# Patient Record
Sex: FEMALE | Race: WHITE | NOT HISPANIC OR LATINO
[De-identification: names, ages, dates, MRNs, and addresses within clinical notes are randomized per-mention and may not be internally consistent; named-entity substitution may affect disease eponyms.]

---

## 2017-01-03 ENCOUNTER — APPOINTMENT (OUTPATIENT)
Dept: ORTHOPEDIC SURGERY | Facility: CLINIC | Age: 82
End: 2017-01-03

## 2017-01-03 VITALS
SYSTOLIC BLOOD PRESSURE: 128 MMHG | TEMPERATURE: 97.5 F | DIASTOLIC BLOOD PRESSURE: 80 MMHG | BODY MASS INDEX: 45.13 KG/M2 | WEIGHT: 215 LBS | HEIGHT: 58 IN

## 2017-01-03 DIAGNOSIS — S82.892D OTHER FRACTURE OF LEFT LOWER LEG, SUBSEQUENT ENCOUNTER FOR CLOSED FRACTURE WITH ROUTINE HEALING: ICD-10-CM

## 2018-06-02 ENCOUNTER — EMERGENCY (EMERGENCY)
Facility: HOSPITAL | Age: 83
LOS: 1 days | Discharge: HOME | End: 2018-06-02
Attending: EMERGENCY MEDICINE
Payer: MEDICARE

## 2018-06-02 VITALS — WEIGHT: 210.1 LBS | HEIGHT: 59 IN

## 2018-06-02 DIAGNOSIS — Z95.2 PRESENCE OF PROSTHETIC HEART VALVE: Chronic | ICD-10-CM

## 2018-06-02 DIAGNOSIS — Z95.0 PRESENCE OF CARDIAC PACEMAKER: Chronic | ICD-10-CM

## 2018-06-02 DIAGNOSIS — Z95.1 PRESENCE OF AORTOCORONARY BYPASS GRAFT: Chronic | ICD-10-CM

## 2018-06-02 DIAGNOSIS — Z90.49 ACQUIRED ABSENCE OF OTHER SPECIFIED PARTS OF DIGESTIVE TRACT: Chronic | ICD-10-CM

## 2018-06-02 DIAGNOSIS — Z95.5 PRESENCE OF CORONARY ANGIOPLASTY IMPLANT AND GRAFT: Chronic | ICD-10-CM

## 2018-06-02 PROCEDURE — 99284 EMERGENCY DEPT VISIT MOD MDM: CPT

## 2018-06-02 PROCEDURE — 73502 X-RAY EXAM HIP UNI 2-3 VIEWS: CPT | Mod: 26,LT

## 2018-06-02 RX ORDER — IBUPROFEN 200 MG
600 TABLET ORAL ONCE
Qty: 0 | Refills: 0 | Status: COMPLETED | OUTPATIENT
Start: 2018-06-02 | End: 2018-06-02

## 2018-06-02 RX ORDER — CYCLOBENZAPRINE HYDROCHLORIDE 10 MG/1
5 TABLET, FILM COATED ORAL ONCE
Qty: 0 | Refills: 0 | Status: COMPLETED | OUTPATIENT
Start: 2018-06-02 | End: 2018-06-02

## 2018-06-02 RX ADMIN — CYCLOBENZAPRINE HYDROCHLORIDE 5 MILLIGRAM(S): 10 TABLET, FILM COATED ORAL at 23:58

## 2018-06-02 RX ADMIN — Medication 600 MILLIGRAM(S): at 23:56

## 2018-06-02 NOTE — ED PROVIDER NOTE - PSH
Aortic valve replaced    Artificial pacemaker    S/P CABG (coronary artery bypass graft)    S/P cholecystectomy    S/P coronary artery stent placement  x 4, 2006-[facility]

## 2018-06-02 NOTE — ED PROVIDER NOTE - PMH
Atrial flutter  1/2010- [facility], Dr Tran  CAD (coronary artery disease)  s/p CABG- 2004-[facility]  Diabetes    HTN (hypertension)    Hyperlipidemia    Obesity    Valvular disease

## 2018-06-02 NOTE — ED ADULT NURSE NOTE - PSH
Aortic valve replaced    Artificial pacemaker    S/P CABG (coronary artery bypass graft)    S/P cholecystectomy    S/P coronary artery stent placement  x 4, 2006-[facility]

## 2018-06-02 NOTE — ED ADULT NURSE NOTE - PMH
Atrial flutter  1/2010- [facility], Dr Tran  CAD (coronary artery disease)  s/p CABG- 2004-[facility]  Diabetes    HTN (hypertension)    Hyperlipidemia    Obesity    Valvular disease

## 2018-06-02 NOTE — ED PROVIDER NOTE - NS ED ROS FT
no weight change, no fever or chills  no recent travel, no recent abox, no sick contacts  no recent change in medications  no rash, no bruises  no visual changes no eye discharge  no cough cold or congestion,   no sob, no chest pain  no orthopnea, no pnd  no abd pain, no n/v/d  no hematuria, no change in urinary habits  (+) left hip pain, no deformity  no headache, no paresthesia

## 2018-06-02 NOTE — ED PROVIDER NOTE - PHYSICAL EXAMINATION
Constitutional: talking in full sentences, obese, uncomfortable,  Head: NC/AT, no swelling  Eyes: EOMI, no swelling  Mouth: mm moist  Neck: supple, trachea is midline  Chest: Bilateral air entry, symmetrical chest expansion, no distress  Heart: S1 S2 distant  Abdomen: abd soft, non tender  Musc/Skel: extremities with no swelling, no deformity, distal pulses intact, groin palpation is non tender, no skin changes L groin. no pain laterally. L-spine nontender.  LE non pitting edema, medial aspect RLE incision.  Neuro: A&Ox3, no focal deficits

## 2018-06-02 NOTE — ED PROVIDER NOTE - OBJECTIVE STATEMENT
88 y/o F pt with Hx of CAD, DM, HLD, HTN and vascular disease, s/p CABG, s/p cholecystomy and stents presents to ED c/o hip pain which onset on yesterday night. She states she was going to the bathroom Friday night when she had acute episode of left hip pain. Her pain is exacerbated with ambulating. Plavix for TAVR placed in in February 2017. The pt lives at home with son where there are no steps., she ambulated with a walker and cane. Denies fall, N/V/D, CP, SOB, HA or abdominal pain

## 2018-06-03 VITALS
DIASTOLIC BLOOD PRESSURE: 73 MMHG | SYSTOLIC BLOOD PRESSURE: 166 MMHG | RESPIRATION RATE: 20 BRPM | TEMPERATURE: 98 F | OXYGEN SATURATION: 99 % | HEART RATE: 63 BPM

## 2018-06-03 PROCEDURE — 73502 X-RAY EXAM HIP UNI 2-3 VIEWS: CPT

## 2018-06-03 PROCEDURE — 74176 CT ABD & PELVIS W/O CONTRAST: CPT

## 2018-06-03 PROCEDURE — 74176 CT ABD & PELVIS W/O CONTRAST: CPT | Mod: 26

## 2018-06-03 PROCEDURE — 99284 EMERGENCY DEPT VISIT MOD MDM: CPT

## 2018-06-03 RX ORDER — LIDOCAINE 4 G/100G
1 CREAM TOPICAL ONCE
Qty: 0 | Refills: 0 | Status: COMPLETED | OUTPATIENT
Start: 2018-06-03 | End: 2018-06-03

## 2018-06-03 RX ORDER — LIDOCAINE 4 G/100G
1 CREAM TOPICAL
Qty: 10 | Refills: 0 | OUTPATIENT
Start: 2018-06-03 | End: 2018-06-12

## 2018-06-03 RX ORDER — OXYCODONE HYDROCHLORIDE 5 MG/1
2.5 TABLET ORAL ONCE
Qty: 0 | Refills: 0 | Status: DISCONTINUED | OUTPATIENT
Start: 2018-06-03 | End: 2018-06-03

## 2018-06-03 RX ORDER — CYCLOBENZAPRINE HYDROCHLORIDE 10 MG/1
1 TABLET, FILM COATED ORAL
Qty: 6 | Refills: 0 | OUTPATIENT
Start: 2018-06-03 | End: 2018-06-05

## 2018-06-03 RX ADMIN — Medication 600 MILLIGRAM(S): at 00:26

## 2018-06-03 RX ADMIN — LIDOCAINE 1 PATCH: 4 CREAM TOPICAL at 05:23

## 2018-06-03 RX ADMIN — OXYCODONE HYDROCHLORIDE 2.5 MILLIGRAM(S): 5 TABLET ORAL at 03:17

## 2018-06-07 NOTE — ED ADULT NURSE NOTE - CHIEF COMPLAINT
The patient is a 87y Female complaining of pain, hip.
You can access the [facility] Patient Portal, offered by [facility], by registering with the following website: [email or web address]

## 2018-08-13 ENCOUNTER — EMERGENCY (EMERGENCY)
Facility: HOSPITAL | Age: 83
LOS: 1 days | Discharge: HOME | End: 2018-08-13
Attending: EMERGENCY MEDICINE
Payer: MEDICARE

## 2018-08-13 VITALS
HEIGHT: 59 IN | TEMPERATURE: 99 F | RESPIRATION RATE: 16 BRPM | WEIGHT: 199.96 LBS | DIASTOLIC BLOOD PRESSURE: 73 MMHG | HEART RATE: 57 BPM | OXYGEN SATURATION: 97 % | SYSTOLIC BLOOD PRESSURE: 170 MMHG

## 2018-08-13 DIAGNOSIS — Z95.5 PRESENCE OF CORONARY ANGIOPLASTY IMPLANT AND GRAFT: Chronic | ICD-10-CM

## 2018-08-13 DIAGNOSIS — Z90.49 ACQUIRED ABSENCE OF OTHER SPECIFIED PARTS OF DIGESTIVE TRACT: Chronic | ICD-10-CM

## 2018-08-13 DIAGNOSIS — Z95.2 PRESENCE OF PROSTHETIC HEART VALVE: Chronic | ICD-10-CM

## 2018-08-13 DIAGNOSIS — Z95.0 PRESENCE OF CARDIAC PACEMAKER: Chronic | ICD-10-CM

## 2018-08-13 DIAGNOSIS — Z95.1 PRESENCE OF AORTOCORONARY BYPASS GRAFT: Chronic | ICD-10-CM

## 2018-08-13 LAB
ALBUMIN SERPL ELPH-MCNC: 3.6 G/DL — SIGNIFICANT CHANGE UP (ref 3.3–5.2)
ALP SERPL-CCNC: 84 U/L — SIGNIFICANT CHANGE UP (ref 40–120)
ALT FLD-CCNC: 19 U/L — SIGNIFICANT CHANGE UP
ANION GAP SERPL CALC-SCNC: 15 MMOL/L — SIGNIFICANT CHANGE UP (ref 5–17)
AST SERPL-CCNC: 24 U/L — SIGNIFICANT CHANGE UP
BASOPHILS # BLD AUTO: 0 K/UL — SIGNIFICANT CHANGE UP (ref 0–0.2)
BASOPHILS NFR BLD AUTO: 0.3 % — SIGNIFICANT CHANGE UP (ref 0–2)
BILIRUB SERPL-MCNC: 0.8 MG/DL — SIGNIFICANT CHANGE UP (ref 0.4–2)
BUN SERPL-MCNC: 17 MG/DL — SIGNIFICANT CHANGE UP (ref 8–20)
CALCIUM SERPL-MCNC: 10.2 MG/DL — SIGNIFICANT CHANGE UP (ref 8.6–10.2)
CHLORIDE SERPL-SCNC: 102 MMOL/L — SIGNIFICANT CHANGE UP (ref 98–107)
CO2 SERPL-SCNC: 25 MMOL/L — SIGNIFICANT CHANGE UP (ref 22–29)
CREAT SERPL-MCNC: 0.65 MG/DL — SIGNIFICANT CHANGE UP (ref 0.5–1.3)
EOSINOPHIL # BLD AUTO: 0.2 K/UL — SIGNIFICANT CHANGE UP (ref 0–0.5)
EOSINOPHIL NFR BLD AUTO: 2.1 % — SIGNIFICANT CHANGE UP (ref 0–6)
GLUCOSE SERPL-MCNC: 122 MG/DL — HIGH (ref 70–115)
HCT VFR BLD CALC: 42.9 % — SIGNIFICANT CHANGE UP (ref 37–47)
HGB BLD-MCNC: 13.9 G/DL — SIGNIFICANT CHANGE UP (ref 12–16)
LYMPHOCYTES # BLD AUTO: 1.3 K/UL — SIGNIFICANT CHANGE UP (ref 1–4.8)
LYMPHOCYTES # BLD AUTO: 17.6 % — LOW (ref 20–55)
MCHC RBC-ENTMCNC: 29.4 PG — SIGNIFICANT CHANGE UP (ref 27–31)
MCHC RBC-ENTMCNC: 32.4 G/DL — SIGNIFICANT CHANGE UP (ref 32–36)
MCV RBC AUTO: 90.7 FL — SIGNIFICANT CHANGE UP (ref 81–99)
MONOCYTES # BLD AUTO: 0.6 K/UL — SIGNIFICANT CHANGE UP (ref 0–0.8)
MONOCYTES NFR BLD AUTO: 7.6 % — SIGNIFICANT CHANGE UP (ref 3–10)
NEUTROPHILS # BLD AUTO: 5.2 K/UL — SIGNIFICANT CHANGE UP (ref 1.8–8)
NEUTROPHILS NFR BLD AUTO: 72.1 % — SIGNIFICANT CHANGE UP (ref 37–73)
PLATELET # BLD AUTO: 195 K/UL — SIGNIFICANT CHANGE UP (ref 150–400)
POTASSIUM SERPL-MCNC: 4.2 MMOL/L — SIGNIFICANT CHANGE UP (ref 3.5–5.3)
POTASSIUM SERPL-SCNC: 4.2 MMOL/L — SIGNIFICANT CHANGE UP (ref 3.5–5.3)
PROT SERPL-MCNC: 7.5 G/DL — SIGNIFICANT CHANGE UP (ref 6.6–8.7)
RBC # BLD: 4.73 M/UL — SIGNIFICANT CHANGE UP (ref 4.4–5.2)
RBC # FLD: 14 % — SIGNIFICANT CHANGE UP (ref 11–15.6)
SODIUM SERPL-SCNC: 142 MMOL/L — SIGNIFICANT CHANGE UP (ref 135–145)
WBC # BLD: 7.2 K/UL — SIGNIFICANT CHANGE UP (ref 4.8–10.8)
WBC # FLD AUTO: 7.2 K/UL — SIGNIFICANT CHANGE UP (ref 4.8–10.8)

## 2018-08-13 PROCEDURE — 93005 ELECTROCARDIOGRAM TRACING: CPT

## 2018-08-13 PROCEDURE — 93010 ELECTROCARDIOGRAM REPORT: CPT

## 2018-08-13 PROCEDURE — 72125 CT NECK SPINE W/O DYE: CPT

## 2018-08-13 PROCEDURE — 80053 COMPREHEN METABOLIC PANEL: CPT

## 2018-08-13 PROCEDURE — 73521 X-RAY EXAM HIPS BI 2 VIEWS: CPT

## 2018-08-13 PROCEDURE — 70450 CT HEAD/BRAIN W/O DYE: CPT

## 2018-08-13 PROCEDURE — 36415 COLL VENOUS BLD VENIPUNCTURE: CPT

## 2018-08-13 PROCEDURE — 72125 CT NECK SPINE W/O DYE: CPT | Mod: 26

## 2018-08-13 PROCEDURE — 73521 X-RAY EXAM HIPS BI 2 VIEWS: CPT | Mod: 26

## 2018-08-13 PROCEDURE — 70450 CT HEAD/BRAIN W/O DYE: CPT | Mod: 26

## 2018-08-13 PROCEDURE — 85027 COMPLETE CBC AUTOMATED: CPT

## 2018-08-13 PROCEDURE — 99284 EMERGENCY DEPT VISIT MOD MDM: CPT | Mod: 25

## 2018-08-13 PROCEDURE — 99284 EMERGENCY DEPT VISIT MOD MDM: CPT

## 2018-08-13 RX ORDER — ACETAMINOPHEN 500 MG
650 TABLET ORAL ONCE
Qty: 0 | Refills: 0 | Status: COMPLETED | OUTPATIENT
Start: 2018-08-13 | End: 2018-08-13

## 2018-08-13 RX ADMIN — Medication 650 MILLIGRAM(S): at 17:01

## 2018-08-13 NOTE — ED ADULT NURSE NOTE - PMH
Atrial flutter  1/2010- [facility], Dr Tran  CAD (coronary artery disease)  s/p CABG- 2004-[facility]  Diabetes    HTN (hypertension)    Hyperlipidemia    Obesity    Valvular disease

## 2018-08-13 NOTE — ED PROVIDER NOTE - PSH
Aortic valve replaced    Artificial pacemaker    S/P CABG (coronary artery bypass graft)    S/P cholecystectomy    S/P coronary artery stent placement  x 4, 2006-[facility]

## 2018-08-13 NOTE — ED ADULT NURSE NOTE - NSIMPLEMENTINTERV_GEN_ALL_ED
Implemented All Fall with Harm Risk Interventions:  [place] to call system. Call bell, personal items and telephone within reach. Instruct patient to call for assistance. Room bathroom lighting operational. Non-slip footwear when patient is off stretcher. Physically safe environment: no spills, clutter or unnecessary equipment. Stretcher in lowest position, wheels locked, appropriate side rails in place. Provide visual cue, wrist band, yellow gown, etc. Monitor gait and stability. Monitor for mental status changes and reorient to person, place, and time. Review medications for side effects contributing to fall risk. Reinforce activity limits and safety measures with patient and family. Provide visual clues: red socks.

## 2018-08-13 NOTE — ED ADULT NURSE NOTE - OBJECTIVE STATEMENT
87 year old female in no acute distress, alert and oriented x 4, BIBA from home s/p mechanical fall from standing height at approx 1400 this afternoon. Pt reports mild headache to sides of forehead and reports she hit the back of her head on the wood floor, denies LOC, denies neck pain, states "I'm fine" Reports stopped taking plavix the second week of July and takes only daily baby ASA. Fall precautions in place, daughter at bedside, will monitor.

## 2018-08-13 NOTE — ED PROVIDER NOTE - CHPI ED SYMPTOMS NEG
no tingling/no weakness/no abrasion/no bleeding/no fever/no numbness/no vomiting/no loss of consciousness/no confusion/no deformity

## 2018-08-13 NOTE — ED PROVIDER NOTE - ATTENDING CONTRIBUTION TO CARE
86 yo female with a pmh of HTN, HLD, DM, open heart surgery (2004), pacemaker (2016), and TAVR (Feb 2016) presents after a fall. Pt states that she was getting a shirt out of her closet and lost her balance hitting the back of her head on the floor. She was not able to get up after the fall but was quickly assisted by family and bought to the ED. pe head ncat neck supple  chest nontender; ambulating without difficulty;

## 2018-08-13 NOTE — ED PROVIDER NOTE - PROGRESS NOTE DETAILS
pt witnessed ambulating to restroom with minimal assistance from nursing aid. Pt is in no pain and is not experiencing discomfort with ambulation. pt is sitting in bed and in no distress. She states that her headache has subsided with the tylenol given and she denies any pain. Her results are discussed with her and she has no questions. Will send home with education on fall precautions at home.

## 2018-08-13 NOTE — ED ADULT NURSE NOTE - PSH
Aortic valve replaced    Artificial pacemaker    S/P CABG (coronary artery bypass graft)    S/P cholecystectomy    S/P coronary artery stent placement  x 4, 2006-[facility]

## 2018-08-13 NOTE — ED PROVIDER NOTE - MEDICAL DECISION MAKING DETAILS
86 yo female presents after a fall. CT head, xray sandy hips and pelvis, and labs will be done to r/o fracture and brain hemorrhage. Reevaluate.

## 2018-08-13 NOTE — ED PROVIDER NOTE - PMH
Atrial flutter  1/2010- [facility], Dr Tran  CAD (coronary artery disease)  s/p CABG- 2004-[facility]  Diabetes    HTN (hypertension)    Hyperlipidemia    Obesity    Valvular disease

## 2018-08-13 NOTE — ED PROVIDER NOTE - OBJECTIVE STATEMENT
86 yo female with a pmh of HTN, HLD, DM, open heart surgery (2004), pacemaker (2016), and TAVR (Feb 2016) presents after a fall. Pt states that she was getting a shirt out of her closet and lost her balance hitting the back of her head on the floor. She was not able to get up after the fall but was quickly assisted by family and bought to the ED. Pt states did not have nay LOC or blurred vision during or after the fall. She denies pain to the back of her head but states to have a frontal headache that she rates a 5/10. Her headache has been consistent since the fall and is dull in nature. She denies any other symptoms including n/v, fevers, chills, cough, chest pain, or SOB.

## 2018-08-13 NOTE — ED PROVIDER NOTE - SKIN, MLM
Red bruise to the center upper occipital area of the head. Skin normal color for race, warm, dry and intact. No evidence of rash.

## 2019-02-03 ENCOUNTER — INPATIENT (INPATIENT)
Facility: HOSPITAL | Age: 84
LOS: 10 days | Discharge: HOME | DRG: 23 | End: 2019-02-14
Attending: PSYCHIATRY & NEUROLOGY | Admitting: INTERNAL MEDICINE
Payer: MEDICARE

## 2019-02-03 ENCOUNTER — EMERGENCY (EMERGENCY)
Facility: HOSPITAL | Age: 84
LOS: 1 days | Discharge: OTHER INSTITUTION | End: 2019-02-03
Attending: EMERGENCY MEDICINE
Payer: MEDICARE

## 2019-02-03 ENCOUNTER — APPOINTMENT (OUTPATIENT)
Age: 84
End: 2019-02-03
Payer: MEDICARE

## 2019-02-03 VITALS
HEART RATE: 75 BPM | OXYGEN SATURATION: 98 % | DIASTOLIC BLOOD PRESSURE: 64 MMHG | SYSTOLIC BLOOD PRESSURE: 165 MMHG | TEMPERATURE: 97 F | RESPIRATION RATE: 23 BRPM | WEIGHT: 217.16 LBS

## 2019-02-03 VITALS
HEART RATE: 62 BPM | SYSTOLIC BLOOD PRESSURE: 156 MMHG | RESPIRATION RATE: 20 BRPM | TEMPERATURE: 98 F | OXYGEN SATURATION: 97 % | DIASTOLIC BLOOD PRESSURE: 72 MMHG

## 2019-02-03 VITALS
DIASTOLIC BLOOD PRESSURE: 76 MMHG | HEART RATE: 74 BPM | WEIGHT: 238.1 LBS | SYSTOLIC BLOOD PRESSURE: 138 MMHG | RESPIRATION RATE: 20 BRPM | OXYGEN SATURATION: 99 % | TEMPERATURE: 98 F

## 2019-02-03 DIAGNOSIS — Z90.49 ACQUIRED ABSENCE OF OTHER SPECIFIED PARTS OF DIGESTIVE TRACT: Chronic | ICD-10-CM

## 2019-02-03 DIAGNOSIS — Z95.5 PRESENCE OF CORONARY ANGIOPLASTY IMPLANT AND GRAFT: Chronic | ICD-10-CM

## 2019-02-03 DIAGNOSIS — Z95.1 PRESENCE OF AORTOCORONARY BYPASS GRAFT: Chronic | ICD-10-CM

## 2019-02-03 DIAGNOSIS — Z95.2 PRESENCE OF PROSTHETIC HEART VALVE: Chronic | ICD-10-CM

## 2019-02-03 DIAGNOSIS — Z95.0 PRESENCE OF CARDIAC PACEMAKER: Chronic | ICD-10-CM

## 2019-02-03 DIAGNOSIS — I63.9 CEREBRAL INFARCTION, UNSPECIFIED: ICD-10-CM

## 2019-02-03 LAB
ALBUMIN SERPL ELPH-MCNC: 3.5 G/DL — SIGNIFICANT CHANGE UP (ref 3.3–5.2)
ALBUMIN SERPL ELPH-MCNC: 3.8 G/DL — SIGNIFICANT CHANGE UP (ref 3.3–5)
ALP SERPL-CCNC: 82 U/L — SIGNIFICANT CHANGE UP (ref 40–120)
ALP SERPL-CCNC: 82 U/L — SIGNIFICANT CHANGE UP (ref 40–120)
ALT FLD-CCNC: 22 U/L — SIGNIFICANT CHANGE UP (ref 10–45)
ALT FLD-CCNC: 24 U/L — SIGNIFICANT CHANGE UP
ANION GAP SERPL CALC-SCNC: 15 MMOL/L — SIGNIFICANT CHANGE UP (ref 5–17)
ANION GAP SERPL CALC-SCNC: 18 MMOL/L — HIGH (ref 5–17)
APTT BLD: 26 SEC — LOW (ref 27.5–36.3)
APTT BLD: 28.7 SEC — SIGNIFICANT CHANGE UP (ref 27.5–36.3)
AST SERPL-CCNC: 27 U/L — SIGNIFICANT CHANGE UP (ref 10–40)
AST SERPL-CCNC: 40 U/L — HIGH
BASOPHILS # BLD AUTO: 0 K/UL — SIGNIFICANT CHANGE UP (ref 0–0.2)
BASOPHILS # BLD AUTO: 0 K/UL — SIGNIFICANT CHANGE UP (ref 0–0.2)
BASOPHILS NFR BLD AUTO: 0.2 % — SIGNIFICANT CHANGE UP (ref 0–2)
BASOPHILS NFR BLD AUTO: 0.3 % — SIGNIFICANT CHANGE UP (ref 0–2)
BILIRUB SERPL-MCNC: 0.6 MG/DL — SIGNIFICANT CHANGE UP (ref 0.2–1.2)
BILIRUB SERPL-MCNC: 0.7 MG/DL — SIGNIFICANT CHANGE UP (ref 0.4–2)
BUN SERPL-MCNC: 20 MG/DL — SIGNIFICANT CHANGE UP (ref 7–23)
BUN SERPL-MCNC: 22 MG/DL — HIGH (ref 8–20)
CALCIUM SERPL-MCNC: 9.4 MG/DL — SIGNIFICANT CHANGE UP (ref 8.4–10.5)
CALCIUM SERPL-MCNC: 9.5 MG/DL — SIGNIFICANT CHANGE UP (ref 8.6–10.2)
CHLORIDE SERPL-SCNC: 100 MMOL/L — SIGNIFICANT CHANGE UP (ref 96–108)
CHLORIDE SERPL-SCNC: 100 MMOL/L — SIGNIFICANT CHANGE UP (ref 98–107)
CK SERPL-CCNC: 85 U/L — SIGNIFICANT CHANGE UP (ref 25–170)
CO2 SERPL-SCNC: 20 MMOL/L — LOW (ref 22–29)
CO2 SERPL-SCNC: 22 MMOL/L — SIGNIFICANT CHANGE UP (ref 22–31)
CREAT SERPL-MCNC: 0.62 MG/DL — SIGNIFICANT CHANGE UP (ref 0.5–1.3)
CREAT SERPL-MCNC: 0.73 MG/DL — SIGNIFICANT CHANGE UP (ref 0.5–1.3)
EOSINOPHIL # BLD AUTO: 0 K/UL — SIGNIFICANT CHANGE UP (ref 0–0.5)
EOSINOPHIL # BLD AUTO: 0.1 K/UL — SIGNIFICANT CHANGE UP (ref 0–0.5)
EOSINOPHIL NFR BLD AUTO: 0.4 % — SIGNIFICANT CHANGE UP (ref 0–6)
EOSINOPHIL NFR BLD AUTO: 1.4 % — SIGNIFICANT CHANGE UP (ref 0–6)
GLUCOSE SERPL-MCNC: 212 MG/DL — HIGH (ref 70–99)
GLUCOSE SERPL-MCNC: 224 MG/DL — HIGH (ref 70–115)
HCT VFR BLD CALC: 39.1 % — SIGNIFICANT CHANGE UP (ref 34.5–45)
HCT VFR BLD CALC: 43.7 % — SIGNIFICANT CHANGE UP (ref 37–47)
HGB BLD-MCNC: 13.7 G/DL — SIGNIFICANT CHANGE UP (ref 11.5–15.5)
HGB BLD-MCNC: 14.2 G/DL — SIGNIFICANT CHANGE UP (ref 12–16)
INR BLD: 1.06 RATIO — SIGNIFICANT CHANGE UP (ref 0.88–1.16)
INR BLD: 1.08 RATIO — SIGNIFICANT CHANGE UP (ref 0.88–1.16)
LYMPHOCYTES # BLD AUTO: 1.1 K/UL — SIGNIFICANT CHANGE UP (ref 1–3.3)
LYMPHOCYTES # BLD AUTO: 10.8 % — LOW (ref 13–44)
LYMPHOCYTES # BLD AUTO: 2.2 K/UL — SIGNIFICANT CHANGE UP (ref 1–4.8)
LYMPHOCYTES # BLD AUTO: 26 % — SIGNIFICANT CHANGE UP (ref 20–55)
MCHC RBC-ENTMCNC: 29.9 PG — SIGNIFICANT CHANGE UP (ref 27–31)
MCHC RBC-ENTMCNC: 31.2 PG — SIGNIFICANT CHANGE UP (ref 27–34)
MCHC RBC-ENTMCNC: 32.5 G/DL — SIGNIFICANT CHANGE UP (ref 32–36)
MCHC RBC-ENTMCNC: 35.1 GM/DL — SIGNIFICANT CHANGE UP (ref 32–36)
MCV RBC AUTO: 89 FL — SIGNIFICANT CHANGE UP (ref 80–100)
MCV RBC AUTO: 92 FL — SIGNIFICANT CHANGE UP (ref 81–99)
MONOCYTES # BLD AUTO: 0.5 K/UL — SIGNIFICANT CHANGE UP (ref 0–0.9)
MONOCYTES # BLD AUTO: 0.8 K/UL — SIGNIFICANT CHANGE UP (ref 0–0.8)
MONOCYTES NFR BLD AUTO: 5.1 % — SIGNIFICANT CHANGE UP (ref 2–14)
MONOCYTES NFR BLD AUTO: 9.2 % — SIGNIFICANT CHANGE UP (ref 3–10)
NEUTROPHILS # BLD AUTO: 5.3 K/UL — SIGNIFICANT CHANGE UP (ref 1.8–8)
NEUTROPHILS # BLD AUTO: 8.7 K/UL — HIGH (ref 1.8–7.4)
NEUTROPHILS NFR BLD AUTO: 63 % — SIGNIFICANT CHANGE UP (ref 37–73)
NEUTROPHILS NFR BLD AUTO: 83.4 % — HIGH (ref 43–77)
PLATELET # BLD AUTO: 186 K/UL — SIGNIFICANT CHANGE UP (ref 150–400)
PLATELET # BLD AUTO: 205 K/UL — SIGNIFICANT CHANGE UP (ref 150–400)
POTASSIUM SERPL-MCNC: 3.7 MMOL/L — SIGNIFICANT CHANGE UP (ref 3.5–5.3)
POTASSIUM SERPL-MCNC: 4.3 MMOL/L — SIGNIFICANT CHANGE UP (ref 3.5–5.3)
POTASSIUM SERPL-SCNC: 3.7 MMOL/L — SIGNIFICANT CHANGE UP (ref 3.5–5.3)
POTASSIUM SERPL-SCNC: 4.3 MMOL/L — SIGNIFICANT CHANGE UP (ref 3.5–5.3)
PROT SERPL-MCNC: 7 G/DL — SIGNIFICANT CHANGE UP (ref 6–8.3)
PROT SERPL-MCNC: 7.4 G/DL — SIGNIFICANT CHANGE UP (ref 6.6–8.7)
PROTHROM AB SERPL-ACNC: 12.2 SEC — SIGNIFICANT CHANGE UP (ref 10–12.9)
PROTHROM AB SERPL-ACNC: 12.3 SEC — SIGNIFICANT CHANGE UP (ref 10–12.9)
RBC # BLD: 4.4 M/UL — SIGNIFICANT CHANGE UP (ref 3.8–5.2)
RBC # BLD: 4.75 M/UL — SIGNIFICANT CHANGE UP (ref 4.4–5.2)
RBC # FLD: 12.5 % — SIGNIFICANT CHANGE UP (ref 10.3–14.5)
RBC # FLD: 13.9 % — SIGNIFICANT CHANGE UP (ref 11–15.6)
SODIUM SERPL-SCNC: 137 MMOL/L — SIGNIFICANT CHANGE UP (ref 135–145)
SODIUM SERPL-SCNC: 138 MMOL/L — SIGNIFICANT CHANGE UP (ref 135–145)
TROPONIN T SERPL-MCNC: <0.01 NG/ML — SIGNIFICANT CHANGE UP (ref 0–0.06)
TROPONIN T, HIGH SENSITIVITY RESULT: 18 NG/L — SIGNIFICANT CHANGE UP (ref 0–51)
TYPE + AB SCN PNL BLD: SIGNIFICANT CHANGE UP
WBC # BLD: 10.4 K/UL — SIGNIFICANT CHANGE UP (ref 3.8–10.5)
WBC # BLD: 8.4 K/UL — SIGNIFICANT CHANGE UP (ref 4.8–10.8)
WBC # FLD AUTO: 10.4 K/UL — SIGNIFICANT CHANGE UP (ref 3.8–10.5)
WBC # FLD AUTO: 8.4 K/UL — SIGNIFICANT CHANGE UP (ref 4.8–10.8)

## 2019-02-03 PROCEDURE — 70450 CT HEAD/BRAIN W/O DYE: CPT

## 2019-02-03 PROCEDURE — 84484 ASSAY OF TROPONIN QUANT: CPT

## 2019-02-03 PROCEDURE — 93005 ELECTROCARDIOGRAM TRACING: CPT

## 2019-02-03 PROCEDURE — 0042T: CPT

## 2019-02-03 PROCEDURE — 93010 ELECTROCARDIOGRAM REPORT: CPT

## 2019-02-03 PROCEDURE — 82550 ASSAY OF CK (CPK): CPT

## 2019-02-03 PROCEDURE — 86901 BLOOD TYPING SEROLOGIC RH(D): CPT

## 2019-02-03 PROCEDURE — 70498 CT ANGIOGRAPHY NECK: CPT | Mod: 26

## 2019-02-03 PROCEDURE — 99292 CRITICAL CARE ADDL 30 MIN: CPT

## 2019-02-03 PROCEDURE — 70450 CT HEAD/BRAIN W/O DYE: CPT | Mod: 26,59,76

## 2019-02-03 PROCEDURE — 70498 CT ANGIOGRAPHY NECK: CPT

## 2019-02-03 PROCEDURE — 86900 BLOOD TYPING SEROLOGIC ABO: CPT

## 2019-02-03 PROCEDURE — 70460 CT HEAD/BRAIN W/DYE: CPT | Mod: 26

## 2019-02-03 PROCEDURE — 86850 RBC ANTIBODY SCREEN: CPT

## 2019-02-03 PROCEDURE — 85730 THROMBOPLASTIN TIME PARTIAL: CPT

## 2019-02-03 PROCEDURE — 70450 CT HEAD/BRAIN W/O DYE: CPT | Mod: 26,59

## 2019-02-03 PROCEDURE — 61645 PERQ ART M-THROMBECT &/NFS: CPT | Mod: LT

## 2019-02-03 PROCEDURE — 82962 GLUCOSE BLOOD TEST: CPT

## 2019-02-03 PROCEDURE — 70496 CT ANGIOGRAPHY HEAD: CPT

## 2019-02-03 PROCEDURE — 80053 COMPREHEN METABOLIC PANEL: CPT

## 2019-02-03 PROCEDURE — 99291 CRITICAL CARE FIRST HOUR: CPT

## 2019-02-03 PROCEDURE — 70496 CT ANGIOGRAPHY HEAD: CPT | Mod: 26

## 2019-02-03 PROCEDURE — 99285 EMERGENCY DEPT VISIT HI MDM: CPT | Mod: 25

## 2019-02-03 PROCEDURE — 85610 PROTHROMBIN TIME: CPT

## 2019-02-03 PROCEDURE — 85027 COMPLETE CBC AUTOMATED: CPT

## 2019-02-03 PROCEDURE — 36415 COLL VENOUS BLD VENIPUNCTURE: CPT

## 2019-02-03 RX ORDER — ASPIRIN/CALCIUM CARB/MAGNESIUM 324 MG
81 TABLET ORAL DAILY
Qty: 0 | Refills: 0 | Status: DISCONTINUED | OUTPATIENT
Start: 2019-02-03 | End: 2019-02-04

## 2019-02-03 RX ORDER — SODIUM CHLORIDE 9 MG/ML
1000 INJECTION INTRAMUSCULAR; INTRAVENOUS; SUBCUTANEOUS
Qty: 0 | Refills: 0 | Status: DISCONTINUED | OUTPATIENT
Start: 2019-02-03 | End: 2019-02-04

## 2019-02-03 RX ORDER — ENOXAPARIN SODIUM 100 MG/ML
40 INJECTION SUBCUTANEOUS EVERY 24 HOURS
Qty: 0 | Refills: 0 | Status: DISCONTINUED | OUTPATIENT
Start: 2019-02-03 | End: 2019-02-04

## 2019-02-03 RX ORDER — ATORVASTATIN CALCIUM 80 MG/1
80 TABLET, FILM COATED ORAL AT BEDTIME
Qty: 0 | Refills: 0 | Status: DISCONTINUED | OUTPATIENT
Start: 2019-02-03 | End: 2019-02-05

## 2019-02-03 NOTE — ED ADULT NURSE NOTE - PSH
Aortic valve replaced    Artificial pacemaker    S/P CABG (coronary artery bypass graft)    S/P cholecystectomy    S/P coronary artery stent placement  x 4, 2006-[facility]

## 2019-02-03 NOTE — ED PROVIDER NOTE - PROGRESS NOTE DETAILS
Radiologist states CT negative for ICH and for ischemic stroke. Given the significant and immediate threats to this patient based on initial presentation, the benefits of emergency contrast-enhanced CT imaging without obtaining GFR/creatinine serum level results greatly outweigh the potential risk of harm due to contrast-induced nephropathy. Dr. Jasso returned call and gave CT results of M1 occlusion.  Recommends transfer to [place]. Radiology called with results: Distal M1 proximal M2 occlusion

## 2019-02-03 NOTE — ED PROVIDER NOTE - PMH
Atrial flutter  1/2010- [facility], Dr Tran  CAD (coronary artery disease)  s/p CABG- 2004-[facility]  Diabetes    HTN (hypertension)    Hyperlipidemia    Obesity    Valvular disease

## 2019-02-03 NOTE — ED PROVIDER NOTE - ATTENDING CONTRIBUTION TO CARE
Dr. Galindo (Attending Physician)  I performed a history and physical exam of the patient and discussed their management with the resident. I reviewed the resident's note and agree with the documented findings and plan of care. My medical decision making and observations are found above.

## 2019-02-03 NOTE — ED ADULT NURSE NOTE - PMH
Atrial flutter  1/2010- [facility], Dr Tran  CAD (coronary artery disease)  s/p CABG- 2004-[facility]  Diabetes    HTN (hypertension)    Hyperlipidemia    Obesity    Valvular disease

## 2019-02-03 NOTE — CHART NOTE - NSCHARTNOTEFT_GEN_A_CORE
Interventional Neuro Radiology  Pre-Procedure Note PA-C    This is a 88 year old right hand dominant female with a right MCA occlusion           Allergies: No Known Allergies  PMHX: valvular disease, atrial flutter, diabetes, obesity hyperlipidemia, hypertension, coronary artery disease, artificial pacemaker  PSHX: aortic valve replaced, coronary artery stent placement, cholecystectomy, coronary artery bypass graft)  Social History:     FAMILY HISTORY:  Family history of cerebrovascular accident (CVA)  Family history of hypertension    CBC                        13.7   10.4  )-----------( 186      ( 03 Feb 2019 21:48 )             39.1       BMP    137  |  100  |  20  ----------------------------<  212<H>  3.7   |  22  |  0.73      Blood Bank:     Assessment/Plan:   This is a 88 year old right hand dominant female right MCA   Procedure, goals, risks, benefits and alternatives were discussed with patient's daughter. All questions were answered.  Risks include but are not limited to stroke, vessel injury, hemorrhage, and or right groin hematoma. Patient's daughter demonstrates understanding of all risks involved with this procedure and wishes to continue. Appropriate content was obtained from patient's daughter and consent is in the patient's chart. Interventional Neuro Radiology  Pre-Procedure Note PA-C    This is a 88 year old right hand dominant female transfer from [facility] with an acute left MCA occlusion. Patient was brought to Neuro IR for a selective cerebral angiography and endovascular treatment.     Allergies: No Known Allergies  PMHX: valvular disease, atrial flutter, diabetes, obesity, hyperlipidemia, hypertension, coronary artery disease, artificial pacemaker  PSHX: aortic valve replaced, coronary artery stent placement, cholecystectomy, coronary artery bypass graft  Social History: non-smoker     FAMILY HISTORY: family history of cerebrovascular accident (CVA)    CBC                        13.7   10.4  )-----------( 186      ( 03 Feb 2019 21:48 )             39.1       BMP    137  |  100  |  20  ----------------------------<  212<H>  3.7   |  22  |  0.73      Blood Bank: A positive available     Assessment/Plan:   This is a 88 year old right hand dominant female a left MCA occlusion, transported to Neuro IR for a selective cerebral angiography and stroke intervention. Procedure, goals, risks, benefits and alternatives were discussed with patient's daughter. All questions were answered. Risks include but are not limited to stroke, vessel injury, hemorrhage, and or right groin hematoma. Patient's daughter demonstrates understanding of all risks involved with this procedure and wishes to continue. Appropriate content was obtained from patient's daughter and consent is in the patient's chart. Interventional Neuro Radiology  Pre-Procedure Note PA-C    This is a 88 year old right hand dominant female transfer from [facility] with an acute left MCA occlusion. No tPA was given because patient was outside the window. Patient was brought to Neuro IR for a selective cerebral angiography and endovascular treatment.     Allergies: No Known Allergies  PMHX: valvular disease, atrial flutter, diabetes, obesity, hyperlipidemia, hypertension, coronary artery disease, artificial pacemaker  PSHX: aortic valve replaced, coronary artery stent placement, cholecystectomy, coronary artery bypass graft  Social History: non-smoker     FAMILY HISTORY: family history of cerebrovascular accident (CVA)    CBC                        13.7   10.4  )-----------( 186      ( 03 Feb 2019 21:48 )             39.1       BMP    137  |  100  |  20  ----------------------------<  212<H>  3.7   |  22  |  0.73      Blood Bank: A positive available     Assessment/Plan:   This is a 88 year old right hand dominant female a left MCA occlusion, transported to Neuro IR for a selective cerebral angiography and stroke intervention. Procedure, goals, risks, benefits and alternatives were discussed with patient's daughter. All questions were answered. Risks include but are not limited to stroke, vessel injury, hemorrhage, and or right groin hematoma. Patient's daughter demonstrates understanding of all risks involved with this procedure and wishes to continue. Appropriate content was obtained from patient's daughter and consent is in the patient's chart.

## 2019-02-03 NOTE — ED ADULT TRIAGE NOTE - CHIEF COMPLAINT QUOTE
patient biba from home, as per ems patient was last seen normal at 2pm by daughter, patient with right sided wseakness and facial droop, unable to speak with coherent sentences. Dr. Tam at bedside upon arrival, code stroke called patient brought to CT

## 2019-02-03 NOTE — ED PROVIDER NOTE - PSH
Aortic valve replaced    Artificial pacemaker    S/P CABG (coronary artery bypass graft)    S/P cholecystectomy    S/P coronary artery stent placement  x 4, 2006-[facility]

## 2019-02-03 NOTE — ED ADULT NURSE NOTE - OBJECTIVE STATEMENT
88 YOF transfer by EMS from [facility] as code stroke. As per EMS patient was last seen normal at 14:00 and family witnessed patient symptomatic at 18:30, patient was on floor and had vomited. EMS states at [facility] diagnostic scanning showed Left MCA. As stated by EMS patient is aphasic but obeys commands, patient has right sided weakness of arm and leg with right sided facial droop noted. As per EMS no medications were given at [place]. EMS  stated patient is normally independent ambulates and takes care of self. 88 YOF transfer by EMS from [facility] as code stroke. As per EMS patient was last seen normal at 14:00 and family witnessed patient symptomatic at 18:30, patient was on floor and had vomited. EMS states at [facility] diagnostic scanning showed Left MCA. As stated by EMS patient is aphasic but obeys commands, patient has right sided weakness of arm and leg with right sided facial droop noted. As per EMS no medications were given at [place]. EMS  stated patient is normally independent ambulates and takes care of self. pmh includes cad, a-fib, diabetes, hyperlipidemia, hypertension. Patient has pacemaker. Unable to obtain vital signs until 21:36, patient taken straight to CT by EMS. 88 YOF transfer by EMS from [facility] as code stroke. As per EMS patient was last seen normal at 14:00 and family witnessed patient symptomatic at 18:30, patient was on floor and had vomited. Patient taken to [facility] where she was found to have a Left MCA. As stated by EMS patient is aphasic but obeys commands, patient has right sided weakness of arm and leg with right sided facial droop noted. As per EMS no medications were given at [place]. EMS stated patient is normally independent ambulates and takes care of self. Pmh includes cad, a-fib, diabetes, hyperlipidemia, hypertension. Patient has pacemaker. Unable to obtain vital signs until 21:36, patient taken straight to CT by EMS on arrival to ED. Daughter at bedside. 88 YOF transfer by EMS from [facility] as code stroke. As per EMS patient was last seen normal at 14:00 and family witnessed patient symptomatic at 18:30, patient was on floor and had vomited. Patient taken to [facility] where she was found to have a Left MCA occlusion. As stated by EMS patient is aphasic but obeys commands, patient has right sided weakness of arm and leg with right sided facial droop noted. As per EMS no medications were given at [place]. EMS stated patient is normally independent ambulates and takes care of self. Pmh includes cad, a-fib, diabetes, hyperlipidemia, hypertension. Patient has pacemaker. Unable to obtain vital signs until 21:36, patient taken straight to CT by EMS on arrival to ED. Daughter at bedside.

## 2019-02-03 NOTE — CHART NOTE - NSCHARTNOTEFT_GEN_A_CORE
Interventional Neuro- Radiology   Procedure Note PA-C    Procedure: Selective Cerebral Angiography and stroke intervention   Pre- Procedure Diagnosis: left MCA occlusion   Post- Procedure Diagnosis: proximal left MCA, M1 occlusion     : Dr Chuy Mulligan   Physician Assistant: Joann Allen PA-C    Nurse:                    Adeola Garcia RN  Radiologic tech:     Prashant Drummond LRT           Liang Delatorre LRT  Anesthesiologist:    Dr Brad Rangel            Sheath:                  6 Swedish Neuron Max    I/Os: EBL less than 10cc  IV fluids: 200 Urine output 125cc  Contrast Omnipaque 240 23cc      Solitaire  4mm by 40mm       Vitals: /78       HR 82      Spo2 100%    Preliminary Report:  Using a 6 Swedish Neuro Max sheath to the right groin under general anesthesia via right common carotid artery a selective cerebral angiography was performed and demonstrated a proximal left MCA, M1 occlusion s/post endovascular stroke therapy using Solumbra technique, with resultant TICI 3 reperfusion. Official note to follow.  Patient tolerated procedure well, hemodynamically stable, no change in neurological status compared to baseline. Results discussed with neuro ICU and patient's daughter. Right groin sheath was removed, star close device and manual compression held to hemostasis  for  21 minutes, no active bleeding, no hematoma, quick clot and safeguard balloon dressing applied at 2315. STAT labs CBC BMP PTT PT at 12midnight and 6am. Interventional Neuro- Radiology   Procedure Note PA-C    Procedure: Selective Cerebral Angiography and stroke intervention   Pre- Procedure Diagnosis: left MCA occlusion   Post- Procedure Diagnosis: proximal left MCA, M1 occlusion TICI 3    : Dr Chuy Mulligan   Physician Assistant: Joann Allen PA-C    Nurse:                    Adeola Garcia RN  Radiologic tech:     Prashant Drummond LRT           Liang Delatorre LRT  Anesthesiologist:    Dr Brad aRngel            Sheath:                  6 Divehi Neuron Max    I/Os: EBL less than 10cc  IV fluids: 200 Urine output 125cc  Contrast Omnipaque 240 23cc      Solitaire  4mm by 40mm          Ancef 2 grams     Vitals: /78       HR 82      Spo2 100%    Preliminary Report:  Using a 6 Divehi Neuro Max sheath to the right groin under general anesthesia via right common carotid artery a selective cerebral angiography was performed and demonstrated a proximal left MCA, M1 occlusion s/post endovascular stroke therapy using Solumbra technique, with resultant TICI 3 reperfusion. Official note to follow.  Patient tolerated procedure well, hemodynamically stable, no change in neurological status compared to baseline. Results discussed with neuro ICU and patient's daughter. Right groin sheath was removed, star close device and manual compression held to hemostasis for 21 minutes, no active bleeding, no hematoma, quick clot and safeguard balloon dressing applied at 2315. STAT labs CBC BMP PTT PT at 12midnight and 6am.

## 2019-02-03 NOTE — ED PROVIDER NOTE - MEDICAL DECISION MAKING DETAILS
Dr. Galindo (Attending Physician)  Left M1 occlusion with right facial droop and right extremity weakness. Will CT head stroke protocol and CT perfusion. code stroke called.

## 2019-02-03 NOTE — ED ADULT NURSE NOTE - OBJECTIVE STATEMENT
Patient presents with stroke symptoms. Pt has right sided weakness, unable to move right sided extremities against gravity.  Last known well 2pm, not a candidate for TPA.  Code team and Dr. Tam at bedside, Awaiting transport to [place].

## 2019-02-03 NOTE — ED ADULT NURSE REASSESSMENT NOTE - NS ED NURSE REASSESS COMMENT FT1
Gonzalez placed for surgical procedure, patient to IR after insertion. Gonzalez catheter placed using sterile technique. Second RN present to confirm sterility. Draining to gravity. Secured w/ stat lock. Initial output off approx. 200 cc's urine. Patient tolerated procedure well. Will cont to monitor. Safety & comfort maintained.
Patient transported to IR with neuro surg resident MD Murray and RN. Bedside report given to IR RUTHY Grullon. patient transferred to IR team. safety maintained.
TOKEN:'[id number]'

## 2019-02-03 NOTE — ED ADULT NURSE NOTE - PSH
Aortic valve replaced    Artificial pacemaker    S/P CABG (coronary artery bypass graft)    S/P cholecystectomy    S/P coronary artery stent placement  x 4, 2006-[facility]

## 2019-02-03 NOTE — ED PROVIDER NOTE - MEDICAL DECISION MAKING DETAILS
Acute onset right sided weakness patient not in window for tPA.  CT negative for ICH.  lAMS of 5 will check CTA, Labs, and admit vs transfer.

## 2019-02-03 NOTE — ED PROVIDER NOTE - OBJECTIVE STATEMENT
Dr. Galindo (Attending Physician)  Pt. with dax alcantara, Transfer from [facility] with left M1 occlusion on CTA patient pw right facial droop and right sided weakness last known normal at 2 pm. Was out of window for Tpa, tx to [facility] for possible neurointerventional procedure.

## 2019-02-03 NOTE — ED PROVIDER NOTE - OBJECTIVE STATEMENT
This patient is an 88 year old woman with hx of HTN, HLD, and DM BIBA with reports of right sided weakness.  Patient was last seen normal by her daughter at 2pm.  Daughter who arrived at bedside states that the patient is independent, ambulates without assistance.  They went to [employer] today and grocery shopping.  When she returned at 6pm to bring her mother dinner she found her laying on her right side unable to speak.  EMS report right sided weakness.  Patient unable to verbalize complaints at this time.

## 2019-02-03 NOTE — ED PROVIDER NOTE - PMH
Atrial flutter  1/2010- [facility], Dr Tran  CAD (coronary artery disease)  s/p CABG- 2004-[facility]  Diabetes    HTN (hypertension)    Hyperlipidemia    Obesity    Valvular disease

## 2019-02-03 NOTE — ED ADULT NURSE NOTE - NSIMPLEMENTINTERV_GEN_ALL_ED
Implemented All Fall Risk Interventions:  [place] to call system. Call bell, personal items and telephone within reach. Instruct patient to call for assistance. Room bathroom lighting operational. Non-slip footwear when patient is off stretcher. Physically safe environment: no spills, clutter or unnecessary equipment. Stretcher in lowest position, wheels locked, appropriate side rails in place. Provide visual cue, wrist band, yellow gown, etc. Monitor gait and stability. Monitor for mental status changes and reorient to person, place, and time. Review medications for side effects contributing to fall risk. Reinforce activity limits and safety measures with patient and family.

## 2019-02-03 NOTE — PROGRESS NOTE ADULT - ASSESSMENT
L M1 occlusion s/p TICI 3 recanalization, post CT w/ ?contrast extravasation; no tPA given, outside window at presentation    NEURO:  CT Head in AM  MRI  stroke core measure  statin if LFTs WNL  resume ASA if am CTH stable  sedation for RASS 0 to -2, pain control  Activity: [] OOB as tolerated [x] Bedrest [] PT [] OT [] PMNR    PULM:  intubated  CXR     CV:  -130 per neuro IR, nicardipine gtt as tolerated  resume home dose metoprolol  hold PO antihypertensive meds--isosorbide mononitrate 30mg ER, losarten 100mg, HCTZ 25mg  TTE    RENAL:  IVF    GI:  Diet: NPO  RUQ sono   GI prophylaxis [] not indicated [x] PPI [] other:  Bowel regimen [x] colace [x] senna [] other:    ENDO:   Goal euglycemia (-180)    HEME/ONC:  VTE prophylaxis: [x] SCDs [] chemoprophylaxis [x] hold chemoprophylaxis due to: acute stroke, revascularization [] high risk of DVT/PE on admission due to:    ID:  Parul-op antibiotics    MISC:    SOCIAL/FAMILY:  [x] awaiting [] updated at bedside [] family meeting    CODE STATUS:  [x] Full Code [] DNR [] DNI [] Palliative/Comfort Care    DISPOSITION:  [x] ICU [] Stroke Unit [] Floor [] EMU [] RCU [] PCU    [x] Patient is at high risk of neurologic deterioration/death due to: acute L MCA CVA, cerebral edema, hemorrhagic conversion    Time spent: 45 critical care minutes    Contact: [phone]

## 2019-02-03 NOTE — PROGRESS NOTE ADULT - SUBJECTIVE AND OBJECTIVE BOX
87yo woman 89yo woman p/w SSH with R facial droop and R sided weakness, found to have L M1 occlusion on CTA, transferred to [facility] for possible endovascular intervention. LWK 2pm on 2/3, outside window for tPA, not given. CTP showing no core infarct with perfusion deficit, taken to IR for thrombectomy and TICI 3 revascularization. PMH a flutter, CAD s/p multiple stents and CABG in 2004, DM, HTN, HLD, aortic valve replacement on ASA only. Of note, was intubated for endovascular procedure and vomited subsequently.    Vitals/labs/meds/imaging reviewed  intubated  EO to noxious stimulus, L gaze preferece  R pupil 2mm R, L irregularly shaped reactive  LUE FC 2 fingers and antigravity, LLE wiggles toes and bend knees not antigravity  RUE flex?, RLE TF  2/6 systolic murmur LUSB  coarse BS b/l  +bowel sound, ?palpable mass in RUQ, obese abd , ND  LLE purple discoloration around ankle  DP pulses 2+ b/l   R groin c/d/i

## 2019-02-03 NOTE — ED PROVIDER NOTE - FAMILY HISTORY
Mother  Still living? Unknown  Family history of hypertension, Age at diagnosis: Age Unknown     Father  Still living? Unknown  Family history of cerebrovascular accident (CVA), Age at diagnosis: Age Unknown

## 2019-02-03 NOTE — ED ADULT NURSE NOTE - PMH
Atrial flutter  1/2010- [facility], Dr Tran  CAD (coronary artery disease)  s/p CABG- 2004-[facility]  Diabetes    HTN (hypertension)    Hyperlipidemia    Obesity    Valvular disease

## 2019-02-04 LAB
ANION GAP SERPL CALC-SCNC: 14 MMOL/L — SIGNIFICANT CHANGE UP (ref 5–17)
ANION GAP SERPL CALC-SCNC: 14 MMOL/L — SIGNIFICANT CHANGE UP (ref 5–17)
APTT BLD: 27.5 SEC — SIGNIFICANT CHANGE UP (ref 27.5–36.3)
BASOPHILS # BLD AUTO: 0 K/UL — SIGNIFICANT CHANGE UP (ref 0–0.2)
BASOPHILS NFR BLD AUTO: 0.2 % — SIGNIFICANT CHANGE UP (ref 0–2)
BLD GP AB SCN SERPL QL: NEGATIVE — SIGNIFICANT CHANGE UP
BLD GP AB SCN SERPL QL: NEGATIVE — SIGNIFICANT CHANGE UP
BUN SERPL-MCNC: 15 MG/DL — SIGNIFICANT CHANGE UP (ref 7–23)
BUN SERPL-MCNC: 16 MG/DL — SIGNIFICANT CHANGE UP (ref 7–23)
CALCIUM SERPL-MCNC: 8 MG/DL — LOW (ref 8.4–10.5)
CALCIUM SERPL-MCNC: 8.6 MG/DL — SIGNIFICANT CHANGE UP (ref 8.4–10.5)
CHLORIDE SERPL-SCNC: 104 MMOL/L — SIGNIFICANT CHANGE UP (ref 96–108)
CHLORIDE SERPL-SCNC: 108 MMOL/L — SIGNIFICANT CHANGE UP (ref 96–108)
CHOLEST SERPL-MCNC: 124 MG/DL — SIGNIFICANT CHANGE UP (ref 10–199)
CO2 SERPL-SCNC: 19 MMOL/L — LOW (ref 22–31)
CO2 SERPL-SCNC: 21 MMOL/L — LOW (ref 22–31)
CREAT SERPL-MCNC: 0.64 MG/DL — SIGNIFICANT CHANGE UP (ref 0.5–1.3)
CREAT SERPL-MCNC: 0.69 MG/DL — SIGNIFICANT CHANGE UP (ref 0.5–1.3)
EOSINOPHIL # BLD AUTO: 0 K/UL — SIGNIFICANT CHANGE UP (ref 0–0.5)
EOSINOPHIL NFR BLD AUTO: 0.4 % — SIGNIFICANT CHANGE UP (ref 0–6)
GAS PNL BLDA: SIGNIFICANT CHANGE UP
GLUCOSE SERPL-MCNC: 145 MG/DL — HIGH (ref 70–99)
GLUCOSE SERPL-MCNC: 213 MG/DL — HIGH (ref 70–99)
HBA1C BLD-MCNC: 6.6 % — HIGH (ref 4–5.6)
HCT VFR BLD CALC: 38.6 % — SIGNIFICANT CHANGE UP (ref 34.5–45)
HCT VFR BLD CALC: 38.7 % — SIGNIFICANT CHANGE UP (ref 34.5–45)
HDLC SERPL-MCNC: 37 MG/DL — LOW
HGB BLD-MCNC: 12.4 G/DL — SIGNIFICANT CHANGE UP (ref 11.5–15.5)
HGB BLD-MCNC: 12.9 G/DL — SIGNIFICANT CHANGE UP (ref 11.5–15.5)
INR BLD: 1.08 RATIO — SIGNIFICANT CHANGE UP (ref 0.88–1.16)
INR BLD: 1.14 RATIO — SIGNIFICANT CHANGE UP (ref 0.88–1.16)
LIPID PNL WITH DIRECT LDL SERPL: 55 MG/DL — SIGNIFICANT CHANGE UP
LYMPHOCYTES # BLD AUTO: 1 K/UL — SIGNIFICANT CHANGE UP (ref 1–3.3)
LYMPHOCYTES # BLD AUTO: 12 % — LOW (ref 13–44)
MAGNESIUM SERPL-MCNC: 1.7 MG/DL — SIGNIFICANT CHANGE UP (ref 1.6–2.6)
MCHC RBC-ENTMCNC: 28.8 PG — SIGNIFICANT CHANGE UP (ref 27–34)
MCHC RBC-ENTMCNC: 29.8 PG — SIGNIFICANT CHANGE UP (ref 27–34)
MCHC RBC-ENTMCNC: 32.1 GM/DL — SIGNIFICANT CHANGE UP (ref 32–36)
MCHC RBC-ENTMCNC: 33.4 GM/DL — SIGNIFICANT CHANGE UP (ref 32–36)
MCV RBC AUTO: 89.1 FL — SIGNIFICANT CHANGE UP (ref 80–100)
MCV RBC AUTO: 89.6 FL — SIGNIFICANT CHANGE UP (ref 80–100)
MONOCYTES # BLD AUTO: 0.4 K/UL — SIGNIFICANT CHANGE UP (ref 0–0.9)
MONOCYTES NFR BLD AUTO: 5.2 % — SIGNIFICANT CHANGE UP (ref 2–14)
NEUTROPHILS # BLD AUTO: 6.6 K/UL — SIGNIFICANT CHANGE UP (ref 1.8–7.4)
NEUTROPHILS NFR BLD AUTO: 82.2 % — HIGH (ref 43–77)
PHOSPHATE SERPL-MCNC: 3.2 MG/DL — SIGNIFICANT CHANGE UP (ref 2.5–4.5)
PHOSPHATE SERPL-MCNC: 3.6 MG/DL — SIGNIFICANT CHANGE UP (ref 2.5–4.5)
PHOSPHATE SERPL-MCNC: 3.9 MG/DL — SIGNIFICANT CHANGE UP (ref 2.5–4.5)
PLATELET # BLD AUTO: 175 K/UL — SIGNIFICANT CHANGE UP (ref 150–400)
PLATELET # BLD AUTO: 180 K/UL — SIGNIFICANT CHANGE UP (ref 150–400)
POTASSIUM SERPL-MCNC: 3.8 MMOL/L — SIGNIFICANT CHANGE UP (ref 3.5–5.3)
POTASSIUM SERPL-MCNC: 3.9 MMOL/L — SIGNIFICANT CHANGE UP (ref 3.5–5.3)
POTASSIUM SERPL-SCNC: 3.8 MMOL/L — SIGNIFICANT CHANGE UP (ref 3.5–5.3)
POTASSIUM SERPL-SCNC: 3.9 MMOL/L — SIGNIFICANT CHANGE UP (ref 3.5–5.3)
PROTHROM AB SERPL-ACNC: 12.4 SEC — SIGNIFICANT CHANGE UP (ref 10–12.9)
PROTHROM AB SERPL-ACNC: 13.1 SEC — HIGH (ref 10–12.9)
RBC # BLD: 4.32 M/UL — SIGNIFICANT CHANGE UP (ref 3.8–5.2)
RBC # BLD: 4.33 M/UL — SIGNIFICANT CHANGE UP (ref 3.8–5.2)
RBC # FLD: 12.3 % — SIGNIFICANT CHANGE UP (ref 10.3–14.5)
RBC # FLD: 12.4 % — SIGNIFICANT CHANGE UP (ref 10.3–14.5)
RH IG SCN BLD-IMP: POSITIVE — SIGNIFICANT CHANGE UP
RH IG SCN BLD-IMP: POSITIVE — SIGNIFICANT CHANGE UP
SODIUM SERPL-SCNC: 139 MMOL/L — SIGNIFICANT CHANGE UP (ref 135–145)
SODIUM SERPL-SCNC: 141 MMOL/L — SIGNIFICANT CHANGE UP (ref 135–145)
T3 SERPL-MCNC: 92 NG/DL — SIGNIFICANT CHANGE UP (ref 80–200)
T4 AB SER-ACNC: 6.8 UG/DL — SIGNIFICANT CHANGE UP (ref 4.6–12)
TOTAL CHOLESTEROL/HDL RATIO MEASUREMENT: 3.4 RATIO — SIGNIFICANT CHANGE UP (ref 3.3–7.1)
TRIGL SERPL-MCNC: 159 MG/DL — HIGH (ref 10–149)
TSH SERPL-MCNC: 5.06 UIU/ML — HIGH (ref 0.27–4.2)
WBC # BLD: 11.2 K/UL — HIGH (ref 3.8–10.5)
WBC # BLD: 8 K/UL — SIGNIFICANT CHANGE UP (ref 3.8–10.5)
WBC # FLD AUTO: 11.2 K/UL — HIGH (ref 3.8–10.5)
WBC # FLD AUTO: 8 K/UL — SIGNIFICANT CHANGE UP (ref 3.8–10.5)

## 2019-02-04 PROCEDURE — 76700 US EXAM ABDOM COMPLETE: CPT | Mod: 26

## 2019-02-04 PROCEDURE — 70450 CT HEAD/BRAIN W/O DYE: CPT | Mod: 26

## 2019-02-04 PROCEDURE — 71045 X-RAY EXAM CHEST 1 VIEW: CPT | Mod: 26

## 2019-02-04 PROCEDURE — 99291 CRITICAL CARE FIRST HOUR: CPT

## 2019-02-04 PROCEDURE — 93970 EXTREMITY STUDY: CPT | Mod: 26

## 2019-02-04 PROCEDURE — 99223 1ST HOSP IP/OBS HIGH 75: CPT

## 2019-02-04 RX ORDER — SODIUM CHLORIDE 9 MG/ML
1000 INJECTION INTRAMUSCULAR; INTRAVENOUS; SUBCUTANEOUS
Qty: 0 | Refills: 0 | Status: DISCONTINUED | OUTPATIENT
Start: 2019-02-04 | End: 2019-02-08

## 2019-02-04 RX ORDER — SENNA PLUS 8.6 MG/1
20 TABLET ORAL AT BEDTIME
Qty: 0 | Refills: 0 | Status: DISCONTINUED | OUTPATIENT
Start: 2019-02-04 | End: 2019-02-05

## 2019-02-04 RX ORDER — INSULIN HUMAN 100 [IU]/ML
INJECTION, SOLUTION SUBCUTANEOUS EVERY 6 HOURS
Qty: 0 | Refills: 0 | Status: DISCONTINUED | OUTPATIENT
Start: 2019-02-04 | End: 2019-02-14

## 2019-02-04 RX ORDER — ACETAMINOPHEN 500 MG
1000 TABLET ORAL ONCE
Qty: 0 | Refills: 0 | Status: COMPLETED | OUTPATIENT
Start: 2019-02-04 | End: 2019-02-04

## 2019-02-04 RX ORDER — CHLORHEXIDINE GLUCONATE 213 G/1000ML
15 SOLUTION TOPICAL
Qty: 0 | Refills: 0 | Status: DISCONTINUED | OUTPATIENT
Start: 2019-02-04 | End: 2019-02-06

## 2019-02-04 RX ORDER — DEXMEDETOMIDINE HYDROCHLORIDE IN 0.9% SODIUM CHLORIDE 4 UG/ML
0.3 INJECTION INTRAVENOUS
Qty: 200 | Refills: 0 | Status: DISCONTINUED | OUTPATIENT
Start: 2019-02-04 | End: 2019-02-04

## 2019-02-04 RX ORDER — METOPROLOL TARTRATE 50 MG
50 TABLET ORAL
Qty: 0 | Refills: 0 | Status: DISCONTINUED | OUTPATIENT
Start: 2019-02-04 | End: 2019-02-05

## 2019-02-04 RX ORDER — NICARDIPINE HYDROCHLORIDE 30 MG/1
5 CAPSULE, EXTENDED RELEASE ORAL
Qty: 40 | Refills: 0 | Status: DISCONTINUED | OUTPATIENT
Start: 2019-02-04 | End: 2019-02-04

## 2019-02-04 RX ORDER — ACETAMINOPHEN 500 MG
650 TABLET ORAL EVERY 6 HOURS
Qty: 0 | Refills: 0 | Status: DISCONTINUED | OUTPATIENT
Start: 2019-02-04 | End: 2019-02-05

## 2019-02-04 RX ORDER — PANTOPRAZOLE SODIUM 20 MG/1
40 TABLET, DELAYED RELEASE ORAL DAILY
Qty: 0 | Refills: 0 | Status: DISCONTINUED | OUTPATIENT
Start: 2019-02-04 | End: 2019-02-06

## 2019-02-04 RX ORDER — NICARDIPINE HYDROCHLORIDE 30 MG/1
5 CAPSULE, EXTENDED RELEASE ORAL
Qty: 40 | Refills: 0 | Status: DISCONTINUED | OUTPATIENT
Start: 2019-02-04 | End: 2019-02-05

## 2019-02-04 RX ORDER — DOCUSATE SODIUM 100 MG
100 CAPSULE ORAL
Qty: 0 | Refills: 0 | Status: DISCONTINUED | OUTPATIENT
Start: 2019-02-04 | End: 2019-02-05

## 2019-02-04 RX ADMIN — Medication 1000 MILLIGRAM(S): at 21:32

## 2019-02-04 RX ADMIN — Medication 50 MILLIGRAM(S): at 08:47

## 2019-02-04 RX ADMIN — Medication 50 MILLIGRAM(S): at 17:50

## 2019-02-04 RX ADMIN — INSULIN HUMAN 2: 100 INJECTION, SOLUTION SUBCUTANEOUS at 23:24

## 2019-02-04 RX ADMIN — Medication 100 MILLIGRAM(S): at 05:43

## 2019-02-04 RX ADMIN — INSULIN HUMAN 2: 100 INJECTION, SOLUTION SUBCUTANEOUS at 12:37

## 2019-02-04 RX ADMIN — PANTOPRAZOLE SODIUM 40 MILLIGRAM(S): 20 TABLET, DELAYED RELEASE ORAL at 12:35

## 2019-02-04 RX ADMIN — SENNA PLUS 20 MILLILITER(S): 8.6 TABLET ORAL at 21:17

## 2019-02-04 RX ADMIN — ATORVASTATIN CALCIUM 80 MILLIGRAM(S): 80 TABLET, FILM COATED ORAL at 21:17

## 2019-02-04 RX ADMIN — Medication 400 MILLIGRAM(S): at 21:17

## 2019-02-04 RX ADMIN — Medication 100 MILLIGRAM(S): at 17:15

## 2019-02-04 RX ADMIN — CHLORHEXIDINE GLUCONATE 15 MILLILITER(S): 213 SOLUTION TOPICAL at 17:15

## 2019-02-04 RX ADMIN — INSULIN HUMAN 2: 100 INJECTION, SOLUTION SUBCUTANEOUS at 17:50

## 2019-02-04 NOTE — H&P ADULT - PSH
Aortic valve replaced    Artificial pacemaker    S/P CABG (coronary artery bypass graft)    S/P cholecystectomy    S/P coronary artery stent placement  x 4, 2006-[facility]

## 2019-02-04 NOTE — H&P ADULT - NSHPLABSRESULTS_GEN_ALL_CORE
12.4   8.0   )-----------( 180      ( 04 Feb 2019 01:12 )             38.7   02-04    138  |  103  |  18  ----------------------------<  170<H>  3.6   |  23  |  0.65    Ca    8.7      04 Feb 2019 01:00  Phos  3.9     02-04  Mg     1.7     02-04    TPro  7.0  /  Alb  3.8  /  TBili  0.6  /  DBili  x   /  AST  27  /  ALT  22  /  AlkPhos  82  02-03  < from: CT Brain Stroke Protocol (02.03.19 @ 21:38) >    IMPRESSION:  NONCONTRAST HEAD CT SCAN: No CT evidence of acute intracranial   hemorrhage, mass effect or acute territorial infarct.    CT PERFUSION: No evidence of a completed infarct.  Approximately 80 cc   penumbra of at risk tissue in the left MCA vascular territory    < end of copied text >    < from: CT Angio Neck w/ IV Cont (02.03.19 @ 19:50) >    IMPRESSION:   CT angiography neck: Patent cervical vasculature.  No hemodynamically   significant carotid stenosis or flow-limiting vertebral artery stenosis.    No evidence of dissection. Three-vessel arch.  Vertebral arteries are   codominant.    CT angiography brain:   1.  The M1 segment of the left middle cerebral artery is occluded,   approximately 8 mm distal to its origin.  Multiple proximal M2 branches   of the left MCA are occluded within the sylvian fissure.  There is some   reconstitution of blood flow within distal M2 branches and M3 branches of   the left middle cerebral artery.  CT perfusion is recommended for further   characterization.  2.  Atherosclerotic calcification causes diffuse mild narrowing in the   cavernous and supraclinoid segments of the internal carotid arteries   without flow-limiting stenosis.  3.  Atherosclerotic calcification causes moderate to severe stenosis in   the bilateral intradural vertebral arteries.  Mild luminal irregularity   of the basilar artery without flow-limiting stenosis.  Multiple mild to   moderate stenoses in the proximal P2 segment of the left posterior   cerebral artery.  The right posterior cerebral artery is diffusely small   and irregular, and appears hypoenhancing compared to the contralateral   side.    < end of copied text >

## 2019-02-04 NOTE — CHART NOTE - NSCHARTNOTEFT_GEN_A_CORE
CAPRINI SCORE [CLOT] Score on Admission for     AGE RELATED RISK FACTORS                                                       MOBILITY RELATED FACTORS  [ ] Age 41-60 years                                            (1 Point)                  [ ] Bed rest                                                        (1 Point)  [ ] Age: 61-74 years                                           (2 Points)                 [ ] Plaster cast                                                   (2 Points)  [ X] Age= 75 years                                              (3 Points)                 [ ] Bed bound for more than 72 hours                 (2 Points)    DISEASE RELATED RISK FACTORS                                               GENDER SPECIFIC FACTORS  [ ] Edema in the lower extremities                       (1 Point)                  [ ] Pregnancy                                                     (1 Point)  [X ] Varicose veins                                               (1 Point)                  [ ] Post-partum < 6 weeks                                   (1 Point)             [X ] BMI > 25 Kg/m2                                            (1 Point)                  [ ] Hormonal therapy  or oral contraception          (1 Point)                 [ ] Sepsis (in the previous month)                        (1 Point)                  [ ] History of pregnancy complications                 (1 point)  [ ] Pneumonia or serious lung disease                                               [ ] Unexplained or recurrent                     (1 Point)           (in the previous month)                               (1 Point)  [ ] Abnormal pulmonary function test                     (1 Point)                 SURGERY RELATED RISK FACTORS (include planned surgeries)  [ ] Acute myocardial infarction                              (1 Point)                 [ ]  Section                                             (1 Point)  [ ] Congestive heart failure (in the previous month)  (1 Point)                [X ] Minor surgery                                                  (1 Point)   [ ] Inflammatory bowel disease                             (1 Point)                 [ ] Arthroscopic surgery                                        (2 Points)  [ ] Central venous access                                      (2 Points)                [ ] General surgery lasting more than 45 minutes   (2 Points)       [ ] Stroke (in the previous month)                          (5 Points)               [ ] Elective arthroplasty                                         (5 Points)            [ ] current or past malignancy                              (2 Points)                                                                                                       HEMATOLOGY RELATED FACTORS                                                 TRAUMA RELATED RISK FACTORS  [ ] Prior episodes of VTE                                     (3 Points)                [ ] Fracture of the hip, pelvis, or leg                       (5 Points)  [ ] Positive family history for VTE                         (3 Points)                 [ ] Acute spinal cord injury (in the previous month)  (5 Points)  [ ] Prothrombin 03634 A                                     (3 Points)                 [ ] Paralysis  (less than 1 month)                             (5 Points)  [ ] Factor V Leiden                                             (3 Points)                  [ ] Multiple Trauma within 1 month                        (5 Points)  [ ] Lupus anticoagulants                                     (3 Points)                                                           [ ] Anticardiolipin antibodies                               (3 Points)                                                       [ ] High homocysteine in the blood                      (3 Points)                                             [ ] Other congenital or acquired thrombophilia      (3 Points)                                                [ ] Heparin induced thrombocytopenia                  (3 Points)                                          Total Score [   6       ]    Risk:  Very low 0   Low 1 to 2   Moderate 3 to 4   High =5       VTE Prophylasix Recommednations:  [X ] mechanical pneumatic compression devices , on Left lower extremity, Fresh post op Angio patient, will start bilateral SCDs once safeguard on Rt. Is removed                                   [ ] contraindicated: _____________________  [ ] chemo prophylasix                                                                                   [ X] contraindicated ____Fresh post op Lt. M1 thrombectomy_________________    **** HIGH LIKELIHOOD DVT PRESENT ON ADMISSION  [ ] (please order LE dopplers within 24 hours of admission)

## 2019-02-04 NOTE — H&P ADULT - HISTORY OF PRESENT ILLNESS
Patient is an 88 year old right handed  female with a history of CAD s/p stents and CABG on aspirin at home, DM, HTN, aortic valve replacement, and pacemaker who presents as transfer from [facility] for stroke rescue. Her last known normal was at approximately 2 pm on 2/3. Robbins was then noted to have left sided weakness and sudden onset inability to speak, so she was brought to [facility]. She was outside the appropriate time window for tPA, so tPA was not given. CTA showed evidence of a L M1 occlusion so she was transferred to [facility] and code stroke called upon arrival. Initial NIHSS at [facility] was 19, MRS 1.

## 2019-02-04 NOTE — PROGRESS NOTE ADULT - SUBJECTIVE AND OBJECTIVE BOX
89yo woman p/w SSH with R facial droop and R sided weakness, found to have L M1 occlusion on CTA, transferred to [facility] for possible endovascular intervention. LWK 2pm on 2/3, outside window for tPA, not given. CTP showing no core infarct with perfusion deficit, taken to IR for thrombectomy and TICI 3 revascularization. PMH a flutter, CAD s/p multiple stents and CABG in 2004, DM, HTN, HLD, aortic valve replacement on ASA only.     HOSPITAL COURSE:  2/3 s/p L M1 thrombectomy    Overnight events: Remains intubated    ROS: Unable to obtain since patient is intubated.    Vitals/labs/meds/imaging reviewed  intubated  EO spontaneously, L gaze preferece  R pupil 2mm R, L irregularly shaped reactive  LUE FC 2 fingers and antigravity, LLE spontaneous movement (not following commands, and not AG)  RUE ?abnormal flexion, RLE TF  2/6 systolic murmur LUSB  Decreased breath sounds b/l bases  obese abd , ND  LLE purple discoloration around ankle  DP pulses 2+ b/l   R groin c/d/i SUMMARY:  89yo woman p/w SSH with R facial droop and R sided weakness, found to have L M1 occlusion on CTA, transferred to [facility] for possible endovascular intervention. LWK 2pm on 2/3, outside window for tPA, not given. CTP showing no core infarct with significant perfusion deficit, taken to IR for thrombectomy and TICI 3 revascularization. PMH a flutter, CAD s/p multiple stents and CABG in 2004, DM, HTN, HLD, aortic valve replacement on ASA only.     HOSPITAL COURSE:  2/3 s/p L M1 thrombectomy    Overnight events: Remains intubated    ROS: Unable to obtain since patient is intubated.    Vitals/labs/meds/imaging reviewed    intubated  EO spontaneously, L gaze preferece, R pupil 2mm R, L irregularly shaped reactive, aphasic, does not follow commands, LUE/LLE spontaneous movement, RUE flexion, RLE TF  2/6 systolic murmur LUSB  Decreased breath sounds b/l bases  obese abd , ND  LLE purple discoloration around ankle  DP pulses 2+ b/l   R groin c/d/i, no hematoma

## 2019-02-04 NOTE — PROGRESS NOTE ADULT - ASSESSMENT
L M1 occlusion s/p TICI 3 recanalization, post CT w/ ?contrast extravasation; no tPA given, outside window at presentation    NEURO:  CT Head in AM  MRI  stroke core measure: A1c, LDL  statin if LFTs WNL  resume ASA if am CTH stable  sedation for RASS 0 to -2, pain control  Activity: [] OOB as tolerated [x] Bedrest [] PT [] OT [] PMNR    PULM:  Intubated  CPAP; will attempt to extubate  CXR     CV:  -130 per neuro IR, nicardipine gtt as tolerated  resume home dose metoprolol  hold PO antihypertensive meds--isosorbide mononitrate 30mg ER, losarten 100mg, HCTZ 25mg  TTE    RENAL:  IVF    GI:  Diet: NPO for possible extubation  RUQ sono   GI prophylaxis [] not indicated [x] PPI [] other:  Bowel regimen [x] colace [x] senna [] other:    ENDO:   Goal euglycemia (-180)    HEME/ONC:  VTE prophylaxis: [x] SCDs [] chemoprophylaxis [x] hold chemoprophylaxis due to: acute stroke, revascularization [] high risk of DVT/PE on admission due to:    ID:  Parul-op antibiotics    MISC:    SOCIAL/FAMILY:  [x] awaiting [] updated at bedside [] family meeting    CODE STATUS:  [x] Full Code [] DNR [] DNI [] Palliative/Comfort Care    DISPOSITION:  [x] ICU [] Stroke Unit [] Floor [] EMU [] RCU [] PCU    [x] Patient is at high risk of neurologic deterioration/death due to: acute L MCA CVA, cerebral edema, hemorrhagic conversion    Time spent: 45 critical care minutes    Contact: [phone] L M1 occlusion s/p TICI 3 recanalization, post CT w/ ?contrast extravasation; no tPA given, outside window at presentation    NEURO:  CT Head in AM  MRI  stroke core measure: A1c, LDL  statin if LFTs WNL  resume ASA if am CTH stable  sedation for RASS 0 to -2, pain control  Activity: [x] OOB as tolerated [] Bedrest [] PT [] OT [] PMNR    PULM:  Intubated  CPAP; will attempt to extubate  CXR     CV:  -130 per neuro IR, nicardipine gtt as tolerated  resume home dose metoprolol  hold PO antihypertensive meds--isosorbide mononitrate 30mg ER, losarten 100mg, HCTZ 25mg  TTE    RENAL:  IVF    GI:  Diet: NPO for possible extubation  RUQ sono   GI prophylaxis [] not indicated [x] PPI [] other:  Bowel regimen [x] colace [x] senna [] other:    ENDO:   Goal euglycemia (-180)    HEME/ONC:  VTE prophylaxis: [x] SCDs [] chemoprophylaxis [x] hold chemoprophylaxis due to: acute stroke, revascularization [] high risk of DVT/PE on admission due to:    ID:  Afebrile    SOCIAL/FAMILY:  [x] awaiting [] updated at bedside [] family meeting    CODE STATUS:  [x] Full Code [] DNR [] DNI [] Palliative/Comfort Care    DISPOSITION:  [x] ICU [] Stroke Unit [] Floor [] EMU [] RCU [] PCU    [x] Patient is at high risk of neurologic deterioration/death due to: acute L MCA CVA, cerebral edema, hemorrhagic conversion    Time spent: 45 critical care minutes    Contact: [phone]

## 2019-02-04 NOTE — H&P ADULT - PMH
Atrial flutter  1/2010- [facility], Dr Tran  CAD (coronary artery disease)  s/p CABG- 2004-[facility]  Diabetes    HTN (hypertension)    Hyperlipidemia    Obesity    Valvular disease

## 2019-02-04 NOTE — H&P ADULT - ATTENDING COMMENTS
The patient is an 88 year old white female with a pmh of CAD, HTN, HLD, pacemaker that presented to [facility] as a transfer from [facility] with a left proximal MCA occlusion. The patient was last known well at 2 pm on 2/3/19 and was then found down on the ground by her daughter several hours later and was noted to be not speaking and weak on the right. At [facility] a CTA head and neck was performed which showed a proximal left MCA occlusion, and a CT head showed a dense left MCA sign with some hypodensity and early ischemic changes to my eye in the left MCA distribution. She did not receive IV tPA as she was out of the window and was transferred to [facility] for possible mechanical thrombectomy. At [facility] her CT perfusion showed zero core infarct, with a penumbra of 80 mls, and an infinite mismatch. She was deemed a candidate for intervention and recanalization was achieved with TICI 3 score. On exam today, she is intubated, not following commands, she has a left gaze preference and is plegic in the right side. Impression: The patient has cerebral infarction secondary to cerebral embolism from unknown source at this time. ASA/DVT prophylaxis being held at this time due to concern for large stroke volume and risk of hemorrhagic transformation. Once stable will restart ASA/DVT prophylaxis, repeat CT scan of the head, TTE, cardiology consultation and possible implantable loop recorder. She will have PT/OT evaluation, speech and swallow evaluation, and neurochecks. The patient is an 88 year old white female with a pmh of CAD, HTN, HLD, pacemaker who presented to [facility] as a transfer from [facility] with right hemiparesis and aphasia and left proximal MCA occlusion. The patient was last known well at 2 pm on 2/3/19 and was then found down on the ground by her daughter several hours later and was noted to be not speaking and weak on the right. At [facility] a CTA head and neck was performed which showed a proximal left MCA occlusion, and a CT head showed a dense left MCA sign with some hypodensity and early ischemic changes to my eye in the left MCA distribution. She did not receive IV tPA as she was out of the window and was transferred to [facility] for possible mechanical thrombectomy. At [facility] her CT perfusion showed zero core infarct, with a penumbra of 80 mls, and an infinite mismatch. She was deemed a candidate for intervention and recanalization was achieved with TICI 3 score. On exam today, she is intubated, not following commands, she has mild gaze palsy and is plegic in the right side. 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 ASA/DVT prophylaxis being held at this time due to concern for large stroke volume and risk of hemorrhagic transformation. Once stable will restart ASA/DVT prophylaxis, repeat CT scan of the head, TTE, cardiology consultation and possible implantable loop recorder. She will have PT/OT evaluation, speech and swallow evaluation, and neurochecks.

## 2019-02-04 NOTE — H&P ADULT - ASSESSMENT
88F with history of CAD, DM, HTN, aortic valve replacement and pacemaker who presents as transfer for stroke rescue. On exam, patient has severe expressive aphasia. She is awake, alert and able to follow simple commands. She is hemiplegic on the right side of her body. She has evidence of a right visual field cut. She does not withdraw to noxious stimuli on the right side of her body. NIHSS 19, MRS 1. Presented to OSH outside the time window for tPA.  CTA at OSH showed a L M1 occlusion. CT perfusion showed no evidence of a completed infarct and appox 80 cc penumbra of at risk tissue in the left MCA territory. Case was discussed with stroke fellow and on-call neurointerventionalist and patient was taken to IR suite for mechanical thrombectomy with TICI 3 revascularization. She was admitted to the NSCU for close monitoring post-procedure.     Plan:  SBP goal 100-130 post-procedure per neuroIR, cardene drip as needed to maintain goal   MRI brain without contrast  MRA head/neck post-procedure  TTE with bubble study  repeat CTH in the AM  aspirin, pharmacological DVT prophylaxis when able  lipitor 80 mg qhs, titrate based on LDL  lipid panel, A1c  neurochecks q2h  PT/OT/SLP

## 2019-02-05 DIAGNOSIS — G93.40 ENCEPHALOPATHY, UNSPECIFIED: ICD-10-CM

## 2019-02-05 DIAGNOSIS — Z51.5 ENCOUNTER FOR PALLIATIVE CARE: ICD-10-CM

## 2019-02-05 DIAGNOSIS — I63.9 CEREBRAL INFARCTION, UNSPECIFIED: ICD-10-CM

## 2019-02-05 DIAGNOSIS — R53.81 OTHER MALAISE: ICD-10-CM

## 2019-02-05 LAB — GAS PNL BLDA: SIGNIFICANT CHANGE UP

## 2019-02-05 PROCEDURE — 99291 CRITICAL CARE FIRST HOUR: CPT

## 2019-02-05 PROCEDURE — 99233 SBSQ HOSP IP/OBS HIGH 50: CPT

## 2019-02-05 PROCEDURE — 70450 CT HEAD/BRAIN W/O DYE: CPT | Mod: 26

## 2019-02-05 PROCEDURE — 99292 CRITICAL CARE ADDL 30 MIN: CPT | Mod: 25

## 2019-02-05 PROCEDURE — 43752 NASAL/OROGASTRIC W/TUBE PLMT: CPT

## 2019-02-05 PROCEDURE — 71045 X-RAY EXAM CHEST 1 VIEW: CPT | Mod: 26

## 2019-02-05 RX ORDER — METOPROLOL TARTRATE 50 MG
50 TABLET ORAL
Qty: 0 | Refills: 0 | Status: DISCONTINUED | OUTPATIENT
Start: 2019-02-05 | End: 2019-02-07

## 2019-02-05 RX ORDER — LOSARTAN POTASSIUM 100 MG/1
100 TABLET, FILM COATED ORAL DAILY
Qty: 0 | Refills: 0 | Status: DISCONTINUED | OUTPATIENT
Start: 2019-02-05 | End: 2019-02-05

## 2019-02-05 RX ORDER — ACETAMINOPHEN 500 MG
1000 TABLET ORAL ONCE
Qty: 0 | Refills: 0 | Status: COMPLETED | OUTPATIENT
Start: 2019-02-05 | End: 2019-02-05

## 2019-02-05 RX ORDER — MAGNESIUM SULFATE 500 MG/ML
1 VIAL (ML) INJECTION ONCE
Qty: 0 | Refills: 0 | Status: COMPLETED | OUTPATIENT
Start: 2019-02-05 | End: 2019-02-05

## 2019-02-05 RX ORDER — SENNA PLUS 8.6 MG/1
20 TABLET ORAL AT BEDTIME
Qty: 0 | Refills: 0 | Status: DISCONTINUED | OUTPATIENT
Start: 2019-02-05 | End: 2019-02-14

## 2019-02-05 RX ORDER — DOCUSATE SODIUM 100 MG
100 CAPSULE ORAL
Qty: 0 | Refills: 0 | Status: DISCONTINUED | OUTPATIENT
Start: 2019-02-05 | End: 2019-02-14

## 2019-02-05 RX ORDER — POTASSIUM CHLORIDE 20 MEQ
20 PACKET (EA) ORAL ONCE
Qty: 0 | Refills: 0 | Status: COMPLETED | OUTPATIENT
Start: 2019-02-05 | End: 2019-02-05

## 2019-02-05 RX ORDER — ATORVASTATIN CALCIUM 80 MG/1
80 TABLET, FILM COATED ORAL AT BEDTIME
Qty: 0 | Refills: 0 | Status: DISCONTINUED | OUTPATIENT
Start: 2019-02-05 | End: 2019-02-07

## 2019-02-05 RX ORDER — CALCIUM GLUCONATE 100 MG/ML
1 VIAL (ML) INTRAVENOUS ONCE
Qty: 0 | Refills: 0 | Status: COMPLETED | OUTPATIENT
Start: 2019-02-05 | End: 2019-02-05

## 2019-02-05 RX ORDER — HYDRALAZINE HCL 50 MG
5 TABLET ORAL EVERY 6 HOURS
Qty: 0 | Refills: 0 | Status: DISCONTINUED | OUTPATIENT
Start: 2019-02-05 | End: 2019-02-05

## 2019-02-05 RX ORDER — ASPIRIN/CALCIUM CARB/MAGNESIUM 324 MG
81 TABLET ORAL DAILY
Qty: 0 | Refills: 0 | Status: DISCONTINUED | OUTPATIENT
Start: 2019-02-05 | End: 2019-02-10

## 2019-02-05 RX ORDER — ACETAMINOPHEN 500 MG
650 TABLET ORAL EVERY 6 HOURS
Qty: 0 | Refills: 0 | Status: DISCONTINUED | OUTPATIENT
Start: 2019-02-05 | End: 2019-02-14

## 2019-02-05 RX ORDER — ENOXAPARIN SODIUM 100 MG/ML
40 INJECTION SUBCUTANEOUS
Qty: 0 | Refills: 0 | Status: DISCONTINUED | OUTPATIENT
Start: 2019-02-05 | End: 2019-02-11

## 2019-02-05 RX ORDER — LOSARTAN POTASSIUM 100 MG/1
100 TABLET, FILM COATED ORAL DAILY
Qty: 0 | Refills: 0 | Status: DISCONTINUED | OUTPATIENT
Start: 2019-02-05 | End: 2019-02-06

## 2019-02-05 RX ADMIN — Medication 5 MILLIGRAM(S): at 12:08

## 2019-02-05 RX ADMIN — ATORVASTATIN CALCIUM 80 MILLIGRAM(S): 80 TABLET, FILM COATED ORAL at 22:51

## 2019-02-05 RX ADMIN — Medication 200 GRAM(S): at 02:28

## 2019-02-05 RX ADMIN — Medication 5 MILLIGRAM(S): at 18:19

## 2019-02-05 RX ADMIN — Medication 400 MILLIGRAM(S): at 06:58

## 2019-02-05 RX ADMIN — SODIUM CHLORIDE 80 MILLILITER(S): 9 INJECTION INTRAMUSCULAR; INTRAVENOUS; SUBCUTANEOUS at 07:30

## 2019-02-05 RX ADMIN — LOSARTAN POTASSIUM 100 MILLIGRAM(S): 100 TABLET, FILM COATED ORAL at 22:03

## 2019-02-05 RX ADMIN — INSULIN HUMAN 2: 100 INJECTION, SOLUTION SUBCUTANEOUS at 12:08

## 2019-02-05 RX ADMIN — Medication 81 MILLIGRAM(S): at 22:03

## 2019-02-05 RX ADMIN — SENNA PLUS 20 MILLILITER(S): 8.6 TABLET ORAL at 22:12

## 2019-02-05 RX ADMIN — Medication 1000 MILLIGRAM(S): at 07:13

## 2019-02-05 RX ADMIN — Medication 20 MILLIEQUIVALENT(S): at 00:46

## 2019-02-05 RX ADMIN — CHLORHEXIDINE GLUCONATE 15 MILLILITER(S): 213 SOLUTION TOPICAL at 18:19

## 2019-02-05 RX ADMIN — Medication 100 GRAM(S): at 00:46

## 2019-02-05 RX ADMIN — Medication 100 MILLIGRAM(S): at 05:15

## 2019-02-05 RX ADMIN — CHLORHEXIDINE GLUCONATE 15 MILLILITER(S): 213 SOLUTION TOPICAL at 05:15

## 2019-02-05 RX ADMIN — LOSARTAN POTASSIUM 100 MILLIGRAM(S): 100 TABLET, FILM COATED ORAL at 06:58

## 2019-02-05 RX ADMIN — PANTOPRAZOLE SODIUM 40 MILLIGRAM(S): 20 TABLET, DELAYED RELEASE ORAL at 12:07

## 2019-02-05 RX ADMIN — Medication 50 MILLIGRAM(S): at 05:15

## 2019-02-05 RX ADMIN — ENOXAPARIN SODIUM 40 MILLIGRAM(S): 100 INJECTION SUBCUTANEOUS at 18:19

## 2019-02-05 NOTE — PHYSICAL THERAPY INITIAL EVALUATION ADULT - GENERAL OBSERVATIONS, REHAB EVAL
Pt received semisupine in bed, following simple commands, willing to participate, p/w ?expressive aphasia/dysarthria, right sided weakness & ?neglect, +NGT, +ICU monitoring; pt received soiled with (+)BM

## 2019-02-05 NOTE — PROGRESS NOTE ADULT - SUBJECTIVE AND OBJECTIVE BOX
SUMMARY:  87yo woman p/w SSH with R facial droop and R sided weakness, found to have L M1 occlusion on CTA, transferred to [facility] for possible endovascular intervention. LWK 2pm on 2/3, outside window for tPA, not given. CTP showing no core infarct with significant perfusion deficit, taken to IR for thrombectomy and TICI 3 revascularization. PMH a flutter, CAD s/p multiple stents and CABG in 2004, DM, HTN, HLD, aortic valve replacement on ASA only.     HOSPITAL COURSE:  2/3 s/p L M1 thrombectomy    24 HOUR EVENTS:  Did not tolerate CPAP yesterday - tachypnea and poor volumes  More awake this morning    Vitals/labs/meds/imaging reviewed    EXAMINATION:  intubated  EO spontaneously, L gaze preference, R pupil 2mm R, L irregularly shaped reactive, aphasic, does not show 2 fingers but does lift arm and move toes to command, LUE/LLE spontaneous movement, RUE flexion, RLE TF  2/6 systolic murmur LUSB  Decreased breath sounds b/l bases  obese abd , ND  DP pulses 2+ b/l   R groin c/d/i, no hematoma

## 2019-02-05 NOTE — PHYSICAL THERAPY INITIAL EVALUATION ADULT - ADDITIONAL COMMENTS
As per  [facility]; pt resides in private home with 0 steps to enter; grandson lives upstairs & dtr lives next door; PTA pt was independent with mobility and ADL's, ambulatory with cane; also owns RW.

## 2019-02-05 NOTE — PHYSICAL THERAPY INITIAL EVALUATION ADULT - PRECAUTIONS/LIMITATIONS, REHAB EVAL
CTH: Some residual contrast is suspected  in the left MCA territory with high attenuation appreciated in the left insular region when compared with the prior to 2/3/2019. Hemorrhage in this region is not excluded however, retained contrast is favored. The patient is status post successful thrombectomy with pretreatment evidence of a left M1 occlusion./fall precautions

## 2019-02-05 NOTE — OCCUPATIONAL THERAPY INITIAL EVALUATION ADULT - MARITAL STATUS
Problem: Goal/Outcome  Goal: Goal Outcome Summary  Limited today by SOB with fluid in lungs, originally planned on completing FES bike in PM but changed plan d/t pt reporting not having enough energy to safely perform transfers and time on FES bike. Will attempt to perform FES tomorrow.       Single

## 2019-02-05 NOTE — PHYSICAL THERAPY INITIAL EVALUATION ADULT - MANUAL MUSCLE TESTING RESULTS, REHAB EVAL
grossly assessed due to/LUE & LLE grossly at least 3/5 t/o; RUE grossly at least 2-/5 & RLE grossly at least 1/5 t/o

## 2019-02-05 NOTE — OCCUPATIONAL THERAPY INITIAL EVALUATION ADULT - BALANCE TRAINING, PT EVAL
GOAL Pt will demonstrate improved static/dynamic balance to good in order to increase safety and independence in ADLs within 4 weeks

## 2019-02-05 NOTE — PROGRESS NOTE ADULT - SUBJECTIVE AND OBJECTIVE BOX
THE PATIENT WAS SEEN AND EXAMINED BY ME WITH THE HOUSESTAFF AND STROKE TEAM DURING MORNING ROUNDS.   HPI:  Patient is an 88 year old right handed  female with a history of CAD s/p stents and CABG on aspirin at home, DM, HTN, aortic valve replacement, and pacemaker who presented as transfer from [facility] for stroke rescue. Her last known normal was at approximately 2 pm on 2/3. Robbins was then noted to have left sided weakness and sudden onset inability to speak, so she was brought to [facility]. She was outside the appropriate time window for tPA, so tPA was not given. CTA showed evidence of a L M1 occlusion so she was transferred to [facility] and code stroke called upon arrival. Initial NIHSS at [facility] was 19, MRS 1.    SUBJECTIVE: No events overnight.  No new neurologic complaints. Per family improved communication.     acetaminophen    Suspension .. 650 milliGRAM(s) Oral every 6 hours PRN  aspirin  chewable 81 milliGRAM(s) Oral daily  atorvastatin 80 milliGRAM(s) Oral at bedtime  chlorhexidine 0.12% Liquid 15 milliLiter(s) Oral Mucosa two times a day  docusate sodium Liquid 100 milliGRAM(s) Oral two times a day  enoxaparin Injectable 40 milliGRAM(s) SubCutaneous <User Schedule>  hydrALAZINE Injectable 5 milliGRAM(s) IV Push every 6 hours PRN  insulin regular  human corrective regimen sliding scale   SubCutaneous every 6 hours  losartan 100 milliGRAM(s) Oral daily  metoprolol tartrate 50 milliGRAM(s) Oral two times a day  pantoprazole  Injectable 40 milliGRAM(s) IV Push daily  senna Syrup 20 milliLiter(s) Oral at bedtime  sodium chloride 0.9%. 1000 milliLiter(s) IV Continuous <Continuous>      PHYSICAL EXAM:   Vital Signs Last 24 Hrs  T(C): 36.8 (05 Feb 2019 15:00), Max: 37.4 (04 Feb 2019 23:00)  T(F): 98.2 (05 Feb 2019 15:00), Max: 99.4 (04 Feb 2019 23:00)  HR: 64 (05 Feb 2019 17:00) (60 - 860)  BP: 142/61 (05 Feb 2019 17:00) (104/51 - 184/79)  BP(mean): 88 (05 Feb 2019 17:00) (70 - 122)  RR: 24 (05 Feb 2019 17:00) (12 - 217)  SpO2: 100% (05 Feb 2019 17:00) (99% - 100%)    General: No acute distress, intubated   HEENT: EOM intact, no blink to threat on right   Abdomen: , nontender  Extremities: No edema    NEUROLOGICAL EXAM:  Mental status: eyes open, awake, attentive to persons at bedside, follows some simple commands   Cranial Nerves: unable to assess facial symmetry, no blink to threat on right   Motor exam: left side movees well against gravity, RUE 3/5 with drift at elbow, RLE 2/5   Sensation: Intact to light touch   Coordination/ Gait: gait not assessed     LABS:                        12.9   11.2  )-----------( 175      ( 04 Feb 2019 06:17 )             38.6    02-04    141  |  108  |  15  ----------------------------<  145<H>  3.9   |  19<L>  |  0.69    Ca    8.0<L>      04 Feb 2019 22:20  Phos  3.2     02-04  Mg     1.7     02-04    TPro  7.0  /  Alb  3.8  /  TBili  0.6  /  DBili  x   /  AST  27  /  ALT  22  /  AlkPhos  82  02-03  PT/INR - ( 04 Feb 2019 06:17 )   PT: 13.1 sec;   INR: 1.14 ratio         PTT - ( 04 Feb 2019 00:59 )  PTT:27.5 sec  Hemoglobin A1C, Whole Blood: 6.6 % (02-04 @ 06:11)      IMAGING: Reviewed by me.   CT Head No Cont (02.05.19)   Previously seen increased attenuation within left sided sulci is   decreased on the current examination, and may represent the presence of   residual intravenous contrast and/or subarachnoid hemorrhage.  No no hydrocephalus or midline shift. No effacement of basal cisterns    (02.03.19)   CT angiography neck: Patent cervical vasculature.  No hemodynamically   significant carotid stenosis or flow-limiting vertebral artery stenosis.    No evidence of dissection. Three-vessel arch.  Vertebral arteries are   codominant.    CT angiography brain:   1.  The M1 segment of the left middle cerebral artery is occluded,   approximately 8 mm distal to its origin.  Multiple proximal M2 branches   of the left MCA are occluded within the sylvian fissure.  There is some   reconstitution of blood flow within distal M2 branches and M3 branches of   the left middle cerebral artery.  CT perfusion is recommended for further   characterization.  2.  Atherosclerotic calcification causes diffuse mild narrowing in the   cavernous and supraclinoid segments of the internal carotid arteries   without flow-limiting stenosis.  3.  Atherosclerotic calcification causes moderate to severe stenosis in   the bilateral intradural vertebral arteries.  Mild luminal irregularity   of the basilar artery without flow-limiting stenosis.  Multiple mild to   moderate stenoses in the proximal P2 segment of the left posterior   cerebral artery.  The right posterior cerebral artery is diffusely small   and irregular, and appears hypoenhancing compared to the contralateral   side.    CT Perfusion w/ Maps w/ IV Cont (02.03.19)   NONCONTRAST HEAD CT SCAN: No CT evidence of acute intracranial   hemorrhage, mass effect or acute territorial infarct.    CT PERFUSION: No evidence of a completed infarct.  Approximately 80 cc   penumbra of at risk tissue in the left MCA vascular territory

## 2019-02-05 NOTE — OCCUPATIONAL THERAPY INITIAL EVALUATION ADULT - ASR WT BEARING STATUS EVAL
CT Brain: Some residual contrast is suspected  in the left MCA territory with high attenuation appreciated in the left insular region when compared with the prior to 2/3/2019. Hemorrhage in this region is not excluded however, retained contrast is favored. The patient is status post successful thrombectomy with pretreatment evidence of a left M1 occlusion. CT Angio H: The M1 segment of the left middle cerebral artery is occluded, approximately 8 mm distal to its origin.  Multiple proximal M2 branches of the left MCA are occluded within the sylvian fissure.

## 2019-02-05 NOTE — OCCUPATIONAL THERAPY INITIAL EVALUATION ADULT - DIAGNOSIS, OT EVAL
Pt demonstrated decreased strength, ROM, cognition, vision impacting pt's ability to participate in functional mobility and ADLs.

## 2019-02-05 NOTE — CHART NOTE - NSCHARTNOTEFT_GEN_A_CORE
NGT placed on patient - tolerated procedure. CXR ordered for confirmation. NGT placed on patient secondary to failed dysphagia from LMCA stroke - tolerated procedure. CXR ordered for confirmation.

## 2019-02-05 NOTE — OCCUPATIONAL THERAPY INITIAL EVALUATION ADULT - PLANNED THERAPY INTERVENTIONS, OT EVAL
bed mobility training/strengthening/cognitive, visual perceptual/fine motor coordination training/ADL retraining/balance training

## 2019-02-05 NOTE — PHYSICAL THERAPY INITIAL EVALUATION ADULT - CRITERIA FOR SKILLED THERAPEUTIC INTERVENTIONS
anticipated discharge recommendation/impairments found/rehab potential/functional limitations in following categories

## 2019-02-05 NOTE — OCCUPATIONAL THERAPY INITIAL EVALUATION ADULT - PERTINENT HX OF CURRENT PROBLEM, REHAB EVAL
89 yo R handed  female with a history of CAD s/p stents and CABG on aspirin at home, DM, HTN, aortic valve replacement, and pacemaker who presents as transfer from [facility] for stroke rescue. Her last known normal was at approximately 2 pm on 2/3. Rosendo was then noted to have left sided weakness and sudden onset inability to speak, so she was brought to [facility]. She was outside the appropriate time window for tPA, so tPA was not given.

## 2019-02-05 NOTE — OCCUPATIONAL THERAPY INITIAL EVALUATION ADULT - RANGE OF MOTION EXAMINATION, UPPER EXTREMITY
Right UE Active Assistive ROM was WFL  (within functional limits)/Left UE Active ROM was WFL (within functional limits)/may be due to R neglect

## 2019-02-05 NOTE — PROGRESS NOTE ADULT - ASSESSMENT
ASSESSMENT: 88 year old white female with a pmh of CAD, HTN, HLD, pacemaker who presented to [facility] as a transfer from [facility] with right hemiparesis and aphasia and left proximal MCA occlusion. The patient was last known well at 2 pm on 2/3/19 and was then found down on the ground by her daughter several hours later and was noted to be not speaking and weak on the right. At [facility] a CTA head and neck was performed which showed a proximal left MCA occlusion, and a CT head showed a dense left MCA sign with some hypodensity and early ischemic changes to my eye in the left MCA distribution. She did not receive IV tPA as she was out of the window and was transferred to [facility] for possible mechanical thrombectomy. At [facility] her CT perfusion showed zero core infarct, with a penumbra of 80 mls, and an infinite mismatch. She was deemed a candidate for intervention and recanalization was achieved with TICI 3 score.     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       NEURO: neurologically overall improved, continue close monitoring for neurologic deterioration, normotension in setting of recent recanalizaiton, titrate statin to LDL goal less than 70, MR imaging when stable. Physical therapy/OT/Speech eval.     ANTITHROMBOTIC THERAPY: ASA     PULMONARY: respiratory care per ICU re: extubation     CARDIOVASCULAR: check TTE, cardiac monitoring  and PPM interrogation to screen for  occult arrhythmia                            SBP goal: 100-130mmHg     GASTROINTESTINAL:  dysphagia screen  when stable      Diet: npo    RENAL: BUN/Cr wihtin limits, good urine output      Na Goal: Greater than 135     Gonzalez:    HEMATOLOGY: H/H without anemia, Platelets 175, LE duplex negative for DVt      DVT ppx: Heparin s.c [] LMWH [x]     ID: afebrile,  leukocytosis, monitor for si/sx of infection      DISPOSITION: Rehab or home depending on PT eval once stable and workup is complete      CORE MEASURES:        Admission NIHSS: 19     TPA: [] YES [x] NO      LDL/HDL:55/37     Depression Screen: p     Statin Therapy: when access obtained continue home regimen     Dysphagia Screen: [] PASS [] FAIL -per ICU when stable      Smoking [] YES [x] NO      Afib [] YES [x] NO     Stroke Education [x] YES [] NO

## 2019-02-05 NOTE — OCCUPATIONAL THERAPY INITIAL EVALUATION ADULT - RANGE OF MOTION EXAMINATION, LOWER EXTREMITY
Left LE Active ROM was WFL (within functional limits)/Right LE Active Assistive ROM was WFL  (within functional limits)/may be due to R neglect

## 2019-02-05 NOTE — CONSULT NOTE ADULT - SUBJECTIVE AND OBJECTIVE BOX
HPI:  Patient is an 88 year old right handed  female with a history of CAD s/p stents and CABG on aspirin at home, DM, HTN, aortic valve replacement, and pacemaker who presents as transfer from [facility] for stroke rescue. Her last known normal was at approximately 2 pm on 2/3. Rosendo was then noted to have left sided weakness and sudden onset inability to speak, so she was brought to [facility]. She was outside the appropriate time window for tPA, so tPA was not given. CTA showed evidence of a L M1 occlusion so she was transferred to [facility] and code stroke called upon arrival. Initial NIHSS at [facility] was 19, MRS 1. (04 Feb 2019 04:35)    PERTINENT PM/SXH:   Valvular disease  Atrial flutter  Diabetes  Obesity  Hyperlipidemia  HTN (hypertension)  CAD (coronary artery disease)    Artificial pacemaker  Aortic valve replaced  S/P coronary artery stent placement  S/P cholecystectomy  S/P CABG (coronary artery bypass graft)    FAMILY HISTORY:  Family history of cerebrovascular accident (CVA)  Family history of hypertension    ITEMS NOT CHECKED ARE NOT PRESENT    SOCIAL HISTORY:   Significant other/partner:  [x ]  Children:  [x ]  Christian/Spirituality: Christianity   Substance hx:  [ ]   Tobacco hx:  [ ]   Alcohol hx: [ ]   Home Opioid hx:  [ ] I-Stop Reference No:  Living Situation: [x ]Home  [ ]Long term care  [ ]Rehab [ ]Other    ADVANCE DIRECTIVES:    DNR  MOLST  [ ]  Living Will  [ ]   DECISION MAKER(s):  [x ] Health Care Proxy(s)  [ ] Surrogate(s)  [ ] Guardian           Name(s): Phone Number(s):  Eliana Neves   BASELINE (I)ADL(s) (prior to admission):  [place]: [ ]Total  [ x] Moderate [ ]Dependent    Allergies    No Known Allergies    Intolerances    MEDICATIONS  (STANDING):  atorvastatin 80 milliGRAM(s) Oral at bedtime  chlorhexidine 0.12% Liquid 15 milliLiter(s) Oral Mucosa two times a day  docusate sodium Liquid 100 milliGRAM(s) Oral two times a day  insulin regular  human corrective regimen sliding scale   SubCutaneous every 6 hours  losartan 100 milliGRAM(s) Oral daily  metoprolol tartrate 50 milliGRAM(s) Oral two times a day  pantoprazole  Injectable 40 milliGRAM(s) IV Push daily  senna Syrup 20 milliLiter(s) Oral at bedtime  sodium chloride 0.9%. 1000 milliLiter(s) (80 mL/Hr) IV Continuous <Continuous>    MEDICATIONS  (PRN):  acetaminophen    Suspension .. 650 milliGRAM(s) Oral every 6 hours PRN Temp greater or equal to 38C (100.4F), Mild Pain (1 - 3)  hydrALAZINE Injectable 5 milliGRAM(s) IV Push every 6 hours PRN sbp > 150    PRESENT SYMPTOMS: [ ]Unable to obtain due to poor mentation   Source if other than patient:  [ ]Family   [ ]Team     Pain (Impact on QOL):    Location -         Minimal acceptable level (0-10 scale):                    Aggrevating factors -  Quality -  Radiation -  Severity (0-10 scale) -    Timing -    PAIN AD Score: 0    [email or web address] (press ctrl +  left click to view)    Dyspnea:                           [x ]Mild [ x]Moderate [ ]Severe  Anxiety:                             [ ]Mild [ ]Moderate [ ]Severe  Fatigue:                             [ ]Mild [ ]Moderate [ ]Severe  Nausea:                             [ ]Mild [ ]Moderate [ ]Severe  Loss of appetite:              [ ]Mild [ ]Moderate [ ]Severe  Constipation:                    [ ]Mild [ ]Moderate [ ]Severe    Other Symptoms:  [ ]All other review of systems negative     Karnofsky Performance Score/Palliative Performance Status Version 2:  30       %  PHYSICAL EXAM:  Vital Signs Last 24 Hrs  T(C): 36.8 (05 Feb 2019 11:00), Max: 37.4 (04 Feb 2019 23:00)  T(F): 98.2 (05 Feb 2019 11:00), Max: 99.4 (04 Feb 2019 23:00)  HR: 626 (05 Feb 2019 13:17) (60 - 860)  BP: 152/63 (05 Feb 2019 13:17) (104/51 - 184/79)  BP(mean): 91 (05 Feb 2019 13:17) (70 - 122)  RR: 22 (05 Feb 2019 13:17) (12 - 217)  SpO2: 100% (05 Feb 2019 13:17) (94% - 100%) I&O's Summary    04 Feb 2019 07:01  -  05 Feb 2019 07:00  --------------------------------------------------------  IN: 2220 mL / OUT: 680 mL / NET: 1540 mL    05 Feb 2019 07:01  -  05 Feb 2019 13:56  --------------------------------------------------------  IN: 480 mL / OUT: 60 mL / NET: 420 mL    GENERAL:  [ x]Alert  [x ]Oriented x 2  [ ]Lethargic  [ ]Cachexia  [ ]Unarousable  [x ]Verbal  [ ]Non-Verbal  Behavioral:   [ ] Anxiety  [ ] Delirium [ ] Agitation [ ] Other  HEENT:  [ ]Normal   [ ]Dry mouth   [ ]ET Tube/Trach  [ ]Oral lesions  PULMONARY:   [ ]Clear [ ]Tachypnea  [x ]Audible excessive secretions   [ ]Rhonchi        [ ]Right [ ]Left [ ]Bilateral  [ ]Crackles        [ ]Right [ ]Left [ ]Bilateral  [ ]Wheezing     [ ]Right [ ]Left [ ]Bilateral  CARDIOVASCULAR:    [ ]Regular [x ]Irregular [ ]Tachy  [ ]Duc [ ]Murmur [ ]Other  GASTROINTESTINAL:  [x ]Soft  [ ]Distended   x[ ]+BS  [x ]Non tender [ ]Tender  [ ]PEG [ ]OGT/ NGT  Last BM:   GENITOURINARY:  [ ]Normal [x ] Incontinent   [ ]Oliguria/Anuria   [ ]Gonzalez  MUSCULOSKELETAL:   [ ]Normal   [x ]Weakness  [ ]Bed/Wheelchair bound [ ]Edema  NEUROLOGIC:   [ ]No focal deficits  [ ] Cognitive impairment  [ ] Dysphagia [ ]Dysarthria [ ] Paresis [x ]Other :  unable to assess , s/p extubation , on oxygen mask  SKIN:   [x ]Normal   [ ]Pressure ulcer(s)  [ ]Rash    CRITICAL CARE:  [ ] Shock Present  [ ]Septic [ ]Cardiogenic [ ]Neurologic [ ]Hypovolemic  [ ]  Vasopressors [ ]  Inotropes   [ ] Respiratory failure present  [ ] Acute  [ ] Chronic [ ] Hypoxic  [ ] Hypercarbic [ ] Other  [ ] Other organ failure     LABS:                        12.9   11.2  )-----------( 175      ( 04 Feb 2019 06:17 )             38.6   02-04    141  |  108  |  15  ----------------------------<  145<H>  3.9   |  19<L>  |  0.69    Ca    8.0<L>      04 Feb 2019 22:20  Phos  3.2     02-04  Mg     1.7     02-04    TPro  7.0  /  Alb  3.8  /  TBili  0.6  /  DBili  x   /  AST  27  /  ALT  22  /  AlkPhos  82  02-03  PT/INR - ( 04 Feb 2019 06:17 )   PT: 13.1 sec;   INR: 1.14 ratio         PTT - ( 04 Feb 2019 00:59 )  PTT:27.5 sec      RADIOLOGY & ADDITIONAL STUDIES:      INTERPRETATION:  Noncontrast CT of the brain.    CLINICAL INDICATION:  lt mca cva    TECHNIQUE : Axial CT scanning of the brain was obtained from the skull   base to the vertex without the administration of intravenous contrast.      COMPARISON: CT brain 2/4/2019    FINDINGS:      Previously seen increased attenuation within left sided sulci is   decreased on the current examination.    No hydrocephalus or midlineshift. No effacement of basal cisterns. No   parenchymal hemorrhage.     IMPRESSION:    Previously seen increased attenuation within left sided sulci is   decreased on the current examination, and may represent the presence of   residual intravenous contrast and/or subarachnoid hemorrhage.    No no hydrocephalus or midline shift. No effacement of basal cisterns            PROTEIN CALORIE MALNUTRITION:   [ ] PPSV2 < or = to 30% [ ] significant weight loss  [ ] poor nutritional intake [ ] catabolic state [ ] anasarca     Albumin, Serum: 3.8 g/dL (02-03-19 @ 21:48)  Artificial Nutrition [ ]     REFERRALS:   [ ]Chaplaincy  [ ] Hospice  [ ]Child Life  x ]Social Work  [ ]Case management [ ]Holistic Therapy   Goals of Care Discussion Document:

## 2019-02-05 NOTE — CONSULT NOTE ADULT - PROBLEM SELECTOR RECOMMENDATION 9
Left M1 occulusion   significant perfusion deficit, taken to IR for thrombectomy and TICI 3 revascularization

## 2019-02-05 NOTE — AIRWAY REMOVAL NOTE  ADULT & PEDS - ARTIFICAL AIRWAY REMOVAL COMMENTS
[place] order for extubation verified.  The patient was identified by full name and birth day compared to the identification band. Present during the procedure was  . Pt successfully extubated to AM40% no distress noted.

## 2019-02-05 NOTE — OCCUPATIONAL THERAPY INITIAL EVALUATION ADULT - ADDITIONAL COMMENTS
There is some reconstitution of blood flow within distal M2 branches and M3 branches of the left middle cerebral artery.  CT perfusion is recommended for further characterization.  2.  Atherosclerotic calcification causes diffuse mild narrowing in the   cavernous and supraclinoid segments of the internal carotid arteries   withhout flow-limiting stenosis. Atherosclerotic calcification causes moderate to severe stenosis in the bilateral intradural vertebral arteries.  Mild luminal irregularity of the basilar artery without flow-limiting stenosis.  Multiple mild to moderate stenoses in the proximal P2 segment of the left posterior cerebral artery.  The right posterior cerebral artery is diffusely small and irregular, and appears hypoenhancing compared to the contralateral side. Doppler (-)

## 2019-02-05 NOTE — PROGRESS NOTE ADULT - SUBJECTIVE AND OBJECTIVE BOX
extubated today    vitals/labs/meds/imaging reviewed  EO to noxious, hypophonic  2mm reactive pupils b/l  FC on L -- two fingers and wiggles toes  RUE 3/5  RLE TF    -monitor for resp status  -stroke core measures  -ASA/statin  -MRI pending  --180  -TTE pending  -NPO as just extubated, dysphagia screen when appropriate  -IVF  -DVT ppx  -monitor for fevers    additional critical care time 35min extubated today    vitals/labs/meds/imaging reviewed  EO to noxious, hypophonic  2mm reactive pupils b/l  FC on L -- two fingers and wiggles toes  RUE 3/5  RLE TF    -monitor for resp status  -stroke core measures  -ASA/statin  -MRI pending  --180  -TTE pending  -NPO as just extubated, dysphagia screen when appropriate  -IVF  -DVT ppx  -monitor for fevers    family--daughter and grandson updated at bedside    additional critical care time 35min

## 2019-02-05 NOTE — PROGRESS NOTE ADULT - ASSESSMENT
L M1 occlusion s/p TICI 3 recanalization, post CT w/ ?contrast extravasation; no tPA given, outside window at presentation    NEURO:  CT Head in AM  MRI when able  stroke core measure: A1c, LDL  Secondary stroke prevention with statin; ASA if CT head stable  Activity: [x] OOB as tolerated [] Bedrest [] PT [] OT [] PMNR    PULM:  Intubated  CPAP; will attempt to extubate  CXR     CV:  -130 per neuro IR    RENAL:  IVF    GI:  Diet: NPO for possible extubation  GI prophylaxis [] not indicated [x] PPI [] other:  Bowel regimen [x] colace [x] senna [] other:    ENDO:   Goal euglycemia (-180)    HEME/ONC:  VTE prophylaxis: [x] SCDs [] chemoprophylaxis [x] hold chemoprophylaxis due to: acute stroke, revascularization; start if CT Head stable [] high risk of DVT/PE on admission due to:    ID:  Afebrile    SOCIAL/FAMILY:  [x] awaiting [] updated at bedside [] family meeting    CODE STATUS:  [x] Full Code [] DNR [] DNI [] Palliative/Comfort Care    DISPOSITION:  [x] ICU [] Stroke Unit [] Floor [] EMU [] RCU [] PCU    [x] Patient is at high risk of neurologic deterioration/death due to: acute L MCA CVA, cerebral edema, hemorrhagic conversion    Time spent: 45 critical care minutes    Contact: [phone] L M1 occlusion s/p TICI 3 recanalization, post CT w/ ?contrast extravasation; no tPA given, outside window at presentation    NEURO:  CT Head in AM  MRI when able  stroke core measure: A1c, LDL  Secondary stroke prevention with statin; ASA if CT head stable  Activity: [x] OOB as tolerated [] Bedrest [] PT [] OT [] PMNR    PULM:  Intubated  CPAP; will attempt to extubate  CXR     CV:  Will liberalize SBP    RENAL:  IVF    GI:  Diet: NPO for possible extubation  GI prophylaxis [] not indicated [x] PPI [] other:  Bowel regimen [x] colace [x] senna [] other:    ENDO:   Goal euglycemia (-180)    HEME/ONC:  VTE prophylaxis: [x] SCDs [] chemoprophylaxis [x] hold chemoprophylaxis due to: acute stroke, revascularization; start if CT Head stable [] high risk of DVT/PE on admission due to:    ID:  Afebrile    SOCIAL/FAMILY:  [x] awaiting [] updated at bedside [] family meeting    CODE STATUS:  [x] Full Code [] DNR [] DNI [] Palliative/Comfort Care    DISPOSITION:  [x] ICU [] Stroke Unit [] Floor [] EMU [] RCU [] PCU    [x] Patient is at high risk of neurologic deterioration/death due to: acute L MCA CVA, cerebral edema, hemorrhagic conversion    Time spent: 45 critical care minutes    Contact: [phone]

## 2019-02-05 NOTE — CONSULT NOTE ADULT - PROBLEM SELECTOR RECOMMENDATION 4
Pt was moderately independent of adl's,  walks with cane most times.  Grandson lives upstairs , two family home, daughter and hCP  Eliana Neves lives next door.  Patient is well looked after by family.  Spends 2-3 days a week at [employer]. Does not drive .  FAmily assist with food and cooking.  Discussed importance of discussion surrounding code status, advance directives. Daughter states, she is well versed as she was the HCP for her father.  She has thought of this throughout her mothers hospitalization and will continue to think about formalizing goals.   Left my phone number , questions and concerns discussed. Signing off

## 2019-02-05 NOTE — PHYSICAL THERAPY INITIAL EVALUATION ADULT - PERTINENT HX OF CURRENT PROBLEM, REHAB EVAL
Pt is 88M admitted 2/3/19 right handed;PMHx CAD s/p stents & CABG, DM, HTN, AVR & pacemaker; presents as transfer from [facility] for stroke rescue. Pt p/w right sided weakness & sudden onset inability to speak.Pt outside the appropriate time window for tPA, so tPA was not given. CTA showed evidence of a L M1 occlusion so she was transferred to [facility] & code stroke called upon arrival.

## 2019-02-06 PROCEDURE — 99233 SBSQ HOSP IP/OBS HIGH 50: CPT

## 2019-02-06 RX ORDER — GLUCAGON INJECTION, SOLUTION 0.5 MG/.1ML
1 INJECTION, SOLUTION SUBCUTANEOUS ONCE
Qty: 0 | Refills: 0 | Status: DISCONTINUED | OUTPATIENT
Start: 2019-02-06 | End: 2019-02-14

## 2019-02-06 RX ORDER — SODIUM CHLORIDE 9 MG/ML
1000 INJECTION, SOLUTION INTRAVENOUS
Qty: 0 | Refills: 0 | Status: DISCONTINUED | OUTPATIENT
Start: 2019-02-06 | End: 2019-02-14

## 2019-02-06 RX ORDER — DEXTROSE 50 % IN WATER 50 %
25 SYRINGE (ML) INTRAVENOUS ONCE
Qty: 0 | Refills: 0 | Status: DISCONTINUED | OUTPATIENT
Start: 2019-02-06 | End: 2019-02-14

## 2019-02-06 RX ORDER — DEXTROSE 50 % IN WATER 50 %
15 SYRINGE (ML) INTRAVENOUS ONCE
Qty: 0 | Refills: 0 | Status: DISCONTINUED | OUTPATIENT
Start: 2019-02-06 | End: 2019-02-14

## 2019-02-06 RX ORDER — DEXTROSE 50 % IN WATER 50 %
12.5 SYRINGE (ML) INTRAVENOUS ONCE
Qty: 0 | Refills: 0 | Status: DISCONTINUED | OUTPATIENT
Start: 2019-02-06 | End: 2019-02-14

## 2019-02-06 RX ORDER — FAMOTIDINE 10 MG/ML
20 INJECTION INTRAVENOUS
Qty: 0 | Refills: 0 | Status: DISCONTINUED | OUTPATIENT
Start: 2019-02-06 | End: 2019-02-10

## 2019-02-06 RX ADMIN — LOSARTAN POTASSIUM 100 MILLIGRAM(S): 100 TABLET, FILM COATED ORAL at 05:10

## 2019-02-06 RX ADMIN — Medication 50 MILLIGRAM(S): at 05:10

## 2019-02-06 RX ADMIN — ENOXAPARIN SODIUM 40 MILLIGRAM(S): 100 INJECTION SUBCUTANEOUS at 18:06

## 2019-02-06 RX ADMIN — Medication 650 MILLIGRAM(S): at 22:46

## 2019-02-06 RX ADMIN — CHLORHEXIDINE GLUCONATE 15 MILLILITER(S): 213 SOLUTION TOPICAL at 05:13

## 2019-02-06 RX ADMIN — ATORVASTATIN CALCIUM 80 MILLIGRAM(S): 80 TABLET, FILM COATED ORAL at 21:02

## 2019-02-06 RX ADMIN — Medication 50 MILLIGRAM(S): at 18:06

## 2019-02-06 RX ADMIN — Medication 81 MILLIGRAM(S): at 13:17

## 2019-02-06 RX ADMIN — SODIUM CHLORIDE 80 MILLILITER(S): 9 INJECTION INTRAMUSCULAR; INTRAVENOUS; SUBCUTANEOUS at 07:30

## 2019-02-06 RX ADMIN — INSULIN HUMAN 2: 100 INJECTION, SOLUTION SUBCUTANEOUS at 13:12

## 2019-02-06 RX ADMIN — Medication 650 MILLIGRAM(S): at 23:16

## 2019-02-06 RX ADMIN — FAMOTIDINE 20 MILLIGRAM(S): 10 INJECTION INTRAVENOUS at 18:06

## 2019-02-06 RX ADMIN — Medication 100 MILLIGRAM(S): at 05:10

## 2019-02-06 NOTE — PROGRESS NOTE ADULT - SUBJECTIVE AND OBJECTIVE BOX
SUMMARY:  89yo woman p/w SSH with R facial droop and R sided weakness, found to have L M1 occlusion on CTA, transferred to [facility] for possible endovascular intervention. LWK 2pm on 2/3, outside window for tPA, not given. CTP showing no core infarct with significant perfusion deficit, taken to IR for thrombectomy and TICI 3 revascularization. PMH a flutter, CAD s/p multiple stents and CABG in 2004, DM, HTN, HLD, aortic valve replacement on ASA only.     HOSPITAL COURSE:  2/3 s/p L M1 thrombectomy  2/5: Extubated     Overnight events: Was extubated successfully yesterday. Started following some commands on R side.     ROS: Negative unless specified.    Vitals/labs/meds/imaging reviewed    intubated  EO spontaneously, No gaze preference, R pupil 2mm R, L irregularly shaped reactive, EOMI.   A & O x 3 (w/ choices) [Chooses her name, that she is in a hospital, and that it is 2019)  LUE 5/5  LLE 4/5 (however poor effort)  RUE 4+5 distally, 2/5 proximally  RLE 1/5  2/6 systolic murmur LUSB. RRR.  Clear to auscultation b/l.  obese abd , ND  DP pulses 2+ b/l SUMMARY:  89yo woman p/w SSH with R facial droop and R sided weakness, found to have L M1 occlusion on CTA, transferred to [facility] for possible endovascular intervention. LWK 2pm on 2/3, outside window for tPA, not given. CTP showing no core infarct with significant perfusion deficit, taken to IR for thrombectomy and TICI 3 revascularization. PMH a flutter, CAD s/p multiple stents and CABG in 2004, DM, HTN, HLD, aortic valve replacement on ASA only.     HOSPITAL COURSE:  2/3 s/p L M1 thrombectomy  2/5: Extubated     Overnight events: Was extubated successfully yesterday. Started following some commands on R side.     ROS: Negative unless specified.    Vitals/labs/meds/imaging reviewed    intubated  EO spontaneously, No gaze preference, R pupil 2mm R, L irregularly shaped reactive, EOMI.   A & O x 3 (w/ choices) [Chooses her name, that she is in a hospital, and that it is 2019)  Some aphasia as cannot follow multi-step commands, but can lift arm/leg to command  LUE 5/5  LLE 4/5 (however poor effort)  RUE 4+5 distally, 2/5 proximally  RLE 1/5  2/6 systolic murmur LUSB. RRR.  Clear to auscultation b/l.  obese abd , ND  DP pulses 2+ b/l

## 2019-02-06 NOTE — PROGRESS NOTE ADULT - SUBJECTIVE AND OBJECTIVE BOX
THE PATIENT WAS SEEN AND EXAMINED BY ME WITH THE HOUSESTAFF AND STROKE TEAM DURING MORNING ROUNDS.   HPI:  Patient is an 88 year old right handed  female with a history of CAD s/p stents and CABG on aspirin at home, DM, HTN, aortic valve replacement, and pacemaker who presented as transfer from [facility] for stroke rescue. Her last known normal was at approximately 2 pm on 2/3. Robbins was then noted to have left sided weakness and sudden onset inability to speak, so she was brought to [facility]. She was outside the appropriate time window for tPA, so tPA was not given. CTA showed evidence of a L M1 occlusion so she was transferred to [facility] and code stroke called upon arrival. Initial NIHSS at [facility] was 19, MRS 1.    SUBJECTIVE: No events overnight.  No new neurologic complaints.      acetaminophen    Suspension .. 650 milliGRAM(s) Oral every 6 hours PRN  aspirin  chewable 81 milliGRAM(s) Oral daily  atorvastatin 80 milliGRAM(s) Oral at bedtime  chlorhexidine 0.12% Liquid 15 milliLiter(s) Oral Mucosa two times a day  docusate sodium Liquid 100 milliGRAM(s) Oral two times a day  enoxaparin Injectable 40 milliGRAM(s) SubCutaneous <User Schedule>  insulin regular  human corrective regimen sliding scale   SubCutaneous every 6 hours  losartan 100 milliGRAM(s) Oral daily  metoprolol tartrate 50 milliGRAM(s) Oral two times a day  pantoprazole  Injectable 40 milliGRAM(s) IV Push daily  senna Syrup 20 milliLiter(s) Oral at bedtime  sodium chloride 0.9%. 1000 milliLiter(s) IV Continuous <Continuous>      PHYSICAL EXAM:   Vital Signs Last 24 Hrs  T(C): 36.8 (06 Feb 2019 03:00), Max: 37.3 (05 Feb 2019 07:00)  T(F): 98.2 (06 Feb 2019 03:00), Max: 99.1 (05 Feb 2019 07:00)  HR: 90 (06 Feb 2019 06:00) (60 - 860)  BP: 126/59 (06 Feb 2019 05:00) (126/59 - 184/79)  BP(mean): 85 (06 Feb 2019 05:00) (77 - 122)  RR: 24 (06 Feb 2019 06:00) (16 - 217)  SpO2: 98% (06 Feb 2019 06:00) (98% - 100%)    General: No acute distress,    HEENT: EOM intact, no blink to threat on right   Abdomen: , nontender  Extremities: No edema    NEUROLOGICAL EXAM:  Mental status: eyes open, awake, attentive to persons at bedside, follows some simple commands   Cranial Nerves:  , no blink to threat on right   Motor exam: left side movees well against gravity, RUE 3/5 with drift at elbow, RLE 2/5   Sensation: Intact to light touch   Coordination/ Gait: gait not assessed   LABS:   02-04    141  |  108  |  15  ----------------------------<  145<H>  3.9   |  19<L>  |  0.69    Ca    8.0<L>      04 Feb 2019 22:20  Phos  3.2     02-04  Mg     1.7     02-04      Hemoglobin A1C, Whole Blood: 6.6 % (02-04 @ 06:11)      IMAGING: Reviewed by me.   CT Head No Cont (02.05.19)   Previously seen increased attenuation within left sided sulci is   decreased on the current examination, and may represent the presence of   residual intravenous contrast and/or subarachnoid hemorrhage.  No no hydrocephalus or midline shift. No effacement of basal cisterns    (02.03.19)   CT angiography neck: Patent cervical vasculature.  No hemodynamically   significant carotid stenosis or flow-limiting vertebral artery stenosis.    No evidence of dissection. Three-vessel arch.  Vertebral arteries are   codominant.    CT angiography brain:   1.  The M1 segment of the left middle cerebral artery is occluded,   approximately 8 mm distal to its origin.  Multiple proximal M2 branches   of the left MCA are occluded within the sylvian fissure.  There is some   reconstitution of blood flow within distal M2 branches and M3 branches of   the left middle cerebral artery.  CT perfusion is recommended for further   characterization.  2.  Atherosclerotic calcification causes diffuse mild narrowing in the   cavernous and supraclinoid segments of the internal carotid arteries   without flow-limiting stenosis.  3.  Atherosclerotic calcification causes moderate to severe stenosis in   the bilateral intradural vertebral arteries.  Mild luminal irregularity   of the basilar artery without flow-limiting stenosis.  Multiple mild to   moderate stenoses in the proximal P2 segment of the left posterior   cerebral artery.  The right posterior cerebral artery is diffusely small   and irregular, and appears hypoenhancing compared to the contralateral   side.    CT Perfusion w/ Maps w/ IV Cont (02.03.19)   NONCONTRAST HEAD CT SCAN: No CT evidence of acute intracranial   hemorrhage, mass effect or acute territorial infarct.    CT PERFUSION: No evidence of a completed infarct.  Approximately 80 cc   penumbra of at risk tissue in the left MCA vascular territory THE PATIENT WAS SEEN AND EXAMINED BY ME WITH THE HOUSESTAFF AND STROKE TEAM DURING MORNING ROUNDS.   HPI:  Patient is an 88 year old right handed  female with a history of CAD s/p stents and CABG on aspirin at home, DM, HTN, aortic valve replacement, and pacemaker who presented as transfer from [facility] for stroke rescue. Her last known normal was at approximately 2 pm on 2/3. Robbins was then noted to have left sided weakness and sudden onset inability to speak, so she was brought to [facility]. She was outside the appropriate time window for tPA, so tPA was not given. CTA showed evidence of a L M1 occlusion so she was transferred to [facility] and code stroke called upon arrival. Initial NIHSS at [facility] was 19, MRS 1.    SUBJECTIVE: No events overnight.  No new neurologic complaints.      acetaminophen    Suspension .. 650 milliGRAM(s) Oral every 6 hours PRN  aspirin  chewable 81 milliGRAM(s) Oral daily  atorvastatin 80 milliGRAM(s) Oral at bedtime  chlorhexidine 0.12% Liquid 15 milliLiter(s) Oral Mucosa two times a day  docusate sodium Liquid 100 milliGRAM(s) Oral two times a day  enoxaparin Injectable 40 milliGRAM(s) SubCutaneous <User Schedule>  insulin regular  human corrective regimen sliding scale   SubCutaneous every 6 hours  losartan 100 milliGRAM(s) Oral daily  metoprolol tartrate 50 milliGRAM(s) Oral two times a day  pantoprazole  Injectable 40 milliGRAM(s) IV Push daily  senna Syrup 20 milliLiter(s) Oral at bedtime  sodium chloride 0.9%. 1000 milliLiter(s) IV Continuous <Continuous>      PHYSICAL EXAM:   Vital Signs Last 24 Hrs  T(C): 36.8 (06 Feb 2019 03:00), Max: 37.3 (05 Feb 2019 07:00)  T(F): 98.2 (06 Feb 2019 03:00), Max: 99.1 (05 Feb 2019 07:00)  HR: 90 (06 Feb 2019 06:00) (60 - 860)  BP: 126/59 (06 Feb 2019 05:00) (126/59 - 184/79)  BP(mean): 85 (06 Feb 2019 05:00) (77 - 122)  RR: 24 (06 Feb 2019 06:00) (16 - 217)  SpO2: 98% (06 Feb 2019 06:00) (98% - 100%)    General: No acute distress,    HEENT: EOM intact, no blink to threat on right   Abdomen: , nontender  Extremities: No edema    NEUROLOGICAL EXAM:  Mental status: eyes open, awake, attentive to persons at bedside, able to state name, hypophonic follows some simple commands, perseverates at times   Cranial Nerves:  right facial droop, no blink to threat on right   Motor exam: left side moves well against gravity, RUE 3/5 with drift , RLE 2/5   Sensation: Intact to light touch   Coordination/ Gait: gait not assessed   LABS:   02-04    141  |  108  |  15  ----------------------------<  145<H>  3.9   |  19<L>  |  0.69    Ca    8.0<L>      04 Feb 2019 22:20  Phos  3.2     02-04  Mg     1.7     02-04      Hemoglobin A1C, Whole Blood: 6.6 % (02-04 @ 06:11)      IMAGING: Reviewed by me.   CT Head No Cont (02.05.19)   Previously seen increased attenuation within left sided sulci is   decreased on the current examination, and may represent the presence of   residual intravenous contrast and/or subarachnoid hemorrhage.  No no hydrocephalus or midline shift. No effacement of basal cisterns    (02.03.19)   CT angiography neck: Patent cervical vasculature.  No hemodynamically   significant carotid stenosis or flow-limiting vertebral artery stenosis.    No evidence of dissection. Three-vessel arch.  Vertebral arteries are   codominant.    CT angiography brain:   1.  The M1 segment of the left middle cerebral artery is occluded,   approximately 8 mm distal to its origin.  Multiple proximal M2 branches   of the left MCA are occluded within the sylvian fissure.  There is some   reconstitution of blood flow within distal M2 branches and M3 branches of   the left middle cerebral artery.  CT perfusion is recommended for further   characterization.  2.  Atherosclerotic calcification causes diffuse mild narrowing in the   cavernous and supraclinoid segments of the internal carotid arteries   without flow-limiting stenosis.  3.  Atherosclerotic calcification causes moderate to severe stenosis in   the bilateral intradural vertebral arteries.  Mild luminal irregularity   of the basilar artery without flow-limiting stenosis.  Multiple mild to   moderate stenoses in the proximal P2 segment of the left posterior   cerebral artery.  The right posterior cerebral artery is diffusely small   and irregular, and appears hypoenhancing compared to the contralateral   side.    CT Perfusion w/ Maps w/ IV Cont (02.03.19)   NONCONTRAST HEAD CT SCAN: No CT evidence of acute intracranial   hemorrhage, mass effect or acute territorial infarct.    CT PERFUSION: No evidence of a completed infarct.  Approximately 80 cc   penumbra of at risk tissue in the left MCA vascular territory

## 2019-02-06 NOTE — PROGRESS NOTE ADULT - ASSESSMENT
ASSESSMENT: 88 year old white female with a pmh of CAD, HTN, HLD, pacemaker who presented to [facility] as a transfer from [facility] with right hemiparesis and aphasia and left proximal MCA occlusion. The patient was last known well at 2 pm on 2/3/19 and was then found down on the ground by her daughter several hours later and was noted to be not speaking and weak on the right. At [facility] a CTA head and neck was performed which showed a proximal left MCA occlusion, and a CT head showed a dense left MCA sign with some hypodensity and early ischemic changes to my eye in the left MCA distribution. She did not receive IV tPA as she was out of the window and was transferred to [facility] for possible mechanical thrombectomy. At [facility] her CT perfusion showed zero core infarct, with a penumbra of 80 mls, and an infinite mismatch. She was deemed a candidate for intervention and recanalization was achieved with TICI 3 score.     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     NEURO: neurologically overall improved, continue close monitoring for neurologic deterioration, normotension in setting of recent recanalizaiton, titrate statin to LDL goal less than 70, MR imaging when stable. Physical therapy/OT/Speech eval.     ANTITHROMBOTIC THERAPY: ASA     PULMONARY: respiratory care per ICU     CARDIOVASCULAR: check TTE, cardiac monitoring  and PPM interrogation to screen for  occult arrhythmia                            SBP goal: 100-130mmHg     GASTROINTESTINAL:  dysphagia screen noted failed, recommend SLP eval      Diet: npo    RENAL: BUN/Cr wihtin limits, good urine output      Na Goal: Greater than 135     Gonzalez:    HEMATOLOGY: H/H without anemia, Platelets 175, LE duplex negative for DVt      DVT ppx: Heparin s.c [] LMWH [x]     ID: afebrile,  leukocytosis, monitor for si/sx of infection      DISPOSITION: Rehab or home depending on PT eval once stable and workup is complete      CORE MEASURES:        Admission NIHSS: 19     TPA: [] YES [x] NO      LDL/HDL:55/37     Depression Screen: p     Statin Therapy: y     Dysphagia Screen: [] PASS [x] FAIL      Smoking [] YES [x] NO      Afib [] YES [x] NO     Stroke Education [x] YES [] NO ASSESSMENT: 88 year old white female with a pmh of CAD, HTN, HLD, pacemaker who presented to [facility] as a transfer from [facility] with right hemiparesis and aphasia and left proximal MCA occlusion. The patient was last known well at 2 pm on 2/3/19 and was then found down on the ground by her daughter several hours later and was noted to be not speaking and weak on the right. At [facility] a CTA head and neck was performed which showed a proximal left MCA occlusion, and a CT head showed a dense left MCA sign with some hypodensity and early ischemic changes to my eye in the left MCA distribution. She did not receive IV tPA as she was out of the window and was transferred to [facility] for possible mechanical thrombectomy. At [facility] her CT perfusion showed zero core infarct, with a penumbra of 80 mls, and an infinite mismatch. She was deemed a candidate for intervention and recanalization was achieved with TICI 3 score.     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     NEURO: neurologically overall improved, continue close monitoring for neurologic deterioration, normotension in setting of recent recanalizaiton, titrate statin to LDL goal less than 70, MR imaging when stable. Physical therapy/OT/Speech eval.     ANTITHROMBOTIC THERAPY: ASA     PULMONARY: s/p extubation, protecting airway, saturating well, secretions requiring suctioning, monitor respiratory status closely     CARDIOVASCULAR: check TTE, cardiac monitoring  and PPM interrogation to screen for  occult arrhythmia                            SBP goal: 100-130mmHg     GASTROINTESTINAL:  dysphagia screen noted failed, recommend SLP eval      Diet: npo    RENAL: BUN/Cr within limits, good urine output      Na Goal: Greater than 135     Gonzalez:    HEMATOLOGY: H/H without anemia, Platelets 175, LE duplex negative for DVt      DVT ppx: Heparin s.c [] LMWH [x]     ID: afebrile,  leukocytosis, monitor for si/sx of infection    DISPOSITION: Rehab or home depending on PT eval once stable and workup is complete      CORE MEASURES:        Admission NIHSS: 19     TPA: [] YES [x] NO      LDL/HDL:55/37     Depression Screen: p     Statin Therapy: y     Dysphagia Screen: [] PASS [x] FAIL      Smoking [] YES [x] NO      Afib [] YES [x] NO     Stroke Education [x] YES [] NO

## 2019-02-06 NOTE — PROGRESS NOTE ADULT - ASSESSMENT
L M1 occlusion s/p TICI 3 recanalization; no tPA given, outside window at presentation    NEURO:  Neurochecks q2H  stroke core measure: A1c, LDL  On ASA 81, Atorvastatin 80 qHS  Activity: [x] OOB as tolerated [] Bedrest [X] PT [X] OT [] PMNR    PULM:  Extubated 2/5    CV:  -180 (permissive HTN)  resume home dose metoprolol 50 BID  hold PO antihypertensive meds--isosorbide mononitrate 30mg ER, losarten 100mg, HCTZ 25mg  TTE    RENAL:  IVF    GI:  Diet: Dysphagia screen and advance diet as tolerated  or start TF  GI prophylaxis [X] not indicated [] PPI [X] other: on home Famotidine 20 BID  Bowel regimen [x] colace [x] senna [] other:    ENDO:   Goal euglycemia (-180)    HEME/ONC:  VTE prophylaxis: [x] SCDs [] chemoprophylaxis - start Lovenox 40 sc qHS [] high risk of DVT/PE on admission due to:    ID: Monitor for fevers    SOCIAL/FAMILY:  [x] awaiting [] updated at bedside [] family meeting    CODE STATUS:  [x] Full Code [] DNR [] DNI [] Palliative/Comfort Care    DISPOSITION:  [] ICU [X] Stroke Unit [] Floor [] EMU [] RCU [] PCU    [x] Patient is at high risk of neurologic deterioration/death due to: acute L MCA CVA, cerebral edema, hemorrhagic conversion    Time spent: 30 critical care minutes    Contact: [phone] ASSESSMENT/PLAN: L M1 occlusion s/p TICI 3 recanalization; no tPA given, outside window at presentation    NEURO:  Neurochecks q2H  stroke core measure: A1c, LDL  Secondary stroke prevention: On ASA 81, Atorvastatin 80 qHS  Activity: [x] OOB as tolerated [] Bedrest [X] PT [X] OT [] PMNR    PULM:  Extubated 2/5    CV:  -180 (permissive HTN)  metoprolol 50 BID    RENAL:  IVF    GI:  Diet: Dysphagia screen and advance diet as tolerated or TF if fails dysphagia  GI prophylaxis [X] not indicated [] PPI [X] other: on home Famotidine 20 BID  Bowel regimen [x] colace [x] senna [] other:    ENDO:   Goal euglycemia (-180)    HEME/ONC:  VTE prophylaxis: [x] SCDs [x] chemoprophylaxis Lovenox 40 sc qHS [] high risk of DVT/PE on admission due to:    ID: Monitor for fevers    SOCIAL/FAMILY:  [x] awaiting [] updated at bedside [] family meeting    CODE STATUS:  [x] Full Code [] DNR [] DNI [] Palliative/Comfort Care    DISPOSITION:  [] ICU [X] Stroke Unit [] Floor [] EMU [] RCU [] PCU    Accepted to Stroke Service by Dr. Bay    Contact: [phone]

## 2019-02-07 DIAGNOSIS — R00.0 TACHYCARDIA, UNSPECIFIED: ICD-10-CM

## 2019-02-07 DIAGNOSIS — I63.9 CEREBRAL INFARCTION, UNSPECIFIED: ICD-10-CM

## 2019-02-07 DIAGNOSIS — I25.10 ATHEROSCLEROTIC HEART DISEASE OF NATIVE CORONARY ARTERY WITHOUT ANGINA PECTORIS: ICD-10-CM

## 2019-02-07 DIAGNOSIS — E11.9 TYPE 2 DIABETES MELLITUS WITHOUT COMPLICATIONS: ICD-10-CM

## 2019-02-07 LAB
ANION GAP SERPL CALC-SCNC: 13 MMOL/L — SIGNIFICANT CHANGE UP (ref 5–17)
BUN SERPL-MCNC: 11 MG/DL — SIGNIFICANT CHANGE UP (ref 7–23)
CALCIUM SERPL-MCNC: 8 MG/DL — LOW (ref 8.4–10.5)
CHLORIDE SERPL-SCNC: 110 MMOL/L — HIGH (ref 96–108)
CO2 SERPL-SCNC: 17 MMOL/L — LOW (ref 22–31)
CREAT SERPL-MCNC: 0.56 MG/DL — SIGNIFICANT CHANGE UP (ref 0.5–1.3)
GLUCOSE SERPL-MCNC: 149 MG/DL — HIGH (ref 70–99)
HCT VFR BLD CALC: 35.2 % — SIGNIFICANT CHANGE UP (ref 34.5–45)
HGB BLD-MCNC: 11.9 G/DL — SIGNIFICANT CHANGE UP (ref 11.5–15.5)
MCHC RBC-ENTMCNC: 30.5 PG — SIGNIFICANT CHANGE UP (ref 27–34)
MCHC RBC-ENTMCNC: 33.8 GM/DL — SIGNIFICANT CHANGE UP (ref 32–36)
MCV RBC AUTO: 90.1 FL — SIGNIFICANT CHANGE UP (ref 80–100)
PLATELET # BLD AUTO: 156 K/UL — SIGNIFICANT CHANGE UP (ref 150–400)
POTASSIUM SERPL-MCNC: 3.5 MMOL/L — SIGNIFICANT CHANGE UP (ref 3.5–5.3)
POTASSIUM SERPL-SCNC: 3.5 MMOL/L — SIGNIFICANT CHANGE UP (ref 3.5–5.3)
RAPID RVP RESULT: SIGNIFICANT CHANGE UP
RBC # BLD: 3.91 M/UL — SIGNIFICANT CHANGE UP (ref 3.8–5.2)
RBC # FLD: 12.6 % — SIGNIFICANT CHANGE UP (ref 10.3–14.5)
SODIUM SERPL-SCNC: 140 MMOL/L — SIGNIFICANT CHANGE UP (ref 135–145)
WBC # BLD: 6.2 K/UL — SIGNIFICANT CHANGE UP (ref 3.8–10.5)
WBC # FLD AUTO: 6.2 K/UL — SIGNIFICANT CHANGE UP (ref 3.8–10.5)

## 2019-02-07 PROCEDURE — 99233 SBSQ HOSP IP/OBS HIGH 50: CPT

## 2019-02-07 PROCEDURE — 70450 CT HEAD/BRAIN W/O DYE: CPT | Mod: 26

## 2019-02-07 PROCEDURE — 93288 INTERROG EVL PM/LDLS PM IP: CPT | Mod: 26

## 2019-02-07 PROCEDURE — 93010 ELECTROCARDIOGRAM REPORT: CPT

## 2019-02-07 RX ORDER — METOPROLOL TARTRATE 50 MG
2.5 TABLET ORAL ONCE
Qty: 0 | Refills: 0 | Status: COMPLETED | OUTPATIENT
Start: 2019-02-07 | End: 2019-02-07

## 2019-02-07 RX ORDER — SIMVASTATIN 20 MG/1
40 TABLET, FILM COATED ORAL AT BEDTIME
Qty: 0 | Refills: 0 | Status: DISCONTINUED | OUTPATIENT
Start: 2019-02-07 | End: 2019-02-10

## 2019-02-07 RX ORDER — LEVALBUTEROL 1.25 MG/.5ML
0.63 SOLUTION, CONCENTRATE RESPIRATORY (INHALATION) EVERY 6 HOURS
Qty: 0 | Refills: 0 | Status: DISCONTINUED | OUTPATIENT
Start: 2019-02-07 | End: 2019-02-14

## 2019-02-07 RX ORDER — NYSTATIN CREAM 100000 [USP'U]/G
1 CREAM TOPICAL
Qty: 0 | Refills: 0 | Status: DISCONTINUED | OUTPATIENT
Start: 2019-02-07 | End: 2019-02-14

## 2019-02-07 RX ORDER — METOPROLOL TARTRATE 50 MG
75 TABLET ORAL
Qty: 0 | Refills: 0 | Status: DISCONTINUED | OUTPATIENT
Start: 2019-02-07 | End: 2019-02-09

## 2019-02-07 RX ORDER — FUROSEMIDE 40 MG
20 TABLET ORAL ONCE
Qty: 0 | Refills: 0 | Status: DISCONTINUED | OUTPATIENT
Start: 2019-02-07 | End: 2019-02-07

## 2019-02-07 RX ORDER — METOPROLOL TARTRATE 50 MG
25 TABLET ORAL ONCE
Qty: 0 | Refills: 0 | Status: COMPLETED | OUTPATIENT
Start: 2019-02-07 | End: 2019-02-07

## 2019-02-07 RX ORDER — OXYCODONE AND ACETAMINOPHEN 5; 325 MG/1; MG/1
1 TABLET ORAL ONCE
Qty: 0 | Refills: 0 | Status: DISCONTINUED | OUTPATIENT
Start: 2019-02-07 | End: 2019-02-07

## 2019-02-07 RX ADMIN — Medication 81 MILLIGRAM(S): at 12:54

## 2019-02-07 RX ADMIN — SENNA PLUS 20 MILLILITER(S): 8.6 TABLET ORAL at 23:51

## 2019-02-07 RX ADMIN — INSULIN HUMAN 2: 100 INJECTION, SOLUTION SUBCUTANEOUS at 12:51

## 2019-02-07 RX ADMIN — LEVALBUTEROL 0.63 MILLIGRAM(S): 1.25 SOLUTION, CONCENTRATE RESPIRATORY (INHALATION) at 06:30

## 2019-02-07 RX ADMIN — Medication 50 MILLIGRAM(S): at 16:30

## 2019-02-07 RX ADMIN — NYSTATIN CREAM 1 APPLICATION(S): 100000 CREAM TOPICAL at 06:57

## 2019-02-07 RX ADMIN — OXYCODONE AND ACETAMINOPHEN 1 TABLET(S): 5; 325 TABLET ORAL at 00:50

## 2019-02-07 RX ADMIN — INSULIN HUMAN 2: 100 INJECTION, SOLUTION SUBCUTANEOUS at 17:10

## 2019-02-07 RX ADMIN — SODIUM CHLORIDE 80 MILLILITER(S): 9 INJECTION INTRAMUSCULAR; INTRAVENOUS; SUBCUTANEOUS at 13:01

## 2019-02-07 RX ADMIN — Medication 2.5 MILLIGRAM(S): at 01:44

## 2019-02-07 RX ADMIN — ENOXAPARIN SODIUM 40 MILLIGRAM(S): 100 INJECTION SUBCUTANEOUS at 17:08

## 2019-02-07 RX ADMIN — FAMOTIDINE 20 MILLIGRAM(S): 10 INJECTION INTRAVENOUS at 05:51

## 2019-02-07 RX ADMIN — Medication 50 MILLIGRAM(S): at 05:51

## 2019-02-07 RX ADMIN — Medication 25 MILLIGRAM(S): at 20:12

## 2019-02-07 RX ADMIN — OXYCODONE AND ACETAMINOPHEN 1 TABLET(S): 5; 325 TABLET ORAL at 00:20

## 2019-02-07 RX ADMIN — FAMOTIDINE 20 MILLIGRAM(S): 10 INJECTION INTRAVENOUS at 17:08

## 2019-02-07 RX ADMIN — LEVALBUTEROL 0.63 MILLIGRAM(S): 1.25 SOLUTION, CONCENTRATE RESPIRATORY (INHALATION) at 00:43

## 2019-02-07 RX ADMIN — SIMVASTATIN 40 MILLIGRAM(S): 20 TABLET, FILM COATED ORAL at 23:51

## 2019-02-07 NOTE — CONSULT NOTE ADULT - SUBJECTIVE AND OBJECTIVE BOX
CHIEF COMPLAINT: tachycardia     HISTORY OF PRESENT ILLNESS:  88 year old female admitted to the Stroke unit with a history of CAD s/p stents and CABG on aspirin at home, DM, HTN, aortic valve replacement, and pacemaker who presented as transfer from [facility] for stroke rescue. Her last known normal was at approximately 2 pm on 2/3. Robbins was then noted to have left sided weakness and sudden onset inability to speak, so she was brought to [facility]. She was outside the appropriate time window for tPA, so tPA was not given. CTA showed evidence of a L M1 occlusion so she was transferred to [facility] and code stroke called upon arrival. Initial NIHSS at [facility] was 19, MRS 1.  has been tachycardic with heart rates up to the 130s. Denies chest pain or shortness of breath.     PAST MEDICAL & SURGICAL HISTORY:  Valvular disease  Atrial flutter: 1/2010- [facility], Dr Tran  Diabetes  Obesity  Hyperlipidemia  HTN (hypertension)  CAD (coronary artery disease): s/p CABG- 2004-[facility]  Artificial pacemaker  Aortic valve replaced  S/P coronary artery stent placement: x 4, 2006-[facility]  S/P cholecystectomy  S/P CABG (coronary artery bypass graft)          MEDICATIONS:  aspirin  chewable 81 milliGRAM(s) Oral daily  enoxaparin Injectable 40 milliGRAM(s) SubCutaneous <User Schedule>  metoprolol tartrate 50 milliGRAM(s) Oral two times a day      levalbuterol Inhalation 0.63 milliGRAM(s) Inhalation every 6 hours PRN    acetaminophen    Suspension .. 650 milliGRAM(s) Oral every 6 hours PRN    docusate sodium Liquid 100 milliGRAM(s) Oral two times a day  famotidine    Tablet 20 milliGRAM(s) Oral two times a day  senna Syrup 20 milliLiter(s) Oral at bedtime    atorvastatin 80 milliGRAM(s) Oral at bedtime  dextrose 40% Gel 15 Gram(s) Oral once PRN  dextrose 50% Injectable 12.5 Gram(s) IV Push once  dextrose 50% Injectable 25 Gram(s) IV Push once  dextrose 50% Injectable 25 Gram(s) IV Push once  glucagon  Injectable 1 milliGRAM(s) IntraMuscular once PRN  insulin regular  human corrective regimen sliding scale   SubCutaneous every 6 hours    dextrose 5%. 1000 milliLiter(s) IV Continuous <Continuous>  nystatin Powder 1 Application(s) Topical two times a day PRN  sodium chloride 0.9%. 1000 milliLiter(s) IV Continuous <Continuous>      FAMILY HISTORY:  Family history of cerebrovascular accident (CVA)  Family history of hypertension      SOCIAL HISTORY:    [ ] Non-smoker  [ ] Smoker  [ ] Alcohol    Allergies    No Known Allergies    Intolerances    	    REVIEW OF SYSTEMS:  CONSTITUTIONAL: No fever, weight loss, +fatigue  EYES: No eye pain, visual disturbances, or discharge  ENMT:  No difficulty hearing, tinnitus, vertigo; No sinus or throat pain  NECK: No pain or stiffness  RESPIRATORY: No cough, wheezing, chills or hemoptysis; No Shortness of Breath  CARDIOVASCULAR: No chest pain, palpitations, passing out, dizziness, or leg swelling  GASTROINTESTINAL: No abdominal or epigastric pain. No nausea, vomiting, or hematemesis; No diarrhea or constipation. No melena or hematochezia.  GENITOURINARY: No dysuria, frequency, hematuria, or incontinence  NEUROLOGICAL: No headaches, memory loss,+ loss of strength, numbness, or tremors  SKIN: No itching, burning, rashes, or lesions   LYMPH Nodes: No enlarged glands  ENDOCRINE: No heat or cold intolerance; No hair loss  MUSCULOSKELETAL: + joint pain or swelling; No muscle, back, or extremity pain  PSYCHIATRIC: No depression, anxiety, mood swings, or difficulty sleeping  HEME/LYMPH: No easy bruising, or bleeding gums  ALLERY AND IMMUNOLOGIC: No hives or eczema	    [ ] All others negative	  [ ] Unable to obtain    PHYSICAL EXAM:  T(C): 36.7 (02-07-19 @ 06:00), Max: 37.2 (02-06-19 @ 22:00)  HR: 102 (02-07-19 @ 10:00) (90 - 108)  BP: 134/96 (02-07-19 @ 10:00) (111/58 - 156/72)  RR: 21 (02-07-19 @ 10:00) (17 - 30)  SpO2: 100% (02-07-19 @ 10:00) (95% - 100%)  Wt(kg): --  I&O's Summary    06 Feb 2019 07:01  -  07 Feb 2019 07:00  --------------------------------------------------------  IN: 2630 mL / OUT: 0 mL / NET: 2630 mL        Appearance: NAD very lethargic 	  HEENT:   Normal oral mucosa, PERRL, EOMI +NGT 	  Lymphatic: No lymphadenopathy  Cardiovascular: tachycardic S1 S2, No JVD, No murmurs, No edema  Respiratory: decreased bs   Psychiatry: A & O x 3, lethargic   Gastrointestinal:  Soft, Non-tender, + BS	  Skin: No rashes, No ecchymoses, No cyanosis	  NEUROLOGICAL EXAM:  Mental status: eyes open, awake, attentive to persons at bedside, able to state name, hypophonic follows some simple commands, perseverates at times   Cranial Nerves:  right facial droop, no blink to threat on right   Motor exam: left side moves well against gravity, RUE 3/5 with drift , RLE 2/5   Sensation: Intact to light touch   Coordination/ Gait: gait not assessed     Extremities: decreased range of motion, No clubbing, cyanosis or edema  Vascular: Peripheral pulses palpable 2+ bilaterally    TELEMETRY: 	  Sinus tachycardia   ECG:  	SR/Junctional, no acute ischemic stt changes   RADIOLOGY:        < from: CT Head No Cont (02.05.19 @ 09:40) >    EXAM:  CT BRAIN                            PROCEDURE DATE:  02/05/2019            INTERPRETATION:  Noncontrast CT of the brain.    CLINICAL INDICATION:  lt mca cva    TECHNIQUE : Axial CT scanning of the brain was obtained from the skull   base to the vertex without the administration of intravenous contrast.      COMPARISON: CT brain 2/4/2019    FINDINGS:      Previously seen increased attenuation within left sided sulci is   decreased on the current examination.    No hydrocephalus or midlineshift. No effacement of basal cisterns. No   parenchymal hemorrhage.     IMPRESSION:    Previously seen increased attenuation within left sided sulci is   decreased on the current examination, and may represent the presence of   residual intravenous contrast and/or subarachnoid hemorrhage.    No no hydrocephalus or midline shift. No effacement of basal cisterns                    JEFF BEVERLY M.D., ATTENDING RADIOLOGIST  This document has been electronically signed. Feb 5 2019 10:29AM                < end of copied text >    OTHER: 	  	  LABS:	 	    CARDIAC MARKERS:                                  11.9   6.2   )-----------( 156      ( 07 Feb 2019 00:28 )             35.2     02-07    140  |  110<H>  |  11  ----------------------------<  149<H>  3.5   |  17<L>  |  0.56    Ca    8.0<L>      07 Feb 2019 00:28      proBNP:   Lipid Profile:   HgA1c:   TSH:

## 2019-02-07 NOTE — DIETITIAN INITIAL EVALUATION ADULT. - PT NOT SOURCE
expressive aphasia, unable to interview at this time, no visitors at bedside expressive aphasia, unable to provide detailed hx at this time, no visitors at bedside

## 2019-02-07 NOTE — PROCEDURE NOTE - ADDITIONAL PROCEDURE DETAILS
-Indication for interrogation: P/W CVA  -Presenting rhythm: Atrial flutter (atrial rate 220-240 bpm) with ventricular rate at 110-120's   -Measured data WNL, NL PM function, atrial threshold testing not being done due to is currently in atrial flutter, Pt is not PM dependent  -Stored data revealed multiple atrial high rate episode today between 6:13pm to 7:23pm c/w 2:1 atrial flutter (atrial rate 220-240 bpm, ventricular rate 110-120 bpm). No events recorded prior to today.  -Changes made: none  -Findings discussed with Neurology team     ZOHRA Gallego, NP-C  [id number]

## 2019-02-07 NOTE — PROGRESS NOTE ADULT - SUBJECTIVE AND OBJECTIVE BOX
THE PATIENT WAS SEEN AND EXAMINED BY ME WITH THE HOUSESTAFF AND STROKE TEAM DURING MORNING ROUNDS.   HPI:  Patient is an 88 year old right handed  female with a history of CAD s/p stents and CABG on aspirin at home, DM, HTN, aortic valve replacement, and pacemaker who presented as transfer from [facility] for stroke rescue. Her last known normal was at approximately 2 pm on 2/3. Robbins was then noted to have left sided weakness and sudden onset inability to speak, so she was brought to [facility]. She was outside the appropriate time window for tPA, so tPA was not given. CTA showed evidence of a L M1 occlusion so she was transferred to [facility] and code stroke called upon arrival. Initial NIHSS at [facility] was 19, MRS 1.    SUBJECTIVE: No events overnight.  No new neurologic complaints.      acetaminophen    Suspension .. 650 milliGRAM(s) Oral every 6 hours PRN  aspirin  chewable 81 milliGRAM(s) Oral daily  atorvastatin 80 milliGRAM(s) Oral at bedtime  dextrose 40% Gel 15 Gram(s) Oral once PRN  dextrose 5%. 1000 milliLiter(s) IV Continuous <Continuous>  dextrose 50% Injectable 12.5 Gram(s) IV Push once  dextrose 50% Injectable 25 Gram(s) IV Push once  dextrose 50% Injectable 25 Gram(s) IV Push once  docusate sodium Liquid 100 milliGRAM(s) Oral two times a day  enoxaparin Injectable 40 milliGRAM(s) SubCutaneous <User Schedule>  famotidine    Tablet 20 milliGRAM(s) Oral two times a day  glucagon  Injectable 1 milliGRAM(s) IntraMuscular once PRN  insulin regular  human corrective regimen sliding scale   SubCutaneous every 6 hours  levalbuterol Inhalation 0.63 milliGRAM(s) Inhalation every 6 hours PRN  metoprolol tartrate 50 milliGRAM(s) Oral two times a day  nystatin Powder 1 Application(s) Topical two times a day PRN  senna Syrup 20 milliLiter(s) Oral at bedtime  sodium chloride 0.9%. 1000 milliLiter(s) IV Continuous <Continuous>    PHYSICAL EXAM:   Vital Signs Last 24 Hrs  T(C): 36.7 (07 Feb 2019 06:00), Max: 37.2 (06 Feb 2019 22:00)  T(F): 98 (07 Feb 2019 06:00), Max: 99 (06 Feb 2019 22:00)  HR: 108 (07 Feb 2019 06:00) (90 - 108)  BP: 126/63 (07 Feb 2019 06:00) (111/58 - 183/74)  BP(mean): 79 (07 Feb 2019 06:00) (70 - 114)  RR: 22 (07 Feb 2019 06:00) (17 - 43)  SpO2: 98% (07 Feb 2019 06:00) (88% - 100%)    General: No acute distress,    HEENT: EOM intact, no blink to threat on right   Abdomen: , nontender  Extremities: No edema    NEUROLOGICAL EXAM:  Mental status: eyes open, awake, attentive to persons at bedside, able to state name, hypophonic follows some simple commands, perseverates at times   Cranial Nerves:  right facial droop, no blink to threat on right   Motor exam: left side moves well against gravity, RUE 3/5 with drift , RLE 2/5   Sensation: Intact to light touch   Coordination/ Gait: gait not assessed     LABS:                        11.9   6.2   )-----------( 156      ( 07 Feb 2019 00:28 )             35.2    02-07    140  |  110<H>  |  11  ----------------------------<  149<H>  3.5   |  17<L>  |  0.56    Ca    8.0<L>      07 Feb 2019 00:28    Hemoglobin A1C, Whole Blood: 6.6 % (02-04 @ 06:11)    IMAGING: Reviewed by me.     CT Head No Cont (02.05.19)   Previously seen increased attenuation within left sided sulci is   decreased on the current examination, and may represent the presence of   residual intravenous contrast and/or subarachnoid hemorrhage.  No no hydrocephalus or midline shift. No effacement of basal cisterns    (02.03.19)   CT angiography neck: Patent cervical vasculature.  No hemodynamically   significant carotid stenosis or flow-limiting vertebral artery stenosis.    No evidence of dissection. Three-vessel arch.  Vertebral arteries are   codominant.    CT angiography brain:   1.  The M1 segment of the left middle cerebral artery is occluded,   approximately 8 mm distal to its origin.  Multiple proximal M2 branches   of the left MCA are occluded within the sylvian fissure.  There is some   reconstitution of blood flow within distal M2 branches and M3 branches of   the left middle cerebral artery.  CT perfusion is recommended for further   characterization.  2.  Atherosclerotic calcification causes diffuse mild narrowing in the   cavernous and supraclinoid segments of the internal carotid arteries   without flow-limiting stenosis.  3.  Atherosclerotic calcification causes moderate to severe stenosis in   the bilateral intradural vertebral arteries.  Mild luminal irregularity   of the basilar artery without flow-limiting stenosis.  Multiple mild to   moderate stenoses in the proximal P2 segment of the left posterior   cerebral artery.  The right posterior cerebral artery is diffusely small   and irregular, and appears hypoenhancing compared to the contralateral   side.    CT Perfusion w/ Maps w/ IV Cont (02.03.19)   NONCONTRAST HEAD CT SCAN: No CT evidence of acute intracranial   hemorrhage, mass effect or acute territorial infarct.    CT PERFUSION: No evidence of a completed infarct.  Approximately 80 cc   penumbra of at risk tissue in the left MCA vascular territory. THE PATIENT WAS SEEN AND EXAMINED BY ME WITH THE HOUSESTAFF AND STROKE TEAM DURING MORNING ROUNDS.   HPI:  Patient is an 88 year old right handed  female with a history of CAD s/p stents and CABG on aspirin at home, DM, HTN, aortic valve replacement, and pacemaker who presented as transfer from [facility] for stroke rescue. Her last known normal was at approximately 2 pm on 2/3. Robbins was then noted to have left sided weakness and sudden onset inability to speak, so she was brought to [facility]. She was outside the appropriate time window for tPA, so tPA was not given. CTA showed evidence of a L M1 occlusion so she was transferred to [facility] and code stroke called upon arrival. Initial NIHSS at [facility] was 19, MRS 1.    SUBJECTIVE: No events overnight.  No new neurologic complaints.      acetaminophen    Suspension .. 650 milliGRAM(s) Oral every 6 hours PRN  aspirin  chewable 81 milliGRAM(s) Oral daily  atorvastatin 80 milliGRAM(s) Oral at bedtime  dextrose 40% Gel 15 Gram(s) Oral once PRN  dextrose 5%. 1000 milliLiter(s) IV Continuous <Continuous>  dextrose 50% Injectable 12.5 Gram(s) IV Push once  dextrose 50% Injectable 25 Gram(s) IV Push once  dextrose 50% Injectable 25 Gram(s) IV Push once  docusate sodium Liquid 100 milliGRAM(s) Oral two times a day  enoxaparin Injectable 40 milliGRAM(s) SubCutaneous <User Schedule>  famotidine    Tablet 20 milliGRAM(s) Oral two times a day  glucagon  Injectable 1 milliGRAM(s) IntraMuscular once PRN  insulin regular  human corrective regimen sliding scale   SubCutaneous every 6 hours  levalbuterol Inhalation 0.63 milliGRAM(s) Inhalation every 6 hours PRN  metoprolol tartrate 50 milliGRAM(s) Oral two times a day  nystatin Powder 1 Application(s) Topical two times a day PRN  senna Syrup 20 milliLiter(s) Oral at bedtime  sodium chloride 0.9%. 1000 milliLiter(s) IV Continuous <Continuous>    PHYSICAL EXAM:   Vital Signs Last 24 Hrs  T(C): 36.7 (07 Feb 2019 06:00), Max: 37.2 (06 Feb 2019 22:00)  T(F): 98 (07 Feb 2019 06:00), Max: 99 (06 Feb 2019 22:00)  HR: 108 (07 Feb 2019 06:00) (90 - 108)  BP: 126/63 (07 Feb 2019 06:00) (111/58 - 183/74)  BP(mean): 79 (07 Feb 2019 06:00) (70 - 114)  RR: 22 (07 Feb 2019 06:00) (17 - 43)  SpO2: 98% (07 Feb 2019 06:00) (88% - 100%)    General: No acute distress,    HEENT: EOM intact, no blink to threat on right   Abdomen: , nontender  Extremities: No edema    NEUROLOGICAL EXAM:  Mental status: eyes open, awake, attentive to persons at bedside, oriented to name, hypophonic follows simple commands, perseverates at times, IDs objects   Cranial Nerves:  right facial droop, no blink to threat on right   Motor exam: left side moves well against gravity, RUE 3/5 with drift , RLE 2/5   Sensation: Intact to light touch   Coordination/ Gait: gait not assessed     LABS:                        11.9   6.2   )-----------( 156      ( 07 Feb 2019 00:28 )             35.2    02-07    140  |  110<H>  |  11  ----------------------------<  149<H>  3.5   |  17<L>  |  0.56    Ca    8.0<L>      07 Feb 2019 00:28    Hemoglobin A1C, Whole Blood: 6.6 % (02-04 @ 06:11)    IMAGING: Reviewed by me.     CT Head No Cont (02.05.19)   Previously seen increased attenuation within left sided sulci is   decreased on the current examination, and may represent the presence of   residual intravenous contrast and/or subarachnoid hemorrhage.  No no hydrocephalus or midline shift. No effacement of basal cisterns    (02.03.19)   CT angiography neck: Patent cervical vasculature.  No hemodynamically   significant carotid stenosis or flow-limiting vertebral artery stenosis.    No evidence of dissection. Three-vessel arch.  Vertebral arteries are   codominant.    CT angiography brain:   1.  The M1 segment of the left middle cerebral artery is occluded,   approximately 8 mm distal to its origin.  Multiple proximal M2 branches   of the left MCA are occluded within the sylvian fissure.  There is some   reconstitution of blood flow within distal M2 branches and M3 branches of   the left middle cerebral artery.  CT perfusion is recommended for further   characterization.  2.  Atherosclerotic calcification causes diffuse mild narrowing in the   cavernous and supraclinoid segments of the internal carotid arteries   without flow-limiting stenosis.  3.  Atherosclerotic calcification causes moderate to severe stenosis in   the bilateral intradural vertebral arteries.  Mild luminal irregularity   of the basilar artery without flow-limiting stenosis.  Multiple mild to   moderate stenoses in the proximal P2 segment of the left posterior   cerebral artery.  The right posterior cerebral artery is diffusely small   and irregular, and appears hypoenhancing compared to the contralateral   side.    CT Perfusion w/ Maps w/ IV Cont (02.03.19)   NONCONTRAST HEAD CT SCAN: No CT evidence of acute intracranial   hemorrhage, mass effect or acute territorial infarct.    CT PERFUSION: No evidence of a completed infarct.  Approximately 80 cc   penumbra of at risk tissue in the left MCA vascular territory.

## 2019-02-07 NOTE — DIETITIAN INITIAL EVALUATION ADULT. - NS AS NUTRI INTERV ENTERAL NUTRITION
recommend Glucerna 1.2 50cc/hr x 24 hrs to provide 1440 kcals, 72gm protein, 966cc free water meets 15 kcals/kg dosing wt 93.7kg, 1.2 gm protein/kg IBW 59kg

## 2019-02-07 NOTE — PROVIDER CONTACT NOTE (OTHER) - ASSESSMENT
pt is in sinus tachycardia, pt assessed for nonverbal pain cues,  pt has severe aphasia, coarse lungs on ascultation. /71, hr 103-113, resp 21, Sao2 99% room air.

## 2019-02-07 NOTE — PROGRESS NOTE ADULT - ASSESSMENT
88 year old white female with a pmh of CAD, HTN, HLD, pacemaker who presented to [facility] as a transfer from [facility] with right hemiparesis and aphasia and left proximal MCA occlusion. The patient was last known well at 2 pm on 2/3/19 and was then found down on the ground by her daughter several hours later and was noted to be not speaking and weak on the right. At [facility] a CTA head and neck was performed which showed a proximal left MCA occlusion, and a CT head showed a dense left MCA sign with some hypodensity and early ischemic changes to my eye in the left MCA distribution. She did not receive IV tPA as she was out of the window and was transferred to [facility] for possible mechanical thrombectomy. At [facility] her CT perfusion showed zero core infarct, with a penumbra of 80 mls, and an infinite mismatch. She was deemed a candidate for intervention and recanalization was achieved with TICI 3 score.     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     NEURO: neurologically overall improved, continue close monitoring for neurologic deterioration, normotension in setting of recent recanalizaiton, titrate statin to LDL goal less than 70, MR imaging when stable. Physical therapy/OT/Speech eval.     ANTITHROMBOTIC THERAPY: ASA     PULMONARY: s/p extubation, protecting airway, saturating well, secretions requiring suctioning, monitor respiratory status closely     CARDIOVASCULAR: check TTE, cardiac monitoring  and PPM interrogation to screen for  occult arrhythmia                            SBP goal: 100-130mmHg     GASTROINTESTINAL:  dysphagia screen noted failed, recommend SLP eval      Diet: npo    RENAL: BUN/Cr within limits, good urine output      Na Goal: Greater than 135     Gonzalez:    HEMATOLOGY: H/H without anemia, Platelets 175, LE duplex negative for DVt      DVT ppx: Heparin s.c [] LMWH [x]     ID: afebrile,  leukocytosis, monitor for si/sx of infection, RVP negative    DISPOSITION: Rehab or home depending on PT eval once stable and workup is complete      CORE MEASURES:        Admission NIHSS: 19     TPA: [] YES [x] NO      LDL/HDL:55/37     Depression Screen: p     Statin Therapy: y     Dysphagia Screen: [] PASS [x] FAIL      Smoking [] YES [x] NO      Afib [] YES [x] NO     Stroke Education [x] YES [] NO 88 year old white female with a pmh of CAD, HTN, HLD, pacemaker who presented to [facility] as a transfer from [facility] with right hemiparesis and aphasia and left proximal MCA occlusion. The patient was last known well at 2 pm on 2/3/19 and was then found down on the ground by her daughter several hours later and was noted to be not speaking and weak on the right. At [facility] a CTA head and neck was performed which showed a proximal left MCA occlusion, and a CT head showed a dense left MCA sign with some hypodensity and early ischemic changes to my eye in the left MCA distribution. She did not receive IV tPA as she was out of the window and was transferred to [facility] for possible mechanical thrombectomy. At [facility] her CT perfusion showed zero core infarct, with a penumbra of 80 mls, and an infinite mismatch. She was deemed a candidate for intervention and recanalization was achieved with TICI 3 score.     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     NEURO: neurologically overall improved as patient more alert and interactive on examination, continue close monitoring for neurologic deterioration, will allow for normotension in setting of recent recanalization, LDL 55 on admission- continue on home dose of simvastatin, MR imaging when stable. Physical therapy/OT recommended acute TBI rehab    ANTITHROMBOTIC THERAPY: ASA     PULMONARY: s/p extubation, protecting airway, saturating well, secretions requiring suctioning PRN    CARDIOVASCULAR: check TTE to look for cardiac source of embolism, continue cardiac monitoring  and PPM interrogation to screen for occult arrythmia                      SBP goal: 100-130mmHg     GASTROINTESTINAL:  dysphagia screen noted failed, S&S pending.      Diet: npo with TF via PEG    RENAL: BUN/Cr within limits, good urine output      Na Goal: Greater than 135     Gonzalez:    HEMATOLOGY: H/H without anemia, Platelets 156, LE duplex negative for DVt      DVT ppx: Heparin s.c [] LMWH [x]     ID: afebrile, no leukocytosis, monitor for si/sx of infection, RVP negative    DISPOSITION: Acute TBI rehab once stable enteral access established    CORE MEASURES:        Admission NIHSS: 19     TPA: [] YES [x] NO      LDL/HDL:55/37     Depression Screen: p     Statin Therapy: y     Dysphagia Screen: [] PASS [x] FAIL      Smoking [] YES [x] NO      Afib [] YES [x] NO     Stroke Education [x] YES [] NO 88 year old white female with a pmh of CAD, HTN, HLD, pacemaker who presented to [facility] as a transfer from [facility] with right hemiparesis and aphasia and left proximal MCA occlusion. The patient was last known well at 2 pm on 2/3/19 and was then found down on the ground by her daughter several hours later and was noted to be not speaking and weak on the right. At [facility] a CTA head and neck was performed which showed a proximal left MCA occlusion, and a CT head showed a dense left MCA sign with some hypodensity and early ischemic changes to my eye in the left MCA distribution. She did not receive IV tPA as she was out of the window and was transferred to [facility] for possible mechanical thrombectomy. At [facility] her CT perfusion showed zero core infarct, with a penumbra of 80 mls, and an infinite mismatch. She was deemed a candidate for intervention and recanalization was achieved with TICI 3 score.     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     NEURO: neurologically overall improved as patient more alert and interactive on examination, continue close monitoring for neurologic deterioration, will allow for normotension in setting of recent recanalization, LDL 55 on admission- continue on home dose of simvastatin, MR imaging when stable and if PPM compatible. Physical therapy/OT recommended acute TBI rehab    ANTITHROMBOTIC THERAPY: ASA     PULMONARY: s/p extubation, protecting airway, saturating well, secretions requiring suctioning PRN    CARDIOVASCULAR: check TTE to look for cardiac source of embolism, continue cardiac monitoring  and PPM interrogation to screen for occult arrythmia                      SBP goal: Normotension     GASTROINTESTINAL:  dysphagia screen noted failed, S&S pending.      Diet: npo with TF via NGT    RENAL: BUN/Cr within limits, good urine output      Na Goal: Greater than 135     Gonzalez:N    HEMATOLOGY: H/H without anemia, Platelets 156, LE duplex negative for DVt      DVT ppx: Heparin s.c [] LMWH [x]     ID: afebrile, no leukocytosis, monitor for si/sx of infection, RVP negative    DISPOSITION: Acute TBI rehab once stable enteral access established    CORE MEASURES:        Admission NIHSS: 19     TPA: [] YES [x] NO      LDL/HDL:55/37     Depression Screen: p     Statin Therapy: y     Dysphagia Screen: [] PASS [x] FAIL      Smoking [] YES [x] NO      Afib [] YES [x] NO     Stroke Education [x] YES [] NO

## 2019-02-07 NOTE — DIETITIAN INITIAL EVALUATION ADULT. - ENERGY NEEDS
Ht: 66"  Wt: 206   BMI: 33.3 kg/m2   IBW: 130 (+/-10%)     158% IBW  Edema: 1+ generalized        Skin: no pressure injuries documented

## 2019-02-07 NOTE — DIETITIAN INITIAL EVALUATION ADULT. - OTHER INFO
Pt seen for: stroke unit Length Of Stay   Adm dx: CVA, s/p thrombectomy    GI issues: no vomiting, no abd distention   Last BM: fecal incontinence 2/7    Food allergies/Intolerances: NKFA    Vit/supplement PTA:  last documented 6/18 folic acid, multivitamin

## 2019-02-07 NOTE — SWALLOW BEDSIDE ASSESSMENT ADULT - SLP GENERAL OBSERVATIONS
Pt noted to be awake, OOB to chair. Ox3, able  follow simple commands, markedly dysarthric, dense R HP and facial weakness Pt noted to be awake, OOB to chair. Ox3 (when given choices- unable to provide anything but name due to expressive aphasia- noted dysnomia, non-fluency, part and whole word repetition ), able  follow simple commands, markedly dysarthric- max phonation 3 sec, wet quality ,hoarse, glottal armijo, low pitch, distortions, reduced word boundaries, monopitch/loudness, hypernasal, short phrases  dense R HP and facial weakness

## 2019-02-07 NOTE — CONSULT NOTE ADULT - PROBLEM SELECTOR RECOMMENDATION 2
? embolic   monitor on tele for now   TTE   ASA  Orders per Stroke unit team   aspiration precautions

## 2019-02-07 NOTE — DIETITIAN INITIAL EVALUATION ADULT. - PERTINENT MEDS FT
MEDICATIONS  (STANDING):  aspirin  chewable 81 milliGRAM(s) Oral daily  atorvastatin 80 milliGRAM(s) Oral at bedtime  dextrose 5%. 1000 milliLiter(s) (50 mL/Hr) IV Continuous <Continuous>  dextrose 50% Injectable 12.5 Gram(s) IV Push once  dextrose 50% Injectable 25 Gram(s) IV Push once  dextrose 50% Injectable 25 Gram(s) IV Push once  docusate sodium Liquid 100 milliGRAM(s) Oral two times a day  enoxaparin Injectable 40 milliGRAM(s) SubCutaneous <User Schedule>  famotidine    Tablet 20 milliGRAM(s) Oral two times a day  insulin regular  human corrective regimen sliding scale   SubCutaneous every 6 hours  metoprolol tartrate 50 milliGRAM(s) Oral two times a day  senna Syrup 20 milliLiter(s) Oral at bedtime  sodium chloride 0.9%. 1000 milliLiter(s) (80 mL/Hr) IV Continuous <Continuous>    MEDICATIONS  (PRN):  acetaminophen    Suspension .. 650 milliGRAM(s) Oral every 6 hours PRN Temp greater or equal to 38C (100.4F), Mild Pain (1 - 3)  dextrose 40% Gel 15 Gram(s) Oral once PRN Blood Glucose LESS THAN 70 milliGRAM(s)/deciLiter  glucagon  Injectable 1 milliGRAM(s) IntraMuscular once PRN Glucose <70 milliGRAM(s)/deciLiter  levalbuterol Inhalation 0.63 milliGRAM(s) Inhalation every 6 hours PRN SOB/Wheezing  nystatin Powder 1 Application(s) Topical two times a day PRN dermatitis

## 2019-02-07 NOTE — SWALLOW BEDSIDE ASSESSMENT ADULT - SWALLOW EVAL: DIAGNOSIS
Patient presents with: 1) Severe dysarthria  2) young-pharyngeal dysphagia with impaired oral management, evidence of delay in trigger of the swallow reflex, reduced hyo-laryngeal excursion. Given the degree of dysarthria and recent inability to manage secretions, pt deemed an aspiration risk Patient presents with: 1) Evidence of at least expressive aphasia 2) Severe dysarthria  3) young-pharyngeal dysphagia with impaired oral management, evidence of delay in trigger of the swallow reflex, reduced hyo-laryngeal excursion. Given the degree of dysarthria and recent inability to manage secretions, pt deemed an aspiration risk

## 2019-02-07 NOTE — PROVIDER CONTACT NOTE (OTHER) - REASON
pt is in sinus tachycardia 103-113, pt assessed for nonverbal pain cues,  pt has severe aphasia, coarse lungs on ascultation

## 2019-02-08 ENCOUNTER — TRANSCRIPTION ENCOUNTER (OUTPATIENT)
Age: 84
End: 2019-02-08

## 2019-02-08 DIAGNOSIS — R13.10 DYSPHAGIA, UNSPECIFIED: ICD-10-CM

## 2019-02-08 DIAGNOSIS — I63.9 CEREBRAL INFARCTION, UNSPECIFIED: ICD-10-CM

## 2019-02-08 DIAGNOSIS — I50.30 UNSPECIFIED DIASTOLIC (CONGESTIVE) HEART FAILURE: ICD-10-CM

## 2019-02-08 LAB
ANION GAP SERPL CALC-SCNC: 14 MMOL/L — SIGNIFICANT CHANGE UP (ref 5–17)
BUN SERPL-MCNC: 12 MG/DL — SIGNIFICANT CHANGE UP (ref 7–23)
CALCIUM SERPL-MCNC: 8.5 MG/DL — SIGNIFICANT CHANGE UP (ref 8.4–10.5)
CHLORIDE SERPL-SCNC: 107 MMOL/L — SIGNIFICANT CHANGE UP (ref 96–108)
CO2 SERPL-SCNC: 20 MMOL/L — LOW (ref 22–31)
CREAT SERPL-MCNC: 0.57 MG/DL — SIGNIFICANT CHANGE UP (ref 0.5–1.3)
GLUCOSE SERPL-MCNC: 164 MG/DL — HIGH (ref 70–99)
HCT VFR BLD CALC: 36.8 % — SIGNIFICANT CHANGE UP (ref 34.5–45)
HGB BLD-MCNC: 12.5 G/DL — SIGNIFICANT CHANGE UP (ref 11.5–15.5)
MCHC RBC-ENTMCNC: 30.5 PG — SIGNIFICANT CHANGE UP (ref 27–34)
MCHC RBC-ENTMCNC: 33.9 GM/DL — SIGNIFICANT CHANGE UP (ref 32–36)
MCV RBC AUTO: 90 FL — SIGNIFICANT CHANGE UP (ref 80–100)
PLATELET # BLD AUTO: 186 K/UL — SIGNIFICANT CHANGE UP (ref 150–400)
POTASSIUM SERPL-MCNC: 3.8 MMOL/L — SIGNIFICANT CHANGE UP (ref 3.5–5.3)
POTASSIUM SERPL-SCNC: 3.8 MMOL/L — SIGNIFICANT CHANGE UP (ref 3.5–5.3)
RBC # BLD: 4.1 M/UL — SIGNIFICANT CHANGE UP (ref 3.8–5.2)
RBC # FLD: 12.6 % — SIGNIFICANT CHANGE UP (ref 10.3–14.5)
SODIUM SERPL-SCNC: 141 MMOL/L — SIGNIFICANT CHANGE UP (ref 135–145)
WBC # BLD: 5.8 K/UL — SIGNIFICANT CHANGE UP (ref 3.8–10.5)
WBC # FLD AUTO: 5.8 K/UL — SIGNIFICANT CHANGE UP (ref 3.8–10.5)

## 2019-02-08 PROCEDURE — 74230 X-RAY XM SWLNG FUNCJ C+: CPT | Mod: 26

## 2019-02-08 PROCEDURE — 71045 X-RAY EXAM CHEST 1 VIEW: CPT | Mod: 26

## 2019-02-08 PROCEDURE — 93306 TTE W/DOPPLER COMPLETE: CPT | Mod: 26

## 2019-02-08 PROCEDURE — 99221 1ST HOSP IP/OBS SF/LOW 40: CPT

## 2019-02-08 PROCEDURE — 99233 SBSQ HOSP IP/OBS HIGH 50: CPT

## 2019-02-08 RX ORDER — ASPIRIN/CALCIUM CARB/MAGNESIUM 324 MG
1 TABLET ORAL
Qty: 0 | Refills: 0 | COMMUNITY
Start: 2019-02-08

## 2019-02-08 RX ORDER — FUROSEMIDE 40 MG
20 TABLET ORAL DAILY
Qty: 0 | Refills: 0 | Status: DISCONTINUED | OUTPATIENT
Start: 2019-02-09 | End: 2019-02-10

## 2019-02-08 RX ORDER — METOPROLOL TARTRATE 50 MG
1 TABLET ORAL
Qty: 0 | Refills: 0 | COMMUNITY
Start: 2019-02-08

## 2019-02-08 RX ORDER — SIMVASTATIN 20 MG/1
1 TABLET, FILM COATED ORAL
Qty: 0 | Refills: 0 | COMMUNITY
Start: 2019-02-08

## 2019-02-08 RX ORDER — METOPROLOL TARTRATE 50 MG
2.5 TABLET ORAL ONCE
Qty: 0 | Refills: 0 | Status: COMPLETED | OUTPATIENT
Start: 2019-02-08 | End: 2019-02-08

## 2019-02-08 RX ORDER — LABETALOL HCL 100 MG
10 TABLET ORAL EVERY 8 HOURS
Qty: 0 | Refills: 0 | Status: DISCONTINUED | OUTPATIENT
Start: 2019-02-08 | End: 2019-02-10

## 2019-02-08 RX ORDER — FUROSEMIDE 40 MG
40 TABLET ORAL DAILY
Qty: 0 | Refills: 0 | Status: DISCONTINUED | OUTPATIENT
Start: 2019-02-08 | End: 2019-02-08

## 2019-02-08 RX ORDER — LABETALOL HCL 100 MG
10 TABLET ORAL EVERY 8 HOURS
Qty: 0 | Refills: 0 | Status: DISCONTINUED | OUTPATIENT
Start: 2019-02-08 | End: 2019-02-08

## 2019-02-08 RX ORDER — FUROSEMIDE 40 MG
20 TABLET ORAL ONCE
Qty: 0 | Refills: 0 | Status: DISCONTINUED | OUTPATIENT
Start: 2019-02-08 | End: 2019-02-08

## 2019-02-08 RX ADMIN — INSULIN HUMAN 2: 100 INJECTION, SOLUTION SUBCUTANEOUS at 06:39

## 2019-02-08 RX ADMIN — SIMVASTATIN 40 MILLIGRAM(S): 20 TABLET, FILM COATED ORAL at 23:33

## 2019-02-08 RX ADMIN — FAMOTIDINE 20 MILLIGRAM(S): 10 INJECTION INTRAVENOUS at 06:39

## 2019-02-08 RX ADMIN — FAMOTIDINE 20 MILLIGRAM(S): 10 INJECTION INTRAVENOUS at 17:43

## 2019-02-08 RX ADMIN — Medication 40 MILLIGRAM(S): at 11:55

## 2019-02-08 RX ADMIN — INSULIN HUMAN 2: 100 INJECTION, SOLUTION SUBCUTANEOUS at 12:08

## 2019-02-08 RX ADMIN — Medication 10 MILLIGRAM(S): at 21:16

## 2019-02-08 RX ADMIN — Medication 100 MILLIGRAM(S): at 06:39

## 2019-02-08 RX ADMIN — SODIUM CHLORIDE 80 MILLILITER(S): 9 INJECTION INTRAMUSCULAR; INTRAVENOUS; SUBCUTANEOUS at 06:40

## 2019-02-08 RX ADMIN — ENOXAPARIN SODIUM 40 MILLIGRAM(S): 100 INJECTION SUBCUTANEOUS at 17:43

## 2019-02-08 RX ADMIN — Medication 2.5 MILLIGRAM(S): at 04:06

## 2019-02-08 RX ADMIN — Medication 75 MILLIGRAM(S): at 17:43

## 2019-02-08 RX ADMIN — SENNA PLUS 20 MILLILITER(S): 8.6 TABLET ORAL at 23:32

## 2019-02-08 RX ADMIN — Medication 2.5 MILLIGRAM(S): at 15:30

## 2019-02-08 RX ADMIN — Medication 100 MILLIGRAM(S): at 17:43

## 2019-02-08 RX ADMIN — INSULIN HUMAN 2: 100 INJECTION, SOLUTION SUBCUTANEOUS at 01:16

## 2019-02-08 RX ADMIN — INSULIN HUMAN 2: 100 INJECTION, SOLUTION SUBCUTANEOUS at 17:44

## 2019-02-08 RX ADMIN — Medication 75 MILLIGRAM(S): at 06:39

## 2019-02-08 RX ADMIN — Medication 81 MILLIGRAM(S): at 11:55

## 2019-02-08 NOTE — PROGRESS NOTE ADULT - ASSESSMENT
88 year old white female with a pmh of CAD, HTN, HLD, pacemaker who presented to [facility] as a transfer from [facility] with right hemiparesis and aphasia and left proximal MCA occlusion. The patient was last known well at 2 pm on 2/3/19 and was then found down on the ground by her daughter several hours later and was noted to be not speaking and weak on the right. At [facility] a CTA head and neck was performed which showed a proximal left MCA occlusion, and a CT head showed a dense left MCA sign with some hypodensity and early ischemic changes to my eye in the left MCA distribution. She did not receive IV tPA as she was out of the window and was transferred to [facility] for possible mechanical thrombectomy. At [facility] her CT perfusion showed zero core infarct, with a penumbra of 80 mls, and an infinite mismatch. She was deemed a candidate for intervention and recanalization was achieved with TICI 3 score.     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     NEURO: neurologically overall improved as patient more alert and interactive on examination, continue close monitoring for neurologic deterioration, will allow for normotension in setting of recent recanalization, LDL 55 on admission- continue on home dose of simvastatin, MR imaging when stable and if PPM compatible. Physical therapy/OT recommended acute TBI rehab    ANTITHROMBOTIC THERAPY: ASA     PULMONARY: s/p extubation, protecting airway, saturating well, secretions requiring suctioning PRN, noted with episodes of tachypnea- continue on Xopenex PRN    CARDIOVASCULAR: check TTE to look for cardiac source of embolism, continue cardiac monitoring  and PPM interrogation to screen for occult arrythmia                      SBP goal: Normotension     GASTROINTESTINAL:  dysphagia screen noted failed, S&S recommended NPO with plans for MBS on 2/8.     Diet: npo with TF via NGT    RENAL: BUN/Cr within limits, good urine output      Na Goal: Greater than 135     Gonzalez:N    HEMATOLOGY: H/H without anemia, Platelets 186, LE duplex negative for DVT     DVT ppx: Heparin s.c [] LMWH [x]     ID: afebrile, no leukocytosis, monitor for si/sx of infection, RVP negative    DISPOSITION: Acute TBI rehab once stable enteral access established    CORE MEASURES:        Admission NIHSS: 19     TPA: [] YES [x] NO      LDL/HDL:55/37     Depression Screen: p     Statin Therapy: y     Dysphagia Screen: [] PASS [x] FAIL      Smoking [] YES [x] NO      Afib [] YES [x] NO     Stroke Education [x] YES [] NO 88 year old white female with a pmh of CAD, HTN, HLD, pacemaker who presented to [facility] as a transfer from [facility] with right hemiparesis and aphasia and left proximal MCA occlusion. The patient was last known well at 2 pm on 2/3/19 and was then found down on the ground by her daughter several hours later and was noted to be not speaking and weak on the right. At [facility] a CTA head and neck was performed which showed a proximal left MCA occlusion, and a CT head showed a dense left MCA sign with some hypodensity and early ischemic changes to my eye in the left MCA distribution. She did not receive IV tPA as she was out of the window and was transferred to [facility] for possible mechanical thrombectomy. At [facility] her CT perfusion showed zero core infarct, with a penumbra of 80 mls, and an infinite mismatch. She was deemed a candidate for intervention and recanalization was achieved with TICI 3 score.     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     NEURO: neurologically overall improved as patient more alert and interactive on examination, continue close monitoring for neurologic deterioration, will allow for normotension in setting of recent recanalization, LDL 55 on admission- continue on home dose of simvastatin, MR imaging when stable and if PPM compatible. Physical therapy/OT recommended acute TBI rehab    ANTITHROMBOTIC THERAPY: ASA     PULMONARY: protecting airway, saturating well, secretions requiring suctioning PRN, noted with episodes of tachypnea- continue on Xopenex PRN. CXR performed this AM showed unchanged mild pulmonary vascular congestion and a small left pleural effusion with associated atelectasis, given IV lasix- will monitor closely.     CARDIOVASCULAR: check TTE to look for cardiac source of embolism, continue cardiac monitoring  and PPM interrogation to screen for occult arrythmia                      SBP goal: Normotension     GASTROINTESTINAL:  dysphagia screen noted failed, S&S recommended NPO with plans for MBS on 2/8.     Diet: npo with TF via NGT    RENAL: BUN/Cr within limits, good urine output      Na Goal: Greater than 135     Gonzalez:N    HEMATOLOGY: H/H without anemia, Platelets 186, LE duplex negative for DVT     DVT ppx: Heparin s.c [] LMWH [x]     ID: afebrile, no leukocytosis, monitor for si/sx of infection, RVP negative    DISPOSITION: Acute TBI rehab once stable enteral access established    CORE MEASURES:        Admission NIHSS: 19     TPA: [] YES [x] NO      LDL/HDL:55/37     Depression Screen: p     Statin Therapy: y     Dysphagia Screen: [] PASS [x] FAIL      Smoking [] YES [x] NO      Afib [] YES [x] NO     Stroke Education [x] YES [] NO 88 year old white female with a pmh of CAD, HTN, HLD, pacemaker who presented to [facility] as a transfer from [facility] with right hemiparesis and aphasia and left proximal MCA occlusion. The patient was last known well at 2 pm on 2/3/19 and was then found down on the ground by her daughter several hours later and was noted to be not speaking and weak on the right. At [facility] a CTA head and neck was performed which showed a proximal left MCA occlusion, and a CT head showed a dense left MCA sign with some hypodensity and early ischemic changes to my eye in the left MCA distribution. She did not receive IV tPA as she was out of the window and was transferred to [facility] for possible mechanical thrombectomy. At [facility] her CT perfusion showed zero core infarct, with a penumbra of 80 mls, and an infinite mismatch. She was deemed a candidate for intervention and recanalization was achieved with TICI 3 score.     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     NEURO: neurologically without acute change, interval CTH performed on 2/7 shows L MCA infarct with petechial hemorrhagic transformation, continue close monitoring for neurologic deterioration, will allow for normotension in setting of recent recanalization, LDL 55 on admission- continue on home dose of simvastatin, MR imaging when stable and if PPM compatible. Physical therapy/OT recommended acute TBI rehab    ANTITHROMBOTIC THERAPY: ASA with plans to transition to anticoagulation in 7 days from admission (02/10/19) after repeat stable imaging and after stable enteral access obtained.    PULMONARY: protecting airway, saturating well, secretions requiring suctioning PRN, noted with episodes of tachypnea- continue on Xopenex PRN. CXR performed this AM showed unchanged mild pulmonary vascular congestion and a small left pleural effusion with associated atelectasis, started on IV lasix daily- will monitor closely.     CARDIOVASCULAR: check TTE to look for cardiac source of embolism, continue cardiac monitoring  and PPM interrogation to screen for occult arrythmia                      SBP goal: Normotension     GASTROINTESTINAL:  dysphagia screen noted failed, S&S recommended NPO with plans for MBS on 2/8.     Diet: npo with TF via NGT    RENAL: BUN/Cr within limits, good urine output      Na Goal: Greater than 135     Gonzalez:N    HEMATOLOGY: H/H without anemia, Platelets 186, LE duplex negative for DVT     DVT ppx: Heparin s.c [] LMWH [x]     ID: afebrile, no leukocytosis, monitor for si/sx of infection, RVP negative    DISPOSITION: Acute TBI rehab once stable enteral access established    CORE MEASURES:        Admission NIHSS: 19     TPA: [] YES [x] NO      LDL/HDL:55/37     Depression Screen: 0     Statin Therapy: y     Dysphagia Screen: [] PASS [x] FAIL      Smoking [] YES [x] NO      Afib [] YES [x] NO     Stroke Education [x] YES [] NO 88 year old white female with a pmh of CAD, HTN, HLD, pacemaker who presented to [facility] as a transfer from [facility] with right hemiparesis and aphasia and left proximal MCA occlusion. The patient was last known well at 2 pm on 2/3/19 and was then found down on the ground by her daughter several hours later and was noted to be not speaking and weak on the right. At [facility] a CTA head and neck was performed which showed a proximal left MCA occlusion, and a CT head showed a dense left MCA sign with some hypodensity and early ischemic changes to my eye in the left MCA distribution. She did not receive IV tPA as she was out of the window and was transferred to [facility] for possible mechanical thrombectomy. At [facility] her CT perfusion showed zero core infarct, with a penumbra of 80 mls, and an infinite mismatch. She was deemed a candidate for intervention and recanalization was achieved with TICI 3 score.     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    NEURO: neurologically without acute change, interval CTH performed on 2/7 shows L MCA infarct with petechial hemorrhagic transformation, continue close monitoring for neurologic deterioration, will allow for normotension in setting of recent recanalization, LDL 55 on admission- continue on home dose of simvastatin, MR imaging not possible due to pacemaker incompatibility. Physical therapy/OT recommended acute TBI rehab    ANTITHROMBOTIC THERAPY: ASA with plans to transition to anticoagulation in 7 days from admission (02/10/19) after repeat stable imaging and after stable enteral access obtained.    PULMONARY: protecting airway, saturating well, secretions requiring suctioning PRN, noted with episodes of tachypnea- continue on Xopenex PRN. CXR performed this AM showed unchanged mild pulmonary vascular congestion and a small left pleural effusion with associated atelectasis, started on IV lasix daily- will monitor closely.     CARDIOVASCULAR: check TTE to look for cardiac source of embolism, continue cardiac monitoring  and PPM interrogation to screen for occult arrythmia                      SBP goal: Normotension     GASTROINTESTINAL:  dysphagia screen noted failed, S&S recommended NPO with plans for MBS on 2/8.     Diet: npo with TF via NGT    RENAL: BUN/Cr within limits, good urine output      Na Goal: Greater than 135     Gonzalez:N    HEMATOLOGY: H/H without anemia, Platelets 186, LE duplex negative for DVT     DVT ppx: Heparin s.c [] LMWH [x]     ID: afebrile, no leukocytosis, monitor for si/sx of infection, RVP negative    DISPOSITION: Acute TBI rehab once stable enteral access established    CORE MEASURES:        Admission NIHSS: 19     TPA: [] YES [x] NO      LDL/HDL:55/37     Depression Screen: 0     Statin Therapy: y     Dysphagia Screen: [] PASS [x] FAIL      Smoking [] YES [x] NO      Afib [] YES [x] NO     Stroke Education [x] YES [] NO

## 2019-02-08 NOTE — CONSULT NOTE ADULT - PROBLEM SELECTOR RECOMMENDATION 2
c/w TF via NGT  -f/u MBS results Sinus tach  -LE duplex negative 2/4 for DVT  -Consider checking CE  -Cards f/u   -Low Sinus tach  -LE duplex negative 2/4 for DVT  -Consider checking CE  -Cards f/u   -Moderate Well's score for PE, consider CTA chest if unresolved to r/o PE

## 2019-02-08 NOTE — CONSULT NOTE ADULT - ATTENDING COMMENTS
LM informing patient of lab and went over next steps in detail. Patient will rtc 7/19 for FET. Encouraged to call if questions   Seen and examined with resident. Agree with note.   Patient with right hemiparesis, aphasia, dysphagia, and gait dysfunction.  Will f/u rehab needs

## 2019-02-08 NOTE — DISCHARGE NOTE ADULT - MEDICATION SUMMARY - MEDICATIONS TO TAKE
I will START or STAY ON the medications listed below when I get home from the hospital:    aspirin 81 mg oral tablet, chewable  -- 1 tab(s) by mouth once a day  stop the aspirin after the INR is 2-3.  -- Indication: For Cerebral infarction    amiodarone 200 mg oral tablet  -- 1 tab(s) by mouth once a day  -- Indication: For A. flutter    Coumadin 5 mg oral tablet  -- 1 tab(s) by mouth once a day  Titrate for goal of INR 2-3  -- Indication: For A. flutter    metFORMIN 500 mg oral tablet, extended release  -- 1 tab(s) by mouth once a day  -- Indication: For Diabetes    simvastatin 40 mg oral tablet  -- 1 tab(s) by mouth once a day (at bedtime)  -- Indication: For hyperlipidemia    Metoprolol Tartrate 25 mg oral tablet  -- orally 3 times a day  -- Indication: For hypertension    levalbuterol 0.63 mg/3 mL inhalation solution  -- 3 milliliter(s) inhaled every 6 hours, As needed, SOB/Wheezing  -- Indication: For wheezing    cefpodoxime 100 mg oral tablet  -- orally once a day for 5 days.  -- Indication: For uti    Lasix 20 mg oral tablet  -- 1 tab(s) by mouth once a day  -- Indication: For home med    famotidine 20 mg oral tablet  -- 1 tab(s) by mouth every 12 hours  -- Indication: For gerd    Colace 100 mg oral capsule  -- 1 cap(s) by mouth 2 times a day  -- Indication: For Constipation    Multiple Vitamins oral tablet  -- 1 tab(s) by mouth once a day  -- Indication: For home med    ascorbic acid 500 mg oral tablet  -- 1 tab(s) by mouth 2 times a day  -- Indication: For home med    folic acid 1 mg oral tablet  -- 1 tab(s) by mouth once a day  -- Indication: For home med I will START or STAY ON the medications listed below when I get home from the hospital:    aspirin 81 mg oral tablet, chewable  -- 1 tab(s) by mouth once a day  stop the aspirin after the INR is 2-3.  -- Indication: For Cerebral infarction    amiodarone 200 mg oral tablet  -- 1 tab(s) by mouth once a day  -- Indication: For A. flutter    Coumadin 5 mg oral tablet  -- 1 tab(s) by mouth once a day  Titrate for goal of INR 2-3  -- Indication: For A. flutter    metFORMIN 500 mg oral tablet, extended release  -- 1 tab(s) by mouth once a day  -- Indication: For Diabetes    simvastatin 40 mg oral tablet  -- 1 tab(s) by mouth once a day (at bedtime)  -- Indication: For hyperlipidemia    chlorhexidine 0.12% mucous membrane liquid  --  mucous membrane   swish and spit two times a day  -- Indication: For mouth care    Metoprolol Tartrate 25 mg oral tablet  -- orally 3 times a day  -- Indication: For hypertension    levalbuterol 0.63 mg/3 mL inhalation solution  -- 3 milliliter(s) inhaled every 6 hours, As needed, SOB/Wheezing  -- Indication: For wheezing    cefpodoxime 100 mg oral tablet  -- orally once a day for 5 days.  -- Indication: For uti    Lasix 20 mg oral tablet  -- 1 tab(s) by mouth once a day  -- Indication: For home med    famotidine 20 mg oral tablet  -- 1 tab(s) by mouth every 12 hours  -- Indication: For gerd    Colace 100 mg oral capsule  -- 1 cap(s) by mouth 2 times a day  -- Indication: For Constipation    Multiple Vitamins oral tablet  -- 1 tab(s) by mouth once a day  -- Indication: For home med    ascorbic acid 500 mg oral tablet  -- 1 tab(s) by mouth 2 times a day  -- Indication: For home med    folic acid 1 mg oral tablet  -- 1 tab(s) by mouth once a day  -- Indication: For home med I will START or STAY ON the medications listed below when I get home from the hospital:    aspirin 81 mg oral tablet, chewable  -- 1 tab(s) by mouth once a day  stop the aspirin after the INR is 2-3.  -- Indication: For Cerebral infarction    amiodarone 200 mg oral tablet  -- 1 tab(s) by mouth once a day  -- Indication: For A. flutter    Coumadin 5 mg oral tablet  -- 1 tab(s) by mouth once a day  Titrate for goal of INR 2-3  -- Indication: For A. flutter    metFORMIN 500 mg oral tablet  -- 1 tab(s) by mouth once a day  -- Indication: For Diabetes    simvastatin 40 mg oral tablet  -- 1 tab(s) by mouth once a day (at bedtime)  -- Indication: For hyperlipidemia    chlorhexidine 0.12% mucous membrane liquid  --  mucous membrane   swish and spit two times a day  -- Indication: For mouth care    Metoprolol Tartrate 25 mg oral tablet  -- orally 3 times a day  -- Indication: For hypertension    levalbuterol 0.63 mg/3 mL inhalation solution  -- 3 milliliter(s) inhaled every 6 hours, As needed, SOB/Wheezing  -- Indication: For wheezing    cefpodoxime 100 mg oral tablet  -- orally once a day for 5 days.  -- Indication: For uti    Lasix 20 mg oral tablet  -- 1 tab(s) by mouth once a day  -- Indication: For home med    famotidine 20 mg oral tablet  -- 1 tab(s) by mouth every 12 hours  -- Indication: For gerd    Colace 100 mg oral capsule  -- 1 cap(s) by mouth 2 times a day  -- Indication: For Constipation    Multiple Vitamins oral tablet  -- 1 tab(s) by mouth once a day  -- Indication: For home med    ascorbic acid 500 mg oral tablet  -- 1 tab(s) by mouth 2 times a day  -- Indication: For home med    folic acid 1 mg oral tablet  -- 1 tab(s) by mouth once a day  -- Indication: For home med

## 2019-02-08 NOTE — DISCHARGE NOTE ADULT - PLAN OF CARE
Secondary stroke prevention Patient should continue ASA 81mg daily prevent any reoccurrence Please follow up with neurologist in 1 week. Continue taking medications as prescribed. Monitor your blood pressure. Reduce fat, cholesterol and salt in your diet. Increase intake of fruits and vegetables. Limit alcohol to minimum and do not smoke. You may be at risk for falling, make changes to your home to help you walk easier. Keep up to date on vaccinations.  If you experience any symptoms of facial drooping, slurred speech, arm or leg weakness, severe headache, vision changes or any worsening symptoms, notify provider immediately and return to ER.

## 2019-02-08 NOTE — DISCHARGE NOTE ADULT - CARE PLAN
Principal Discharge DX:	Stroke  Goal:	Secondary stroke prevention  Assessment and plan of treatment:	Patient should continue ASA 81mg daily Principal Discharge DX:	Stroke  Goal:	prevent any reoccurrence  Assessment and plan of treatment:	Please follow up with neurologist in 1 week. Continue taking medications as prescribed. Monitor your blood pressure. Reduce fat, cholesterol and salt in your diet. Increase intake of fruits and vegetables. Limit alcohol to minimum and do not smoke. You may be at risk for falling, make changes to your home to help you walk easier. Keep up to date on vaccinations.  If you experience any symptoms of facial drooping, slurred speech, arm or leg weakness, severe headache, vision changes or any worsening symptoms, notify provider immediately and return to ER.

## 2019-02-08 NOTE — DISCHARGE NOTE ADULT - MEDICATION SUMMARY - MEDICATIONS TO STOP TAKING
I will STOP taking the medications listed below when I get home from the hospital:    isosorbide mononitrate extended release 30 mg oral tablet, extended release  -- 1 tab(s) by mouth once a day (in the morning)    losartan-hydroCHLOROthiazide 100mg-25mg oral tablet  -- 1 tab(s) by mouth once a day    HYDROmorphone 2 mg oral tablet  -- 1 tab(s) by mouth every 3 hours, As needed, Severe Pain (7 - 10)    oxyCODONE 10 mg oral tablet  -- 1 tab(s) by mouth every 3 hours, As needed, Moderate Pain    oxyCODONE 5 mg oral tablet  -- 1 tab(s) by mouth every 3 hours, As needed, Mild Pain    enoxaparin  -- 40 milligram(s) subcutaneous once a day    lidocaine 5% topical film  -- Apply on skin to affected area once a day MDD:apply to area as directed for twelve hrs then remove for twelve hrs  -- For external use only.  Remove old patch prior to applying a new patch.    cyclobenzaprine 5 mg oral tablet  -- 1 tab(s) by mouth 2 times a day   -- Some non-prescription drugs may aggravate your condition.  Read all labels carefully.  If a warning appears, check with your doctor before taking.  This drug may impair the ability to drive or operate machinery.  Use care until you become familiar with its effects.

## 2019-02-08 NOTE — DISCHARGE NOTE ADULT - CARE PROVIDERS DIRECT ADDRESSES
,DirectAddress_Unknown,DirectAddress_Unknown,DirectAddress_Unknown ,DirectAddress_Unknown,DirectAddress_Unknown,[email or web address]

## 2019-02-08 NOTE — SWALLOW VFSS/MBS ASSESSMENT ADULT - RECOMMENDED CONSISTENCY
At present time, suggest NPO, with non-oral nutrition/hydration/medications; however, given that the patient has demonstrated neuro recovery, the plan is to reassess on Monday in the hopes of advancing to a p.o diet. This was reviewed with Neuro resident and was approved.

## 2019-02-08 NOTE — SWALLOW VFSS/MBS ASSESSMENT ADULT - DIAGNOSTIC IMPRESSIONS
Patient presents with young-pharyngeal dysphagia with impaired oral management (with post swallow residue), delay in trigger of the swallow reflex, pharyngeal residue after the swallow, and impaired airway protection with silent laryngeal penetration of fluids >puree (which was episodic)

## 2019-02-08 NOTE — DISCHARGE NOTE ADULT - MEDICATION SUMMARY - MEDICATIONS TO CHANGE
I will SWITCH the dose or number of times a day I take the medications listed below when I get home from the hospital:    metoprolol succinate 100 mg oral tablet, extended release  -- 1 tab(s) by mouth once a day I will SWITCH the dose or number of times a day I take the medications listed below when I get home from the hospital:    metFORMIN 500 mg oral tablet, extended release  -- 1 tab(s) by mouth once a day    metoprolol succinate 100 mg oral tablet, extended release  -- 1 tab(s) by mouth once a day

## 2019-02-08 NOTE — DISCHARGE NOTE ADULT - HOSPITAL COURSE
88 year old white female with a pmh of CAD, HTN, HLD, pacemaker who presented to [facility] as a transfer from [facility] with right hemiparesis and aphasia and left proximal MCA occlusion. The patient was last known well at 2 pm on 2/3/19 and was then found down on the ground by her daughter several hours later and was noted to be not speaking and weak on the right. At [facility] a CTA head and neck was performed which showed a proximal left MCA occlusion, and a CT head showed a dense left MCA sign with some hypodensity and early ischemic changes to my eye in the left MCA distribution. She did not receive IV tPA as she was out of the window and was transferred to [facility] for possible mechanical thrombectomy. At [facility] her CT perfusion showed zero core infarct, with a penumbra of 80 mls, and an infinite mismatch. She was deemed a candidate for intervention and recanalization was achieved with TICI 3 score.     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     Since admission patient neurologically without acute change, interval CTH performed on 2/7 shows L MCA infarct with petechial hemorrhagic transformation, continue close monitoring for neurologic deterioration, will allow for normotension in setting of recent recanalization, LDL 55 on admission- continue on home dose of simvastatin, MR imaging when stable and if PPM compatible. Physical therapy/OT recommended acute TBI rehab    Currently on ASA with plans to transition to anticoagulation in 7 days from admission (02/10/19) after repeat stable imaging and after stable enteral access obtained 88 year old white female with a pmh of CAD, HTN, HLD, pacemaker who presented to [facility] as a transfer from [facility] with right hemiparesis and aphasia and left proximal MCA occlusion. The patient was last known well at 2 pm on 2/3/19 and was then found down on the ground by her daughter several hours later and was noted to be not speaking and weak on the right. At [facility] a CTA head and neck was performed which showed a proximal left MCA occlusion, and a CT head showed a dense left MCA sign with some hypodensity and early ischemic changes to my eye in the left MCA distribution. She did not receive IV tPA as she was out of the window and was transferred to [facility] for possible mechanical thrombectomy. At [facility] her CT perfusion showed zero core infarct, with a penumbra of 80 mls, and an infinite mismatch. She was deemed a candidate for intervention and recanalization was achieved with TICI 3 score.     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     Since admission patient neurologically without acute change, interval CTH performed on 2/7 shows L MCA infarct with petechial hemorrhagic transformation, continue close monitoring for neurologic deterioration, will allow for normotension in setting of recent recanalization, LDL 55 on admission- continue on home dose of simvastatin, MR imaging when stable and if PPM compatible. Physical therapy/OT recommended acute TBI rehab    Currently on ASA with plans to transition to anticoagulation in 7 days from admission (02/10/19) after repeat stable imaging and after stable enteral access obtained. 88 year old white female with a pmh of CAD, HTN, HLD, pacemaker who presented to [facility] as a transfer from [facility] with right hemiparesis and aphasia and left proximal MCA occlusion. The patient was last known well at 2 pm on 2/3/19 and was then found down on the ground by her daughter several hours later and was noted to be not speaking and weak on the right. At [facility] a CTA head and neck was performed which showed a proximal left MCA occlusion, and a CT head showed a dense left MCA sign with some hypodensity and early ischemic changes in the left MCA distribution. She did not receive IV tPA as she was out of the window and was transferred to [facility] for possible mechanical thrombectomy. At [facility] her CT perfusion showed zero core infarct, with a penumbra of 80 mls, and an infinite mismatch. She was deemed a candidate for intervention and recanalization was achieved with TICI 3 score on 2/13.     Impression:  left MCA infarction with petechial hemorraghic transformation secondary to cerebral embolism given pacemaker showed a-flutter high suspicion for cardiac source.   Currently on ASA 81 mg as a bridge to coumadin for goal INR 2-3. Her INR on 2/14 is 1.26, she received coumadin 5 mg on night of 2/13. Discontinue the aspiring when INR at goal.  CARDIOVASCULAR: EF 65-70% moderate AS, moderate pulm HTN, mild-mod TR and no PFO, cardiac monitoring at times showed sinus tachycardia - started on Amiodarone and Metoprolol with improvement PPM interrogation- showed episode of atrial flutter.   Patient failed speech and swallow, PEG was placed on 2/12 and has been tolerating tube feeds at goal.     Vital Signs Last 24 Hrs  T(C): 37.7 (13 Feb 2019 20:00), Max: 37.7 (13 Feb 2019 20:00)  T(F): 99.9 (13 Feb 2019 20:00), Max: 99.9 (13 Feb 2019 20:00)  HR: 94 (14 Feb 2019 10:08) (77 - 113)  BP: 147/74 (14 Feb 2019 10:08) (113/70 - 176/159)  BP(mean): 95 (14 Feb 2019 10:08) (75 - 167)  RR: 22 (14 Feb 2019 10:08) (19 - 23)  SpO2: 98% (14 Feb 2019 10:08) (97% - 100%)                        14.6   9.0   )-----------( 244      ( 14 Feb 2019 05:32 )             43.6   02-14    139  |  94<L>  |  35<H>  ----------------------------<  218<H>  3.6   |  28  |  0.73    Ca    9.7      14 Feb 2019 05:32    TPro  8.4<H>  /  Alb  4.0  /  TBili  2.1<H>  /  DBili  x   /  AST  23  /  ALT  18  /  AlkPhos  98  02-13    Patient was assessed by PT and OT and was recommended Acute Rehab. Patient stable for discharge. 88 year old white female with a pmh of CAD, HTN, HLD, pacemaker who presented to [facility] as a transfer from [facility] with right hemiparesis and aphasia and left proximal MCA occlusion. The patient was last known well at 2 pm on 2/3/19 and was then found down on the ground by her daughter several hours later and was noted to be not speaking and weak on the right. At [facility] a CTA head and neck was performed which showed a proximal left MCA occlusion, and a CT head showed a dense left MCA sign with some hypodensity and early ischemic changes in the left MCA distribution. She did not receive IV tPA as she was out of the window and was transferred to [facility] for possible mechanical thrombectomy. At [facility] her CT perfusion showed zero core infarct, with a penumbra of 80 mls, and an infinite mismatch. She was deemed a candidate for intervention and recanalization was achieved with TICI 3 score on 2/13.     Impression:  left MCA infarction with petechial hemorraghic transformation secondary to cerebral embolism given pacemaker showed a-flutter high suspicion for cardiac source.   Currently on ASA 81 mg as a bridge to coumadin for goal INR 2-3. Her INR on 2/14 is 1.26, she received coumadin 5 mg on night of 2/13. Discontinue the aspiring when INR at goal.  CARDIOVASCULAR: EF 65-70% moderate AS, moderate pulm HTN, mild-mod TR and no PFO, cardiac monitoring at times showed sinus tachycardia - started on Amiodarone and Metoprolol with improvement PPM interrogation- showed episode of atrial flutter.   Patient failed speech and swallow, PEG was placed on 2/12 and has been tolerating tube feeds at goal.     Vital Signs Last 24 Hrs  T(C): 37.7 (13 Feb 2019 20:00), Max: 37.7 (13 Feb 2019 20:00)  T(F): 99.9 (13 Feb 2019 20:00), Max: 99.9 (13 Feb 2019 20:00)  HR: 94 (14 Feb 2019 10:08) (77 - 113)  BP: 147/74 (14 Feb 2019 10:08) (113/70 - 176/159)  BP(mean): 95 (14 Feb 2019 10:08) (75 - 167)  RR: 22 (14 Feb 2019 10:08) (19 - 23)  SpO2: 98% (14 Feb 2019 10:08) (97% - 100%)                        14.6   9.0   )-----------( 244      ( 14 Feb 2019 05:32 )             43.6   02-14    139  |  94<L>  |  35<H>  ----------------------------<  218<H>  3.6   |  28  |  0.73    Ca    9.7      14 Feb 2019 05:32    TPro  8.4<H>  /  Alb  4.0  /  TBili  2.1<H>  /  DBili  x   /  AST  23  /  ALT  18  /  AlkPhos  98  02-13     Patient was started on antibiotics for UTI/cystitis on 2/13- f/u UCx at rehab    Patient was assessed by PT and OT and was recommended Acute Rehab. Patient stable for discharge.

## 2019-02-08 NOTE — DISCHARGE NOTE ADULT - INSTRUCTIONS
Tube feeds via PEG. Glucerna 1.2 usha, goal tube feed 50 mL/hr, total feed duration 24 hours, total volume for 24 hours is 1200

## 2019-02-08 NOTE — SWALLOW VFSS/MBS ASSESSMENT ADULT - ORAL PREPARATORY PHASE
Poor bolus formation/Anterior loss Anterior loss/Poor bolus formation Anterior loss/Poor bolus formation/Insufficient mastication

## 2019-02-08 NOTE — SWALLOW VFSS/MBS ASSESSMENT ADULT - ORAL PHASE COMMENTS
Oral transit time noted to be  6.1 seconds. Approximately 50% of bolus remained in the oral cavity post swallow (diffuse- anterior and lateral sulcus, lingual and trace along the palate) Maximal spillover to the level of the valleculae and moderate passive overflow along the posterior surface of the epiglottis/along the a-e folds to the pyriform sinus region. Oral transit time noted to be 4.5  seconds for the primary swallow. Approximately 50% of the bolus remained in the cavity post swallow. A delayed secondary swallow was elicited. There was maximal spillage of oral residue along the a-e folds. Total oral transit >20 sec. Oral transit time noted to be 2.9  seconds. Maximal spillover to the level of the valleculae and minimal passive overflow along the a-e folds. There was moderate diffuse oral residue Oral transit time noted to be 2  seconds for the primary swallow. Maximal spillover to the level of the valleculae and pyriform sinuses. There was moderate oral residue post swallow, with spillage of the same along the a-e folds to the pyriforms. A delayed secondary swallow was elicited and total oral transit time was 6.2 sec Oral transit time noted to be 2 seconds. Maximal spillover to the level of the pyriform sinuses. There was minimal diffuse oral residue There was minimal diffuse oral residue.

## 2019-02-08 NOTE — CONSULT NOTE ADULT - SUBJECTIVE AND OBJECTIVE BOX
PULMONARY CONSULT    HPI: 89 y/o F with PMH of CAD s/p stents and CABG, DMT2, HTN, s/p AVR, PPM presents with L MCA CVA outside of window for tPA. Extubated 2/5. Hospital course c/b dysphagia. Called to eval for cough and congestion. CXR with small L pleural effusion, bibasilar atelectasis. 98% on RA.       PAST MEDICAL & SURGICAL HISTORY:  Valvular disease  Atrial flutter: 1/2010- [facility], Dr Tran  Diabetes  Obesity  Hyperlipidemia  HTN (hypertension)  CAD (coronary artery disease): s/p CABG- 2004-[facility]  Artificial pacemaker  Aortic valve replaced  S/P coronary artery stent placement: x 4, 2006-[facility]  S/P cholecystectomy  S/P CABG (coronary artery bypass graft)    Allergies    No Known Allergies    Intolerances      FAMILY HISTORY:  Family history of cerebrovascular accident (CVA)  Family history of hypertension    Social history: Never smoker     Review of Systems:  CONSTITUTIONAL: No fever, chills, or fatigue  EYES: No eye pain, visual disturbances, or discharge  ENMT:  No difficulty hearing, tinnitus, vertigo; No sinus or throat pain  NECK: No pain or stiffness  RESPIRATORY: Per above  CARDIOVASCULAR: No chest pain, palpitations, dizziness, or leg swelling  GASTROINTESTINAL: No abdominal or epigastric pain. No nausea, vomiting, or hematemesis; No diarrhea or constipation. No melena or hematochezia.  GENITOURINARY: No dysuria, frequency, hematuria, or incontinence  NEUROLOGICAL: No headaches, memory loss, loss of strength, numbness, or tremors  SKIN: No itching, burning, rashes, or lesions   MUSCULOSKELETAL: No joint pain or swelling; No muscle, back, or extremity pain  PSYCHIATRIC: No depression, anxiety, mood swings, or difficulty sleeping      Medications:  MEDICATIONS  (STANDING):  aspirin  chewable 81 milliGRAM(s) Oral daily  dextrose 5%. 1000 milliLiter(s) (50 mL/Hr) IV Continuous <Continuous>  dextrose 50% Injectable 12.5 Gram(s) IV Push once  dextrose 50% Injectable 25 Gram(s) IV Push once  dextrose 50% Injectable 25 Gram(s) IV Push once  docusate sodium Liquid 100 milliGRAM(s) Oral two times a day  enoxaparin Injectable 40 milliGRAM(s) SubCutaneous <User Schedule>  famotidine    Tablet 20 milliGRAM(s) Oral two times a day  insulin regular  human corrective regimen sliding scale   SubCutaneous every 6 hours  metoprolol tartrate 75 milliGRAM(s) Oral two times a day  senna Syrup 20 milliLiter(s) Oral at bedtime  simvastatin 40 milliGRAM(s) Oral at bedtime    MEDICATIONS  (PRN):  acetaminophen    Suspension .. 650 milliGRAM(s) Oral every 6 hours PRN Temp greater or equal to 38C (100.4F), Mild Pain (1 - 3)  dextrose 40% Gel 15 Gram(s) Oral once PRN Blood Glucose LESS THAN 70 milliGRAM(s)/deciLiter  glucagon  Injectable 1 milliGRAM(s) IntraMuscular once PRN Glucose <70 milliGRAM(s)/deciLiter  levalbuterol Inhalation 0.63 milliGRAM(s) Inhalation every 6 hours PRN SOB/Wheezing  nystatin Powder 1 Application(s) Topical two times a day PRN dermatitis            Vital Signs Last 24 Hrs  T(C): 36.9 (08 Feb 2019 08:49), Max: 37.2 (07 Feb 2019 19:36)  T(F): 98.4 (08 Feb 2019 08:49), Max: 99 (07 Feb 2019 19:36)  HR: 115 (08 Feb 2019 12:00) (111 - 115)  BP: 158/108 (08 Feb 2019 12:00) (137/78 - 177/78)  BP(mean): 123 (08 Feb 2019 12:00) (94 - 131)  RR: 23 (08 Feb 2019 12:00) (22 - 24)  SpO2: 100% (08 Feb 2019 12:00) (93% - 100%) on RA            02-07 @ 07:01  -  02-08 @ 07:00  --------------------------------------------------------  IN: 1770 mL / OUT: 0 mL / NET: 1770 mL          LABS:                        12.5   5.8   )-----------( 186      ( 08 Feb 2019 00:22 )             36.8     02-08    141  |  107  |  12  ----------------------------<  164<H>  3.8   |  20<L>  |  0.57    Ca    8.5      08 Feb 2019 00:22            CAPILLARY BLOOD GLUCOSE      POCT Blood Glucose.: 158 mg/dL (08 Feb 2019 12:01)          Physical Examination:    General: No acute distress.      HEENT: Pupils equal, reactive to light.  Symmetric.    PULM: Crackles bibasilar     CVS: S1, S2 tachy    ABD: Soft, nondistended, nontender, normoactive bowel sounds, no masses    EXT: No edema, nontender    SKIN: Warm and well perfused, no rashes noted.    NEURO: Alert, oriented x 2-3, R sided weakness     RADIOLOGY REVIEWED  CXR: L pleural effusion  Mild pulm edema/atelectasis     TTE: < from: Transthoracic Echocardiogram (02.08.19 @ 08:18) >  1. Aortic valve not well visualized; appears calcified.  Peak transaortic valve gradient equals 17 mm Hg, mean  transaortic valve gradient equals 11 mm Hg, estimated  aortic valve area equals 1.4 sqcm (bycontinuity equation),  aortic valve velocity time integral equals 36 cm,  consistent with moderate aortic stenosis. Minimal aortic  regurgitation.  2. Increased relative wall thickness with normal left  ventricular mass index, consistent with concentric left  ventricular remodeling.  3. Hyperdynamic left ventricular systolic function.  4. The right ventricle is not well visualized; grossly  normal right ventricular systolic function. TV s' = 10  cm/sec.  5. Estimated right ventricular systolic pressure equals 57  mm Hg, assuming right atrial pressure equals 8 mm Hg,  consistent with moderate pulmonary hypertension.  6. Normal tricuspid valve. Mild-moderate tricuspid  regurgitation.  7. Agitated saline injection and color flow Doppler  demonstrates no evidence of a patent foramen ovale.    < end of copied text >

## 2019-02-08 NOTE — SWALLOW VFSS/MBS ASSESSMENT ADULT - ORAL PREP COMMENTS
Anterior loss along the length of the mandible. Loss along the length of the mandible There was minimal spillage along the length of the mandible Spillage along the length of the mandible

## 2019-02-08 NOTE — SWALLOW VFSS/MBS ASSESSMENT ADULT - ADDITIONAL INFORMATION
+ KFT in place. + calcification of the thyroid and arytenoid  cartilages + KFT in place. + calcification of the thyroid and arytenoid  cartilages    Disorders: reduced oral competence, impaired buccal tension, reduced lingual strength/ROM/Rate of motion, reduced BOT to posterior pharyngeal wall contact with a narrow column of contrast between structures, delay in trigger of the swallow reflex with low trigger points, reduced laryngeal elevation, incomplete laryngeal vestibule closure, impaired epiglottic retroflexion (to horizontal) , reduced supraglottic sensation, evidence suggestive of a fatigue factor with decompensation of swallow function when multiple back-to-back trials were presented (this took place off fluoro to assess for this issues)     Strategies: intake of controlled volumes (tsp) at slow rate, cues for successive swallows/texture alternation reduced the degree of residue (but did not clear). Pt unable to maintain postures. Pt able to tolerate Dysphagia I diet with honey thick fluids with strategies listed above, but suggest f/u tx session by SLP to assess for further fatigue and overall tolerance.

## 2019-02-08 NOTE — SWALLOW VFSS/MBS ASSESSMENT ADULT - LARYNGEAL PENETRATION DURING THE SWALLOW - SILENT
Trace/with inconsistent residue in the vestibule Mild/deep penetration, with inconsistent residue post swallow (in the vestibule)/Trace Trace/with inconsistent residue in the vestibule (especially noted when nectar thick fluids was utilized as a fluid wash after solids)

## 2019-02-08 NOTE — SWALLOW VFSS/MBS ASSESSMENT ADULT - MODE OF PRESENTATION
fed by clinician spoon/fed by clinician fed by clinician/spoon self fed/straw spoon/fed by clinician/1/4 tsp

## 2019-02-08 NOTE — PROGRESS NOTE ADULT - SUBJECTIVE AND OBJECTIVE BOX
THE PATIENT WAS SEEN AND EXAMINED BY ME WITH THE HOUSESTAFF AND STROKE TEAM DURING MORNING ROUNDS.   HPI:  Patient is an 88 year old right handed  female with a history of CAD s/p stents and CABG on aspirin at home, DM, HTN, aortic valve replacement, and pacemaker who presented as transfer from [facility] for stroke rescue. Her last known normal was at approximately 2 pm on 2/3. Robbins was then noted to have left sided weakness and sudden onset inability to speak, so she was brought to [facility]. She was outside the appropriate time window for tPA, so tPA was not given. CTA showed evidence of a L M1 occlusion so she was transferred to [facility] and code stroke called upon arrival. Initial NIHSS at [facility] was 19, MRS 1.    SUBJECTIVE: No events overnight.  No new neurologic complaints.      acetaminophen    Suspension .. 650 milliGRAM(s) Oral every 6 hours PRN  aspirin  chewable 81 milliGRAM(s) Oral daily  dextrose 40% Gel 15 Gram(s) Oral once PRN  dextrose 5%. 1000 milliLiter(s) IV Continuous <Continuous>  dextrose 50% Injectable 12.5 Gram(s) IV Push once  dextrose 50% Injectable 25 Gram(s) IV Push once  dextrose 50% Injectable 25 Gram(s) IV Push once  docusate sodium Liquid 100 milliGRAM(s) Oral two times a day  enoxaparin Injectable 40 milliGRAM(s) SubCutaneous <User Schedule>  famotidine    Tablet 20 milliGRAM(s) Oral two times a day  glucagon  Injectable 1 milliGRAM(s) IntraMuscular once PRN  insulin regular  human corrective regimen sliding scale   SubCutaneous every 6 hours  levalbuterol Inhalation 0.63 milliGRAM(s) Inhalation every 6 hours PRN  metoprolol tartrate 75 milliGRAM(s) Oral two times a day  nystatin Powder 1 Application(s) Topical two times a day PRN  senna Syrup 20 milliLiter(s) Oral at bedtime  simvastatin 40 milliGRAM(s) Oral at bedtime  sodium chloride 0.9%. 1000 milliLiter(s) IV Continuous <Continuous>    PHYSICAL EXAM:   Vital Signs Last 24 Hrs  T(C): 37.2 (08 Feb 2019 00:00), Max: 37.2 (07 Feb 2019 19:36)  T(F): 98.9 (08 Feb 2019 00:00), Max: 99 (07 Feb 2019 19:36)  HR: 115 (08 Feb 2019 06:00) (102 - 115)  BP: 150/100 (08 Feb 2019 06:00) (134/75 - 177/78)  BP(mean): 115 (08 Feb 2019 06:00) (93 - 120)  RR: 24 (08 Feb 2019 06:00) (20 - 24)  SpO2: 99% (08 Feb 2019 06:00) (93% - 100%)    General: No acute distress,    HEENT: EOM intact, no blink to threat on right   Abdomen: , nontender  Extremities: No edema    NEUROLOGICAL EXAM:  Mental status: eyes open, awake, attentive to persons at bedside, oriented to name, hypophonic follows simple commands, perseverates at times, IDs objects   Cranial Nerves:  right facial droop, no blink to threat on right   Motor exam: left side moves well against gravity, RUE 3/5 with drift , RLE 2/5   Sensation: Intact to light touch   Coordination/ Gait: gait not assessed     LABS:                        12.5   5.8   )-----------( 186      ( 08 Feb 2019 00:22 )             36.8    02-08    141  |  107  |  12  ----------------------------<  164<H>  3.8   |  20<L>  |  0.57    Ca    8.5      08 Feb 2019 00:22    Hemoglobin A1C, Whole Blood: 6.6 % (02-04 @ 06:11)    IMAGING: Reviewed by me.     CT Head No Cont (02.05.19)   Previously seen increased attenuation within left sided sulci is   decreased on the current examination, and may represent the presence of   residual intravenous contrast and/or subarachnoid hemorrhage.  No no hydrocephalus or midline shift. No effacement of basal cisterns    (02.03.19)   CT angiography neck: Patent cervical vasculature.  No hemodynamically   significant carotid stenosis or flow-limiting vertebral artery stenosis.    No evidence of dissection. Three-vessel arch.  Vertebral arteries are   codominant.    CT angiography brain:   1.  The M1 segment of the left middle cerebral artery is occluded,   approximately 8 mm distal to its origin.  Multiple proximal M2 branches   of the left MCA are occluded within the sylvian fissure.  There is some   reconstitution of blood flow within distal M2 branches and M3 branches of   the left middle cerebral artery.  CT perfusion is recommended for further   characterization.  2.  Atherosclerotic calcification causes diffuse mild narrowing in the   cavernous and supraclinoid segments of the internal carotid arteries   without flow-limiting stenosis.  3.  Atherosclerotic calcification causes moderate to severe stenosis in   the bilateral intradural vertebral arteries.  Mild luminal irregularity   of the basilar artery without flow-limiting stenosis.  Multiple mild to   moderate stenoses in the proximal P2 segment of the left posterior   cerebral artery.  The right posterior cerebral artery is diffusely small   and irregular, and appears hypoenhancing compared to the contralateral   side.    CT Perfusion w/ Maps w/ IV Cont (02.03.19)   NONCONTRAST HEAD CT SCAN: No CT evidence of acute intracranial   hemorrhage, mass effect or acute territorial infarct.    CT PERFUSION: No evidence of a completed infarct.  Approximately 80 cc   penumbra of at risk tissue in the left MCA vascular territory. THE PATIENT WAS SEEN AND EXAMINED BY ME WITH THE HOUSESTAFF AND STROKE TEAM DURING MORNING ROUNDS.   HPI:  Patient is an 88 year old right handed  female with a history of CAD s/p stents and CABG on aspirin at home, DM, HTN, aortic valve replacement, and pacemaker who presented as transfer from [facility] for stroke rescue. Her last known normal was at approximately 2 pm on 2/3. Robbins was then noted to have left sided weakness and sudden onset inability to speak, so she was brought to [facility]. She was outside the appropriate time window for tPA, so tPA was not given. CTA showed evidence of a L M1 occlusion so she was transferred to [facility] and code stroke called upon arrival. Initial NIHSS at [facility] was 19, MRS 1.    SUBJECTIVE: Noted with tachypnea overnight. No new neurologic complaints.      acetaminophen    Suspension .. 650 milliGRAM(s) Oral every 6 hours PRN  aspirin  chewable 81 milliGRAM(s) Oral daily  dextrose 40% Gel 15 Gram(s) Oral once PRN  dextrose 5%. 1000 milliLiter(s) IV Continuous <Continuous>  dextrose 50% Injectable 12.5 Gram(s) IV Push once  dextrose 50% Injectable 25 Gram(s) IV Push once  dextrose 50% Injectable 25 Gram(s) IV Push once  docusate sodium Liquid 100 milliGRAM(s) Oral two times a day  enoxaparin Injectable 40 milliGRAM(s) SubCutaneous <User Schedule>  famotidine    Tablet 20 milliGRAM(s) Oral two times a day  glucagon  Injectable 1 milliGRAM(s) IntraMuscular once PRN  insulin regular  human corrective regimen sliding scale   SubCutaneous every 6 hours  levalbuterol Inhalation 0.63 milliGRAM(s) Inhalation every 6 hours PRN  metoprolol tartrate 75 milliGRAM(s) Oral two times a day  nystatin Powder 1 Application(s) Topical two times a day PRN  senna Syrup 20 milliLiter(s) Oral at bedtime  simvastatin 40 milliGRAM(s) Oral at bedtime  sodium chloride 0.9%. 1000 milliLiter(s) IV Continuous <Continuous>    PHYSICAL EXAM:   Vital Signs Last 24 Hrs  T(C): 37.2 (08 Feb 2019 00:00), Max: 37.2 (07 Feb 2019 19:36)  T(F): 98.9 (08 Feb 2019 00:00), Max: 99 (07 Feb 2019 19:36)  HR: 115 (08 Feb 2019 06:00) (102 - 115)  BP: 150/100 (08 Feb 2019 06:00) (134/75 - 177/78)  BP(mean): 115 (08 Feb 2019 06:00) (93 - 120)  RR: 24 (08 Feb 2019 06:00) (20 - 24)  SpO2: 99% (08 Feb 2019 06:00) (93% - 100%)    General: No acute distress, tachypnea    HEENT: EOM intact, no blink to threat on right   Abdomen: , nontender  Extremities: No edema    NEUROLOGICAL EXAM:  Mental status: eyes open, awake, attentive to persons at bedside, oriented to name, hypophonic follows simple commands, perseverates at times, IDs objects   Cranial Nerves:  right facial droop, no blink to threat on right   Motor exam: left side moves well against gravity, RUE 3/5 with drift , RLE 2/5   Sensation: Intact to light touch   Coordination/ Gait: gait not assessed     LABS:                        12.5   5.8   )-----------( 186      ( 08 Feb 2019 00:22 )             36.8    02-08    141  |  107  |  12  ----------------------------<  164<H>  3.8   |  20<L>  |  0.57    Ca    8.5      08 Feb 2019 00:22    Hemoglobin A1C, Whole Blood: 6.6 % (02-04 @ 06:11)    IMAGING: Reviewed by me.     CT Head No Cont (02.05.19)   Previously seen increased attenuation within left sided sulci is   decreased on the current examination, and may represent the presence of   residual intravenous contrast and/or subarachnoid hemorrhage.  No no hydrocephalus or midline shift. No effacement of basal cisterns    (02.03.19)   CT angiography neck: Patent cervical vasculature.  No hemodynamically   significant carotid stenosis or flow-limiting vertebral artery stenosis.    No evidence of dissection. Three-vessel arch.  Vertebral arteries are   codominant.    CT angiography brain:   1.  The M1 segment of the left middle cerebral artery is occluded,   approximately 8 mm distal to its origin.  Multiple proximal M2 branches   of the left MCA are occluded within the sylvian fissure.  There is some   reconstitution of blood flow within distal M2 branches and M3 branches of   the left middle cerebral artery.  CT perfusion is recommended for further   characterization.  2.  Atherosclerotic calcification causes diffuse mild narrowing in the   cavernous and supraclinoid segments of the internal carotid arteries   without flow-limiting stenosis.  3.  Atherosclerotic calcification causes moderate to severe stenosis in   the bilateral intradural vertebral arteries.  Mild luminal irregularity   of the basilar artery without flow-limiting stenosis.  Multiple mild to   moderate stenoses in the proximal P2 segment of the left posterior   cerebral artery.  The right posterior cerebral artery is diffusely small   and irregular, and appears hypoenhancing compared to the contralateral   side.    CT Perfusion w/ Maps w/ IV Cont (02.03.19)   NONCONTRAST HEAD CT SCAN: No CT evidence of acute intracranial   hemorrhage, mass effect or acute territorial infarct.    CT PERFUSION: No evidence of a completed infarct.  Approximately 80 cc   penumbra of at risk tissue in the left MCA vascular territory. THE PATIENT WAS SEEN AND EXAMINED BY ME WITH THE HOUSESTAFF AND STROKE TEAM DURING MORNING ROUNDS.   HPI:  Patient is an 88 year old right handed  female with a history of CAD s/p stents and CABG on aspirin at home, DM, HTN, aortic valve replacement, and pacemaker who presented as transfer from [facility] for stroke rescue. Her last known normal was at approximately 2 pm on 2/3. Robbins was then noted to have left sided weakness and sudden onset inability to speak, so she was brought to [facility]. She was outside the appropriate time window for tPA, so tPA was not given. CTA showed evidence of a L M1 occlusion so she was transferred to [facility] and code stroke called upon arrival. Initial NIHSS at [facility] was 19, MRS 1.    SUBJECTIVE: Noted with tachypnea overnight. No new neurologic complaints.      acetaminophen    Suspension .. 650 milliGRAM(s) Oral every 6 hours PRN  aspirin  chewable 81 milliGRAM(s) Oral daily  dextrose 40% Gel 15 Gram(s) Oral once PRN  dextrose 5%. 1000 milliLiter(s) IV Continuous <Continuous>  dextrose 50% Injectable 12.5 Gram(s) IV Push once  dextrose 50% Injectable 25 Gram(s) IV Push once  dextrose 50% Injectable 25 Gram(s) IV Push once  docusate sodium Liquid 100 milliGRAM(s) Oral two times a day  enoxaparin Injectable 40 milliGRAM(s) SubCutaneous <User Schedule>  famotidine    Tablet 20 milliGRAM(s) Oral two times a day  glucagon  Injectable 1 milliGRAM(s) IntraMuscular once PRN  insulin regular  human corrective regimen sliding scale   SubCutaneous every 6 hours  levalbuterol Inhalation 0.63 milliGRAM(s) Inhalation every 6 hours PRN  metoprolol tartrate 75 milliGRAM(s) Oral two times a day  nystatin Powder 1 Application(s) Topical two times a day PRN  senna Syrup 20 milliLiter(s) Oral at bedtime  simvastatin 40 milliGRAM(s) Oral at bedtime  sodium chloride 0.9%. 1000 milliLiter(s) IV Continuous <Continuous>    PHYSICAL EXAM:   Vital Signs Last 24 Hrs  T(C): 37.2 (08 Feb 2019 00:00), Max: 37.2 (07 Feb 2019 19:36)  T(F): 98.9 (08 Feb 2019 00:00), Max: 99 (07 Feb 2019 19:36)  HR: 115 (08 Feb 2019 06:00) (102 - 115)  BP: 150/100 (08 Feb 2019 06:00) (134/75 - 177/78)  BP(mean): 115 (08 Feb 2019 06:00) (93 - 120)  RR: 24 (08 Feb 2019 06:00) (20 - 24)  SpO2: 99% (08 Feb 2019 06:00) (93% - 100%)    General: No acute distress, tachypnea    HEENT: EOM intact, no blink to threat on right   Abdomen: , nontender  Extremities: No edema    NEUROLOGICAL EXAM:  Mental status: eyes open, awake, attentive to persons at bedside, oriented to name, hypophonic follows simple commands, perseverates at times, IDs objects   Cranial Nerves:  right facial droop, no blink to threat on right   Motor exam: left side moves well against gravity, RUE 3/5 with drift , RLE 2/5   Sensation: Intact to light touch   Coordination/ Gait: gait not assessed     LABS:                        12.5   5.8   )-----------( 186      ( 08 Feb 2019 00:22 )             36.8    02-08    141  |  107  |  12  ----------------------------<  164<H>  3.8   |  20<L>  |  0.57    Ca    8.5      08 Feb 2019 00:22    Hemoglobin A1C, Whole Blood: 6.6 % (02-04 @ 06:11)    IMAGING: Reviewed by me.     CT Head No Cont (02.08.19)  Increasing hypodensity involves the left basal ganglia, internal capsule,   corona radiata, and insula, consistent with an evolving acute left middle   cerebral artery territory infarct. Vague increased areas of density   involve the infarct which may reflect petechial hemorrhagic   transformation, retained contrast from the angiogram procedure, or a   combination of both. There is no focal parenchymal hematoma.    CT Head No Cont (02.05.19)   Previously seen increased attenuation within left sided sulci is   decreased on the current examination, and may represent the presence of   residual intravenous contrast and/or subarachnoid hemorrhage.  No no hydrocephalus or midline shift. No effacement of basal cisterns    (02.03.19)   CT angiography neck: Patent cervical vasculature.  No hemodynamically   significant carotid stenosis or flow-limiting vertebral artery stenosis.    No evidence of dissection. Three-vessel arch.  Vertebral arteries are   codominant.    CT angiography brain:   1.  The M1 segment of the left middle cerebral artery is occluded,   approximately 8 mm distal to its origin.  Multiple proximal M2 branches   of the left MCA are occluded within the sylvian fissure.  There is some   reconstitution of blood flow within distal M2 branches and M3 branches of   the left middle cerebral artery.  CT perfusion is recommended for further   characterization.  2.  Atherosclerotic calcification causes diffuse mild narrowing in the   cavernous and supraclinoid segments of the internal carotid arteries   without flow-limiting stenosis.  3.  Atherosclerotic calcification causes moderate to severe stenosis in   the bilateral intradural vertebral arteries.  Mild luminal irregularity   of the basilar artery without flow-limiting stenosis.  Multiple mild to   moderate stenoses in the proximal P2 segment of the left posterior   cerebral artery.  The right posterior cerebral artery is diffusely small   and irregular, and appears hypoenhancing compared to the contralateral   side.    CT Perfusion w/ Maps w/ IV Cont (02.03.19)   NONCONTRAST HEAD CT SCAN: No CT evidence of acute intracranial   hemorrhage, mass effect or acute territorial infarct.    CT PERFUSION: No evidence of a completed infarct.  Approximately 80 cc   penumbra of at risk tissue in the left MCA vascular territory.

## 2019-02-08 NOTE — DISCHARGE NOTE ADULT - OTHER SIGNIFICANT FINDINGS
CT Head No Cont (02.08.19)  Increasing hypodensity involves the left basal ganglia, internal capsule,   corona radiata, and insula, consistent with an evolving acute left middle   cerebral artery territory infarct. Vague increased areas of density   involve the infarct which may reflect petechial hemorrhagic   transformation, retained contrast from the angiogram procedure, or a   combination of both. There is no focal parenchymal hematoma.    CT Head No Cont (02.05.19)   Previously seen increased attenuation within left sided sulci is   decreased on the current examination, and may represent the presence of   residual intravenous contrast and/or subarachnoid hemorrhage.  No no hydrocephalus or midline shift. No effacement of basal cisterns    (02.03.19)   CT angiography neck: Patent cervical vasculature.  No hemodynamically   significant carotid stenosis or flow-limiting vertebral artery stenosis.    No evidence of dissection. Three-vessel arch.  Vertebral arteries are   codominant.    CT angiography brain:   1.  The M1 segment of the left middle cerebral artery is occluded,   approximately 8 mm distal to its origin.  Multiple proximal M2 branches   of the left MCA are occluded within the sylvian fissure.  There is some   reconstitution of blood flow within distal M2 branches and M3 branches of   the left middle cerebral artery.  CT perfusion is recommended for further   characterization.  2.  Atherosclerotic calcification causes diffuse mild narrowing in the   cavernous and supraclinoid segments of the internal carotid arteries   without flow-limiting stenosis.  3.  Atherosclerotic calcification causes moderate to severe stenosis in   the bilateral intradural vertebral arteries.  Mild luminal irregularity   of the basilar artery without flow-limiting stenosis.  Multiple mild to   moderate stenoses in the proximal P2 segment of the left posterior   cerebral artery.  The right posterior cerebral artery is diffusely small   and irregular, and appears hypoenhancing compared to the contralateral   side.    CT Perfusion w/ Maps w/ IV Cont (02.03.19)   NONCONTRAST HEAD CT SCAN: No CT evidence of acute intracranial   hemorrhage, mass effect or acute territorial infarct.    CT PERFUSION: No evidence of a completed infarct.  Approximately 80 cc   penumbra of at risk tissue in the left MCA vascular territory.    CT Head No Cont (02.13.19)   Slight interval decreased hyperdensity within left sylvian fissure.  No interval acute intracranial hemorrhage or evidence for interval   transcortical territorial infarction.

## 2019-02-08 NOTE — PROGRESS NOTE ADULT - ASSESSMENT
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 Has been tachycardic

## 2019-02-08 NOTE — DISCHARGE NOTE ADULT - PROVIDER TOKENS
PROVIDER:[TOKEN:[[id number]]],PROVIDER:[TOKEN:[[id number]]],PROVIDER:[TOKEN:[[id number]]] PROVIDER:[TOKEN:[[id number]]],PROVIDER:[TOKEN:[[id number]]],PROVIDER:[TOKEN:[[id number]]]

## 2019-02-08 NOTE — DISCHARGE NOTE ADULT - PATIENT PORTAL LINK FT
You can access the [facility], offered by [facility], by registering with the following website: [email or web address]

## 2019-02-08 NOTE — PROGRESS NOTE ADULT - SUBJECTIVE AND OBJECTIVE BOX
Subjective: Patient seen and examined. No new events except as noted.   remains in Stroke unit   PPM interrogation revealed episodes of Aflutter     REVIEW OF SYSTEMS:    CONSTITUTIONAL: + weakness, fevers or chills  EYES/ENT: No visual changes;  No vertigo or throat pain   NECK: No pain or stiffness  RESPIRATORY: No cough, wheezing, hemoptysis; No shortness of breath  CARDIOVASCULAR: No chest pain or palpitations  GASTROINTESTINAL: No abdominal or epigastric pain. No nausea, vomiting, or hematemesis; No diarrhea or constipation. No melena or hematochezia.  GENITOURINARY: No dysuria, frequency or hematuria  NEUROLOGICAL: No numbness or weakness  SKIN: No itching, burning, rashes, or lesions   All other review of systems is negative unless indicated above.    MEDICATIONS:  MEDICATIONS  (STANDING):  aspirin  chewable 81 milliGRAM(s) Oral daily  dextrose 5%. 1000 milliLiter(s) (50 mL/Hr) IV Continuous <Continuous>  dextrose 50% Injectable 12.5 Gram(s) IV Push once  dextrose 50% Injectable 25 Gram(s) IV Push once  dextrose 50% Injectable 25 Gram(s) IV Push once  docusate sodium Liquid 100 milliGRAM(s) Oral two times a day  enoxaparin Injectable 40 milliGRAM(s) SubCutaneous <User Schedule>  famotidine    Tablet 20 milliGRAM(s) Oral two times a day  furosemide   Injectable 40 milliGRAM(s) IV Push daily  insulin regular  human corrective regimen sliding scale   SubCutaneous every 6 hours  metoprolol tartrate 75 milliGRAM(s) Oral two times a day  senna Syrup 20 milliLiter(s) Oral at bedtime  simvastatin 40 milliGRAM(s) Oral at bedtime      PHYSICAL EXAM:  T(C): 36.9 (19 @ 08:49), Max: 37.2 (19 @ 19:36)  HR: 115 (19 @ 12:00) (111 - 115)  BP: 158/108 (19 @ 12:00) (137/78 - 177/78)  RR: 23 (19 @ 12:00) (21 - 24)  SpO2: 100% (19 @ 12:00) (93% - 100%)  Wt(kg): --  I&O's Summary    2019 07: 07:00  --------------------------------------------------------  IN: 1770 mL / OUT: 0 mL / NET: 1770 mL          Appearance: NAD very lethargic 	  HEENT:   Normal oral mucosa, PERRL, EOMI +NGT 	  Lymphatic: No lymphadenopathy  Cardiovascular: tachycardic S1 S2, No JVD, No murmurs, No edema  Respiratory: decreased bs   Psychiatry: A & O x 3, lethargic   Gastrointestinal:  Soft, Non-tender, + BS	  Skin: No rashes, No ecchymoses, No cyanosis	  NEUROLOGICAL EXAM:  Mental status: eyes open, awake, attentive to persons at bedside, able to state name, hypophonic follows some simple commands, perseverates at times   Cranial Nerves:  right facial droop, no blink to threat on right   Motor exam: left side moves well against gravity, RUE 3/5 with drift , RLE 2/5   Sensation: Intact to light touch   Coordination/ Gait: gait not assessed     Extremities: decreased range of motion, No clubbing, cyanosis or edema  Vascular: Peripheral pulses palpable 2+ bilaterally      LABS:    CARDIAC MARKERS:                                12.5   5.8   )-----------( 186      ( 2019 00:22 )             36.8     02-08    141  |  107  |  12  ----------------------------<  164<H>  3.8   |  20<L>  |  0.57    Ca    8.5      2019 00:22      proBNP:   Lipid Profile:   HgA1c:   TSH:           PROCEDURE:   · Procedure Name	PPM Interrogation Note	  · TIME OUT	Patient's first and last name, , procedure, and correct site confirmed prior to the start of procedure.	  · Procedure Performed By	Myself	  · 	[facility]	  · Model	Assurity 2240	  · Mode	DDDR	  · Rate	Lower rate: 60 bpm	  · Atrial Lead	Yes	  · P-wave amplitude (mV)	2.9	  · Impedance (ohms)	360	  · Right Ventricular Lead	Yes	  · R-wave amplitude (mV)	12	  · RV lead Impedance (ohms)	340	  · Threshold (V)	0.75	  · Threshold at (ms)	0.4	  · Battery	Good	  · Underlying Rhythm	Atrial flutter with ventricular rate at 110's	  · Comments	Battery longevity: 9.7-9.9 years	  · Additional Procedure Details	-Indication for interrogation: P/W CVA -Presenting rhythm: Atrial flutter (atrial rate 220-240 bpm) with ventricular rate at 110-120's  -Measured data WNL, NL PM function, atrial threshold testing not being done due to is currently in atrial flutter, Pt is not PM dependent -Stored data revealed multiple atrial high rate episode today between 6:13pm to 7:23pm c/w 2:1 atrial flutter (atrial rate 220-240 bpm, ventricular rate 110-120 bpm). No events recorded prior to today. -Changes made: none -Findings discussed with Neurology team   DOMINIQUE Watts [id number]	      Electronic Signatures:  Felicitas Gallego (FRANCIS)  (Signed 2019 20:02)  	Authored: PROCEDURE      Last Updated: 2019 20:02 by Felicitas Gallego (FRANCIS)  TELEMETRY: SR	    ECG:  	  RADIOLOGY:   DIAGNOSTIC TESTING:  [ ] Echocardiogram:  [ ]  Catheterization:  [ ] Stress Test:    OTHER:

## 2019-02-08 NOTE — PROGRESS NOTE ADULT - PROBLEM SELECTOR PLAN 2
Likely embolic   Long term AC when deemed safe from neuro standpoint  lasix 40 mg IV daily for now as she appears volume overloaded

## 2019-02-08 NOTE — CONSULT NOTE ADULT - SUBJECTIVE AND OBJECTIVE BOX
88yF was admitted on 02-03    Patient is a 88y old  Female who presents with a chief complaint of weakness     HPI:  88 year old RHD  female with a  h/o CAD s/p stents + CABG, DM, HTN, aortic valve replacement, and pacemaker who presented 2/3/19 as a transfer to [facility] from [facility] for stroke rescue. Outside the therapeutic window for tPA at [facility]. CTA showed evidence of a L M1 occlusion so she was transferred to [facility] and code stroke called upon arrival. Pt s/p selective cerebral angiography for left MCA occlusion, TICI 3 on 2/3/19 with Dr Mulligan. Extubated 2/5/19.   A1c 6.6 LDL 55 HDL 37 L  Embolic w/u ongoing.   Patient currently NPO with TF. Lopressor increased by cardiology for tachycardia.     REVIEW OF SYSTEMS  Constitutional - No fever, No weight loss, No fatigue  HEENT - No eye pain, No visual disturbances, No difficulty hearing, No tinnitus, No vertigo, No neck pain  Respiratory - No cough, No wheezing, No shortness of breath  Cardiovascular - No chest pain, No palpitations  Gastrointestinal - No abdominal pain, No nausea, No vomiting, No diarrhea, No constipation  Genitourinary - No dysuria, No frequency, No hematuria, No incontinence  Neurological - No headaches, No memory loss, No loss of strength, No numbness, No tremors  Skin - No itching, No rashes, No lesions   Endocrine - No temperature intolerance  Musculoskeletal - No joint pain, No joint swelling, No muscle pain  Psychiatric - No depression, No anxiety    PAST MEDICAL & SURGICAL HISTORY  Valvular disease  Atrial flutter  Diabetes  Obesity  Hyperlipidemia  HTN (hypertension)  CAD (coronary artery disease) S/P coronary artery stent placement S/P CABG (coronary artery bypass graft)  Artificial pacemaker  Aortic valve replaced  S/P cholecystectomy    FAMILY HISTORY   Family history of cerebrovascular accident (CVA)  Family history of hypertension    SOCIAL HISTORY  Smoking - Denied  EtOH - Denied   Drugs - Denied    FUNCTIONAL HISTORY  Pt resides in  with 0 CATHRYN; grandson lives upstairs & dtr lives next door; PTA pt was independent with mobility and ADLs, ambulatory with cane; also owns RW.	    CURRENT FUNCTIONAL STATUS    OT: Pt required mod Ax1 for supine to sit transfer. Pt required max Ax1 for sit to stand transfer and bed to chair transfer. Pt required max Ax1 for lower body dressing.    Bed Mobility: Rolling/Turning:     · Level of [place]	moderate assist (50% patients effort)	  · Physical Assist/Nonphysical Assist	1 person assist; verbal cues; nonverbal cues (demo/gestures)	    Bed Mobility: Scooting/Bridging:     · Level of [place]	moderate assist (50% patients effort)	  · Physical Assist/Nonphysical Assist	1 person assist; nonverbal cues (demo/gestures); verbal cues	    Transfer: Sit to Stand:     · Level of [place]	TBD	    Gait Skills:     · Level of [place]	TBD	    Stair Negotiation:     · Level of [place]	unable to perform	    RECENT LABS/IMAGING  CBC Full  -  ( 08 Feb 2019 00:22 )  WBC Count : 5.8 K/uL  Hemoglobin : 12.5 g/dL  Hematocrit : 36.8 %  Platelet Count - Automated : 186 K/uL  Mean Cell Volume : 90.0 fl  Mean Cell Hemoglobin : 30.5 pg  Mean Cell Hemoglobin Concentration : 33.9 gm/dL  Auto Neutrophil # : x  Auto Lymphocyte # : x  Auto Monocyte # : x  Auto Eosinophil # : x  Auto Basophil # : x  Auto Neutrophil % : x  Auto Lymphocyte % : x  Auto Monocyte % : x  Auto Eosinophil % : x  Auto Basophil % : x    02-08    141  |  107  |  12  ----------------------------<  164<H>  3.8   |  20<L>  |  0.57    Ca    8.5      08 Feb 2019 00:22    ALLERGIES NKA    MEDICATIONS  (STANDING):  aspirin  chewable 81 milliGRAM(s) Oral daily  dextrose 5%. 1000 milliLiter(s) (50 mL/Hr) IV Continuous <Continuous>  dextrose 50% Injectable 12.5 Gram(s) IV Push once  dextrose 50% Injectable 25 Gram(s) IV Push once  dextrose 50% Injectable 25 Gram(s) IV Push once  docusate sodium Liquid 100 milliGRAM(s) Oral two times a day  enoxaparin Injectable 40 milliGRAM(s) SubCutaneous <User Schedule>  famotidine    Tablet 20 milliGRAM(s) Oral two times a day  insulin regular  human corrective regimen sliding scale   SubCutaneous every 6 hours  metoprolol tartrate 75 milliGRAM(s) Oral two times a day  senna Syrup 20 milliLiter(s) Oral at bedtime  simvastatin 40 milliGRAM(s) Oral at bedtime    MEDICATIONS  (PRN):  acetaminophen    Suspension .. 650 milliGRAM(s) Oral every 6 hours PRN Temp greater or equal to 38C (100.4F), Mild Pain (1 - 3)  dextrose 40% Gel 15 Gram(s) Oral once PRN Blood Glucose LESS THAN 70 milliGRAM(s)/deciLiter  glucagon  Injectable 1 milliGRAM(s) IntraMuscular once PRN Glucose <70 milliGRAM(s)/deciLiter  levalbuterol Inhalation 0.63 milliGRAM(s) Inhalation every 6 hours PRN SOB/Wheezing  nystatin Powder 1 Application(s) Topical two times a day PRN dermatitis    Vital Signs Last 24 Hrs  T(C): 36.9 (08 Feb 2019 08:49), Max: 37.2 (07 Feb 2019 19:36)  T(F): 98.4 (08 Feb 2019 08:49), Max: 99 (07 Feb 2019 19:36)  HR: 114 (08 Feb 2019 08:49) (110 - 115)  BP: 150/125 (08 Feb 2019 08:49) (137/78 - 177/78)  BP(mean): 131 (08 Feb 2019 08:49) (94 - 131)  RR: 24 (08 Feb 2019 08:49) (21 - 24)  SpO2: 100% (08 Feb 2019 08:49) (93% - 100%)    PHYSICAL EXAM  Constitutional - NAD, Comfortable  HEENT - NCAT, EOMI  Neck - Supple, No limited ROM  Chest - CTA bilaterally, No wheeze, No rhonchi, No crackles  Cardiovascular - RRR, S1S2, No murmurs  Abdomen - BS+, Soft, NTND  Extremities - No C/C/E, No calf tenderness   Neurologic Exam -                    Cognitive - Awake, Alert, AAO to self, place, date, year, situation     Communication - Fluent, No dysarthria     Cranial Nerves - CN 2-12 intact     Motor - No focal deficits                    LEFT    UE - ShAB 5/5, EF 5/5, EE 5/5, WE 5/5,  5/5                    RIGHT UE - ShAB 5/5, EF 5/5, EE 5/5, WE 5/5,  5/5                    LEFT    LE - HF 5/5, KE 5/5, DF 5/5, PF 5/5                    RIGHT LE - HF 5/5, KE 5/5, DF 5/5, PF 5/5        Sensory - Intact to LT     Reflexes - DTR Intact, No primitive reflexive     Coordination - FTN intact     OculoVestibular - No saccades, No nystagmus, VOR         Balance - WNL Static  Psychiatric - Mood stable, Affect WNL    ------------------------------------------------------------------------------------------------  ASSESSMENT/PLAN  88yFemale with functional deficits after  Pain - Tylenol  DVT PPX - SCDs  Rehab - 88yF was admitted on 02-03    Patient is a 88y old  Female who presents with a chief complaint of weakness     HPI:  88 year old RHD  female with a  h/o CAD s/p stents + CABG, DM, HTN, aortic valve replacement, and pacemaker who presented 2/3/19 as a transfer to [facility] from [facility] for stroke rescue. Outside the therapeutic window for tPA at [facility]. CTA showed evidence of a L M1 occlusion so she was transferred to [facility] and code stroke called upon arrival. Pt s/p selective cerebral angiography for left MCA occlusion, TICI 3 on 2/3/19 with Dr Mulligan. Extubated 2/5/19.   A1c 6.6 LDL 55 HDL 37 L  Embolic w/u ongoing.   Patient currently NPO with TF. Lopressor increased by cardiology for tachycardia.     REVIEW OF SYSTEMS  Constitutional - No fever, No weight loss, No fatigue  HEENT - No eye pain, No visual disturbances, No difficulty hearing, No tinnitus, No vertigo, No neck pain  Respiratory - No cough, No wheezing, No shortness of breath  Cardiovascular - No chest pain, No palpitations  Gastrointestinal - No abdominal pain, No nausea, No vomiting, No diarrhea, No constipation  Genitourinary - No dysuria, No frequency, No hematuria, No incontinence  Neurological - (+) headaches, No memory loss, No loss of strength, No numbness, No tremors  Skin - No itching, No rashes, No lesions   Endocrine - No temperature intolerance  Musculoskeletal - No joint pain, No joint swelling, No muscle pain  Psychiatric - No depression, No anxiety    PAST MEDICAL & SURGICAL HISTORY  Valvular disease  Atrial flutter  Diabetes  Obesity  Hyperlipidemia  HTN (hypertension)  CAD (coronary artery disease) S/P coronary artery stent placement S/P CABG (coronary artery bypass graft)  Artificial pacemaker  Aortic valve replaced  S/P cholecystectomy    FAMILY HISTORY   Family history of cerebrovascular accident (CVA)  Family history of hypertension    SOCIAL HISTORY  Smoking - Denied  EtOH - Denied   Drugs - Denied    FUNCTIONAL HISTORY  Pt resides in  with 0 CATHRYN; grandson lives upstairs & dtr lives next door; PTA pt was independent with mobility and ADLs, ambulatory with cane; also owns RW.	    CURRENT FUNCTIONAL STATUS    OT: Pt required mod Ax1 for supine to sit transfer. Pt required max Ax1 for sit to stand transfer and bed to chair transfer. Pt required max Ax1 for lower body dressing.    Bed Mobility: Rolling/Turning:     · Level of [place]	moderate assist (50% patients effort)	  · Physical Assist/Nonphysical Assist	1 person assist; verbal cues; nonverbal cues (demo/gestures)	    Bed Mobility: Scooting/Bridging:     · Level of [place]	moderate assist (50% patients effort)	  · Physical Assist/Nonphysical Assist	1 person assist; nonverbal cues (demo/gestures); verbal cues	    Transfer: Sit to Stand:     · Level of [place]	TBD	    Gait Skills:     · Level of [place]	TBD	    Stair Negotiation:     · Level of [place]	unable to perform	    RECENT LABS/IMAGING  CBC Full  -  ( 08 Feb 2019 00:22 )  WBC Count : 5.8 K/uL  Hemoglobin : 12.5 g/dL  Hematocrit : 36.8 %  Platelet Count - Automated : 186 K/uL  Mean Cell Volume : 90.0 fl  Mean Cell Hemoglobin : 30.5 pg  Mean Cell Hemoglobin Concentration : 33.9 gm/dL  Auto Neutrophil # : x  Auto Lymphocyte # : x  Auto Monocyte # : x  Auto Eosinophil # : x  Auto Basophil # : x  Auto Neutrophil % : x  Auto Lymphocyte % : x  Auto Monocyte % : x  Auto Eosinophil % : x  Auto Basophil % : x    02-08    141  |  107  |  12  ----------------------------<  164<H>  3.8   |  20<L>  |  0.57    Ca    8.5      08 Feb 2019 00:22    ALLERGIES NKA    MEDICATIONS  (STANDING):  aspirin  chewable 81 milliGRAM(s) Oral daily  dextrose 5%. 1000 milliLiter(s) (50 mL/Hr) IV Continuous <Continuous>  dextrose 50% Injectable 12.5 Gram(s) IV Push once  dextrose 50% Injectable 25 Gram(s) IV Push once  dextrose 50% Injectable 25 Gram(s) IV Push once  docusate sodium Liquid 100 milliGRAM(s) Oral two times a day  enoxaparin Injectable 40 milliGRAM(s) SubCutaneous <User Schedule>  famotidine    Tablet 20 milliGRAM(s) Oral two times a day  insulin regular  human corrective regimen sliding scale   SubCutaneous every 6 hours  metoprolol tartrate 75 milliGRAM(s) Oral two times a day  senna Syrup 20 milliLiter(s) Oral at bedtime  simvastatin 40 milliGRAM(s) Oral at bedtime    MEDICATIONS  (PRN):  acetaminophen    Suspension .. 650 milliGRAM(s) Oral every 6 hours PRN Temp greater or equal to 38C (100.4F), Mild Pain (1 - 3)  dextrose 40% Gel 15 Gram(s) Oral once PRN Blood Glucose LESS THAN 70 milliGRAM(s)/deciLiter  glucagon  Injectable 1 milliGRAM(s) IntraMuscular once PRN Glucose <70 milliGRAM(s)/deciLiter  levalbuterol Inhalation 0.63 milliGRAM(s) Inhalation every 6 hours PRN SOB/Wheezing  nystatin Powder 1 Application(s) Topical two times a day PRN dermatitis    Vital Signs Last 24 Hrs  T(C): 36.9 (08 Feb 2019 08:49), Max: 37.2 (07 Feb 2019 19:36)  T(F): 98.4 (08 Feb 2019 08:49), Max: 99 (07 Feb 2019 19:36)  HR: 114 (08 Feb 2019 08:49) (110 - 115)  BP: 150/125 (08 Feb 2019 08:49) (137/78 - 177/78)  BP(mean): 131 (08 Feb 2019 08:49) (94 - 131)  RR: 24 (08 Feb 2019 08:49) (21 - 24)  SpO2: 100% (08 Feb 2019 08:49) (93% - 100%)    PHYSICAL EXAM  Constitutional - NAD, Comfortable  HEENT - NCAT, EOMI  Neck - Supple, No limited ROM  Chest - CTA bilaterally, No wheeze  Cardiovascular - S1S2  Abdomen - BS+, Soft, NTND  Extremities - No calf tenderness   Neurologic Exam -                    Cognitive - Awake, Alert, AAO to self, place, date, year, situation     Communication - severe dysarthria, possible expressive aphasia      Cranial Nerves - CN 2-12 intact     Motor -                     LEFT    UE - ShAB 5/5, EF 5/5, EE 5/5, WE 5/5,  5/5                    RIGHT UE - ShAB 0/5, EF 0/5, EE 0/5, WE 0/5,  0/5                    LEFT    LE - HF 5/5, KE 5/5, DF 5/5, PF 5/5                    RIGHT LE - HF 0/5, KE 0/5, DF 0/5, PF 0/5     Right sided hypertonia/flexor tone beginning to develop.   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    Continue bedside PT/OT/SLP for speech and swallow, bed mobility/transfers/ambulation/ADLs   Pain - Tylenol PRN  Bowel regimen- senna, colace  Skin- turn and position q2hrs to maintain skin integrity.  DVT PPX - SCDs  Rehab -   Will continue to follow for ongoing rehab needs and recommendations. 88yF was admitted on 02-03    Patient is a 88y old  Female who presents with a chief complaint of weakness     HPI:  88 year old RHD  female with a  h/o CAD s/p stents + CABG, DM, HTN, aortic valve replacement, and pacemaker who presented 2/3/19 as a transfer to [facility] from [facility] for stroke rescue. Outside the therapeutic window for tPA at [facility]. CTA showed evidence of a L M1 occlusion so she was transferred to [facility] and code stroke called upon arrival. Pt s/p selective cerebral angiography for left MCA occlusion, TICI 3 on 2/3/19 with Dr Mulligan. Extubated 2/5/19.   A1c 6.6 LDL 55 HDL 37 L  Embolic w/u ongoing.   Patient currently NPO with TF. Lopressor increased by cardiology for tachycardia.     REVIEW OF SYSTEMS  Constitutional - No fever, No weight loss, No fatigue  HEENT - No eye pain, No visual disturbances, No difficulty hearing, No tinnitus, No vertigo, No neck pain  Respiratory - No cough, No wheezing, No shortness of breath  Cardiovascular - No chest pain, No palpitations  Gastrointestinal - No abdominal pain, No nausea, No vomiting, No diarrhea, No constipation  Genitourinary - No dysuria, No frequency, No hematuria, No incontinence  Neurological - (+) headaches, No memory loss, No loss of strength, No numbness, No tremors  Skin - No itching, No rashes, No lesions   Endocrine - No temperature intolerance  Musculoskeletal - No joint pain, No joint swelling, No muscle pain  Psychiatric - No depression, No anxiety    PAST MEDICAL & SURGICAL HISTORY  Valvular disease  Atrial flutter  Diabetes  Obesity  Hyperlipidemia  HTN (hypertension)  CAD (coronary artery disease) S/P coronary artery stent placement S/P CABG (coronary artery bypass graft)  Artificial pacemaker  Aortic valve replaced  S/P cholecystectomy    FAMILY HISTORY   Family history of cerebrovascular accident (CVA)  Family history of hypertension    SOCIAL HISTORY  Smoking - Denied  EtOH - Denied   Drugs - Denied    FUNCTIONAL HISTORY  Pt resides in  with 0 [place]HRYN; grandson lives upstairs & dtr lives next door; PTA pt was independent with mobility and ADLs, ambulatory with cane; also owns RW.	    CURRENT FUNCTIONAL STATUS    OT: Pt required mod Ax1 for supine to sit transfer. Pt required max Ax1 for sit to stand transfer and bed to chair transfer. Pt required max Ax1 for lower body dressing.    Bed Mobility: Rolling/Turning:     · Level of [place]	moderate assist (50% patients effort)	  · Physical Assist/Nonphysical Assist	1 person assist; verbal cues; nonverbal cues (demo/gestures)	    Bed Mobility: Scooting/Bridging:     · Level of [place]	moderate assist (50% patients effort)	  · Physical Assist/Nonphysical Assist	1 person assist; nonverbal cues (demo/gestures); verbal cues	    Transfer: Sit to Stand:     · Level of [place]	TBD	    Gait Skills:     · Level of [place]	TBD	    Stair Negotiation:     · Level of [place]	unable to perform	    RECENT LABS/IMAGING  CBC Full  -  ( 08 Feb 2019 00:22 )  WBC Count : 5.8 K/uL  Hemoglobin : 12.5 g/dL  Hematocrit : 36.8 %  Platelet Count - Automated : 186 K/uL  Mean Cell Volume : 90.0 fl  Mean Cell Hemoglobin : 30.5 pg  Mean Cell Hemoglobin Concentration : 33.9 gm/dL  Auto Neutrophil # : x  Auto Lymphocyte # : x  Auto Monocyte # : x  Auto Eosinophil # : x  Auto Basophil # : x  Auto Neutrophil % : x  Auto Lymphocyte % : x  Auto Monocyte % : x  Auto Eosinophil % : x  Auto Basophil % : x    02-08    141  |  107  |  12  ----------------------------<  164<H>  3.8   |  20<L>  |  0.57    Ca    8.5      08 Feb 2019 00:22    ALLERGIES NKA    MEDICATIONS  (STANDING):  aspirin  chewable 81 milliGRAM(s) Oral daily  dextrose 5%. 1000 milliLiter(s) (50 mL/Hr) IV Continuous <Continuous>  dextrose 50% Injectable 12.5 Gram(s) IV Push once  dextrose 50% Injectable 25 Gram(s) IV Push once  dextrose 50% Injectable 25 Gram(s) IV Push once  docusate sodium Liquid 100 milliGRAM(s) Oral two times a day  enoxaparin Injectable 40 milliGRAM(s) SubCutaneous <User Schedule>  famotidine    Tablet 20 milliGRAM(s) Oral two times a day  insulin regular  human corrective regimen sliding scale   SubCutaneous every 6 hours  metoprolol tartrate 75 milliGRAM(s) Oral two times a day  senna Syrup 20 milliLiter(s) Oral at bedtime  simvastatin 40 milliGRAM(s) Oral at bedtime    MEDICATIONS  (PRN):  acetaminophen    Suspension .. 650 milliGRAM(s) Oral every 6 hours PRN Temp greater or equal to 38C (100.4F), Mild Pain (1 - 3)  dextrose 40% Gel 15 Gram(s) Oral once PRN Blood Glucose LESS THAN 70 milliGRAM(s)/deciLiter  glucagon  Injectable 1 milliGRAM(s) IntraMuscular once PRN Glucose <70 milliGRAM(s)/deciLiter  levalbuterol Inhalation 0.63 milliGRAM(s) Inhalation every 6 hours PRN SOB/Wheezing  nystatin Powder 1 Application(s) Topical two times a day PRN dermatitis    Vital Signs Last 24 Hrs  T(C): 36.9 (08 Feb 2019 08:49), Max: 37.2 (07 Feb 2019 19:36)  T(F): 98.4 (08 Feb 2019 08:49), Max: 99 (07 Feb 2019 19:36)  HR: 114 (08 Feb 2019 08:49) (110 - 115)  BP: 150/125 (08 Feb 2019 08:49) (137/78 - 177/78)  BP(mean): 131 (08 Feb 2019 08:49) (94 - 131)  RR: 24 (08 Feb 2019 08:49) (21 - 24)  SpO2: 100% (08 Feb 2019 08:49) (93% - 100%)    PHYSICAL EXAM  Constitutional - NAD, Comfortable  HEENT - NCAT, EOMI  Neck - Supple, No limited ROM  Chest - coarse breath sounds   Cardiovascular - S1S2  Abdomen - BS+, Soft, NTND  Extremities - No calf tenderness   Neurologic Exam -                    Cognitive - Awake, Alert, AAO to self, place, date, year, situation     Communication - severe dysarthria, possible expressive aphasia      Cranial Nerves - CN 2-12 intact     Motor -                     LEFT    UE - ShAB 5/5, EF 5/5, EE 5/5, WE 5/5,  5/5                    RIGHT UE - ShAB 0/5, EF 0/5, EE 0/5, WE 0/5,  0/5                    LEFT    LE - HF 5/5, KE 5/5, DF 5/5, PF 5/5                    RIGHT LE - HF 0/5, KE 0/5, DF 0/5, PF 0/5     Right sided hypertonia/flexor tone beginning to develop.   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    Continue bedside PT/OT/SLP for speech and swallow, bed mobility/transfers/ambulation/ADLs   Pain - Tylenol PRN  Bowel regimen- senna, colace  Skin- turn and position q2hrs to maintain skin integrity.  DVT PPX - SCDs  Rehab -   Will continue to follow for ongoing rehab needs and recommendations. 88yF was admitted on 02-03    Patient is a 88y old  Female who presents with a chief complaint of weakness     HPI:  88 year old RHD  female with a  h/o CAD s/p stents + CABG, DM, HTN, aortic valve replacement, and pacemaker who presented 2/3/19 as a transfer to [facility] from [facility] for stroke rescue. Outside the therapeutic window for tPA at [facility]. CTA showed evidence of a L M1 occlusion so she was transferred to [facility] and code stroke called upon arrival. Pt s/p selective cerebral angiography for left MCA occlusion, TICI 3 on 2/3/19 with Dr Mulligan. Extubated 2/5/19.   A1c 6.6 LDL 55 HDL 37 L  Embolic w/u ongoing.   Patient currently NPO with TF. Lopressor increased by cardiology for tachycardia.     REVIEW OF SYSTEMS  Constitutional - No fever, No weight loss, No fatigue  HEENT - No eye pain, No visual disturbances, No difficulty hearing, No tinnitus, No vertigo, No neck pain  Respiratory - No cough, No wheezing, No shortness of breath  Cardiovascular - No chest pain, No palpitations  Gastrointestinal - No abdominal pain, No nausea, No vomiting, No diarrhea, No constipation  Genitourinary - No dysuria, No frequency, No hematuria, No incontinence  Neurological - (+) headaches, No memory loss, No loss of strength, No numbness, No tremors  Skin - No itching, No rashes, No lesions   Endocrine - No temperature intolerance  Musculoskeletal - No joint pain, No joint swelling, No muscle pain  Psychiatric - No depression, No anxiety    PAST MEDICAL & SURGICAL HISTORY  Valvular disease  Atrial flutter  Diabetes  Obesity  Hyperlipidemia  HTN (hypertension)  CAD (coronary artery disease) S/P coronary artery stent placement S/P CABG (coronary artery bypass graft)  Artificial pacemaker  Aortic valve replaced  S/P cholecystectomy    FAMILY HISTORY   Family history of cerebrovascular accident (CVA)  Family history of hypertension    SOCIAL HISTORY  Smoking - Denied  EtOH - Denied   Drugs - Denied    FUNCTIONAL HISTORY  Pt resides in  with 0 [place]HRYN; grandson lives upstairs & dtr lives next door; PTA pt was independent with mobility and ADLs, ambulatory with cane; also owns RW.	    CURRENT FUNCTIONAL STATUS    OT: Pt required mod Ax1 for supine to sit transfer. Pt required max Ax1 for sit to stand transfer and bed to chair transfer. Pt required max Ax1 for lower body dressing.    Bed Mobility: Rolling/Turning:     · Level of [place]	moderate assist (50% patients effort)	  · Physical Assist/Nonphysical Assist	1 person assist; verbal cues; nonverbal cues (demo/gestures)	    Bed Mobility: Scooting/Bridging:     · Level of [place]	moderate assist (50% patients effort)	  · Physical Assist/Nonphysical Assist	1 person assist; nonverbal cues (demo/gestures); verbal cues	    Transfer: Sit to Stand:     · Level of [place]	TBD	    Gait Skills:     · Level of [place]	TBD	    Stair Negotiation:     · Level of [place]	unable to perform	    RECENT LABS/IMAGING  CBC Full  -  ( 08 Feb 2019 00:22 )  WBC Count : 5.8 K/uL  Hemoglobin : 12.5 g/dL  Hematocrit : 36.8 %  Platelet Count - Automated : 186 K/uL  Mean Cell Volume : 90.0 fl  Mean Cell Hemoglobin : 30.5 pg  Mean Cell Hemoglobin Concentration : 33.9 gm/dL  Auto Neutrophil # : x  Auto Lymphocyte # : x  Auto Monocyte # : x  Auto Eosinophil # : x  Auto Basophil # : x  Auto Neutrophil % : x  Auto Lymphocyte % : x  Auto Monocyte % : x  Auto Eosinophil % : x  Auto Basophil % : x    02-08    141  |  107  |  12  ----------------------------<  164<H>  3.8   |  20<L>  |  0.57    Ca    8.5      08 Feb 2019 00:22    ALLERGIES NKA    MEDICATIONS  (STANDING):  aspirin  chewable 81 milliGRAM(s) Oral daily  dextrose 5%. 1000 milliLiter(s) (50 mL/Hr) IV Continuous <Continuous>  dextrose 50% Injectable 12.5 Gram(s) IV Push once  dextrose 50% Injectable 25 Gram(s) IV Push once  dextrose 50% Injectable 25 Gram(s) IV Push once  docusate sodium Liquid 100 milliGRAM(s) Oral two times a day  enoxaparin Injectable 40 milliGRAM(s) SubCutaneous <User Schedule>  famotidine    Tablet 20 milliGRAM(s) Oral two times a day  insulin regular  human corrective regimen sliding scale   SubCutaneous every 6 hours  metoprolol tartrate 75 milliGRAM(s) Oral two times a day  senna Syrup 20 milliLiter(s) Oral at bedtime  simvastatin 40 milliGRAM(s) Oral at bedtime    MEDICATIONS  (PRN):  acetaminophen    Suspension .. 650 milliGRAM(s) Oral every 6 hours PRN Temp greater or equal to 38C (100.4F), Mild Pain (1 - 3)  dextrose 40% Gel 15 Gram(s) Oral once PRN Blood Glucose LESS THAN 70 milliGRAM(s)/deciLiter  glucagon  Injectable 1 milliGRAM(s) IntraMuscular once PRN Glucose <70 milliGRAM(s)/deciLiter  levalbuterol Inhalation 0.63 milliGRAM(s) Inhalation every 6 hours PRN SOB/Wheezing  nystatin Powder 1 Application(s) Topical two times a day PRN dermatitis    Vital Signs Last 24 Hrs  T(C): 36.9 (08 Feb 2019 08:49), Max: 37.2 (07 Feb 2019 19:36)  T(F): 98.4 (08 Feb 2019 08:49), Max: 99 (07 Feb 2019 19:36)  HR: 114 (08 Feb 2019 08:49) (110 - 115)  BP: 150/125 (08 Feb 2019 08:49) (137/78 - 177/78)  BP(mean): 131 (08 Feb 2019 08:49) (94 - 131)  RR: 24 (08 Feb 2019 08:49) (21 - 24)  SpO2: 100% (08 Feb 2019 08:49) (93% - 100%)    PHYSICAL EXAM  Constitutional - NAD, Comfortable  HEENT - NCAT, EOMI  Neck - Supple, No limited ROM  Chest - coarse breath sounds   Cardiovascular - S1S2  Abdomen - BS+, Soft, NTND  Extremities - No calf tenderness   Neurologic Exam -                    Cognitive - Awake, Alert, AAO to self, place, date, year, situation     Communication - severe dysarthria, possible expressive aphasia      Cranial Nerves - CN 2-12 intact     Motor -                     LEFT    UE - ShAB 5/5, EF 5/5, EE 5/5, WE 5/5,  5/5                    RIGHT UE - ShAB 0/5, EF 0/5, EE 0/5, WE 0/5,  0/5--> 2/5 in EF/EE/WE/ with improved effort in the afternoon                    LEFT    LE - HF 5/5, KE 5/5, DF 5/5, PF 5/5                    RIGHT LE - HF 0/5, KE 0/5, DF 0/5, PF 0/5   -->1/5 MMT with improved effort in the afternoon  Right sided hypertonia/flexor tone beginning to develop.   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    Continue bedside PT/OT/SLP for speech and swallow, bed mobility/transfers/ambulation/ADLs   Pain - Tylenol PRN  Bowel regimen- senna, colace  Skin- turn and position q2hrs to maintain skin integrity.  DVT PPX - SCDs  Rehab -   Will continue to follow for ongoing rehab needs and recommendations.

## 2019-02-08 NOTE — DISCHARGE NOTE ADULT - CARE PROVIDER_API CALL
Cassie Jacobson (NP; RN)  NP in Family Health  [address] [facility], [address]  [place] [address]  Phone: [phone]  Fax: [phone]  Follow Up Time:     Montez Ackerman (DO)  Cardiology; Internal Medicine  [address] [facility], [address]  [place] [address]  Phone: [phone]  Fax: [phone]  Follow Up Time:     Yang Pedroza (DO)  Gastroenterology; Internal Medicine  [address] [place] [address]  Phone: [phone]  Fax: [phone]  Follow Up Time: Montez Ackerman (DO)  Cardiology; Internal Medicine  [address], [address]  [place] [address]  Phone: [phone]  Fax: [phone]  Follow Up Time:     Yang Pedroza (DO)  Gastroenterology; Internal Medicine  [address] [place] [address]  Phone: [phone]  Fax: [phone]  Follow Up Time:     Chyu Mulligan)  Neurology; Vascular Neurology  [address], [address] [place] [address]  Phone: [phone]  Fax: [phone]  Follow Up Time:

## 2019-02-08 NOTE — SWALLOW VFSS/MBS ASSESSMENT ADULT - ORAL PHASE
Reduced anterior - posterior transport/Incomplete tongue to palate contact/Delayed oral transit time/Residue in oral cavity Delayed oral transit time/Reduced anterior - posterior transport/Incomplete tongue to palate contact/Residue in oral cavity Residue in oral cavity/Reduced anterior - posterior transport/Incomplete tongue to palate contact/Delayed oral transit time/Uncontrolled bolus / spillover in young-pharynx Delayed oral transit time/Residue in oral cavity/Reduced anterior - posterior transport/Incomplete tongue to palate contact/Uncontrolled bolus / spillover in young-pharynx Delayed oral transit time/Reduced anterior - posterior transport/Incomplete tongue to palate contact/Uncontrolled bolus / spillover in young-pharynx/Residue in oral cavity/Uncontrolled bolus / spillover in hypopharynx Uncontrolled bolus / spillover in young-pharynx/Delayed oral transit time/Residue in oral cavity/Incomplete tongue to palate contact/Reduced anterior - posterior transport Reduced anterior - posterior transport/Incomplete tongue to palate contact/Residue in oral cavity/Delayed oral transit time

## 2019-02-09 LAB
ANION GAP SERPL CALC-SCNC: 15 MMOL/L — SIGNIFICANT CHANGE UP (ref 5–17)
BUN SERPL-MCNC: 11 MG/DL — SIGNIFICANT CHANGE UP (ref 7–23)
CALCIUM SERPL-MCNC: 9.4 MG/DL — SIGNIFICANT CHANGE UP (ref 8.4–10.5)
CHLORIDE SERPL-SCNC: 100 MMOL/L — SIGNIFICANT CHANGE UP (ref 96–108)
CO2 SERPL-SCNC: 24 MMOL/L — SIGNIFICANT CHANGE UP (ref 22–31)
CREAT SERPL-MCNC: 0.56 MG/DL — SIGNIFICANT CHANGE UP (ref 0.5–1.3)
GLUCOSE SERPL-MCNC: 188 MG/DL — HIGH (ref 70–99)
HCT VFR BLD CALC: 38.8 % — SIGNIFICANT CHANGE UP (ref 34.5–45)
HGB BLD-MCNC: 12.9 G/DL — SIGNIFICANT CHANGE UP (ref 11.5–15.5)
MCHC RBC-ENTMCNC: 29.8 PG — SIGNIFICANT CHANGE UP (ref 27–34)
MCHC RBC-ENTMCNC: 33.4 GM/DL — SIGNIFICANT CHANGE UP (ref 32–36)
MCV RBC AUTO: 89.2 FL — SIGNIFICANT CHANGE UP (ref 80–100)
PLATELET # BLD AUTO: 210 K/UL — SIGNIFICANT CHANGE UP (ref 150–400)
POTASSIUM SERPL-MCNC: 3.7 MMOL/L — SIGNIFICANT CHANGE UP (ref 3.5–5.3)
POTASSIUM SERPL-SCNC: 3.7 MMOL/L — SIGNIFICANT CHANGE UP (ref 3.5–5.3)
RBC # BLD: 4.35 M/UL — SIGNIFICANT CHANGE UP (ref 3.8–5.2)
RBC # FLD: 12.8 % — SIGNIFICANT CHANGE UP (ref 10.3–14.5)
SODIUM SERPL-SCNC: 139 MMOL/L — SIGNIFICANT CHANGE UP (ref 135–145)
WBC # BLD: 6.9 K/UL — SIGNIFICANT CHANGE UP (ref 3.8–10.5)
WBC # FLD AUTO: 6.9 K/UL — SIGNIFICANT CHANGE UP (ref 3.8–10.5)

## 2019-02-09 PROCEDURE — 99233 SBSQ HOSP IP/OBS HIGH 50: CPT

## 2019-02-09 RX ORDER — AMIODARONE HYDROCHLORIDE 400 MG/1
TABLET ORAL
Qty: 0 | Refills: 0 | Status: DISCONTINUED | OUTPATIENT
Start: 2019-02-09 | End: 2019-02-09

## 2019-02-09 RX ORDER — METOPROLOL TARTRATE 50 MG
100 TABLET ORAL
Qty: 0 | Refills: 0 | Status: DISCONTINUED | OUTPATIENT
Start: 2019-02-09 | End: 2019-02-09

## 2019-02-09 RX ORDER — AMIODARONE HYDROCHLORIDE 400 MG/1
200 TABLET ORAL DAILY
Qty: 0 | Refills: 0 | Status: DISCONTINUED | OUTPATIENT
Start: 2019-02-09 | End: 2019-02-10

## 2019-02-09 RX ORDER — AMIODARONE HYDROCHLORIDE 400 MG/1
400 TABLET ORAL EVERY 8 HOURS
Qty: 0 | Refills: 0 | Status: DISCONTINUED | OUTPATIENT
Start: 2019-02-09 | End: 2019-02-09

## 2019-02-09 RX ADMIN — INSULIN HUMAN 2: 100 INJECTION, SOLUTION SUBCUTANEOUS at 18:23

## 2019-02-09 RX ADMIN — AMIODARONE HYDROCHLORIDE 200 MILLIGRAM(S): 400 TABLET ORAL at 22:44

## 2019-02-09 RX ADMIN — ENOXAPARIN SODIUM 40 MILLIGRAM(S): 100 INJECTION SUBCUTANEOUS at 16:28

## 2019-02-09 RX ADMIN — SIMVASTATIN 40 MILLIGRAM(S): 20 TABLET, FILM COATED ORAL at 22:01

## 2019-02-09 RX ADMIN — INSULIN HUMAN 2: 100 INJECTION, SOLUTION SUBCUTANEOUS at 12:17

## 2019-02-09 RX ADMIN — INSULIN HUMAN 2: 100 INJECTION, SOLUTION SUBCUTANEOUS at 06:20

## 2019-02-09 RX ADMIN — FAMOTIDINE 20 MILLIGRAM(S): 10 INJECTION INTRAVENOUS at 16:28

## 2019-02-09 RX ADMIN — Medication 81 MILLIGRAM(S): at 12:12

## 2019-02-09 RX ADMIN — SENNA PLUS 20 MILLILITER(S): 8.6 TABLET ORAL at 22:53

## 2019-02-09 RX ADMIN — INSULIN HUMAN 2: 100 INJECTION, SOLUTION SUBCUTANEOUS at 23:18

## 2019-02-09 RX ADMIN — Medication 100 MILLIGRAM(S): at 06:17

## 2019-02-09 RX ADMIN — Medication 75 MILLIGRAM(S): at 06:17

## 2019-02-09 RX ADMIN — Medication 100 MILLIGRAM(S): at 16:28

## 2019-02-09 RX ADMIN — FAMOTIDINE 20 MILLIGRAM(S): 10 INJECTION INTRAVENOUS at 06:17

## 2019-02-09 RX ADMIN — Medication 75 MILLIGRAM(S): at 16:28

## 2019-02-09 RX ADMIN — Medication 20 MILLIGRAM(S): at 06:19

## 2019-02-09 RX ADMIN — INSULIN HUMAN 2: 100 INJECTION, SOLUTION SUBCUTANEOUS at 01:42

## 2019-02-09 NOTE — PROGRESS NOTE ADULT - SUBJECTIVE AND OBJECTIVE BOX
THE PATIENT WAS SEEN AND EXAMINED BY ME WITH THE HOUSESTAFF AND STROKE TEAM DURING MORNING ROUNDS.   HPI:  Patient is an 88 year old right handed  female with a history of CAD s/p stents and CABG on aspirin at home, DM, HTN, aortic valve replacement, and pacemaker who presented as transfer from [facility] for stroke rescue. Her last known normal was at approximately 2 pm on 2/3. Robbins was then noted to have left sided weakness and sudden onset inability to speak, so she was brought to [facility]. She was outside the appropriate time window for tPA, so tPA was not given. CTA showed evidence of a L M1 occlusion so she was transferred to [facility] and code stroke called upon arrival. Initial NIHSS at [facility] was 19, MRS 1.    SUBJECTIVE: Noted with sinus tachycardia overnight. No new neurologic complaints.      acetaminophen    Suspension .. 650 milliGRAM(s) Oral every 6 hours PRN  aspirin  chewable 81 milliGRAM(s) Oral daily  dextrose 40% Gel 15 Gram(s) Oral once PRN  dextrose 5%. 1000 milliLiter(s) IV Continuous <Continuous>  dextrose 50% Injectable 12.5 Gram(s) IV Push once  dextrose 50% Injectable 25 Gram(s) IV Push once  dextrose 50% Injectable 25 Gram(s) IV Push once  docusate sodium Liquid 100 milliGRAM(s) Oral two times a day  enoxaparin Injectable 40 milliGRAM(s) SubCutaneous <User Schedule>  famotidine    Tablet 20 milliGRAM(s) Oral two times a day  furosemide   Injectable 20 milliGRAM(s) IV Push daily  glucagon  Injectable 1 milliGRAM(s) IntraMuscular once PRN  insulin regular  human corrective regimen sliding scale   SubCutaneous every 6 hours  labetalol Injectable 10 milliGRAM(s) IV Push every 8 hours PRN  levalbuterol Inhalation 0.63 milliGRAM(s) Inhalation every 6 hours PRN  metoprolol tartrate 75 milliGRAM(s) Oral two times a day  nystatin Powder 1 Application(s) Topical two times a day PRN  senna Syrup 20 milliLiter(s) Oral at bedtime  simvastatin 40 milliGRAM(s) Oral at bedtime    PHYSICAL EXAM:   Vital Signs Last 24 Hrs  T(C): 36.8 (08 Feb 2019 15:33), Max: 36.9 (08 Feb 2019 08:49)  T(F): 98.3 (08 Feb 2019 15:33), Max: 98.4 (08 Feb 2019 08:49)  HR: 98 (09 Feb 2019 08:00) (74 - 122)  BP: 126/100 (09 Feb 2019 08:00) (126/100 - 173/93)  BP(mean): 109 (09 Feb 2019 08:00) (87 - 147)  RR: 19 (09 Feb 2019 08:00) (19 - 25)  SpO2: 97% (09 Feb 2019 08:00) (93% - 100%)    General: No acute distress, tachypnea    HEENT: EOM intact, no blink to threat on right   Abdomen: , nontender  Extremities: No edema    NEUROLOGICAL EXAM:  Mental status: eyes open, awake, attentive to persons at bedside, oriented to name, hypophonic follows simple commands, perseverates at times, IDs objects   Cranial Nerves:  right facial droop, no blink to threat on right   Motor exam: left side moves well against gravity, RUE 3/5 with drift , RLE 2/5   Sensation: Intact to light touch   Coordination/ Gait: gait not assessed     LABS:                        12.9   6.9   )-----------( 210      ( 09 Feb 2019 05:52 )             38.8    02-09    139  |  100  |  11  ----------------------------<  188<H>  3.7   |  24  |  0.56    Ca    9.4      09 Feb 2019 05:52    Hemoglobin A1C, Whole Blood: 6.6 % (02-04 @ 06:11)    IMAGING: Reviewed by me.     CT Head No Cont (02.08.19)  Increasing hypodensity involves the left basal ganglia, internal capsule,   corona radiata, and insula, consistent with an evolving acute left middle   cerebral artery territory infarct. Vague increased areas of density   involve the infarct which may reflect petechial hemorrhagic   transformation, retained contrast from the angiogram procedure, or a   combination of both. There is no focal parenchymal hematoma.    CT Head No Cont (02.05.19)   Previously seen increased attenuation within left sided sulci is   decreased on the current examination, and may represent the presence of   residual intravenous contrast and/or subarachnoid hemorrhage.  No no hydrocephalus or midline shift. No effacement of basal cisterns    (02.03.19)   CT angiography neck: Patent cervical vasculature.  No hemodynamically   significant carotid stenosis or flow-limiting vertebral artery stenosis.    No evidence of dissection. Three-vessel arch.  Vertebral arteries are   codominant.    CT angiography brain:   1.  The M1 segment of the left middle cerebral artery is occluded,   approximately 8 mm distal to its origin.  Multiple proximal M2 branches   of the left MCA are occluded within the sylvian fissure.  There is some   reconstitution of blood flow within distal M2 branches and M3 branches of   the left middle cerebral artery.  CT perfusion is recommended for further   characterization.  2.  Atherosclerotic calcification causes diffuse mild narrowing in the   cavernous and supraclinoid segments of the internal carotid arteries   without flow-limiting stenosis.  3.  Atherosclerotic calcification causes moderate to severe stenosis in   the bilateral intradural vertebral arteries.  Mild luminal irregularity   of the basilar artery without flow-limiting stenosis.  Multiple mild to   moderate stenoses in the proximal P2 segment of the left posterior   cerebral artery.  The right posterior cerebral artery is diffusely small   and irregular, and appears hypoenhancing compared to the contralateral   side.    CT Perfusion w/ Maps w/ IV Cont (02.03.19)   NONCONTRAST HEAD CT SCAN: No CT evidence of acute intracranial   hemorrhage, mass effect or acute territorial infarct.    CT PERFUSION: No evidence of a completed infarct.  Approximately 80 cc   penumbra of at risk tissue in the left MCA vascular territory. THE PATIENT WAS SEEN AND EXAMINED BY ME WITH THE HOUSESTAFF AND STROKE TEAM DURING MORNING ROUNDS.   HPI:  Patient is an 88 year old right handed  female with a history of CAD s/p stents and CABG on aspirin at home, DM, HTN, aortic valve replacement, and pacemaker who presented as transfer from [facility] for stroke rescue. Her last known normal was at approximately 2 pm on 2/3. Robbins was then noted to have left sided weakness and sudden onset inability to speak, so she was brought to [facility]. She was outside the appropriate time window for tPA, so tPA was not given. CTA showed evidence of a L M1 occlusion so she was transferred to [facility] and code stroke called upon arrival. Initial NIHSS at [facility] was 19, MRS 1.    SUBJECTIVE: Noted with sinus tachycardia overnight. No new neurologic complaints.      acetaminophen    Suspension .. 650 milliGRAM(s) Oral every 6 hours PRN  aspirin  chewable 81 milliGRAM(s) Oral daily  dextrose 40% Gel 15 Gram(s) Oral once PRN  dextrose 5%. 1000 milliLiter(s) IV Continuous <Continuous>  dextrose 50% Injectable 12.5 Gram(s) IV Push once  dextrose 50% Injectable 25 Gram(s) IV Push once  dextrose 50% Injectable 25 Gram(s) IV Push once  docusate sodium Liquid 100 milliGRAM(s) Oral two times a day  enoxaparin Injectable 40 milliGRAM(s) SubCutaneous <User Schedule>  famotidine    Tablet 20 milliGRAM(s) Oral two times a day  furosemide   Injectable 20 milliGRAM(s) IV Push daily  glucagon  Injectable 1 milliGRAM(s) IntraMuscular once PRN  insulin regular  human corrective regimen sliding scale   SubCutaneous every 6 hours  labetalol Injectable 10 milliGRAM(s) IV Push every 8 hours PRN  levalbuterol Inhalation 0.63 milliGRAM(s) Inhalation every 6 hours PRN  metoprolol tartrate 75 milliGRAM(s) Oral two times a day  nystatin Powder 1 Application(s) Topical two times a day PRN  senna Syrup 20 milliLiter(s) Oral at bedtime  simvastatin 40 milliGRAM(s) Oral at bedtime    PHYSICAL EXAM:   Vital Signs Last 24 Hrs  T(C): 36.8 (08 Feb 2019 15:33), Max: 36.9 (08 Feb 2019 08:49)  T(F): 98.3 (08 Feb 2019 15:33), Max: 98.4 (08 Feb 2019 08:49)  HR: 98 (09 Feb 2019 08:00) (74 - 122)  BP: 126/100 (09 Feb 2019 08:00) (126/100 - 173/93)  BP(mean): 109 (09 Feb 2019 08:00) (87 - 147)  RR: 19 (09 Feb 2019 08:00) (19 - 25)  SpO2: 97% (09 Feb 2019 08:00) (93% - 100%)    General: No acute distress, resting comfortably in chair   HEENT: EOM intact, no blink to threat on right   Abdomen: , nontender  Extremities: No edema    NEUROLOGICAL EXAM:  Mental status: eyes open, awake, attentive to persons at bedside, oriented to name, hypophonic follows simple commands, perseverates at times, IDs objects   Cranial Nerves:  right facial droop, no blink to threat on right   Motor exam: left side moves well against gravity, RUE 3/5 with drift , RLE 2/5   Sensation: Intact to light touch   Coordination/ Gait: gait not assessed     LABS:                        12.9   6.9   )-----------( 210      ( 09 Feb 2019 05:52 )             38.8    02-09    139  |  100  |  11  ----------------------------<  188<H>  3.7   |  24  |  0.56    Ca    9.4      09 Feb 2019 05:52    Hemoglobin A1C, Whole Blood: 6.6 % (02-04 @ 06:11)    IMAGING: Reviewed by me.     CT Head No Cont (02.08.19)  Increasing hypodensity involves the left basal ganglia, internal capsule,   corona radiata, and insula, consistent with an evolving acute left middle   cerebral artery territory infarct. Vague increased areas of density   involve the infarct which may reflect petechial hemorrhagic   transformation, retained contrast from the angiogram procedure, or a   combination of both. There is no focal parenchymal hematoma.    CT Head No Cont (02.05.19)   Previously seen increased attenuation within left sided sulci is   decreased on the current examination, and may represent the presence of   residual intravenous contrast and/or subarachnoid hemorrhage.  No no hydrocephalus or midline shift. No effacement of basal cisterns    (02.03.19)   CT angiography neck: Patent cervical vasculature.  No hemodynamically   significant carotid stenosis or flow-limiting vertebral artery stenosis.    No evidence of dissection. Three-vessel arch.  Vertebral arteries are   codominant.    CT angiography brain:   1.  The M1 segment of the left middle cerebral artery is occluded,   approximately 8 mm distal to its origin.  Multiple proximal M2 branches   of the left MCA are occluded within the sylvian fissure.  There is some   reconstitution of blood flow within distal M2 branches and M3 branches of   the left middle cerebral artery.  CT perfusion is recommended for further   characterization.  2.  Atherosclerotic calcification causes diffuse mild narrowing in the   cavernous and supraclinoid segments of the internal carotid arteries   without flow-limiting stenosis.  3.  Atherosclerotic calcification causes moderate to severe stenosis in   the bilateral intradural vertebral arteries.  Mild luminal irregularity   of the basilar artery without flow-limiting stenosis.  Multiple mild to   moderate stenoses in the proximal P2 segment of the left posterior   cerebral artery.  The right posterior cerebral artery is diffusely small   and irregular, and appears hypoenhancing compared to the contralateral   side.    CT Perfusion w/ Maps w/ IV Cont (02.03.19)   NONCONTRAST HEAD CT SCAN: No CT evidence of acute intracranial   hemorrhage, mass effect or acute territorial infarct.    CT PERFUSION: No evidence of a completed infarct.  Approximately 80 cc   penumbra of at risk tissue in the left MCA vascular territory.

## 2019-02-09 NOTE — PROGRESS NOTE ADULT - ASSESSMENT
88 year old white female with a pmh of CAD, HTN, HLD, pacemaker who presented to [facility] as a transfer from [facility] with right hemiparesis and aphasia and left proximal MCA occlusion. The patient was last known well at 2 pm on 2/3/19 and was then found down on the ground by her daughter several hours later and was noted to be not speaking and weak on the right. At [facility] a CTA head and neck was performed which showed a proximal left MCA occlusion, and a CT head showed a dense left MCA sign with some hypodensity and early ischemic changes to my eye in the left MCA distribution. She did not receive IV tPA as she was out of the window and was transferred to [facility] for possible mechanical thrombectomy. At [facility] her CT perfusion showed zero core infarct, with a penumbra of 80 mls, and an infinite mismatch. She was deemed a candidate for intervention and recanalization was achieved with TICI 3 score.     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    NEURO: neurologically without acute change, interval CTH performed on 2/7 shows L MCA infarct with petechial hemorrhagic transformation, continue close monitoring for neurologic deterioration, will allow for normotension in setting of recent recanalization, LDL 55 on admission- continue on home dose of simvastatin, MR imaging not possible due to pacemaker incompatibility. Physical therapy/OT recommended acute TBI rehab    ANTITHROMBOTIC THERAPY: ASA with plans to transition to anticoagulation in 7 days from admission (02/10/19) after repeat stable imaging and after stable enteral access obtained.    PULMONARY: protecting airway, saturating well, secretions requiring suctioning PRN, continue on Xopenex PRN. CXR on 2/8 showed unchanged mild pulmonary vascular congestion and a small left pleural effusion with associated atelectasis, started on IV lasix daily with good urine output. Will consider CTPA in light of sinus tachycardia.     CARDIOVASCULAR: EF 65-70% moderate AS, moderate pulm HTN, mild-mod TR and no PFO, cardiac monitoring at times shows sinus tachycardia, PPM interrogation- showed episode of atrial flutter, plan to AC once repeat stable imaging obtained on 2/10 and stable enteral access established.                SBP goal: Normotension     GASTROINTESTINAL: dysphagia screen noted failed, S&S recommended NPO. She underwent a MBS on 2/8 and was recommended to keep NPO and will re-evaluate on 2/11 given patient has demonstrated neurological recovery.      Diet: npo with TF via NGT    RENAL: BUN/Cr within limits, good urine output      Na Goal: Greater than 135     Gonzalez:N    HEMATOLOGY: H/H without anemia, Platelets 210, LE duplex negative for DVT     DVT ppx: Heparin s.c [] LMWH [x]     ID: afebrile, no leukocytosis, RVP negative    DISPOSITION: Acute TBI rehab once stable enteral access established    CORE MEASURES:        Admission NIHSS: 19     TPA: [] YES [x] NO      LDL/HDL:55/37     Depression Screen: 0     Statin Therapy: y     Dysphagia Screen: [] PASS [x] FAIL      Smoking [] YES [x] NO      Afib [] YES [x] NO     Stroke Education [x] YES [] NO 88 year old white female with a pmh of CAD, HTN, HLD, pacemaker who presented to [facility] as a transfer from [facility] with right hemiparesis and aphasia and left proximal MCA occlusion. The patient was last known well at 2 pm on 2/3/19 and was then found down on the ground by her daughter several hours later and was noted to be not speaking and weak on the right. At [facility] a CTA head and neck was performed which showed a proximal left MCA occlusion, and a CT head showed a dense left MCA sign with some hypodensity and early ischemic changes to my eye in the left MCA distribution. She did not receive IV tPA as she was out of the window and was transferred to [facility] for possible mechanical thrombectomy. At [facility] her CT perfusion showed zero core infarct, with a penumbra of 80 mls, and an infinite mismatch. She was deemed a candidate for intervention and recanalization was achieved with TICI 3 score.     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    NEURO: neurologically with improvement in dysarthria and orientation daily, interval CTH performed on 2/7 shows L MCA infarct with petechial hemorrhagic transformation, continue close monitoring for neurologic deterioration, will allow for normotension in setting of recent recanalization, LDL 55 on admission- continue on home dose of simvastatin, MR imaging not possible due to pacemaker incompatibility. Physical therapy/OT recommended acute TBI rehab    ANTITHROMBOTIC THERAPY: ASA with plans to transition to anticoagulation in 7 days from admission (02/10/19) after repeat stable imaging and after stable enteral access obtained.    PULMONARY: protecting airway, saturating well, secretions requiring suctioning PRN, continue on Xopenex PRN. CXR on 2/8 showed unchanged mild pulmonary vascular congestion and a small left pleural effusion with associated atelectasis, started on IV lasix daily with good urine output.     CARDIOVASCULAR: EF 65-70% moderate AS, moderate pulm HTN, mild-mod TR and no PFO, cardiac monitoring at times shows sinus tachycardia now resolved, PPM interrogation- showed episode of atrial flutter, plan to AC once repeat stable imaging obtained on 2/10 and stable enteral access established.  Continue on IV Lasix.               SBP goal: Normotension     GASTROINTESTINAL: dysphagia screen noted failed, S&S recommended NPO. She underwent a MBS on 2/8 and was recommended to keep NPO and will re-evaluate on 2/11 given patient has demonstrated neurological recovery.      Diet: npo with TF via NGT    RENAL: BUN/Cr within limits, good urine output      Na Goal: Greater than 135     Gonzalez:N    HEMATOLOGY: H/H without anemia, Platelets 210, LE duplex negative for DVT     DVT ppx: Heparin s.c [] LMWH [x]     ID: afebrile, no leukocytosis, RVP negative    DISPOSITION: Acute TBI rehab once stable enteral access established    CORE MEASURES:        Admission NIHSS: 19     TPA: [] YES [x] NO      LDL/HDL:55/37     Depression Screen: 0     Statin Therapy: y     Dysphagia Screen: [] PASS [x] FAIL      Smoking [] YES [x] NO      Afib [] YES [x] NO     Stroke Education [x] YES [] NO

## 2019-02-09 NOTE — PROGRESS NOTE ADULT - SUBJECTIVE AND OBJECTIVE BOX
Subjective: Patient seen and examined. No new events except as noted.   remains in Stroke unit     REVIEW OF SYSTEMS:    CONSTITUTIONAL: +_weakness, fevers or chills  EYES/ENT: No visual changes;  No vertigo or throat pain   NECK: No pain or stiffness  RESPIRATORY: No cough, wheezing, hemoptysis; No shortness of breath  CARDIOVASCULAR: No chest pain or palpitations  GASTROINTESTINAL: No abdominal or epigastric pain. No nausea, vomiting, or hematemesis; No diarrhea or constipation. No melena or hematochezia.  GENITOURINARY: No dysuria, frequency or hematuria  NEUROLOGICAL: +numbness or weakness  SKIN: No itching, burning, rashes, or lesions   All other review of systems is negative unless indicated above.    MEDICATIONS:  MEDICATIONS  (STANDING):  aspirin  chewable 81 milliGRAM(s) Oral daily  dextrose 5%. 1000 milliLiter(s) (50 mL/Hr) IV Continuous <Continuous>  dextrose 50% Injectable 12.5 Gram(s) IV Push once  dextrose 50% Injectable 25 Gram(s) IV Push once  dextrose 50% Injectable 25 Gram(s) IV Push once  docusate sodium Liquid 100 milliGRAM(s) Oral two times a day  enoxaparin Injectable 40 milliGRAM(s) SubCutaneous <User Schedule>  famotidine    Tablet 20 milliGRAM(s) Oral two times a day  furosemide   Injectable 20 milliGRAM(s) IV Push daily  insulin regular  human corrective regimen sliding scale   SubCutaneous every 6 hours  metoprolol tartrate 75 milliGRAM(s) Oral two times a day  senna Syrup 20 milliLiter(s) Oral at bedtime  simvastatin 40 milliGRAM(s) Oral at bedtime      PHYSICAL EXAM:  T(C): 36.8 (02-09-19 @ 18:00), Max: 37.3 (02-09-19 @ 12:30)  HR: 122 (02-09-19 @ 18:00) (74 - 125)  BP: 147/89 (02-09-19 @ 18:00) (126/100 - 161/91)  RR: 26 (02-09-19 @ 18:00) (19 - 29)  SpO2: 97% (02-09-19 @ 18:00) (97% - 100%)  Wt(kg): --  I&O's Summary      Appearance: NAD very lethargic 	  HEENT:   Normal oral mucosa, PERRL, EOMI +NGT 	  Lymphatic: No lymphadenopathy  Cardiovascular: tachycardic S1 S2, No JVD, No murmurs, No edema  Respiratory: decreased bs   Psychiatry: A & O x 3, lethargic   Gastrointestinal:  Soft, Non-tender, + BS	  Skin: No rashes, No ecchymoses, No cyanosis	  NEUROLOGICAL EXAM:  Mental status: eyes open, awake, attentive to persons at bedside, able to state name, hypophonic follows some simple commands, perseverates at times   Cranial Nerves:  right facial droop, no blink to threat on right   Motor exam: left side moves well against gravity, RUE 3/5 with drift , RLE 2/5   Sensation: Intact to light touch   Coordination/ Gait: gait not assessed     Extremities: decreased range of motion, No clubbing, cyanosis or edema  Vascular: Peripheral pulses palpable 2+ bilaterally      LABS:    CARDIAC MARKERS:                                12.9   6.9   )-----------( 210      ( 09 Feb 2019 05:52 )             38.8     02-09    139  |  100  |  11  ----------------------------<  188<H>  3.7   |  24  |  0.56    Ca    9.4      09 Feb 2019 05:52      proBNP:   Lipid Profile:   HgA1c:   TSH:             TELEMETRY: 	  AF  ECG:  	  RADIOLOGY:   DIAGNOSTIC TESTING:  [ ] Echocardiogram:  [ ]  Catheterization:  [ ] Stress Test:    OTHER:

## 2019-02-10 LAB
ANION GAP SERPL CALC-SCNC: 15 MMOL/L — SIGNIFICANT CHANGE UP (ref 5–17)
BUN SERPL-MCNC: 19 MG/DL — SIGNIFICANT CHANGE UP (ref 7–23)
CALCIUM SERPL-MCNC: 9.2 MG/DL — SIGNIFICANT CHANGE UP (ref 8.4–10.5)
CHLORIDE SERPL-SCNC: 100 MMOL/L — SIGNIFICANT CHANGE UP (ref 96–108)
CO2 SERPL-SCNC: 25 MMOL/L — SIGNIFICANT CHANGE UP (ref 22–31)
CREAT SERPL-MCNC: 0.63 MG/DL — SIGNIFICANT CHANGE UP (ref 0.5–1.3)
GLUCOSE SERPL-MCNC: 173 MG/DL — HIGH (ref 70–99)
HCT VFR BLD CALC: 41.3 % — SIGNIFICANT CHANGE UP (ref 34.5–45)
HGB BLD-MCNC: 13.8 G/DL — SIGNIFICANT CHANGE UP (ref 11.5–15.5)
MCHC RBC-ENTMCNC: 30.4 PG — SIGNIFICANT CHANGE UP (ref 27–34)
MCHC RBC-ENTMCNC: 33.5 GM/DL — SIGNIFICANT CHANGE UP (ref 32–36)
MCV RBC AUTO: 90.8 FL — SIGNIFICANT CHANGE UP (ref 80–100)
PLATELET # BLD AUTO: 220 K/UL — SIGNIFICANT CHANGE UP (ref 150–400)
POTASSIUM SERPL-MCNC: 4 MMOL/L — SIGNIFICANT CHANGE UP (ref 3.5–5.3)
POTASSIUM SERPL-SCNC: 4 MMOL/L — SIGNIFICANT CHANGE UP (ref 3.5–5.3)
RBC # BLD: 4.55 M/UL — SIGNIFICANT CHANGE UP (ref 3.8–5.2)
RBC # FLD: 13 % — SIGNIFICANT CHANGE UP (ref 10.3–14.5)
SODIUM SERPL-SCNC: 140 MMOL/L — SIGNIFICANT CHANGE UP (ref 135–145)
WBC # BLD: 7.1 K/UL — SIGNIFICANT CHANGE UP (ref 3.8–10.5)
WBC # FLD AUTO: 7.1 K/UL — SIGNIFICANT CHANGE UP (ref 3.8–10.5)

## 2019-02-10 PROCEDURE — 99233 SBSQ HOSP IP/OBS HIGH 50: CPT

## 2019-02-10 RX ORDER — SIMVASTATIN 20 MG/1
40 TABLET, FILM COATED ORAL AT BEDTIME
Qty: 0 | Refills: 0 | Status: DISCONTINUED | OUTPATIENT
Start: 2019-02-10 | End: 2019-02-14

## 2019-02-10 RX ORDER — FAMOTIDINE 10 MG/ML
20 INJECTION INTRAVENOUS
Qty: 0 | Refills: 0 | Status: DISCONTINUED | OUTPATIENT
Start: 2019-02-10 | End: 2019-02-14

## 2019-02-10 RX ORDER — ASPIRIN/CALCIUM CARB/MAGNESIUM 324 MG
81 TABLET ORAL DAILY
Qty: 0 | Refills: 0 | Status: DISCONTINUED | OUTPATIENT
Start: 2019-02-11 | End: 2019-02-11

## 2019-02-10 RX ORDER — FUROSEMIDE 40 MG
40 TABLET ORAL DAILY
Qty: 0 | Refills: 0 | Status: DISCONTINUED | OUTPATIENT
Start: 2019-02-10 | End: 2019-02-12

## 2019-02-10 RX ORDER — METOPROLOL TARTRATE 50 MG
25 TABLET ORAL
Qty: 0 | Refills: 0 | Status: DISCONTINUED | OUTPATIENT
Start: 2019-02-10 | End: 2019-02-10

## 2019-02-10 RX ORDER — METOPROLOL TARTRATE 50 MG
25 TABLET ORAL
Qty: 0 | Refills: 0 | Status: DISCONTINUED | OUTPATIENT
Start: 2019-02-10 | End: 2019-02-12

## 2019-02-10 RX ORDER — AMIODARONE HYDROCHLORIDE 400 MG/1
200 TABLET ORAL DAILY
Qty: 0 | Refills: 0 | Status: DISCONTINUED | OUTPATIENT
Start: 2019-02-11 | End: 2019-02-14

## 2019-02-10 RX ADMIN — SENNA PLUS 20 MILLILITER(S): 8.6 TABLET ORAL at 21:12

## 2019-02-10 RX ADMIN — Medication 81 MILLIGRAM(S): at 13:26

## 2019-02-10 RX ADMIN — INSULIN HUMAN 2: 100 INJECTION, SOLUTION SUBCUTANEOUS at 23:28

## 2019-02-10 RX ADMIN — Medication 20 MILLIGRAM(S): at 05:00

## 2019-02-10 RX ADMIN — Medication 25 MILLIGRAM(S): at 21:10

## 2019-02-10 RX ADMIN — Medication 25 MILLIGRAM(S): at 10:22

## 2019-02-10 RX ADMIN — FAMOTIDINE 20 MILLIGRAM(S): 10 INJECTION INTRAVENOUS at 05:00

## 2019-02-10 RX ADMIN — INSULIN HUMAN 2: 100 INJECTION, SOLUTION SUBCUTANEOUS at 17:31

## 2019-02-10 RX ADMIN — NYSTATIN CREAM 1 APPLICATION(S): 100000 CREAM TOPICAL at 21:13

## 2019-02-10 RX ADMIN — FAMOTIDINE 20 MILLIGRAM(S): 10 INJECTION INTRAVENOUS at 17:31

## 2019-02-10 RX ADMIN — AMIODARONE HYDROCHLORIDE 200 MILLIGRAM(S): 400 TABLET ORAL at 13:26

## 2019-02-10 RX ADMIN — Medication 100 MILLIGRAM(S): at 17:31

## 2019-02-10 RX ADMIN — SIMVASTATIN 40 MILLIGRAM(S): 20 TABLET, FILM COATED ORAL at 21:10

## 2019-02-10 RX ADMIN — INSULIN HUMAN 2: 100 INJECTION, SOLUTION SUBCUTANEOUS at 13:30

## 2019-02-10 RX ADMIN — Medication 40 MILLIGRAM(S): at 09:49

## 2019-02-10 RX ADMIN — ENOXAPARIN SODIUM 40 MILLIGRAM(S): 100 INJECTION SUBCUTANEOUS at 17:31

## 2019-02-10 RX ADMIN — Medication 100 MILLIGRAM(S): at 05:00

## 2019-02-10 RX ADMIN — INSULIN HUMAN 2: 100 INJECTION, SOLUTION SUBCUTANEOUS at 05:29

## 2019-02-10 NOTE — PROGRESS NOTE ADULT - SUBJECTIVE AND OBJECTIVE BOX
Subjective: Patient seen and examined. No new events except as noted.     REVIEW OF SYSTEMS:    CONSTITUTIONAL: No weakness, fevers or chills  EYES/ENT: No visual changes;  No vertigo or throat pain   NECK: No pain or stiffness  RESPIRATORY: No cough, wheezing, hemoptysis; No shortness of breath  CARDIOVASCULAR: No chest pain or palpitations  GASTROINTESTINAL: No abdominal or epigastric pain. No nausea, vomiting, or hematemesis; No diarrhea or constipation. No melena or hematochezia.  GENITOURINARY: No dysuria, frequency or hematuria  NEUROLOGICAL: No numbness or weakness  SKIN: No itching, burning, rashes, or lesions   All other review of systems is negative unless indicated above.    MEDICATIONS:  MEDICATIONS  (STANDING):  amiodarone    Tablet 200 milliGRAM(s) Oral daily  aspirin  chewable 81 milliGRAM(s) Oral daily  dextrose 5%. 1000 milliLiter(s) (50 mL/Hr) IV Continuous <Continuous>  dextrose 50% Injectable 12.5 Gram(s) IV Push once  dextrose 50% Injectable 25 Gram(s) IV Push once  dextrose 50% Injectable 25 Gram(s) IV Push once  docusate sodium Liquid 100 milliGRAM(s) Oral two times a day  enoxaparin Injectable 40 milliGRAM(s) SubCutaneous <User Schedule>  famotidine    Tablet 20 milliGRAM(s) Oral two times a day  furosemide   Injectable 40 milliGRAM(s) IV Push daily  insulin regular  human corrective regimen sliding scale   SubCutaneous every 6 hours  metoprolol tartrate 25 milliGRAM(s) Oral two times a day  senna Syrup 20 milliLiter(s) Oral at bedtime  simvastatin 40 milliGRAM(s) Oral at bedtime      PHYSICAL EXAM:  T(C): 36.5 (02-10-19 @ 08:00), Max: 37.3 (02-09-19 @ 12:30)  HR: 120 (02-10-19 @ 10:00) (62 - 125)  BP: 125/73 (02-10-19 @ 10:00) (121/53 - 161/91)  RR: 30 (02-10-19 @ 10:00) (20 - 30)  SpO2: 97% (02-10-19 @ 10:00) (97% - 99%)  Wt(kg): --  I&O's Summary    09 Feb 2019 07:01  -  10 Feb 2019 07:00  --------------------------------------------------------  IN: 600 mL / OUT: 0 mL / NET: 600 mL    10 Feb 2019 07:01  -  10 Feb 2019 11:31  --------------------------------------------------------  IN: 200 mL / OUT: 400 mL / NET: -200 mL          Appearance: NAD very lethargic 	  HEENT:   Normal oral mucosa, PERRL, EOMI +NGT 	  Lymphatic: No lymphadenopathy  Cardiovascular: tachycardic S1 S2, No JVD, No murmurs, No edema  Respiratory: decreased bs   Psychiatry: A & O x 3, lethargic   Gastrointestinal:  Soft, Non-tender, + BS	  Skin: No rashes, No ecchymoses, No cyanosis	  NEUROLOGICAL EXAM:  Mental status: eyes open, awake, attentive to persons at bedside, able to state name, hypophonic follows some simple commands, perseverates at times   Cranial Nerves:  right facial droop, no blink to threat on right   Motor exam: left side moves well against gravity, RUE 3/5 with drift , RLE 2/5   Sensation: Intact to light touch   Coordination/ Gait: gait not assessed         LABS:    CARDIAC MARKERS:                                13.8   7.1   )-----------( 220      ( 10 Feb 2019 04:23 )             41.3     02-10    140  |  100  |  19  ----------------------------<  173<H>  4.0   |  25  |  0.63    Ca    9.2      10 Feb 2019 04:23      proBNP:   Lipid Profile:   HgA1c:   TSH:             TELEMETRY: 	SR/ST    ECG:  	  RADIOLOGY:   DIAGNOSTIC TESTING:  [ ] Echocardiogram:  [ ]  Catheterization:  [ ] Stress Test:    OTHER:

## 2019-02-10 NOTE — PROGRESS NOTE ADULT - SUBJECTIVE AND OBJECTIVE BOX
THE PATIENT WAS SEEN AND EXAMINED BY ME WITH THE HOUSESTAFF AND STROKE TEAM DURING MORNING ROUNDS.   HPI:  Patient is an 88 year old right handed  female with a history of CAD s/p stents and CABG on aspirin at home, DM, HTN, aortic valve replacement, and pacemaker who presented as transfer from [facility] for stroke rescue. Her last known normal was at approximately 2 pm on 2/3. Robbins was then noted to have left sided weakness and sudden onset inability to speak, so she was brought to [facility]. She was outside the appropriate time window for tPA, so tPA was not given. CTA showed evidence of a L M1 occlusion so she was transferred to [facility] and code stroke called upon arrival. Initial NIHSS at [facility] was 19, MRS 1.    SUBJECTIVE: No events overnight.  No new neurologic complaints.      acetaminophen    Suspension .. 650 milliGRAM(s) Oral every 6 hours PRN  amiodarone    Tablet 200 milliGRAM(s) Oral daily  aspirin  chewable 81 milliGRAM(s) Oral daily  dextrose 40% Gel 15 Gram(s) Oral once PRN  dextrose 5%. 1000 milliLiter(s) IV Continuous <Continuous>  dextrose 50% Injectable 12.5 Gram(s) IV Push once  dextrose 50% Injectable 25 Gram(s) IV Push once  dextrose 50% Injectable 25 Gram(s) IV Push once  docusate sodium Liquid 100 milliGRAM(s) Oral two times a day  enoxaparin Injectable 40 milliGRAM(s) SubCutaneous <User Schedule>  famotidine    Tablet 20 milliGRAM(s) Oral two times a day  furosemide   Injectable 40 milliGRAM(s) IV Push daily  glucagon  Injectable 1 milliGRAM(s) IntraMuscular once PRN  insulin regular  human corrective regimen sliding scale   SubCutaneous every 6 hours  labetalol Injectable 10 milliGRAM(s) IV Push every 8 hours PRN  levalbuterol Inhalation 0.63 milliGRAM(s) Inhalation every 6 hours PRN  metoprolol tartrate 25 milliGRAM(s) Oral two times a day  nystatin Powder 1 Application(s) Topical two times a day PRN  senna Syrup 20 milliLiter(s) Oral at bedtime  simvastatin 40 milliGRAM(s) Oral at bedtime    PHYSICAL EXAM:   Vital Signs Last 24 Hrs  T(C): 36.5 (10 Feb 2019 08:00), Max: 37.3 (09 Feb 2019 12:30)  T(F): 97.7 (10 Feb 2019 08:00), Max: 99.1 (09 Feb 2019 12:30)  HR: 120 (10 Feb 2019 10:00) (62 - 125)  BP: 125/73 (10 Feb 2019 10:00) (121/53 - 161/91)  BP(mean): 73 (10 Feb 2019 08:00) (73 - 112)  RR: 30 (10 Feb 2019 10:00) (20 - 30)  SpO2: 97% (10 Feb 2019 10:00) (97% - 99%)    General: No acute distress, resting comfortably in bed  HEENT: EOM intact, no blink to threat on right   Abdomen: nontender  Extremities: No edema    NEUROLOGICAL EXAM:  Mental status: eyes open, awake, attentive to persons at bedside, oriented to name, hypophonic follows simple commands, perseverates at times, IDs objects   Cranial Nerves: right facial droop, no blink to threat on right   Motor exam: left side moves well against gravity, right sided hemiparesis: RUE 3/5 RLE 2/5   Sensation: Intact to light touch   Coordination/ Gait: gait not assessed     LABS:                        13.8   7.1   )-----------( 220      ( 10 Feb 2019 04:23 )             41.3    02-10    140  |  100  |  19  ----------------------------<  173<H>  4.0   |  25  |  0.63    Ca    9.2      10 Feb 2019 04:23    Hemoglobin A1C, Whole Blood: 6.6 % (02-04 @ 06:11)    IMAGING: Reviewed by me.     CT Head No Cont (02.08.19)  Increasing hypodensity involves the left basal ganglia, internal capsule,   corona radiata, and insula, consistent with an evolving acute left middle   cerebral artery territory infarct. Vague increased areas of density   involve the infarct which may reflect petechial hemorrhagic   transformation, retained contrast from the angiogram procedure, or a   combination of both. There is no focal parenchymal hematoma.    CT Head No Cont (02.05.19)   Previously seen increased attenuation within left sided sulci is   decreased on the current examination, and may represent the presence of   residual intravenous contrast and/or subarachnoid hemorrhage.  No no hydrocephalus or midline shift. No effacement of basal cisterns    (02.03.19)   CT angiography neck: Patent cervical vasculature.  No hemodynamically   significant carotid stenosis or flow-limiting vertebral artery stenosis.    No evidence of dissection. Three-vessel arch.  Vertebral arteries are   codominant.    CT angiography brain:   1.  The M1 segment of the left middle cerebral artery is occluded,   approximately 8 mm distal to its origin.  Multiple proximal M2 branches   of the left MCA are occluded within the sylvian fissure.  There is some   reconstitution of blood flow within distal M2 branches and M3 branches of   the left middle cerebral artery.  CT perfusion is recommended for further   characterization.  2.  Atherosclerotic calcification causes diffuse mild narrowing in the   cavernous and supraclinoid segments of the internal carotid arteries   without flow-limiting stenosis.  3.  Atherosclerotic calcification causes moderate to severe stenosis in   the bilateral intradural vertebral arteries.  Mild luminal irregularity   of the basilar artery without flow-limiting stenosis.  Multiple mild to   moderate stenoses in the proximal P2 segment of the left posterior   cerebral artery.  The right posterior cerebral artery is diffusely small   and irregular, and appears hypoenhancing compared to the contralateral   side.    CT Perfusion w/ Maps w/ IV Cont (02.03.19)   NONCONTRAST HEAD CT SCAN: No CT evidence of acute intracranial   hemorrhage, mass effect or acute territorial infarct.    CT PERFUSION: No evidence of a completed infarct.  Approximately 80 cc   penumbra of at risk tissue in the left MCA vascular territory.

## 2019-02-10 NOTE — PROGRESS NOTE ADULT - ASSESSMENT
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 Has been tachycardic

## 2019-02-10 NOTE — PROGRESS NOTE ADULT - ASSESSMENT
88 year old white female with a pmh of CAD, HTN, HLD, pacemaker who presented to [facility] as a transfer from [facility] with right hemiparesis and aphasia and left proximal MCA occlusion. The patient was last known well at 2 pm on 2/3/19 and was then found down on the ground by her daughter several hours later and was noted to be not speaking and weak on the right. At [facility] a CTA head and neck was performed which showed a proximal left MCA occlusion, and a CT head showed a dense left MCA sign with some hypodensity and early ischemic changes to my eye in the left MCA distribution. She did not receive IV tPA as she was out of the window and was transferred to [facility] for possible mechanical thrombectomy. At [facility] her CT perfusion showed zero core infarct, with a penumbra of 80 mls, and an infinite mismatch. She was deemed a candidate for intervention and recanalization was achieved with TICI 3 score.     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    NEURO: neurologically with improvement in dysarthria and RUE movement, CTH performed on 2/7 shows L MCA infarct with petechial hemorrhagic transformation, continue close monitoring for neurologic deterioration, will allow for normotension in setting of recent recanalization, LDL 55 on admission- continue on home dose of simvastatin, MR imaging not possible due to pacemaker incompatibility. Physical therapy/OT recommended acute TBI rehab    ANTITHROMBOTIC THERAPY: ASA with plans to transition to anticoagulation in 7 days from admission (02/10/19) after repeat stable imaging and after stable enteral access obtained in setting of atrial flutter found on pacemaker interrogation    PULMONARY: protecting airway, saturating well, secretions requiring suctioning PRN, continue on Xopenex PRN. CXR on 2/8 showed unchanged mild pulmonary vascular congestion and a small left pleural effusion with associated atelectasis, started on IV lasix daily with good urine output.     CARDIOVASCULAR: EF 65-70% moderate AS, moderate pulm HTN, mild-mod TR and no PFO, cardiac monitoring at times shows sinus tachycardia - started on Amiodarone and Metoprolol via NGT, PPM interrogation- showed episode of atrial flutter, plan to AC. Continue on IV Lasix.               SBP goal: Normotension     GASTROINTESTINAL: dysphagia screen noted failed, S&S recommended NPO. She underwent a MBS on 2/8 and was recommended to keep NPO and will re-evaluate on 2/11 given patient has demonstrated neurological recovery.      Diet: npo with TF via NGT    RENAL: BUN/Cr within limits, good urine output      Na Goal: Greater than 135     Gonzalez:N    HEMATOLOGY: H/H without anemia, Platelets 220, LE duplex negative for DVT     DVT ppx: Heparin s.c [] LMWH [x]     ID: afebrile, no leukocytosis, RVP negative    DISPOSITION: Acute TBI rehab once stable enteral access established    CORE MEASURES:        Admission NIHSS: 19     TPA: [] YES [x] NO      LDL/HDL:55/37     Depression Screen: 0     Statin Therapy: y     Dysphagia Screen: [] PASS [x] FAIL      Smoking [] YES [x] NO      Afib [] YES [x] NO     Stroke Education [x] YES [] NO 88 year old white female with a pmh of CAD, HTN, HLD, pacemaker who presented to [facility] as a transfer from [facility] with right hemiparesis and aphasia and left proximal MCA occlusion. The patient was last known well at 2 pm on 2/3/19 and was then found down on the ground by her daughter several hours later and was noted to be not speaking and weak on the right. At [facility] a CTA head and neck was performed which showed a proximal left MCA occlusion, and a CT head showed a dense left MCA sign with some hypodensity and early ischemic changes to my eye in the left MCA distribution. She did not receive IV tPA as she was out of the window and was transferred to [facility] for possible mechanical thrombectomy. At [facility] her CT perfusion showed zero core infarct, with a penumbra of 80 mls, and an infinite mismatch. She was deemed a candidate for intervention and recanalization was achieved with TICI 3 score.     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    NEURO: neurologically with improvement in dysarthria and RUE movement, CTH performed on 2/7 shows L MCA infarct with petechial hemorrhagic transformation, continue close monitoring for neurologic deterioration, will allow for normotension in setting of recent recanalization, LDL 55 on admission- continue on home dose of simvastatin, MR imaging not possible due to pacemaker incompatibility. Physical therapy/OT recommended acute TBI rehab    ANTITHROMBOTIC THERAPY: ASA with plans to transition to anticoagulation after repeat stable imaging and after stable enteral access obtained in setting of atrial flutter found on pacemaker interrogation    PULMONARY: protecting airway, saturating well, secretions requiring suctioning PRN, continue on Xopenex PRN. CXR on 2/8 showed unchanged mild pulmonary vascular congestion and a small left pleural effusion with associated atelectasis, started on IV lasix daily with good urine output.     CARDIOVASCULAR: EF 65-70% moderate AS, moderate pulm HTN, mild-mod TR and no PFO, cardiac monitoring at times shows sinus tachycardia - started on Amiodarone and Metoprolol via NGT, PPM interrogation- showed episode of atrial flutter, plan to AC. Continue on IV Lasix.               SBP goal: Normotension     GASTROINTESTINAL: dysphagia screen noted failed, S&S recommended NPO. She underwent a MBS on 2/8 and was recommended to keep NPO and will re-evaluate on 2/11 given patient has demonstrated neurological recovery.      Diet: npo with TF via NGT    RENAL: BUN/Cr within limits, good urine output      Na Goal: Greater than 135     Gonzalez:N    HEMATOLOGY: H/H without anemia, Platelets 220, LE duplex negative for DVT     DVT ppx: Heparin s.c [] LMWH [x]     ID: afebrile, no leukocytosis, RVP negative    DISPOSITION: Acute TBI rehab once stable enteral access established    CORE MEASURES:        Admission NIHSS: 19     TPA: [] YES [x] NO      LDL/HDL:55/37     Depression Screen: 0     Statin Therapy: y     Dysphagia Screen: [] PASS [x] FAIL      Smoking [] YES [x] NO      Afib [] YES [x] NO     Stroke Education [x] YES [] NO

## 2019-02-11 DIAGNOSIS — I48.92 UNSPECIFIED ATRIAL FLUTTER: ICD-10-CM

## 2019-02-11 LAB
ANION GAP SERPL CALC-SCNC: 16 MMOL/L — SIGNIFICANT CHANGE UP (ref 5–17)
BUN SERPL-MCNC: 26 MG/DL — HIGH (ref 7–23)
CALCIUM SERPL-MCNC: 9.7 MG/DL — SIGNIFICANT CHANGE UP (ref 8.4–10.5)
CHLORIDE SERPL-SCNC: 97 MMOL/L — SIGNIFICANT CHANGE UP (ref 96–108)
CO2 SERPL-SCNC: 27 MMOL/L — SIGNIFICANT CHANGE UP (ref 22–31)
CREAT SERPL-MCNC: 0.7 MG/DL — SIGNIFICANT CHANGE UP (ref 0.5–1.3)
GLUCOSE SERPL-MCNC: 201 MG/DL — HIGH (ref 70–99)
HCT VFR BLD CALC: 41.9 % — SIGNIFICANT CHANGE UP (ref 34.5–45)
HGB BLD-MCNC: 14 G/DL — SIGNIFICANT CHANGE UP (ref 11.5–15.5)
MCHC RBC-ENTMCNC: 30 PG — SIGNIFICANT CHANGE UP (ref 27–34)
MCHC RBC-ENTMCNC: 33.4 GM/DL — SIGNIFICANT CHANGE UP (ref 32–36)
MCV RBC AUTO: 89.9 FL — SIGNIFICANT CHANGE UP (ref 80–100)
PLATELET # BLD AUTO: 254 K/UL — SIGNIFICANT CHANGE UP (ref 150–400)
POTASSIUM SERPL-MCNC: 4.5 MMOL/L — SIGNIFICANT CHANGE UP (ref 3.5–5.3)
POTASSIUM SERPL-SCNC: 4.5 MMOL/L — SIGNIFICANT CHANGE UP (ref 3.5–5.3)
RBC # BLD: 4.66 M/UL — SIGNIFICANT CHANGE UP (ref 3.8–5.2)
RBC # FLD: 12.5 % — SIGNIFICANT CHANGE UP (ref 10.3–14.5)
SODIUM SERPL-SCNC: 140 MMOL/L — SIGNIFICANT CHANGE UP (ref 135–145)
WBC # BLD: 8.3 K/UL — SIGNIFICANT CHANGE UP (ref 3.8–10.5)
WBC # FLD AUTO: 8.3 K/UL — SIGNIFICANT CHANGE UP (ref 3.8–10.5)

## 2019-02-11 PROCEDURE — 99232 SBSQ HOSP IP/OBS MODERATE 35: CPT | Mod: GC

## 2019-02-11 RX ORDER — ENOXAPARIN SODIUM 100 MG/ML
40 INJECTION SUBCUTANEOUS ONCE
Qty: 0 | Refills: 0 | Status: COMPLETED | OUTPATIENT
Start: 2019-02-11 | End: 2019-02-11

## 2019-02-11 RX ORDER — METOPROLOL TARTRATE 50 MG
25 TABLET ORAL ONCE
Qty: 0 | Refills: 0 | Status: COMPLETED | OUTPATIENT
Start: 2019-02-11 | End: 2019-02-11

## 2019-02-11 RX ADMIN — Medication 40 MILLIGRAM(S): at 05:36

## 2019-02-11 RX ADMIN — INSULIN HUMAN 2: 100 INJECTION, SOLUTION SUBCUTANEOUS at 05:37

## 2019-02-11 RX ADMIN — Medication 25 MILLIGRAM(S): at 05:36

## 2019-02-11 RX ADMIN — FAMOTIDINE 20 MILLIGRAM(S): 10 INJECTION INTRAVENOUS at 17:47

## 2019-02-11 RX ADMIN — Medication 25 MILLIGRAM(S): at 21:30

## 2019-02-11 RX ADMIN — INSULIN HUMAN 2: 100 INJECTION, SOLUTION SUBCUTANEOUS at 23:09

## 2019-02-11 RX ADMIN — SIMVASTATIN 40 MILLIGRAM(S): 20 TABLET, FILM COATED ORAL at 21:30

## 2019-02-11 RX ADMIN — INSULIN HUMAN 2: 100 INJECTION, SOLUTION SUBCUTANEOUS at 12:49

## 2019-02-11 RX ADMIN — AMIODARONE HYDROCHLORIDE 200 MILLIGRAM(S): 400 TABLET ORAL at 05:36

## 2019-02-11 RX ADMIN — FAMOTIDINE 20 MILLIGRAM(S): 10 INJECTION INTRAVENOUS at 05:37

## 2019-02-11 RX ADMIN — Medication 25 MILLIGRAM(S): at 17:47

## 2019-02-11 RX ADMIN — Medication 81 MILLIGRAM(S): at 12:35

## 2019-02-11 RX ADMIN — Medication 100 MILLIGRAM(S): at 05:36

## 2019-02-11 RX ADMIN — SENNA PLUS 20 MILLILITER(S): 8.6 TABLET ORAL at 21:30

## 2019-02-11 RX ADMIN — ENOXAPARIN SODIUM 40 MILLIGRAM(S): 100 INJECTION SUBCUTANEOUS at 17:47

## 2019-02-11 NOTE — PROGRESS NOTE ADULT - SUBJECTIVE AND OBJECTIVE BOX
History: 87yo female with dysphagia, dysarthria and dense right hemiparesis 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    Interval:   Failed MBS on 2/8/19, plan for repeat today 2/11/19.   Labs significant for elevated BUN and hyperglycemia.     Subjective:     FUNCTIONAL PROGRESS    PT: pt performed rolling requiring modA & assisted RN with cleaning. Pt returned to bed & assisted with boosting requiring modA x2.     OT: Pt required mod Ax1 for supine to sit transfer. Pt required max Ax1 for sit to stand transfer and bed to chair transfer. Pt required max Ax1 for lower body dressing.     Patient required therapy f/u [ ]    REVIEW OF SYSTEMS  Constitutional - No fever,  No fatigue  HEENT - No vertigo, No neck pain  Neurological - No headaches, No memory loss, No loss of strength, No numbness, No tremors  Skin - No rashes, No lesions   Musculoskeletal - No joint pain, No joint swelling, No muscle pain  Psychiatric - No depression, No anxiety    VITALS  T(C): 36.9 (02-10-19 @ 20:00), Max: 36.9 (02-10-19 @ 20:00)  HR: 83 (02-11-19 @ 08:00) (80 - 120)  BP: 129/116 (02-11-19 @ 08:00) (108/55 - 144/65)  RR: 22 (02-11-19 @ 08:00) (20 - 30)  SpO2: 95% (02-11-19 @ 08:00) (95% - 99%)  Wt(kg): --    MEDICATIONS   acetaminophen    Suspension .. 650 milliGRAM(s) every 6 hours PRN  amiodarone    Tablet 200 milliGRAM(s) daily  aspirin  chewable 81 milliGRAM(s) daily  dextrose 40% Gel 15 Gram(s) once PRN  dextrose 5%. 1000 milliLiter(s) <Continuous>  dextrose 50% Injectable 12.5 Gram(s) once  dextrose 50% Injectable 25 Gram(s) once  dextrose 50% Injectable 25 Gram(s) once  docusate sodium Liquid 100 milliGRAM(s) two times a day  enoxaparin Injectable 40 milliGRAM(s) <User Schedule>  famotidine    Tablet 20 milliGRAM(s) two times a day  furosemide   Injectable 40 milliGRAM(s) daily  glucagon  Injectable 1 milliGRAM(s) once PRN  insulin regular  human corrective regimen sliding scale   every 6 hours  levalbuterol Inhalation 0.63 milliGRAM(s) every 6 hours PRN  metoprolol tartrate 25 milliGRAM(s) two times a day  nystatin Powder 1 Application(s) two times a day PRN  senna Syrup 20 milliLiter(s) at bedtime  simvastatin 40 milliGRAM(s) at bedtime      RECENT LABS/IMAGING  CBC Full  -  ( 11 Feb 2019 05:14 )  WBC Count : 8.3 K/uL  Hemoglobin : 14.0 g/dL  Hematocrit : 41.9 %  Platelet Count - Automated : 254 K/uL  Mean Cell Volume : 89.9 fl  Mean Cell Hemoglobin : 30.0 pg  Mean Cell Hemoglobin Concentration : 33.4 gm/dL  Auto Neutrophil # : x  Auto Lymphocyte # : x  Auto Monocyte # : x  Auto Eosinophil # : x  Auto Basophil # : x  Auto Neutrophil % : x  Auto Lymphocyte % : x  Auto Monocyte % : x  Auto Eosinophil % : x  Auto Basophil % : x    02-11    140  |  97  |  26<H>  ----------------------------<  201<H>  4.5   |  27  |  0.70    Ca    9.7      11 Feb 2019 05:14 History: 89yo female with dysphagia, dysarthria and dense right hemiparesis 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    Interval:   Failed MBS on 2/8/19, plan for repeat today 2/11/19.   Labs significant for elevated BUN and hyperglycemia.     Subjective: patient reports "boring weekend". She denies any pain     FUNCTIONAL PROGRESS    PT: pt performed rolling requiring modA & assisted RN with cleaning. Pt returned to bed & assisted with boosting requiring modA x2.     OT: Pt required mod Ax1 for supine to sit transfer. Pt required max Ax1 for sit to stand transfer and bed to chair transfer. Pt required max Ax1 for lower body dressing.     Patient required therapy f/u [ ]    REVIEW OF SYSTEMS  Constitutional - No fever,  No fatigue  HEENT - No vertigo, No neck pain  Neurological - No headaches, No memory loss, No loss of strength, No numbness, No tremors  Skin - No rashes, No lesions   Musculoskeletal - No joint pain, No joint swelling, No muscle pain  Psychiatric - No depression, No anxiety    VITALS  T(C): 36.9 (02-10-19 @ 20:00), Max: 36.9 (02-10-19 @ 20:00)  HR: 83 (02-11-19 @ 08:00) (80 - 120)  BP: 129/116 (02-11-19 @ 08:00) (108/55 - 144/65)  RR: 22 (02-11-19 @ 08:00) (20 - 30)  SpO2: 95% (02-11-19 @ 08:00) (95% - 99%)  Wt(kg): --    MEDICATIONS   acetaminophen    Suspension .. 650 milliGRAM(s) every 6 hours PRN  amiodarone    Tablet 200 milliGRAM(s) daily  aspirin  chewable 81 milliGRAM(s) daily  dextrose 40% Gel 15 Gram(s) once PRN  dextrose 5%. 1000 milliLiter(s) <Continuous>  dextrose 50% Injectable 12.5 Gram(s) once  dextrose 50% Injectable 25 Gram(s) once  dextrose 50% Injectable 25 Gram(s) once  docusate sodium Liquid 100 milliGRAM(s) two times a day  enoxaparin Injectable 40 milliGRAM(s) <User Schedule>  famotidine    Tablet 20 milliGRAM(s) two times a day  furosemide   Injectable 40 milliGRAM(s) daily  glucagon  Injectable 1 milliGRAM(s) once PRN  insulin regular  human corrective regimen sliding scale   every 6 hours  levalbuterol Inhalation 0.63 milliGRAM(s) every 6 hours PRN  metoprolol tartrate 25 milliGRAM(s) two times a day  nystatin Powder 1 Application(s) two times a day PRN  senna Syrup 20 milliLiter(s) at bedtime  simvastatin 40 milliGRAM(s) at bedtime    RECENT LABS/IMAGING  CBC Full  -  ( 11 Feb 2019 05:14 )  WBC Count : 8.3 K/uL  Hemoglobin : 14.0 g/dL  Hematocrit : 41.9 %  Platelet Count - Automated : 254 K/uL  Mean Cell Volume : 89.9 fl  Mean Cell Hemoglobin : 30.0 pg  Mean Cell Hemoglobin Concentration : 33.4 gm/dL  Auto Neutrophil # : x  Auto Lymphocyte # : x  Auto Monocyte # : x  Auto Eosinophil # : x  Auto Basophil # : x  Auto Neutrophil % : x  Auto Lymphocyte % : x  Auto Monocyte % : x  Auto Eosinophil % : x  Auto Basophil % : x    02-11    140  |  97  |  26<H>  ----------------------------<  201<H>  4.5   |  27  |  0.70    Ca    9.7      11 Feb 2019 05:14    Vital Signs Last 24 Hrs  T(C): 36.9 (10 Feb 2019 20:00), Max: 36.9 (10 Feb 2019 20:00)  T(F): 98.5 (10 Feb 2019 20:00), Max: 98.5 (10 Feb 2019 20:00)  HR: 105 (11 Feb 2019 14:00) (83 - 105)  BP: 143/80 (11 Feb 2019 14:00) (108/55 - 144/65)  BP(mean): 97 (11 Feb 2019 14:00) (70 - 122)  RR: 20 (11 Feb 2019 14:00) (20 - 23)  SpO2: 95% (11 Feb 2019 14:00) (95% - 99%)    PHYSICAL EXAM  Constitutional - NAD, Comfortable  HEENT - NCAT, EOMI. NGT in place   Neck - Supple, No limited ROM  Chest - CTAB  Cardiovascular - S1S2, mild tachycardia appreciated   Abdomen - BS+, Soft, NTND  Extremities - No calf tenderness   Neurologic Exam -                    Cognitive - Awake, Alert, AAO to self, place, date, year, situation     Communication - severe dysarthria, expressive aphasia      Cranial Nerves - CN 2-12 intact     Motor -                     LEFT    UE - ShAB 5/5, EF 5/5, EE 5/5, WE 5/5,  5/5                    RIGHT UE - ShAB 1/5, EF 1/5, EE 2/5, WE 1/5,  2/5                    LEFT    LE - HF 5/5, KE 5/5, DF 5/5, PF 5/5                    RIGHT LE - HF 1/5, KE 0/5, DF 0/5, PF 1/5    Right sided hypertonia/flexor tone beginning to develop.   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    Continue bedside PT/OT/SLP for speech and swallow, bed mobility/transfers/ambulation/ADLs   Pain - Tylenol PRN  Bowel regimen- senna, colace  Skin- turn and position q2hrs to maintain skin integrity.  DVT PPX - SCDs, lovenox  Rehab -    Recommend ACUTE inpatient rehabilitation for the functional deficits consisting of 3 hours of therapy/day & 24 hour RN/daily PMR physician for comorbid medical management. Will continue to follow for ongoing rehab needs and recommendations. Patient will be able to tolerate 3 hours a day.  ELOS 21-28 days  Goals: family training, adaptive equipment, speech/swallow rehab, bed mobility/transfers/ambulation/ADLs/functional independence

## 2019-02-11 NOTE — CONSULT NOTE ADULT - SUBJECTIVE AND OBJECTIVE BOX
Chief Complaint:  Patient is a 88y old  Female who presents with a chief complaint of L MCA occlusion (08 Feb 2019 13:16)    Valvular disease  Atrial flutter  Diabetes  Obesity  Hyperlipidemia  HTN (hypertension)  CAD (coronary artery disease)  Artificial pacemaker  Aortic valve replaced  S/P coronary artery stent placement  S/P cholecystectomy  S/P CABG (coronary artery bypass graft)     HPI:  87yo female h/o CAD s/p stents and CABG on aspirin at home, DM, HTN, aortic valve replacement, and pacemaker who presented to [facility] as a transfer from [facility] with right hemiparesis and aphasia and left proximal MCA occlusion. The patient was last known well at 2 pm on 2/3/19 and was then found down on the ground by her daughter several hours later and was noted to be not speaking and weak on the right. At [facility] a CTA head and neck was performed which showed a proximal left MCA occlusion, and a CT head showed a dense left MCA sign with some hypodensity and early ischemic changes to my eye in the left MCA distribution. She did not receive IV tPA as she was out of the window and was transferred to [facility] for possible mechanical thrombectomy. At [facility] her CT perfusion showed zero core infarct, with a penumbra of 80 mls, and an infinite mismatch. She was deemed a candidate for intervention and recanalization was achieved with TICI 3 score. GI consulted for dysphagia with failed swallow studies. Pt with NGT with need for long term alternative means of nutrition     Impression: left MCA infarction secondary to cerebral embolism given pacemaker showed a-flutter high suspicion for cardiac source      No Known Allergies      acetaminophen    Suspension .. 650 milliGRAM(s) Oral every 6 hours PRN  amiodarone    Tablet 200 milliGRAM(s) Oral daily  aspirin  chewable 81 milliGRAM(s) Oral daily  dextrose 40% Gel 15 Gram(s) Oral once PRN  dextrose 5%. 1000 milliLiter(s) IV Continuous <Continuous>  dextrose 50% Injectable 12.5 Gram(s) IV Push once  dextrose 50% Injectable 25 Gram(s) IV Push once  dextrose 50% Injectable 25 Gram(s) IV Push once  docusate sodium Liquid 100 milliGRAM(s) Oral two times a day  enoxaparin Injectable 40 milliGRAM(s) SubCutaneous <User Schedule>  famotidine    Tablet 20 milliGRAM(s) Oral two times a day  furosemide   Injectable 40 milliGRAM(s) IV Push daily  glucagon  Injectable 1 milliGRAM(s) IntraMuscular once PRN  insulin regular  human corrective regimen sliding scale   SubCutaneous every 6 hours  levalbuterol Inhalation 0.63 milliGRAM(s) Inhalation every 6 hours PRN  metoprolol tartrate 25 milliGRAM(s) Oral two times a day  nystatin Powder 1 Application(s) Topical two times a day PRN  senna Syrup 20 milliLiter(s) Oral at bedtime  simvastatin 40 milliGRAM(s) Oral at bedtime        FAMILY HISTORY:  Family history of cerebrovascular accident (CVA)  Family history of hypertension        Review of Systems: *CVA        Relevant Family History:   n/c    Relevant Social History: n/c      Physical Exam:    Vital Signs:  Vital Signs Last 24 Hrs  T(C): 36.9 (11 Feb 2019 15:13), Max: 36.9 (10 Feb 2019 20:00)  T(F): 98.4 (11 Feb 2019 15:13), Max: 98.5 (10 Feb 2019 20:00)  HR: 105 (11 Feb 2019 14:00) (83 - 105)  BP: 143/80 (11 Feb 2019 14:00) (108/55 - 144/65)  BP(mean): 97 (11 Feb 2019 14:00) (70 - 122)  RR: 20 (11 Feb 2019 14:00) (20 - 23)  SpO2: 95% (11 Feb 2019 14:00) (95% - 99%)  Daily     Daily     General:  Appears stated age, well-groomed, nad  HEENT:  NC/AT,  conjunctivae clear and pink, no thyromegaly, nodules, adenopathy, no JVD  Chest:  Full & symmetric excursion, no increased effort, breath sounds clear  Cardiovascular:  Regular rhythm, S1, S2, no murmur/rub/S3/S4, no abdominal bruit, no edema  Abdomen:  Soft, non-tender, non-distended, normoactive bowel sounds,  no masses ,no hepatosplenomeagaly, no signs of chronic liver disease  Extremities:  no cyanosis,clubbing or edema  Skin:  No rash/erythema/ecchymoses/petechiae/wounds/abscess/warm/dry  Neuro/Psych:  A&O, no asterixis, no tremor, no encephalopathy    Laboratory:                            14.0   8.3   )-----------( 254      ( 11 Feb 2019 05:14 )             41.9     02-11    140  |  97  |  26<H>  ----------------------------<  201<H>  4.5   |  27  |  0.70    Ca    9.7      11 Feb 2019 05:14              Imaging: Chief Complaint:  Patient is a 88y old  Female who presents with a chief complaint of L MCA occlusion (08 Feb 2019 13:16)    Valvular disease  Atrial flutter  Diabetes  Obesity  Hyperlipidemia  HTN (hypertension)  CAD (coronary artery disease)  Artificial pacemaker  Aortic valve replaced  S/P coronary artery stent placement  S/P cholecystectomy  S/P CABG (coronary artery bypass graft)     HPI:  87yo female h/o CAD s/p stents and CABG on aspirin at home, DM, HTN, aortic valve replacement, and pacemaker who presented to [facility] as a transfer from [facility] with right hemiparesis and aphasia and left proximal MCA occlusion. The patient was last known well at 2 pm on 2/3/19 and was then found down on the ground by her daughter several hours later and was noted to be not speaking and weak on the right. At [facility] a CTA head and neck was performed which showed a proximal left MCA occlusion, and a CT head showed a dense left MCA sign with some hypodensity and early ischemic changes to my eye in the left MCA distribution. She did not receive IV tPA as she was out of the window and was transferred to [facility] for possible mechanical thrombectomy. At [facility] her CT perfusion showed zero core infarct, with a penumbra of 80 mls, and an infinite mismatch. She was deemed a candidate for intervention and recanalization was achieved with TICI 3 score. GI consulted for dysphagia with failed swallow studies. Pt with NGT with need for long term alternative means of nutrition     Impression: left MCA infarction secondary to cerebral embolism given pacemaker showed a-flutter high suspicion for cardiac source      No Known Allergies      acetaminophen    Suspension .. 650 milliGRAM(s) Oral every 6 hours PRN  amiodarone    Tablet 200 milliGRAM(s) Oral daily  aspirin  chewable 81 milliGRAM(s) Oral daily  dextrose 40% Gel 15 Gram(s) Oral once PRN  dextrose 5%. 1000 milliLiter(s) IV Continuous <Continuous>  dextrose 50% Injectable 12.5 Gram(s) IV Push once  dextrose 50% Injectable 25 Gram(s) IV Push once  dextrose 50% Injectable 25 Gram(s) IV Push once  docusate sodium Liquid 100 milliGRAM(s) Oral two times a day  enoxaparin Injectable 40 milliGRAM(s) SubCutaneous <User Schedule>  famotidine    Tablet 20 milliGRAM(s) Oral two times a day  furosemide   Injectable 40 milliGRAM(s) IV Push daily  glucagon  Injectable 1 milliGRAM(s) IntraMuscular once PRN  insulin regular  human corrective regimen sliding scale   SubCutaneous every 6 hours  levalbuterol Inhalation 0.63 milliGRAM(s) Inhalation every 6 hours PRN  metoprolol tartrate 25 milliGRAM(s) Oral two times a day  nystatin Powder 1 Application(s) Topical two times a day PRN  senna Syrup 20 milliLiter(s) Oral at bedtime  simvastatin 40 milliGRAM(s) Oral at bedtime        FAMILY HISTORY:  Family history of cerebrovascular accident (CVA)  Family history of hypertension        Review of Systems: *CVA, has no complaints  CONSTITUTIONAL: No weakness, fevers or chills  EYES/ENT: No visual changes;  No vertigo or throat pain  NECK: No pain or stiffness  RESPIRATORY: No cough, wheezing, hemoptysis; No shortness of breath  CARDIOVASCULAR: No chest pain or palpitations  GASTROINTESTINAL: No abdominal or epigastric pain. No nausea, vomiting, or hematemesis; No diarrhea or constipation. No melena or hematochezia; +dysphagia   GENITOURINARY: No dysuria, frequency or hematuria  SKIN: No itching, burning, rashes, or lesions   All other review of systems is negative unless indicated above         Relevant Family History:   n/c    Relevant Social History: n/c      Physical Exam:    Vital Signs:  Vital Signs Last 24 Hrs  T(C): 36.9 (11 Feb 2019 15:13), Max: 36.9 (10 Feb 2019 20:00)  T(F): 98.4 (11 Feb 2019 15:13), Max: 98.5 (10 Feb 2019 20:00)  HR: 105 (11 Feb 2019 14:00) (83 - 105)  BP: 143/80 (11 Feb 2019 14:00) (108/55 - 144/65)  BP(mean): 97 (11 Feb 2019 14:00) (70 - 122)  RR: 20 (11 Feb 2019 14:00) (20 - 23)  SpO2: 95% (11 Feb 2019 14:00) (95% - 99%)  Daily     Daily     General:  Appears stated age, well-groomed, nad  HEENT:  NC/AT,  conjunctivae clear and pink, no thyromegaly, nodules, adenopathy, no JVD  Chest:  Full & symmetric excursion, no increased effort, breath sounds clear  Cardiovascular:  Regular rhythm, S1, S2, no murmur/rub/S3/S4, no abdominal bruit, no edema  Abdomen:  Soft, non-tender, non-distended, normoactive bowel sounds,  no masses ,no hepatosplenomeagaly, no signs of chronic liver disease; +NGT  Extremities:  no cyanosis,clubbing or edema  Skin:  No rash/erythema/ecchymoses/petechiae/wounds/abscess/warm/dry  Neuro/Psych:  A&Ox2, no asterixis, no tremor, no encephalopathy    Laboratory:                            14.0   8.3   )-----------( 254      ( 11 Feb 2019 05:14 )             41.9     02-11    140  |  97  |  26<H>  ----------------------------<  201<H>  4.5   |  27  |  0.70    Ca    9.7      11 Feb 2019 05:14              Imaging:

## 2019-02-11 NOTE — SWALLOW BEDSIDE ASSESSMENT ADULT - ASR SWALLOW LINGUAL MOBILITY
Slowed ROM
impaired right lateral movement/impaired anterior elevation/reduced strength and rate of motion

## 2019-02-11 NOTE — CONSULT NOTE ADULT - PROBLEM SELECTOR RECOMMENDATION 9
- in setting of CVA with failed swallow evaluations   - strict aspiration precautions with HOB elevated > 30 degrees with NGT feeds  - patient on schedule for EGD/PEG placement tomorrow, 2/12, in line with GOC and cleared by team   - JADE MANCIA - in setting of CVA with failed swallow evaluations   - strict aspiration precautions with HOB elevated > 30 degrees with NGT feeds  - patient on schedule for EGD/PEG placement tomorrow, 2/12  - NPO p MN; hold am asa/lovenox - in setting of CVA with failed swallow evaluations   - strict aspiration precautions with HOB elevated > 30 degrees with NGT feeds  - patient on schedule for EGD/PEG placement tomorrow, 2/12  - NPO p MN; hold am asa/lovenox  - please call with questions; [phone]

## 2019-02-11 NOTE — SWALLOW BEDSIDE ASSESSMENT ADULT - ORAL PHASE
Delayed oral transit time/suspect uncontrolled loss suspect uncontrolled loss Delayed oral transit time/Stasis in lateral sulci/mild, suspect uncontrolled loss

## 2019-02-11 NOTE — SWALLOW BEDSIDE ASSESSMENT ADULT - ASR SWALLOW LABIAL MOBILITY
impaired seal/impaired retraction/impaired pursing/impaired coordination
impaired pursing/reduced strength and rate of motion/impaired retraction/impaired seal

## 2019-02-11 NOTE — SWALLOW BEDSIDE ASSESSMENT ADULT - COMMENTS
Pt fed in chin tuck and instructed to swallow x2    Delayed cough and wet vocal quality following completion of exam. Pt fed in chin tuck and instructed to swallow x2    Delayed cough and wet vocal quality following completion of exam (despite use of compensatory strategies).

## 2019-02-11 NOTE — SWALLOW BEDSIDE ASSESSMENT ADULT - SWALLOW EVAL: DIAGNOSIS
Pt seen for bedside swallow evaluation s/p MBS to determine candidacy for initiation of PO diet. Pt presents with 1. an oral and known pharyngeal dysphagia marked by reduced oral management (with anterior loss and post swallow residue, most notable in the right lateral sulcus with thick puree), suspected uncontrolled loss, delayed pharyngeal swallows, reduced hyolaryngeal excursion, and wet vocal quality and coughing following completion of PO intake of thick puree and honey thick liquids (alternating textures in chin tuck posture). 2. Dysarthria.

## 2019-02-11 NOTE — SWALLOW BEDSIDE ASSESSMENT ADULT - ADDITIONAL RECOMMENDATIONS
Maintain good oral hygiene   Restorative swallow therapy.
-Oral care/speech-language eval- please enter orders

## 2019-02-11 NOTE — PROGRESS NOTE ADULT - SUBJECTIVE AND OBJECTIVE BOX
Follow-up Pulm Progress Note    No new respiratory events overnight.  Denies SOB/CP.   95% on RA  Cough resolved    Medications:  MEDICATIONS  (STANDING):  amiodarone    Tablet 200 milliGRAM(s) Oral daily  dextrose 5%. 1000 milliLiter(s) (50 mL/Hr) IV Continuous <Continuous>  dextrose 50% Injectable 12.5 Gram(s) IV Push once  dextrose 50% Injectable 25 Gram(s) IV Push once  dextrose 50% Injectable 25 Gram(s) IV Push once  docusate sodium Liquid 100 milliGRAM(s) Oral two times a day  enoxaparin Injectable 40 milliGRAM(s) SubCutaneous once  famotidine    Tablet 20 milliGRAM(s) Oral two times a day  furosemide   Injectable 40 milliGRAM(s) IV Push daily  insulin regular  human corrective regimen sliding scale   SubCutaneous every 6 hours  metoprolol tartrate 25 milliGRAM(s) Oral two times a day  senna Syrup 20 milliLiter(s) Oral at bedtime  simvastatin 40 milliGRAM(s) Oral at bedtime    MEDICATIONS  (PRN):  acetaminophen    Suspension .. 650 milliGRAM(s) Oral every 6 hours PRN Temp greater or equal to 38C (100.4F), Mild Pain (1 - 3)  dextrose 40% Gel 15 Gram(s) Oral once PRN Blood Glucose LESS THAN 70 milliGRAM(s)/deciLiter  glucagon  Injectable 1 milliGRAM(s) IntraMuscular once PRN Glucose <70 milliGRAM(s)/deciLiter  levalbuterol Inhalation 0.63 milliGRAM(s) Inhalation every 6 hours PRN SOB/Wheezing  nystatin Powder 1 Application(s) Topical two times a day PRN dermatitis          Vital Signs Last 24 Hrs  T(C): 36.9 (11 Feb 2019 15:13), Max: 36.9 (10 Feb 2019 20:00)  T(F): 98.4 (11 Feb 2019 15:13), Max: 98.5 (10 Feb 2019 20:00)  HR: 106 (11 Feb 2019 16:00) (83 - 106)  BP: 133/63 (11 Feb 2019 16:00) (108/55 - 143/89)  BP(mean): 82 (11 Feb 2019 16:00) (70 - 122)  RR: 19 (11 Feb 2019 16:00) (19 - 23)  SpO2: 98% (11 Feb 2019 16:00) (95% - 99%) on RA          02-10 @ 07:01  -  02-11 @ 07:00  --------------------------------------------------------  IN: 1150 mL / OUT: 850 mL / NET: 300 mL          LABS:                        14.0   8.3   )-----------( 254      ( 11 Feb 2019 05:14 )             41.9     02-11    140  |  97  |  26<H>  ----------------------------<  201<H>  4.5   |  27  |  0.70    Ca    9.7      11 Feb 2019 05:14            CAPILLARY BLOOD GLUCOSE      POCT Blood Glucose.: 185 mg/dL (11 Feb 2019 12:08)        Physical Examination:  PULM: Clear to auscultation bilaterally, no significant sputum production  CVS: S1, S2 heard    RADIOLOGY REVIEWED  CXR: L pleural effusion  Pulm edema

## 2019-02-11 NOTE — SWALLOW BEDSIDE ASSESSMENT ADULT - SLP GENERAL OBSERVATIONS
Pt awake in bed, KFT in place, R facial droop, daughter present at bedside. Pt able to communicate simple needs and follow directions for exam. + Dysarthria.

## 2019-02-11 NOTE — CONSULT NOTE ADULT - ASSESSMENT
87 y/o F with PMH of CAD s/p stents and CABG, DMT2, HTN, s/p AVR, PPM presents with L MCA CVA outside of window for tPA. Extubated 2/5. Hospital course c/b dysphagia. Called to eval for cough and congestion. CXR with small L pleural effusion, bibasilar atelectasis. 98% on RA.
87 y/o female a/w left sided weakness,  CTA showed evidence of a L M1 occlusion so she was transferred to [facility] for further management , outside time frame for TpA, s/p thrombectomy and revascularization  called for goals of care
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 Has been tachycardic
89yo female h/o CAD s/p stents and CABG on aspirin at home, DM, HTN, aortic valve replacement, and pacemaker who presented to [facility] as a transfer from [facility] with right hemiparesis and aphasia and left proximal MCA occlusion now with dysphagia and failed swallow evaluations

## 2019-02-11 NOTE — SWALLOW BEDSIDE ASSESSMENT ADULT - CONSISTENCIES ADMINISTERED
puree thin honey thick puree thick ~ 3 ounces applesauce/puree thin ~ 6 ounces thickened juice/honey thick ~ 4 ounces ensure pudding/puree thick

## 2019-02-11 NOTE — CONSULT NOTE ADULT - PROBLEM SELECTOR RECOMMENDATION 3
- rate control  - care per cardiology appreciated
RISS
acute, non traumatic  2/2 S/P  ischemic stroke
c/w TF via NGT  -f/u MBS results

## 2019-02-11 NOTE — SWALLOW BEDSIDE ASSESSMENT ADULT - ASR SWALLOW RECOMMEND DIAG
MD, PLEASE ENTER ORDER FOR MODIFIED BARIUM SWALLOW STUDY (ENTER UNDER RADIOLOGY EXAMS THE FOLLOWING WAY: GO TO THE BROWSER AND TYPE IN XRAY, THEN HIT THE SPACEBAR, AND TYPE IN THE LETTER M.)  WILL SCHEDULE EXAM UPON RECEIPT IN RADIOLOGY./VFSS/MBS
Defer objective testing as MBS completed on 2/8 and unlikely to

## 2019-02-11 NOTE — PROGRESS NOTE ADULT - SUBJECTIVE AND OBJECTIVE BOX
Subjective: Patient seen and examined. No new events except as noted.   remains in Stroke unit   NGT remains   no cp or sob       REVIEW OF SYSTEMS:    CONSTITUTIONAL: + weakness, fevers or chills  EYES/ENT: No visual changes;  No vertigo or throat pain   NECK: No pain or stiffness  RESPIRATORY: No cough, wheezing, hemoptysis; No shortness of breath  CARDIOVASCULAR: No chest pain or palpitations  GASTROINTESTINAL: No abdominal or epigastric pain. No nausea, vomiting, or hematemesis; No diarrhea or constipation. No melena or hematochezia.  GENITOURINARY: No dysuria, frequency or hematuria  NEUROLOGICAL: + numbness or weakness  SKIN: No itching, burning, rashes, or lesions   All other review of systems is negative unless indicated above.    MEDICATIONS:  MEDICATIONS  (STANDING):  amiodarone    Tablet 200 milliGRAM(s) Oral daily  dextrose 5%. 1000 milliLiter(s) (50 mL/Hr) IV Continuous <Continuous>  dextrose 50% Injectable 12.5 Gram(s) IV Push once  dextrose 50% Injectable 25 Gram(s) IV Push once  dextrose 50% Injectable 25 Gram(s) IV Push once  docusate sodium Liquid 100 milliGRAM(s) Oral two times a day  famotidine    Tablet 20 milliGRAM(s) Oral two times a day  furosemide   Injectable 40 milliGRAM(s) IV Push daily  insulin regular  human corrective regimen sliding scale   SubCutaneous every 6 hours  metoprolol tartrate 25 milliGRAM(s) Oral two times a day  senna Syrup 20 milliLiter(s) Oral at bedtime  simvastatin 40 milliGRAM(s) Oral at bedtime      PHYSICAL EXAM:  T(C): 37.3 (02-11-19 @ 19:34), Max: 37.3 (02-11-19 @ 19:34)  HR: 91 (02-11-19 @ 20:00) (83 - 106)  BP: 121/69 (02-11-19 @ 20:00) (108/55 - 143/80)  RR: 23 (02-11-19 @ 20:00) (19 - 23)  SpO2: 98% (02-11-19 @ 20:00) (95% - 99%)  Wt(kg): --  I&O's Summary    10 Feb 2019 07:01  -  11 Feb 2019 07:00  --------------------------------------------------------  IN: 1150 mL / OUT: 850 mL / NET: 300 mL      Appearance: NAD very lethargic 	  HEENT:   Normal oral mucosa, PERRL, EOMI +NGT 	  Lymphatic: No lymphadenopathy  Cardiovascular: tachycardic S1 S2, No JVD, No murmurs, No edema  Respiratory: decreased bs   Psychiatry: A & O x 3, lethargic   Gastrointestinal:  Soft, Non-tender, + BS	  Skin: No rashes, No ecchymoses, No cyanosis	  NEUROLOGICAL EXAM:  Mental status: eyes open, awake, attentive to persons at bedside, able to state name, hypophonic follows some simple commands, perseverates at times   Cranial Nerves:  right facial droop, no blink to threat on right   Motor exam: left side moves well against gravity, RUE 3/5 with drift , RLE 2/5   Sensation: Intact to light touch   Coordination/ Gait: gait not assessed       LABS:    CARDIAC MARKERS:                                14.0   8.3   )-----------( 254      ( 11 Feb 2019 05:14 )             41.9     02-11    140  |  97  |  26<H>  ----------------------------<  201<H>  4.5   |  27  |  0.70    Ca    9.7      11 Feb 2019 05:14      proBNP:   Lipid Profile:   HgA1c:   TSH:             TELEMETRY: 	SR/ST     ECG:  	  RADIOLOGY:   DIAGNOSTIC TESTING:  [ ] Echocardiogram:  [ ]  Catheterization:  [ ] Stress Test:    OTHER:

## 2019-02-11 NOTE — PROGRESS NOTE ADULT - ASSESSMENT
88 year old white female with a pmh of CAD, HTN, HLD, pacemaker who presented to [facility] as a transfer from [facility] with right hemiparesis and aphasia and left proximal MCA occlusion. The patient was last known well at 2 pm on 2/3/19 and was then found down on the ground by her daughter several hours later and was noted to be not speaking and weak on the right. At [facility] a CTA head and neck was performed which showed a proximal left MCA occlusion, and a CT head showed a dense left MCA sign with some hypodensity and early ischemic changes to my eye in the left MCA distribution. She did not receive IV tPA as she was out of the window and was transferred to [facility] for possible mechanical thrombectomy. At [facility] her CT perfusion showed zero core infarct, with a penumbra of 80 mls, and an infinite mismatch. She was deemed a candidate for intervention and recanalization was achieved with TICI 3 score.     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    NEURO: neurologically with improvement in dysarthria and Right sided movement, CTH performed on 2/8 shows L MCA infarct with petechial hemorrhagic transformation, continue close monitoring for neurologic deterioration,  normotension in setting of recent recanalization, LDL 55 on admission- continue on home dose of simvastatin, MR imaging not possible due to pacemaker incompatibility. Physical therapy/OT recommended acute TBI rehab    ANTITHROMBOTIC THERAPY: ASA with plans to transition to anticoagulation (Coumadin in setting of AVR) after repeat stable imaging and after stable enteral access obtained in setting of atrial flutter found on pacemaker interrogation    PULMONARY: protecting airway, saturating well, secretions requiring suctioning PRN, continue on Xopenex PRN. CXR on 2/8 showed unchanged mild pulmonary vascular congestion and a small left pleural effusion with associated atelectasis, on IV lasix daily with good urine output.     CARDIOVASCULAR: EF 65-70% moderate AS, moderate pulm HTN, mild-mod TR and no PFO, cardiac monitoring at times shows sinus tachycardia - started on Amiodarone and Metoprolol via NGT, PPM interrogation- showed episode of atrial flutter, plan to AC. Continue on IV Lasix.               SBP goal: Normotension     GASTROINTESTINAL: dysphagia screen noted failed, S&S recommended NPO. She underwent a MBS on 2/8 and was recommended to keep NPO and will re-evaluate on 2/11-2/12 given patient has demonstrated neurological recovery.      Diet: npo with TF via NGT    RENAL: BUN elevated/Cr within limits, maintain adequate hydration, good urine output      Na Goal: Greater than 135     Gonzalez:N    HEMATOLOGY: H/H without anemia, Platelets 254, LE duplex negative for DVT     DVT ppx: Heparin s.c [] LMWH [x]     ID: afebrile, no leukocytosis, RVP negative    DISPOSITION: Acute TBI rehab once stable enteral access established    CORE MEASURES:        Admission NIHSS: 19     TPA: [] YES [x] NO      LDL/HDL:55/37     Depression Screen: 0     Statin Therapy: y     Dysphagia Screen: [] PASS [x] FAIL      Smoking [] YES [x] NO      Afib [] YES [x] NO     Stroke Education [x] YES [] NO

## 2019-02-11 NOTE — SWALLOW BEDSIDE ASSESSMENT ADULT - PHARYNGEAL PHASE
Delayed pharyngeal swallow/Decreased laryngeal elevation
Decreased laryngeal elevation/Delayed pharyngeal swallow

## 2019-02-11 NOTE — PROGRESS NOTE ADULT - SUBJECTIVE AND OBJECTIVE BOX
THE PATIENT WAS SEEN AND EXAMINED BY ME WITH THE HOUSESTAFF AND STROKE TEAM DURING MORNING ROUNDS.   HPI:  Patient is an 88 year old right handed  female with a history of CAD s/p stents and CABG on aspirin at home, DM, HTN, aortic valve replacement, and pacemaker who presented as transfer from [facility] for stroke rescue. Her last known normal was at approximately 2 pm on 2/3. Robbins was then noted to have left sided weakness and sudden onset inability to speak, so she was brought to [facility]. She was outside the appropriate time window for tPA, so tPA was not given. CTA showed evidence of a L M1 occlusion so she was transferred to [facility] and code stroke called upon arrival. Initial NIHSS at [facility] was 19, MRS 1.      SUBJECTIVE: No events overnight.  No new neurologic complaints.      acetaminophen    Suspension .. 650 milliGRAM(s) Oral every 6 hours PRN  amiodarone    Tablet 200 milliGRAM(s) Oral daily  aspirin  chewable 81 milliGRAM(s) Oral daily  dextrose 40% Gel 15 Gram(s) Oral once PRN  dextrose 5%. 1000 milliLiter(s) IV Continuous <Continuous>  dextrose 50% Injectable 12.5 Gram(s) IV Push once  dextrose 50% Injectable 25 Gram(s) IV Push once  dextrose 50% Injectable 25 Gram(s) IV Push once  docusate sodium Liquid 100 milliGRAM(s) Oral two times a day  enoxaparin Injectable 40 milliGRAM(s) SubCutaneous <User Schedule>  famotidine    Tablet 20 milliGRAM(s) Oral two times a day  furosemide   Injectable 40 milliGRAM(s) IV Push daily  glucagon  Injectable 1 milliGRAM(s) IntraMuscular once PRN  insulin regular  human corrective regimen sliding scale   SubCutaneous every 6 hours  levalbuterol Inhalation 0.63 milliGRAM(s) Inhalation every 6 hours PRN  metoprolol tartrate 25 milliGRAM(s) Oral two times a day  nystatin Powder 1 Application(s) Topical two times a day PRN  senna Syrup 20 milliLiter(s) Oral at bedtime  simvastatin 40 milliGRAM(s) Oral at bedtime      PHYSICAL EXAM:   Vital Signs Last 24 Hrs  T(C): 36.9 (10 Feb 2019 20:00), Max: 36.9 (10 Feb 2019 20:00)  T(F): 98.5 (10 Feb 2019 20:00), Max: 98.5 (10 Feb 2019 20:00)  HR: 97 (11 Feb 2019 06:00) (80 - 120)  BP: 134/93 (11 Feb 2019 06:00) (108/55 - 144/65)  BP(mean): 101 (11 Feb 2019 06:00) (70 - 102)  RR: 20 (11 Feb 2019 06:00) (20 - 30)  SpO2: 98% (11 Feb 2019 06:00) (97% - 99%)    General: No acute distress, resting comfortably in bed  HEENT: EOM intact, no blink to threat on right   Abdomen: nontender  Extremities: No edema    NEUROLOGICAL EXAM:  Mental status: eyes open, awake, attentive to persons at bedside, oriented to name, hypophonic follows simple commands, perseverates at times, IDs objects   Cranial Nerves: right facial droop, no blink to threat on right   Motor exam: left side moves well against gravity, right sided hemiparesis: RUE 3/5 RLE 2/5   Sensation: Intact to light touch   Coordination/ Gait: gait not assessed     LABS:                        14.0   8.3   )-----------( 254      ( 11 Feb 2019 05:14 )             41.9    02-11    140  |  97  |  26<H>  ----------------------------<  201<H>  4.5   |  27  |  0.70    Ca    9.7      11 Feb 2019 05:14      Hemoglobin A1C, Whole Blood: 6.6 % (02-04 @ 06:11)      IMAGING: Reviewed by me.   CT Head No Cont (02.08.19)  Increasing hypodensity involves the left basal ganglia, internal capsule,   corona radiata, and insula, consistent with an evolving acute left middle   cerebral artery territory infarct. Vague increased areas of density   involve the infarct which may reflect petechial hemorrhagic   transformation, retained contrast from the angiogram procedure, or a   combination of both. There is no focal parenchymal hematoma.    CT Head No Cont (02.05.19)   Previously seen increased attenuation within left sided sulci is   decreased on the current examination, and may represent the presence of   residual intravenous contrast and/or subarachnoid hemorrhage.  No no hydrocephalus or midline shift. No effacement of basal cisterns    (02.03.19)   CT angiography neck: Patent cervical vasculature.  No hemodynamically   significant carotid stenosis or flow-limiting vertebral artery stenosis.    No evidence of dissection. Three-vessel arch.  Vertebral arteries are   codominant.    CT angiography brain:   1.  The M1 segment of the left middle cerebral artery is occluded,   approximately 8 mm distal to its origin.  Multiple proximal M2 branches   of the left MCA are occluded within the sylvian fissure.  There is some   reconstitution of blood flow within distal M2 branches and M3 branches of   the left middle cerebral artery.  CT perfusion is recommended for further   characterization.  2.  Atherosclerotic calcification causes diffuse mild narrowing in the   cavernous and supraclinoid segments of the internal carotid arteries   without flow-limiting stenosis.  3.  Atherosclerotic calcification causes moderate to severe stenosis in   the bilateral intradural vertebral arteries.  Mild luminal irregularity   of the basilar artery without flow-limiting stenosis.  Multiple mild to   moderate stenoses in the proximal P2 segment of the left posterior   cerebral artery.  The right posterior cerebral artery is diffusely small   and irregular, and appears hypoenhancing compared to the contralateral   side.    CT Perfusion w/ Maps w/ IV Cont (02.03.19)   NONCONTRAST HEAD CT SCAN: No CT evidence of acute intracranial   hemorrhage, mass effect or acute territorial infarct.    CT PERFUSION: No evidence of a completed infarct.  Approximately 80 cc   penumbra of at risk tissue in the left MCA vascular territory. THE PATIENT WAS SEEN AND EXAMINED BY ME WITH THE HOUSESTAFF AND STROKE TEAM DURING MORNING ROUNDS.   HPI:  Patient is an 88 year old right handed  female with a history of CAD s/p stents and CABG on aspirin at home, DM, HTN, aortic valve replacement, and pacemaker who presented as transfer from [facility] for stroke rescue. Her last known normal was at approximately 2 pm on 2/3. Robbins was then noted to have left sided weakness and sudden onset inability to speak, so she was brought to [facility]. She was outside the appropriate time window for tPA, so tPA was not given. CTA showed evidence of a L M1 occlusion so she was transferred to [facility] and code stroke called upon arrival. Initial NIHSS at [facility] was 19, MRS 1.    SUBJECTIVE: No events overnight.  No new neurologic complaints.      acetaminophen    Suspension .. 650 milliGRAM(s) Oral every 6 hours PRN  amiodarone    Tablet 200 milliGRAM(s) Oral daily  aspirin  chewable 81 milliGRAM(s) Oral daily  dextrose 40% Gel 15 Gram(s) Oral once PRN  dextrose 5%. 1000 milliLiter(s) IV Continuous <Continuous>  dextrose 50% Injectable 12.5 Gram(s) IV Push once  dextrose 50% Injectable 25 Gram(s) IV Push once  dextrose 50% Injectable 25 Gram(s) IV Push once  docusate sodium Liquid 100 milliGRAM(s) Oral two times a day  enoxaparin Injectable 40 milliGRAM(s) SubCutaneous <User Schedule>  famotidine    Tablet 20 milliGRAM(s) Oral two times a day  furosemide   Injectable 40 milliGRAM(s) IV Push daily  glucagon  Injectable 1 milliGRAM(s) IntraMuscular once PRN  insulin regular  human corrective regimen sliding scale   SubCutaneous every 6 hours  levalbuterol Inhalation 0.63 milliGRAM(s) Inhalation every 6 hours PRN  metoprolol tartrate 25 milliGRAM(s) Oral two times a day  nystatin Powder 1 Application(s) Topical two times a day PRN  senna Syrup 20 milliLiter(s) Oral at bedtime  simvastatin 40 milliGRAM(s) Oral at bedtime      PHYSICAL EXAM:   Vital Signs Last 24 Hrs  T(C): 36.9 (10 Feb 2019 20:00), Max: 36.9 (10 Feb 2019 20:00)  T(F): 98.5 (10 Feb 2019 20:00), Max: 98.5 (10 Feb 2019 20:00)  HR: 97 (11 Feb 2019 06:00) (80 - 120)  BP: 134/93 (11 Feb 2019 06:00) (108/55 - 144/65)  BP(mean): 101 (11 Feb 2019 06:00) (70 - 102)  RR: 20 (11 Feb 2019 06:00) (20 - 30)  SpO2: 98% (11 Feb 2019 06:00) (97% - 99%)    General: No acute distress, resting comfortably in bed  HEENT: EOM intact, no blink to threat on right   Abdomen: nontender  Extremities: No edema    NEUROLOGICAL EXAM:  Mental status: eyes open, awake, attentive, states age 88, oriented to name, hypophonic follows simple commands, perseverates at times,R/L confusion but able to cross midline with repeated attempts   Cranial Nerves: right facial droop, no blink to threat on right, dysarthria   Motor exam: left side moves well against gravity, right sided hemiparesis: RUE 3/5 drift at elbow,  3/5 RLE 2/5 able to wiggle toes    Sensation: Intact to light touch   Coordination/ Gait: gait not assessed     LABS:                        14.0   8.3   )-----------( 254      ( 11 Feb 2019 05:14 )             41.9    02-11    140  |  97  |  26<H>  ----------------------------<  201<H>  4.5   |  27  |  0.70    Ca    9.7      11 Feb 2019 05:14      Hemoglobin A1C, Whole Blood: 6.6 % (02-04 @ 06:11)      IMAGING: Reviewed by me.   CT Head No Cont (02.08.19)  Increasing hypodensity involves the left basal ganglia, internal capsule,   corona radiata, and insula, consistent with an evolving acute left middle   cerebral artery territory infarct. Vague increased areas of density   involve the infarct which may reflect petechial hemorrhagic   transformation, retained contrast from the angiogram procedure, or a   combination of both. There is no focal parenchymal hematoma.    CT Head No Cont (02.05.19)   Previously seen increased attenuation within left sided sulci is   decreased on the current examination, and may represent the presence of   residual intravenous contrast and/or subarachnoid hemorrhage.  No no hydrocephalus or midline shift. No effacement of basal cisterns    (02.03.19)   CT angiography neck: Patent cervical vasculature.  No hemodynamically   significant carotid stenosis or flow-limiting vertebral artery stenosis.    No evidence of dissection. Three-vessel arch.  Vertebral arteries are   codominant.    CT angiography brain:   1.  The M1 segment of the left middle cerebral artery is occluded,   approximately 8 mm distal to its origin.  Multiple proximal M2 branches   of the left MCA are occluded within the sylvian fissure.  There is some   reconstitution of blood flow within distal M2 branches and M3 branches of   the left middle cerebral artery.  CT perfusion is recommended for further   characterization.  2.  Atherosclerotic calcification causes diffuse mild narrowing in the   cavernous and supraclinoid segments of the internal carotid arteries   without flow-limiting stenosis.  3.  Atherosclerotic calcification causes moderate to severe stenosis in   the bilateral intradural vertebral arteries.  Mild luminal irregularity   of the basilar artery without flow-limiting stenosis.  Multiple mild to   moderate stenoses in the proximal P2 segment of the left posterior   cerebral artery.  The right posterior cerebral artery is diffusely small   and irregular, and appears hypoenhancing compared to the contralateral   side.    CT Perfusion w/ Maps w/ IV Cont (02.03.19)   NONCONTRAST HEAD CT SCAN: No CT evidence of acute intracranial   hemorrhage, mass effect or acute territorial infarct.    CT PERFUSION: No evidence of a completed infarct.  Approximately 80 cc   penumbra of at risk tissue in the left MCA vascular territory.

## 2019-02-11 NOTE — PROGRESS NOTE ADULT - ASSESSMENT
87 y/o F with PMH of CAD s/p stents and CABG, DMT2, HTN, s/p AVR, PPM presents with L MCA CVA outside of window for tPA. Extubated 2/5. Hospital course c/b dysphagia. Called to eval for cough and congestion. CXR with small L pleural effusion, bibasilar atelectasis.

## 2019-02-12 LAB
ANION GAP SERPL CALC-SCNC: 16 MMOL/L — SIGNIFICANT CHANGE UP (ref 5–17)
BUN SERPL-MCNC: 29 MG/DL — HIGH (ref 7–23)
CALCIUM SERPL-MCNC: 9.5 MG/DL — SIGNIFICANT CHANGE UP (ref 8.4–10.5)
CHLORIDE SERPL-SCNC: 94 MMOL/L — LOW (ref 96–108)
CO2 SERPL-SCNC: 27 MMOL/L — SIGNIFICANT CHANGE UP (ref 22–31)
CREAT SERPL-MCNC: 0.68 MG/DL — SIGNIFICANT CHANGE UP (ref 0.5–1.3)
GLUCOSE SERPL-MCNC: 190 MG/DL — HIGH (ref 70–99)
HCT VFR BLD CALC: 40.6 % — SIGNIFICANT CHANGE UP (ref 34.5–45)
HGB BLD-MCNC: 13.8 G/DL — SIGNIFICANT CHANGE UP (ref 11.5–15.5)
MCHC RBC-ENTMCNC: 30.7 PG — SIGNIFICANT CHANGE UP (ref 27–34)
MCHC RBC-ENTMCNC: 34.1 GM/DL — SIGNIFICANT CHANGE UP (ref 32–36)
MCV RBC AUTO: 90.2 FL — SIGNIFICANT CHANGE UP (ref 80–100)
PLATELET # BLD AUTO: 249 K/UL — SIGNIFICANT CHANGE UP (ref 150–400)
POTASSIUM SERPL-MCNC: 4 MMOL/L — SIGNIFICANT CHANGE UP (ref 3.5–5.3)
POTASSIUM SERPL-SCNC: 4 MMOL/L — SIGNIFICANT CHANGE UP (ref 3.5–5.3)
RBC # BLD: 4.5 M/UL — SIGNIFICANT CHANGE UP (ref 3.8–5.2)
RBC # FLD: 12.7 % — SIGNIFICANT CHANGE UP (ref 10.3–14.5)
SODIUM SERPL-SCNC: 137 MMOL/L — SIGNIFICANT CHANGE UP (ref 135–145)
WBC # BLD: 7.5 K/UL — SIGNIFICANT CHANGE UP (ref 3.8–10.5)
WBC # FLD AUTO: 7.5 K/UL — SIGNIFICANT CHANGE UP (ref 3.8–10.5)

## 2019-02-12 PROCEDURE — 99232 SBSQ HOSP IP/OBS MODERATE 35: CPT

## 2019-02-12 PROCEDURE — 70450 CT HEAD/BRAIN W/O DYE: CPT | Mod: 26

## 2019-02-12 RX ORDER — ENOXAPARIN SODIUM 100 MG/ML
40 INJECTION SUBCUTANEOUS AT BEDTIME
Qty: 0 | Refills: 0 | Status: DISCONTINUED | OUTPATIENT
Start: 2019-02-12 | End: 2019-02-14

## 2019-02-12 RX ORDER — ASPIRIN/CALCIUM CARB/MAGNESIUM 324 MG
81 TABLET ORAL
Qty: 0 | Refills: 0 | Status: DISCONTINUED | OUTPATIENT
Start: 2019-02-12 | End: 2019-02-14

## 2019-02-12 RX ORDER — METOPROLOL TARTRATE 50 MG
25 TABLET ORAL THREE TIMES A DAY
Qty: 0 | Refills: 0 | Status: DISCONTINUED | OUTPATIENT
Start: 2019-02-12 | End: 2019-02-14

## 2019-02-12 RX ORDER — FUROSEMIDE 40 MG
40 TABLET ORAL DAILY
Qty: 0 | Refills: 0 | Status: DISCONTINUED | OUTPATIENT
Start: 2019-02-12 | End: 2019-02-12

## 2019-02-12 RX ORDER — FUROSEMIDE 40 MG
40 TABLET ORAL DAILY
Qty: 0 | Refills: 0 | Status: DISCONTINUED | OUTPATIENT
Start: 2019-02-13 | End: 2019-02-13

## 2019-02-12 RX ADMIN — Medication 81 MILLIGRAM(S): at 22:23

## 2019-02-12 RX ADMIN — INSULIN HUMAN 2: 100 INJECTION, SOLUTION SUBCUTANEOUS at 12:31

## 2019-02-12 RX ADMIN — Medication 25 MILLIGRAM(S): at 05:24

## 2019-02-12 RX ADMIN — Medication 40 MILLIGRAM(S): at 05:24

## 2019-02-12 RX ADMIN — Medication 100 MILLIGRAM(S): at 17:04

## 2019-02-12 RX ADMIN — INSULIN HUMAN 2: 100 INJECTION, SOLUTION SUBCUTANEOUS at 23:26

## 2019-02-12 RX ADMIN — SIMVASTATIN 40 MILLIGRAM(S): 20 TABLET, FILM COATED ORAL at 22:24

## 2019-02-12 RX ADMIN — ENOXAPARIN SODIUM 40 MILLIGRAM(S): 100 INJECTION SUBCUTANEOUS at 22:24

## 2019-02-12 RX ADMIN — INSULIN HUMAN 2: 100 INJECTION, SOLUTION SUBCUTANEOUS at 17:37

## 2019-02-12 RX ADMIN — FAMOTIDINE 20 MILLIGRAM(S): 10 INJECTION INTRAVENOUS at 17:04

## 2019-02-12 RX ADMIN — Medication 25 MILLIGRAM(S): at 22:24

## 2019-02-12 RX ADMIN — INSULIN HUMAN 2: 100 INJECTION, SOLUTION SUBCUTANEOUS at 05:22

## 2019-02-12 RX ADMIN — SENNA PLUS 20 MILLILITER(S): 8.6 TABLET ORAL at 22:24

## 2019-02-12 RX ADMIN — AMIODARONE HYDROCHLORIDE 200 MILLIGRAM(S): 400 TABLET ORAL at 05:24

## 2019-02-12 NOTE — PROGRESS NOTE ADULT - SUBJECTIVE AND OBJECTIVE BOX
THE PATIENT WAS SEEN AND EXAMINED BY ME WITH THE HOUSESTAFF AND STROKE TEAM DURING MORNING ROUNDS.   HPI:  Patient is an 88 year old right handed  female with a history of CAD s/p stents and CABG on aspirin at home, DM, HTN, aortic valve replacement, and pacemaker who presented as transfer from [facility] for stroke rescue. Her last known normal was at approximately 2 pm on 2/3. Robbins was then noted to have left sided weakness and sudden onset inability to speak, so she was brought to [facility]. She was outside the appropriate time window for tPA, so tPA was not given. CTA showed evidence of a L M1 occlusion so she was transferred to [facility] and code stroke called upon arrival. Initial NIHSS at [facility] was 19, MRS 1.    SUBJECTIVE: No events overnight.  No new neurologic complaints.      acetaminophen    Suspension .. 650 milliGRAM(s) Oral every 6 hours PRN  amiodarone    Tablet 200 milliGRAM(s) Oral daily  dextrose 40% Gel 15 Gram(s) Oral once PRN  dextrose 5%. 1000 milliLiter(s) IV Continuous <Continuous>  dextrose 50% Injectable 12.5 Gram(s) IV Push once  dextrose 50% Injectable 25 Gram(s) IV Push once  dextrose 50% Injectable 25 Gram(s) IV Push once  docusate sodium Liquid 100 milliGRAM(s) Oral two times a day  famotidine    Tablet 20 milliGRAM(s) Oral two times a day  furosemide   Injectable 40 milliGRAM(s) IV Push daily  glucagon  Injectable 1 milliGRAM(s) IntraMuscular once PRN  insulin regular  human corrective regimen sliding scale   SubCutaneous every 6 hours  levalbuterol Inhalation 0.63 milliGRAM(s) Inhalation every 6 hours PRN  metoprolol tartrate 25 milliGRAM(s) Oral three times a day  nystatin Powder 1 Application(s) Topical two times a day PRN  senna Syrup 20 milliLiter(s) Oral at bedtime  simvastatin 40 milliGRAM(s) Oral at bedtime      PHYSICAL EXAM:   Vital Signs Last 24 Hrs  T(C): 37.3 (11 Feb 2019 19:34), Max: 37.3 (11 Feb 2019 19:34)  T(F): 99.1 (11 Feb 2019 19:34), Max: 99.1 (11 Feb 2019 19:34)  HR: 88 (12 Feb 2019 14:06) (80 - 106)  BP: 143/81 (12 Feb 2019 14:06) (121/69 - 148/90)  BP(mean): 101 (12 Feb 2019 14:06) (69 - 110)  RR: 20 (12 Feb 2019 14:06) (16 - 23)  SpO2: 96% (12 Feb 2019 14:06) (94% - 98%)    General: No acute distress   HEENT: EOM intact, no blink to threat on right   Abdomen: nontender  Extremities: No edema    NEUROLOGICAL EXAM:  Mental status: eyes open, awake, attentive, states age 88, oriented to name, year and month, aware and inquiring re: PEG placement timing, hypophonic follows commands, perseverates at times,R/L confusion but able to cross midline with repeated attempts   Cranial Nerves: right facial droop, no blink to threat on right, dysarthria   Motor exam: left side moves well against gravity, right sided hemiparesis: RUE 3-/5 drift at elbow,  3/5 RLE 2/5 able to wiggle toes    Sensation: Intact to light touch   Coordination/ Gait: gait not assessed   LABS:                        13.8   7.5   )-----------( 249      ( 12 Feb 2019 01:39 )             40.6    02-12    137  |  94<L>  |  29<H>  ----------------------------<  190<H>  4.0   |  27  |  0.68    Ca    9.5      12 Feb 2019 01:39      Hemoglobin A1C, Whole Blood: 6.6 % (02-04 @ 06:11)      IMAGING: Reviewed by me.   CT Head No Cont (02.08.19)  Increasing hypodensity involves the left basal ganglia, internal capsule,   corona radiata, and insula, consistent with an evolving acute left middle   cerebral artery territory infarct. Vague increased areas of density   involve the infarct which may reflect petechial hemorrhagic   transformation, retained contrast from the angiogram procedure, or a   combination of both. There is no focal parenchymal hematoma.    CT Head No Cont (02.05.19)   Previously seen increased attenuation within left sided sulci is   decreased on the current examination, and may represent the presence of   residual intravenous contrast and/or subarachnoid hemorrhage.  No no hydrocephalus or midline shift. No effacement of basal cisterns    (02.03.19)   CT angiography neck: Patent cervical vasculature.  No hemodynamically   significant carotid stenosis or flow-limiting vertebral artery stenosis.    No evidence of dissection. Three-vessel arch.  Vertebral arteries are   codominant.    CT angiography brain:   1.  The M1 segment of the left middle cerebral artery is occluded,   approximately 8 mm distal to its origin.  Multiple proximal M2 branches   of the left MCA are occluded within the sylvian fissure.  There is some   reconstitution of blood flow within distal M2 branches and M3 branches of   the left middle cerebral artery.  CT perfusion is recommended for further   characterization.  2.  Atherosclerotic calcification causes diffuse mild narrowing in the   cavernous and supraclinoid segments of the internal carotid arteries   without flow-limiting stenosis.  3.  Atherosclerotic calcification causes moderate to severe stenosis in   the bilateral intradural vertebral arteries.  Mild luminal irregularity   of the basilar artery without flow-limiting stenosis.  Multiple mild to   moderate stenoses in the proximal P2 segment of the left posterior   cerebral artery.  The right posterior cerebral artery is diffusely small   and irregular, and appears hypoenhancing compared to the contralateral   side.    CT Perfusion w/ Maps w/ IV Cont (02.03.19)   NONCONTRAST HEAD CT SCAN: No CT evidence of acute intracranial   hemorrhage, mass effect or acute territorial infarct.    CT PERFUSION: No evidence of a completed infarct.  Approximately 80 cc   penumbra of at risk tissue in the left MCA vascular territory.

## 2019-02-12 NOTE — PROVIDER CONTACT NOTE (OTHER) - SITUATION
Lethargic, disoriented to time and situation.  Answers only to age, RUE, RLE no movement. LLE some effort

## 2019-02-12 NOTE — PROVIDER CONTACT NOTE (OTHER) - DATE AND TIME:
06-Feb-2019 23:45
08-Feb-2019 21:09
08-Feb-2019 21:31
12-Feb-2019 20:42
well-groomed/well-developed/well-nourished/no distress

## 2019-02-12 NOTE — PROVIDER CONTACT NOTE (OTHER) - ASSESSMENT
Lethargic, disoriented to time and situation.  Answers only to age, RUE, RLE no movement. LLE some effort.  /64, HR 88, resp 22 pox 100% on room air.

## 2019-02-12 NOTE — PRE-ANESTHESIA EVALUATION ADULT - BP NONINVASIVE SYSTOLIC (MM HG)
6/21/2018      Sandip Antunez MD  [address] [facility] [address]      RE: Seven Savage Jr.       Dear Colleague,    I had the pleasure of seeing Seven CHA Hussein Finley in the [facility].    Service Date: 06/21/2018      HISTORY OF PRESENT ILLNESS:  Mr. Savage is a very pleasant 83-year-old male who is a transplant from the [place], [place] area.  He moved here recently to help take care of his son who has an eye condition and also help with him financially.      He has a longstanding history of COPD, emphysema from previous tobacco abuse, end-stage renal disease and is on dialysis.  He is committed to [facility] 3 times per week for dialysis and is following with Dr. Samuel in Nephrology.  He was also diagnosed with aortic stenosis and most recent echocardiogram from 06/07, prior to his move, showed that he had normal LV systolic function with an EF estimated around 55%-60%.  He does have moderate LVH, moderate mitral insufficiency and severe aortic stenosis with a mean gradient of 50, a valve area calculated at 0.7.  He had borderline elevated pulmonary pressures and RV function appeared normal.        He was recently hospitalized in April for a CHF and COPD exacerbation and treated for this successfully.  He states this was due to dietary indiscretion when he went back to [place].  He did eat a very high salt intake which led to his breathing difficulties.  He has subsequently since decrease his sodium, however, it is difficult for him because of the type of foods that he loves.        He has been maintained on an unusual antihypertensive regimen for a dialysis patient.  He has been on eplerenone 50 mg daily in combination with lisinopril 20 mg daily, diltiazem and Cardura as well as carvedilol.  He has been on this combination, including eplerenone, for over 4 years and not had any difficulty with potassium levels.  He was recently prescribed oxygen and uses this  intermittently with activity.  He is denying any difficulty breathing at nighttime currently and he denies any peripheral edema.      We did perform an electrocardiogram in office today, which I reviewed.  This essentially shows a normal sinus rhythm with left axis deviation.  He has ST and T wave abnormalities seen in the lateral leads suggesting possible ischemia in this area.  He has no known history of coronary artery disease.  He has seen a pulmonologist here in [place] in late May who diagnosed him with moderately severe COPD, reduced DLCO consistent with emphysema.  He is on a regimen of inhalers for this as well.      PHYSICAL EXAMINATION:  His blood pressure is 156/68, pulse of 64, weight 187, a body mass index 27.   Cardiovascular tones today are regular, suggesting a normal sinus rhythm.  He has a soft systolic ejection murmur heard best at the right upper sternal border.  He does have a fistula in the left arm, creating bruit as well.  His lungs are diminished posteriorly markedly, but I do not appreciate any rales and he has no peripheral edema on exam.      IMPRESSION:   In summary, Mr. Savage is a very pleasant 83-year-old male with severe aortic stenosis with recent hospitalization for congestive heart failure and pneumonia with underlying history of moderately severe COPD, end-stage renal disease on dialysis and significant spinal stenosis.  He requires intermittent oxygen during the day with activity.  Currently, his breathing is stable and he is not experiencing any symptoms or increased work of breathing and his exam does not support acute decompensated heart failure at this time.      We did discuss possible management options for his aortic valve disease which seems to be his primary cardiac issue, although, I suspect he likely has underlying coronary disease as well.  Mr. Savage and I had a very nice conversation about his philosophy and management of medicine in his health issues.  He  states he has led a very full life at age 83 and he is not interested in pursuing any type of aggressive surgical procedures, whether it be cardiac or noncardiac at this time.  His main issues are concerns are with his quality of life.     I did talk to him a little bit today about a transcutaneous aortic valve replacement that he has upfront decreased risks as compared to an open heart surgery, which I do not feel he would be a good candidate for anyways.  He is willing to at least research this and consider it, but at this time he again reaffirms that he does not want any type of aggressive intervention or surgical procedures, so we will focus on his quality of life, attempting to reduce his sodium intake as much as possible and possibly adjusting his Lasix dose as needed when he chooses to enjoy the foods that he loves that are a little bit higher in salt.  He does continue to urinate and respond to Lasix, so this may be helpful for days when he is eating something with a higher salt intake.     Again, he is on unusual regimen for his antihypertensive medications; however, this has seemingly worked well for him and he has not had any issues, so I have no problem continuing his eplerenone as he will be closely monitoring his potassium levels through dialysis.  He does understand the risks, potential provocation of angina or myocardial infarction with this as well.     He is requesting a routine followup to ensure that his quality of life is maximized with our conservative medical therapy, so I will recommend to follow up in 3-4 months with him.  He will also contact me if he has any interest in pursuing a TAVR or seeing the Structural Heart team.        Please feel free to contact me with any questions you have in regards to his care.  Thank you for allowing me to participate in the care of your very nice patient.      cc:      Sandip Antunez MD     [facility]   [address] [place] [address]          FAY SAMANIEGO DO             D: 2018   T: 2018   MT: HUNTER      Name:     MISHA SANDERS   MRN:      [id number]        Account:      [id number]   :      1935           Service Date: 2018      Document: [id number]         Outpatient Encounter Prescriptions as of 2018   Medication Sig Dispense Refill     albuterol (2.5 MG/3ML) 0.083% neb solution TAKE 1 VIAL BY NEBULIZATION EVERY 6 HOURS AS NEEDED FOR SHORNESS OF BREATH/DYSPNEA OR WHEEZING 75 mL 3     albuterol (PROAIR HFA/PROVENTIL HFA/VENTOLIN HFA) 108 (90 BASE) MCG/ACT Inhaler Inhale 1-2 puffs into the lungs every 4 hours as needed for shortness of breath / dyspnea or wheezing 1 Inhaler 5     aspirin 81 MG chewable tablet Take 81 mg by mouth daily       atorvastatin (LIPITOR) 40 MG tablet TAKE 1 TABLET(40 MG) BY MOUTH AT BEDTIME (Patient taking differently: 20 mg) 90 tablet 1     B Complex-C-Folic Acid (BRANDI-HEIDY) TABS TAKE ONE TABLET BY MOUTH DAILY 90 tablet 3     B-D U/F 31G X 8 MM insulin pen needle USE AS DIRECTED FOUR TIMES DAILY. 400 each 0     blood glucose monitoring (NO BRAND SPECIFIED) test strip Use to test blood sugars 3 times daily or as directed. Uses onetouch 300 strip 11     carvedilol (COREG) 12.5 MG tablet TAKE 1 TABLET(12.5 MG) BY MOUTH TWICE DAILY 180 tablet 3     COLCHICINE PO Take 0.6 mg by mouth every 48 hours as needed Reported on 3/30/2017       darbepoetin libby (ARANESP) 100 MCG/0.5ML injection Inject Subcutaneous three times a week At diaylsis M,W,F. Dialysis ([facility]) doses, patient not sure about strength.       diltiazem (TIAZAC) 300 MG 24 hr ER beaded capsule Take 1 capsule (300 mg) by mouth daily 90 capsule 1     doxazosin (CARDURA) 8 MG tablet TAKE 1 TABLET BY MOUTH EVERY NIGHT AT BEDTIME. 90 tablet 3     eplerenone (INSPRA) 50 MG tablet Take 1 tablet (50 mg) by mouth daily 90 tablet 4     fish oil-omega-3 fatty acids 1000 MG capsule Take 1 g by mouth daily       furosemide (LASIX) 20 MG  tablet Take 1 tablet (20 mg) by mouth daily 90 tablet 1     Insulin Aspart (NOVOLOG SC) Inject 12 Units Subcutaneous 3 times daily (with meals) Patient states with large meals he will increase to 14-16 units.       insulin glargine (LANTUS) 100 UNIT/ML injection Inject 23 Units Subcutaneous At Bedtime (Patient will increase to 26 units at bedtime he states when BG is elevated.       lisinopril (PRINIVIL/ZESTRIL) 20 MG tablet Take 1 tablet (20 mg) by mouth daily 90 tablet 3     order for DME Equipment being ordered: portable Oxygen concentrator 1 Device 0     order for DME Equipment being ordered: Four wheeled walker 1 Device 0     order for DME Equipment being ordered: Other: Nebulizer machine  Treatment Diagnosis: COPD 1 Device 0     Respiratory Therapy Supplies (NEBULIZER/ADULT MASK) KIT 1 Device as needed 1 kit 0     STIOLTO RESPIMAT 2.5-2.5 MCG/ACT AERS INHALE 2 PUFFS INTO THE LUNGS DAILY 4 g 10     VITAMIN D, CHOLECALCIFEROL, PO Take 2,000 Units by mouth daily        [DISCONTINUED] eplerenone (INSPRA) 50 MG tablet TAKE 1 TABLET(50 MG) BY MOUTH DAILY 30 tablet 0     [DISCONTINUED] eplerenone (INSPRA) 50 MG tablet Take 1 tablet (50 mg) by mouth daily 30 tablet 0     [DISCONTINUED] EPLERENONE PO Take 50 mg by mouth daily       No facility-administered encounter medications on file as of 6/21/2018.        Again, thank you for allowing me to participate in the care of your patient.      Sincerely,    Daria Hastings, DO     [facility]     143

## 2019-02-12 NOTE — PROGRESS NOTE ADULT - SUBJECTIVE AND OBJECTIVE BOX
Pre-Endoscopy Evaluation      Referring Physician: dr. douglass                                  Procedure:  upper gastrointestinal endoscopy/peg placement    Indication for Procedure: dysphagia    Pertinent History: 88y female with PMH of CAD s/p stents and CABG, DMT2, HTN, s/p AVR,  PPM presents with Left MCA CVA outside of window for tPA. Extubated 2/5. Hospital course c/b dysphagia.       Sedation by Anesthesia [X]    PAST MEDICAL & SURGICAL HISTORY:  Valvular disease  Atrial flutter: 1/2010- [facility], Dr Tran  Diabetes  Obesity  Hyperlipidemia  HTN (hypertension)  CAD (coronary artery disease): s/p CABG- 2004-[facility]  Artificial pacemaker  Aortic valve replaced  S/P coronary artery stent placement: x 4, [facility]  S/P cholecystectomy  S/P CABG (coronary artery bypass graft)      PMH of Gastroparesis [ ]  Gastric Surgery [ ]  Gastric Outlet Obstruction [ ]    Allergies    No Known Allergies    Intolerances    Latex allergy: [ ] yes [X] no    Medications:MEDICATIONS  (STANDING):  amiodarone    Tablet 200 milliGRAM(s) Oral daily  dextrose 5%. 1000 milliLiter(s) (50 mL/Hr) IV Continuous <Continuous>  dextrose 50% Injectable 12.5 Gram(s) IV Push once  dextrose 50% Injectable 25 Gram(s) IV Push once  dextrose 50% Injectable 25 Gram(s) IV Push once  docusate sodium Liquid 100 milliGRAM(s) Oral two times a day  famotidine    Tablet 20 milliGRAM(s) Oral two times a day  furosemide   Injectable 40 milliGRAM(s) IV Push daily  insulin regular  human corrective regimen sliding scale   SubCutaneous every 6 hours  metoprolol tartrate 25 milliGRAM(s) Oral two times a day  senna Syrup 20 milliLiter(s) Oral at bedtime  simvastatin 40 milliGRAM(s) Oral at bedtime    MEDICATIONS  (PRN):  acetaminophen    Suspension .. 650 milliGRAM(s) Oral every 6 hours PRN Temp greater or equal to 38C (100.4F), Mild Pain (1 - 3)  dextrose 40% Gel 15 Gram(s) Oral once PRN Blood Glucose LESS THAN 70 milliGRAM(s)/deciLiter  glucagon  Injectable 1 milliGRAM(s) IntraMuscular once PRN Glucose <70 milliGRAM(s)/deciLiter  levalbuterol Inhalation 0.63 milliGRAM(s) Inhalation every 6 hours PRN SOB/Wheezing  nystatin Powder 1 Application(s) Topical two times a day PRN dermatitis      Smoking: [ ] yes  [x] no    AICD/PPM: [X] yes   [ ] no    Pertinent lab data:                        13.8   7.5   )-----------( 249      ( 12 Feb 2019 01:39 )             40.6     02-12    137  |  94<L>  |  29<H>  ----------------------------<  190<H>  4.0   |  27  |  0.68    Ca    9.5      12 Feb 2019 01:39      < from: Transthoracic Echocardiogram (02.08.19 @ 08:18) >    Fractional short: 27 %  EF (Visual Estimate): 65-70 %  Doppler Peak Velocity (m/sec): AoV=2.0  ------------------------------------------------------------------------  Observations:  Mitral Valve: Mitral annular calcification, otherwise  normal mitral valve. Minimal mitral regurgitation.  Aortic Valve/Aorta: Aortic valve not well visualized;  appears calcified. Peak transaortic valve gradient equals  17 mm Hg, mean transaortic valve gradient equals 11 mm Hg,  estimated aortic valve area equals 1.4 sqcm (by continuity  equation), aortic valve velocity time integral equals 36  cm, consistent with moderate aortic stenosis. Minimal  aortic regurgitation.  Peak left ventricular outflow tract  gradient equals 4 mm Hg, mean gradient is equalto 2 mm Hg,  LVOT velocity time integral equals 15 cm.  Aortic Root: 2.5 cm.  LVOT diameter: 1.9 cm.  Left Atrium: Normal left atrium.  LA volume index = 25  cc/m2.  Left Ventricle: Hyperdynamic left ventricular systolic  function. Increased relativewall thickness with normal  left ventricular mass index, consistent with concentric  left ventricular remodeling. Moderate diastolic dysfunction  (Stage II). Increased E/e'  is consistent with elevated  left ventricular filling pressure.  Right Heart: Normal right atrium. A device wire is noted in  the right heart. The right ventricle is not well  visualized; grossly normal right ventricular systolic  function. TV s' = 10 cm/sec. Normal tricuspid valve.  Mild-moderate tricuspid regurgitation. Pulmonic valve not  well visualized. Minimal pulmonic regurgitation.  Pericardium/Pleura: Normal pericardium with no pericardial  effusion.  Hemodynamic: Estimated right atrial pressure is 8 mm Hg.  Estimated right ventricular systolic pressure equals 57mm  Hg, assuming right atrial pressure equals 8 mm Hg,  consistent with moderate pulmonary hypertension. Agitated  saline injection and color flow Doppler demonstrates no  evidence of a patent foramen ovale.  ------------------------------------------------------------------------  Conclusions:  1. Aortic valve not well visualized; appears calcified.  Peak transaortic valve gradient equals 17 mm Hg, mean  transaortic valve gradient equals 11 mm Hg, estimated  aortic valve area equals 1.4 sqcm (bycontinuity equation),  aortic valve velocity time integral equals 36 cm,  consistent with moderate aortic stenosis. Minimal aortic  regurgitation.  2. Increased relative wall thickness with normal left  ventricular mass index, consistent with concentric left  ventricular remodeling.  3. Hyperdynamic left ventricular systolic function.  4. The right ventricle is not well visualized; grossly  normal right ventricular systolic function. TV s' = 10  cm/sec.  5. Estimated right ventricular systolic pressure equals 57  mm Hg, assuming right atrial pressure equals 8 mm Hg,  consistent with moderate pulmonary hypertension.  6. Normal tricuspid valve. Mild-moderate tricuspid  regurgitation.  7. Agitated saline injection and color flow Doppler  demonstrates no evidence of a patent foramen ovale.  *** No previous Echo exam.  -------------------------------------------------------------      Physical Examination:       Daily   Vital Signs Last 24 Hrs  T(C): 37.3 (11 Feb 2019 19:34), Max: 37.3 (11 Feb 2019 19:34)  T(F): 99.1 (11 Feb 2019 19:34), Max: 99.1 (11 Feb 2019 19:34)  HR: 90 (12 Feb 2019 10:00) (80 - 106)  BP: 122/84 (12 Feb 2019 10:00) (121/69 - 148/90)  BP(mean): 97 (12 Feb 2019 10:00) (69 - 110)  RR: 19 (12 Feb 2019 10:00) (16 - 23)  SpO2: 95% (12 Feb 2019 10:00) (94% - 98%)      Constitutional: NAD      Neck:  No JVD    Respiratory: CTAB/L    Cardiovascular: S1 and S2    Gastrointestinal: BS+, soft, NT/ND    Extremities: No peripheral edema    Neurological: A/O x 3    : No Gonzalez    Skin: No rashes    Comments:    ASA Class: I [ ]  II [ ]  III [ ]  IV [x]    The patient is a suitable candidate for the planned procedure unless box checked [ ]  No, explain:

## 2019-02-12 NOTE — PROGRESS NOTE ADULT - ASSESSMENT
88 year old white female with a pmh of CAD, HTN, HLD, pacemaker who presented to [facility] as a transfer from [facility] with right hemiparesis and aphasia and left proximal MCA occlusion. The patient was last known well at 2 pm on 2/3/19 and was then found down on the ground by her daughter several hours later and was noted to be not speaking and weak on the right. At [facility] a CTA head and neck was performed which showed a proximal left MCA occlusion, and a CT head showed a dense left MCA sign with some hypodensity and early ischemic changes to my eye in the left MCA distribution. She did not receive IV tPA as she was out of the window and was transferred to [facility] for possible mechanical thrombectomy. At [facility] her CT perfusion showed zero core infarct, with a penumbra of 80 mls, and an infinite mismatch. She was deemed a candidate for intervention and recanalization was achieved with TICI 3 score.     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 Physical therapy/OT recommended acute TBI rehab    ANTITHROMBOTIC THERAPY: ASA with plans to transition to anticoagulation (Coumadin given AVR) after repeat stable imaging and after stable enteral access obtained in setting of atrial flutter found on pacemaker interrogation    PULMONARY: protecting airway, saturating well, secretions requiring suctioning PRN, continue on Xopenex PRN. CXR on 2/8 showed unchanged mild pulmonary vascular congestion and a small left pleural effusion with associated atelectasis, on IV lasix daily with good urine output.     CARDIOVASCULAR: EF 65-70% moderate AS, moderate pulm HTN, mild-mod TR and no PFO, cardiac monitoring at times showed sinus tachycardia - started on Amiodarone and Metoprolol with improvement PPM interrogation- showed episode of atrial flutter, plan to AC. Continue on IV Lasix.               SBP goal: Normotension     GASTROINTESTINAL: dysphagia screen noted failed, S&S recommended NPO. She underwent a MBS on 2/8 and was recommended to keep NPO and re-evaluation on 2/11 recommended NPO, team d/w family , patient aware of plan independently and in agreement.      Diet: npo with TF via NGT    RENAL: BUN elevated/Cr within limits, maintain adequate hydration, good urine output      Na Goal: Greater than 135     Gonzalez:N    HEMATOLOGY: H/H without anemia, Platelets 249, LE duplex negative for DVT     DVT ppx: Heparin s.c [] LMWH [x]     ID: afebrile, no leukocytosis, RVP negative    DISPOSITION: Acute TBI rehab once stable enteral access established    CORE MEASURES:        Admission NIHSS: 19     TPA: [] YES [x] NO      LDL/HDL:55/37     Depression Screen: 0     Statin Therapy: y     Dysphagia Screen: [] PASS [x] FAIL      Smoking [] YES [x] NO      Afib [] YES [x] NO     Stroke Education [x] YES [] NO 88 year old white female with a pmh of CAD, HTN, HLD, pacemaker who presented to [facility] as a transfer from [facility] with right hemiparesis and aphasia and left proximal MCA occlusion. The patient was last known well at 2 pm on 2/3/19 and was then found down on the ground by her daughter several hours later and was noted to be not speaking and weak on the right. At [facility] a CTA head and neck was performed which showed a proximal left MCA occlusion, and a CT head showed a dense left MCA sign with some hypodensity and early ischemic changes to my eye in the left MCA distribution. She did not receive IV tPA as she was out of the window and was transferred to [facility] for possible mechanical thrombectomy. At [facility] her CT perfusion showed zero core infarct, with a penumbra of 80 mls, and an infinite mismatch. She was deemed a candidate for intervention and recanalization was achieved with TICI 3 score.     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 Physical therapy/OT recommended acute TBI rehab    ANTITHROMBOTIC THERAPY: ASA with plans to transition to anticoagulation (Coumadin given AVR) after repeat stable imaging and after stable enteral access obtained in setting of atrial flutter found on pacemaker interrogation    PULMONARY: protecting airway, saturating well, secretions requiring suctioning PRN, continue on Xopenex PRN. CXR on 2/8 showed unchanged mild pulmonary vascular congestion and a small left pleural effusion with associated atelectasis, on IV lasix daily with good urine output.     CARDIOVASCULAR: EF 65-70% moderate AS, moderate pulm HTN, mild-mod TR and no PFO, cardiac monitoring at times showed sinus tachycardia - started on Amiodarone and Metoprolol with improvement PPM interrogation- showed episode of atrial flutter, plan to AC. Continue on IV Lasix.               SBP goal: Normotension     GASTROINTESTINAL: dysphagia screen noted failed, S&S recommended NPO. She underwent a MBS on 2/8 and was recommended to keep NPO and re-evaluation on 2/11 recommended NPO, team d/w family , patient aware of plan independently and in agreement.      Diet: npo with TF via NGT    RENAL: BUN elevated/Cr within limits, maintain adequate hydration, good urine output      Na Goal: Greater than 135     Gonzalez:N    HEMATOLOGY: H/H without anemia, Platelets 249, LE duplex negative for DVT     DVT ppx: Heparin s.c [] LMWH [x]     ID: afebrile, no leukocytosis, RVP negative    DISPOSITION: Acute TBI rehab once stable enteral access established    CORE MEASURES:        Admission NIHSS: 19     TPA: [] YES [x] NO      LDL/HDL:55/37     Depression Screen: 0     Statin Therapy: y     Dysphagia Screen: [] PASS [x] FAIL      Smoking [] YES [x] NO      Afib [] YES [x] NO     Stroke Education [x] YES [] NO    Addendum - d/w Perla PA, pt s/p PEG, cleared to restart ASA and DVT ppx

## 2019-02-12 NOTE — PROGRESS NOTE ADULT - SUBJECTIVE AND OBJECTIVE BOX
Subjective: Patient seen and examined. No new events except as noted.   remains in Stroke unit   received an extra 25 mg dose of lopressor last night     REVIEW OF SYSTEMS:    CONSTITUTIONAL: + weakness, fevers or chills  EYES/ENT: No visual changes;  No vertigo or throat pain   NECK: No pain or stiffness  RESPIRATORY: No cough, wheezing, hemoptysis; No shortness of breath  CARDIOVASCULAR: No chest pain or palpitations  GASTROINTESTINAL: No abdominal or epigastric pain. No nausea, vomiting, or hematemesis; No diarrhea or constipation. No melena or hematochezia.  GENITOURINARY: No dysuria, frequency or hematuria  NEUROLOGICAL: + numbness or weakness  SKIN: No itching, burning, rashes, or lesions   All other review of systems is negative unless indicated above.    MEDICATIONS:  MEDICATIONS  (STANDING):  amiodarone    Tablet 200 milliGRAM(s) Oral daily  dextrose 5%. 1000 milliLiter(s) (50 mL/Hr) IV Continuous <Continuous>  dextrose 50% Injectable 12.5 Gram(s) IV Push once  dextrose 50% Injectable 25 Gram(s) IV Push once  dextrose 50% Injectable 25 Gram(s) IV Push once  docusate sodium Liquid 100 milliGRAM(s) Oral two times a day  famotidine    Tablet 20 milliGRAM(s) Oral two times a day  furosemide   Injectable 40 milliGRAM(s) IV Push daily  insulin regular  human corrective regimen sliding scale   SubCutaneous every 6 hours  metoprolol tartrate 25 milliGRAM(s) Oral two times a day  senna Syrup 20 milliLiter(s) Oral at bedtime  simvastatin 40 milliGRAM(s) Oral at bedtime      PHYSICAL EXAM:  T(C): 37.3 (02-11-19 @ 19:34), Max: 37.3 (02-11-19 @ 19:34)  HR: 90 (02-12-19 @ 10:00) (80 - 106)  BP: 122/84 (02-12-19 @ 10:00) (121/69 - 148/90)  RR: 19 (02-12-19 @ 10:00) (16 - 23)  SpO2: 95% (02-12-19 @ 10:00) (94% - 98%)  Wt(kg): --  I&O's Summary    11 Feb 2019 07:01  -  12 Feb 2019 07:00  --------------------------------------------------------  IN: 200 mL / OUT: 0 mL / NET: 200 mL          Appearance: NAD more awake  but still lethargic   HEENT:   Normal oral mucosa, PERRL, EOMI +NGT 	  Lymphatic: No lymphadenopathy  Cardiovascular: tachycardic S1 S2, No JVD, No murmurs, No edema  Respiratory: decreased bs   Psychiatry: A & O x 3, lethargic   Gastrointestinal:  Soft, Non-tender, + BS	  Skin: No rashes, No ecchymoses, No cyanosis	  NEUROLOGICAL EXAM:  Mental status: eyes open, awake, attentive to persons at bedside, able to state name, hypophonic follows some simple commands, perseverates at times   Cranial Nerves:  right facial droop, no blink to threat on right   Motor exam: left side moves well against gravity, RUE 3/5 with drift , RLE 2/5   Sensation: Intact to light touch   Coordination/ Gait: gait not assessed       LABS:    CARDIAC MARKERS:                                13.8   7.5   )-----------( 249      ( 12 Feb 2019 01:39 )             40.6     02-12    137  |  94<L>  |  29<H>  ----------------------------<  190<H>  4.0   |  27  |  0.68    Ca    9.5      12 Feb 2019 01:39      proBNP:   Lipid Profile:   HgA1c:   TSH:             TELEMETRY: SR	 /ST  ECG:  	  RADIOLOGY:   DIAGNOSTIC TESTING:  [ ] Echocardiogram:  [ ]  Catheterization:  [ ] Stress Test:    OTHER:

## 2019-02-13 DIAGNOSIS — Z71.89 OTHER SPECIFIED COUNSELING: ICD-10-CM

## 2019-02-13 LAB
ALBUMIN SERPL ELPH-MCNC: 4 G/DL — SIGNIFICANT CHANGE UP (ref 3.3–5)
ALP SERPL-CCNC: 98 U/L — SIGNIFICANT CHANGE UP (ref 40–120)
ALT FLD-CCNC: 18 U/L — SIGNIFICANT CHANGE UP (ref 10–45)
ANION GAP SERPL CALC-SCNC: 18 MMOL/L — HIGH (ref 5–17)
ANION GAP SERPL CALC-SCNC: 18 MMOL/L — HIGH (ref 5–17)
APPEARANCE UR: CLEAR — SIGNIFICANT CHANGE UP
APPEARANCE UR: CLEAR — SIGNIFICANT CHANGE UP
APTT BLD: 31.3 SEC — SIGNIFICANT CHANGE UP (ref 27.5–36.3)
AST SERPL-CCNC: 23 U/L — SIGNIFICANT CHANGE UP (ref 10–40)
BILIRUB SERPL-MCNC: 2.1 MG/DL — HIGH (ref 0.2–1.2)
BILIRUB UR-MCNC: NEGATIVE — SIGNIFICANT CHANGE UP
BILIRUB UR-MCNC: NEGATIVE — SIGNIFICANT CHANGE UP
BUN SERPL-MCNC: 32 MG/DL — HIGH (ref 7–23)
BUN SERPL-MCNC: 33 MG/DL — HIGH (ref 7–23)
CALCIUM SERPL-MCNC: 9.6 MG/DL — SIGNIFICANT CHANGE UP (ref 8.4–10.5)
CALCIUM SERPL-MCNC: 9.7 MG/DL — SIGNIFICANT CHANGE UP (ref 8.4–10.5)
CHLORIDE SERPL-SCNC: 94 MMOL/L — LOW (ref 96–108)
CHLORIDE SERPL-SCNC: 95 MMOL/L — LOW (ref 96–108)
CO2 SERPL-SCNC: 25 MMOL/L — SIGNIFICANT CHANGE UP (ref 22–31)
CO2 SERPL-SCNC: 28 MMOL/L — SIGNIFICANT CHANGE UP (ref 22–31)
COLOR SPEC: ABNORMAL
COLOR SPEC: YELLOW — SIGNIFICANT CHANGE UP
CREAT SERPL-MCNC: 0.78 MG/DL — SIGNIFICANT CHANGE UP (ref 0.5–1.3)
CREAT SERPL-MCNC: 0.8 MG/DL — SIGNIFICANT CHANGE UP (ref 0.5–1.3)
DIFF PNL FLD: NEGATIVE — SIGNIFICANT CHANGE UP
DIFF PNL FLD: NEGATIVE — SIGNIFICANT CHANGE UP
GLUCOSE SERPL-MCNC: 195 MG/DL — HIGH (ref 70–99)
GLUCOSE SERPL-MCNC: 225 MG/DL — HIGH (ref 70–99)
GLUCOSE UR QL: NEGATIVE MG/DL — SIGNIFICANT CHANGE UP
GLUCOSE UR QL: NEGATIVE — SIGNIFICANT CHANGE UP
HCT VFR BLD CALC: 42.6 % — SIGNIFICANT CHANGE UP (ref 34.5–45)
HCT VFR BLD CALC: 42.9 % — SIGNIFICANT CHANGE UP (ref 34.5–45)
HCT VFR BLD CALC: 43.9 % — SIGNIFICANT CHANGE UP (ref 34.5–45)
HCT VFR BLD CALC: 44.9 % — SIGNIFICANT CHANGE UP (ref 34.5–45)
HGB BLD-MCNC: 14.4 G/DL — SIGNIFICANT CHANGE UP (ref 11.5–15.5)
HGB BLD-MCNC: 14.4 G/DL — SIGNIFICANT CHANGE UP (ref 11.5–15.5)
HGB BLD-MCNC: 14.5 G/DL — SIGNIFICANT CHANGE UP (ref 11.5–15.5)
HGB BLD-MCNC: 14.6 G/DL — SIGNIFICANT CHANGE UP (ref 11.5–15.5)
INR BLD: 1.28 RATIO — HIGH (ref 0.88–1.16)
KETONES UR-MCNC: NEGATIVE — SIGNIFICANT CHANGE UP
KETONES UR-MCNC: NEGATIVE — SIGNIFICANT CHANGE UP
LEUKOCYTE ESTERASE UR-ACNC: ABNORMAL
LEUKOCYTE ESTERASE UR-ACNC: ABNORMAL
MCHC RBC-ENTMCNC: 29.7 PG — SIGNIFICANT CHANGE UP (ref 27–34)
MCHC RBC-ENTMCNC: 30 PG — SIGNIFICANT CHANGE UP (ref 27–34)
MCHC RBC-ENTMCNC: 30.3 PG — SIGNIFICANT CHANGE UP (ref 27–34)
MCHC RBC-ENTMCNC: 30.8 PG — SIGNIFICANT CHANGE UP (ref 27–34)
MCHC RBC-ENTMCNC: 32.1 GM/DL — SIGNIFICANT CHANGE UP (ref 32–36)
MCHC RBC-ENTMCNC: 33.1 GM/DL — SIGNIFICANT CHANGE UP (ref 32–36)
MCHC RBC-ENTMCNC: 33.9 GM/DL — SIGNIFICANT CHANGE UP (ref 32–36)
MCHC RBC-ENTMCNC: 34.1 GM/DL — SIGNIFICANT CHANGE UP (ref 32–36)
MCV RBC AUTO: 89.5 FL — SIGNIFICANT CHANGE UP (ref 80–100)
MCV RBC AUTO: 90.2 FL — SIGNIFICANT CHANGE UP (ref 80–100)
MCV RBC AUTO: 90.5 FL — SIGNIFICANT CHANGE UP (ref 80–100)
MCV RBC AUTO: 92.6 FL — SIGNIFICANT CHANGE UP (ref 80–100)
NITRITE UR-MCNC: NEGATIVE — SIGNIFICANT CHANGE UP
NITRITE UR-MCNC: NEGATIVE — SIGNIFICANT CHANGE UP
PH UR: 6.5 — SIGNIFICANT CHANGE UP (ref 5–8)
PH UR: 7 — SIGNIFICANT CHANGE UP (ref 5–8)
PLATELET # BLD AUTO: 246 K/UL — SIGNIFICANT CHANGE UP (ref 150–400)
PLATELET # BLD AUTO: 278 K/UL — SIGNIFICANT CHANGE UP (ref 150–400)
PLATELET # BLD AUTO: 283 K/UL — SIGNIFICANT CHANGE UP (ref 150–400)
PLATELET # BLD AUTO: 284 K/UL — SIGNIFICANT CHANGE UP (ref 150–400)
POTASSIUM SERPL-MCNC: 4 MMOL/L — SIGNIFICANT CHANGE UP (ref 3.5–5.3)
POTASSIUM SERPL-MCNC: 4.2 MMOL/L — SIGNIFICANT CHANGE UP (ref 3.5–5.3)
POTASSIUM SERPL-SCNC: 4 MMOL/L — SIGNIFICANT CHANGE UP (ref 3.5–5.3)
POTASSIUM SERPL-SCNC: 4.2 MMOL/L — SIGNIFICANT CHANGE UP (ref 3.5–5.3)
PROT SERPL-MCNC: 8.4 G/DL — HIGH (ref 6–8.3)
PROT UR-MCNC: NEGATIVE MG/DL — SIGNIFICANT CHANGE UP
PROT UR-MCNC: SIGNIFICANT CHANGE UP
PROTHROM AB SERPL-ACNC: 14.7 SEC — HIGH (ref 10–12.9)
RBC # BLD: 4.75 M/UL — SIGNIFICANT CHANGE UP (ref 3.8–5.2)
RBC # BLD: 4.76 M/UL — SIGNIFICANT CHANGE UP (ref 3.8–5.2)
RBC # BLD: 4.85 M/UL — SIGNIFICANT CHANGE UP (ref 3.8–5.2)
RBC # BLD: 4.85 M/UL — SIGNIFICANT CHANGE UP (ref 3.8–5.2)
RBC # FLD: 12.4 % — SIGNIFICANT CHANGE UP (ref 10.3–14.5)
RBC # FLD: 12.8 % — SIGNIFICANT CHANGE UP (ref 10.3–14.5)
RBC # FLD: 12.9 % — SIGNIFICANT CHANGE UP (ref 10.3–14.5)
RBC # FLD: 14.1 % — SIGNIFICANT CHANGE UP (ref 10.3–14.5)
SODIUM SERPL-SCNC: 138 MMOL/L — SIGNIFICANT CHANGE UP (ref 135–145)
SODIUM SERPL-SCNC: 140 MMOL/L — SIGNIFICANT CHANGE UP (ref 135–145)
SP GR SPEC: 1.01 — SIGNIFICANT CHANGE UP (ref 1.01–1.02)
SP GR SPEC: 1.02 — SIGNIFICANT CHANGE UP (ref 1.01–1.02)
UROBILINOGEN FLD QL: NEGATIVE MG/DL — SIGNIFICANT CHANGE UP
UROBILINOGEN FLD QL: NEGATIVE — SIGNIFICANT CHANGE UP
WBC # BLD: 10.37 K/UL — SIGNIFICANT CHANGE UP (ref 3.8–10.5)
WBC # BLD: 10.5 K/UL — SIGNIFICANT CHANGE UP (ref 3.8–10.5)
WBC # BLD: 12 K/UL — HIGH (ref 3.8–10.5)
WBC # BLD: 13.9 K/UL — HIGH (ref 3.8–10.5)
WBC # FLD AUTO: 10.37 K/UL — SIGNIFICANT CHANGE UP (ref 3.8–10.5)
WBC # FLD AUTO: 10.5 K/UL — SIGNIFICANT CHANGE UP (ref 3.8–10.5)
WBC # FLD AUTO: 12 K/UL — HIGH (ref 3.8–10.5)
WBC # FLD AUTO: 13.9 K/UL — HIGH (ref 3.8–10.5)

## 2019-02-13 PROCEDURE — 70450 CT HEAD/BRAIN W/O DYE: CPT | Mod: 26,77

## 2019-02-13 PROCEDURE — 70450 CT HEAD/BRAIN W/O DYE: CPT | Mod: 26

## 2019-02-13 PROCEDURE — 71045 X-RAY EXAM CHEST 1 VIEW: CPT | Mod: 26

## 2019-02-13 RX ORDER — VANCOMYCIN HCL 1 G
5 VIAL (EA) INTRAVENOUS ONCE
Qty: 0 | Refills: 0 | Status: DISCONTINUED | OUTPATIENT
Start: 2019-02-13 | End: 2019-02-13

## 2019-02-13 RX ORDER — WARFARIN SODIUM 2.5 MG/1
5 TABLET ORAL ONCE
Qty: 0 | Refills: 0 | Status: COMPLETED | OUTPATIENT
Start: 2019-02-13 | End: 2019-02-13

## 2019-02-13 RX ORDER — FUROSEMIDE 40 MG
20 TABLET ORAL DAILY
Qty: 0 | Refills: 0 | Status: DISCONTINUED | OUTPATIENT
Start: 2019-02-14 | End: 2019-02-14

## 2019-02-13 RX ADMIN — Medication 100 MILLIGRAM(S): at 05:43

## 2019-02-13 RX ADMIN — Medication 650 MILLIGRAM(S): at 21:17

## 2019-02-13 RX ADMIN — Medication 650 MILLIGRAM(S): at 21:47

## 2019-02-13 RX ADMIN — INSULIN HUMAN 2: 100 INJECTION, SOLUTION SUBCUTANEOUS at 13:04

## 2019-02-13 RX ADMIN — Medication 40 MILLIGRAM(S): at 05:43

## 2019-02-13 RX ADMIN — AMIODARONE HYDROCHLORIDE 200 MILLIGRAM(S): 400 TABLET ORAL at 05:43

## 2019-02-13 RX ADMIN — INSULIN HUMAN 2: 100 INJECTION, SOLUTION SUBCUTANEOUS at 05:56

## 2019-02-13 RX ADMIN — Medication 25 MILLIGRAM(S): at 13:14

## 2019-02-13 RX ADMIN — FAMOTIDINE 20 MILLIGRAM(S): 10 INJECTION INTRAVENOUS at 05:43

## 2019-02-13 RX ADMIN — Medication 25 MILLIGRAM(S): at 21:18

## 2019-02-13 RX ADMIN — Medication 25 MILLIGRAM(S): at 05:43

## 2019-02-13 RX ADMIN — FAMOTIDINE 20 MILLIGRAM(S): 10 INJECTION INTRAVENOUS at 17:27

## 2019-02-13 RX ADMIN — SIMVASTATIN 40 MILLIGRAM(S): 20 TABLET, FILM COATED ORAL at 21:18

## 2019-02-13 RX ADMIN — INSULIN HUMAN 2: 100 INJECTION, SOLUTION SUBCUTANEOUS at 23:40

## 2019-02-13 RX ADMIN — Medication 81 MILLIGRAM(S): at 21:18

## 2019-02-13 RX ADMIN — Medication 100 MILLIGRAM(S): at 17:27

## 2019-02-13 RX ADMIN — INSULIN HUMAN 2: 100 INJECTION, SOLUTION SUBCUTANEOUS at 17:28

## 2019-02-13 RX ADMIN — SENNA PLUS 20 MILLILITER(S): 8.6 TABLET ORAL at 21:17

## 2019-02-13 NOTE — PROGRESS NOTE ADULT - PROBLEM SELECTOR PLAN 1
Small L effusion with bibasilar atelectasis and mild pulm edema  -Lasix as tolerated Small L effusion with bibasilar atelectasis and mild pulm edema  -consider decrease lasix

## 2019-02-13 NOTE — PROGRESS NOTE ADULT - SUBJECTIVE AND OBJECTIVE BOX
THE PATIENT WAS SEEN AND EXAMINED BY ME WITH THE HOUSESTAFF AND STROKE TEAM DURING MORNING ROUNDS.   HPI:  Patient is an 88 year old right handed  female with a history of CAD s/p stents and CABG on aspirin at home, DM, HTN, aortic valve replacement, and pacemaker who presented as transfer from [facility] for stroke rescue. Her last known normal was at approximately 2 pm on 2/3. Robbins was then noted to have left sided weakness and sudden onset inability to speak, so she was brought to [facility]. She was outside the appropriate time window for tPA, so tPA was not given. CTA showed evidence of a L M1 occlusion so she was transferred to [facility] and code stroke called upon arrival. Initial NIHSS at [facility] was 19, MRS 1.      SUBJECTIVE: Noted confused and lethargic overnight.     acetaminophen    Suspension .. 650 milliGRAM(s) Oral every 6 hours PRN  amiodarone    Tablet 200 milliGRAM(s) Oral daily  aspirin  chewable 81 milliGRAM(s) Enteral Tube <User Schedule>  dextrose 40% Gel 15 Gram(s) Oral once PRN  dextrose 5%. 1000 milliLiter(s) IV Continuous <Continuous>  dextrose 50% Injectable 12.5 Gram(s) IV Push once  dextrose 50% Injectable 25 Gram(s) IV Push once  dextrose 50% Injectable 25 Gram(s) IV Push once  docusate sodium Liquid 100 milliGRAM(s) Oral two times a day  enoxaparin Injectable 40 milliGRAM(s) SubCutaneous at bedtime  famotidine    Tablet 20 milliGRAM(s) Oral two times a day  furosemide   Injectable 40 milliGRAM(s) IV Push daily  glucagon  Injectable 1 milliGRAM(s) IntraMuscular once PRN  insulin regular  human corrective regimen sliding scale   SubCutaneous every 6 hours  levalbuterol Inhalation 0.63 milliGRAM(s) Inhalation every 6 hours PRN  metoprolol tartrate 25 milliGRAM(s) Oral three times a day  nystatin Powder 1 Application(s) Topical two times a day PRN  senna Syrup 20 milliLiter(s) Oral at bedtime  simvastatin 40 milliGRAM(s) Oral at bedtime  warfarin 5 milliGRAM(s) Oral once      PHYSICAL EXAM:   Vital Signs Last 24 Hrs  T(C): 37.3 (13 Feb 2019 07:50), Max: 37.3 (13 Feb 2019 07:50)  T(F): 99.1 (13 Feb 2019 07:50), Max: 99.1 (13 Feb 2019 07:50)  HR: 80 (13 Feb 2019 14:00) (68 - 103)  BP: 132/50 (13 Feb 2019 14:00) (122/78 - 158/76)  BP(mean): 75 (13 Feb 2019 12:00) (75 - 106)  RR: 23 (13 Feb 2019 14:00) (16 - 26)  SpO2: 100% (13 Feb 2019 14:00) (80% - 100%)    General: No acute distress   HEENT: EOM intact, no blink to threat on right   Abdomen: nontender  Extremities: No edema    NEUROLOGICAL EXAM:  Mental status: eyes open, awake, attentive, states age 88, oriented to name, year and month, aware and inquiring re: PEG placement timing, hypophonic follows commands, perseverates at times,R/L confusion but able to cross midline with repeated attempts   Cranial Nerves: right facial droop, no blink to threat on right, dysarthria   Motor exam: left side moves well against gravity, right sided hemiparesis: RUE 3-/5 drift at elbow,  3/5 RLE 2/5 able to wiggle toes    Sensation: Intact to light touch   Coordination/ Gait: gait not assessed     LABS:                        14.6   12.0  )-----------( 283      ( 13 Feb 2019 12:48 )             42.9    02-13    138  |  95<L>  |  32<H>  ----------------------------<  225<H>  4.0   |  25  |  0.78    Ca    9.6      13 Feb 2019 03:09    PT/INR - ( 13 Feb 2019 12:48 )   PT: 14.7 sec;   INR: 1.28 ratio         PTT - ( 13 Feb 2019 12:48 )  PTT:31.3 sec  Hemoglobin A1C, Whole Blood: 6.6 % (02-04 @ 06:11)      IMAGING: Reviewed by me.   CT Head No Cont (02.08.19)  Increasing hypodensity involves the left basal ganglia, internal capsule,   corona radiata, and insula, consistent with an evolving acute left middle   cerebral artery territory infarct. Vague increased areas of density   involve the infarct which may reflect petechial hemorrhagic   transformation, retained contrast from the angiogram procedure, or a   combination of both. There is no focal parenchymal hematoma.    CT Head No Cont (02.05.19)   Previously seen increased attenuation within left sided sulci is   decreased on the current examination, and may represent the presence of   residual intravenous contrast and/or subarachnoid hemorrhage.  No no hydrocephalus or midline shift. No effacement of basal cisterns    (02.03.19)   CT angiography neck: Patent cervical vasculature.  No hemodynamically   significant carotid stenosis or flow-limiting vertebral artery stenosis.    No evidence of dissection. Three-vessel arch.  Vertebral arteries are   codominant.    CT angiography brain:   1.  The M1 segment of the left middle cerebral artery is occluded,   approximately 8 mm distal to its origin.  Multiple proximal M2 branches   of the left MCA are occluded within the sylvian fissure.  There is some   reconstitution of blood flow within distal M2 branches and M3 branches of   the left middle cerebral artery.  CT perfusion is recommended for further   characterization.  2.  Atherosclerotic calcification causes diffuse mild narrowing in the   cavernous and supraclinoid segments of the internal carotid arteries   without flow-limiting stenosis.  3.  Atherosclerotic calcification causes moderate to severe stenosis in   the bilateral intradural vertebral arteries.  Mild luminal irregularity   of the basilar artery without flow-limiting stenosis.  Multiple mild to   moderate stenoses in the proximal P2 segment of the left posterior   cerebral artery.  The right posterior cerebral artery is diffusely small   and irregular, and appears hypoenhancing compared to the contralateral   side.    CT Perfusion w/ Maps w/ IV Cont (02.03.19)   NONCONTRAST HEAD CT SCAN: No CT evidence of acute intracranial   hemorrhage, mass effect or acute territorial infarct.    CT PERFUSION: No evidence of a completed infarct.  Approximately 80 cc   penumbra of at risk tissue in the left MCA vascular territory.    CT Head No Cont (02.13.19)   Slight interval decreased hyperdensity within left sylvian fissure.  No interval acute intracranial hemorrhage or evidence for interval   transcortical territorial infarction. THE PATIENT WAS SEEN AND EXAMINED BY ME WITH THE HOUSESTAFF AND STROKE TEAM DURING MORNING ROUNDS.   HPI:  Patient is an 88 year old right handed  female with a history of CAD s/p stents and CABG on aspirin at home, DM, HTN, aortic valve replacement, and pacemaker who presented as transfer from [facility] for stroke rescue. Her last known normal was at approximately 2 pm on 2/3. Robbins was then noted to have left sided weakness and sudden onset inability to speak, so she was brought to [facility]. She was outside the appropriate time window for tPA, so tPA was not given. CTA showed evidence of a L M1 occlusion so she was transferred to [facility] and code stroke called upon arrival. Initial NIHSS at [facility] was 19, MRS 1.      SUBJECTIVE: Noted confused and lethargic overnight and intermittently throughout day.    acetaminophen    Suspension .. 650 milliGRAM(s) Oral every 6 hours PRN  amiodarone    Tablet 200 milliGRAM(s) Oral daily  aspirin  chewable 81 milliGRAM(s) Enteral Tube <User Schedule>  dextrose 40% Gel 15 Gram(s) Oral once PRN  dextrose 5%. 1000 milliLiter(s) IV Continuous <Continuous>  dextrose 50% Injectable 12.5 Gram(s) IV Push once  dextrose 50% Injectable 25 Gram(s) IV Push once  dextrose 50% Injectable 25 Gram(s) IV Push once  docusate sodium Liquid 100 milliGRAM(s) Oral two times a day  enoxaparin Injectable 40 milliGRAM(s) SubCutaneous at bedtime  famotidine    Tablet 20 milliGRAM(s) Oral two times a day  furosemide   Injectable 40 milliGRAM(s) IV Push daily  glucagon  Injectable 1 milliGRAM(s) IntraMuscular once PRN  insulin regular  human corrective regimen sliding scale   SubCutaneous every 6 hours  levalbuterol Inhalation 0.63 milliGRAM(s) Inhalation every 6 hours PRN  metoprolol tartrate 25 milliGRAM(s) Oral three times a day  nystatin Powder 1 Application(s) Topical two times a day PRN  senna Syrup 20 milliLiter(s) Oral at bedtime  simvastatin 40 milliGRAM(s) Oral at bedtime  warfarin 5 milliGRAM(s) Oral once      PHYSICAL EXAM:   Vital Signs Last 24 Hrs  T(C): 37.3 (13 Feb 2019 07:50), Max: 37.3 (13 Feb 2019 07:50)  T(F): 99.1 (13 Feb 2019 07:50), Max: 99.1 (13 Feb 2019 07:50)  HR: 80 (13 Feb 2019 14:00) (68 - 103)  BP: 132/50 (13 Feb 2019 14:00) (122/78 - 158/76)  BP(mean): 75 (13 Feb 2019 12:00) (75 - 106)  RR: 23 (13 Feb 2019 14:00) (16 - 26)  SpO2: 100% (13 Feb 2019 14:00) (80% - 100%)    General: No acute distress   HEENT: EOM intact, no blink to threat on right   Abdomen: nontender  Extremities: No edema    NEUROLOGICAL EXAM:  Mental status: eyes open, awake, attentive, states age 88, oriented to name, year and month,, hypophonic follows simple  commands, perseverates at times,R/L confusion   Cranial Nerves: right facial droop, no blink to threat on right, dysarthria   Motor exam: left side moves well against gravity, right sided hemiparesis: RUE trace antigravity distal to elbow, drifts < 5 sec,  3/5 RLE 2/5 able to wiggle toes    Sensation: Intact to light touch   Coordination/ Gait: gait not assessed     LABS:                        14.6   12.0  )-----------( 283      ( 13 Feb 2019 12:48 )             42.9    02-13    138  |  95<L>  |  32<H>  ----------------------------<  225<H>  4.0   |  25  |  0.78    Ca    9.6      13 Feb 2019 03:09    PT/INR - ( 13 Feb 2019 12:48 )   PT: 14.7 sec;   INR: 1.28 ratio         PTT - ( 13 Feb 2019 12:48 )  PTT:31.3 sec  Hemoglobin A1C, Whole Blood: 6.6 % (02-04 @ 06:11)      IMAGING: Reviewed by me.   CT Head No Cont (02.08.19)  Increasing hypodensity involves the left basal ganglia, internal capsule,   corona radiata, and insula, consistent with an evolving acute left middle   cerebral artery territory infarct. Vague increased areas of density   involve the infarct which may reflect petechial hemorrhagic   transformation, retained contrast from the angiogram procedure, or a   combination of both. There is no focal parenchymal hematoma.    CT Head No Cont (02.05.19)   Previously seen increased attenuation within left sided sulci is   decreased on the current examination, and may represent the presence of   residual intravenous contrast and/or subarachnoid hemorrhage.  No no hydrocephalus or midline shift. No effacement of basal cisterns    (02.03.19)   CT angiography neck: Patent cervical vasculature.  No hemodynamically   significant carotid stenosis or flow-limiting vertebral artery stenosis.    No evidence of dissection. Three-vessel arch.  Vertebral arteries are   codominant.    CT angiography brain:   1.  The M1 segment of the left middle cerebral artery is occluded,   approximately 8 mm distal to its origin.  Multiple proximal M2 branches   of the left MCA are occluded within the sylvian fissure.  There is some   reconstitution of blood flow within distal M2 branches and M3 branches of   the left middle cerebral artery.  CT perfusion is recommended for further   characterization.  2.  Atherosclerotic calcification causes diffuse mild narrowing in the   cavernous and supraclinoid segments of the internal carotid arteries   without flow-limiting stenosis.  3.  Atherosclerotic calcification causes moderate to severe stenosis in   the bilateral intradural vertebral arteries.  Mild luminal irregularity   of the basilar artery without flow-limiting stenosis.  Multiple mild to   moderate stenoses in the proximal P2 segment of the left posterior   cerebral artery.  The right posterior cerebral artery is diffusely small   and irregular, and appears hypoenhancing compared to the contralateral   side.    CT Perfusion w/ Maps w/ IV Cont (02.03.19)   NONCONTRAST HEAD CT SCAN: No CT evidence of acute intracranial   hemorrhage, mass effect or acute territorial infarct.    CT PERFUSION: No evidence of a completed infarct.  Approximately 80 cc   penumbra of at risk tissue in the left MCA vascular territory.    CT Head No Cont (02.13.19)   Slight interval decreased hyperdensity within left sylvian fissure.  No interval acute intracranial hemorrhage or evidence for interval   transcortical territorial infarction.

## 2019-02-13 NOTE — PROGRESS NOTE ADULT - ASSESSMENT
87yo female h/o CAD s/p stents and CABG on aspirin at home, DM, HTN, aortic valve replacement, and pacemaker who presented to [facility] as a transfer from [facility] with right hemiparesis and aphasia and left proximal MCA occlusion now with dysphagia and failed swallow evaluations

## 2019-02-13 NOTE — PROGRESS NOTE ADULT - PROBLEM SELECTOR PLAN 3
More lethargic today   -CT head today was stable  -?metabolic encephelopathy, consider pan culture/CXR to r/o infectious process as cause of lethargy

## 2019-02-13 NOTE — PROGRESS NOTE ADULT - PROBLEM SELECTOR PLAN 1
- in setting of CVA with failed swallow evaluations   - s/p upper gastrointestinal endoscopy with placement of an endoscopically removable PEG  - feeds to be started today  - daily PEG care  - monitor residuals   - strict aspiration precautions with HOB elevated > 30 degrees   - please call with questions; [phone].

## 2019-02-13 NOTE — CHART NOTE - NSCHARTNOTEFT_GEN_A_CORE
NEUROLOGY CHART NOTE      Pt noted to be more lethargic this evening than earlier in the day on rounds. Pt at baseline is awake/alert, has eyes open, is attentive, conversive although hypophonic, follows simple commands with some perseveration.     Tachycardic 110s, saturating well, BP/RR stable. Temp 97.8  Currently on exam this evening, pt has eyes closed, keeps them mostly closed, opens them only with repetitive noxious stimuli and only for brief moment and immediately closes them again. No gaze deviation when forcefully opened. Pupils equal and reactive, ~3mm B/L. Minimal verbal output, mostly one word, mostly inaudible. She has minimal semi-purposeful movement with eyes remaining closed and follows some (1-2) not all simple commands with repetitive verbal instruction. Motor exam is unchanged.      PLAN:  - Awaiting CBC and U/A which was ordered earlier this afternoon, labs have not returned.   [] Re-ordered labs stat  [] pending above results, will order Repeat CT Head, routine EEG to r/o subclinical seizures, and Blood Cx (r/o infectious source)

## 2019-02-13 NOTE — PROGRESS NOTE ADULT - SUBJECTIVE AND OBJECTIVE BOX
Subjective: Patient seen and examined. No new events except as noted.   remains in Stroke unit   sitting in chair at bedside   much more awake and alert   no cp or sob   s/p upper gastrointestinal endoscopy with placement of an endoscopically removable PEG yesterday   REVIEW OF SYSTEMS:    CONSTITUTIONAL: +weakness, fevers or chills  EYES/ENT: No visual changes;  No vertigo or throat pain   NECK: No pain or stiffness  RESPIRATORY: No cough, wheezing, hemoptysis; No shortness of breath  CARDIOVASCULAR: No chest pain or palpitations  GASTROINTESTINAL: No abdominal or epigastric pain. No nausea, vomiting, or hematemesis; No diarrhea or constipation. No melena or hematochezia.  GENITOURINARY: No dysuria, frequency or hematuria  NEUROLOGICAL: + numbness or weakness  SKIN: No itching, burning, rashes, or lesions   All other review of systems is negative unless indicated above.    MEDICATIONS:  MEDICATIONS  (STANDING):  amiodarone    Tablet 200 milliGRAM(s) Oral daily  aspirin  chewable 81 milliGRAM(s) Enteral Tube <User Schedule>  dextrose 5%. 1000 milliLiter(s) (50 mL/Hr) IV Continuous <Continuous>  dextrose 50% Injectable 12.5 Gram(s) IV Push once  dextrose 50% Injectable 25 Gram(s) IV Push once  dextrose 50% Injectable 25 Gram(s) IV Push once  docusate sodium Liquid 100 milliGRAM(s) Oral two times a day  enoxaparin Injectable 40 milliGRAM(s) SubCutaneous at bedtime  famotidine    Tablet 20 milliGRAM(s) Oral two times a day  insulin regular  human corrective regimen sliding scale   SubCutaneous every 6 hours  metoprolol tartrate 25 milliGRAM(s) Oral three times a day  senna Syrup 20 milliLiter(s) Oral at bedtime  simvastatin 40 milliGRAM(s) Oral at bedtime  warfarin 5 milliGRAM(s) Oral once      PHYSICAL EXAM:  T(C): 36.6 (02-13-19 @ 19:26), Max: 37.3 (02-13-19 @ 07:50)  HR: 102 (02-13-19 @ 18:00) (68 - 108)  BP: 129/67 (02-13-19 @ 18:00) (122/78 - 158/76)  RR: 22 (02-13-19 @ 18:00) (18 - 26)  SpO2: 97% (02-13-19 @ 18:00) (80% - 100%)  Wt(kg): --  I&O's Summary    12 Feb 2019 07:01  -  13 Feb 2019 07:00  --------------------------------------------------------  IN: 200 mL / OUT: 0 mL / NET: 200 mL    13 Feb 2019 07:01  -  13 Feb 2019 19:50  --------------------------------------------------------  IN: 240 mL / OUT: 0 mL / NET: 240 mL          Appearance: NAD more awake  but still lethargic   HEENT:   Normal oral mucosa, PERRL, EOMI +NGT 	  Lymphatic: No lymphadenopathy  Cardiovascular: tachycardic S1 S2, No JVD, No murmurs, No edema  Respiratory: decreased bs   Psychiatry: A & O x 3, lethargic   Gastrointestinal:  Soft, Non-tender, + PEG  Skin: No rashes, No ecchymoses, No cyanosis	  NEUROLOGICAL EXAM:  Mental status: eyes open, awake, attentive to persons at bedside, able to state name, hypophonic follows some simple commands, perseverates at times   Cranial Nerves:  right facial droop, no blink to threat on right   Motor exam: left side moves well against gravity, RUE 3/5 with drift , RLE 2/5   Sensation: Intact to light touch   Coordination/ Gait: gait not assessed         LABS:    CARDIAC MARKERS:                                14.6   12.0  )-----------( 283      ( 13 Feb 2019 12:48 )             42.9     02-13    140  |  94<L>  |  33<H>  ----------------------------<  195<H>  4.2   |  28  |  0.80    Ca    9.7      13 Feb 2019 17:19    TPro  8.4<H>  /  Alb  4.0  /  TBili  2.1<H>  /  DBili  x   /  AST  23  /  ALT  18  /  AlkPhos  98  02-13    proBNP:   Lipid Profile:   HgA1c:   TSH:             TELEMETRY: 	  SR  ECG:  	  RADIOLOGY:   DIAGNOSTIC TESTING:  [ ] Echocardiogram:  [ ]  Catheterization:  [ ] Stress Test:    OTHER: 	    < from: Upper Endoscopy (02.12.19 @ 13:43) >    [facility]  ____________________________________________________________________________________________________  Patient Name: Susie Valentino                    MRN: [id number]  Account Number: [id number]                     YOB: 1931  Room: Endoscopy Room 1                           Gender: Female  Attending MD: Yang Pedroza MD                 Procedure Date No Time: 2/12/2019  ____________________________________________________________________________________________________     Procedure:           Upper GI endoscopy  Indications:         Place PEG because patient is unable to eat due to stroke (CVA)  Providers:           Yang Pedroza MD, Perla Lind PA-C  Medicines:           Monitored Anesthesia Care  Complications:       No immediate complications.  ____________________________________________________________________________________________________  Procedure:           After obtaining informed consent, the endoscope was passed under direct                        vision. Throughout the procedure, the patient's blood pressure, pulse, and                        oxygen saturations were monitored continuously. The Endoscope was introduced                        through the mouth, and advanced to the second part of duodenum. The upper GI                        endoscopy was accomplished without difficulty. The patient tolerated the                        procedure well.                                        Findings:       The examined esophagus was normal.       Mild inflammation was found in the gastric antrum.       The examined duodenum was normal.       The patient was placed in the supine position for PEG placement. The stomach was insufflated        to appose gastric and abdominal walls. A site was located in the body of the stomach with        excellent transillumination for placement. The abdominal wall was marked and prepped in a        sterile manner.The area was anesthetized with 5 mL of 0.5% lidocaine. The trocar needle was        introduced through the abdominal wall and into the stomach under direct endoscopic view. A        snare was introduced through the endoscope and opened in the gastriclumen. The guide wire        was passed through the trocar and into the open snare. The snare was closed around the guide        wire. The endoscope and snare were removed, pulling the wire out through the mouth. A skin        incision was made at the site of needle insertion. The endoscopically removable 20 Fr Bard        gastrostomy tube was lubricated. The G-tube was tied to the guide wire and pulled through the        mouth and into the stomach. The trocar needle was removed, and the gastrostomy tube was        pulled out from the stomach through the skin. The external bumper was attached to the        gastrostomy tube, and the tube was cut to remove the guide wire. The final position of the        gastrostomy tube was confirmed by relookendoscopy, and skin marking noted to be 4 cm at the        external bumper. The final tension and compression of the abdominal wall by the PEG tube and        external bumper were checked. The feeding tube was capped, and the tube site cleaned and       dressed.                                                                                                        Impression:          - Normal esophagus.                       - Gastritis.                       - Normal examined duodenum.               - An endoscopically removable PEG placement was successfully completed.                       - No specimens collected.  Recommendation:      - Return patient to [facility] for [facility] care.                       - Please follow the post-PEG recommendations including: may use PEG today for                        meds and water and may use PEG tomorrow for feedings.                                                                                                        Attending Participation:       I personally performed the entire procedure.                                                                                                            _________________  Yang Pedroza MD  2/12/2019 2:11:18 PM  Number of Addenda: 0    Note Initiated On: 2/12/2019 1:43 PM    < end of copied text >

## 2019-02-13 NOTE — PROGRESS NOTE ADULT - SUBJECTIVE AND OBJECTIVE BOX
Interval Events: s/p upper gastrointestinal endoscopy with placement of an endoscopically removable PEG. Feeds to be started today    Pt seen and examined. She complains of mild incisional pain. Pt denies n/v  peg site without drainage/leakage/blood     MEDICATIONS  (STANDING):  amiodarone    Tablet 200 milliGRAM(s) Oral daily  aspirin  chewable 81 milliGRAM(s) Enteral Tube <User Schedule>  dextrose 5%. 1000 milliLiter(s) (50 mL/Hr) IV Continuous <Continuous>  dextrose 50% Injectable 12.5 Gram(s) IV Push once  dextrose 50% Injectable 25 Gram(s) IV Push once  dextrose 50% Injectable 25 Gram(s) IV Push once  docusate sodium Liquid 100 milliGRAM(s) Oral two times a day  enoxaparin Injectable 40 milliGRAM(s) SubCutaneous at bedtime  famotidine    Tablet 20 milliGRAM(s) Oral two times a day  furosemide   Injectable 40 milliGRAM(s) IV Push daily  insulin regular  human corrective regimen sliding scale   SubCutaneous every 6 hours  metoprolol tartrate 25 milliGRAM(s) Oral three times a day  senna Syrup 20 milliLiter(s) Oral at bedtime  simvastatin 40 milliGRAM(s) Oral at bedtime    MEDICATIONS  (PRN):  acetaminophen    Suspension .. 650 milliGRAM(s) Oral every 6 hours PRN Temp greater or equal to 38C (100.4F), Mild Pain (1 - 3)  dextrose 40% Gel 15 Gram(s) Oral once PRN Blood Glucose LESS THAN 70 milliGRAM(s)/deciLiter  glucagon  Injectable 1 milliGRAM(s) IntraMuscular once PRN Glucose <70 milliGRAM(s)/deciLiter  levalbuterol Inhalation 0.63 milliGRAM(s) Inhalation every 6 hours PRN SOB/Wheezing  nystatin Powder 1 Application(s) Topical two times a day PRN dermatitis      Allergies    No Known Allergies    Intolerances        Review of Systems:    General:  No wt loss, fevers, chills, night sweats,fatigue,   Eyes:  Good vision, no reported pain  ENT:  No sore throat, pain, runny nose, dysphagia  CV:  No pain, palpitations, hypo/hypertension  Resp:  No dyspnea, cough, tachypnea, wheezing  GI:  No pain, No nausea, No vomiting, No diarrhea, No constipation, No weight loss, No fever, No pruritis, No rectal bleeding, No melena, No dysphagia  :  No pain, bleeding, incontinence, nocturia  Muscle:  No pain, weakness  Neuro:  No weakness, tingling, memory problems  Psych:  No fatigue, insomnia, mood problems, depression  Endocrine:  No polyuria, polydypsia, cold/heat intolerance  Heme:  No petechiae, ecchymosis, easy bruisability  Skin:  No rash, tattoos, scars, edema      Vital Signs Last 24 Hrs  T(C): 37.3 (13 Feb 2019 07:50), Max: 37.3 (13 Feb 2019 07:50)  T(F): 99.1 (13 Feb 2019 07:50), Max: 99.1 (13 Feb 2019 07:50)  HR: 80 (13 Feb 2019 09:07) (68 - 103)  BP: 142/81 (13 Feb 2019 08:00) (124/61 - 158/76)  BP(mean): 99 (13 Feb 2019 08:00) (76 - 106)  RR: 21 (13 Feb 2019 09:07) (16 - 26)  SpO2: 97% (13 Feb 2019 09:07) (80% - 100%)    PHYSICAL EXAM:    General:  Appears stated age, well-groomed, nad  HEENT:  NC/AT,  conjunctivae clear and pink, no thyromegaly, nodules, adenopathy, no JVD,   Chest:  Full & symmetric excursion, no increased effort, breath sounds clear  Cardiovascular:  Regular rhythm, S1, S2, no murmur/rub/S3/S4, no abdominal bruit, no edema  Abdomen:  Soft, non-tender, non-distended, normoactive bowel sounds,  no masses ,no hepatosplenomeagaly, no signs of chronic liver disease, + PEG c/d/i  Extremities:  no cyanosis, clubbing or edema  Skin:  No rash/erythema/ecchymoses/petechiae/wounds/abscess/warm/dry  Neuro/Psych:  A&Ox2, no asterixis, no tremor, no encephalopathy      LABS:                        14.4   13.9  )-----------( 278      ( 13 Feb 2019 03:09 )             42.6     02-13    138  |  95<L>  |  32<H>  ----------------------------<  225<H>  4.0   |  25  |  0.78    Ca    9.6      13 Feb 2019 03:09            RADIOLOGY & ADDITIONAL TESTS:

## 2019-02-13 NOTE — PROGRESS NOTE ADULT - ASSESSMENT
89 y/o F with PMH of CAD s/p stents and CABG, DMT2, HTN, s/p AVR, PPM presents with L MCA CVA outside of window for tPA. Extubated 2/5. Hospital course c/b dysphagia. Called to eval for cough and congestion. CXR with small L pleural effusion, bibasilar atelectasis.

## 2019-02-13 NOTE — PROGRESS NOTE ADULT - SUBJECTIVE AND OBJECTIVE BOX
Follow-up Pulm Progress Note    Lethargic this afternoon  Oriented x1-2  99% on RA    Medications:  MEDICATIONS  (STANDING):  amiodarone    Tablet 200 milliGRAM(s) Oral daily  aspirin  chewable 81 milliGRAM(s) Enteral Tube <User Schedule>  dextrose 5%. 1000 milliLiter(s) (50 mL/Hr) IV Continuous <Continuous>  dextrose 50% Injectable 12.5 Gram(s) IV Push once  dextrose 50% Injectable 25 Gram(s) IV Push once  dextrose 50% Injectable 25 Gram(s) IV Push once  docusate sodium Liquid 100 milliGRAM(s) Oral two times a day  enoxaparin Injectable 40 milliGRAM(s) SubCutaneous at bedtime  famotidine    Tablet 20 milliGRAM(s) Oral two times a day  furosemide   Injectable 40 milliGRAM(s) IV Push daily  insulin regular  human corrective regimen sliding scale   SubCutaneous every 6 hours  metoprolol tartrate 25 milliGRAM(s) Oral three times a day  senna Syrup 20 milliLiter(s) Oral at bedtime  simvastatin 40 milliGRAM(s) Oral at bedtime  warfarin 5 milliGRAM(s) Oral once    MEDICATIONS  (PRN):  acetaminophen    Suspension .. 650 milliGRAM(s) Oral every 6 hours PRN Temp greater or equal to 38C (100.4F), Mild Pain (1 - 3)  dextrose 40% Gel 15 Gram(s) Oral once PRN Blood Glucose LESS THAN 70 milliGRAM(s)/deciLiter  glucagon  Injectable 1 milliGRAM(s) IntraMuscular once PRN Glucose <70 milliGRAM(s)/deciLiter  levalbuterol Inhalation 0.63 milliGRAM(s) Inhalation every 6 hours PRN SOB/Wheezing  nystatin Powder 1 Application(s) Topical two times a day PRN dermatitis          Vital Signs Last 24 Hrs  T(C): 37.3 (13 Feb 2019 07:50), Max: 37.3 (13 Feb 2019 07:50)  T(F): 99.1 (13 Feb 2019 07:50), Max: 99.1 (13 Feb 2019 07:50)  HR: 90 (13 Feb 2019 12:00) (68 - 103)  BP: 130/52 (13 Feb 2019 12:00) (122/78 - 158/76)  BP(mean): 75 (13 Feb 2019 12:00) (75 - 106)  RR: 21 (13 Feb 2019 12:00) (16 - 26)  SpO2: 100% (13 Feb 2019 12:00) (80% - 100%)  on RA          02-12 @ 07:01  -  02-13 @ 07:00  --------------------------------------------------------  IN: 200 mL / OUT: 0 mL / NET: 200 mL          LABS:                        14.6   12.0  )-----------( 283      ( 13 Feb 2019 12:48 )             42.9     02-13    138  |  95<L>  |  32<H>  ----------------------------<  225<H>  4.0   |  25  |  0.78    Ca    9.6      13 Feb 2019 03:09            CAPILLARY BLOOD GLUCOSE      POCT Blood Glucose.: 188 mg/dL (13 Feb 2019 13:02)    PT/INR - ( 13 Feb 2019 12:48 )   PT: 14.7 sec;   INR: 1.28 ratio         PTT - ( 13 Feb 2019 12:48 )  PTT:31.3 sec        Physical Examination:  PULM: Decreased BS at bases  CVS: S1, S2 heard    RADIOLOGY REVIEWED  CXR: 2/8: L pleural effusion   Pulm edema

## 2019-02-13 NOTE — PROGRESS NOTE ADULT - ASSESSMENT
88 year old white female with a pmh of CAD, HTN, HLD, pacemaker who presented to [facility] as a transfer from [facility] with right hemiparesis and aphasia and left proximal MCA occlusion. The patient was last known well at 2 pm on 2/3/19 and was then found down on the ground by her daughter several hours later and was noted to be not speaking and weak on the right. At [facility] a CTA head and neck was performed which showed a proximal left MCA occlusion, and a CT head showed a dense left MCA sign with some hypodensity and early ischemic changes to my eye in the left MCA distribution. She did not receive IV tPA as she was out of the window and was transferred to [facility] for possible mechanical thrombectomy. At [facility] her CT perfusion showed zero core infarct, with a penumbra of 80 mls, and an infinite mismatch. She was deemed a candidate for intervention and recanalization was achieved with TICI 3 score.     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    NEURO: neurologically with transient lethargy and confusion overnight, now improved, repeat CTH without acute change, ? 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 Physical therapy/OT recommended acute TBI rehab    ANTITHROMBOTIC THERAPY: ASA,  transition to anticoagulation (Coumadin given AVR) as repeat stable imaging in setting of atrial flutter found on pacemaker interrogation, obtained GI clearance.     PULMONARY: protecting airway, saturating well, secretions requiring suctioning PRN, continue on Xopenex PRN. CXR on 2/8 showed unchanged mild pulmonary vascular congestion and a small left pleural effusion with associated atelectasis, on IV lasix daily with good urine output.     CARDIOVASCULAR: EF 65-70% moderate AS, moderate pulm HTN, mild-mod TR and no PFO, cardiac monitoring at times showed sinus tachycardia - started on Amiodarone and Metoprolol with improvement PPM interrogation- showed episode of atrial flutter, plan to AC. Continue on  Lasix.               SBP goal: Normotension     GASTROINTESTINAL: dysphagia screen noted failed, S&S recommended NPO. She underwent a MBS on 2/8 and was recommended to keep NPO and re-evaluation on 2/11 recommended NPO, team d/w family , patient aware of plan independently and in agreement. s/p PEG 2/12, TF initated, noted with no GI contraindication to AC.       Diet: npo with TF via NGT    RENAL: BUN elevated/Cr within limits, maintain adequate hydration, good urine output, free water started, monitor and titrate accordingly       Na Goal: Greater than 135     Gonzalez:N    HEMATOLOGY: H/H without anemia, Platelets 283, LE duplex negative for DVT     DVT ppx: Heparin s.c [] LMWH [x]     ID: afebrile, mild leukocytosis- downtrending ? reactive to PEG, continue to monitor, RVP negative    DISPOSITION: Acute TBI rehab once stable enteral access established    CORE MEASURES:        Admission NIHSS: 19     TPA: [] YES [x] NO      LDL/HDL:55/37     Depression Screen: 0     Statin Therapy: y     Dysphagia Screen: [] PASS [x] FAIL      Smoking [] YES [x] NO      Afib [] YES [x] NO     Stroke Education [x] YES [] NO 88 year old white female with a pmh of CAD, HTN, HLD, pacemaker who presented to [facility] as a transfer from [facility] with right hemiparesis and aphasia and left proximal MCA occlusion. The patient was last known well at 2 pm on 2/3/19 and was then found down on the ground by her daughter several hours later and was noted to be not speaking and weak on the right. At [facility] a CTA head and neck was performed which showed a proximal left MCA occlusion, and a CT head showed a dense left MCA sign with some hypodensity and early ischemic changes to my eye in the left MCA distribution. She did not receive IV tPA as she was out of the window and was transferred to [facility] for possible mechanical thrombectomy. At [facility] her CT perfusion showed zero core infarct, with a penumbra of 80 mls, and an infinite mismatch. She was deemed a candidate for intervention and recanalization was achieved with TICI 3 score.     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    NEURO: neurologically with transient lethargy and confusion overnight, now improved but remains intermittent, repeat CTH without acute change, ? 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 Physical therapy/OT recommended acute TBI rehab    ANTITHROMBOTIC THERAPY: ASA,  transition to anticoagulation (Coumadin given AVR) as repeat stable imaging in setting of atrial flutter found on pacemaker interrogation, obtained GI clearance.     PULMONARY: protecting airway, saturating well, secretions requiring suctioning PRN, continue on Xopenex PRN. CXR on 2/8 showed unchanged mild pulmonary vascular congestion and a small left pleural effusion with associated atelectasis, on IV lasix daily with good urine output.     CARDIOVASCULAR: EF 65-70% moderate AS, moderate pulm HTN, mild-mod TR and no PFO, cardiac monitoring at times showed sinus tachycardia - started on Amiodarone and Metoprolol with improvement PPM interrogation- showed episode of atrial flutter, plan to AC. Continue on  Lasix.               SBP goal: Normotension     GASTROINTESTINAL: dysphagia screen noted failed, S&S recommended NPO. She underwent a MBS on 2/8 and was recommended to keep NPO and re-evaluation on 2/11 recommended NPO, team d/w family , patient aware of plan independently and in agreement. s/p PEG 2/12, TF initated, noted with no GI contraindication to AC.       Diet: npo with TF via NGT    RENAL: BUN elevated/Cr within limits, maintain adequate hydration, good urine output, free water started, monitor and titrate accordingly       Na Goal: Greater than 135     Gonzalez:N    HEMATOLOGY: H/H without anemia, Platelets 283, LE duplex negative for DVT     DVT ppx: Heparin s.c [] LMWH [x]     ID: afebrile, mild leukocytosis- downtrending ? reactive to PEG, continue to monitor, RVP negative, UA    DISPOSITION: Acute TBI rehab once stable enteral access established    CORE MEASURES:        Admission NIHSS: 19     TPA: [] YES [x] NO      LDL/HDL:55/37     Depression Screen: 0     Statin Therapy: y     Dysphagia Screen: [] PASS [x] FAIL      Smoking [] YES [x] NO      Afib [] YES [x] NO     Stroke Education [x] YES [] NO

## 2019-02-13 NOTE — PROGRESS NOTE ADULT - PROBLEM SELECTOR PLAN 2
Likely embolic   Long term AC when deemed safe from neuro standpoint  decreases lasix 20 mg IV daily

## 2019-02-14 ENCOUNTER — INPATIENT (INPATIENT)
Facility: HOSPITAL | Age: 84
LOS: 25 days | Discharge: NURSING, REHAB OR LONG-TERM CARE | DRG: 949 | End: 2019-03-12
Attending: PSYCHIATRY & NEUROLOGY | Admitting: PSYCHIATRY & NEUROLOGY
Payer: MEDICARE

## 2019-02-14 VITALS
DIASTOLIC BLOOD PRESSURE: 64 MMHG | HEIGHT: 58 IN | TEMPERATURE: 99 F | SYSTOLIC BLOOD PRESSURE: 138 MMHG | RESPIRATION RATE: 14 BRPM | HEART RATE: 108 BPM | OXYGEN SATURATION: 100 % | WEIGHT: 198.86 LBS

## 2019-02-14 VITALS
DIASTOLIC BLOOD PRESSURE: 82 MMHG | RESPIRATION RATE: 21 BRPM | HEART RATE: 111 BPM | OXYGEN SATURATION: 100 % | SYSTOLIC BLOOD PRESSURE: 100 MMHG | TEMPERATURE: 99 F

## 2019-02-14 DIAGNOSIS — E11.9 TYPE 2 DIABETES MELLITUS WITHOUT COMPLICATIONS: ICD-10-CM

## 2019-02-14 DIAGNOSIS — M25.552 PAIN IN LEFT HIP: ICD-10-CM

## 2019-02-14 DIAGNOSIS — Z90.49 ACQUIRED ABSENCE OF OTHER SPECIFIED PARTS OF DIGESTIVE TRACT: Chronic | ICD-10-CM

## 2019-02-14 DIAGNOSIS — I48.92 UNSPECIFIED ATRIAL FLUTTER: ICD-10-CM

## 2019-02-14 DIAGNOSIS — Z95.1 PRESENCE OF AORTOCORONARY BYPASS GRAFT: Chronic | ICD-10-CM

## 2019-02-14 DIAGNOSIS — Z95.5 PRESENCE OF CORONARY ANGIOPLASTY IMPLANT AND GRAFT: Chronic | ICD-10-CM

## 2019-02-14 DIAGNOSIS — I63.9 CEREBRAL INFARCTION, UNSPECIFIED: ICD-10-CM

## 2019-02-14 DIAGNOSIS — Z95.0 PRESENCE OF CARDIAC PACEMAKER: Chronic | ICD-10-CM

## 2019-02-14 DIAGNOSIS — R13.10 DYSPHAGIA, UNSPECIFIED: ICD-10-CM

## 2019-02-14 DIAGNOSIS — N39.0 URINARY TRACT INFECTION, SITE NOT SPECIFIED: ICD-10-CM

## 2019-02-14 DIAGNOSIS — I50.30 UNSPECIFIED DIASTOLIC (CONGESTIVE) HEART FAILURE: ICD-10-CM

## 2019-02-14 DIAGNOSIS — Z95.2 PRESENCE OF PROSTHETIC HEART VALVE: Chronic | ICD-10-CM

## 2019-02-14 DIAGNOSIS — E78.5 HYPERLIPIDEMIA, UNSPECIFIED: ICD-10-CM

## 2019-02-14 DIAGNOSIS — Z91.89 OTHER SPECIFIED PERSONAL RISK FACTORS, NOT ELSEWHERE CLASSIFIED: ICD-10-CM

## 2019-02-14 DIAGNOSIS — I10 ESSENTIAL (PRIMARY) HYPERTENSION: ICD-10-CM

## 2019-02-14 LAB
ANION GAP SERPL CALC-SCNC: 17 MMOL/L — SIGNIFICANT CHANGE UP (ref 5–17)
BASE EXCESS BLDV CALC-SCNC: 8.5 MMOL/L — HIGH (ref -2–2)
BUN SERPL-MCNC: 35 MG/DL — HIGH (ref 7–23)
CALCIUM SERPL-MCNC: 9.7 MG/DL — SIGNIFICANT CHANGE UP (ref 8.4–10.5)
CHLORIDE SERPL-SCNC: 94 MMOL/L — LOW (ref 96–108)
CO2 BLDV-SCNC: 34 MMOL/L — HIGH (ref 22–30)
CO2 SERPL-SCNC: 28 MMOL/L — SIGNIFICANT CHANGE UP (ref 22–31)
CREAT SERPL-MCNC: 0.73 MG/DL — SIGNIFICANT CHANGE UP (ref 0.5–1.3)
GAS PNL BLDV: SIGNIFICANT CHANGE UP
GLUCOSE BLDC GLUCOMTR-MCNC: 166 MG/DL — HIGH (ref 70–99)
GLUCOSE BLDC GLUCOMTR-MCNC: 211 MG/DL — HIGH (ref 70–99)
GLUCOSE SERPL-MCNC: 218 MG/DL — HIGH (ref 70–99)
HCO3 BLDV-SCNC: 33 MMOL/L — HIGH (ref 21–29)
HCT VFR BLD CALC: 43.6 % — SIGNIFICANT CHANGE UP (ref 34.5–45)
HGB BLD-MCNC: 14.6 G/DL — SIGNIFICANT CHANGE UP (ref 11.5–15.5)
HOROWITZ INDEX BLDV+IHG-RTO: 21 — SIGNIFICANT CHANGE UP
INR BLD: 1.26 RATIO — HIGH (ref 0.88–1.16)
MCHC RBC-ENTMCNC: 30.3 PG — SIGNIFICANT CHANGE UP (ref 27–34)
MCHC RBC-ENTMCNC: 33.5 GM/DL — SIGNIFICANT CHANGE UP (ref 32–36)
MCV RBC AUTO: 90.6 FL — SIGNIFICANT CHANGE UP (ref 80–100)
PCO2 BLDV: 43 MMHG — SIGNIFICANT CHANGE UP (ref 35–50)
PH BLDV: 7.49 — HIGH (ref 7.35–7.45)
PLATELET # BLD AUTO: 244 K/UL — SIGNIFICANT CHANGE UP (ref 150–400)
PO2 BLDV: 51 MMHG — HIGH (ref 25–45)
POTASSIUM SERPL-MCNC: 3.6 MMOL/L — SIGNIFICANT CHANGE UP (ref 3.5–5.3)
POTASSIUM SERPL-SCNC: 3.6 MMOL/L — SIGNIFICANT CHANGE UP (ref 3.5–5.3)
PROTHROM AB SERPL-ACNC: 14.5 SEC — HIGH (ref 10–12.9)
RBC # BLD: 4.81 M/UL — SIGNIFICANT CHANGE UP (ref 3.8–5.2)
RBC # FLD: 12.4 % — SIGNIFICANT CHANGE UP (ref 10.3–14.5)
SAO2 % BLDV: 85 % — SIGNIFICANT CHANGE UP (ref 67–88)
SODIUM SERPL-SCNC: 139 MMOL/L — SIGNIFICANT CHANGE UP (ref 135–145)
WBC # BLD: 9 K/UL — SIGNIFICANT CHANGE UP (ref 3.8–10.5)
WBC # FLD AUTO: 9 K/UL — SIGNIFICANT CHANGE UP (ref 3.8–10.5)

## 2019-02-14 PROCEDURE — 87581 M.PNEUMON DNA AMP PROBE: CPT

## 2019-02-14 PROCEDURE — 97116 GAIT TRAINING THERAPY: CPT

## 2019-02-14 PROCEDURE — 0042T: CPT

## 2019-02-14 PROCEDURE — 86901 BLOOD TYPING SEROLOGIC RH(D): CPT

## 2019-02-14 PROCEDURE — 87798 DETECT AGENT NOS DNA AMP: CPT

## 2019-02-14 PROCEDURE — 80053 COMPREHEN METABOLIC PANEL: CPT

## 2019-02-14 PROCEDURE — 84436 ASSAY OF TOTAL THYROXINE: CPT

## 2019-02-14 PROCEDURE — 97530 THERAPEUTIC ACTIVITIES: CPT

## 2019-02-14 PROCEDURE — 87633 RESP VIRUS 12-25 TARGETS: CPT

## 2019-02-14 PROCEDURE — C1894: CPT

## 2019-02-14 PROCEDURE — 85730 THROMBOPLASTIN TIME PARTIAL: CPT

## 2019-02-14 PROCEDURE — 83036 HEMOGLOBIN GLYCOSYLATED A1C: CPT

## 2019-02-14 PROCEDURE — 84443 ASSAY THYROID STIM HORMONE: CPT

## 2019-02-14 PROCEDURE — 93306 TTE W/DOPPLER COMPLETE: CPT

## 2019-02-14 PROCEDURE — 93970 EXTREMITY STUDY: CPT

## 2019-02-14 PROCEDURE — 71045 X-RAY EXAM CHEST 1 VIEW: CPT

## 2019-02-14 PROCEDURE — 99291 CRITICAL CARE FIRST HOUR: CPT | Mod: 25

## 2019-02-14 PROCEDURE — 94002 VENT MGMT INPAT INIT DAY: CPT

## 2019-02-14 PROCEDURE — 76380 CAT SCAN FOLLOW-UP STUDY: CPT | Mod: XU

## 2019-02-14 PROCEDURE — 85610 PROTHROMBIN TIME: CPT

## 2019-02-14 PROCEDURE — 51702 INSERT TEMP BLADDER CATH: CPT

## 2019-02-14 PROCEDURE — 84480 ASSAY TRIIODOTHYRONINE (T3): CPT

## 2019-02-14 PROCEDURE — 85014 HEMATOCRIT: CPT

## 2019-02-14 PROCEDURE — 85027 COMPLETE CBC AUTOMATED: CPT

## 2019-02-14 PROCEDURE — 92610 EVALUATE SWALLOWING FUNCTION: CPT

## 2019-02-14 PROCEDURE — 92526 ORAL FUNCTION THERAPY: CPT

## 2019-02-14 PROCEDURE — 84100 ASSAY OF PHOSPHORUS: CPT

## 2019-02-14 PROCEDURE — 86850 RBC ANTIBODY SCREEN: CPT

## 2019-02-14 PROCEDURE — 92611 MOTION FLUOROSCOPY/SWALLOW: CPT

## 2019-02-14 PROCEDURE — 84132 ASSAY OF SERUM POTASSIUM: CPT

## 2019-02-14 PROCEDURE — C1757: CPT

## 2019-02-14 PROCEDURE — 84484 ASSAY OF TROPONIN QUANT: CPT

## 2019-02-14 PROCEDURE — 83605 ASSAY OF LACTIC ACID: CPT

## 2019-02-14 PROCEDURE — 87486 CHLMYD PNEUM DNA AMP PROBE: CPT

## 2019-02-14 PROCEDURE — 81001 URINALYSIS AUTO W/SCOPE: CPT

## 2019-02-14 PROCEDURE — 80048 BASIC METABOLIC PNL TOTAL CA: CPT

## 2019-02-14 PROCEDURE — 61645 PERQ ART M-THROMBECT &/NFS: CPT

## 2019-02-14 PROCEDURE — C1887: CPT

## 2019-02-14 PROCEDURE — 87086 URINE CULTURE/COLONY COUNT: CPT

## 2019-02-14 PROCEDURE — 99232 SBSQ HOSP IP/OBS MODERATE 35: CPT

## 2019-02-14 PROCEDURE — 87186 SC STD MICRODIL/AGAR DIL: CPT

## 2019-02-14 PROCEDURE — 82962 GLUCOSE BLOOD TEST: CPT

## 2019-02-14 PROCEDURE — C1769: CPT

## 2019-02-14 PROCEDURE — 93010 ELECTROCARDIOGRAM REPORT: CPT

## 2019-02-14 PROCEDURE — 97760 ORTHOTIC MGMT&TRAING 1ST ENC: CPT

## 2019-02-14 PROCEDURE — 70450 CT HEAD/BRAIN W/O DYE: CPT

## 2019-02-14 PROCEDURE — 86900 BLOOD TYPING SEROLOGIC ABO: CPT

## 2019-02-14 PROCEDURE — L8699: CPT

## 2019-02-14 PROCEDURE — 97110 THERAPEUTIC EXERCISES: CPT

## 2019-02-14 PROCEDURE — C1760: CPT

## 2019-02-14 PROCEDURE — 82330 ASSAY OF CALCIUM: CPT

## 2019-02-14 PROCEDURE — 82947 ASSAY GLUCOSE BLOOD QUANT: CPT

## 2019-02-14 PROCEDURE — 84295 ASSAY OF SERUM SODIUM: CPT

## 2019-02-14 PROCEDURE — 94640 AIRWAY INHALATION TREATMENT: CPT

## 2019-02-14 PROCEDURE — 83735 ASSAY OF MAGNESIUM: CPT

## 2019-02-14 PROCEDURE — 76700 US EXAM ABDOM COMPLETE: CPT

## 2019-02-14 PROCEDURE — 82803 BLOOD GASES ANY COMBINATION: CPT

## 2019-02-14 PROCEDURE — 97162 PT EVAL MOD COMPLEX 30 MIN: CPT

## 2019-02-14 PROCEDURE — 93005 ELECTROCARDIOGRAM TRACING: CPT | Mod: XU

## 2019-02-14 PROCEDURE — 74230 X-RAY XM SWLNG FUNCJ C+: CPT

## 2019-02-14 PROCEDURE — 80061 LIPID PANEL: CPT

## 2019-02-14 PROCEDURE — 82435 ASSAY OF BLOOD CHLORIDE: CPT

## 2019-02-14 PROCEDURE — 97166 OT EVAL MOD COMPLEX 45 MIN: CPT

## 2019-02-14 RX ORDER — ATORVASTATIN CALCIUM 80 MG/1
20 TABLET, FILM COATED ORAL AT BEDTIME
Qty: 0 | Refills: 0 | Status: DISCONTINUED | OUTPATIENT
Start: 2019-02-14 | End: 2019-02-23

## 2019-02-14 RX ORDER — METOPROLOL TARTRATE 50 MG
25 TABLET ORAL EVERY 8 HOURS
Qty: 0 | Refills: 0 | Status: DISCONTINUED | OUTPATIENT
Start: 2019-02-14 | End: 2019-02-23

## 2019-02-14 RX ORDER — FAMOTIDINE 10 MG/ML
20 INJECTION INTRAVENOUS
Qty: 0 | Refills: 0 | Status: DISCONTINUED | OUTPATIENT
Start: 2019-02-14 | End: 2019-02-14

## 2019-02-14 RX ORDER — DEXTROSE 50 % IN WATER 50 %
15 SYRINGE (ML) INTRAVENOUS ONCE
Qty: 0 | Refills: 0 | Status: DISCONTINUED | OUTPATIENT
Start: 2019-02-14 | End: 2019-03-12

## 2019-02-14 RX ORDER — WARFARIN SODIUM 2.5 MG/1
5 TABLET ORAL AT BEDTIME
Qty: 0 | Refills: 0 | Status: DISCONTINUED | OUTPATIENT
Start: 2019-02-15 | End: 2019-02-16

## 2019-02-14 RX ORDER — CEFUROXIME AXETIL 250 MG
250 TABLET ORAL DAILY
Qty: 0 | Refills: 0 | Status: COMPLETED | OUTPATIENT
Start: 2019-02-15 | End: 2019-02-19

## 2019-02-14 RX ORDER — FOLIC ACID 0.8 MG
1 TABLET ORAL DAILY
Qty: 0 | Refills: 0 | Status: DISCONTINUED | OUTPATIENT
Start: 2019-02-14 | End: 2019-02-23

## 2019-02-14 RX ORDER — POLYETHYLENE GLYCOL 3350 17 G/17G
17 POWDER, FOR SOLUTION ORAL ONCE
Qty: 0 | Refills: 0 | Status: COMPLETED | OUTPATIENT
Start: 2019-02-14 | End: 2019-02-16

## 2019-02-14 RX ORDER — WARFARIN SODIUM 2.5 MG/1
5 TABLET ORAL ONCE
Qty: 0 | Refills: 0 | Status: COMPLETED | OUTPATIENT
Start: 2019-02-14 | End: 2019-02-14

## 2019-02-14 RX ORDER — CHLORHEXIDINE GLUCONATE 213 G/1000ML
15 SOLUTION TOPICAL
Qty: 0 | Refills: 0 | Status: DISCONTINUED | OUTPATIENT
Start: 2019-02-14 | End: 2019-02-14

## 2019-02-14 RX ORDER — AMIODARONE HYDROCHLORIDE 400 MG/1
1 TABLET ORAL
Qty: 0 | Refills: 0 | COMMUNITY
Start: 2019-02-14

## 2019-02-14 RX ORDER — SODIUM CHLORIDE 9 MG/ML
1000 INJECTION, SOLUTION INTRAVENOUS
Qty: 0 | Refills: 0 | Status: DISCONTINUED | OUTPATIENT
Start: 2019-02-14 | End: 2019-03-12

## 2019-02-14 RX ORDER — GLUCAGON INJECTION, SOLUTION 0.5 MG/.1ML
1 INJECTION, SOLUTION SUBCUTANEOUS ONCE
Qty: 0 | Refills: 0 | Status: DISCONTINUED | OUTPATIENT
Start: 2019-02-14 | End: 2019-03-12

## 2019-02-14 RX ORDER — LIDOCAINE 4 G/100G
2 CREAM TOPICAL
Qty: 0 | Refills: 0 | Status: DISCONTINUED | OUTPATIENT
Start: 2019-02-14 | End: 2019-03-12

## 2019-02-14 RX ORDER — FUROSEMIDE 40 MG
20 TABLET ORAL DAILY
Qty: 0 | Refills: 0 | Status: DISCONTINUED | OUTPATIENT
Start: 2019-02-15 | End: 2019-02-23

## 2019-02-14 RX ORDER — SIMVASTATIN 20 MG/1
40 TABLET, FILM COATED ORAL AT BEDTIME
Qty: 0 | Refills: 0 | Status: DISCONTINUED | OUTPATIENT
Start: 2019-02-14 | End: 2019-02-14

## 2019-02-14 RX ORDER — INSULIN LISPRO 100/ML
VIAL (ML) SUBCUTANEOUS
Qty: 0 | Refills: 0 | Status: DISCONTINUED | OUTPATIENT
Start: 2019-02-14 | End: 2019-02-14

## 2019-02-14 RX ORDER — FAMOTIDINE 10 MG/ML
20 INJECTION INTRAVENOUS DAILY
Qty: 0 | Refills: 0 | Status: DISCONTINUED | OUTPATIENT
Start: 2019-02-15 | End: 2019-02-23

## 2019-02-14 RX ORDER — POLYETHYLENE GLYCOL 3350 17 G/17G
17 POWDER, FOR SOLUTION ORAL DAILY
Qty: 0 | Refills: 0 | Status: DISCONTINUED | OUTPATIENT
Start: 2019-02-14 | End: 2019-02-17

## 2019-02-14 RX ORDER — ASCORBIC ACID 60 MG
500 TABLET,CHEWABLE ORAL
Qty: 0 | Refills: 0 | Status: DISCONTINUED | OUTPATIENT
Start: 2019-02-14 | End: 2019-02-23

## 2019-02-14 RX ORDER — DEXTROSE 50 % IN WATER 50 %
12.5 SYRINGE (ML) INTRAVENOUS ONCE
Qty: 0 | Refills: 0 | Status: DISCONTINUED | OUTPATIENT
Start: 2019-02-14 | End: 2019-03-12

## 2019-02-14 RX ORDER — ASPIRIN/CALCIUM CARB/MAGNESIUM 324 MG
81 TABLET ORAL DAILY
Qty: 0 | Refills: 0 | Status: DISCONTINUED | OUTPATIENT
Start: 2019-02-15 | End: 2019-02-17

## 2019-02-14 RX ORDER — CHLORHEXIDINE GLUCONATE 213 G/1000ML
0 SOLUTION TOPICAL
Qty: 0 | Refills: 0 | COMMUNITY
Start: 2019-02-14

## 2019-02-14 RX ORDER — CEFTRIAXONE 500 MG/1
1 INJECTION, POWDER, FOR SOLUTION INTRAMUSCULAR; INTRAVENOUS EVERY 24 HOURS
Qty: 0 | Refills: 0 | Status: DISCONTINUED | OUTPATIENT
Start: 2019-02-14 | End: 2019-02-14

## 2019-02-14 RX ORDER — WARFARIN SODIUM 2.5 MG/1
5 TABLET ORAL AT BEDTIME
Qty: 0 | Refills: 0 | Status: DISCONTINUED | OUTPATIENT
Start: 2019-02-14 | End: 2019-02-14

## 2019-02-14 RX ORDER — AMIODARONE HYDROCHLORIDE 400 MG/1
200 TABLET ORAL DAILY
Qty: 0 | Refills: 0 | Status: DISCONTINUED | OUTPATIENT
Start: 2019-02-15 | End: 2019-02-23

## 2019-02-14 RX ORDER — CHLORHEXIDINE GLUCONATE 213 G/1000ML
15 SOLUTION TOPICAL
Qty: 0 | Refills: 0 | Status: DISCONTINUED | OUTPATIENT
Start: 2019-02-14 | End: 2019-03-12

## 2019-02-14 RX ORDER — ACETAMINOPHEN 500 MG
650 TABLET ORAL EVERY 6 HOURS
Qty: 0 | Refills: 0 | Status: DISCONTINUED | OUTPATIENT
Start: 2019-02-14 | End: 2019-02-23

## 2019-02-14 RX ORDER — LEVALBUTEROL 1.25 MG/.5ML
3 SOLUTION, CONCENTRATE RESPIRATORY (INHALATION)
Qty: 0 | Refills: 0 | COMMUNITY
Start: 2019-02-14

## 2019-02-14 RX ORDER — INSULIN LISPRO 100/ML
VIAL (ML) SUBCUTANEOUS EVERY 6 HOURS
Qty: 0 | Refills: 0 | Status: DISCONTINUED | OUTPATIENT
Start: 2019-02-14 | End: 2019-02-20

## 2019-02-14 RX ADMIN — ATORVASTATIN CALCIUM 20 MILLIGRAM(S): 80 TABLET, FILM COATED ORAL at 22:15

## 2019-02-14 RX ADMIN — Medication 500 MILLIGRAM(S): at 17:29

## 2019-02-14 RX ADMIN — Medication 4: at 23:08

## 2019-02-14 RX ADMIN — Medication 25 MILLIGRAM(S): at 22:15

## 2019-02-14 RX ADMIN — CEFTRIAXONE 100 GRAM(S): 500 INJECTION, POWDER, FOR SOLUTION INTRAMUSCULAR; INTRAVENOUS at 08:12

## 2019-02-14 RX ADMIN — WARFARIN SODIUM 5 MILLIGRAM(S): 2.5 TABLET ORAL at 08:12

## 2019-02-14 RX ADMIN — Medication 650 MILLIGRAM(S): at 07:12

## 2019-02-14 RX ADMIN — CHLORHEXIDINE GLUCONATE 15 MILLILITER(S): 213 SOLUTION TOPICAL at 17:29

## 2019-02-14 RX ADMIN — AMIODARONE HYDROCHLORIDE 200 MILLIGRAM(S): 400 TABLET ORAL at 06:07

## 2019-02-14 RX ADMIN — Medication 25 MILLIGRAM(S): at 06:36

## 2019-02-14 RX ADMIN — Medication 20 MILLIGRAM(S): at 06:08

## 2019-02-14 RX ADMIN — INSULIN HUMAN 4: 100 INJECTION, SOLUTION SUBCUTANEOUS at 12:30

## 2019-02-14 RX ADMIN — INSULIN HUMAN 2: 100 INJECTION, SOLUTION SUBCUTANEOUS at 06:07

## 2019-02-14 RX ADMIN — FAMOTIDINE 20 MILLIGRAM(S): 10 INJECTION INTRAVENOUS at 06:07

## 2019-02-14 RX ADMIN — Medication 650 MILLIGRAM(S): at 06:42

## 2019-02-14 RX ADMIN — Medication 2: at 17:29

## 2019-02-14 RX ADMIN — Medication 100 MILLIGRAM(S): at 06:05

## 2019-02-14 NOTE — H&P ADULT - NSHPPHYSICALEXAM_GEN_ALL_CORE
On Neurological Examination:  Mental Status - Patient is alert, awake, oriented X3. Able to  name and repeat.  Follows commands well and able to answer questions appropriately. Mood and affect  normal. 3/3 immediate recall, 1/3 delayed recall.   Cranial Nerves - PERRL, EOMI, right sided facial droop, +dysarthria, +dysphagia,  Motor Exam -   Right upper 1/5 hand grasp, 1/5 elbow strengths, 2/5 shoulder adduction   Left upper 4/5 shoulder strengths, 5/5 elbow and hand strengths.   Right lower 2/5 hip flexor, knee extender and DF/PF  Left lower 3/5 hip flexor secondary to pain, knee and ankle strengths 5/5   MSK: as above, straight leg raise to left side produced left hip pain without radiating pain down leg, +hip pain with IFEANYI maneuver   Normal muscle bulk/tone  Sensory    Intact to light touch bilaterally.   No tremors  Gait -  reassessed   DTS Reflexes:  2+ bilat BR/biceps/triceps. 0 bilat patellar and achilles. Neg hoffmans and babinski.                               GENERAL Exam:     Nontoxic , No Acute Distress 	  HEENT:  normocephalic, atraumatic		  LUNGS:	Clear bilaterally    HEART:	 Tachycardic, S1S1 auscultated, regular 	  GI/ ABDOMEN:  Soft, +BS,  Non tender, obese abdomen, peg tube present-no drainage or erythema noted  SKIN:  blanchable erythema to bilat buttocks and sacrum, all skin intact

## 2019-02-14 NOTE — PROGRESS NOTE ADULT - SUBJECTIVE AND OBJECTIVE BOX
THE PATIENT WAS SEEN AND EXAMINED BY ME WITH THE HOUSESTAFF AND STROKE TEAM DURING MORNING ROUNDS.   HPI:  Patient is an 88 year old right handed  female with a history of CAD s/p stents and CABG on aspirin at home, DM, HTN, aortic valve replacement, and pacemaker who presented as transfer from [facility] for stroke rescue. Her last known normal was at approximately 2 pm on 2/3. Robbins was then noted to have left sided weakness and sudden onset inability to speak, so she was brought to [facility]. She was outside the appropriate time window for tPA, so tPA was not given. CTA showed evidence of a L M1 occlusion so she was transferred to [facility] and code stroke called upon arrival. Initial NIHSS at [facility] was 19, MRS 1.    SUBJECTIVE: No events overnight.  No new neurologic complaints.      acetaminophen    Suspension .. 650 milliGRAM(s) Oral every 6 hours PRN  amiodarone    Tablet 200 milliGRAM(s) Oral daily  aspirin  chewable 81 milliGRAM(s) Enteral Tube <User Schedule>  cefTRIAXone   IVPB 1 Gram(s) IV Intermittent every 24 hours  dextrose 40% Gel 15 Gram(s) Oral once PRN  dextrose 5%. 1000 milliLiter(s) IV Continuous <Continuous>  dextrose 50% Injectable 12.5 Gram(s) IV Push once  dextrose 50% Injectable 25 Gram(s) IV Push once  dextrose 50% Injectable 25 Gram(s) IV Push once  docusate sodium Liquid 100 milliGRAM(s) Oral two times a day  enoxaparin Injectable 40 milliGRAM(s) SubCutaneous at bedtime  famotidine    Tablet 20 milliGRAM(s) Oral two times a day  furosemide   Injectable 20 milliGRAM(s) IV Push daily  glucagon  Injectable 1 milliGRAM(s) IntraMuscular once PRN  insulin regular  human corrective regimen sliding scale   SubCutaneous every 6 hours  levalbuterol Inhalation 0.63 milliGRAM(s) Inhalation every 6 hours PRN  metoprolol tartrate 25 milliGRAM(s) Oral three times a day  nystatin Powder 1 Application(s) Topical two times a day PRN  senna Syrup 20 milliLiter(s) Oral at bedtime  simvastatin 40 milliGRAM(s) Oral at bedtime    PHYSICAL EXAM:   Vital Signs Last 24 Hrs  T(C): 37.7 (13 Feb 2019 20:00), Max: 37.7 (13 Feb 2019 20:00)  T(F): 99.9 (13 Feb 2019 20:00), Max: 99.9 (13 Feb 2019 20:00)  HR: 94 (14 Feb 2019 10:08) (77 - 113)  BP: 147/74 (14 Feb 2019 10:08) (113/70 - 176/159)  BP(mean): 95 (14 Feb 2019 10:08) (77 - 167)  RR: 22 (14 Feb 2019 10:08) (19 - 23)  SpO2: 98% (14 Feb 2019 10:08) (97% - 100%)    General: No acute distress   HEENT: EOM intact, no blink to threat on right   Abdomen: nontender  Extremities: No edema    NEUROLOGICAL EXAM:  Mental status: eyes open, awake, attentive, oriented to name, year, month and hospital, hypophonic follows simple  commands, perseverates at times,R/L confusion   Cranial Nerves: right facial droop, no blink to threat on right, dysarthria   Motor exam: left side moves well against gravity, right sided hemiparesis: RUE trace antigravity distal to elbow, drifts < 5 sec,  3/5 RLE 2/5 able to wiggle toes    Sensation: Intact to light touch   Coordination/ Gait: gait not assessed     LABS:                        14.6   9.0   )-----------( 244      ( 14 Feb 2019 05:32 )             43.6    02-14    139  |  94<L>  |  35<H>  ----------------------------<  218<H>  3.6   |  28  |  0.73    Ca    9.7      14 Feb 2019 05:32    TPro  8.4<H>  /  Alb  4.0  /  TBili  2.1<H>  /  DBili  x   /  AST  23  /  ALT  18  /  AlkPhos  98  02-13  PT/INR - ( 14 Feb 2019 05:32 )   PT: 14.5 sec;   INR: 1.26 ratio       PTT - ( 13 Feb 2019 12:48 )  PTT:31.3 sec  Hemoglobin A1C, Whole Blood: 6.6 % (02-04 @ 06:11)    IMAGING: Reviewed by me. THE PATIENT WAS SEEN AND EXAMINED BY ME WITH THE HOUSESTAFF AND STROKE TEAM DURING MORNING ROUNDS.   HPI:  Patient is an 88 year old right handed  female with a history of CAD s/p stents and CABG on aspirin at home, DM, HTN, aortic valve replacement, and pacemaker who presented as transfer from [facility] for stroke rescue. Her last known normal was at approximately 2 pm on 2/3. Robbins was then noted to have left sided weakness and sudden onset inability to speak, so she was brought to [facility]. She was outside the appropriate time window for tPA, so tPA was not given. CTA showed evidence of a L M1 occlusion so she was transferred to [facility] and code stroke called upon arrival. Initial NIHSS at [facility] was 19, MRS 1.    SUBJECTIVE: No events overnight.  No new neurologic complaints.      acetaminophen    Suspension .. 650 milliGRAM(s) Oral every 6 hours PRN  amiodarone    Tablet 200 milliGRAM(s) Oral daily  aspirin  chewable 81 milliGRAM(s) Enteral Tube <User Schedule>  cefTRIAXone   IVPB 1 Gram(s) IV Intermittent every 24 hours  dextrose 40% Gel 15 Gram(s) Oral once PRN  dextrose 5%. 1000 milliLiter(s) IV Continuous <Continuous>  dextrose 50% Injectable 12.5 Gram(s) IV Push once  dextrose 50% Injectable 25 Gram(s) IV Push once  dextrose 50% Injectable 25 Gram(s) IV Push once  docusate sodium Liquid 100 milliGRAM(s) Oral two times a day  enoxaparin Injectable 40 milliGRAM(s) SubCutaneous at bedtime  famotidine    Tablet 20 milliGRAM(s) Oral two times a day  furosemide   Injectable 20 milliGRAM(s) IV Push daily  glucagon  Injectable 1 milliGRAM(s) IntraMuscular once PRN  insulin regular  human corrective regimen sliding scale   SubCutaneous every 6 hours  levalbuterol Inhalation 0.63 milliGRAM(s) Inhalation every 6 hours PRN  metoprolol tartrate 25 milliGRAM(s) Oral three times a day  nystatin Powder 1 Application(s) Topical two times a day PRN  senna Syrup 20 milliLiter(s) Oral at bedtime  simvastatin 40 milliGRAM(s) Oral at bedtime    PHYSICAL EXAM:   Vital Signs Last 24 Hrs  T(C): 37.7 (13 Feb 2019 20:00), Max: 37.7 (13 Feb 2019 20:00)  T(F): 99.9 (13 Feb 2019 20:00), Max: 99.9 (13 Feb 2019 20:00)  HR: 94 (14 Feb 2019 10:08) (77 - 113)  BP: 147/74 (14 Feb 2019 10:08) (113/70 - 176/159)  BP(mean): 95 (14 Feb 2019 10:08) (77 - 167)  RR: 22 (14 Feb 2019 10:08) (19 - 23)  SpO2: 98% (14 Feb 2019 10:08) (97% - 100%)    General: No acute distress   HEENT: EOM intact, no blink to threat on right   Abdomen: nontender  Extremities: No edema    NEUROLOGICAL EXAM:  Mental status: eyes open, awake, attentive, oriented to name, year, month and hospital, hypophonic follows simple commands, perseverates   Cranial Nerves: right facial droop, no blink to threat on right, dysarthria   Motor exam: left side moves well against gravity, right sided hemiparesis: RUE 2/5, RLE 1/5 some movement  Sensation: Intact to light touch   Coordination/ Gait: gait not assessed     LABS:                        14.6   9.0   )-----------( 244      ( 14 Feb 2019 05:32 )             43.6    02-14    139  |  94<L>  |  35<H>  ----------------------------<  218<H>  3.6   |  28  |  0.73    Ca    9.7      14 Feb 2019 05:32    TPro  8.4<H>  /  Alb  4.0  /  TBili  2.1<H>  /  DBili  x   /  AST  23  /  ALT  18  /  AlkPhos  98  02-13  PT/INR - ( 14 Feb 2019 05:32 )   PT: 14.5 sec;   INR: 1.26 ratio       PTT - ( 13 Feb 2019 12:48 )  PTT:31.3 sec  Hemoglobin A1C, Whole Blood: 6.6 % (02-04 @ 06:11)    IMAGING: Reviewed by me.     CT Head No Cont (02.13.19)   Slight interval decreased hyperdensity within left sylvian fissure.  No interval acute intracranial hemorrhage or evidence for interval   transcortical territorial infarction.    CT Head No Cont (02.08.19)  Increasing hypodensity involves the left basal ganglia, internal capsule,   corona radiata, and insula, consistent with an evolving acute left middle   cerebral artery territory infarct. Vague increased areas of density   involve the infarct which may reflect petechial hemorrhagic   transformation, retained contrast from the angiogram procedure, or a   combination of both. There is no focal parenchymal hematoma.    CT Head No Cont (02.05.19)   Previously seen increased attenuation within left sided sulci is   decreased on the current examination, and may represent the presence of   residual intravenous contrast and/or subarachnoid hemorrhage.  No no hydrocephalus or midline shift. No effacement of basal cisterns    (02.03.19)   CT angiography neck: Patent cervical vasculature.  No hemodynamically   significant carotid stenosis or flow-limiting vertebral artery stenosis.    No evidence of dissection. Three-vessel arch.  Vertebral arteries are   codominant.    CT angiography brain:   1.  The M1 segment of the left middle cerebral artery is occluded,   approximately 8 mm distal to its origin.  Multiple proximal M2 branches   of the left MCA are occluded within the sylvian fissure.  There is some   reconstitution of blood flow within distal M2 branches and M3 branches of   the left middle cerebral artery.  CT perfusion is recommended for further   characterization.  2.  Atherosclerotic calcification causes diffuse mild narrowing in the   cavernous and supraclinoid segments of the internal carotid arteries   without flow-limiting stenosis.  3.  Atherosclerotic calcification causes moderate to severe stenosis in   the bilateral intradural vertebral arteries.  Mild luminal irregularity   of the basilar artery without flow-limiting stenosis.  Multiple mild to   moderate stenoses in the proximal P2 segment of the left posterior   cerebral artery.  The right posterior cerebral artery is diffusely small   and irregular, and appears hypoenhancing compared to the contralateral   side.    CT Perfusion w/ Maps w/ IV Cont (02.03.19)   NONCONTRAST HEAD CT SCAN: No CT evidence of acute intracranial   hemorrhage, mass effect or acute territorial infarct.    CT PERFUSION: No evidence of a completed infarct.  Approximately 80 cc   penumbra of at risk tissue in the left MCA vascular territory.

## 2019-02-14 NOTE — PROGRESS NOTE ADULT - ATTENDING COMMENTS
Seen and examined with resident. Agree with note.   Patient with R weakness, aphasia, dysphagia, gait dysfunction.  Patient will need acute rehabilitation when stable.
Seen and examined with resident. Agree with note.   Patient with R weakness, aphasia, dysphagia, gait dysfunction.  Patient will need acute rehabilitation when stable.

## 2019-02-14 NOTE — CHART NOTE - NSCHARTNOTEFT_GEN_A_CORE
CT findings reviewed with On-Call Radiology resident. No signs of intracranial hemorrhage as per verbal discussion and review of the CT scan.   CT head, otherwise, looks unchanged from prior.     UA results reviewed; potentially weakly positive for UTI    Plan  [] Will start Coumadin for treatment of underlying A-flutter CT findings reviewed with On-Call Radiology resident. No signs of intracranial hemorrhage as per verbal discussion and review of the CT scan.   CT head, otherwise, looks unchanged from prior.     UA results reviewed; potentially weakly positive for UTI    Plan  [] Will start Coumadin for treatment of underlying A-flutter  [] Urine Culture  [] Start Ceftriaxone 1g IV Q24H

## 2019-02-14 NOTE — H&P ADULT - FAMILY HISTORY
Father  Still living? Unknown  Family history of hypertension, Age at diagnosis: Age Unknown  Family history of cerebrovascular accident (CVA), Age at diagnosis: Age Unknown

## 2019-02-14 NOTE — PROGRESS NOTE ADULT - PROBLEM SELECTOR PLAN 4
Start peridex BID  -Evidence of sphenoid air fluid level on most recent CT head, if patient develops s/s of sinusitis can consider Augmentin at rehab   -Nasal saline BID

## 2019-02-14 NOTE — PROGRESS NOTE ADULT - SUBJECTIVE AND OBJECTIVE BOX
Interval events:  Pt seen and examined. Tolerating PEG feeds as per RN  peg site without drainage/leakage/blood   pt denies abdominal pain, n/v.     MEDICATIONS  (STANDING):  amiodarone    Tablet 200 milliGRAM(s) Oral daily  aspirin  chewable 81 milliGRAM(s) Enteral Tube <User Schedule>  cefTRIAXone   IVPB 1 Gram(s) IV Intermittent every 24 hours  dextrose 5%. 1000 milliLiter(s) (50 mL/Hr) IV Continuous <Continuous>  dextrose 50% Injectable 12.5 Gram(s) IV Push once  dextrose 50% Injectable 25 Gram(s) IV Push once  dextrose 50% Injectable 25 Gram(s) IV Push once  docusate sodium Liquid 100 milliGRAM(s) Oral two times a day  enoxaparin Injectable 40 milliGRAM(s) SubCutaneous at bedtime  famotidine    Tablet 20 milliGRAM(s) Oral two times a day  furosemide   Injectable 20 milliGRAM(s) IV Push daily  insulin regular  human corrective regimen sliding scale   SubCutaneous every 6 hours  metoprolol tartrate 25 milliGRAM(s) Oral three times a day  senna Syrup 20 milliLiter(s) Oral at bedtime  simvastatin 40 milliGRAM(s) Oral at bedtime    MEDICATIONS  (PRN):  acetaminophen    Suspension .. 650 milliGRAM(s) Oral every 6 hours PRN Temp greater or equal to 38C (100.4F), Mild Pain (1 - 3)  dextrose 40% Gel 15 Gram(s) Oral once PRN Blood Glucose LESS THAN 70 milliGRAM(s)/deciLiter  glucagon  Injectable 1 milliGRAM(s) IntraMuscular once PRN Glucose <70 milliGRAM(s)/deciLiter  levalbuterol Inhalation 0.63 milliGRAM(s) Inhalation every 6 hours PRN SOB/Wheezing  nystatin Powder 1 Application(s) Topical two times a day PRN dermatitis      Allergies    No Known Allergies    Intolerances        Review of Systems:    General:  No wt loss, fevers, chills, night sweats,fatigue,   Eyes:  Good vision, no reported pain  ENT:  No sore throat, pain, runny nose, dysphagia  CV:  No pain, palpitations, hypo/hypertension  Resp:  No dyspnea, cough, tachypnea, wheezing  GI:  No pain, No nausea, No vomiting, No diarrhea, No constipation, No weight loss, No fever, No pruritis, No rectal bleeding, No melena, No dysphagia  :  No pain, bleeding, incontinence, nocturia  Muscle:  No pain, weakness  Neuro:  No weakness, tingling, memory problems  Psych:  No fatigue, insomnia, mood problems, depression  Endocrine:  No polyuria, polydypsia, cold/heat intolerance  Heme:  No petechiae, ecchymosis, easy bruisability  Skin:  No rash, tattoos, scars, edema      Vital Signs Last 24 Hrs  T(C): 37.7 (2019 20:00), Max: 37.7 (2019 20:00)  T(F): 99.9 (2019 20:00), Max: 99.9 (2019 20:00)  HR: 94 (2019 10:08) (77 - 113)  BP: 147/74 (2019 10:08) (113/70 - 176/159)  BP(mean): 95 (2019 10:08) (75 - 167)  RR: 22 (2019 10:08) (19 - 23)  SpO2: 98% (2019 10:08) (97% - 100%)    PHYSICAL EXAM:    General:  Appears stated age, well-groomed, nad  HEENT:  NC/AT,  conjunctivae clear and pink, no thyromegaly, nodules, adenopathy, no JVD,   Chest:  Full & symmetric excursion, no increased effort, breath sounds clear  Cardiovascular:  Regular rhythm, S1, S2, no murmur/rub/S3/S4, no abdominal bruit, no edema  Abdomen:  Soft, non-tender, non-distended, normoactive bowel sounds,  no masses ,no hepatosplenomeagaly, no signs of chronic liver disease, + PEG c/d/i  Extremities:  no cyanosis, clubbing or edema  Skin:  No rash/erythema/ecchymoses/petechiae/wounds/abscess/warm/dry  Neuro/Psych:  A&Ox2, no asterixis, no tremor, no encephalopathy      LABS:                        14.6   9.0   )-----------( 244      ( 2019 05:32 )             43.6     02-14    139  |  94<L>  |  35<H>  ----------------------------<  218<H>  3.6   |  28  |  0.73    Ca    9.7      2019 05:32    TPro  8.4<H>  /  Alb  4.0  /  TBili  2.1<H>  /  DBili  x   /  AST  23  /  ALT  18  /  AlkPhos  98  02-13    PT/INR - ( 2019 05:32 )   PT: 14.5 sec;   INR: 1.26 ratio         PTT - ( 2019 12:48 )  PTT:31.3 sec  Urinalysis Basic - ( 2019 22:49 )    Color: Silvia / Appearance: Clear / S.018 / pH: x  Gluc: x / Ketone: Negative  / Bili: Negative / Urobili: Negative mg/dL   Blood: x / Protein: Negative mg/dL / Nitrite: Negative   Leuk Esterase: Large / RBC: 3 /HPF / WBC 28 /HPF   Sq Epi: x / Non Sq Epi: 3 /HPF / Bacteria: Few        RADIOLOGY & ADDITIONAL TESTS:

## 2019-02-14 NOTE — CHART NOTE - NSCHARTNOTEFT_GEN_A_CORE
Nutrition Follow Up Note  Patient seen for: f/u    Chart reviewed, events noted. Adm dx: CBA. S/P PEG .    Source: chart    Diet :  Glucerna 1.2 50cc/hr x 24 hrs  free water 150cc q 6hr     Enteral /Parenteral Nutrition: goal 1200cc/day 24 hr EN intake  140cc,  none documented,  200cc  noted EN had been held for PEG placement , feeds resumed .    GI: no vomiting, no abd distention, last BM  on bowel regimen      Daily Weight in k.5 (-), dosing wt  93kg,  wt 93.7 kg    Pertinent Medications: MEDICATIONS  (STANDING):  amiodarone    Tablet 200 milliGRAM(s) Oral daily  aspirin  chewable 81 milliGRAM(s) Enteral Tube <User Schedule>  cefTRIAXone   IVPB 1 Gram(s) IV Intermittent every 24 hours  dextrose 5%. 1000 milliLiter(s) (50 mL/Hr) IV Continuous <Continuous>  dextrose 50% Injectable 12.5 Gram(s) IV Push once  dextrose 50% Injectable 25 Gram(s) IV Push once  dextrose 50% Injectable 25 Gram(s) IV Push once  docusate sodium Liquid 100 milliGRAM(s) Oral two times a day  enoxaparin Injectable 40 milliGRAM(s) SubCutaneous at bedtime  famotidine    Tablet 20 milliGRAM(s) Oral two times a day  furosemide   Injectable 20 milliGRAM(s) IV Push daily  insulin regular  human corrective regimen sliding scale   SubCutaneous every 6 hours  metoprolol tartrate 25 milliGRAM(s) Oral three times a day  senna Syrup 20 milliLiter(s) Oral at bedtime  simvastatin 40 milliGRAM(s) Oral at bedtime  warfarin 5 milliGRAM(s) Oral once    MEDICATIONS  (PRN):  acetaminophen    Suspension .. 650 milliGRAM(s) Oral every 6 hours PRN Temp greater or equal to 38C (100.4F), Mild Pain (1 - 3)  dextrose 40% Gel 15 Gram(s) Oral once PRN Blood Glucose LESS THAN 70 milliGRAM(s)/deciLiter  glucagon  Injectable 1 milliGRAM(s) IntraMuscular once PRN Glucose <70 milliGRAM(s)/deciLiter  levalbuterol Inhalation 0.63 milliGRAM(s) Inhalation every 6 hours PRN SOB/Wheezing  nystatin Powder 1 Application(s) Topical two times a day PRN dermatitis     @ 05:32: Na 139, BUN 35<H>, Cr 0.73, <H>, K+ 3.6, Phos --, Mg --, Alk Phos --, ALT/SGPT --, AST/SGOT --, HbA1c --   @ 17:19: Na 140, BUN 33<H>, Cr 0.80, <H>, K+ 4.2, Phos --, Mg --, Alk Phos 98, ALT/SGPT 18, AST/SGOT 23, HbA1c --    Finger Sticks:  POCT Blood Glucose.: 192 mg/dL ( @ 05:22)  POCT Blood Glucose.: 184 mg/dL ( @ 23:37)  POCT Blood Glucose.: 170 mg/dL ( @ 17:21)  POCT Blood Glucose.: 188 mg/dL ( @ 13:02)  POCT Blood Glucose.: 180 mg/dL ( @ 11:48)      Skin per nursing documentation: no pressure injuries documented   Edema: none    Estimated Needs:   [x ] no change since previous assessment: 1389 kcals, 59-83 gm protein  [ ] recalculated:     Previous Nutrition Diagnosis: swallowing difficulty  Nutrition Diagnosis is: ongoing, addressed w/ PEG feeds    New Nutrition Diagnosis: none  Related to:    As evidenced by:      Interventions:     Recommend  1) Glucerna 1.2 50cc/hr x 24 hrs to provide 1440 kcals, 72gm protein, 966cc free water meets 15 kcals/kg dosing wt 93.7kg, 1.2 gm protein/kg IBW 59kg  2) free water 150cc q 6 hr (total 1560cc free water)    Monitoring and Evaluation:     Continue to monitor Nutritional intake, Tolerance to diet prescription, weights, labs, skin integrity    RD remains available upon request and will follow up per protocol

## 2019-02-14 NOTE — PROGRESS NOTE ADULT - SUBJECTIVE AND OBJECTIVE BOX
Subjective: Patient seen and examined. No new events except as noted.   resting comfortably in bed in Stroke unit     REVIEW OF SYSTEMS:    CONSTITUTIONAL: + weakness, fevers or chills  EYES/ENT: No visual changes;  No vertigo or throat pain   NECK: No pain or stiffness  RESPIRATORY: No cough, wheezing, hemoptysis; No shortness of breath  CARDIOVASCULAR: No chest pain or palpitations  GASTROINTESTINAL: No abdominal or epigastric pain. No nausea, vomiting, or hematemesis; No diarrhea or constipation. No melena or hematochezia.  GENITOURINARY: No dysuria, frequency or hematuria  NEUROLOGICAL: + numbness or weakness  SKIN: No itching, burning, rashes, or lesions   All other review of systems is negative unless indicated above.    MEDICATIONS:  MEDICATIONS  (STANDING):  amiodarone    Tablet 200 milliGRAM(s) Oral daily  aspirin  chewable 81 milliGRAM(s) Enteral Tube <User Schedule>  cefTRIAXone   IVPB 1 Gram(s) IV Intermittent every 24 hours  dextrose 5%. 1000 milliLiter(s) (50 mL/Hr) IV Continuous <Continuous>  dextrose 50% Injectable 12.5 Gram(s) IV Push once  dextrose 50% Injectable 25 Gram(s) IV Push once  dextrose 50% Injectable 25 Gram(s) IV Push once  docusate sodium Liquid 100 milliGRAM(s) Oral two times a day  enoxaparin Injectable 40 milliGRAM(s) SubCutaneous at bedtime  famotidine    Tablet 20 milliGRAM(s) Oral two times a day  furosemide   Injectable 20 milliGRAM(s) IV Push daily  insulin regular  human corrective regimen sliding scale   SubCutaneous every 6 hours  metoprolol tartrate 25 milliGRAM(s) Oral three times a day  senna Syrup 20 milliLiter(s) Oral at bedtime  simvastatin 40 milliGRAM(s) Oral at bedtime      PHYSICAL EXAM:  T(C): 37.7 (02-13-19 @ 20:00), Max: 37.7 (02-13-19 @ 20:00)  HR: 94 (02-14-19 @ 10:08) (77 - 113)  BP: 147/74 (02-14-19 @ 10:08) (113/70 - 176/159)  RR: 22 (02-14-19 @ 10:08) (19 - 23)  SpO2: 98% (02-14-19 @ 10:08) (97% - 100%)  Wt(kg): --  I&O's Summary    13 Feb 2019 07:01  -  14 Feb 2019 07:00  --------------------------------------------------------  IN: 370 mL / OUT: 300 mL / NET: 70 mL          Appearance: NAD more awake   HEENT: dry  oral mucosa, PERRL, EOMI   Lymphatic: No lymphadenopathy  Cardiovascular: tachycardic S1 S2, No JVD, No murmurs, No edema  Respiratory: decreased bs   Psychiatry: A & O x 3, lethargic   Gastrointestinal:  Soft, Non-tender, + PEG  Skin: No rashes, No ecchymoses, No cyanosis	  NEUROLOGICAL EXAM:  Mental status: eyes open, awake, attentive to persons at bedside, able to state name, hypophonic follows some simple commands, perseverates at times   Cranial Nerves:  right facial droop, no blink to threat on right   Motor exam: left side moves well against gravity, RUE 3/5 with drift , RLE 2/5   Sensation: Intact to light touch   Coordination/ Gait: gait not assessed         LABS:    CARDIAC MARKERS:                                14.6   9.0   )-----------( 244      ( 14 Feb 2019 05:32 )             43.6     02-14    139  |  94<L>  |  35<H>  ----------------------------<  218<H>  3.6   |  28  |  0.73    Ca    9.7      14 Feb 2019 05:32    TPro  8.4<H>  /  Alb  4.0  /  TBili  2.1<H>  /  DBili  x   /  AST  23  /  ALT  18  /  AlkPhos  98  02-13    proBNP:   Lipid Profile:   HgA1c:   TSH:     0  2  Negative          TELEMETRY: 	  SR/ST   ECG:  	  RADIOLOGY:   DIAGNOSTIC TESTING:  [ ] Echocardiogram:  [ ]  Catheterization:  [ ] Stress Test:    OTHER:

## 2019-02-14 NOTE — PROGRESS NOTE ADULT - ASSESSMENT
88 year old white female with a pmh of CAD, HTN, HLD, pacemaker who presented to [facility] as a transfer from [facility] with right hemiparesis and aphasia and left proximal MCA occlusion. The patient was last known well at 2 pm on 2/3/19 and was then found down on the ground by her daughter several hours later and was noted to be not speaking and weak on the right. At [facility] a CTA head and neck was performed which showed a proximal left MCA occlusion, and a CT head showed a dense left MCA sign with some hypodensity and early ischemic changes to my eye in the left MCA distribution. She did not receive IV tPA as she was out of the window and was transferred to [facility] for possible mechanical thrombectomy. At [facility] her CT perfusion showed zero core infarct, with a penumbra of 80 mls, and an infinite mismatch. She was deemed a candidate for intervention and recanalization was achieved with TICI 3 score.     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    NEURO: neurologically with improvement in mental status and motor function, likely transient metabolic encephalopathy in setting of UTI, CTH on 2/13 without acute change, normotension in setting of recent recanalization, LDL 55 on admission- continue on home dose of simvastatin as LDL < goal of 70, patient likely cardioembolic and >75 years, MR imaging not possible due to pacemaker incompatibility. Physical therapy/OT recommended acute TBI rehab    ANTITHROMBOTIC THERAPY: ASA to coumadin Coumadin (given AVR) in setting of atrial flutter found on pacemaker interrogation, obtained GI clearance. Will d/c ASA once goal INR reached 2-3.     PULMONARY: protecting airway, saturating well, CXR on 2/8 showed unchanged mild pulmonary vascular congestion and a small left pleural effusion with associated atelectasis, continue on PO Lasix.     CARDIOVASCULAR: EF 65-70% moderate AS, moderate pulm HTN, mild-mod TR and no PFO, cardiac monitoring at times showed sinus tachycardia - started on Amiodarone and Metoprolol with improvement PPM interrogation- showed episode of atrial flutter, started on AC.        SBP goal: Normotension     GASTROINTESTINAL: dysphagia screen noted failed, S&S recommended NPO. She underwent a MBS on 2/8 and was recommended to keep NPO s/p PEG 2/12, TF initiated noted with no GI contraindication to AC.       Diet: npo with TF via PEG    RENAL: BUN elevated/Cr within limits, maintain adequate hydration, good urine output, continue free water     Na Goal: Greater than 135     Gonzalez: N    HEMATOLOGY: H/H without anemia, Platelets 244, LE duplex negative for DVT     DVT ppx: Heparin s.c [] LMWH [x]     ID: afebrile, no leukocytosis, started on antibiotics for UTI/cystitis- f/u UCx at rehab    DISPOSITION: Acute TBI 2pm on 2/14    CORE MEASURES:        Admission NIHSS: 19     TPA: [] YES [x] NO      LDL/HDL:55/37     Depression Screen: 0     Statin Therapy: y     Dysphagia Screen: [] PASS [x] FAIL      Smoking [] YES [x] NO      Afib [] YES [x] NO     Stroke Education [x] YES [] NO

## 2019-02-14 NOTE — H&P ADULT - HISTORY OF PRESENT ILLNESS
This is a 88 year old  female with a pmh of CAD, HTN, HLD, pacemaker who presented to [facility] as a transfer from [facility] with right hemiparesis and aphasia and left proximal MCA occlusion. The patient was last known well at 2 pm on 2/3/19 and was then found down on the ground by her daughter several hours later and was noted to be not speaking and weak on the right. At [facility] a CTA head and neck was performed which showed a proximal left MCA occlusion, and a CT head showed a dense left MCA sign with some hypodensity and early ischemic changes in the left MCA distribution. She did not receive IV tPA as she was out of the window and was transferred to [facility] for possible mechanical thrombectomy. At [facility] her CT perfusion showed zero core infarct, with a penumbra of 80 mls, and an infinite mismatch. She was deemed a candidate for intervention and recanalization was achieved with TICI 3 score on 2/13.     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 Given pacemaker showed a-flutter, high suspicion for cardiac source.   Per neurology, patient is currently on ASA 81 mg as a bridge to coumadin for goal INR 2-3. Her INR on 2/14 is 1.26, she received coumadin 5 mg on night of 2/13. Recommend to discontinue the aspirin when INR at goal.  Patient found to have EF 65-70% with moderate AS, moderate pulm HTN, mild-mod TR and no PFO, cardiac monitoring at times showed sinus tachycardia - started on Amiodarone and Metoprolol with improvement. PPM interrogation- showed episode of atrial flutter.   Patient failed speech and swallow, PEG was placed on 2/12 and has been tolerating tube feeds.   Patient found be more lethargic on 2/13. Repeat CT head was stable. UA positive and patient started on antibiotics. Urine cx sent and pending results. Of note, CT head showed sphenoid air fluid level. Recs to start Augmentin if s/s of sinusitis start.   Also, cxray from 2/13 showed pulmonary edema had decreased. Rec to continue lasix at a lower dose of 20mg daily.     Patient medically stable for discharge to [facility]'s acute IPR on 2/14/19.

## 2019-02-14 NOTE — H&P ADULT - PROBLEM SELECTOR PLAN 1
Start comprehensive PT/OT/ST program consisting of 3 hours a day, 5 days a week.   Continue AC with coumadin and  BP management.

## 2019-02-14 NOTE — H&P ADULT - NSHPREVIEWOFSYSTEMS_GEN_ALL_CORE
TODAY'S SUBJECTIVE & REVIEW OF SYMPTOMS:  [  x ] Constitutional WNL  [   ] Cardio WNL  [  x ] Resp WNL  [   ] GI WNL  [ x  ] Heme WNL  [   ] Endo WNL  [  x ] Skin WNL  [   ] MSK WNL  [   ] Neuro WNL  [   ] Cognitive WNL  [   ] Psych WNL  Patient denies CP, SOB, abdominal pain, dysuria, fevers, chills, HAs, vision changes. +chronic urinary incontinence. +consitpation-last BM 3 days ago per patient-requesting miralax.   +Hx of left hip pain. Patient reports pain with movement. Reports h/o of sciatic nerve pain to the left leg. Takes asa at home and occasional tylenol for hip pain.     Any major surgery within past 100 days?    NO    Two or more falls within past one year?      YES    One fall with injury within past one year?   NO

## 2019-02-14 NOTE — PROGRESS NOTE ADULT - SUBJECTIVE AND OBJECTIVE BOX
History: 89yo female with dysphagia, dysarthria and dense right hemiparesis 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  HPI:  Patient tolerating therapies.  Mod-Max assist with transfers and gait.  More lethargic - had repeat CTH which was negative for new event; diagnosed with UTI    MEDICATIONS  (STANDING):  amiodarone    Tablet 200 milliGRAM(s) Oral daily  aspirin  chewable 81 milliGRAM(s) Enteral Tube <User Schedule>  cefTRIAXone   IVPB 1 Gram(s) IV Intermittent every 24 hours  dextrose 5%. 1000 milliLiter(s) (50 mL/Hr) IV Continuous <Continuous>  dextrose 50% Injectable 12.5 Gram(s) IV Push once  dextrose 50% Injectable 25 Gram(s) IV Push once  dextrose 50% Injectable 25 Gram(s) IV Push once  docusate sodium Liquid 100 milliGRAM(s) Oral two times a day  enoxaparin Injectable 40 milliGRAM(s) SubCutaneous at bedtime  famotidine    Tablet 20 milliGRAM(s) Oral two times a day  furosemide   Injectable 20 milliGRAM(s) IV Push daily  insulin regular  human corrective regimen sliding scale   SubCutaneous every 6 hours  metoprolol tartrate 25 milliGRAM(s) Oral three times a day  senna Syrup 20 milliLiter(s) Oral at bedtime  simvastatin 40 milliGRAM(s) Oral at bedtime    MEDICATIONS  (PRN):  acetaminophen    Suspension .. 650 milliGRAM(s) Oral every 6 hours PRN Temp greater or equal to 38C (100.4F), Mild Pain (1 - 3)  dextrose 40% Gel 15 Gram(s) Oral once PRN Blood Glucose LESS THAN 70 milliGRAM(s)/deciLiter  glucagon  Injectable 1 milliGRAM(s) IntraMuscular once PRN Glucose <70 milliGRAM(s)/deciLiter  levalbuterol Inhalation 0.63 milliGRAM(s) Inhalation every 6 hours PRN SOB/Wheezing  nystatin Powder 1 Application(s) Topical two times a day PRN dermatitis    Vital Signs Last 24 Hrs  T(C): 37.7 (13 Feb 2019 20:00), Max: 37.7 (13 Feb 2019 20:00)  T(F): 99.9 (13 Feb 2019 20:00), Max: 99.9 (13 Feb 2019 20:00)  HR: 94 (14 Feb 2019 06:57) (77 - 113)  BP: 134/92 (14 Feb 2019 06:57) (113/70 - 149/77)  BP(mean): 102 (14 Feb 2019 06:57) (75 - 121)  RR: 20 (14 Feb 2019 06:57) (19 - 23)  SpO2: 99% (14 Feb 2019 06:57) (97% - 100%)    PHYSICAL EXAM  Constitutional - In NAD, Comfortable  Lethargic but arousable.  Follows commands inconsistently  R side poor strength  Trace edema                          14.6   9.0   )-----------( 244      ( 14 Feb 2019 05:32 )             43.6     02-14    139  |  94<L>  |  35<H>  ----------------------------<  218<H>  3.6   |  28  |  0.73    Ca    9.7      14 Feb 2019 05:32    TPro  8.4<H>  /  Alb  4.0  /  TBili  2.1<H>  /  DBili  x   /  AST  23  /  ALT  18  /  AlkPhos  98  02-13    ASSESSMENT/PLAN    89yo female with dysphagia, dysarthria, aphasia, and dense right hemiparesis 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    Continue bedside PT/OT/SLP for speech and swallow, bed mobility/transfers/ambulation/ADLs   Pain - Tylenol PRN  Bowel regimen- senna  Skin- turn and position q2hrs to maintain skin integrity.  DVT PPX - SCDs, lovenox  Rehab -    At this time patient lethargic (CTH neg, likely due to UTI).  When mental status improves recommend acute rehab.

## 2019-02-14 NOTE — PROGRESS NOTE ADULT - SUBJECTIVE AND OBJECTIVE BOX
Follow-up Pulm Progress Note    No new respiratory events overnight.  Denies SOB/CP.   Mental status improved to baseline today   99% on RA  Foul breath     Medications:  MEDICATIONS  (STANDING):  amiodarone    Tablet 200 milliGRAM(s) Oral daily  aspirin  chewable 81 milliGRAM(s) Enteral Tube <User Schedule>  cefTRIAXone   IVPB 1 Gram(s) IV Intermittent every 24 hours  chlorhexidine 0.12% Liquid 15 milliLiter(s) Swish and Spit two times a day  dextrose 5%. 1000 milliLiter(s) (50 mL/Hr) IV Continuous <Continuous>  dextrose 50% Injectable 12.5 Gram(s) IV Push once  dextrose 50% Injectable 25 Gram(s) IV Push once  dextrose 50% Injectable 25 Gram(s) IV Push once  docusate sodium Liquid 100 milliGRAM(s) Oral two times a day  enoxaparin Injectable 40 milliGRAM(s) SubCutaneous at bedtime  famotidine    Tablet 20 milliGRAM(s) Oral two times a day  furosemide   Injectable 20 milliGRAM(s) IV Push daily  insulin regular  human corrective regimen sliding scale   SubCutaneous every 6 hours  metoprolol tartrate 25 milliGRAM(s) Oral three times a day  senna Syrup 20 milliLiter(s) Oral at bedtime  simvastatin 40 milliGRAM(s) Oral at bedtime    MEDICATIONS  (PRN):  acetaminophen    Suspension .. 650 milliGRAM(s) Oral every 6 hours PRN Temp greater or equal to 38C (100.4F), Mild Pain (1 - 3)  dextrose 40% Gel 15 Gram(s) Oral once PRN Blood Glucose LESS THAN 70 milliGRAM(s)/deciLiter  glucagon  Injectable 1 milliGRAM(s) IntraMuscular once PRN Glucose <70 milliGRAM(s)/deciLiter  levalbuterol Inhalation 0.63 milliGRAM(s) Inhalation every 6 hours PRN SOB/Wheezing  nystatin Powder 1 Application(s) Topical two times a day PRN dermatitis          Vital Signs Last 24 Hrs  T(C): 37.7 (2019 20:00), Max: 37.7 (2019 20:00)  T(F): 99.9 (2019 20:00), Max: 99.9 (2019 20:00)  HR: 94 (2019 10:08) (77 - 113)  BP: 147/74 (2019 10:08) (113/70 - 176/159)  BP(mean): 95 (2019 10:08) (77 - 167)  RR: 22 (2019 10:08) (19 - 23)  SpO2: 98% (2019 10:08) (97% - 100%) on RA      VBG pH 7.49  @ 05:30    VBG pCO2 43  @ 05:30    VBG O2 sat 85  @ 05:30    VBG lactate --  @ 05:30       @ 07:01  -   @ 07:00  --------------------------------------------------------  IN: 370 mL / OUT: 300 mL / NET: 70 mL          LABS:                        14.6   9.0   )-----------( 244      ( 2019 05:32 )             43.6     02-14    139  |  94<L>  |  35<H>  ----------------------------<  218<H>  3.6   |  28  |  0.73    Ca    9.7      2019 05:32    TPro  8.4<H>  /  Alb  4.0  /  TBili  2.1<H>  /  DBili  x   /  AST  23  /  ALT  18  /  AlkPhos  98  02-13          CAPILLARY BLOOD GLUCOSE      POCT Blood Glucose.: 206 mg/dL (2019 12:11)    PT/INR - ( 2019 05:32 )   PT: 14.5 sec;   INR: 1.26 ratio         PTT - ( 2019 12:48 )  PTT:31.3 sec  Urinalysis Basic - ( 2019 22:49 )    Color: Silvia / Appearance: Clear / S.018 / pH: x  Gluc: x / Ketone: Negative  / Bili: Negative / Urobili: Negative mg/dL   Blood: x / Protein: Negative mg/dL / Nitrite: Negative   Leuk Esterase: Large / RBC: 3 /HPF / WBC 28 /HPF   Sq Epi: x / Non Sq Epi: 3 /HPF / Bacteria: Few          Physical Examination:  PULM: Clear to auscultation bilaterally, no significant sputum production  CVS: S1, S2 heard    RADIOLOGY REVIEWED  CXR: Improved pulm edema, L pleural effusion

## 2019-02-14 NOTE — H&P ADULT - NSHPSOCIALHISTORY_GEN_ALL_CORE
Pt lives in  one first floor, no CATHRYN. Son lives upstairs. Daughter lives next door. Patient no longer drives. Uses cane for ambulation. Owns RW. Independent with all ADLs PTA.     Smoking - Denied  EtOH - Denied   Drugs - Denied

## 2019-02-14 NOTE — CHART NOTE - NSCHARTNOTESELECT_GEN_ALL_CORE
VTE Risk Assessment
Event Note
Event Note/NGT placement
Neurology/Event Note
Nutrition Services

## 2019-02-14 NOTE — H&P ADULT - NSHPLABSRESULTS_GEN_ALL_CORE
02-14    139  |  94<L>  |  35<H>  ----------------------------<  218<H>  3.6   |  28  |  0.73    Ca    9.7      14 Feb 2019 05:32    TPro  8.4<H>  /  Alb  4.0  /  TBili  2.1<H>  /  DBili  x   /  AST  23  /  ALT  18  /  AlkPhos  98  02-13                        14.6   9.0   )-----------( 244      ( 14 Feb 2019 05:32 )             43.6   Lipid Profile (02.04.19 @ 06:10)    Total Cholesterol/HDL Ratio Measurement: 3.4 RATIO    Cholesterol, Serum: 124 mg/dL    Triglycerides, Serum: 159 mg/dL    HDL Cholesterol, Serum: 37: HDL Levels >/= 60 mg/dL are considered beneficial and a "negative" risk  factor.  Effective 08/15/2018: New reference range and interpretive comment. mg/dL    Direct LDL: 55: LDL Cholesterol (mg/dL) --- Interpretive Comment (for adults 18 and over)  Optimal LDL Level may vary based on clinical situation  Below 70                   Ideal for people at very high risk of heart  disease  Below 100                  Ideal for people at risk of heart disease  100 - 129                    [place]  130 - 159                    Borderline high  160 - 189                    High  190 and Above           Very high mg/dL    HA1C 6.6% on 2/4/19    < from: CT Head No Cont (02.13.19 @ 23:27) >      EXAM:  CT BRAIN                            PROCEDURE DATE:  02/13/2019            INTERPRETATION:  INDICATIONS:  L MCA stroke    TECHNIQUE:  Serial axial images were obtained from the skull base to the   vertex without intravenous contrast.    COMPARISON EXAMINATION: 2/13/2019 at 4:30 AM    FINDINGS:    VENTRICLES AND SULCI:  Normal.  INTRA-AXIAL: Microvascular ischemic changes involving the deep gray   matter bilaterally. This is identified on the prior as well  EXTRA-AXIAL:  No mass or collection is seen.  VISUALIZED SINUSES: Evidence of a sphenoid fluid level  VISUALIZED MASTOIDS:  Clear.  CALVARIUM:  Normal.  MISCELLANEOUS:  None.    IMPRESSION: Microvascular ischemic changes involving the deep gray matter   bilaterally. This is identified on the prior as well. Evidence of the   sphenoid fluid level.      < end of copied text >    < from: Xray Chest 1 View- PORTABLE-Routine (02.13.19 @ 16:59) >      EXAM:  XR CHEST PORTABLE ROUTINE 1V                            PROCEDURE DATE:  02/13/2019      INTERPRETATION:  INDICATION: History of sepsis.    COMPARISON: Radiograph 2/8/2019 at 0841 hours.    FINDINGS:  Lines and Tubes: A dual-chamber pacemaker is in place.  Lungs: Bilateral perihilar opacities have decreased.  Pleura: No pleural effusion or pneumothorax.  Heart and Mediastinum: The heart is normal in size.  TAVR.  Skeletal: Median sternotomy.  IMPRESSION:  1.  Pulmonary edema has decreased.    < end of copied text >

## 2019-02-14 NOTE — PROGRESS NOTE ADULT - PROBLEM SELECTOR PLAN 1
- in setting of CVA with failed swallow evaluations   - s/p upper gastrointestinal endoscopy with placement of an endoscopically removable PEG  - feeds to be started today  - daily PEG care  - monitor residuals   - strict aspiration precautions with HOB elevated > 30 degrees   - please call with questions; [phone].

## 2019-02-14 NOTE — H&P ADULT - ASSESSMENT
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     Precautions:    falls, aspiration                                                                            Diet: NPO, peg tube feedings     DVT Prophylaxis:  lovenox (d/c once INR 2-3), coumadin                    Medical Prognosis: good     Prescreen Comparison: I have reviewed the prescreen information and I found no relevant changes between the preadmission  screening and my post admission evaluation.     Expected Therapy:   P.T.    1    hrs/day           O. T.    1  hrs/day           S.L.P.     1   hrs/day                    P&O    eval                                               Excepted Frequency: 5 days/7 day period    Rehab Potential:         good        Estimated Disposition:      home with home therapies                    ELOS:    18-21  days      Rationale For Inpatient Rehab Admission- Patient demonstrates the following:     [X] Medically appropriate for rehabilitation admission   [X] Has attainable rehab goals with an appropriate discharge plan  [X] Has rehabilitation potential (expected to make significant improvement within a reasonable period of time)  [X] Requires close medical management by a rehab physician, rehab nursing care and comprehensive interdisciplinary team (including         PT, OT, SLP and/or prosthetics and orthotics) 87yo female with dysphagia, dysarthria and dense right hemiparesis following left MCA CVA s/p endovascular intervention with right sided hemiparesis, dysphagia s/p peg placement, dysarthria and expressive aphasia, ADL and gait impairments, decreased endurance and strength.     Precautions:    falls, aspiration                                                                            Diet: NPO, peg tube feedings     DVT Prophylaxis:  lovenox (d/c once INR 2-3), coumadin                    Medical Prognosis: good     Prescreen Comparison: I have reviewed the prescreen information and I found no relevant changes between the preadmission  screening and my post admission evaluation.     Expected Therapy:   P.T.    1    hrs/day           O. T.    1  hrs/day           S.L.P.     1   hrs/day                    P&O    eval                                               Excepted Frequency: 5 days/7 day period    Rehab Potential:         good        Estimated Disposition:      home with home therapies                    ELOS:    18-21  days      Rationale For Inpatient Rehab Admission- Patient demonstrates the following:     [X] Medically appropriate for rehabilitation admission   [X] Has attainable rehab goals with an appropriate discharge plan  [X] Has rehabilitation potential (expected to make significant improvement within a reasonable period of time)  [X] Requires close medical management by a rehab physician, rehab nursing care and comprehensive interdisciplinary team (including         PT, OT, SLP and/or prosthetics and orthotics)    Discussed case with Dr Sandy.

## 2019-02-14 NOTE — H&P ADULT - PROBLEM SELECTOR PLAN 7
Add warm packs prn, lidocaine patches, and prn tylenol   This is chronic pain per patient with possible component of arthritis, continue conservative management and monitor

## 2019-02-14 NOTE — PROGRESS NOTE ADULT - PROBLEM SELECTOR PLAN 1
L effusion and pulm edema improved on repeat CXR yesterday, mild metabolic alkalosis on VBG today   -decrease Lasix 20mg PO daily   -check repeat BMP in 2-3 days at rehab  -d/c planning per primary team to acute rehab

## 2019-02-14 NOTE — PROGRESS NOTE ADULT - ASSESSMENT
89yo female h/o CAD s/p stents and CABG on aspirin at home, DM, HTN, aortic valve replacement, and pacemaker who presented to [facility] as a transfer from [facility] with right hemiparesis and aphasia and left proximal MCA occlusion now with dysphagia and failed swallow evaluations

## 2019-02-14 NOTE — H&P ADULT - PMH
Atrial flutter  1/2010- [facility], Dr Tran  CAD (coronary artery disease)  s/p CABG- 2004-[facility]  Diabetes    HTN (hypertension)    Hyperlipidemia    Obesity    Valvular disease

## 2019-02-14 NOTE — H&P ADULT - PSH
Aortic valve replaced    Artificial pacemaker    S/P CABG (coronary artery bypass graft)    S/P cholecystectomy    S/P coronary artery stent placement  x 4, 2006-[facility]

## 2019-02-14 NOTE — H&P ADULT - ATTENDING COMMENTS
Patient's history/exam and treatment plan discussed by NP, Florinda Houser with me over phone.. Patient to be seen in am   Patient has tolerated therapy sessions today

## 2019-02-15 DIAGNOSIS — I63.512 CEREBRAL INFARCTION DUE TO UNSPECIFIED OCCLUSION OR STENOSIS OF LEFT MIDDLE CEREBRAL ARTERY: ICD-10-CM

## 2019-02-15 LAB
ALBUMIN SERPL ELPH-MCNC: 2.7 G/DL — LOW (ref 3.3–5)
ALP SERPL-CCNC: 113 U/L — SIGNIFICANT CHANGE UP (ref 40–120)
ALT FLD-CCNC: 17 U/L DA — SIGNIFICANT CHANGE UP (ref 10–45)
ANION GAP SERPL CALC-SCNC: 13 MMOL/L — SIGNIFICANT CHANGE UP (ref 5–17)
AST SERPL-CCNC: 31 U/L — SIGNIFICANT CHANGE UP (ref 10–40)
BASOPHILS # BLD AUTO: 0.1 K/UL — SIGNIFICANT CHANGE UP (ref 0–0.2)
BASOPHILS NFR BLD AUTO: 1.2 % — SIGNIFICANT CHANGE UP (ref 0–2)
BILIRUB SERPL-MCNC: 1 MG/DL — SIGNIFICANT CHANGE UP (ref 0.2–1.2)
BUN SERPL-MCNC: 39 MG/DL — HIGH (ref 7–23)
CALCIUM SERPL-MCNC: 9.7 MG/DL — SIGNIFICANT CHANGE UP (ref 8.4–10.5)
CHLORIDE SERPL-SCNC: 97 MMOL/L — SIGNIFICANT CHANGE UP (ref 96–108)
CO2 SERPL-SCNC: 28 MMOL/L — SIGNIFICANT CHANGE UP (ref 22–31)
CREAT SERPL-MCNC: 0.95 MG/DL — SIGNIFICANT CHANGE UP (ref 0.5–1.3)
EOSINOPHIL # BLD AUTO: 0.1 K/UL — SIGNIFICANT CHANGE UP (ref 0–0.5)
EOSINOPHIL NFR BLD AUTO: 1.4 % — SIGNIFICANT CHANGE UP (ref 0–6)
GLUCOSE BLDC GLUCOMTR-MCNC: 185 MG/DL — HIGH (ref 70–99)
GLUCOSE BLDC GLUCOMTR-MCNC: 226 MG/DL — HIGH (ref 70–99)
GLUCOSE BLDC GLUCOMTR-MCNC: 229 MG/DL — HIGH (ref 70–99)
GLUCOSE BLDC GLUCOMTR-MCNC: 239 MG/DL — HIGH (ref 70–99)
GLUCOSE SERPL-MCNC: 235 MG/DL — HIGH (ref 70–99)
HCT VFR BLD CALC: 43.3 % — SIGNIFICANT CHANGE UP (ref 34.5–45)
HGB BLD-MCNC: 14.2 G/DL — SIGNIFICANT CHANGE UP (ref 11.5–15.5)
INR BLD: 1.73 RATIO — HIGH (ref 0.88–1.16)
LYMPHOCYTES # BLD AUTO: 1.5 K/UL — SIGNIFICANT CHANGE UP (ref 1–3.3)
LYMPHOCYTES # BLD AUTO: 15.5 % — SIGNIFICANT CHANGE UP (ref 13–44)
MCHC RBC-ENTMCNC: 30.6 PG — SIGNIFICANT CHANGE UP (ref 27–34)
MCHC RBC-ENTMCNC: 32.7 GM/DL — SIGNIFICANT CHANGE UP (ref 32–36)
MCV RBC AUTO: 93.5 FL — SIGNIFICANT CHANGE UP (ref 80–100)
MONOCYTES # BLD AUTO: 0.6 K/UL — SIGNIFICANT CHANGE UP (ref 0–0.9)
MONOCYTES NFR BLD AUTO: 5.9 % — SIGNIFICANT CHANGE UP (ref 2–14)
NEUTROPHILS # BLD AUTO: 7.5 K/UL — HIGH (ref 1.8–7.4)
NEUTROPHILS NFR BLD AUTO: 75.9 % — SIGNIFICANT CHANGE UP (ref 43–77)
PLATELET # BLD AUTO: 283 K/UL — SIGNIFICANT CHANGE UP (ref 150–400)
POTASSIUM SERPL-MCNC: 4 MMOL/L — SIGNIFICANT CHANGE UP (ref 3.5–5.3)
POTASSIUM SERPL-SCNC: 4 MMOL/L — SIGNIFICANT CHANGE UP (ref 3.5–5.3)
PROT SERPL-MCNC: 8.1 G/DL — SIGNIFICANT CHANGE UP (ref 6–8.3)
PROTHROM AB SERPL-ACNC: 19.7 SEC — HIGH (ref 10–12.9)
RBC # BLD: 4.63 M/UL — SIGNIFICANT CHANGE UP (ref 3.8–5.2)
RBC # FLD: 12.8 % — SIGNIFICANT CHANGE UP (ref 10.3–14.5)
SODIUM SERPL-SCNC: 138 MMOL/L — SIGNIFICANT CHANGE UP (ref 135–145)
TSH SERPL-MCNC: 4.76 UIU/ML — HIGH (ref 0.27–4.2)
WBC # BLD: 9.9 K/UL — SIGNIFICANT CHANGE UP (ref 3.8–10.5)
WBC # FLD AUTO: 9.9 K/UL — SIGNIFICANT CHANGE UP (ref 3.8–10.5)

## 2019-02-15 PROCEDURE — 99232 SBSQ HOSP IP/OBS MODERATE 35: CPT

## 2019-02-15 RX ORDER — INSULIN GLARGINE 100 [IU]/ML
10 INJECTION, SOLUTION SUBCUTANEOUS AT BEDTIME
Qty: 0 | Refills: 0 | Status: DISCONTINUED | OUTPATIENT
Start: 2019-02-15 | End: 2019-02-16

## 2019-02-15 RX ADMIN — Medication 5 MILLILITER(S): at 11:12

## 2019-02-15 RX ADMIN — Medication 500 MILLIGRAM(S): at 17:34

## 2019-02-15 RX ADMIN — INSULIN GLARGINE 10 UNIT(S): 100 INJECTION, SOLUTION SUBCUTANEOUS at 23:09

## 2019-02-15 RX ADMIN — Medication 650 MILLIGRAM(S): at 18:13

## 2019-02-15 RX ADMIN — Medication 25 MILLIGRAM(S): at 22:03

## 2019-02-15 RX ADMIN — WARFARIN SODIUM 5 MILLIGRAM(S): 2.5 TABLET ORAL at 22:03

## 2019-02-15 RX ADMIN — Medication 2: at 17:33

## 2019-02-15 RX ADMIN — Medication 20 MILLIGRAM(S): at 05:22

## 2019-02-15 RX ADMIN — ATORVASTATIN CALCIUM 20 MILLIGRAM(S): 80 TABLET, FILM COATED ORAL at 22:03

## 2019-02-15 RX ADMIN — Medication 500 MILLIGRAM(S): at 05:22

## 2019-02-15 RX ADMIN — LIDOCAINE 2 PATCH: 4 CREAM TOPICAL at 18:12

## 2019-02-15 RX ADMIN — Medication 250 MILLIGRAM(S): at 11:11

## 2019-02-15 RX ADMIN — Medication 4: at 11:13

## 2019-02-15 RX ADMIN — FAMOTIDINE 20 MILLIGRAM(S): 10 INJECTION INTRAVENOUS at 11:11

## 2019-02-15 RX ADMIN — Medication 25 MILLIGRAM(S): at 14:44

## 2019-02-15 RX ADMIN — LIDOCAINE 2 PATCH: 4 CREAM TOPICAL at 07:37

## 2019-02-15 RX ADMIN — Medication 81 MILLIGRAM(S): at 11:11

## 2019-02-15 RX ADMIN — Medication 650 MILLIGRAM(S): at 17:34

## 2019-02-15 RX ADMIN — CHLORHEXIDINE GLUCONATE 15 MILLILITER(S): 213 SOLUTION TOPICAL at 05:22

## 2019-02-15 RX ADMIN — Medication 4: at 23:10

## 2019-02-15 RX ADMIN — Medication 4: at 05:23

## 2019-02-15 RX ADMIN — AMIODARONE HYDROCHLORIDE 200 MILLIGRAM(S): 400 TABLET ORAL at 05:22

## 2019-02-15 RX ADMIN — Medication 25 MILLIGRAM(S): at 05:22

## 2019-02-15 RX ADMIN — Medication 1 MILLIGRAM(S): at 11:12

## 2019-02-15 RX ADMIN — CHLORHEXIDINE GLUCONATE 15 MILLILITER(S): 213 SOLUTION TOPICAL at 17:34

## 2019-02-15 NOTE — DIETITIAN INITIAL EVALUATION ADULT. - ENERGY NEEDS
Height: 4'10" (unable to confirm accuracy), Weight: (2/14) 198.8lbs  Skin: intact, Edema: none   IBW range: 86-106lbs (if height is accurate)  Last BM: 2/14

## 2019-02-15 NOTE — DIETITIAN INITIAL EVALUATION ADULT. - NS AS NUTRI INTERV ENTERAL NUTRITION
The patient is a 73y Female complaining of trauma.
Recommend change tube feeding regimen to bolus: Glucerna 1.5 @ 240ml 4x/day via PEG (provides 1,440kcals, 79g protein, 730ml free H2O). Suggest 150ml free H2O q 6 hours.

## 2019-02-15 NOTE — CHART NOTE - NSCHARTNOTEFT_GEN_A_CORE
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     COMORBIDITIES/COMPLICATING CONDITIONS IMPACTING REHABILITATION:  HEALTH ISSUES - PROBLEM Dx:  UTI (urinary tract infection): UTI (urinary tract infection)  Diastolic heart failure: Diastolic heart failure  Left hip pain: Left hip pain  Hyperlipidemia: Hyperlipidemia  Diabetes: Diabetes  HTN (hypertension): HTN (hypertension)  Atrial flutter: Atrial flutter  Dysphagia: Dysphagia  CVA (cerebral vascular accident): CVA (cerebral vascular accident)        PAST MEDICAL & SURGICAL HISTORY:  Valvular disease  Atrial flutter: 1/2010- [facility], Dr Tran  Diabetes  Obesity  Hyperlipidemia  HTN (hypertension)  CAD (coronary artery disease): s/p CABG- 2004-[facility]  Artificial pacemaker  Aortic valve replaced  S/P coronary artery stent placement: x 4, 2006-[facility]  S/P cholecystectomy  S/P CABG (coronary artery bypass graft)      Based upon consideration of the patient's impairments, functional status, complicating conditions and any other contributing factors and after information garnered from the assessments of all therapy disciplines involved in treating the patient and other pertinent clinicians:    INTERDISCIPLINARY REHABILITATION INTERVENTIONS:    [  x ] Transfer Training  [  x ] Bed Mobility  [  x ] Therapeutic Exercise  [  x ] Balance/Coordination Exercises  [  x ] Locomotion retraining  [  x ] Stairs  [  x ] Functional Transfer Training  [  x ] Bowel/Bladder program  [  x ] Pain Management  [  x ] Skin/Wound Care  [  x ] Visual/Perceptual Training  [  x ] Therapeutic Recreation Activities  [  x ] Neuromuscular Re-education  [  x ] Activities of Daily Living  [  x ] Speech Exercise  [  x ] Swallowing Exercises  [   ] Vital Stim  [   ] Dietary Supplements  [   ] Calorie Count  [ x  ] Cognitive Exercises  [  x ] Congnitive/Linguistic Treatment  [   ] Behavior Program  [   ] Neuropsych Therapy  [ x  ] Patient/Family Counseling  [ x  ] Family Training  [ x  ] Community Re-entry  [   ] Orthotic Evaluation  [   ] Prosthetic Eval/Training    MEDICAL PROGNOSIS:  good     REHAB POTENTIAL:  good     EXPECTED DAILY THERAPY:         PT: 1 hr        OT: 1 hr        ST: 1 hr        S&O: n/a    EXPECTED INTENSITY OF PROGRAM:  3 hours a day     EXPECTED FREQUENCY OF PROGRAM:  5 days a week     ESTIMATED LOS:  18-21 days     ESTIMATED DISPOSITION:  home with home therapies     INTERDISCIPLINARY FUNCTIONAL OUTCOMES/GOALS:         Gait/Mobility: min A        Transfers: min A       ADLs: min A        Functional Transfers: min A        Medication Management: min A        Communication:  supervision        Cognitive: supervision       Dysphagia: min  A       Bladder: min A        Bowel: min A

## 2019-02-15 NOTE — CONSULT NOTE ADULT - SUBJECTIVE AND OBJECTIVE BOX
Chief Complaint  Found down, LT HP  HPI  ·	88 year old female  ·	Found down by daughter. Left hemiparesis  ·	Brought to ED  ·	Acute Ischemic CVA  ·	Recannulated by neurology  ·	Deficit remained  ·	Sinus tach during admission  ·	PPM investigation revealed episodes of a flutter  ·	Coumadin started  ·	Transferred to acute rehab  Past Medical History  ·	TAVR (recently)  ·	AFib with ablation and PPM Placement (years ago)  ·	CAD SP CABG (years ago)  ·	Bovine valve (many years ago)  ·	HTN  ·	HLD  Past Surgical history and hospitalizations  ·	As above  Social History  ·	Denies etoh, tobacco use, recreational drug use  Review of Systems  ·	Denies Headache  ·	Denies Chest pains, palpitations  ·	Denies Abdominal pains, diarrhea, constipation  ·	Denies dysuria  ·	Denies pain in joints or legs, denies edema  ·	Denies unexplainable weight loss or gain  Assessment and plan  88F from home HTN HLD CAD with CABG Ablation and PPM and TAVR  Admit for Acute ischemic CVA  Aflutter    ·	Aflutter  Coumadin  follow INR  Adjust accordingly   ·	SP CVA  SP ASA  High intensity statin  Strict BP control Chief Complaint  Found down, LT HP  HPI  ·	88 year old female  ·	Found down by daughter. Left hemiparesis  ·	Brought to ED  ·	Acute Ischemic CVA  ·	Recannulated by neurology  ·	Deficit remained  ·	Sinus tach during admission  ·	PPM investigation revealed episodes of a flutter  ·	Coumadin started  ·	Transferred to acute rehab  Past Medical History  ·	TAVR (recently)  ·	AFib with ablation and PPM Placement (years ago)  ·	CAD SP CABG (years ago)  ·	Bovine valve (many years ago)  ·	HTN  ·	HLD  Past Surgical history and hospitalizations  ·	As above  Social History  ·	Denies etoh, tobacco use, recreational drug use  Review of Systems  ·	Denies Headache  ·	Denies Chest pains, palpitations  ·	Denies Abdominal pains, diarrhea, constipation  ·	Denies dysuria  ·	Denies pain in joints or legs, denies edema  ·	Denies unexplainable weight loss or gain  Assessment and plan  88F from home HTN HLD CAD with CABG Ablation and PPM and TAVR  Admit for Acute ischemic CVA  Aflutter    ·	Aflutter  Coumadin  follow INR  Adjust accordingly   ·	SP CVA  SP ASA  High intensity statin  Strict BP control  ·	DM  Morning Sugars persistently >180 gm/dl  May start lantus 10 units QHS  Adjust accordingly.  Target AM Sugar less than 150 gm/dl

## 2019-02-15 NOTE — PROGRESS NOTE ADULT - ASSESSMENT
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     Continue comprehensive acute inpatient rehab program consisting of PT/OT/ST.

## 2019-02-15 NOTE — DIETITIAN INITIAL EVALUATION ADULT. - OTHER INFO
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 Unable to obtain hx from pt 2/2 expressive aphasia. Noted pt tolerating current tube feeding regimen at continuous rate. NKFA per chart review. Noted failed MBS on 2/8 at [facility]. PEG placed on 2/12/19.

## 2019-02-15 NOTE — PROGRESS NOTE ADULT - PROBLEM SELECTOR PLAN 9
Ceftin 250mg daily x 5 days, follow up urine cx results-still pending Ceftin 250mg daily x 5 days, follow up urine cx results-still pending  Low PVRs (56cc and 135cc)-PVRs discontinued

## 2019-02-15 NOTE — PROGRESS NOTE ADULT - PROBLEM SELECTOR PLAN 7
Continue warm packs prn, lidocaine patches, and prn tylenol   This is chronic pain per patient with possible component of arthritis, continue conservative management and monitor--pain controlled at this time

## 2019-02-15 NOTE — PROGRESS NOTE ADULT - SUBJECTIVE AND OBJECTIVE BOX
This is a 88 year old  female with a pmh of CAD, HTN, HLD, pacemaker who presented to [facility] as a transfer from [facility] with right hemiparesis and aphasia and left proximal MCA occlusion. The patient was last known well at 2 pm on 2/3/19 and was then found down on the ground by her daughter several hours later and was noted to be not speaking and weak on the right. At [facility] a CTA head and neck was performed which showed a proximal left MCA occlusion, and a CT head showed a dense left MCA sign with some hypodensity and early ischemic changes in the left MCA distribution. She did not receive IV tPA as she was out of the window and was transferred to [facility] for possible mechanical thrombectomy. At [facility] her CT perfusion showed zero core infarct, with a penumbra of 80 mls, and an infinite mismatch. She was deemed a candidate for intervention and recanalization was achieved with TICI 3 score on 2/13.     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 Given pacemaker showed a-flutter, high suspicion for cardiac source.   Per neurology, patient is currently on ASA 81 mg as a bridge to coumadin for goal INR 2-3. Her INR on 2/14 is 1.26, she received coumadin 5 mg on night of 2/13. Recommend to discontinue the aspirin when INR at goal.  Patient found to have EF 65-70% with moderate AS, moderate pulm HTN, mild-mod TR and no PFO, cardiac monitoring at times showed sinus tachycardia - started on Amiodarone and Metoprolol with improvement. PPM interrogation- showed episode of atrial flutter.   Patient failed speech and swallow, PEG was placed on 2/12 and has been tolerating tube feeds.   Patient found be more lethargic on 2/13. Repeat CT head was stable. UA positive and patient started on antibiotics. Urine cx sent and pending results. Of note, CT head showed sphenoid air fluid level. Recs to start Augmentin if s/s of sinusitis start.   Also, cxray from 2/13 showed pulmonary edema had decreased. Rec to continue lasix at a lower dose of 20mg daily.     Patient medically stable for discharge to [facility]'s acute IPR on 2/14/19.       TODAY'S SUBJECTIVE & REVIEW OF SYMPTOMS:  [ x ] Constitutional WNL  [ x  ] Cardio WNL  [x   ] Resp WNL  [ x  ] GI WNL  [x   ] Heme WNL  [   ] Endo WNL  [ x  ] Skin WNL  [   ] MSK WNL  [   ] Neuro WNL  [   ] Cognitive WNL  [ x  ] Psych WNL  Patient denies CP, SOB, HA, abdominal pain, dysuria, fevers, chills, nausea. Tolerating tube feedings. No overnight issues or events per nursing. Left hip pain controlled. Tolerating therapies this morning. Last BM yesterday.     MEDICATIONS  (STANDING):  amiodarone    Tablet 200 milliGRAM(s) Oral daily  ascorbic acid 500 milliGRAM(s) Oral two times a day  aspirin  chewable 81 milliGRAM(s) Oral daily  atorvastatin 20 milliGRAM(s) Oral at bedtime  cefuroxime   Tablet 250 milliGRAM(s) Oral daily  chlorhexidine 0.12% Liquid 15 milliLiter(s) Swish and Spit two times a day  dextrose 5%. 1000 milliLiter(s) (50 mL/Hr) IV Continuous <Continuous>  dextrose 50% Injectable 12.5 Gram(s) IV Push once  famotidine    Tablet 20 milliGRAM(s) Oral daily  folic acid 1 milliGRAM(s) Oral daily  furosemide    Tablet 20 milliGRAM(s) Oral daily  insulin lispro (HumaLOG) corrective regimen sliding scale   SubCutaneous every 6 hours  lidocaine   Patch 2 Patch Transdermal <User Schedule>  metoprolol tartrate 25 milliGRAM(s) Oral every 8 hours  multivitamin   Solution 5 milliLiter(s) Oral daily  polyethylene glycol 3350 17 Gram(s) Oral once  warfarin 5 milliGRAM(s) Oral at bedtime    MEDICATIONS  (PRN):  acetaminophen    Suspension .. 650 milliGRAM(s) Oral every 6 hours PRN Mild Pain (1 - 3)  bisacodyl Suppository 10 milliGRAM(s) Rectal daily PRN Constipation  dextrose 40% Gel 15 Gram(s) Oral once PRN Blood Glucose LESS THAN 70 milliGRAM(s)/deciliter  glucagon  Injectable 1 milliGRAM(s) IntraMuscular once PRN Glucose LESS THAN 70 milligrams/deciliter  polyethylene glycol 3350 17 Gram(s) Oral daily PRN Constipation      02-15    138  |  97  |  39<H>  ----------------------------<  235<H>  4.0   |  28  |  0.95    Ca    9.7      15 Feb 2019 06:00    TPro  8.1  /  Alb  2.7<L>  /  TBili  1.0  /  DBili  x   /  AST  31  /  ALT  17  /  AlkPhos  113  02-15                        14.2   9.9   )-----------( 283      ( 15 Feb 2019 06:00 )             43.3     Thyroid Stimulating Hormone, Serum (02.04.19 @ 06:10)    Thyroid Stimulating Hormone, Serum: 5.06 uIU/mL    PT/INR - ( 15 Feb 2019 06:00 )   PT: 19.7 sec;   INR: 1.73 ratio             Vital Signs Last 24 Hrs  T(C): 36.6 (15 Feb 2019 08:34), Max: 37.1 (14 Feb 2019 13:53)  T(F): 97.9 (15 Feb 2019 08:34), Max: 98.8 (14 Feb 2019 13:53)  HR: 62 (15 Feb 2019 08:34) (62 - 111)  BP: 136/78 (15 Feb 2019 08:34) (100/82 - 138/64)  BP(mean): 89 (14 Feb 2019 13:53) (89 - 89)  RR: 15 (15 Feb 2019 08:34) (14 - 21)  SpO2: 96% (15 Feb 2019 08:34) (96% - 100%)       Physical Exam:  On Neurological Examination:  Mental Status - Patient is alert, awake, oriented X3. Able to  name and repeat.  Follows commands well and able to answer questions appropriately. Mood and affect  normal. 3/3 immediate recall, 1/3 delayed recall.   Cranial Nerves - PERRL, EOMI, right sided facial droop, +dysarthria, +dysphagia,  Motor Exam -   Right upper 1/5 hand grasp, 1/5 elbow strengths, 2/5 shoulder adduction   Left upper 4/5 shoulder strengths, 5/5 elbow and hand strengths.   Right lower 2/5 hip flexor, knee extender and DF/PF  Left lower 3/5 hip flexor secondary to pain, knee and ankle strengths 5/5   Normal muscle bulk/tone  Sensory    Intact to light touch bilaterally.   No tremors  Gait -  reassessed   DTS Reflexes:  2+ bilat BR/biceps/triceps. 0 bilat patellar and achilles. Neg hoffmans and babinski.                               GENERAL Exam:     Nontoxic , No Acute Distress    HEENT:  normocephalic, atraumatic    LUNGS: Clear bilaterally    HEART:  Tachycardic, S1S1 auscultated, regular    GI/ ABDOMEN:  Soft, +BS,  Non tender, obese abdomen, peg tube present-no drainage or erythema noted SKIN:  blanchable erythema to bilat buttocks and sacrum, all skin intact

## 2019-02-15 NOTE — DIETITIAN INITIAL EVALUATION ADULT. - ORAL INTAKE PTA
NPO + enteral nutrition @ [facility]. As per previous RD note, pt was receiving Glucerna 1.2 @50cc/hr x 24 hours. PEG placed on (2/12/19)

## 2019-02-16 LAB
-  AMIKACIN: SIGNIFICANT CHANGE UP
-  AMPICILLIN/SULBACTAM: SIGNIFICANT CHANGE UP
-  AMPICILLIN: SIGNIFICANT CHANGE UP
-  AZTREONAM: SIGNIFICANT CHANGE UP
-  CEFAZOLIN: SIGNIFICANT CHANGE UP
-  CEFEPIME: SIGNIFICANT CHANGE UP
-  CEFOXITIN: SIGNIFICANT CHANGE UP
-  CEFTRIAXONE: SIGNIFICANT CHANGE UP
-  CIPROFLOXACIN: SIGNIFICANT CHANGE UP
-  ERTAPENEM: SIGNIFICANT CHANGE UP
-  GENTAMICIN: SIGNIFICANT CHANGE UP
-  IMIPENEM: SIGNIFICANT CHANGE UP
-  LEVOFLOXACIN: SIGNIFICANT CHANGE UP
-  MEROPENEM: SIGNIFICANT CHANGE UP
-  NITROFURANTOIN: SIGNIFICANT CHANGE UP
-  PIPERACILLIN/TAZOBACTAM: SIGNIFICANT CHANGE UP
-  TIGECYCLINE: SIGNIFICANT CHANGE UP
-  TOBRAMYCIN: SIGNIFICANT CHANGE UP
-  TRIMETHOPRIM/SULFAMETHOXAZOLE: SIGNIFICANT CHANGE UP
CULTURE RESULTS: SIGNIFICANT CHANGE UP
GLUCOSE BLDC GLUCOMTR-MCNC: 177 MG/DL — HIGH (ref 70–99)
GLUCOSE BLDC GLUCOMTR-MCNC: 178 MG/DL — HIGH (ref 70–99)
GLUCOSE BLDC GLUCOMTR-MCNC: 186 MG/DL — HIGH (ref 70–99)
GLUCOSE BLDC GLUCOMTR-MCNC: 200 MG/DL — HIGH (ref 70–99)
INR BLD: 1.91 RATIO — HIGH (ref 0.88–1.16)
METHOD TYPE: SIGNIFICANT CHANGE UP
ORGANISM # SPEC MICROSCOPIC CNT: SIGNIFICANT CHANGE UP
ORGANISM # SPEC MICROSCOPIC CNT: SIGNIFICANT CHANGE UP
PROTHROM AB SERPL-ACNC: 21.8 SEC — HIGH (ref 10–12.9)
SPECIMEN SOURCE: SIGNIFICANT CHANGE UP

## 2019-02-16 PROCEDURE — 99232 SBSQ HOSP IP/OBS MODERATE 35: CPT | Mod: GC

## 2019-02-16 PROCEDURE — 99223 1ST HOSP IP/OBS HIGH 75: CPT | Mod: GC,AI

## 2019-02-16 RX ORDER — WARFARIN SODIUM 2.5 MG/1
6 TABLET ORAL AT BEDTIME
Qty: 0 | Refills: 0 | Status: COMPLETED | OUTPATIENT
Start: 2019-02-16 | End: 2019-02-16

## 2019-02-16 RX ORDER — INSULIN GLARGINE 100 [IU]/ML
12 INJECTION, SOLUTION SUBCUTANEOUS AT BEDTIME
Qty: 0 | Refills: 0 | Status: DISCONTINUED | OUTPATIENT
Start: 2019-02-16 | End: 2019-02-20

## 2019-02-16 RX ADMIN — LIDOCAINE 2 PATCH: 4 CREAM TOPICAL at 06:44

## 2019-02-16 RX ADMIN — Medication 500 MILLIGRAM(S): at 05:18

## 2019-02-16 RX ADMIN — Medication 500 MILLIGRAM(S): at 17:47

## 2019-02-16 RX ADMIN — Medication 2: at 05:19

## 2019-02-16 RX ADMIN — Medication 81 MILLIGRAM(S): at 11:24

## 2019-02-16 RX ADMIN — ATORVASTATIN CALCIUM 20 MILLIGRAM(S): 80 TABLET, FILM COATED ORAL at 21:43

## 2019-02-16 RX ADMIN — Medication 25 MILLIGRAM(S): at 21:43

## 2019-02-16 RX ADMIN — POLYETHYLENE GLYCOL 3350 17 GRAM(S): 17 POWDER, FOR SOLUTION ORAL at 21:42

## 2019-02-16 RX ADMIN — CHLORHEXIDINE GLUCONATE 15 MILLILITER(S): 213 SOLUTION TOPICAL at 05:18

## 2019-02-16 RX ADMIN — WARFARIN SODIUM 6 MILLIGRAM(S): 2.5 TABLET ORAL at 21:42

## 2019-02-16 RX ADMIN — Medication 250 MILLIGRAM(S): at 11:24

## 2019-02-16 RX ADMIN — INSULIN GLARGINE 12 UNIT(S): 100 INJECTION, SOLUTION SUBCUTANEOUS at 23:03

## 2019-02-16 RX ADMIN — Medication 5 MILLILITER(S): at 11:23

## 2019-02-16 RX ADMIN — Medication 2: at 17:47

## 2019-02-16 RX ADMIN — Medication 1 MILLIGRAM(S): at 11:24

## 2019-02-16 RX ADMIN — Medication 2: at 11:24

## 2019-02-16 RX ADMIN — Medication 25 MILLIGRAM(S): at 05:18

## 2019-02-16 RX ADMIN — LIDOCAINE 2 PATCH: 4 CREAM TOPICAL at 07:21

## 2019-02-16 RX ADMIN — CHLORHEXIDINE GLUCONATE 15 MILLILITER(S): 213 SOLUTION TOPICAL at 17:46

## 2019-02-16 RX ADMIN — Medication 2: at 23:04

## 2019-02-16 RX ADMIN — Medication 20 MILLIGRAM(S): at 05:18

## 2019-02-16 RX ADMIN — FAMOTIDINE 20 MILLIGRAM(S): 10 INJECTION INTRAVENOUS at 11:24

## 2019-02-16 RX ADMIN — Medication 25 MILLIGRAM(S): at 14:00

## 2019-02-16 RX ADMIN — LIDOCAINE 2 PATCH: 4 CREAM TOPICAL at 18:37

## 2019-02-16 RX ADMIN — AMIODARONE HYDROCHLORIDE 200 MILLIGRAM(S): 400 TABLET ORAL at 05:19

## 2019-02-16 NOTE — PROGRESS NOTE ADULT - SUBJECTIVE AND OBJECTIVE BOX
HPI:88 year old  female with a pmh of CAD, HTN, HLD, pacemaker who presented to [facility] as a transfer from [facility] with right hemiparesis and aphasia and left proximal MCA occlusion. The patient was last known well at 2 pm on 2/3/19 and was then found down on the ground by her daughter several hours later and was noted to be not speaking and weak on the right. At [facility] a CTA head and neck was performed which showed a proximal left MCA occlusion, and a CT head showed a dense left MCA sign with some hypodensity and early ischemic changes in the left MCA distribution. She did not receive IV tPA as she was out of the window and was transferred to [facility] for possible mechanical thrombectomy. At [facility] her CT perfusion showed zero core infarct, with a penumbra of 80 mls, and an infinite mismatch. She was deemed a candidate for intervention and recanalization.  Stroke thought to be 2* cardioembolism   Patient failed speech and swallow, PEG was placed on 2/12 and has been tolerating tube feeds.   Patient found be more lethargic on 2/13. Repeat CT head was stable. UA positive and patient started on antibiotics. Urine cx sent and pending results. Of note, CT head showed sphenoid air fluid level. Recs to start Augmentin if s/s of sinusitis start. Admitted to rehab on 2/14/19  Case discussed with me by NPHoward on 2/15    Patient seen at bedside this am. Sleepy, but opens eyes easily  States that she feels ok      REVIEW OF SYSTEMS  [ x  ] Constitutional WNL  [ x  ] Cardio WNL  [x   ] Resp WNL  [   ] GI PEG    VITAL SIGNS  Vital Signs Last 24 Hrs  T(C): 36.3 (16 Feb 2019 08:19), Max: 36.4 (15 Feb 2019 20:43)  T(F): 97.4 (16 Feb 2019 08:19), Max: 97.5 (15 Feb 2019 20:43)  HR: 67 (16 Feb 2019 08:19) (67 - 96)  BP: 133/77 (16 Feb 2019 08:19) (133/77 - 136/77)  BP(mean): --  RR: 15 (16 Feb 2019 08:19) (14 - 15)  SpO2: 97% (16 Feb 2019 08:19) (95% - 97%)    PHYSICAL EXAM  General: nad, obese female  Cardio: S1S2  Resp: clear  Abdomen: soft, PEG site with old drainage  Neuro: aaoxself, place, needs cues for time, Right facial droop, expressive deficits, right hemiplegia  Extrem: no edema, no calf tenderness      RECENT LABS:                        14.2   9.9   )-----------( 283      ( 15 Feb 2019 06:00 )             43.3     02-15    138  |  97  |  39<H>  ----------------------------<  235<H>  4.0   |  28  |  0.95    Ca    9.7      15 Feb 2019 06:00    TPro  8.1  /  Alb  2.7<L>  /  TBili  1.0  /  DBili  x   /  AST  31  /  ALT  17  /  AlkPhos  113  02-15    PT/INR - ( 16 Feb 2019 05:40 )   PT: 21.8 sec;   INR: 1.91 ratio        CAPILLARY BLOOD GLUCOSE      POCT Blood Glucose.: 200 mg/dL (16 Feb 2019 11:22)  POCT Blood Glucose.: 177 mg/dL (16 Feb 2019 05:17)  POCT Blood Glucose.: 239 mg/dL (15 Feb 2019 23:03)  POCT Blood Glucose.: 185 mg/dL (15 Feb 2019 17:31)      MEDICATIONS  (STANDING):  amiodarone    Tablet 200 milliGRAM(s) Oral daily  ascorbic acid 500 milliGRAM(s) Oral two times a day  aspirin  chewable 81 milliGRAM(s) Oral daily  atorvastatin 20 milliGRAM(s) Oral at bedtime  cefuroxime   Tablet 250 milliGRAM(s) Oral daily  chlorhexidine 0.12% Liquid 15 milliLiter(s) Swish and Spit two times a day  dextrose 5%. 1000 milliLiter(s) (50 mL/Hr) IV Continuous <Continuous>  dextrose 50% Injectable 12.5 Gram(s) IV Push once  famotidine    Tablet 20 milliGRAM(s) Oral daily  folic acid 1 milliGRAM(s) Oral daily  furosemide    Tablet 20 milliGRAM(s) Oral daily  insulin glargine Injectable (LANTUS) 12 Unit(s) SubCutaneous at bedtime  insulin lispro (HumaLOG) corrective regimen sliding scale   SubCutaneous every 6 hours  lidocaine   Patch 2 Patch Transdermal <User Schedule>  metoprolol tartrate 25 milliGRAM(s) Oral every 8 hours  multivitamin   Solution 5 milliLiter(s) Oral daily  polyethylene glycol 3350 17 Gram(s) Oral once  warfarin 6 milliGRAM(s) Oral at bedtime    MEDICATIONS  (PRN):  acetaminophen    Suspension .. 650 milliGRAM(s) Oral every 6 hours PRN Mild Pain (1 - 3)  bisacodyl Suppository 10 milliGRAM(s) Rectal daily PRN Constipation  dextrose 40% Gel 15 Gram(s) Oral once PRN Blood Glucose LESS THAN 70 milliGRAM(s)/deciliter  glucagon  Injectable 1 milliGRAM(s) IntraMuscular once PRN Glucose LESS THAN 70 milligrams/deciliter  polyethylene glycol 3350 17 Gram(s) Oral daily PRN Constipation         RADIOLOGY/OTHER RESULTS:    A/P:    88 year old female with risk factors as stated above admitted for comprehensive rehab with left MCA infarct s/p recanalization procedure at [facility]  Continue full rehab program:PT/OT/SLP 3 hrs/day 5 days/week  On ASA, statins, risk factor management    2. Dysphagia: On PEG feeds at this time    3. Atrial flutter : on amidarone, metoprolol    4. UTI: complete abx course    5. 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 Pain: on tylenol prn Pain patch     7. Diastolic HF: on lasix . monitor potassium

## 2019-02-16 NOTE — PROGRESS NOTE ADULT - SUBJECTIVE AND OBJECTIVE BOX
HPI  Pt is a 89yo F admitted to acute rehab secondary to Left MCA CVA s/p endovascular intervention with right side hemiparesis, dysphagia s/p PEG placement, dysarthria and expressive aphasia.   Pt was seen and examined at bedside. Denies of any new complaints over night. Hemodynamically stable.      Vital Signs Last 24 Hrs  T(C): 36.3 (16 Feb 2019 08:19), Max: 36.4 (15 Feb 2019 20:43)  T(F): 97.4 (16 Feb 2019 08:19), Max: 97.5 (15 Feb 2019 20:43)  HR: 67 (16 Feb 2019 08:19) (67 - 96)  BP: 133/77 (16 Feb 2019 08:19) (133/77 - 136/77)  BP(mean): --  RR: 15 (16 Feb 2019 08:19) (14 - 15)  SpO2: 97% (16 Feb 2019 08:19) (95% - 97%)    I&O's Summary    15 Feb 2019 07:01  -  16 Feb 2019 07:00  --------------------------------------------------------  IN: 1130 mL / OUT: 1 mL / NET: 1129 mL    16 Feb 2019 07:01  -  16 Feb 2019 14:24  --------------------------------------------------------  IN: 390 mL / OUT: 0 mL / NET: 390 mL        CAPILLARY BLOOD GLUCOSE      POCT Blood Glucose.: 200 mg/dL (16 Feb 2019 11:22)  POCT Blood Glucose.: 177 mg/dL (16 Feb 2019 05:17)  POCT Blood Glucose.: 239 mg/dL (15 Feb 2019 23:03)  POCT Blood Glucose.: 185 mg/dL (15 Feb 2019 17:31)      PHYSICAL EXAM:    Constitutional: NAD, awake   HEENT: PERR, EOMI, Normal Hearing, MMM, right side facial droop, dysarthria and dysphagia   Neck: Soft and supple, No LAD, No JVD  Respiratory: Breath sounds are clear bilaterally, No wheezing, rales or rhonchi  Cardiovascular: S1 and S2, regular rate and rhythm, no Murmurs, gallops or rubs  Gastrointestinal: Bowel Sounds present, soft, nontender, nondistended, no guarding, no rebound  Extremities: No peripheral edema  Vascular: 2+ peripheral pulses  Neurological: A/O x 3, no focal deficits  Musculoskeletal: RUE 1/5 strength, 5/5 LUE, 2/5 right lower and 3/5 left lower ext   Skin: No rashes    MEDICATIONS:  MEDICATIONS  (STANDING):  amiodarone    Tablet 200 milliGRAM(s) Oral daily  ascorbic acid 500 milliGRAM(s) Oral two times a day  aspirin  chewable 81 milliGRAM(s) Oral daily  atorvastatin 20 milliGRAM(s) Oral at bedtime  cefuroxime   Tablet 250 milliGRAM(s) Oral daily  chlorhexidine 0.12% Liquid 15 milliLiter(s) Swish and Spit two times a day  dextrose 5%. 1000 milliLiter(s) (50 mL/Hr) IV Continuous <Continuous>  dextrose 50% Injectable 12.5 Gram(s) IV Push once  famotidine    Tablet 20 milliGRAM(s) Oral daily  folic acid 1 milliGRAM(s) Oral daily  furosemide    Tablet 20 milliGRAM(s) Oral daily  insulin glargine Injectable (LANTUS) 10 Unit(s) SubCutaneous at bedtime  insulin lispro (HumaLOG) corrective regimen sliding scale   SubCutaneous every 6 hours  lidocaine   Patch 2 Patch Transdermal <User Schedule>  metoprolol tartrate 25 milliGRAM(s) Oral every 8 hours  multivitamin   Solution 5 milliLiter(s) Oral daily  polyethylene glycol 3350 17 Gram(s) Oral once  warfarin 5 milliGRAM(s) Oral at bedtime      LABS: All Labs Reviewed:                        14.2   9.9   )-----------( 283      ( 15 Feb 2019 06:00 )             43.3     02-15    138  |  97  |  39<H>  ----------------------------<  235<H>  4.0   |  28  |  0.95    Ca    9.7      15 Feb 2019 06:00    TPro  8.1  /  Alb  2.7<L>  /  TBili  1.0  /  DBili  x   /  AST  31  /  ALT  17  /  AlkPhos  113  02-15    PT/INR - ( 16 Feb 2019 05:40 )   PT: 21.8 sec;   INR: 1.91 ratio               Blood Culture: 02-14 @ 11:07  Organism --  Gram Stain Blood -- Gram Stain --  Specimen Source .Urine Catheterized  Culture-Blood --        RADIOLOGY/EKG:    DVT PPX:    ADVANCED DIRECTIVE:    DISPOSITION:

## 2019-02-16 NOTE — PROGRESS NOTE ADULT - ASSESSMENT
Pt is a 87yo F admitted to acute rehab secondary to Left MCA CVA s/p endovascular intervention with right side hemiparesis, dysphagia s/p PEG placement, dysarthria and expressive aphasia.     *Left MCA CVA  Cont acute rehab  PT/OT/SLP   Coumadin 6mg tonight given INR 1.91 today  resume to 5mg tomorrow if INR therapeutic       *Dysphagia/dysarthria  SLP  Peg placement in place  tolerate feeding well  Cont aspiration precaution     *A-Flutter  Rate controlled   Cont amiodarone and lopressor  Pacemaker in place     *HTN  Controlled   Cont lopressor     *DM   Uncontrolled BGM   Increase Lantus to 12u qhs 2/16/19  Cont ISS high scale     *Diastolic CHF  Cont lasix   Cont BB     *UTI  Cont ceftin  Urine culture result pending

## 2019-02-17 LAB
GLUCOSE BLDC GLUCOMTR-MCNC: 169 MG/DL — HIGH (ref 70–99)
GLUCOSE BLDC GLUCOMTR-MCNC: 170 MG/DL — HIGH (ref 70–99)
GLUCOSE BLDC GLUCOMTR-MCNC: 190 MG/DL — HIGH (ref 70–99)
GLUCOSE BLDC GLUCOMTR-MCNC: 203 MG/DL — HIGH (ref 70–99)
INR BLD: 4.57 RATIO — HIGH (ref 0.88–1.16)
PROTHROM AB SERPL-ACNC: 53.6 SEC — HIGH (ref 10–12.9)

## 2019-02-17 PROCEDURE — 99232 SBSQ HOSP IP/OBS MODERATE 35: CPT

## 2019-02-17 PROCEDURE — 99233 SBSQ HOSP IP/OBS HIGH 50: CPT | Mod: GC

## 2019-02-17 RX ORDER — INSULIN LISPRO 100/ML
2 VIAL (ML) SUBCUTANEOUS
Qty: 0 | Refills: 0 | Status: DISCONTINUED | OUTPATIENT
Start: 2019-02-17 | End: 2019-02-17

## 2019-02-17 RX ORDER — POLYETHYLENE GLYCOL 3350 17 G/17G
17 POWDER, FOR SOLUTION ORAL DAILY
Qty: 0 | Refills: 0 | Status: DISCONTINUED | OUTPATIENT
Start: 2019-02-17 | End: 2019-03-04

## 2019-02-17 RX ORDER — INSULIN LISPRO 100/ML
2 VIAL (ML) SUBCUTANEOUS
Qty: 0 | Refills: 0 | Status: DISCONTINUED | OUTPATIENT
Start: 2019-02-17 | End: 2019-02-18

## 2019-02-17 RX ORDER — NYSTATIN 500MM UNIT
5 POWDER (EA) MISCELLANEOUS
Qty: 0 | Refills: 0 | Status: COMPLETED | OUTPATIENT
Start: 2019-02-17 | End: 2019-02-24

## 2019-02-17 RX ORDER — SENNA PLUS 8.6 MG/1
2 TABLET ORAL AT BEDTIME
Qty: 0 | Refills: 0 | Status: DISCONTINUED | OUTPATIENT
Start: 2019-02-17 | End: 2019-03-04

## 2019-02-17 RX ADMIN — Medication 25 MILLIGRAM(S): at 05:50

## 2019-02-17 RX ADMIN — Medication 1 MILLIGRAM(S): at 11:30

## 2019-02-17 RX ADMIN — Medication 250 MILLIGRAM(S): at 11:30

## 2019-02-17 RX ADMIN — Medication 5 MILLILITER(S): at 11:31

## 2019-02-17 RX ADMIN — CHLORHEXIDINE GLUCONATE 15 MILLILITER(S): 213 SOLUTION TOPICAL at 05:51

## 2019-02-17 RX ADMIN — POLYETHYLENE GLYCOL 3350 17 GRAM(S): 17 POWDER, FOR SOLUTION ORAL at 21:48

## 2019-02-17 RX ADMIN — LIDOCAINE 2 PATCH: 4 CREAM TOPICAL at 07:23

## 2019-02-17 RX ADMIN — Medication 500 MILLIGRAM(S): at 17:21

## 2019-02-17 RX ADMIN — Medication 2: at 23:06

## 2019-02-17 RX ADMIN — Medication 4: at 11:32

## 2019-02-17 RX ADMIN — Medication 2: at 05:51

## 2019-02-17 RX ADMIN — Medication 81 MILLIGRAM(S): at 11:30

## 2019-02-17 RX ADMIN — Medication 20 MILLIGRAM(S): at 05:50

## 2019-02-17 RX ADMIN — Medication 2 UNIT(S): at 23:07

## 2019-02-17 RX ADMIN — CHLORHEXIDINE GLUCONATE 15 MILLILITER(S): 213 SOLUTION TOPICAL at 17:20

## 2019-02-17 RX ADMIN — Medication 5 MILLILITER(S): at 17:24

## 2019-02-17 RX ADMIN — LIDOCAINE 2 PATCH: 4 CREAM TOPICAL at 06:33

## 2019-02-17 RX ADMIN — Medication 2 UNIT(S): at 17:22

## 2019-02-17 RX ADMIN — Medication 2: at 17:21

## 2019-02-17 RX ADMIN — Medication 500 MILLIGRAM(S): at 05:51

## 2019-02-17 RX ADMIN — FAMOTIDINE 20 MILLIGRAM(S): 10 INJECTION INTRAVENOUS at 11:30

## 2019-02-17 RX ADMIN — INSULIN GLARGINE 12 UNIT(S): 100 INJECTION, SOLUTION SUBCUTANEOUS at 23:06

## 2019-02-17 RX ADMIN — LIDOCAINE 2 PATCH: 4 CREAM TOPICAL at 18:25

## 2019-02-17 RX ADMIN — ATORVASTATIN CALCIUM 20 MILLIGRAM(S): 80 TABLET, FILM COATED ORAL at 21:48

## 2019-02-17 RX ADMIN — Medication 25 MILLIGRAM(S): at 21:48

## 2019-02-17 RX ADMIN — SENNA PLUS 2 TABLET(S): 8.6 TABLET ORAL at 21:49

## 2019-02-17 RX ADMIN — AMIODARONE HYDROCHLORIDE 200 MILLIGRAM(S): 400 TABLET ORAL at 05:50

## 2019-02-17 NOTE — PROGRESS NOTE ADULT - ASSESSMENT
Pt is a 87yo F admitted to acute rehab secondary to Left MCA CVA s/p endovascular intervention with right side hemiparesis, dysphagia s/p PEG placement, dysarthria and expressive aphasia.     *Left MCA CVA  Cont acute rehab  PT/OT/SLP   Coumadin 6mg tonight given INR 1.91 today  resume to 5mg tomorrow if INR therapeutic       *Dysphagia/dysarthria  SLP  Peg placement in place  tolerate feeding well  Cont aspiration precaution     *A-Flutter  Rate controlled   Cont amiodarone and lopressor  Pacemaker in place     *HTN  Controlled   Cont lopressor     *DM   Uncontrolled BGM   Increase Lantus to 12u qhs 2/16/19  Cont ISS high scale   Start Pre meal 3 unit Humalog 2/17    *Diastolic CHF  Cont lasix   Cont BB     *UTI  Cont ceftin  finish course of abx due to new onset of incontinence Pt is a 87yo F admitted to acute rehab secondary to Left MCA CVA s/p endovascular intervention with right side hemiparesis, dysphagia s/p PEG placement, dysarthria and expressive aphasia.     *Left MCA CVA  Cont acute rehab  PT/OT/SLP   Coumadin held tonight INR 4.57 today  resume to 5mg once INR therapeutic       *Dysphagia/dysarthria  SLP  Peg placement in place  tolerate feeding well  Cont aspiration precaution     *A-Flutter  Rate controlled   Cont amiodarone and lopressor  Pacemaker in place     *HTN  Controlled   Cont lopressor     *DM   Uncontrolled BGM   Increase Lantus to 12u qhs 2/16/19  Cont ISS high scale   Start Pre meal 3 unit Humalog 2/17    *Diastolic CHF  Cont lasix   Cont BB     *UTI  Cont ceftin  finish course of abx due to new onset of incontinence Pt is a 87yo F admitted to acute rehab secondary to Left MCA CVA s/p endovascular intervention with right side hemiparesis, dysphagia s/p PEG placement, dysarthria and expressive aphasia.     *Left MCA CVA  Cont acute rehab  PT/OT/SLP   Coumadin held tonight INR 4.57 today  resume to 5mg once INR therapeutic       *Dysphagia/dysarthria  SLP  Peg placement in place  tolerate feeding well  Cont aspiration precaution     *A-Flutter  Rate controlled   Cont amiodarone and lopressor  Pacemaker in place     *HTN  Controlled   Cont lopressor     *DM   Uncontrolled BGM   Increase Lantus to 12u qhs 2/16/19  Cont ISS high scale   Start Pre meal 2 unit Humalog 2/17    *Diastolic CHF  Cont lasix   Cont BB     *UTI  Cont ceftin  finish course of abx due to new onset of incontinence

## 2019-02-17 NOTE — PROGRESS NOTE ADULT - SUBJECTIVE AND OBJECTIVE BOX
HPI:88 year old  female with a pmh of CAD, HTN, HLD, pacemaker who presented to [facility] as a transfer from [facility] with right hemiparesis and aphasia and left proximal MCA occlusion. The patient was last known well at 2 pm on 2/3/19 and was then found down on the ground by her daughter several hours later and was noted to be not speaking and weak on the right. At [facility] a CTA head and neck was performed which showed a proximal left MCA occlusion, and a CT head showed a dense left MCA sign with some hypodensity and early ischemic changes in the left MCA distribution. She did not receive IV tPA as she was out of the window and was transferred to [facility] for possible mechanical thrombectomy. At [facility] her CT perfusion showed zero core infarct, with a penumbra of 80 mls, and an infinite mismatch. She was deemed a candidate for intervention and recanalization.  Stroke thought to be 2* cardioembolism   Patient failed speech and swallow, PEG was placed on 2/12 and has been tolerating tube feeds.   Patient found be more lethargic on 2/13. Repeat CT head was stable. UA positive and patient started on antibiotics. Urine cx sent and pending results. Of note, CT head showed sphenoid air fluid level. Recs to start Augmentin if s/s of sinusitis start. Admitted to rehab on 2/14/19  Case discussed with me by NPHoward on 2/15    Patient seen at bedside this am. awake and alert  States that she feels ok  INR elevated       REVIEW OF SYSTEMS  [ x  ] Constitutional WNL  [ x  ] Cardio WNL  [x   ] Resp WNL  [   ] GI PEG    Vital Signs Last 24 Hrs  T(C): 36.3 (17 Feb 2019 07:48), Max: 36.6 (16 Feb 2019 20:41)  T(F): 97.4 (17 Feb 2019 07:48), Max: 97.8 (16 Feb 2019 20:41)  HR: 71 (17 Feb 2019 13:44) (62 - 98)  BP: 107/64 (17 Feb 2019 13:44) (107/64 - 155/84)  BP(mean): --  RR: 16 (17 Feb 2019 07:48) (15 - 16)  SpO2: 95% (16 Feb 2019 20:41) (95% - 95%)        PHYSICAL EXAM  General: nad, obese female, Tongue : coated   Cardio: S1S2  Resp: clear  Abdomen: soft, PEG site with old drainage  Neuro: aaoxself, place, needs cues for time, Right facial droop, expressive deficits, right hemiplegia with increased tone   Extrem: no edema, no calf tenderness  skin: no overt signs of bleeding      RECENT LABS:  Prothrombin Time and INR, Plasma in AM (02.17.19 @ 05:30)    Prothrombin Time, Plasma: 53.6: Effective October 30th, 2018 the reference range for PT has changed. sec    INR: 4.57 ratio                         14.2   9.9   )-----------( 283      ( 15 Feb 2019 06:00 )             43.3     02-15    138  |  97  |  39<H>  ----------------------------<  235<H>  4.0   |  28  |  0.95    Ca    9.7      15 Feb 2019 06:00    TPro  8.1  /  Alb  2.7<L>  /  TBili  1.0  /  DBili  x   /  AST  31  /  ALT  17  /  AlkPhos  113  02-15    PT/INR - ( 16 Feb 2019 05:40 )   PT: 21.8 sec;   INR: 1.91 ratio        CAPILLARY BLOOD GLUCOSE      POCT Blood Glucose.: 203 mg/dL (17 Feb 2019 11:22)  POCT Blood Glucose.: 190 mg/dL (17 Feb 2019 05:32)  POCT Blood Glucose.: 186 mg/dL (16 Feb 2019 23:01)  POCT Blood Glucose.: 178 mg/dL (16 Feb 2019 17:45)      MEDICATIONS  (STANDING):  amiodarone    Tablet 200 milliGRAM(s) Oral daily  ascorbic acid 500 milliGRAM(s) Oral two times a day  atorvastatin 20 milliGRAM(s) Oral at bedtime  cefuroxime   Tablet 250 milliGRAM(s) Oral daily  chlorhexidine 0.12% Liquid 15 milliLiter(s) Swish and Spit two times a day  dextrose 5%. 1000 milliLiter(s) (50 mL/Hr) IV Continuous <Continuous>  dextrose 50% Injectable 12.5 Gram(s) IV Push once  famotidine    Tablet 20 milliGRAM(s) Oral daily  folic acid 1 milliGRAM(s) Oral daily  furosemide    Tablet 20 milliGRAM(s) Oral daily  insulin glargine Injectable (LANTUS) 12 Unit(s) SubCutaneous at bedtime  insulin lispro (HumaLOG) corrective regimen sliding scale   SubCutaneous every 6 hours  insulin lispro Injectable (HumaLOG) 2 Unit(s) SubCutaneous four times a day before meals  lidocaine   Patch 2 Patch Transdermal <User Schedule>  metoprolol tartrate 25 milliGRAM(s) Oral every 8 hours  multivitamin   Solution 5 milliLiter(s) Oral daily    MEDICATIONS  (PRN):  acetaminophen    Suspension .. 650 milliGRAM(s) Oral every 6 hours PRN Mild Pain (1 - 3)  bisacodyl Suppository 10 milliGRAM(s) Rectal daily PRN Constipation  dextrose 40% Gel 15 Gram(s) Oral once PRN Blood Glucose LESS THAN 70 milliGRAM(s)/deciliter  glucagon  Injectable 1 milliGRAM(s) IntraMuscular once PRN Glucose LESS THAN 70 milligrams/deciliter  polyethylene glycol 3350 17 Gram(s) Oral daily PRN Constipation        A/P:    88 year old female with risk factors as stated above admitted for comprehensive rehab with left MCA infarct s/p recanalization procedure at [facility]  Continue full rehab program:PT/OT/SLP 3 hrs/day 5 days/week  On AC in setting atrial flutter,  statins, risk factor management    2. Dysphagia: On PEG feeds at this time    3. Atrial flutter : on amiodarone metoprolol. Hold coumadin today  INR in am     4. UTI: complete abx course    5. 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 Pain: on tylenol prn Pain patch     7. Diastolic HF: on lasix . monitor potassium HPI:88 year old  female with a pmh of CAD, HTN, HLD, pacemaker who presented to [facility] as a transfer from [facility] with right hemiparesis and aphasia and left proximal MCA occlusion. The patient was last known well at 2 pm on 2/3/19 and was then found down on the ground by her daughter several hours later and was noted to be not speaking and weak on the right. At [facility] a CTA head and neck was performed which showed a proximal left MCA occlusion, and a CT head showed a dense left MCA sign with some hypodensity and early ischemic changes in the left MCA distribution. She did not receive IV tPA as she was out of the window and was transferred to [facility] for possible mechanical thrombectomy. At [facility] her CT perfusion showed zero core infarct, with a penumbra of 80 mls, and an infinite mismatch. She was deemed a candidate for intervention and recanalization.  Stroke thought to be 2* cardioembolism   Patient failed speech and swallow, PEG was placed on 2/12 and has been tolerating tube feeds.   Patient found be more lethargic on 2/13. Repeat CT head was stable. UA positive and patient started on antibiotics. Urine cx sent and pending results. Of note, CT head showed sphenoid air fluid level. Recs to start Augmentin if s/s of sinusitis start. Admitted to rehab on 2/14/19  Case discussed with me by NPHoward on 2/15    Patient seen at bedside this am. awake and alert  States that she feels ok  INR elevated       REVIEW OF SYSTEMS  [ x  ] Constitutional WNL  [ x  ] Cardio WNL  [x   ] Resp WNL  [   ] GI PEG    Vital Signs Last 24 Hrs  T(C): 36.3 (17 Feb 2019 07:48), Max: 36.6 (16 Feb 2019 20:41)  T(F): 97.4 (17 Feb 2019 07:48), Max: 97.8 (16 Feb 2019 20:41)  HR: 71 (17 Feb 2019 13:44) (62 - 98)  BP: 107/64 (17 Feb 2019 13:44) (107/64 - 155/84)  BP(mean): --  RR: 16 (17 Feb 2019 07:48) (15 - 16)  SpO2: 95% (16 Feb 2019 20:41) (95% - 95%)        PHYSICAL EXAM  General: nad, obese female, Tongue : coated   Cardio: S1S2  Resp: clear  Abdomen: soft, PEG site with old drainage  Neuro: aaoxself, place, needs cues for time, Right facial droop, expressive deficits, right hemiplegia with increased tone   Extrem: no edema, no calf tenderness  skin: no overt signs of bleeding      RECENT LABS:  Prothrombin Time and INR, Plasma in AM (02.17.19 @ 05:30)    Prothrombin Time, Plasma: 53.6: Effective October 30th, 2018 the reference range for PT has changed. sec    INR: 4.57 ratio                         14.2   9.9   )-----------( 283      ( 15 Feb 2019 06:00 )             43.3     02-15    138  |  97  |  39<H>  ----------------------------<  235<H>  4.0   |  28  |  0.95    Ca    9.7      15 Feb 2019 06:00    TPro  8.1  /  Alb  2.7<L>  /  TBili  1.0  /  DBili  x   /  AST  31  /  ALT  17  /  AlkPhos  113  02-15    PT/INR - ( 16 Feb 2019 05:40 )   PT: 21.8 sec;   INR: 1.91 ratio        CAPILLARY BLOOD GLUCOSE      POCT Blood Glucose.: 203 mg/dL (17 Feb 2019 11:22)  POCT Blood Glucose.: 190 mg/dL (17 Feb 2019 05:32)  POCT Blood Glucose.: 186 mg/dL (16 Feb 2019 23:01)  POCT Blood Glucose.: 178 mg/dL (16 Feb 2019 17:45)      MEDICATIONS  (STANDING):  amiodarone    Tablet 200 milliGRAM(s) Oral daily  ascorbic acid 500 milliGRAM(s) Oral two times a day  atorvastatin 20 milliGRAM(s) Oral at bedtime  cefuroxime   Tablet 250 milliGRAM(s) Oral daily  chlorhexidine 0.12% Liquid 15 milliLiter(s) Swish and Spit two times a day  dextrose 5%. 1000 milliLiter(s) (50 mL/Hr) IV Continuous <Continuous>  dextrose 50% Injectable 12.5 Gram(s) IV Push once  famotidine    Tablet 20 milliGRAM(s) Oral daily  folic acid 1 milliGRAM(s) Oral daily  furosemide    Tablet 20 milliGRAM(s) Oral daily  insulin glargine Injectable (LANTUS) 12 Unit(s) SubCutaneous at bedtime  insulin lispro (HumaLOG) corrective regimen sliding scale   SubCutaneous every 6 hours  insulin lispro Injectable (HumaLOG) 2 Unit(s) SubCutaneous four times a day before meals  lidocaine   Patch 2 Patch Transdermal <User Schedule>  metoprolol tartrate 25 milliGRAM(s) Oral every 8 hours  multivitamin   Solution 5 milliLiter(s) Oral daily    MEDICATIONS  (PRN):  acetaminophen    Suspension .. 650 milliGRAM(s) Oral every 6 hours PRN Mild Pain (1 - 3)  bisacodyl Suppository 10 milliGRAM(s) Rectal daily PRN Constipation  dextrose 40% Gel 15 Gram(s) Oral once PRN Blood Glucose LESS THAN 70 milliGRAM(s)/deciliter  glucagon  Injectable 1 milliGRAM(s) IntraMuscular once PRN Glucose LESS THAN 70 milligrams/deciliter  polyethylene glycol 3350 17 Gram(s) Oral daily PRN Constipation        A/P:    88 year old female with risk factors as stated above admitted for comprehensive rehab with left MCA infarct s/p recanalization procedure at [facility]  Continue full rehab program:PT/OT/SLP 3 hrs/day 5 days/week  On AC in setting atrial flutter,  statins, risk factor management    2. Dysphagia: On PEG feeds at this time    3. Atrial flutter : on amiodarone metoprolol. Hold coumadin today  INR in am     4. UTI: complete abx course    5. 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 Pain: on tylenol prn Pain patch     7. Diastolic HF: on lasix . monitor potassium     8. Thrush: start nystatin swish and suction    Hospitalist fu noted

## 2019-02-17 NOTE — PROGRESS NOTE ADULT - SUBJECTIVE AND OBJECTIVE BOX
HPI  Pt is a 87yo F admitted to acute rehab secondary to Left MCA CVA s/p endovascular intervention with right side hemiparesis, dysphagia s/p PEG placement, dysarthria and expressive aphasia.   Pt was seen and examined in wheelchair. INR supra therapeutic today. Denies of any headache, blurry vision, blood in stool or sputum. Hemodynamically stable.     Vital Signs Last 24 Hrs  T(C): 36.3 (17 Feb 2019 07:48), Max: 36.6 (16 Feb 2019 20:41)  T(F): 97.4 (17 Feb 2019 07:48), Max: 97.8 (16 Feb 2019 20:41)  HR: 62 (17 Feb 2019 07:48) (62 - 98)  BP: 107/70 (17 Feb 2019 07:48) (107/70 - 155/84)  BP(mean): --  RR: 16 (17 Feb 2019 07:48) (15 - 16)  SpO2: 95% (16 Feb 2019 20:41) (95% - 95%)    I&O's Summary    16 Feb 2019 07:01  -  17 Feb 2019 07:00  --------------------------------------------------------  IN: 1170 mL / OUT: 0 mL / NET: 1170 mL    17 Feb 2019 07:01  -  17 Feb 2019 12:49  --------------------------------------------------------  IN: 390 mL / OUT: 0 mL / NET: 390 mL        CAPILLARY BLOOD GLUCOSE      POCT Blood Glucose.: 203 mg/dL (17 Feb 2019 11:22)  POCT Blood Glucose.: 190 mg/dL (17 Feb 2019 05:32)  POCT Blood Glucose.: 186 mg/dL (16 Feb 2019 23:01)  POCT Blood Glucose.: 178 mg/dL (16 Feb 2019 17:45)      PHYSICAL EXAM:    Constitutional: NAD, awake   HEENT: PERR, EOMI, Normal Hearing, MMM, right side facial droop, dysarthria and dysphagia   Neck: Soft and supple, No LAD, No JVD  Respiratory: Breath sounds are clear bilaterally, No wheezing, rales or rhonchi  Cardiovascular: S1 and S2, regular rate and rhythm, no Murmurs, gallops or rubs  Gastrointestinal: Bowel Sounds present, soft, nontender, nondistended, no guarding, no rebound  Extremities: No peripheral edema  Vascular: 2+ peripheral pulses  Neurological: A/O x 3, no focal deficits  Musculoskeletal: RUE 1/5 strength, 5/5 LUE, 2/5 right lower and 3/5 left lower ext   Skin: No rashes    MEDICATIONS:  MEDICATIONS  (STANDING):  amiodarone    Tablet 200 milliGRAM(s) Oral daily  ascorbic acid 500 milliGRAM(s) Oral two times a day  atorvastatin 20 milliGRAM(s) Oral at bedtime  cefuroxime   Tablet 250 milliGRAM(s) Oral daily  chlorhexidine 0.12% Liquid 15 milliLiter(s) Swish and Spit two times a day  dextrose 5%. 1000 milliLiter(s) (50 mL/Hr) IV Continuous <Continuous>  dextrose 50% Injectable 12.5 Gram(s) IV Push once  famotidine    Tablet 20 milliGRAM(s) Oral daily  folic acid 1 milliGRAM(s) Oral daily  furosemide    Tablet 20 milliGRAM(s) Oral daily  insulin glargine Injectable (LANTUS) 12 Unit(s) SubCutaneous at bedtime  insulin lispro (HumaLOG) corrective regimen sliding scale   SubCutaneous every 6 hours  lidocaine   Patch 2 Patch Transdermal <User Schedule>  metoprolol tartrate 25 milliGRAM(s) Oral every 8 hours  multivitamin   Solution 5 milliLiter(s) Oral daily      LABS: All Labs Reviewed:          PT/INR - ( 17 Feb 2019 05:30 )   PT: 53.6 sec;   INR: 4.57 ratio               Blood Culture: 02-14 @ 11:07  Organism Morganella morganii  Gram Stain Blood -- Gram Stain --  Specimen Source .Urine Catheterized  Culture-Blood --        RADIOLOGY/EKG:    DVT PPX:    ADVANCED DIRECTIVE:    DISPOSITION:

## 2019-02-18 LAB
GLUCOSE BLDC GLUCOMTR-MCNC: 159 MG/DL — HIGH (ref 70–99)
GLUCOSE BLDC GLUCOMTR-MCNC: 178 MG/DL — HIGH (ref 70–99)
GLUCOSE BLDC GLUCOMTR-MCNC: 190 MG/DL — HIGH (ref 70–99)
GLUCOSE BLDC GLUCOMTR-MCNC: 204 MG/DL — HIGH (ref 70–99)
INR BLD: 7.44 RATIO — CRITICAL HIGH (ref 0.88–1.16)
INR BLD: 7.56 RATIO — CRITICAL HIGH (ref 0.88–1.16)
PROTHROM AB SERPL-ACNC: 88.7 SEC — HIGH (ref 10–12.9)
PROTHROM AB SERPL-ACNC: 90.2 SEC — HIGH (ref 10–12.9)

## 2019-02-18 PROCEDURE — 99233 SBSQ HOSP IP/OBS HIGH 50: CPT

## 2019-02-18 RX ORDER — ZINC OXIDE 200 MG/G
1 OINTMENT TOPICAL DAILY
Qty: 0 | Refills: 0 | Status: DISCONTINUED | OUTPATIENT
Start: 2019-02-18 | End: 2019-03-12

## 2019-02-18 RX ORDER — INSULIN LISPRO 100/ML
3 VIAL (ML) SUBCUTANEOUS
Qty: 0 | Refills: 0 | Status: DISCONTINUED | OUTPATIENT
Start: 2019-02-18 | End: 2019-02-18

## 2019-02-18 RX ORDER — INSULIN LISPRO 100/ML
3 VIAL (ML) SUBCUTANEOUS
Qty: 0 | Refills: 0 | Status: DISCONTINUED | OUTPATIENT
Start: 2019-02-18 | End: 2019-02-20

## 2019-02-18 RX ORDER — INSULIN LISPRO 100/ML
4 VIAL (ML) SUBCUTANEOUS
Qty: 0 | Refills: 0 | Status: DISCONTINUED | OUTPATIENT
Start: 2019-02-18 | End: 2019-02-18

## 2019-02-18 RX ADMIN — Medication 20 MILLIGRAM(S): at 05:09

## 2019-02-18 RX ADMIN — SENNA PLUS 2 TABLET(S): 8.6 TABLET ORAL at 21:14

## 2019-02-18 RX ADMIN — Medication 1 MILLIGRAM(S): at 11:16

## 2019-02-18 RX ADMIN — CHLORHEXIDINE GLUCONATE 15 MILLILITER(S): 213 SOLUTION TOPICAL at 05:09

## 2019-02-18 RX ADMIN — Medication 2: at 17:35

## 2019-02-18 RX ADMIN — LIDOCAINE 2 PATCH: 4 CREAM TOPICAL at 08:22

## 2019-02-18 RX ADMIN — AMIODARONE HYDROCHLORIDE 200 MILLIGRAM(S): 400 TABLET ORAL at 05:09

## 2019-02-18 RX ADMIN — INSULIN GLARGINE 12 UNIT(S): 100 INJECTION, SOLUTION SUBCUTANEOUS at 21:13

## 2019-02-18 RX ADMIN — ZINC OXIDE 1 APPLICATION(S): 200 OINTMENT TOPICAL at 17:38

## 2019-02-18 RX ADMIN — Medication 5 MILLILITER(S): at 11:19

## 2019-02-18 RX ADMIN — Medication 25 MILLIGRAM(S): at 14:05

## 2019-02-18 RX ADMIN — Medication 250 MILLIGRAM(S): at 11:16

## 2019-02-18 RX ADMIN — FAMOTIDINE 20 MILLIGRAM(S): 10 INJECTION INTRAVENOUS at 11:16

## 2019-02-18 RX ADMIN — Medication 3 UNIT(S): at 17:35

## 2019-02-18 RX ADMIN — Medication 3 UNIT(S): at 23:53

## 2019-02-18 RX ADMIN — Medication 2: at 05:10

## 2019-02-18 RX ADMIN — Medication 2 UNIT(S): at 05:11

## 2019-02-18 RX ADMIN — LIDOCAINE 2 PATCH: 4 CREAM TOPICAL at 17:49

## 2019-02-18 RX ADMIN — Medication 2 UNIT(S): at 11:16

## 2019-02-18 RX ADMIN — Medication 5 MILLILITER(S): at 17:38

## 2019-02-18 RX ADMIN — Medication 500 MILLIGRAM(S): at 05:09

## 2019-02-18 RX ADMIN — Medication 500 MILLIGRAM(S): at 17:34

## 2019-02-18 RX ADMIN — Medication 25 MILLIGRAM(S): at 21:14

## 2019-02-18 RX ADMIN — CHLORHEXIDINE GLUCONATE 15 MILLILITER(S): 213 SOLUTION TOPICAL at 17:34

## 2019-02-18 RX ADMIN — POLYETHYLENE GLYCOL 3350 17 GRAM(S): 17 POWDER, FOR SOLUTION ORAL at 11:22

## 2019-02-18 RX ADMIN — Medication 25 MILLIGRAM(S): at 05:09

## 2019-02-18 RX ADMIN — ATORVASTATIN CALCIUM 20 MILLIGRAM(S): 80 TABLET, FILM COATED ORAL at 21:14

## 2019-02-18 RX ADMIN — Medication 4: at 11:17

## 2019-02-18 RX ADMIN — LIDOCAINE 2 PATCH: 4 CREAM TOPICAL at 06:17

## 2019-02-18 RX ADMIN — Medication 5 MILLILITER(S): at 05:09

## 2019-02-18 RX ADMIN — Medication 2: at 23:53

## 2019-02-18 NOTE — PROGRESS NOTE ADULT - SUBJECTIVE AND OBJECTIVE BOX
HPI:88 year old  female with a pmh of CAD, HTN, HLD, pacemaker who presented to [facility] as a transfer from [facility] with right hemiparesis and aphasia and left proximal MCA occlusion. The patient was last known well at 2 pm on 2/3/19 and was then found down on the ground by her daughter several hours later and was noted to be not speaking and weak on the right. At [facility] a CTA head and neck was performed which showed a proximal left MCA occlusion, and a CT head showed a dense left MCA sign with some hypodensity and early ischemic changes in the left MCA distribution. She did not receive IV tPA as she was out of the window and was transferred to [facility] for possible mechanical thrombectomy. At [facility] her CT perfusion showed zero core infarct, with a penumbra of 80 mls, and an infinite mismatch. She was deemed a candidate for intervention and recanalization.  Stroke thought to be 2* cardioembolism   Patient failed speech and swallow, PEG was placed on 2/12 and has been tolerating tube feeds.   Patient found be more lethargic on 2/13. Repeat CT head was stable. UA positive and patient started on antibiotics. Urine cx sent and pending results. Of note, CT head showed sphenoid air fluid level. Recs to start Augmentin if s/s of sinusitis start. Admitted to rehab on 2/14/19      Subjective: Chart reviewed. Patient seen at bedside.    awake and alert with improved verbalisation  States that she feels ok  INR elevated - X2 reading- > 7      REVIEW OF SYSTEMS  [ x  ] Constitutional WNL, no overt signs of bleeding   [ x  ] Cardio WNL  [x   ] Resp WNL  [   ] GI PEG    Vital Signs Last 24 Hrs  T(C): 36.6 (18 Feb 2019 08:22), Max: 36.7 (17 Feb 2019 20:45)  T(F): 97.8 (18 Feb 2019 08:22), Max: 98 (17 Feb 2019 20:45)  HR: 60 (18 Feb 2019 08:22) (60 - 94)  BP: 124/72 (18 Feb 2019 08:22) (107/64 - 162/89)  BP(mean): --  RR: 14 (18 Feb 2019 08:22) (14 - 14)  SpO2: 95% (18 Feb 2019 08:22) (95% - 95%)      PHYSICAL EXAM  General: nad, obese female, Tongue : coated   Cardio: S1S2  Resp: clear  Abdomen: soft, PEG site with old drainage  Neuro: aaoxself, place, needs cues for time, Right facial droop, expressive deficits, right hemiplegia with increased tone - some movement noted both in UE and LE   Extrem: no edema, no calf tenderness  skin: no overt signs of bleeding      RECENT LABS:  Prothrombin Time and INR, Plasma (02.18.19 @ 08:50)    Prothrombin Time, Plasma: 90.2: Effective October 30th, 2018 the reference range for PT has changed. sec    INR: 7.56 ratio        Prothrombin Time and INR, Plasma in AM (02.17.19 @ 05:30)    Prothrombin Time, Plasma: 53.6: Effective October 30th, 2018 the reference range for PT has changed. sec    INR: 4.57 ratio                         14.2   9.9   )-----------( 283      ( 15 Feb 2019 06:00 )             43.3     02-15    138  |  97  |  39<H>  ----------------------------<  235<H>  4.0   |  28  |  0.95    Ca    9.7      15 Feb 2019 06:00    TPro  8.1  /  Alb  2.7<L>  /  TBili  1.0  /  DBili  x   /  AST  31  /  ALT  17  /  AlkPhos  113  02-15    PT/INR - ( 16 Feb 2019 05:40 )   PT: 21.8 sec;   INR: 1.91 ratio        CAPILLARY BLOOD GLUCOSE      POCT Blood Glucose.: 204 mg/dL (18 Feb 2019 11:14)  POCT Blood Glucose.: 190 mg/dL (18 Feb 2019 05:08)  POCT Blood Glucose.: 169 mg/dL (17 Feb 2019 23:04)  POCT Blood Glucose.: 170 mg/dL (17 Feb 2019 17:18)      MEDICATIONS  (STANDING):  amiodarone    Tablet 200 milliGRAM(s) Oral daily  ascorbic acid 500 milliGRAM(s) Oral two times a day  atorvastatin 20 milliGRAM(s) Oral at bedtime  cefuroxime   Tablet 250 milliGRAM(s) Oral daily  chlorhexidine 0.12% Liquid 15 milliLiter(s) Swish and Spit two times a day  dextrose 5%. 1000 milliLiter(s) (50 mL/Hr) IV Continuous <Continuous>  dextrose 50% Injectable 12.5 Gram(s) IV Push once  famotidine    Tablet 20 milliGRAM(s) Oral daily  folic acid 1 milliGRAM(s) Oral daily  furosemide    Tablet 20 milliGRAM(s) Oral daily  insulin glargine Injectable (LANTUS) 12 Unit(s) SubCutaneous at bedtime  insulin lispro (HumaLOG) corrective regimen sliding scale   SubCutaneous every 6 hours  insulin lispro Injectable (HumaLOG) 3 Unit(s) SubCutaneous four times a day before meals  lidocaine   Patch 2 Patch Transdermal <User Schedule>  metoprolol tartrate 25 milliGRAM(s) Oral every 8 hours  multivitamin   Solution 5 milliLiter(s) Oral daily  nystatin    Suspension 5 milliLiter(s) Oral two times a day  polyethylene glycol 3350 17 Gram(s) Oral daily  senna 2 Tablet(s) Oral at bedtime  zinc oxide 20% Ointment 1 Application(s) Topical daily    MEDICATIONS  (PRN):  acetaminophen    Suspension .. 650 milliGRAM(s) Oral every 6 hours PRN Mild Pain (1 - 3)  bisacodyl Suppository 10 milliGRAM(s) Rectal daily PRN Constipation  dextrose 40% Gel 15 Gram(s) Oral once PRN Blood Glucose LESS THAN 70 milliGRAM(s)/deciliter  glucagon  Injectable 1 milliGRAM(s) IntraMuscular once PRN Glucose LESS THAN 70 milligrams/deciliter            A/P:    88 year old female with risk factors as stated above admitted for comprehensive rehab with left MCA infarct s/p recanalization procedure at [facility]  Continue full rehab program:PT/OT/SLP 3 hrs/day 5 days/week  On AC in setting atrial flutter,  statins, risk factor management    2. Dysphagia: On PEG feeds at this time    3. Atrial flutter : on amiodarone metoprolol. Hold coumadin today in setting of supratherapeutic INR. fu in am   INR in am. Bed therapy today. Monitor for any bleeding,     4. UTI: complete abx course    5. DM-ii: on diet,- PEG feeds Lantus, SSI and premeal 3 units of humalog    6. Diastolic HF: on lasix . monitor potassium     7. Pain: on tylenol prn lidoderm patch     8. Thrush: continue nystatin swish and suction    Hospitalist fu noted    Labs in am

## 2019-02-18 NOTE — PROGRESS NOTE ADULT - ASSESSMENT
Pt is a 87yo F admitted to acute rehab secondary to Left MCA CVA s/p endovascular intervention with right side hemiparesis, dysphagia s/p PEG placement, dysarthria and expressive aphasia.     #Left MCA CVA  c/w PT/OT/SLP     #Dysphagia/dysarthria  SLP  PEG+ , c/w PEG feed     #A-Flutter  Rate controlled   Cont amiodarone and lopressor  Pacemaker in place     # Supratherapeutic INR   No obvious bleed.   Hold Coumadin   Fall prevention   Repeat INR in AM     #HTN  Controlled   Cont lopressor     #DM   c/w Lantus 12   pre meal increased to 4-3-3  c/w SSI   Hypoglycemia protocol.     #Diastolic CHF- stable.,   c/w Lasix ,  BB     #UTI  c/w Cefuroxime     DVT px  on Coumadin

## 2019-02-18 NOTE — PROGRESS NOTE ADULT - SUBJECTIVE AND OBJECTIVE BOX
Patient is a 88y old  Female who presents with a chief complaint of CVA with right sided weakness, dysphagia, dysarthria, expressive aphasia (17 Feb 2019 15:35)      Patient seen and examined at bedside.     ALLERGIES:  No Known Allergies    MEDICATIONS:  acetaminophen    Suspension .. 650 milliGRAM(s) Oral every 6 hours PRN  atorvastatin 20 milliGRAM(s) Oral at bedtime  bisacodyl Suppository 10 milliGRAM(s) Rectal daily PRN  cefuroxime   Tablet 250 milliGRAM(s) Oral daily  dextrose 40% Gel 15 Gram(s) Oral once PRN  dextrose 5%. 1000 milliLiter(s) IV Continuous <Continuous>  dextrose 50% Injectable 12.5 Gram(s) IV Push once  famotidine    Tablet 20 milliGRAM(s) Oral daily  glucagon  Injectable 1 milliGRAM(s) IntraMuscular once PRN  insulin glargine Injectable (LANTUS) 12 Unit(s) SubCutaneous at bedtime  insulin lispro (HumaLOG) corrective regimen sliding scale   SubCutaneous every 6 hours  insulin lispro Injectable (HumaLOG) 2 Unit(s) SubCutaneous four times a day before meals  lidocaine   Patch 2 Patch Transdermal <User Schedule>  multivitamin   Solution 5 milliLiter(s) Oral daily  nystatin    Suspension 5 milliLiter(s) Oral two times a day  polyethylene glycol 3350 17 Gram(s) Oral daily  senna 2 Tablet(s) Oral at bedtime    Vital Signs Last 24 Hrs  T(F): 97.8 (18 Feb 2019 08:22), Max: 98 (17 Feb 2019 20:45)  HR: 60 (18 Feb 2019 08:22) (60 - 94)  BP: 124/72 (18 Feb 2019 08:22) (107/64 - 162/89)  RR: 14 (18 Feb 2019 08:22) (14 - 14)  SpO2: 95% (18 Feb 2019 08:22) (95% - 95%)  I&O's Summary    17 Feb 2019 07:01  -  18 Feb 2019 07:00  --------------------------------------------------------  IN: 1560 mL / OUT: 0 mL / NET: 1560 mL        PHYSICAL EXAM:  General: NAD, comfortable   ENT: MMM  Neck: Supple, No JVD  Lungs: CTA, BLAE, No added sounds.   Cardio: RRR, S1/S2, No murmurs  Abdomen: Soft, NT/ND, BS+   Extremities: No edema  CNS: Left hemiparesis     LABS:            PT/INR - ( 18 Feb 2019 08:50 )   PT: 90.2 sec;   INR: 7.56 ratio                 02-04 Chol 124 mg/dL LDL 55 mg/dL HDL 37 mg/dL Trig 159 mg/dL        CAPILLARY BLOOD GLUCOSE      POCT Blood Glucose.: 204 mg/dL (18 Feb 2019 11:14)  POCT Blood Glucose.: 190 mg/dL (18 Feb 2019 05:08)  POCT Blood Glucose.: 169 mg/dL (17 Feb 2019 23:04)  POCT Blood Glucose.: 170 mg/dL (17 Feb 2019 17:18)    02-04 [id number] 6.6        Culture - Urine (collected 14 Feb 2019 11:07)  Source: .Urine Catheterized  Final Report (16 Feb 2019 16:14):    10,000 - 49,000 CFU/mL Morganella morganii  Organism: Morganella morganii (16 Feb 2019 16:14)  Organism: Morganella morganii (16 Feb 2019 16:14)      -  Amikacin: S <=16      -  Ampicillin: R >16 These ampicillin results predict results for amoxicillin      -  Ampicillin/Sulbactam: R >16/8      -  Aztreonam: S <=4      -  Cefazolin: R >16      -  Cefepime: S <=4      -  Cefoxitin: S <=8      -  Ceftriaxone: S <=1 Enterobacter, Citrobacter, and Serratia may develop resistance during prolonged therapy      -  Ciprofloxacin: S <=1      -  Ertapenem: S <=1      -  Gentamicin: S <=4      -  Imipenem: I 2      -  Levofloxacin: S <=2      -  Meropenem: S <=1      -  Nitrofurantoin: R 64 Should not be used to treat pyelonephritis      -  Piperacillin/Tazobactam: S <=16      -  Tigecycline: R <=2      -  Tobramycin: S <=4      -  Trimethoprim/Sulfamethoxazole: S <=2/38      Method Type: MAY        RADIOLOGY & ADDITIONAL TESTS:    Care Discussed with Consultants/Other Providers:

## 2019-02-19 LAB
ALBUMIN SERPL ELPH-MCNC: 2.6 G/DL — LOW (ref 3.3–5)
ALP SERPL-CCNC: 109 U/L — SIGNIFICANT CHANGE UP (ref 40–120)
ALT FLD-CCNC: 26 U/L DA — SIGNIFICANT CHANGE UP (ref 10–45)
ANION GAP SERPL CALC-SCNC: 8 MMOL/L — SIGNIFICANT CHANGE UP (ref 5–17)
AST SERPL-CCNC: 35 U/L — SIGNIFICANT CHANGE UP (ref 10–40)
BILIRUB SERPL-MCNC: 0.8 MG/DL — SIGNIFICANT CHANGE UP (ref 0.2–1.2)
BUN SERPL-MCNC: 24 MG/DL — HIGH (ref 7–23)
CALCIUM SERPL-MCNC: 9.7 MG/DL — SIGNIFICANT CHANGE UP (ref 8.4–10.5)
CHLORIDE SERPL-SCNC: 97 MMOL/L — SIGNIFICANT CHANGE UP (ref 96–108)
CO2 SERPL-SCNC: 30 MMOL/L — SIGNIFICANT CHANGE UP (ref 22–31)
CREAT SERPL-MCNC: 0.86 MG/DL — SIGNIFICANT CHANGE UP (ref 0.5–1.3)
GLUCOSE BLDC GLUCOMTR-MCNC: 133 MG/DL — HIGH (ref 70–99)
GLUCOSE BLDC GLUCOMTR-MCNC: 149 MG/DL — HIGH (ref 70–99)
GLUCOSE BLDC GLUCOMTR-MCNC: 175 MG/DL — HIGH (ref 70–99)
GLUCOSE BLDC GLUCOMTR-MCNC: 180 MG/DL — HIGH (ref 70–99)
GLUCOSE BLDC GLUCOMTR-MCNC: 210 MG/DL — HIGH (ref 70–99)
GLUCOSE SERPL-MCNC: 206 MG/DL — HIGH (ref 70–99)
HCT VFR BLD CALC: 41.5 % — SIGNIFICANT CHANGE UP (ref 34.5–45)
HGB BLD-MCNC: 13.8 G/DL — SIGNIFICANT CHANGE UP (ref 11.5–15.5)
INR BLD: 4.97 RATIO — HIGH (ref 0.88–1.16)
MAGNESIUM SERPL-MCNC: 2.1 MG/DL — SIGNIFICANT CHANGE UP (ref 1.6–2.6)
MCHC RBC-ENTMCNC: 30.5 PG — SIGNIFICANT CHANGE UP (ref 27–34)
MCHC RBC-ENTMCNC: 33.2 GM/DL — SIGNIFICANT CHANGE UP (ref 32–36)
MCV RBC AUTO: 92 FL — SIGNIFICANT CHANGE UP (ref 80–100)
PLATELET # BLD AUTO: 304 K/UL — SIGNIFICANT CHANGE UP (ref 150–400)
POTASSIUM SERPL-MCNC: 4.2 MMOL/L — SIGNIFICANT CHANGE UP (ref 3.5–5.3)
POTASSIUM SERPL-SCNC: 4.2 MMOL/L — SIGNIFICANT CHANGE UP (ref 3.5–5.3)
PROT SERPL-MCNC: 7.5 G/DL — SIGNIFICANT CHANGE UP (ref 6–8.3)
PROTHROM AB SERPL-ACNC: 58.5 SEC — HIGH (ref 10–12.9)
RBC # BLD: 4.51 M/UL — SIGNIFICANT CHANGE UP (ref 3.8–5.2)
RBC # FLD: 12.6 % — SIGNIFICANT CHANGE UP (ref 10.3–14.5)
SODIUM SERPL-SCNC: 135 MMOL/L — SIGNIFICANT CHANGE UP (ref 135–145)
WBC # BLD: 7.7 K/UL — SIGNIFICANT CHANGE UP (ref 3.8–10.5)
WBC # FLD AUTO: 7.7 K/UL — SIGNIFICANT CHANGE UP (ref 3.8–10.5)

## 2019-02-19 PROCEDURE — 99233 SBSQ HOSP IP/OBS HIGH 50: CPT

## 2019-02-19 PROCEDURE — 99232 SBSQ HOSP IP/OBS MODERATE 35: CPT | Mod: GC

## 2019-02-19 PROCEDURE — 99223 1ST HOSP IP/OBS HIGH 75: CPT

## 2019-02-19 RX ADMIN — Medication 1 MILLIGRAM(S): at 12:23

## 2019-02-19 RX ADMIN — LIDOCAINE 2 PATCH: 4 CREAM TOPICAL at 06:11

## 2019-02-19 RX ADMIN — CHLORHEXIDINE GLUCONATE 15 MILLILITER(S): 213 SOLUTION TOPICAL at 17:36

## 2019-02-19 RX ADMIN — ZINC OXIDE 1 APPLICATION(S): 200 OINTMENT TOPICAL at 12:25

## 2019-02-19 RX ADMIN — Medication 20 MILLIGRAM(S): at 05:19

## 2019-02-19 RX ADMIN — INSULIN GLARGINE 12 UNIT(S): 100 INJECTION, SOLUTION SUBCUTANEOUS at 21:00

## 2019-02-19 RX ADMIN — Medication 500 MILLIGRAM(S): at 17:36

## 2019-02-19 RX ADMIN — Medication 3 UNIT(S): at 05:19

## 2019-02-19 RX ADMIN — ATORVASTATIN CALCIUM 20 MILLIGRAM(S): 80 TABLET, FILM COATED ORAL at 21:00

## 2019-02-19 RX ADMIN — Medication 25 MILLIGRAM(S): at 21:00

## 2019-02-19 RX ADMIN — SENNA PLUS 2 TABLET(S): 8.6 TABLET ORAL at 21:00

## 2019-02-19 RX ADMIN — AMIODARONE HYDROCHLORIDE 200 MILLIGRAM(S): 400 TABLET ORAL at 05:19

## 2019-02-19 RX ADMIN — Medication 500 MILLIGRAM(S): at 05:19

## 2019-02-19 RX ADMIN — Medication 3 UNIT(S): at 12:21

## 2019-02-19 RX ADMIN — Medication 5 MILLILITER(S): at 12:23

## 2019-02-19 RX ADMIN — POLYETHYLENE GLYCOL 3350 17 GRAM(S): 17 POWDER, FOR SOLUTION ORAL at 12:25

## 2019-02-19 RX ADMIN — Medication 250 MILLIGRAM(S): at 12:23

## 2019-02-19 RX ADMIN — FAMOTIDINE 20 MILLIGRAM(S): 10 INJECTION INTRAVENOUS at 12:23

## 2019-02-19 RX ADMIN — Medication 5 MILLILITER(S): at 05:19

## 2019-02-19 RX ADMIN — Medication 3 UNIT(S): at 17:36

## 2019-02-19 RX ADMIN — CHLORHEXIDINE GLUCONATE 15 MILLILITER(S): 213 SOLUTION TOPICAL at 05:19

## 2019-02-19 RX ADMIN — LIDOCAINE 2 PATCH: 4 CREAM TOPICAL at 07:54

## 2019-02-19 RX ADMIN — Medication 5 MILLILITER(S): at 17:36

## 2019-02-19 RX ADMIN — Medication 2: at 17:35

## 2019-02-19 RX ADMIN — Medication 2: at 12:21

## 2019-02-19 RX ADMIN — Medication 0: at 23:44

## 2019-02-19 RX ADMIN — Medication 3 UNIT(S): at 23:44

## 2019-02-19 RX ADMIN — Medication 25 MILLIGRAM(S): at 14:05

## 2019-02-19 RX ADMIN — LIDOCAINE 2 PATCH: 4 CREAM TOPICAL at 18:33

## 2019-02-19 RX ADMIN — Medication 25 MILLIGRAM(S): at 05:19

## 2019-02-19 RX ADMIN — Medication 4: at 05:19

## 2019-02-19 NOTE — CONSULT NOTE ADULT - SUBJECTIVE AND OBJECTIVE BOX
INTERVAL HPI/OVERNIGHT EVENTS:  HPI:  87 y/o female with pmh  CAD, HTN, HLD, Pacemaker and admitted to rehab s/p [facility]. She is S/P Peg placement by Dr. Pedroza on 19 at [facility]. GI consulted to see patient for problem with peg tube. Patient seen and examined at bedside.     This is a 88 year old  female with a pmh of CAD, HTN, HLD, pacemaker   MEDICATIONS  (STANDING):  amiodarone    Tablet 200 milliGRAM(s) Oral daily  ascorbic acid 500 milliGRAM(s) Oral two times a day  atorvastatin 20 milliGRAM(s) Oral at bedtime  chlorhexidine 0.12% Liquid 15 milliLiter(s) Swish and Spit two times a day  dextrose 5%. 1000 milliLiter(s) (50 mL/Hr) IV Continuous <Continuous>  dextrose 50% Injectable 12.5 Gram(s) IV Push once  famotidine    Tablet 20 milliGRAM(s) Oral daily  folic acid 1 milliGRAM(s) Oral daily  furosemide    Tablet 20 milliGRAM(s) Oral daily  insulin glargine Injectable (LANTUS) 12 Unit(s) SubCutaneous at bedtime  insulin lispro (HumaLOG) corrective regimen sliding scale   SubCutaneous every 6 hours  insulin lispro Injectable (HumaLOG) 3 Unit(s) SubCutaneous four times a day before meals  lidocaine   Patch 2 Patch Transdermal <User Schedule>  metoprolol tartrate 25 milliGRAM(s) Oral every 8 hours  multivitamin   Solution 5 milliLiter(s) Oral daily  nystatin    Suspension 5 milliLiter(s) Oral two times a day  polyethylene glycol 3350 17 Gram(s) Oral daily  senna 2 Tablet(s) Oral at bedtime  zinc oxide 20% Ointment 1 Application(s) Topical daily    MEDICATIONS  (PRN):  acetaminophen    Suspension .. 650 milliGRAM(s) Oral every 6 hours PRN Mild Pain (1 - 3)  bisacodyl Suppository 10 milliGRAM(s) Rectal daily PRN Constipation  dextrose 40% Gel 15 Gram(s) Oral once PRN Blood Glucose LESS THAN 70 milliGRAM(s)/deciliter  glucagon  Injectable 1 milliGRAM(s) IntraMuscular once PRN Glucose LESS THAN 70 milligrams/deciliter      Allergies    No Known Allergies    Intolerances          PHYSICAL EXAM:   Vital Signs:  Vital Signs Last 24 Hrs  T(C): 36.5 (2019 07:49), Max: 36.7 (2019 21:08)  T(F): 97.7 (2019 07:49), Max: 98.1 (2019 21:08)  HR: 60 (2019 07:49) (60 - 88)  BP: 146/80 (2019 07:49) (134/74 - 146/80)  BP(mean): --  RR: 14 (2019 07:49) (14 - 14)  SpO2: 97% (2019 07:49) (96% - 98%)  Daily     Daily Weight in k.9 (2019 19:00)I&O's Summary    2019 07:01  -  2019 07:00  --------------------------------------------------------  IN: 930 mL / OUT: 0 mL / NET: 930 mL        GENERAL:  Appears stated age, well-groomed, well-nourished, no distress  HEENT:  NC/AT,  conjunctivae clear and pink, no thyromegaly, nodules, adenopathy, no JVD, sclera -anicteric  CHEST:  Full & symmetric excursion, no increased effort, breath sounds clear  HEART:  Regular rhythm, S1, S2, no murmur/rub/S3/S4, no abdominal bruit, no edema  ABDOMEN:  Soft, non-tender, non-distended, normoactive bowel sounds,  no masses ,no hepato-splenomegaly, no signs of chronic liver disease  EXTEREMITIES:  no cyanosis,clubbing or edema  SKIN:  No rash/erythema/ecchymoses/petechiae/wounds/abscess/warm/dry  NEURO:  Alert, oriented, no asterixis, no tremor, no encephalopathy      LABS:                        13.8   7.7   )-----------( 304      ( 2019 05:40 )             41.5     02-    135  |  97  |  24<H>  ----------------------------<  206<H>  4.2   |  30  |  0.86    Ca    9.7      2019 05:40  Mg     2.1         TPro  7.5  /  Alb  2.6<L>  /  TBili  0.8  /  DBili  x   /  AST  35  /  ALT  26  /  AlkPhos  109      PT/INR - ( 2019 05:40 )   PT: 58.5 sec;   INR: 4.97 ratio             amylase   lipase  RADIOLOGY & ADDITIONAL TESTS: INTERVAL HPI/OVERNIGHT EVENTS:  HPI:  89 y/o female with pmh  CAD, HTN, HLD, Pacemaker and admitted to rehab s/p [facility]. She is S/P Peg placement by Dr. Pedroza on 19 at [facility]. GI consulted to see patient for problem with peg tube. Patient seen and examined at bedside.     This is a 88 year old  female with a pmh of CAD, HTN, HLD, pacemaker   MEDICATIONS  (STANDING):  amiodarone    Tablet 200 milliGRAM(s) Oral daily  ascorbic acid 500 milliGRAM(s) Oral two times a day  atorvastatin 20 milliGRAM(s) Oral at bedtime  chlorhexidine 0.12% Liquid 15 milliLiter(s) Swish and Spit two times a day  dextrose 5%. 1000 milliLiter(s) (50 mL/Hr) IV Continuous <Continuous>  dextrose 50% Injectable 12.5 Gram(s) IV Push once  famotidine    Tablet 20 milliGRAM(s) Oral daily  folic acid 1 milliGRAM(s) Oral daily  furosemide    Tablet 20 milliGRAM(s) Oral daily  insulin glargine Injectable (LANTUS) 12 Unit(s) SubCutaneous at bedtime  insulin lispro (HumaLOG) corrective regimen sliding scale   SubCutaneous every 6 hours  insulin lispro Injectable (HumaLOG) 3 Unit(s) SubCutaneous four times a day before meals  lidocaine   Patch 2 Patch Transdermal <User Schedule>  metoprolol tartrate 25 milliGRAM(s) Oral every 8 hours  multivitamin   Solution 5 milliLiter(s) Oral daily  nystatin    Suspension 5 milliLiter(s) Oral two times a day  polyethylene glycol 3350 17 Gram(s) Oral daily  senna 2 Tablet(s) Oral at bedtime  zinc oxide 20% Ointment 1 Application(s) Topical daily    MEDICATIONS  (PRN):  acetaminophen    Suspension .. 650 milliGRAM(s) Oral every 6 hours PRN Mild Pain (1 - 3)  bisacodyl Suppository 10 milliGRAM(s) Rectal daily PRN Constipation  dextrose 40% Gel 15 Gram(s) Oral once PRN Blood Glucose LESS THAN 70 milliGRAM(s)/deciliter  glucagon  Injectable 1 milliGRAM(s) IntraMuscular once PRN Glucose LESS THAN 70 milligrams/deciliter      Allergies    No Known Allergies    Intolerances          PHYSICAL EXAM:   Vital Signs:  Vital Signs Last 24 Hrs  T(C): 36.5 (2019 07:49), Max: 36.7 (2019 21:08)  T(F): 97.7 (2019 07:49), Max: 98.1 (2019 21:08)  HR: 60 (2019 07:49) (60 - 88)  BP: 146/80 (2019 07:49) (134/74 - 146/80)  BP(mean): --  RR: 14 (2019 07:49) (14 - 14)  SpO2: 97% (2019 07:49) (96% - 98%)  Daily     Daily Weight in k.9 (2019 19:00)I&O's Summary    2019 07:01  -  2019 07:00  --------------------------------------------------------  IN: 930 mL / OUT: 0 mL / NET: 930 mL        GENERAL: NAD  HEENT:  conjunctivae clear and pink,   CHEST:  Full & symmetric excursion, no increased effort, breath sounds clear  HEART:  Regular rhythm, S1, S2, no edema  ABDOMEN:  Soft, non-tender, non-distended, normoactive bowel sounds,  Peg intact, red around site  EXTEREMITIES:  no cyanosis,clubbing or edema  SKIN:  No rash, warm/dry  NEURO:  Alert, oriented,       LABS:                        13.8   7.7   )-----------( 304      ( 2019 05:40 )             41.5         135  |  97  |  24<H>  ----------------------------<  206<H>  4.2   |  30  |  0.86    Ca    9.7      2019 05:40  Mg     2.1         TPro  7.5  /  Alb  2.6<L>  /  TBili  0.8  /  DBili  x   /  AST  35  /  ALT  26  /  AlkPhos  109      PT/INR - ( 2019 05:40 )   PT: 58.5 sec;   INR: 4.97 ratio

## 2019-02-19 NOTE — CONSULT NOTE ADULT - ASSESSMENT
89 y/o female with multiple medical problems seen by GI due to retention ring being pressed against skin and unable to be released. Patient denies abdominal pain. Abdomen softly-distended. Retention ring was loosed from skin, and oozing noted upon release. Oozing most likely residual feed. Redness noted around peg site probably from pressure.     Plan:  Change advant dressing BID and apply Zinc Oxide

## 2019-02-19 NOTE — PROGRESS NOTE ADULT - SUBJECTIVE AND OBJECTIVE BOX
HPI:88 year old  female with a pmh of CAD, HTN, HLD, pacemaker who presented to [facility] as a transfer from [facility] with right hemiparesis and aphasia and left proximal MCA occlusion. The patient was last known well at 2 pm on 2/3/19 and was then found down on the ground by her daughter several hours later and was noted to be not speaking and weak on the right. At [facility] a CTA head and neck was performed which showed a proximal left MCA occlusion, and a CT head showed a dense left MCA sign with some hypodensity and early ischemic changes in the left MCA distribution. She did not receive IV tPA as she was out of the window and was transferred to [facility] for possible mechanical thrombectomy. At [facility] her CT perfusion showed zero core infarct, with a penumbra of 80 mls, and an infinite mismatch. She was deemed a candidate for intervention and recanalization.  Stroke thought to be 2* cardioembolism   Patient failed speech and swallow, PEG was placed on 2/12 and has been tolerating tube feeds.   Patient found be more lethargic on 2/13. Repeat CT head was stable. UA positive and patient started on antibiotics. Urine cx sent and pending results. Of note, CT head showed sphenoid air fluid level. Recs to start Augmentin if s/s of sinusitis start. Admitted to rehab on 2/14/19      Subjective: Patient seen at bedside.   Denies any pain, SOB, CP, HA, nausea, vomiting, abdominal pain, dysuria. Patient reports that she is ready to get out of bed and restart therapies.       REVIEW OF SYSTEMS  [ x  ] Constitutional WNL, no overt signs of bleeding   [ x  ] Cardio WNL  [x   ] Resp WNL  [   ] GI PEG    Vital Signs Last 24 Hrs  T(C): 36.5 (19 Feb 2019 07:49), Max: 36.7 (18 Feb 2019 21:08)  T(F): 97.7 (19 Feb 2019 07:49), Max: 98.1 (18 Feb 2019 21:08)  HR: 60 (19 Feb 2019 07:49) (60 - 88)  BP: 146/80 (19 Feb 2019 07:49) (134/74 - 146/80)  RR: 14 (19 Feb 2019 07:49) (14 - 14)  SpO2: 97% (19 Feb 2019 07:49) (96% - 98%)      PHYSICAL EXAM  General: nad, obese female, Tongue : coated   Cardio: S1S2  Resp: clear  Abdomen: soft, PEG site with old drainage  Neuro: aaoxself, place, Right facial droop, expressive deficits, right hemiplegia with increased tone - some movement noted both in UE and LE, about 2/5 right shoulder/elbow/wrist/hand grasp   Extrem: no edema, no calf tenderness  skin: no overt signs of bleeding      RECENT LABS:    PT/INR - ( 19 Feb 2019 05:40 )   PT: 58.5 sec;   INR: 4.97 ratio                                13.8   7.7   )-----------( 304      ( 19 Feb 2019 05:40 )             41.5   02-19    135  |  97  |  24<H>  ----------------------------<  206<H>  4.2   |  30  |  0.86    Ca    9.7      19 Feb 2019 05:40  Mg     2.1     02-19    TPro  7.5  /  Alb  2.6<L>  /  TBili  0.8  /  DBili  x   /  AST  35  /  ALT  26  /  AlkPhos  109  02-19      CAPILLARY BLOOD GLUCOSE      POCT Blood Glucose.: 204 mg/dL (18 Feb 2019 11:14)  POCT Blood Glucose.: 190 mg/dL (18 Feb 2019 05:08)  POCT Blood Glucose.: 169 mg/dL (17 Feb 2019 23:04)  POCT Blood Glucose.: 170 mg/dL (17 Feb 2019 17:18)      MEDICATIONS  (STANDING):  amiodarone    Tablet 200 milliGRAM(s) Oral daily  ascorbic acid 500 milliGRAM(s) Oral two times a day  atorvastatin 20 milliGRAM(s) Oral at bedtime  chlorhexidine 0.12% Liquid 15 milliLiter(s) Swish and Spit two times a day  dextrose 5%. 1000 milliLiter(s) (50 mL/Hr) IV Continuous <Continuous>  dextrose 50% Injectable 12.5 Gram(s) IV Push once  famotidine    Tablet 20 milliGRAM(s) Oral daily  folic acid 1 milliGRAM(s) Oral daily  furosemide    Tablet 20 milliGRAM(s) Oral daily  insulin glargine Injectable (LANTUS) 12 Unit(s) SubCutaneous at bedtime  insulin lispro (HumaLOG) corrective regimen sliding scale   SubCutaneous every 6 hours  insulin lispro Injectable (HumaLOG) 3 Unit(s) SubCutaneous four times a day before meals  lidocaine   Patch 2 Patch Transdermal <User Schedule>  metoprolol tartrate 25 milliGRAM(s) Oral every 8 hours  multivitamin   Solution 5 milliLiter(s) Oral daily  nystatin    Suspension 5 milliLiter(s) Oral two times a day  polyethylene glycol 3350 17 Gram(s) Oral daily  senna 2 Tablet(s) Oral at bedtime  zinc oxide 20% Ointment 1 Application(s) Topical daily    MEDICATIONS  (PRN):  acetaminophen    Suspension .. 650 milliGRAM(s) Oral every 6 hours PRN Mild Pain (1 - 3)  bisacodyl Suppository 10 milliGRAM(s) Rectal daily PRN Constipation  dextrose 40% Gel 15 Gram(s) Oral once PRN Blood Glucose LESS THAN 70 milliGRAM(s)/deciliter  glucagon  Injectable 1 milliGRAM(s) IntraMuscular once PRN Glucose LESS THAN 70 milligrams/deciliter        A/P:    88 year old female with risk factors as stated above admitted for comprehensive rehab with left MCA infarct s/p recanalization procedure at [facility]  Continue full rehab program:PT/OT/SLP 3 hrs/day 5 days/week  On AC in setting atrial flutter,  statins, risk factor management    2. Dysphagia: On PEG feeds at this time, continue ST    3. Atrial flutter : on amiodarone metoprolol. Hold coumadin again today in setting of supratherapeutic INR. fu in am   INR in am. Ok to restart therapies today. Monitor for any bleeding,     4. UTI: complete abx course    5. DM-ii:- PEG feeds Lantus, SSI and premeal 3 units of humalog (increased yesterday 4x day)    6. Diastolic HF: on lasix . monitor potassium     7. Pain: on tylenol prn lidoderm patch     8. Thrush: continue nystatin swish and suction        Labs in am 2/20

## 2019-02-19 NOTE — PROGRESS NOTE ADULT - ATTENDING COMMENTS
Chart reviewed.  Patient seen at bedside. Seated in chair. more comfortable  Verbal expression improving slowly. Right hemiplegia with some minimal movement and increased tone in UE    2. MBS in am     3. Continue full rehab program

## 2019-02-19 NOTE — PROGRESS NOTE ADULT - SUBJECTIVE AND OBJECTIVE BOX
Patient is a 88y old  Female who presents with a chief complaint of CVA with right sided weakness, dysphagia, dysarthria, expressive aphasia (18 Feb 2019 12:17)      Patient seen and examined at bedside.     ALLERGIES:  No Known Allergies    MEDICATIONS:  acetaminophen    Suspension .. 650 milliGRAM(s) Oral every 6 hours PRN  atorvastatin 20 milliGRAM(s) Oral at bedtime  bisacodyl Suppository 10 milliGRAM(s) Rectal daily PRN  cefuroxime   Tablet 250 milliGRAM(s) Oral daily  dextrose 40% Gel 15 Gram(s) Oral once PRN  dextrose 5%. 1000 milliLiter(s) IV Continuous <Continuous>  dextrose 50% Injectable 12.5 Gram(s) IV Push once  famotidine    Tablet 20 milliGRAM(s) Oral daily  glucagon  Injectable 1 milliGRAM(s) IntraMuscular once PRN  insulin glargine Injectable (LANTUS) 12 Unit(s) SubCutaneous at bedtime  insulin lispro (HumaLOG) corrective regimen sliding scale   SubCutaneous every 6 hours  insulin lispro Injectable (HumaLOG) 3 Unit(s) SubCutaneous four times a day before meals  lidocaine   Patch 2 Patch Transdermal <User Schedule>  multivitamin   Solution 5 milliLiter(s) Oral daily  nystatin    Suspension 5 milliLiter(s) Oral two times a day  polyethylene glycol 3350 17 Gram(s) Oral daily  senna 2 Tablet(s) Oral at bedtime  zinc oxide 20% Ointment 1 Application(s) Topical daily    Vital Signs Last 24 Hrs  T(F): 97.7 (19 Feb 2019 07:49), Max: 98.1 (18 Feb 2019 21:08)  HR: 60 (19 Feb 2019 07:49) (60 - 88)  BP: 146/80 (19 Feb 2019 07:49) (134/74 - 146/80)  RR: 14 (19 Feb 2019 07:49) (14 - 14)  SpO2: 97% (19 Feb 2019 07:49) (96% - 98%)  I&O's Summary    18 Feb 2019 07:01  -  19 Feb 2019 07:00  --------------------------------------------------------  IN: 930 mL / OUT: 0 mL / NET: 930 mL        PHYSICAL EXAM:  General: NAD, comfortable   ENT: MMM  Neck: Supple, No JVD  Lungs: CTA, BLAE, No added sounds.   Cardio: RRR, S1/S2, No murmurs  Abdomen: Soft, NT/ND, BS+   Extremities: No edema   CNS: nonfocal , rt hemiparesis     LABS:                        13.8   7.7   )-----------( 304      ( 19 Feb 2019 05:40 )             41.5     02-19    135  |  97  |  24  ----------------------------<  206  4.2   |  30  |  0.86    Ca    9.7      19 Feb 2019 05:40  Mg     2.1     02-19    TPro  7.5  /  Alb  2.6  /  TBili  0.8  /  DBili  x   /  AST  35  /  ALT  26  /  AlkPhos  109  02-19    eGFR if Non African American: 60 mL/min/1.73M2 (02-19-19 @ 05:40)  eGFR if African American: 70 mL/min/1.73M2 (02-19-19 @ 05:40)    PT/INR - ( 19 Feb 2019 05:40 )   PT: 58.5 sec;   INR: 4.97 ratio                 02-04 Chol 124 mg/dL LDL 55 mg/dL HDL 37 mg/dL Trig 159 mg/dL        CAPILLARY BLOOD GLUCOSE      POCT Blood Glucose.: 210 mg/dL (19 Feb 2019 05:17)  POCT Blood Glucose.: 159 mg/dL (18 Feb 2019 23:52)  POCT Blood Glucose.: 178 mg/dL (18 Feb 2019 21:12)  POCT Blood Glucose.: 204 mg/dL (18 Feb 2019 11:14)    02-04 [id number] 6.6        Culture - Urine (collected 14 Feb 2019 11:07)  Source: .Urine Catheterized  Final Report (16 Feb 2019 16:14):    10,000 - 49,000 CFU/mL Morganella morganii  Organism: Morganella morganii (16 Feb 2019 16:14)  Organism: Morganella morganii (16 Feb 2019 16:14)      -  Amikacin: S <=16      -  Ampicillin: R >16 These ampicillin results predict results for amoxicillin      -  Ampicillin/Sulbactam: R >16/8      -  Aztreonam: S <=4      -  Cefazolin: R >16      -  Cefepime: S <=4      -  Cefoxitin: S <=8      -  Ceftriaxone: S <=1 Enterobacter, Citrobacter, and Serratia may develop resistance during prolonged therapy      -  Ciprofloxacin: S <=1      -  Ertapenem: S <=1      -  Gentamicin: S <=4      -  Imipenem: I 2      -  Levofloxacin: S <=2      -  Meropenem: S <=1      -  Nitrofurantoin: R 64 Should not be used to treat pyelonephritis      -  Piperacillin/Tazobactam: S <=16      -  Tigecycline: R <=2      -  Tobramycin: S <=4      -  Trimethoprim/Sulfamethoxazole: S <=2/38      Method Type: MAY        RADIOLOGY & ADDITIONAL TESTS:    Care Discussed with Consultants/Other Providers:

## 2019-02-19 NOTE — PROGRESS NOTE ADULT - ASSESSMENT
Pt is a 87yo F admitted to acute rehab secondary to Left MCA CVA s/p endovascular intervention with right side hemiparesis, dysphagia s/p PEG placement, dysarthria and expressive aphasia.     #Left MCA CVA  c/w PT/OT/SLP     #Dysphagia/dysarthria  SLP  PEG+ , c/w PEG feed     #A-Flutter  Rate controlled   c/w amiodarone and lopressor  Pacemaker in place     # Supratherapeutic INR   INR trending down still supra at 4.97   Hold Coumadin today   Fall prevention   Repeat INR in AM     #HTN  at goal   Cont lopressor     #DM   c/w Lantus 12   pre meal increased to 3-3-3-3 yesterday  f/u FS   c/w SSI   Hypoglycemia protocol.     #Diastolic CHF- stable.,   c/w Lasix ,  BB     #UTI  c/w Cefuroxime , day 5/5     DVT px  on Coumadin

## 2019-02-20 LAB
ALBUMIN SERPL ELPH-MCNC: 2.6 G/DL — LOW (ref 3.3–5)
ALP SERPL-CCNC: 108 U/L — SIGNIFICANT CHANGE UP (ref 40–120)
ALT FLD-CCNC: 32 U/L DA — SIGNIFICANT CHANGE UP (ref 10–45)
ANION GAP SERPL CALC-SCNC: 10 MMOL/L — SIGNIFICANT CHANGE UP (ref 5–17)
AST SERPL-CCNC: 40 U/L — SIGNIFICANT CHANGE UP (ref 10–40)
BILIRUB SERPL-MCNC: 0.9 MG/DL — SIGNIFICANT CHANGE UP (ref 0.2–1.2)
BUN SERPL-MCNC: 26 MG/DL — HIGH (ref 7–23)
CALCIUM SERPL-MCNC: 9.4 MG/DL — SIGNIFICANT CHANGE UP (ref 8.4–10.5)
CHLORIDE SERPL-SCNC: 97 MMOL/L — SIGNIFICANT CHANGE UP (ref 96–108)
CO2 SERPL-SCNC: 29 MMOL/L — SIGNIFICANT CHANGE UP (ref 22–31)
CREAT SERPL-MCNC: 0.9 MG/DL — SIGNIFICANT CHANGE UP (ref 0.5–1.3)
GLUCOSE BLDC GLUCOMTR-MCNC: 135 MG/DL — HIGH (ref 70–99)
GLUCOSE BLDC GLUCOMTR-MCNC: 162 MG/DL — HIGH (ref 70–99)
GLUCOSE BLDC GLUCOMTR-MCNC: 168 MG/DL — HIGH (ref 70–99)
GLUCOSE BLDC GLUCOMTR-MCNC: 190 MG/DL — HIGH (ref 70–99)
GLUCOSE SERPL-MCNC: 195 MG/DL — HIGH (ref 70–99)
HCT VFR BLD CALC: 43.7 % — SIGNIFICANT CHANGE UP (ref 34.5–45)
HGB BLD-MCNC: 13.8 G/DL — SIGNIFICANT CHANGE UP (ref 11.5–15.5)
INR BLD: 2.96 RATIO — HIGH (ref 0.88–1.16)
MCHC RBC-ENTMCNC: 29.3 PG — SIGNIFICANT CHANGE UP (ref 27–34)
MCHC RBC-ENTMCNC: 31.7 GM/DL — LOW (ref 32–36)
MCV RBC AUTO: 92.5 FL — SIGNIFICANT CHANGE UP (ref 80–100)
PLATELET # BLD AUTO: 291 K/UL — SIGNIFICANT CHANGE UP (ref 150–400)
POTASSIUM SERPL-MCNC: 4 MMOL/L — SIGNIFICANT CHANGE UP (ref 3.5–5.3)
POTASSIUM SERPL-SCNC: 4 MMOL/L — SIGNIFICANT CHANGE UP (ref 3.5–5.3)
PROT SERPL-MCNC: 7.4 G/DL — SIGNIFICANT CHANGE UP (ref 6–8.3)
PROTHROM AB SERPL-ACNC: 34.3 SEC — HIGH (ref 10–12.9)
RBC # BLD: 4.72 M/UL — SIGNIFICANT CHANGE UP (ref 3.8–5.2)
RBC # FLD: 12.5 % — SIGNIFICANT CHANGE UP (ref 10.3–14.5)
SODIUM SERPL-SCNC: 136 MMOL/L — SIGNIFICANT CHANGE UP (ref 135–145)
WBC # BLD: 6.4 K/UL — SIGNIFICANT CHANGE UP (ref 3.8–10.5)
WBC # FLD AUTO: 6.4 K/UL — SIGNIFICANT CHANGE UP (ref 3.8–10.5)

## 2019-02-20 PROCEDURE — 99233 SBSQ HOSP IP/OBS HIGH 50: CPT

## 2019-02-20 PROCEDURE — 99232 SBSQ HOSP IP/OBS MODERATE 35: CPT | Mod: GC

## 2019-02-20 RX ORDER — INSULIN GLARGINE 100 [IU]/ML
14 INJECTION, SOLUTION SUBCUTANEOUS AT BEDTIME
Qty: 0 | Refills: 0 | Status: DISCONTINUED | OUTPATIENT
Start: 2019-02-20 | End: 2019-02-22

## 2019-02-20 RX ORDER — WARFARIN SODIUM 2.5 MG/1
2 TABLET ORAL AT BEDTIME
Qty: 0 | Refills: 0 | Status: COMPLETED | OUTPATIENT
Start: 2019-02-20 | End: 2019-02-20

## 2019-02-20 RX ORDER — INSULIN LISPRO 100/ML
VIAL (ML) SUBCUTANEOUS
Qty: 0 | Refills: 0 | Status: DISCONTINUED | OUTPATIENT
Start: 2019-02-20 | End: 2019-03-12

## 2019-02-20 RX ORDER — INSULIN LISPRO 100/ML
3 VIAL (ML) SUBCUTANEOUS
Qty: 0 | Refills: 0 | Status: DISCONTINUED | OUTPATIENT
Start: 2019-02-20 | End: 2019-02-22

## 2019-02-20 RX ADMIN — Medication 500 MILLIGRAM(S): at 22:05

## 2019-02-20 RX ADMIN — Medication 3 UNIT(S): at 12:37

## 2019-02-20 RX ADMIN — Medication 25 MILLIGRAM(S): at 15:12

## 2019-02-20 RX ADMIN — LIDOCAINE 2 PATCH: 4 CREAM TOPICAL at 07:21

## 2019-02-20 RX ADMIN — CHLORHEXIDINE GLUCONATE 15 MILLILITER(S): 213 SOLUTION TOPICAL at 05:05

## 2019-02-20 RX ADMIN — Medication 2: at 05:06

## 2019-02-20 RX ADMIN — Medication 1 MILLIGRAM(S): at 12:40

## 2019-02-20 RX ADMIN — SENNA PLUS 2 TABLET(S): 8.6 TABLET ORAL at 22:04

## 2019-02-20 RX ADMIN — Medication 3 UNIT(S): at 05:06

## 2019-02-20 RX ADMIN — WARFARIN SODIUM 2 MILLIGRAM(S): 2.5 TABLET ORAL at 21:55

## 2019-02-20 RX ADMIN — ZINC OXIDE 1 APPLICATION(S): 200 OINTMENT TOPICAL at 12:52

## 2019-02-20 RX ADMIN — LIDOCAINE 2 PATCH: 4 CREAM TOPICAL at 19:16

## 2019-02-20 RX ADMIN — LIDOCAINE 2 PATCH: 4 CREAM TOPICAL at 05:07

## 2019-02-20 RX ADMIN — Medication 5 MILLILITER(S): at 12:41

## 2019-02-20 RX ADMIN — Medication 20 MILLIGRAM(S): at 05:06

## 2019-02-20 RX ADMIN — Medication 2: at 17:08

## 2019-02-20 RX ADMIN — INSULIN GLARGINE 14 UNIT(S): 100 INJECTION, SOLUTION SUBCUTANEOUS at 22:03

## 2019-02-20 RX ADMIN — Medication 500 MILLIGRAM(S): at 05:06

## 2019-02-20 RX ADMIN — Medication 2: at 12:36

## 2019-02-20 RX ADMIN — Medication 5 MILLILITER(S): at 20:33

## 2019-02-20 RX ADMIN — CHLORHEXIDINE GLUCONATE 15 MILLILITER(S): 213 SOLUTION TOPICAL at 20:33

## 2019-02-20 RX ADMIN — Medication 5 MILLILITER(S): at 05:05

## 2019-02-20 RX ADMIN — ATORVASTATIN CALCIUM 20 MILLIGRAM(S): 80 TABLET, FILM COATED ORAL at 22:04

## 2019-02-20 RX ADMIN — FAMOTIDINE 20 MILLIGRAM(S): 10 INJECTION INTRAVENOUS at 12:40

## 2019-02-20 RX ADMIN — AMIODARONE HYDROCHLORIDE 200 MILLIGRAM(S): 400 TABLET ORAL at 05:06

## 2019-02-20 RX ADMIN — Medication 25 MILLIGRAM(S): at 05:06

## 2019-02-20 RX ADMIN — Medication 3 UNIT(S): at 17:07

## 2019-02-20 RX ADMIN — Medication 25 MILLIGRAM(S): at 22:04

## 2019-02-20 NOTE — PROGRESS NOTE ADULT - SUBJECTIVE AND OBJECTIVE BOX
Patient is a 88y old  Female who presents with a chief complaint of CVA with right sided weakness, dysphagia, dysarthria, expressive aphasia (19 Feb 2019 16:40)      Patient seen and examined at bedside.     ALLERGIES:  No Known Allergies    MEDICATIONS:  acetaminophen    Suspension .. 650 milliGRAM(s) Oral every 6 hours PRN  atorvastatin 20 milliGRAM(s) Oral at bedtime  bisacodyl Suppository 10 milliGRAM(s) Rectal daily PRN  dextrose 40% Gel 15 Gram(s) Oral once PRN  dextrose 5%. 1000 milliLiter(s) IV Continuous <Continuous>  dextrose 50% Injectable 12.5 Gram(s) IV Push once  famotidine    Tablet 20 milliGRAM(s) Oral daily  glucagon  Injectable 1 milliGRAM(s) IntraMuscular once PRN  insulin glargine Injectable (LANTUS) 12 Unit(s) SubCutaneous at bedtime  insulin lispro (HumaLOG) corrective regimen sliding scale   SubCutaneous every 6 hours  insulin lispro Injectable (HumaLOG) 3 Unit(s) SubCutaneous four times a day before meals  lidocaine   Patch 2 Patch Transdermal <User Schedule>  multivitamin   Solution 5 milliLiter(s) Oral daily  nystatin    Suspension 5 milliLiter(s) Oral two times a day  polyethylene glycol 3350 17 Gram(s) Oral daily  senna 2 Tablet(s) Oral at bedtime  zinc oxide 20% Ointment 1 Application(s) Topical daily    Vital Signs Last 24 Hrs  T(F): 97.4 (20 Feb 2019 08:30), Max: 98.1 (19 Feb 2019 20:59)  HR: 68 (20 Feb 2019 08:30) (68 - 80)  BP: 128/75 (20 Feb 2019 08:30) (128/75 - 142/84)  RR: 14 (20 Feb 2019 08:30) (14 - 14)  SpO2: 98% (20 Feb 2019 08:30) (96% - 98%)  I&O's Summary    19 Feb 2019 07:01  -  20 Feb 2019 07:00  --------------------------------------------------------  IN: 870 mL / OUT: 1 mL / NET: 869 mL        PHYSICAL EXAM:  General: NAD, comfortable   ENT: MMM  Neck: Supple, No JVD  Lungs: CTA, BLAE, No added sounds.   Cardio: RRR, S1/S2, No murmurs  Abdomen: Soft, NT/ND, BS+   Extremities: No edema  CNS: Left Hemiparesis    LABS:                        13.8   6.4   )-----------( 291      ( 20 Feb 2019 06:00 )             43.7     02-20    136  |  97  |  26  ----------------------------<  195  4.0   |  29  |  0.90    Ca    9.4      20 Feb 2019 06:00  Mg     2.1     02-19    TPro  7.4  /  Alb  2.6  /  TBili  0.9  /  DBili  x   /  AST  40  /  ALT  32  /  AlkPhos  108  02-20    eGFR if Non African American: 57 mL/min/1.73M2 (02-20-19 @ 06:00)  eGFR if : 67 mL/min/1.73M2 (02-20-19 @ 06:00)    PT/INR - ( 20 Feb 2019 06:00 )   PT: 34.3 sec;   INR: 2.96 ratio                 02-04 Chol 124 mg/dL LDL 55 mg/dL HDL 37 mg/dL Trig 159 mg/dL        CAPILLARY BLOOD GLUCOSE      POCT Blood Glucose.: 168 mg/dL (20 Feb 2019 04:49)  POCT Blood Glucose.: 133 mg/dL (19 Feb 2019 23:35)  POCT Blood Glucose.: 149 mg/dL (19 Feb 2019 20:54)  POCT Blood Glucose.: 175 mg/dL (19 Feb 2019 17:34)  POCT Blood Glucose.: 180 mg/dL (19 Feb 2019 12:20)    02-04 [id number] 6.6          RADIOLOGY & ADDITIONAL TESTS:    Care Discussed with Consultants/Other Providers:

## 2019-02-20 NOTE — PROGRESS NOTE ADULT - ATTENDING COMMENTS
Chart reviewed. Patient seen at bedside. Feels ok,   Denies any pain, slept well  Scheduled for MBS today.     2. INR now therapeutic- dose 2mg today. INR in am    3. GI fu noted: PEG site irritation improved

## 2019-02-20 NOTE — CHART NOTE - NSCHARTNOTEFT_GEN_A_CORE
NUTRITION FOLLOW UP    SOURCE: Patient [X)      Medical Record (X)    DIET: Glucerna 1.5 240cc 4xday with 150cc free water q 6hrs    Patient tolerating bolus feeds, no GI distress noted. Patient scheduled for a MBS today, will await results. Last BM (2/17)     CURRENT WEIGHT:   195.9 /88.9 (02-18 @ 19:00), 3 lb desirable weight loss since admission  PERTINENT MEDS:   Pertinent Medications: MEDICATIONS  (STANDING):  amiodarone    Tablet 200 milliGRAM(s) Oral daily  ascorbic acid 500 milliGRAM(s) Oral two times a day  atorvastatin 20 milliGRAM(s) Oral at bedtime  chlorhexidine 0.12% Liquid 15 milliLiter(s) Swish and Spit two times a day  dextrose 5%. 1000 milliLiter(s) (50 mL/Hr) IV Continuous <Continuous>  dextrose 50% Injectable 12.5 Gram(s) IV Push once  famotidine    Tablet 20 milliGRAM(s) Oral daily  folic acid 1 milliGRAM(s) Oral daily  furosemide    Tablet 20 milliGRAM(s) Oral daily  insulin glargine Injectable (LANTUS) 14 Unit(s) SubCutaneous at bedtime  insulin lispro (HumaLOG) corrective regimen sliding scale   SubCutaneous every 6 hours  insulin lispro Injectable (HumaLOG) 3 Unit(s) SubCutaneous four times a day before meals  lidocaine   Patch 2 Patch Transdermal <User Schedule>  metoprolol tartrate 25 milliGRAM(s) Oral every 8 hours  multivitamin   Solution 5 milliLiter(s) Oral daily  nystatin    Suspension 5 milliLiter(s) Oral two times a day  polyethylene glycol 3350 17 Gram(s) Oral daily  senna 2 Tablet(s) Oral at bedtime  warfarin 2 milliGRAM(s) Oral at bedtime  zinc oxide 20% Ointment 1 Application(s) Topical daily    MEDICATIONS  (PRN):  acetaminophen    Suspension .. 650 milliGRAM(s) Oral every 6 hours PRN Mild Pain (1 - 3)  bisacodyl Suppository 10 milliGRAM(s) Rectal daily PRN Constipation  dextrose 40% Gel 15 Gram(s) Oral once PRN Blood Glucose LESS THAN 70 milliGRAM(s)/deciliter  glucagon  Injectable 1 milliGRAM(s) IntraMuscular once PRN Glucose LESS THAN 70 milligrams/deciliter      PERTINENT LABS:  02-20 Na136 mmol/L Glu 195 mg/dL<H> K+ 4.0 mmol/L Cr  0.90 mg/dL BUN 26 mg/dL<H> 02-20 Alb 2.6 g/dL<L> 02-04 [id number] 6.6 %<H> 02-04 Chol 124 mg/dL LDL 55 mg/dL HDL 37 mg/dL<L> Trig 159 mg/dL<H>  POCT 162,168,133,149      SKIN:  intact (X  )       ESTIMATED NEEDS:   [X] no change since previous assessment  [ ] recalculated:     PREVIOUS NUTRITION DIAGNOSIS:  Difficulty swallowing    NUTRITION DIAGNOSIS is :  ( X  )  Ongoing      (    ) Resolved,  RD will follow as per nutrition department protocol.      MONITORING AND EVALUATION:   Current Tube feed  order is appropriate and is well tolerated, but will monitor for any changes that may be needed. Await MBS results    NUTRITION RECOMMENDATIONS:  Continue current bolus feeds if unable to tolerate po diet

## 2019-02-20 NOTE — PROGRESS NOTE ADULT - SUBJECTIVE AND OBJECTIVE BOX
INTERVAL HPI/OVERNIGHT EVENTS:  HPI:  87 y/o female with multiple comorbidities being followed by GI for Peg tube malfunction. Patient seen and examined at bedside this morning. No complaints from patient at this time.     MEDICATIONS  (STANDING):  amiodarone    Tablet 200 milliGRAM(s) Oral daily  ascorbic acid 500 milliGRAM(s) Oral two times a day  atorvastatin 20 milliGRAM(s) Oral at bedtime  chlorhexidine 0.12% Liquid 15 milliLiter(s) Swish and Spit two times a day  dextrose 5%. 1000 milliLiter(s) (50 mL/Hr) IV Continuous <Continuous>  dextrose 50% Injectable 12.5 Gram(s) IV Push once  famotidine    Tablet 20 milliGRAM(s) Oral daily  folic acid 1 milliGRAM(s) Oral daily  furosemide    Tablet 20 milliGRAM(s) Oral daily  insulin glargine Injectable (LANTUS) 14 Unit(s) SubCutaneous at bedtime  insulin lispro (HumaLOG) corrective regimen sliding scale   SubCutaneous every 6 hours  insulin lispro Injectable (HumaLOG) 3 Unit(s) SubCutaneous four times a day before meals  lidocaine   Patch 2 Patch Transdermal <User Schedule>  metoprolol tartrate 25 milliGRAM(s) Oral every 8 hours  multivitamin   Solution 5 milliLiter(s) Oral daily  nystatin    Suspension 5 milliLiter(s) Oral two times a day  polyethylene glycol 3350 17 Gram(s) Oral daily  senna 2 Tablet(s) Oral at bedtime  warfarin 2 milliGRAM(s) Oral at bedtime  zinc oxide 20% Ointment 1 Application(s) Topical daily    MEDICATIONS  (PRN):  acetaminophen    Suspension .. 650 milliGRAM(s) Oral every 6 hours PRN Mild Pain (1 - 3)  bisacodyl Suppository 10 milliGRAM(s) Rectal daily PRN Constipation  dextrose 40% Gel 15 Gram(s) Oral once PRN Blood Glucose LESS THAN 70 milliGRAM(s)/deciliter  glucagon  Injectable 1 milliGRAM(s) IntraMuscular once PRN Glucose LESS THAN 70 milligrams/deciliter      Allergies    No Known Allergies    Intolerances    PHYSICAL EXAM:   Vital Signs:  Vital Signs Last 24 Hrs  T(C): 36.3 (20 Feb 2019 08:30), Max: 36.7 (19 Feb 2019 20:59)  T(F): 97.4 (20 Feb 2019 08:30), Max: 98.1 (19 Feb 2019 20:59)  HR: 68 (20 Feb 2019 08:30) (68 - 80)  BP: 128/75 (20 Feb 2019 08:30) (128/75 - 142/84)  BP(mean): --  RR: 14 (20 Feb 2019 08:30) (14 - 14)  SpO2: 98% (20 Feb 2019 08:30) (96% - 98%)  Daily     Daily I&O's Summary    19 Feb 2019 07:01  -  20 Feb 2019 07:00  --------------------------------------------------------  IN: 870 mL / OUT: 1 mL / NET: 869 mL    GENERAL: NAD  HEENT:  conjunctivae clear and pink,   CHEST:  Full & symmetric excursion, no increased effort, breath sounds clear  HEART:  Regular rhythm, S1, S2, no edema  ABDOMEN:  Soft, non-tender, non-distended, normoactive bowel sounds,  Peg intact, red around site  EXTEREMITIES:  no cyanosis,clubbing or edema  SKIN:  No rash, warm/dry  NEURO:  Alert, oriented,     LABS:                        13.8   6.4   )-----------( 291      ( 20 Feb 2019 06:00 )             43.7     02-20    136  |  97  |  26<H>  ----------------------------<  195<H>  4.0   |  29  |  0.90    Ca    9.4      20 Feb 2019 06:00  Mg     2.1     02-19    TPro  7.4  /  Alb  2.6<L>  /  TBili  0.9  /  DBili  x   /  AST  40  /  ALT  32  /  AlkPhos  108  02-20    PT/INR - ( 20 Feb 2019 06:00 )   PT: 34.3 sec;   INR: 2.96 ratio             amylase   lipase  RADIOLOGY & ADDITIONAL TESTS:

## 2019-02-20 NOTE — PROGRESS NOTE ADULT - SUBJECTIVE AND OBJECTIVE BOX
HPI:88 year old  female with a pmh of CAD, HTN, HLD, pacemaker who presented to [facility] as a transfer from [facility] with right hemiparesis and aphasia and left proximal MCA occlusion. The patient was last known well at 2 pm on 2/3/19 and was then found down on the ground by her daughter several hours later and was noted to be not speaking and weak on the right. At [facility] a CTA head and neck was performed which showed a proximal left MCA occlusion, and a CT head showed a dense left MCA sign with some hypodensity and early ischemic changes in the left MCA distribution. She did not receive IV tPA as she was out of the window and was transferred to [facility] for possible mechanical thrombectomy. At [facility] her CT perfusion showed zero core infarct, with a penumbra of 80 mls, and an infinite mismatch. She was deemed a candidate for intervention and recanalization.  Stroke thought to be 2* cardioembolism   Patient failed speech and swallow, PEG was placed on 2/12 and has been tolerating tube feeds.   Patient found be more lethargic on 2/13. Repeat CT head was stable. UA positive and patient started on antibiotics. Urine cx sent and pending results. Of note, CT head showed sphenoid air fluid level. Recs to start Augmentin if s/s of sinusitis start. Admitted to rehab on 2/14/19      Subjective: Patient seen at bedside.   Denies any pain, SOB, CP, HA, nausea, vomiting, abdominal pain, dysuria.     REVIEW OF SYSTEMS  [ x  ] Constitutional WNL, no overt signs of bleeding   [ x  ] Cardio WNL  [x   ] Resp WNL  [   ] GI PEG    Vital Signs Last 24 Hrs  T(C): 36.3 (20 Feb 2019 08:30), Max: 36.7 (19 Feb 2019 20:59)  T(F): 97.4 (20 Feb 2019 08:30), Max: 98.1 (19 Feb 2019 20:59)  HR: 68 (20 Feb 2019 08:30) (68 - 80)  BP: 128/75 (20 Feb 2019 08:30) (128/75 - 142/84)  RR: 14 (20 Feb 2019 08:30) (14 - 14)  SpO2: 98% (20 Feb 2019 08:30) (96% - 98%)    PHYSICAL EXAM  General: nad, obese female  Cardio: S1S2  Resp: CTA  Abdomen: soft, PEG site with minimal drainage-SS, erythema improving around peg insertion site.   Neuro: aao x self, place. Difficulty with year. Aware of month. Right facial droop, expressive deficits improving, right hemiplegia with increased tone - some movement noted both in UE and LE, about 2/5 right shoulder/elbow/wrist/hand grasp   Extrem: no edema, no calf tenderness  skin: no overt signs of bleeding      RECENT LABS:    PT/INR - ( 20 Feb 2019 06:00 )   PT: 34.3 sec;   INR: 2.96 ratio          02-20    136  |  97  |  26<H>  ----------------------------<  195<H>  4.0   |  29  |  0.90    Ca    9.4      20 Feb 2019 06:00  Mg     2.1     02-19    TPro  7.4  /  Alb  2.6<L>  /  TBili  0.9  /  DBili  x   /  AST  40  /  ALT  32  /  AlkPhos  108  02-20                        13.8   6.4   )-----------( 291      ( 20 Feb 2019 06:00 )             43.7       CAPILLARY BLOOD GLUCOSE      POCT Blood Glucose.: 204 mg/dL (18 Feb 2019 11:14)  POCT Blood Glucose.: 190 mg/dL (18 Feb 2019 05:08)  POCT Blood Glucose.: 169 mg/dL (17 Feb 2019 23:04)  POCT Blood Glucose.: 170 mg/dL (17 Feb 2019 17:18)      MEDICATIONS  (STANDING):  amiodarone    Tablet 200 milliGRAM(s) Oral daily  ascorbic acid 500 milliGRAM(s) Oral two times a day  atorvastatin 20 milliGRAM(s) Oral at bedtime  chlorhexidine 0.12% Liquid 15 milliLiter(s) Swish and Spit two times a day  dextrose 5%. 1000 milliLiter(s) (50 mL/Hr) IV Continuous <Continuous>  dextrose 50% Injectable 12.5 Gram(s) IV Push once  famotidine    Tablet 20 milliGRAM(s) Oral daily  folic acid 1 milliGRAM(s) Oral daily  furosemide    Tablet 20 milliGRAM(s) Oral daily  insulin glargine Injectable (LANTUS) 14 Unit(s) SubCutaneous at bedtime  insulin lispro (HumaLOG) corrective regimen sliding scale   SubCutaneous every 6 hours  insulin lispro Injectable (HumaLOG) 3 Unit(s) SubCutaneous four times a day before meals  lidocaine   Patch 2 Patch Transdermal <User Schedule>  metoprolol tartrate 25 milliGRAM(s) Oral every 8 hours  multivitamin   Solution 5 milliLiter(s) Oral daily  nystatin    Suspension 5 milliLiter(s) Oral two times a day  polyethylene glycol 3350 17 Gram(s) Oral daily  senna 2 Tablet(s) Oral at bedtime  warfarin 2 milliGRAM(s) Oral at bedtime  zinc oxide 20% Ointment 1 Application(s) Topical daily    MEDICATIONS  (PRN):  acetaminophen    Suspension .. 650 milliGRAM(s) Oral every 6 hours PRN Mild Pain (1 - 3)  bisacodyl Suppository 10 milliGRAM(s) Rectal daily PRN Constipation  dextrose 40% Gel 15 Gram(s) Oral once PRN Blood Glucose LESS THAN 70 milliGRAM(s)/deciliter  glucagon  Injectable 1 milliGRAM(s) IntraMuscular once PRN Glucose LESS THAN 70 milligrams/deciliter      A/P:    88 year old female with risk factors as stated above admitted for comprehensive rehab with left MCA infarct s/p recanalization procedure at [facility]  Continue full rehab program:PT/OT/SLP 3 hrs/day 5 days/week  On AC in setting atrial flutter,  statins, risk factor management    2. Dysphagia: On PEG feeds at this time, continue ST, MBS today     3. Atrial flutter : on amiodarone metoprolol. INR 2.96 this am, will give 2mg coumadin tonight and recheck INR in am.     4. UTI: completesd abx course    5. DM-ii:- PEG feeds Lantus, SSI and premeal 3 units of humalog (increased on 2/18 to 4x day)  Accuchecks improving    6. Diastolic HF: on lasix . monitor potassium     7. Pain: on tylenol prn lidoderm patch     8. Thrush: continue nystatin swish and suction        Labs in am 2/22 HPI:88 year old  female with a pmh of CAD, HTN, HLD, pacemaker who presented to [facility] as a transfer from [facility] with right hemiparesis and aphasia and left proximal MCA occlusion. The patient was last known well at 2 pm on 2/3/19 and was then found down on the ground by her daughter several hours later and was noted to be not speaking and weak on the right. At [facility] a CTA head and neck was performed which showed a proximal left MCA occlusion, and a CT head showed a dense left MCA sign with some hypodensity and early ischemic changes in the left MCA distribution. She did not receive IV tPA as she was out of the window and was transferred to [facility] for possible mechanical thrombectomy. At [facility] her CT perfusion showed zero core infarct, with a penumbra of 80 mls, and an infinite mismatch. She was deemed a candidate for intervention and recanalization.  Stroke thought to be 2* cardioembolism   Patient failed speech and swallow, PEG was placed on 2/12 and has been tolerating tube feeds.   Patient found be more lethargic on 2/13. Repeat CT head was stable. UA positive and patient started on antibiotics. Urine cx sent and pending results. Of note, CT head showed sphenoid air fluid level. Recs to start Augmentin if s/s of sinusitis start. Admitted to rehab on 2/14/19      Subjective: Patient seen at bedside.   Denies any pain, SOB, CP, HA, nausea, vomiting, abdominal pain, dysuria.     REVIEW OF SYSTEMS  [ x  ] Constitutional WNL, no overt signs of bleeding   [ x  ] Cardio WNL  [x   ] Resp WNL  [   ] GI PEG    Vital Signs Last 24 Hrs  T(C): 36.3 (20 Feb 2019 08:30), Max: 36.7 (19 Feb 2019 20:59)  T(F): 97.4 (20 Feb 2019 08:30), Max: 98.1 (19 Feb 2019 20:59)  HR: 68 (20 Feb 2019 08:30) (68 - 80)  BP: 128/75 (20 Feb 2019 08:30) (128/75 - 142/84)  RR: 14 (20 Feb 2019 08:30) (14 - 14)  SpO2: 98% (20 Feb 2019 08:30) (96% - 98%)    PHYSICAL EXAM  General: nad, obese female  Cardio: S1S2  Resp: CTA  Abdomen: soft, PEG site with minimal drainage-SS, erythema improving around peg insertion site.   Neuro: aao x self, place. Difficulty with year. Aware of month. Right facial droop, expressive deficits improving, right hemiplegia with increased tone - some movement noted both in UE and LE, about 2/5 right shoulder/elbow/wrist/hand grasp   Extrem: no edema, no calf tenderness  skin: no overt signs of bleeding      RECENT LABS:    PT/INR - ( 20 Feb 2019 06:00 )   PT: 34.3 sec;   INR: 2.96 ratio          02-20    136  |  97  |  26<H>  ----------------------------<  195<H>  4.0   |  29  |  0.90    Ca    9.4      20 Feb 2019 06:00  Mg     2.1     02-19    TPro  7.4  /  Alb  2.6<L>  /  TBili  0.9  /  DBili  x   /  AST  40  /  ALT  32  /  AlkPhos  108  02-20                        13.8   6.4   )-----------( 291      ( 20 Feb 2019 06:00 )             43.7       CAPILLARY BLOOD GLUCOSE      POCT Blood Glucose.: 204 mg/dL (18 Feb 2019 11:14)  POCT Blood Glucose.: 190 mg/dL (18 Feb 2019 05:08)  POCT Blood Glucose.: 169 mg/dL (17 Feb 2019 23:04)  POCT Blood Glucose.: 170 mg/dL (17 Feb 2019 17:18)      MEDICATIONS  (STANDING):  amiodarone    Tablet 200 milliGRAM(s) Oral daily  ascorbic acid 500 milliGRAM(s) Oral two times a day  atorvastatin 20 milliGRAM(s) Oral at bedtime  chlorhexidine 0.12% Liquid 15 milliLiter(s) Swish and Spit two times a day  dextrose 5%. 1000 milliLiter(s) (50 mL/Hr) IV Continuous <Continuous>  dextrose 50% Injectable 12.5 Gram(s) IV Push once  famotidine    Tablet 20 milliGRAM(s) Oral daily  folic acid 1 milliGRAM(s) Oral daily  furosemide    Tablet 20 milliGRAM(s) Oral daily  insulin glargine Injectable (LANTUS) 14 Unit(s) SubCutaneous at bedtime  insulin lispro (HumaLOG) corrective regimen sliding scale   SubCutaneous every 6 hours  insulin lispro Injectable (HumaLOG) 3 Unit(s) SubCutaneous four times a day before meals  lidocaine   Patch 2 Patch Transdermal <User Schedule>  metoprolol tartrate 25 milliGRAM(s) Oral every 8 hours  multivitamin   Solution 5 milliLiter(s) Oral daily  nystatin    Suspension 5 milliLiter(s) Oral two times a day  polyethylene glycol 3350 17 Gram(s) Oral daily  senna 2 Tablet(s) Oral at bedtime  warfarin 2 milliGRAM(s) Oral at bedtime  zinc oxide 20% Ointment 1 Application(s) Topical daily    MEDICATIONS  (PRN):  acetaminophen    Suspension .. 650 milliGRAM(s) Oral every 6 hours PRN Mild Pain (1 - 3)  bisacodyl Suppository 10 milliGRAM(s) Rectal daily PRN Constipation  dextrose 40% Gel 15 Gram(s) Oral once PRN Blood Glucose LESS THAN 70 milliGRAM(s)/deciliter  glucagon  Injectable 1 milliGRAM(s) IntraMuscular once PRN Glucose LESS THAN 70 milligrams/deciliter      A/P:    88 year old female with risk factors as stated above admitted for comprehensive rehab with left MCA infarct s/p recanalization procedure at [facility]  Continue full rehab program:PT/OT/SLP 3 hrs/day 5 days/week  On AC in setting atrial flutter,  statins, risk factor management    2. Dysphagia: On PEG feeds at this time, continue ST, MBS today     3. Atrial flutter : on amiodarone metoprolol. INR 2.96 this am, will give 2mg coumadin tonight and recheck INR in am.     4. UTI: completesd abx course    5. DM-ii:- PEG feeds Lantus, SSI and premeal 3 units of humalog (increased on 2/18 to 4x day)  Accuchecks improving    6. Diastolic HF: on lasix . monitor potassium     7. Pain: on tylenol prn lidoderm patch     8. Thrush: continue nystatin swish and suction    9. Skin: PEG site irritation improved. GI fu noted. Retention ring adjusted        Labs in am 2/22

## 2019-02-20 NOTE — PROGRESS NOTE ADULT - ASSESSMENT
89 y/o female with multiple comorbidities being followed by GI for Peg tube malfunction. Retention ring was loosen from abdomen yesterday. Site around peg appears less red/irritated today.     Plan:   Continue with Zinc BID to Peg site

## 2019-02-20 NOTE — PROGRESS NOTE ADULT - ASSESSMENT
Pt is a 89yo F admitted to acute rehab secondary to Left MCA CVA s/p endovascular intervention with right side hemiparesis, dysphagia s/p PEG placement, dysarthria and expressive aphasia.     #Left MCA CVA  c/w PT/OT/SLP     #Dysphagia/dysarthria  SLP  PEG+ , c/w PEG feed     #A-Flutter  Rate controlled   c/w amiodarone and lopressor  Pacemaker in place   INR at goal  2.9  Re-dose Coumadin today     #HTN  at goal   Cont lopressor     #DM   increased Lantus to 14 u QHS   c/w 3 Units premeal   c/w SSI   Hypoglycemia protocol.     #Diastolic CHF- stable.,   c/w Lasix ,  BB     #UTI  s/p Cefuroxime x 5     DVT px  on Coumadin

## 2019-02-21 LAB
GLUCOSE BLDC GLUCOMTR-MCNC: 129 MG/DL — HIGH (ref 70–99)
GLUCOSE BLDC GLUCOMTR-MCNC: 173 MG/DL — HIGH (ref 70–99)
GLUCOSE BLDC GLUCOMTR-MCNC: 184 MG/DL — HIGH (ref 70–99)
GLUCOSE BLDC GLUCOMTR-MCNC: 187 MG/DL — HIGH (ref 70–99)
INR BLD: 2.09 RATIO — HIGH (ref 0.88–1.16)
PROTHROM AB SERPL-ACNC: 23.9 SEC — HIGH (ref 10–12.9)

## 2019-02-21 PROCEDURE — 99232 SBSQ HOSP IP/OBS MODERATE 35: CPT | Mod: GC

## 2019-02-21 PROCEDURE — 99233 SBSQ HOSP IP/OBS HIGH 50: CPT

## 2019-02-21 RX ORDER — WARFARIN SODIUM 2.5 MG/1
3 TABLET ORAL AT BEDTIME
Qty: 0 | Refills: 0 | Status: COMPLETED | OUTPATIENT
Start: 2019-02-21 | End: 2019-02-21

## 2019-02-21 RX ORDER — FLUOXETINE HCL 10 MG
10 CAPSULE ORAL AT BEDTIME
Qty: 0 | Refills: 0 | Status: DISCONTINUED | OUTPATIENT
Start: 2019-02-21 | End: 2019-03-12

## 2019-02-21 RX ORDER — LANOLIN ALCOHOL/MO/W.PET/CERES
3 CREAM (GRAM) TOPICAL AT BEDTIME
Qty: 0 | Refills: 0 | Status: DISCONTINUED | OUTPATIENT
Start: 2019-02-21 | End: 2019-03-12

## 2019-02-21 RX ADMIN — Medication 1 MILLIGRAM(S): at 12:22

## 2019-02-21 RX ADMIN — AMIODARONE HYDROCHLORIDE 200 MILLIGRAM(S): 400 TABLET ORAL at 05:00

## 2019-02-21 RX ADMIN — CHLORHEXIDINE GLUCONATE 15 MILLILITER(S): 213 SOLUTION TOPICAL at 17:59

## 2019-02-21 RX ADMIN — ZINC OXIDE 1 APPLICATION(S): 200 OINTMENT TOPICAL at 12:24

## 2019-02-21 RX ADMIN — FAMOTIDINE 20 MILLIGRAM(S): 10 INJECTION INTRAVENOUS at 12:23

## 2019-02-21 RX ADMIN — LIDOCAINE 2 PATCH: 4 CREAM TOPICAL at 18:00

## 2019-02-21 RX ADMIN — Medication 25 MILLIGRAM(S): at 22:14

## 2019-02-21 RX ADMIN — POLYETHYLENE GLYCOL 3350 17 GRAM(S): 17 POWDER, FOR SOLUTION ORAL at 12:22

## 2019-02-21 RX ADMIN — Medication 5 MILLILITER(S): at 17:58

## 2019-02-21 RX ADMIN — CHLORHEXIDINE GLUCONATE 15 MILLILITER(S): 213 SOLUTION TOPICAL at 05:00

## 2019-02-21 RX ADMIN — Medication 20 MILLIGRAM(S): at 05:00

## 2019-02-21 RX ADMIN — Medication 500 MILLIGRAM(S): at 05:00

## 2019-02-21 RX ADMIN — SENNA PLUS 2 TABLET(S): 8.6 TABLET ORAL at 22:16

## 2019-02-21 RX ADMIN — Medication 2: at 12:14

## 2019-02-21 RX ADMIN — Medication 25 MILLIGRAM(S): at 05:00

## 2019-02-21 RX ADMIN — Medication 5 MILLILITER(S): at 05:00

## 2019-02-21 RX ADMIN — Medication 10 MILLIGRAM(S): at 22:14

## 2019-02-21 RX ADMIN — Medication 2: at 08:01

## 2019-02-21 RX ADMIN — Medication 500 MILLIGRAM(S): at 17:59

## 2019-02-21 RX ADMIN — Medication 3 UNIT(S): at 16:35

## 2019-02-21 RX ADMIN — Medication 3 UNIT(S): at 08:01

## 2019-02-21 RX ADMIN — LIDOCAINE 2 PATCH: 4 CREAM TOPICAL at 06:15

## 2019-02-21 RX ADMIN — WARFARIN SODIUM 3 MILLIGRAM(S): 2.5 TABLET ORAL at 22:13

## 2019-02-21 RX ADMIN — INSULIN GLARGINE 14 UNIT(S): 100 INJECTION, SOLUTION SUBCUTANEOUS at 22:15

## 2019-02-21 RX ADMIN — Medication 3 MILLIGRAM(S): at 22:14

## 2019-02-21 RX ADMIN — Medication 2: at 16:34

## 2019-02-21 RX ADMIN — Medication 25 MILLIGRAM(S): at 13:04

## 2019-02-21 RX ADMIN — Medication 5 MILLILITER(S): at 12:23

## 2019-02-21 RX ADMIN — ATORVASTATIN CALCIUM 20 MILLIGRAM(S): 80 TABLET, FILM COATED ORAL at 22:14

## 2019-02-21 RX ADMIN — Medication 3 UNIT(S): at 12:14

## 2019-02-21 RX ADMIN — LIDOCAINE 2 PATCH: 4 CREAM TOPICAL at 08:01

## 2019-02-21 NOTE — PROGRESS NOTE ADULT - ASSESSMENT
Pt is a 87yo F admitted to acute rehab secondary to Left MCA CVA s/p endovascular intervention with right side hemiparesis, dysphagia s/p PEG placement, dysarthria and expressive aphasia.     #Left MCA CVA  c/w PT/OT/SLP     #Dysphagia/dysarthria  SLP  PEG+ , c/w PEG feed   c/w Zn Oxide     #A-Flutter  Rate controlled   c/w amiodarone and lopressor  Pacemaker in place   INR at goal  2.0, c/w Coumadin        #HTN  at goal   Cont lopressor     #DM   c/w Lantus 14 u QHS   c/w 3 Units premeal   c/w SSI , FS monitoring   Hypoglycemia protocol.     #Diastolic CHF- stable.,   c/w Lasix , BB     DVT px  on Coumadin

## 2019-02-21 NOTE — PROGRESS NOTE ADULT - SUBJECTIVE AND OBJECTIVE BOX
Patient is a 88y old  Female who presents with a chief complaint of CVA with right sided weakness, dysphagia, dysarthria, expressive aphasia (20 Feb 2019 12:23)      Patient seen and examined at bedside.     ALLERGIES:  No Known Allergies    MEDICATIONS:  acetaminophen    Suspension .. 650 milliGRAM(s) Oral every 6 hours PRN  atorvastatin 20 milliGRAM(s) Oral at bedtime  bisacodyl Suppository 10 milliGRAM(s) Rectal daily PRN  dextrose 40% Gel 15 Gram(s) Oral once PRN  dextrose 5%. 1000 milliLiter(s) IV Continuous <Continuous>  dextrose 50% Injectable 12.5 Gram(s) IV Push once  famotidine    Tablet 20 milliGRAM(s) Oral daily  glucagon  Injectable 1 milliGRAM(s) IntraMuscular once PRN  insulin glargine Injectable (LANTUS) 14 Unit(s) SubCutaneous at bedtime  insulin lispro (HumaLOG) corrective regimen sliding scale   SubCutaneous three times a day with meals  insulin lispro Injectable (HumaLOG) 3 Unit(s) SubCutaneous three times a day with meals  lidocaine   Patch 2 Patch Transdermal <User Schedule>  multivitamin   Solution 5 milliLiter(s) Oral daily  nystatin    Suspension 5 milliLiter(s) Oral two times a day  polyethylene glycol 3350 17 Gram(s) Oral daily  senna 2 Tablet(s) Oral at bedtime  zinc oxide 20% Ointment 1 Application(s) Topical daily    Vital Signs Last 24 Hrs  T(F): 97.6 (21 Feb 2019 08:45), Max: 97.6 (21 Feb 2019 08:45)  HR: 68 (21 Feb 2019 08:45) (60 - 72)  BP: 145/77 (21 Feb 2019 08:45) (143/82 - 145/77)  RR: 14 (21 Feb 2019 08:45) (14 - 16)  SpO2: 94% (21 Feb 2019 08:45) (94% - 97%)  I&O's Summary    20 Feb 2019 07:01  -  21 Feb 2019 07:00  --------------------------------------------------------  IN: 390 mL / OUT: 0 mL / NET: 390 mL        PHYSICAL EXAM:  General: NAD, comfortable   ENT: MMM  Neck: Supple, No JVD  Lungs: CTA, BLAE, No added sounds.   Cardio: RRR, S1/S2, No murmurs  Abdomen: Soft, NT/ND, BS+   Extremities: No edema  CNS: Left hemiparesis     LABS:                        13.8   6.4   )-----------( 291      ( 20 Feb 2019 06:00 )             43.7     02-20    136  |  97  |  26  ----------------------------<  195  4.0   |  29  |  0.90    Ca    9.4      20 Feb 2019 06:00  Mg     2.1     02-19    TPro  7.4  /  Alb  2.6  /  TBili  0.9  /  DBili  x   /  AST  40  /  ALT  32  /  AlkPhos  108  02-20    eGFR if Non African American: 57 mL/min/1.73M2 (02-20-19 @ 06:00)  eGFR if : 67 mL/min/1.73M2 (02-20-19 @ 06:00)    PT/INR - ( 21 Feb 2019 05:45 )   PT: 23.9 sec;   INR: 2.09 ratio                 02-04 Chol 124 mg/dL LDL 55 mg/dL HDL 37 mg/dL Trig 159 mg/dL        CAPILLARY BLOOD GLUCOSE      POCT Blood Glucose.: 173 mg/dL (21 Feb 2019 07:58)  POCT Blood Glucose.: 135 mg/dL (20 Feb 2019 21:58)  POCT Blood Glucose.: 190 mg/dL (20 Feb 2019 17:06)  POCT Blood Glucose.: 162 mg/dL (20 Feb 2019 12:33)    02-04 [id number] 6.6          RADIOLOGY & ADDITIONAL TESTS:    Care Discussed with Consultants/Other Providers:

## 2019-02-21 NOTE — PROGRESS NOTE ADULT - SUBJECTIVE AND OBJECTIVE BOX
INTERVAL HPI/OVERNIGHT EVENTS:  HPI:  89 y/o female with multiple comorbidities being followed by GI for Peg tube malfunction. Patient seen and examined at bedside this morning. No complaints from patient at this time. She tolerated about 50 % of her breakfast by mouth today. Denies nausea/vomiting or coughing while eating.     MEDICATIONS  (STANDING):  amiodarone    Tablet 200 milliGRAM(s) Oral daily  ascorbic acid 500 milliGRAM(s) Oral two times a day  atorvastatin 20 milliGRAM(s) Oral at bedtime  chlorhexidine 0.12% Liquid 15 milliLiter(s) Swish and Spit two times a day  dextrose 5%. 1000 milliLiter(s) (50 mL/Hr) IV Continuous <Continuous>  dextrose 50% Injectable 12.5 Gram(s) IV Push once  famotidine    Tablet 20 milliGRAM(s) Oral daily  folic acid 1 milliGRAM(s) Oral daily  furosemide    Tablet 20 milliGRAM(s) Oral daily  insulin glargine Injectable (LANTUS) 14 Unit(s) SubCutaneous at bedtime  insulin lispro (HumaLOG) corrective regimen sliding scale   SubCutaneous three times a day with meals  insulin lispro Injectable (HumaLOG) 3 Unit(s) SubCutaneous three times a day with meals  lidocaine   Patch 2 Patch Transdermal <User Schedule>  metoprolol tartrate 25 milliGRAM(s) Oral every 8 hours  multivitamin   Solution 5 milliLiter(s) Oral daily  nystatin    Suspension 5 milliLiter(s) Oral two times a day  polyethylene glycol 3350 17 Gram(s) Oral daily  senna 2 Tablet(s) Oral at bedtime  zinc oxide 20% Ointment 1 Application(s) Topical daily    MEDICATIONS  (PRN):  acetaminophen    Suspension .. 650 milliGRAM(s) Oral every 6 hours PRN Mild Pain (1 - 3)  bisacodyl Suppository 10 milliGRAM(s) Rectal daily PRN Constipation  dextrose 40% Gel 15 Gram(s) Oral once PRN Blood Glucose LESS THAN 70 milliGRAM(s)/deciliter  glucagon  Injectable 1 milliGRAM(s) IntraMuscular once PRN Glucose LESS THAN 70 milligrams/deciliter      Allergies    No Known Allergies    Intolerances      PHYSICAL EXAM:   Vital Signs:  Vital Signs Last 24 Hrs  T(C): 36.4 (21 Feb 2019 08:45), Max: 36.4 (20 Feb 2019 21:59)  T(F): 97.6 (21 Feb 2019 08:45), Max: 97.6 (21 Feb 2019 08:45)  HR: 68 (21 Feb 2019 08:45) (60 - 72)  BP: 145/77 (21 Feb 2019 08:45) (143/82 - 145/77)  BP(mean): --  RR: 14 (21 Feb 2019 08:45) (14 - 16)  SpO2: 94% (21 Feb 2019 08:45) (94% - 97%)  Daily     Daily I&O's Summary    20 Feb 2019 07:01  -  21 Feb 2019 07:00  --------------------------------------------------------  IN: 390 mL / OUT: 0 mL / NET: 390 mL    GENERAL: NAD  HEENT:  conjunctivae clear and pink,   CHEST:  Full & symmetric excursion, no increased effort, breath sounds clear  HEART:  Regular rhythm, S1, S2, no edema  ABDOMEN:  Soft, non-tender, non-distended, normoactive bowel sounds,  Peg intact, red around site  EXTEREMITIES:  no cyanosis,clubbing or edema  SKIN:  No rash, warm/dry  NEURO:  Alert, oriented,     LABS:                        13.8   6.4   )-----------( 291      ( 20 Feb 2019 06:00 )             43.7     02-20    136  |  97  |  26<H>  ----------------------------<  195<H>  4.0   |  29  |  0.90    Ca    9.4      20 Feb 2019 06:00    TPro  7.4  /  Alb  2.6<L>  /  TBili  0.9  /  DBili  x   /  AST  40  /  ALT  32  /  AlkPhos  108  02-20    PT/INR - ( 21 Feb 2019 05:45 )   PT: 23.9 sec;   INR: 2.09 ratio             amylase   lipase  RADIOLOGY & ADDITIONAL TESTS:

## 2019-02-21 NOTE — PROGRESS NOTE ADULT - ASSESSMENT
87 y/o female with multiple comorbidities being followed by GI for Peg tube malfunction. Retention ring was loosened from abdomen. Erythema and irritation around peg much less today.     Plan:   Continue with Zinc BID to Peg site

## 2019-02-21 NOTE — PROGRESS NOTE ADULT - SUBJECTIVE AND OBJECTIVE BOX
HPI: 88 year old  female with a pmh of CAD, HTN, HLD, pacemaker who presented to [facility] as a transfer from [facility] with right hemiparesis and aphasia and left proximal MCA occlusion. The patient was last known well at 2 pm on 2/3/19 and was then found down on the ground by her daughter several hours later and was noted to be not speaking and weak on the right. At [facility] a CTA head and neck was performed which showed a proximal left MCA occlusion, and a CT head showed a dense left MCA sign with some hypodensity and early ischemic changes in the left MCA distribution. She did not receive IV tPA as she was out of the window and was transferred to [facility] for possible mechanical thrombectomy. At [facility] her CT perfusion showed zero core infarct, with a penumbra of 80 mls, and an infinite mismatch. She was deemed a candidate for intervention and recanalization.  Stroke thought to be 2* cardioembolism   Patient failed speech and swallow, PEG was placed on 2/12 and has been tolerating tube feeds.   Patient found be more lethargic on 2/13. Repeat CT head was stable. UA positive and patient started on antibiotics. Urine cx sent and pending results. Of note, CT head showed sphenoid air fluid level. Recs to start Augmentin if s/s of sinusitis start. Admitted to rehab on 2/14/19      Subjective: Patient seen at bedside.   Denies any pain, SOB, CP, HA, nausea, vomiting, abdominal pain, dysuria. Reports that she is tolerating her diet. No coughing/choking with swallowing. Reports that she feels tired during the day but reporting that she is sleeping through the night. Reports moving her bowels yesterday. Reports dry eyes.     REVIEW OF SYSTEMS  [ x  ] Constitutional WNL, no overt signs of bleeding   [ x  ] Cardio WNL  [x   ] Resp WNL  [   ] GI PEG    Vital Signs Last 24 Hrs  T(C): 36.4 (21 Feb 2019 08:45), Max: 36.4 (20 Feb 2019 21:59)  T(F): 97.6 (21 Feb 2019 08:45), Max: 97.6 (21 Feb 2019 08:45)  HR: 68 (21 Feb 2019 08:45) (60 - 72)  BP: 145/77 (21 Feb 2019 08:45) (143/82 - 145/77)  RR: 14 (21 Feb 2019 08:45) (14 - 16)  SpO2: 94% (21 Feb 2019 08:45) (94% - 97%)    PHYSICAL EXAM  General: nad, obese female  Cardio: S1S2  Resp: CTA  Abdomen: soft, PEG site with scant drainage, erythema improving around peg insertion site.   Neuro: aao x self, place. Right facial droop, expressive deficits improving, right hemiplegia with increased tone - some movement noted both in UE and LE, about 2/5 right shoulder/elbow/wrist/hand grasp   Extrem: no edema, no calf tenderness  skin: no overt signs of bleeding      RECENT LABS:    PT/INR - ( 21 Feb 2019 05:45 )   PT: 23.9 sec;   INR: 2.09 ratio                 02-20    136  |  97  |  26<H>  ----------------------------<  195<H>  4.0   |  29  |  0.90    Ca    9.4      20 Feb 2019 06:00  Mg     2.1     02-19    TPro  7.4  /  Alb  2.6<L>  /  TBili  0.9  /  DBili  x   /  AST  40  /  ALT  32  /  AlkPhos  108  02-20                        13.8   6.4   )-----------( 291      ( 20 Feb 2019 06:00 )             43.7       CAPILLARY BLOOD GLUCOSE    POCT  Blood Glucose (02.21.19 @ 07:58)    POCT Blood Glucose.: 173 mg/dL    POCT  Blood Glucose (02.20.19 @ 21:58)    POCT Blood Glucose.: 135 mg/dL    POCT  Blood Glucose (02.20.19 @ 17:06)    POCT Blood Glucose.: 190 mg/dL      MEDICATIONS  (STANDING):  amiodarone    Tablet 200 milliGRAM(s) Oral daily  ascorbic acid 500 milliGRAM(s) Oral two times a day  atorvastatin 20 milliGRAM(s) Oral at bedtime  chlorhexidine 0.12% Liquid 15 milliLiter(s) Swish and Spit two times a day  dextrose 5%. 1000 milliLiter(s) (50 mL/Hr) IV Continuous <Continuous>  dextrose 50% Injectable 12.5 Gram(s) IV Push once  famotidine    Tablet 20 milliGRAM(s) Oral daily  folic acid 1 milliGRAM(s) Oral daily  furosemide    Tablet 20 milliGRAM(s) Oral daily  insulin glargine Injectable (LANTUS) 14 Unit(s) SubCutaneous at bedtime  insulin lispro (HumaLOG) corrective regimen sliding scale   SubCutaneous every 6 hours  insulin lispro Injectable (HumaLOG) 3 Unit(s) SubCutaneous four times a day before meals  lidocaine   Patch 2 Patch Transdermal <User Schedule>  metoprolol tartrate 25 milliGRAM(s) Oral every 8 hours  multivitamin   Solution 5 milliLiter(s) Oral daily  nystatin    Suspension 5 milliLiter(s) Oral two times a day  polyethylene glycol 3350 17 Gram(s) Oral daily  senna 2 Tablet(s) Oral at bedtime  warfarin 2 milliGRAM(s) Oral at bedtime  zinc oxide 20% Ointment 1 Application(s) Topical daily    MEDICATIONS  (PRN):  acetaminophen    Suspension .. 650 milliGRAM(s) Oral every 6 hours PRN Mild Pain (1 - 3)  bisacodyl Suppository 10 milliGRAM(s) Rectal daily PRN Constipation  dextrose 40% Gel 15 Gram(s) Oral once PRN Blood Glucose LESS THAN 70 milliGRAM(s)/deciliter  glucagon  Injectable 1 milliGRAM(s) IntraMuscular once PRN Glucose LESS THAN 70 milligrams/deciliter      A/P:    88 year old female with risk factors as stated above admitted for comprehensive rehab with left MCA infarct s/p recanalization procedure at [facility]  Continue full rehab program:PT/OT/SLP 3 hrs/day 5 days/week  On AC in setting atrial flutter,  statins, risk factor management    2. Dysphagia: Passed MBS on 2/20, now tolerating pureed diet with nectar thick liquids     3. Atrial flutter : on amiodarone metoprolol. INR 2.09 this am, will give 3mg coumadin tonight and recheck INR in am.     4. UTI: completed abx course    5. DM-ii:- PEG feeds Lantus, SSI and premeal 3 units of humalog (increased on 2/18 to 4x day)  Accuchecks improving--will confirm home regemin for diabetes management.     6. Diastolic HF: on lasix . monitor potassium     7. Pain: on tylenol prn lidoderm patch     8. Thrush: continue nystatin swish and suction    9. Skin: PEG site irritation improved. GI fu noted. Retention ring adjusted    10: motor recovery: will start prozac 10mg qHS    11: daytime fatigue: will add melatonin to improve sleep regimen. Will check TSH (on amiodarone).     12. Dry eyes: will start artificial tears PRN         Labs in am 2/22  TSH added to am labs HPI: 88 year old  female with a pmh of CAD, HTN, HLD, pacemaker who presented to [facility] as a transfer from [facility] with right hemiparesis and aphasia and left proximal MCA occlusion. The patient was last known well at 2 pm on 2/3/19 and was then found down on the ground by her daughter several hours later and was noted to be not speaking and weak on the right. At [facility] a CTA head and neck was performed which showed a proximal left MCA occlusion, and a CT head showed a dense left MCA sign with some hypodensity and early ischemic changes in the left MCA distribution. She did not receive IV tPA as she was out of the window and was transferred to [facility] for possible mechanical thrombectomy. At [facility] her CT perfusion showed zero core infarct, with a penumbra of 80 mls, and an infinite mismatch. She was deemed a candidate for intervention and recanalization.  Stroke thought to be 2* cardioembolism   Patient failed speech and swallow, PEG was placed on 2/12 and has been tolerating tube feeds.   Patient found be more lethargic on 2/13. Repeat CT head was stable. UA positive and patient started on antibiotics. Urine cx sent and pending results. Of note, CT head showed sphenoid air fluid level. Recs to start Augmentin if s/s of sinusitis start. Admitted to rehab on 2/14/19      Subjective: Patient seen at bedside.   Denies any pain, SOB, CP, HA, nausea, vomiting, abdominal pain, dysuria. Reports that she is tolerating her diet. No coughing/choking with swallowing. Reports that she feels tired during the day but reporting that she is sleeping through the night. Reports moving her bowels yesterday. Reports dry eyes.     REVIEW OF SYSTEMS  [ x  ] Constitutional WNL, no overt signs of bleeding   [ x  ] Cardio WNL  [x   ] Resp WNL  [   ] GI PEG    Vital Signs Last 24 Hrs  T(C): 36.4 (21 Feb 2019 08:45), Max: 36.4 (20 Feb 2019 21:59)  T(F): 97.6 (21 Feb 2019 08:45), Max: 97.6 (21 Feb 2019 08:45)  HR: 68 (21 Feb 2019 08:45) (60 - 72)  BP: 145/77 (21 Feb 2019 08:45) (143/82 - 145/77)  RR: 14 (21 Feb 2019 08:45) (14 - 16)  SpO2: 94% (21 Feb 2019 08:45) (94% - 97%)    PHYSICAL EXAM  General: nad, obese female  Cardio: S1S2  Resp: CTA  Abdomen: soft, PEG site with scant drainage, erythema improving around peg insertion site.   Neuro: aao x self, place. Right facial droop, expressive deficits improving, right hemiplegia with increased tone - some movement noted both in UE and LE, about 2/5 right shoulder/elbow/wrist/hand grasp   Extrem: no edema, no calf tenderness  skin: no overt signs of bleeding      RECENT LABS:  PT/INR - ( 21 Feb 2019 05:45 )   PT: 23.9 sec;   INR: 2.09 ratio          02-20    136  |  97  |  26<H>  ----------------------------<  195<H>  4.0   |  29  |  0.90    Ca    9.4      20 Feb 2019 06:00  Mg     2.1     02-19    TPro  7.4  /  Alb  2.6<L>  /  TBili  0.9  /  DBili  x   /  AST  40  /  ALT  32  /  AlkPhos  108  02-20                        13.8   6.4   )-----------( 291      ( 20 Feb 2019 06:00 )             43.7       CAPILLARY BLOOD GLUCOSE    POCT  Blood Glucose (02.21.19 @ 07:58)    POCT Blood Glucose.: 173 mg/dL    POCT  Blood Glucose (02.20.19 @ 21:58)    POCT Blood Glucose.: 135 mg/dL    POCT  Blood Glucose (02.20.19 @ 17:06)    POCT Blood Glucose.: 190 mg/dL      MEDICATIONS  (STANDING):  amiodarone    Tablet 200 milliGRAM(s) Oral daily  ascorbic acid 500 milliGRAM(s) Oral two times a day  atorvastatin 20 milliGRAM(s) Oral at bedtime  chlorhexidine 0.12% Liquid 15 milliLiter(s) Swish and Spit two times a day  dextrose 5%. 1000 milliLiter(s) (50 mL/Hr) IV Continuous <Continuous>  dextrose 50% Injectable 12.5 Gram(s) IV Push once  famotidine    Tablet 20 milliGRAM(s) Oral daily  folic acid 1 milliGRAM(s) Oral daily  furosemide    Tablet 20 milliGRAM(s) Oral daily  insulin glargine Injectable (LANTUS) 14 Unit(s) SubCutaneous at bedtime  insulin lispro (HumaLOG) corrective regimen sliding scale   SubCutaneous every 6 hours  insulin lispro Injectable (HumaLOG) 3 Unit(s) SubCutaneous four times a day before meals  lidocaine   Patch 2 Patch Transdermal <User Schedule>  metoprolol tartrate 25 milliGRAM(s) Oral every 8 hours  multivitamin   Solution 5 milliLiter(s) Oral daily  nystatin    Suspension 5 milliLiter(s) Oral two times a day  polyethylene glycol 3350 17 Gram(s) Oral daily  senna 2 Tablet(s) Oral at bedtime  warfarin 2 milliGRAM(s) Oral at bedtime  zinc oxide 20% Ointment 1 Application(s) Topical daily    MEDICATIONS  (PRN):  acetaminophen    Suspension .. 650 milliGRAM(s) Oral every 6 hours PRN Mild Pain (1 - 3)  bisacodyl Suppository 10 milliGRAM(s) Rectal daily PRN Constipation  dextrose 40% Gel 15 Gram(s) Oral once PRN Blood Glucose LESS THAN 70 milliGRAM(s)/deciliter  glucagon  Injectable 1 milliGRAM(s) IntraMuscular once PRN Glucose LESS THAN 70 milligrams/deciliter      A/P:    88 year old female with risk factors as stated above admitted for comprehensive rehab with left MCA infarct s/p recanalization procedure at [facility]  Continue full rehab program:PT/OT/SLP 3 hrs/day 5 days/week  On AC in setting atrial flutter,  statins, risk factor management    2. Dysphagia: Passed MBS on 2/20, now tolerating pureed diet with nectar thick liquids     3. Atrial flutter : on amiodarone metoprolol. INR 2.09 this am, will give 3mg coumadin tonight and recheck INR in am.     4. UTI: completed abx course    5. DM-ii:- PEG feeds Lantus, SSI and premeal 3 units of humalog (increased on 2/18 to 4x day)  Accuchecks improving--will confirm home regemin for diabetes management.     6. Diastolic HF: on lasix . monitor potassium     7. Pain: on tylenol prn lidoderm patch     8. Thrush: continue nystatin swish and suction    9. Skin: PEG site irritation improved. GI fu noted. Retention ring adjusted    10: motor recovery: will start prozac 10mg qHS    11: daytime fatigue: will add melatonin to improve sleep regimen. Will check TSH (on amiodarone).     12. Dry eyes: will start artificial tears PRN         Labs in am 2/22  TSH added to am labs

## 2019-02-21 NOTE — PROGRESS NOTE ADULT - ATTENDING COMMENTS
Chart reviewed. Patient seen at bedside.   Seated in WC.   States that she feels ok, Exam with expressive aphasia and dysarthria, right hemiplegia-with increased tone   Started on puree with nectar after MBS on 2/20    2. Start melatonin to improve night sleep and Prozac for motor recovery    3. INR 2.09, coumadin 3mg today and INR in am     4. PEG site erythema improved    5. Team conference today: FREDRICK 3/12/19

## 2019-02-22 LAB
ALBUMIN SERPL ELPH-MCNC: 2.7 G/DL — LOW (ref 3.3–5)
ALP SERPL-CCNC: 104 U/L — SIGNIFICANT CHANGE UP (ref 40–120)
ALT FLD-CCNC: 45 U/L DA — SIGNIFICANT CHANGE UP (ref 10–45)
ANION GAP SERPL CALC-SCNC: 10 MMOL/L — SIGNIFICANT CHANGE UP (ref 5–17)
AST SERPL-CCNC: 49 U/L — HIGH (ref 10–40)
BILIRUB SERPL-MCNC: 1.2 MG/DL — SIGNIFICANT CHANGE UP (ref 0.2–1.2)
BUN SERPL-MCNC: 28 MG/DL — HIGH (ref 7–23)
CALCIUM SERPL-MCNC: 9.4 MG/DL — SIGNIFICANT CHANGE UP (ref 8.4–10.5)
CHLORIDE SERPL-SCNC: 98 MMOL/L — SIGNIFICANT CHANGE UP (ref 96–108)
CO2 SERPL-SCNC: 29 MMOL/L — SIGNIFICANT CHANGE UP (ref 22–31)
CREAT SERPL-MCNC: 0.86 MG/DL — SIGNIFICANT CHANGE UP (ref 0.5–1.3)
GLUCOSE BLDC GLUCOMTR-MCNC: 104 MG/DL — HIGH (ref 70–99)
GLUCOSE BLDC GLUCOMTR-MCNC: 135 MG/DL — HIGH (ref 70–99)
GLUCOSE BLDC GLUCOMTR-MCNC: 137 MG/DL — HIGH (ref 70–99)
GLUCOSE BLDC GLUCOMTR-MCNC: 166 MG/DL — HIGH (ref 70–99)
GLUCOSE SERPL-MCNC: 151 MG/DL — HIGH (ref 70–99)
HCT VFR BLD CALC: 41.2 % — SIGNIFICANT CHANGE UP (ref 34.5–45)
HGB BLD-MCNC: 13.3 G/DL — SIGNIFICANT CHANGE UP (ref 11.5–15.5)
INR BLD: 2.47 RATIO — HIGH (ref 0.88–1.16)
MCHC RBC-ENTMCNC: 29.6 PG — SIGNIFICANT CHANGE UP (ref 27–34)
MCHC RBC-ENTMCNC: 32.3 GM/DL — SIGNIFICANT CHANGE UP (ref 32–36)
MCV RBC AUTO: 91.6 FL — SIGNIFICANT CHANGE UP (ref 80–100)
PLATELET # BLD AUTO: 302 K/UL — SIGNIFICANT CHANGE UP (ref 150–400)
POTASSIUM SERPL-MCNC: 3.8 MMOL/L — SIGNIFICANT CHANGE UP (ref 3.5–5.3)
POTASSIUM SERPL-SCNC: 3.8 MMOL/L — SIGNIFICANT CHANGE UP (ref 3.5–5.3)
PROT SERPL-MCNC: 7.3 G/DL — SIGNIFICANT CHANGE UP (ref 6–8.3)
PROTHROM AB SERPL-ACNC: 28.4 SEC — HIGH (ref 10–12.9)
RBC # BLD: 4.49 M/UL — SIGNIFICANT CHANGE UP (ref 3.8–5.2)
RBC # FLD: 12.8 % — SIGNIFICANT CHANGE UP (ref 10.3–14.5)
SODIUM SERPL-SCNC: 137 MMOL/L — SIGNIFICANT CHANGE UP (ref 135–145)
TSH SERPL-MCNC: 6.04 UIU/ML — HIGH (ref 0.27–4.2)
WBC # BLD: 6.3 K/UL — SIGNIFICANT CHANGE UP (ref 3.8–10.5)
WBC # FLD AUTO: 6.3 K/UL — SIGNIFICANT CHANGE UP (ref 3.8–10.5)

## 2019-02-22 PROCEDURE — 99232 SBSQ HOSP IP/OBS MODERATE 35: CPT

## 2019-02-22 PROCEDURE — 99232 SBSQ HOSP IP/OBS MODERATE 35: CPT | Mod: GC

## 2019-02-22 PROCEDURE — 93010 ELECTROCARDIOGRAM REPORT: CPT

## 2019-02-22 PROCEDURE — 99233 SBSQ HOSP IP/OBS HIGH 50: CPT

## 2019-02-22 RX ORDER — INSULIN GLARGINE 100 [IU]/ML
10 INJECTION, SOLUTION SUBCUTANEOUS AT BEDTIME
Qty: 0 | Refills: 0 | Status: DISCONTINUED | OUTPATIENT
Start: 2019-02-22 | End: 2019-02-22

## 2019-02-22 RX ORDER — METFORMIN HYDROCHLORIDE 850 MG/1
500 TABLET ORAL ONCE
Qty: 0 | Refills: 0 | Status: COMPLETED | OUTPATIENT
Start: 2019-02-22 | End: 2019-02-22

## 2019-02-22 RX ORDER — WARFARIN SODIUM 2.5 MG/1
2 TABLET ORAL ONCE
Qty: 0 | Refills: 0 | Status: COMPLETED | OUTPATIENT
Start: 2019-02-22 | End: 2019-02-22

## 2019-02-22 RX ORDER — METFORMIN HYDROCHLORIDE 850 MG/1
500 TABLET ORAL DAILY
Qty: 0 | Refills: 0 | Status: DISCONTINUED | OUTPATIENT
Start: 2019-02-23 | End: 2019-02-25

## 2019-02-22 RX ORDER — INSULIN GLARGINE 100 [IU]/ML
8 INJECTION, SOLUTION SUBCUTANEOUS AT BEDTIME
Qty: 0 | Refills: 0 | Status: DISCONTINUED | OUTPATIENT
Start: 2019-02-22 | End: 2019-02-25

## 2019-02-22 RX ADMIN — ZINC OXIDE 1 APPLICATION(S): 200 OINTMENT TOPICAL at 12:04

## 2019-02-22 RX ADMIN — Medication 3 UNIT(S): at 07:51

## 2019-02-22 RX ADMIN — Medication 20 MILLIGRAM(S): at 05:11

## 2019-02-22 RX ADMIN — Medication 500 MILLIGRAM(S): at 05:11

## 2019-02-22 RX ADMIN — Medication 1 MILLIGRAM(S): at 11:56

## 2019-02-22 RX ADMIN — METFORMIN HYDROCHLORIDE 500 MILLIGRAM(S): 850 TABLET ORAL at 15:50

## 2019-02-22 RX ADMIN — Medication 3 MILLIGRAM(S): at 22:19

## 2019-02-22 RX ADMIN — SENNA PLUS 2 TABLET(S): 8.6 TABLET ORAL at 22:19

## 2019-02-22 RX ADMIN — Medication 25 MILLIGRAM(S): at 15:49

## 2019-02-22 RX ADMIN — Medication 3 UNIT(S): at 11:58

## 2019-02-22 RX ADMIN — LIDOCAINE 2 PATCH: 4 CREAM TOPICAL at 07:53

## 2019-02-22 RX ADMIN — WARFARIN SODIUM 2 MILLIGRAM(S): 2.5 TABLET ORAL at 22:19

## 2019-02-22 RX ADMIN — LIDOCAINE 2 PATCH: 4 CREAM TOPICAL at 06:20

## 2019-02-22 RX ADMIN — ATORVASTATIN CALCIUM 20 MILLIGRAM(S): 80 TABLET, FILM COATED ORAL at 22:19

## 2019-02-22 RX ADMIN — Medication 5 MILLILITER(S): at 18:10

## 2019-02-22 RX ADMIN — Medication 5 MILLILITER(S): at 05:11

## 2019-02-22 RX ADMIN — CHLORHEXIDINE GLUCONATE 15 MILLILITER(S): 213 SOLUTION TOPICAL at 18:10

## 2019-02-22 RX ADMIN — Medication 500 MILLIGRAM(S): at 18:10

## 2019-02-22 RX ADMIN — INSULIN GLARGINE 8 UNIT(S): 100 INJECTION, SOLUTION SUBCUTANEOUS at 22:19

## 2019-02-22 RX ADMIN — Medication 25 MILLIGRAM(S): at 22:19

## 2019-02-22 RX ADMIN — LIDOCAINE 2 PATCH: 4 CREAM TOPICAL at 18:11

## 2019-02-22 RX ADMIN — Medication 25 MILLIGRAM(S): at 05:11

## 2019-02-22 RX ADMIN — POLYETHYLENE GLYCOL 3350 17 GRAM(S): 17 POWDER, FOR SOLUTION ORAL at 11:57

## 2019-02-22 RX ADMIN — FAMOTIDINE 20 MILLIGRAM(S): 10 INJECTION INTRAVENOUS at 11:56

## 2019-02-22 RX ADMIN — CHLORHEXIDINE GLUCONATE 15 MILLILITER(S): 213 SOLUTION TOPICAL at 05:11

## 2019-02-22 RX ADMIN — Medication 10 MILLIGRAM(S): at 22:19

## 2019-02-22 RX ADMIN — Medication 2: at 11:58

## 2019-02-22 RX ADMIN — AMIODARONE HYDROCHLORIDE 200 MILLIGRAM(S): 400 TABLET ORAL at 05:11

## 2019-02-22 RX ADMIN — Medication 5 MILLILITER(S): at 11:57

## 2019-02-22 NOTE — PROGRESS NOTE ADULT - ASSESSMENT
Pt is a 87yo F admitted to acute rehab secondary to Left MCA CVA s/p endovascular intervention with right side hemiparesis, dysphagia s/p PEG placement, dysarthria and expressive aphasia.     #Left MCA CVA  c/w PT/OT/SLP   c/w statin     #Dysphagia/dysarthria  SLP  PEG+ , c/w PEG feed   c/w Zn Oxide , GI following     #A-Flutter  Rate controlled   c/w amiodarone and lopressor  Pacemaker in place   INR at goal  2.47, c/w Coumadin        #HTN  at goal   Cont lopressor     #DM   Morning FS not at goal, consider increasing Lantus to 16 units   c/w 3 U premeal units TID   c/w SSI , FS monitoring   Hypoglycemia protocol.     #Diastolic CHF- stable.,   c/w Lasix , BB     DVT px  on Coumadin

## 2019-02-22 NOTE — PROGRESS NOTE ADULT - ATTENDING COMMENTS
Chart reviewed. Patient seen at bedside.   Denies any new issues overnight. Slept well, - started on melatonin for sleep    2. Started on Prozac for mood/motor recovery    3. Improved RLE strength- with anti gravity knee extension noted    4. PEG site erythema improved. PO intake good. Continue water flushes via PEG to maintain hydration     5. INR therapeutic. Dose coumadin today. INR in am     6. DM- start oral agents with plans to wean off insulin

## 2019-02-22 NOTE — PROGRESS NOTE ADULT - ASSESSMENT
87 y/o female with multiple comorbidities being followed by GI for Peg tube malfunction. Retention ring was loosened from abdomen on 2/19/19 . Erythema continues to improve.     Plan:   Continue with Zinc BID to Peg site

## 2019-02-22 NOTE — PROGRESS NOTE ADULT - SUBJECTIVE AND OBJECTIVE BOX
Patient is a 88y old  Female who presents with a chief complaint of CVA with right sided weakness, dysphagia, dysarthria, expressive aphasia (21 Feb 2019 11:49)      Patient seen and examined at bedside.     ALLERGIES:  No Known Allergies    MEDICATIONS:  acetaminophen    Suspension .. 650 milliGRAM(s) Oral every 6 hours PRN  artificial  tears Solution 2 Drop(s) Both EYES every 2 hours PRN  atorvastatin 20 milliGRAM(s) Oral at bedtime  bisacodyl Suppository 10 milliGRAM(s) Rectal daily PRN  dextrose 40% Gel 15 Gram(s) Oral once PRN  dextrose 5%. 1000 milliLiter(s) IV Continuous <Continuous>  dextrose 50% Injectable 12.5 Gram(s) IV Push once  famotidine    Tablet 20 milliGRAM(s) Oral daily  FLUoxetine 10 milliGRAM(s) Oral at bedtime  glucagon  Injectable 1 milliGRAM(s) IntraMuscular once PRN  insulin glargine Injectable (LANTUS) 14 Unit(s) SubCutaneous at bedtime  insulin lispro (HumaLOG) corrective regimen sliding scale   SubCutaneous three times a day with meals  insulin lispro Injectable (HumaLOG) 3 Unit(s) SubCutaneous three times a day with meals  lidocaine   Patch 2 Patch Transdermal <User Schedule>  melatonin 3 milliGRAM(s) Oral at bedtime  multivitamin   Solution 5 milliLiter(s) Oral daily  nystatin    Suspension 5 milliLiter(s) Oral two times a day  polyethylene glycol 3350 17 Gram(s) Oral daily  senna 2 Tablet(s) Oral at bedtime  zinc oxide 20% Ointment 1 Application(s) Topical daily    Vital Signs Last 24 Hrs  T(F): 97.6 (22 Feb 2019 07:39), Max: 97.6 (22 Feb 2019 07:39)  HR: 70 (22 Feb 2019 07:39) (70 - 82)  BP: 132/80 (22 Feb 2019 07:39) (124/81 - 155/90)  RR: 16 (22 Feb 2019 07:39) (15 - 16)  SpO2: 98% (22 Feb 2019 07:39) (96% - 98%)  I&O's Summary    21 Feb 2019 07:01  -  22 Feb 2019 07:00  --------------------------------------------------------  IN: 534 mL / OUT: 0 mL / NET: 534 mL        PHYSICAL EXAM:  General: NAD, comfortable   ENT: MMM  Neck: Supple, No JVD  Lungs: CTA, BLAE, No added sounds.   Cardio: RRR, S1/S2, No murmurs  Abdomen: Soft, NT/ND, BS+   Extremities: No edema  CNS: Rt Facial weakness/ Rt hemiparesis     LABS:                        13.3   6.3   )-----------( 302      ( 22 Feb 2019 05:45 )             41.2     02-22    137  |  98  |  28  ----------------------------<  151  3.8   |  29  |  0.86    Ca    9.4      22 Feb 2019 05:45    TPro  7.3  /  Alb  2.7  /  TBili  1.2  /  DBili  x   /  AST  49  /  ALT  45  /  AlkPhos  104  02-22    eGFR if Non African American: 60 mL/min/1.73M2 (02-22-19 @ 05:45)  eGFR if African American: 70 mL/min/1.73M2 (02-22-19 @ 05:45)    PT/INR - ( 22 Feb 2019 05:45 )   PT: 28.4 sec;   INR: 2.47 ratio                 02-04 Chol 124 mg/dL LDL 55 mg/dL HDL 37 mg/dL Trig 159 mg/dL        CAPILLARY BLOOD GLUCOSE      POCT Blood Glucose.: 135 mg/dL (22 Feb 2019 07:28)  POCT Blood Glucose.: 129 mg/dL (21 Feb 2019 22:10)  POCT Blood Glucose.: 184 mg/dL (21 Feb 2019 16:18)  POCT Blood Glucose.: 187 mg/dL (21 Feb 2019 12:12)    02-04 [id number] 6.6          RADIOLOGY & ADDITIONAL TESTS:    Care Discussed with Consultants/Other Providers:

## 2019-02-22 NOTE — PROGRESS NOTE ADULT - SUBJECTIVE AND OBJECTIVE BOX
HPI: 88 year old  female with a pmh of CAD, HTN, HLD, pacemaker who presented to [facility] as a transfer from [facility] with right hemiparesis and aphasia and left proximal MCA occlusion. The patient was last known well at 2 pm on 2/3/19 and was then found down on the ground by her daughter several hours later and was noted to be not speaking and weak on the right. At [facility] a CTA head and neck was performed which showed a proximal left MCA occlusion, and a CT head showed a dense left MCA sign with some hypodensity and early ischemic changes in the left MCA distribution. She did not receive IV tPA as she was out of the window and was transferred to [facility] for possible mechanical thrombectomy. At [facility] her CT perfusion showed zero core infarct, with a penumbra of 80 mls, and an infinite mismatch. She was deemed a candidate for intervention and recanalization.  Stroke thought to be 2* cardioembolism   Patient failed speech and swallow, PEG was placed on 2/12 and has been tolerating tube feeds.   Patient found be more lethargic on 2/13. Repeat CT head was stable. UA positive and patient started on antibiotics. Urine cx sent and pending results. Of note, CT head showed sphenoid air fluid level. Recs to start Augmentin if s/s of sinusitis start. Admitted to rehab on 2/14/19      Subjective: Patient seen at bedside.   Denies any pain, SOB, CP, HA, nausea, vomiting, abdominal pain, dysuria. Reports that she is tolerating her diet. No coughing/choking with swallowing. Reports that she feels tired during the day but reporting that she is sleeping through the night. Reports moving her bowels yesterday. Reports dry eyes.     REVIEW OF SYSTEMS  [ x  ] Constitutional WNL, no overt signs of bleeding   [ x  ] Cardio WNL  [x   ] Resp WNL  [   ] GI PEG    Vital Signs Last 24 Hrs  Vital Signs Last 24 Hrs  T(C): 36.4 (22 Feb 2019 07:39), Max: 36.4 (21 Feb 2019 20:29)  T(F): 97.6 (22 Feb 2019 07:39), Max: 97.6 (22 Feb 2019 07:39)  HR: 70 (22 Feb 2019 07:39) (70 - 82)  BP: 132/80 (22 Feb 2019 07:39) (124/81 - 155/90)  RR: 16 (22 Feb 2019 07:39) (15 - 16)  SpO2: 98% (22 Feb 2019 07:39) (96% - 98%)    PHYSICAL EXAM  General: nad, obese female  Cardio: S1S2  Resp: CTA  Abdomen: soft, PEG site with scant drainage, erythema continues to improve around peg insertion site. Small amt drainage noted.  Neuro: aao x self, place. Right facial droop, expressive deficits improving, right hemiplegia with increased tone - continued improvement in movement noted both in UE and LE, about 2-3/5 right shoulder/elbow/wrist/hand grasp, approx 3/5 right hip flexor and knee extender 2/5 ankle strengths   Extrem: no edema, no calf tenderness  skin: no overt signs of bleeding      RECENT LABS:  PT/INR - ( 22 Feb 2019 05:45 )   PT: 28.4 sec;   INR: 2.47 ratio                02-22    137  |  98  |  28<H>  ----------------------------<  151<H>  3.8   |  29  |  0.86    Ca    9.4      22 Feb 2019 05:45    TPro  7.3  /  Alb  2.7<L>  /  TBili  1.2  /  DBili  x   /  AST  49<H>  /  ALT  45  /  AlkPhos  104  02-22                        13.3   6.3   )-----------( 302      ( 22 Feb 2019 05:45 )             41.2     CAPILLARY BLOOD GLUCOSE  POCT  Blood Glucose (02.22.19 @ 11:54)    POCT Blood Glucose.: 166 mg/dL  POCT  Blood Glucose (02.22.19 @ 07:28)    POCT Blood Glucose.: 135 mg/dL  POCT  Blood Glucose (02.21.19 @ 22:10)    POCT Blood Glucose.: 129 mg/dL  POCT  Blood Glucose (02.21.19 @ 07:58)    POCT Blood Glucose.: 173 mg/dL  POCT  Blood Glucose (02.20.19 @ 21:58)    POCT Blood Glucose.: 135 mg/dL  POCT  Blood Glucose (02.20.19 @ 17:06)    POCT Blood Glucose.: 190 mg/dL      MEDICATIONS  (STANDING):  amiodarone    Tablet 200 milliGRAM(s) Oral daily  ascorbic acid 500 milliGRAM(s) Oral two times a day  atorvastatin 20 milliGRAM(s) Oral at bedtime  chlorhexidine 0.12% Liquid 15 milliLiter(s) Swish and Spit two times a day  dextrose 5%. 1000 milliLiter(s) (50 mL/Hr) IV Continuous <Continuous>  dextrose 50% Injectable 12.5 Gram(s) IV Push once  famotidine    Tablet 20 milliGRAM(s) Oral daily  folic acid 1 milliGRAM(s) Oral daily  furosemide    Tablet 20 milliGRAM(s) Oral daily  insulin glargine Injectable (LANTUS) 14 Unit(s) SubCutaneous at bedtime  insulin lispro (HumaLOG) corrective regimen sliding scale   SubCutaneous every 6 hours  insulin lispro Injectable (HumaLOG) 3 Unit(s) SubCutaneous four times a day before meals  lidocaine   Patch 2 Patch Transdermal <User Schedule>  metoprolol tartrate 25 milliGRAM(s) Oral every 8 hours  multivitamin   Solution 5 milliLiter(s) Oral daily  nystatin    Suspension 5 milliLiter(s) Oral two times a day  polyethylene glycol 3350 17 Gram(s) Oral daily  senna 2 Tablet(s) Oral at bedtime  warfarin 2 milliGRAM(s) Oral at bedtime  zinc oxide 20% Ointment 1 Application(s) Topical daily    MEDICATIONS  (PRN):  acetaminophen    Suspension .. 650 milliGRAM(s) Oral every 6 hours PRN Mild Pain (1 - 3)  bisacodyl Suppository 10 milliGRAM(s) Rectal daily PRN Constipation  dextrose 40% Gel 15 Gram(s) Oral once PRN Blood Glucose LESS THAN 70 milliGRAM(s)/deciliter  glucagon  Injectable 1 milliGRAM(s) IntraMuscular once PRN Glucose LESS THAN 70 milligrams/deciliter      A/P:    88 year old female with risk factors as stated above admitted for comprehensive rehab with left MCA infarct s/p recanalization procedure at [facility]  Continue full rehab program:PT/OT/SLP 3 hrs/day 5 days/week  On AC in setting atrial flutter,  statins, risk factor management    2. Dysphagia: Passed MBS on 2/20, now tolerating pureed diet with nectar thick liquids     3. Atrial flutter : on amiodarone metoprolol. INR 2.09 this am, will give 3mg coumadin tonight and recheck INR in am.     4. UTI: completed abx course    5. DM-ii:- PEG feeds Lantus-decreased to 8 units, SSI and stopped premeal  Accuchecks improving--ranging 130-170s  Restart home dose of metformin   Home regimen: 500mg metformin ER daily   Daughter confirms accuchecks ranged 120-160s at home     6. Diastolic HF: on lasix . monitor potassium     7. Pain: on tylenol prn lidoderm patch     8. Thrush: continue nystatin swish and suction    9. Skin: PEG site irritation improved. GI fu noted. Retention ring adjusted    10: motor recovery: will start prozac 10mg qHS-discussed with daughter who agrees with starting prozac  Will check EKG (last EKG with Qtc/QT prolongation)    11: daytime fatigue: will add melatonin to improve sleep regimen. TSH results pending.     12. Dry eyes: will start artificial tears PRN         Labs in am 2/22  TSH added to am labs HPI: 88 year old  female with a pmh of CAD, HTN, HLD, pacemaker who presented to [facility] as a transfer from [facility] with right hemiparesis and aphasia and left proximal MCA occlusion. The patient was last known well at 2 pm on 2/3/19 and was then found down on the ground by her daughter several hours later and was noted to be not speaking and weak on the right. At [facility] a CTA head and neck was performed which showed a proximal left MCA occlusion, and a CT head showed a dense left MCA sign with some hypodensity and early ischemic changes in the left MCA distribution. She did not receive IV tPA as she was out of the window and was transferred to [facility] for possible mechanical thrombectomy. At [facility] her CT perfusion showed zero core infarct, with a penumbra of 80 mls, and an infinite mismatch. She was deemed a candidate for intervention and recanalization.  Stroke thought to be 2* cardioembolism   Patient failed speech and swallow, PEG was placed on 2/12 and has been tolerating tube feeds.   Patient found be more lethargic on 2/13. Repeat CT head was stable. UA positive and patient started on antibiotics. Urine cx sent and pending results. Of note, CT head showed sphenoid air fluid level. Recs to start Augmentin if s/s of sinusitis start. Admitted to rehab on 2/14/19      Subjective: Patient seen at bedside.   Denies any pain, SOB, CP, HA, nausea, vomiting, abdominal pain, dysuria. Reports that she is tolerating her diet. No coughing/choking with swallowing. Reports that she feels tired during the day but reporting that she is sleeping through the night. Reports moving her bowels yesterday. Reports dry eyes.     REVIEW OF SYSTEMS  [ x  ] Constitutional WNL, no overt signs of bleeding   [ x  ] Cardio WNL  [x   ] Resp WNL  [   ] GI PEG    Vital Signs Last 24 Hrs  Vital Signs Last 24 Hrs  T(C): 36.4 (22 Feb 2019 07:39), Max: 36.4 (21 Feb 2019 20:29)  T(F): 97.6 (22 Feb 2019 07:39), Max: 97.6 (22 Feb 2019 07:39)  HR: 70 (22 Feb 2019 07:39) (70 - 82)  BP: 132/80 (22 Feb 2019 07:39) (124/81 - 155/90)  RR: 16 (22 Feb 2019 07:39) (15 - 16)  SpO2: 98% (22 Feb 2019 07:39) (96% - 98%)    PHYSICAL EXAM  General: nad, obese female  Cardio: S1S2  Resp: CTA  Abdomen: soft, PEG site with scant drainage, erythema continues to improve around peg insertion site. Small amt drainage noted.  Neuro: aao x self, place. Right facial droop, expressive deficits improving, right hemiplegia with increased tone - continued improvement in movement noted both in UE and LE, about 2-3/5 right shoulder/elbow/wrist/hand grasp, approx 3/5 right hip flexor and knee extender 2/5 ankle strengths   Extrem: no edema, no calf tenderness  skin: no overt signs of bleeding      RECENT LABS:  PT/INR - ( 22 Feb 2019 05:45 )   PT: 28.4 sec;   INR: 2.47 ratio                02-22    137  |  98  |  28<H>  ----------------------------<  151<H>  3.8   |  29  |  0.86    Ca    9.4      22 Feb 2019 05:45    TPro  7.3  /  Alb  2.7<L>  /  TBili  1.2  /  DBili  x   /  AST  49<H>  /  ALT  45  /  AlkPhos  104  02-22                        13.3   6.3   )-----------( 302      ( 22 Feb 2019 05:45 )             41.2     CAPILLARY BLOOD GLUCOSE  POCT  Blood Glucose (02.22.19 @ 11:54)    POCT Blood Glucose.: 166 mg/dL  POCT  Blood Glucose (02.22.19 @ 07:28)    POCT Blood Glucose.: 135 mg/dL  POCT  Blood Glucose (02.21.19 @ 22:10)    POCT Blood Glucose.: 129 mg/dL  POCT  Blood Glucose (02.21.19 @ 07:58)    POCT Blood Glucose.: 173 mg/dL  POCT  Blood Glucose (02.20.19 @ 21:58)    POCT Blood Glucose.: 135 mg/dL  POCT  Blood Glucose (02.20.19 @ 17:06)    POCT Blood Glucose.: 190 mg/dL      MEDICATIONS  (STANDING):  amiodarone    Tablet 200 milliGRAM(s) Oral daily  ascorbic acid 500 milliGRAM(s) Oral two times a day  atorvastatin 20 milliGRAM(s) Oral at bedtime  chlorhexidine 0.12% Liquid 15 milliLiter(s) Swish and Spit two times a day  dextrose 5%. 1000 milliLiter(s) (50 mL/Hr) IV Continuous <Continuous>  dextrose 50% Injectable 12.5 Gram(s) IV Push once  famotidine    Tablet 20 milliGRAM(s) Oral daily  folic acid 1 milliGRAM(s) Oral daily  furosemide    Tablet 20 milliGRAM(s) Oral daily  insulin glargine Injectable (LANTUS) 14 Unit(s) SubCutaneous at bedtime  insulin lispro (HumaLOG) corrective regimen sliding scale   SubCutaneous every 6 hours  insulin lispro Injectable (HumaLOG) 3 Unit(s) SubCutaneous four times a day before meals  lidocaine   Patch 2 Patch Transdermal <User Schedule>  metoprolol tartrate 25 milliGRAM(s) Oral every 8 hours  multivitamin   Solution 5 milliLiter(s) Oral daily  nystatin    Suspension 5 milliLiter(s) Oral two times a day  polyethylene glycol 3350 17 Gram(s) Oral daily  senna 2 Tablet(s) Oral at bedtime  warfarin 2 milliGRAM(s) Oral at bedtime  zinc oxide 20% Ointment 1 Application(s) Topical daily    MEDICATIONS  (PRN):  acetaminophen    Suspension .. 650 milliGRAM(s) Oral every 6 hours PRN Mild Pain (1 - 3)  bisacodyl Suppository 10 milliGRAM(s) Rectal daily PRN Constipation  dextrose 40% Gel 15 Gram(s) Oral once PRN Blood Glucose LESS THAN 70 milliGRAM(s)/deciliter  glucagon  Injectable 1 milliGRAM(s) IntraMuscular once PRN Glucose LESS THAN 70 milligrams/deciliter      A/P:    88 year old female with risk factors as stated above admitted for comprehensive rehab with left MCA infarct s/p recanalization procedure at [facility]  Continue full rehab program:PT/OT/SLP 3 hrs/day 5 days/week  On AC in setting atrial flutter,  statins, risk factor management    2. Dysphagia: Passed MBS on 2/20, now tolerating pureed diet with nectar thick liquids     3. Atrial flutter : on amiodarone metoprolol. INR 2.09 this am, will give 3mg coumadin tonight and recheck INR in am.     4. UTI: completed abx course    5. DM-ii:- PEG feeds Lantus-decreased to 8 units, SSI and stopped premeal  Accuchecks improving--ranging 130-170s  Restart home dose of metformin   Home regimen: 500mg metformin ER daily   Daughter confirms accuchecks ranged 120-160s at home     6. Diastolic HF: on lasix . monitor potassium     7. Pain: on tylenol prn lidoderm patch     8. Thrush: continue nystatin swish and suction    9. Skin: PEG site irritation improved. GI fu noted. Retention ring adjusted    10: motor recovery: will start prozac 10mg qHS-discussed with daughter who agrees with starting prozac  Will check EKG (last EKG with Qtc/QT prolongation)    11: daytime fatigue: will add melatonin to improve sleep regimen. TSH results pending.     12. Dry eyes: will start artificial tears PRN         TSH added to am labs

## 2019-02-22 NOTE — PROGRESS NOTE ADULT - SUBJECTIVE AND OBJECTIVE BOX
INTERVAL HPI/OVERNIGHT EVENTS:  HPI:  89 y/o female being followed by GI for issues with peg tube. Patient seen and examined this morning. No complaints from patient at this time.     MEDICATIONS  (STANDING):  amiodarone    Tablet 200 milliGRAM(s) Oral daily  ascorbic acid 500 milliGRAM(s) Oral two times a day  atorvastatin 20 milliGRAM(s) Oral at bedtime  chlorhexidine 0.12% Liquid 15 milliLiter(s) Swish and Spit two times a day  dextrose 5%. 1000 milliLiter(s) (50 mL/Hr) IV Continuous <Continuous>  dextrose 50% Injectable 12.5 Gram(s) IV Push once  famotidine    Tablet 20 milliGRAM(s) Oral daily  FLUoxetine 10 milliGRAM(s) Oral at bedtime  folic acid 1 milliGRAM(s) Oral daily  furosemide    Tablet 20 milliGRAM(s) Oral daily  insulin glargine Injectable (LANTUS) 14 Unit(s) SubCutaneous at bedtime  insulin lispro (HumaLOG) corrective regimen sliding scale   SubCutaneous three times a day with meals  insulin lispro Injectable (HumaLOG) 3 Unit(s) SubCutaneous three times a day with meals  lidocaine   Patch 2 Patch Transdermal <User Schedule>  melatonin 3 milliGRAM(s) Oral at bedtime  metoprolol tartrate 25 milliGRAM(s) Oral every 8 hours  multivitamin   Solution 5 milliLiter(s) Oral daily  nystatin    Suspension 5 milliLiter(s) Oral two times a day  polyethylene glycol 3350 17 Gram(s) Oral daily  senna 2 Tablet(s) Oral at bedtime  zinc oxide 20% Ointment 1 Application(s) Topical daily    MEDICATIONS  (PRN):  acetaminophen    Suspension .. 650 milliGRAM(s) Oral every 6 hours PRN Mild Pain (1 - 3)  artificial  tears Solution 2 Drop(s) Both EYES every 2 hours PRN Dry Eyes  bisacodyl Suppository 10 milliGRAM(s) Rectal daily PRN Constipation  dextrose 40% Gel 15 Gram(s) Oral once PRN Blood Glucose LESS THAN 70 milliGRAM(s)/deciliter  glucagon  Injectable 1 milliGRAM(s) IntraMuscular once PRN Glucose LESS THAN 70 milligrams/deciliter      Allergies    No Known Allergies    Intolerances    PHYSICAL EXAM:   Vital Signs:  Vital Signs Last 24 Hrs  T(C): 36.4 (2019 07:39), Max: 36.4 (2019 20:29)  T(F): 97.6 (2019 07:39), Max: 97.6 (2019 07:39)  HR: 70 (2019 07:39) (70 - 82)  BP: 132/80 (2019 07:39) (124/81 - 155/90)  BP(mean): --  RR: 16 (2019 07:39) (15 - 16)  SpO2: 98% (2019 07:39) (96% - 98%)  Daily     Daily Weight in k.1 (2019 15:30)I&O's Summary    2019 07:01  -  2019 07:00  --------------------------------------------------------  IN: 534 mL / OUT: 0 mL / NET: 534 mL    GENERAL: NAD  HEENT:  conjunctivae clear and pink,   CHEST:  Full & symmetric excursion, no increased effort, breath sounds clear  HEART:  Regular rhythm, S1, S2, no edema  ABDOMEN:  Soft, non-tender, non-distended, normoactive bowel sounds,  Peg intact, red around site  EXTEREMITIES:  no cyanosis,clubbing or edema  SKIN:  No rash, warm/dry  NEURO:  Alert, oriented      LABS:                        13.3   6.3   )-----------( 302      ( 2019 05:45 )             41.2         137  |  98  |  28<H>  ----------------------------<  151<H>  3.8   |  29  |  0.86    Ca    9.4      2019 05:45    TPro  7.3  /  Alb  2.7<L>  /  TBili  1.2  /  DBili  x   /  AST  49<H>  /  ALT  45  /  AlkPhos  104  02-    PT/INR - ( 2019 05:45 )   PT: 28.4 sec;   INR: 2.47 ratio             amylase   lipase  RADIOLOGY & ADDITIONAL TESTS:

## 2019-02-23 LAB
GLUCOSE BLDC GLUCOMTR-MCNC: 112 MG/DL — HIGH (ref 70–99)
GLUCOSE BLDC GLUCOMTR-MCNC: 135 MG/DL — HIGH (ref 70–99)
GLUCOSE BLDC GLUCOMTR-MCNC: 143 MG/DL — HIGH (ref 70–99)
GLUCOSE BLDC GLUCOMTR-MCNC: 228 MG/DL — HIGH (ref 70–99)
INR BLD: 3.99 RATIO — HIGH (ref 0.88–1.16)
PROTHROM AB SERPL-ACNC: 46.7 SEC — HIGH (ref 10–12.9)
T3 SERPL-MCNC: 75 NG/DL — LOW (ref 80–200)
T4 FREE SERPL-MCNC: 1.5 NG/DL — SIGNIFICANT CHANGE UP (ref 0.9–1.8)
THYROPEROXIDASE AB SERPL-ACNC: <10 IU/ML — SIGNIFICANT CHANGE UP (ref 0–34.9)

## 2019-02-23 PROCEDURE — 99233 SBSQ HOSP IP/OBS HIGH 50: CPT

## 2019-02-23 PROCEDURE — 99232 SBSQ HOSP IP/OBS MODERATE 35: CPT

## 2019-02-23 RX ORDER — AMIODARONE HYDROCHLORIDE 400 MG/1
200 TABLET ORAL DAILY
Qty: 0 | Refills: 0 | Status: DISCONTINUED | OUTPATIENT
Start: 2019-02-23 | End: 2019-03-12

## 2019-02-23 RX ORDER — FOLIC ACID 0.8 MG
1 TABLET ORAL DAILY
Qty: 0 | Refills: 0 | Status: DISCONTINUED | OUTPATIENT
Start: 2019-02-23 | End: 2019-02-27

## 2019-02-23 RX ORDER — ASCORBIC ACID 60 MG
500 TABLET,CHEWABLE ORAL
Qty: 0 | Refills: 0 | Status: DISCONTINUED | OUTPATIENT
Start: 2019-02-23 | End: 2019-02-27

## 2019-02-23 RX ORDER — ATORVASTATIN CALCIUM 80 MG/1
20 TABLET, FILM COATED ORAL AT BEDTIME
Qty: 0 | Refills: 0 | Status: DISCONTINUED | OUTPATIENT
Start: 2019-02-23 | End: 2019-03-12

## 2019-02-23 RX ORDER — METOPROLOL TARTRATE 50 MG
25 TABLET ORAL EVERY 8 HOURS
Qty: 0 | Refills: 0 | Status: DISCONTINUED | OUTPATIENT
Start: 2019-02-23 | End: 2019-03-12

## 2019-02-23 RX ORDER — FAMOTIDINE 10 MG/ML
20 INJECTION INTRAVENOUS DAILY
Qty: 0 | Refills: 0 | Status: DISCONTINUED | OUTPATIENT
Start: 2019-02-23 | End: 2019-03-12

## 2019-02-23 RX ORDER — FUROSEMIDE 40 MG
20 TABLET ORAL DAILY
Qty: 0 | Refills: 0 | Status: DISCONTINUED | OUTPATIENT
Start: 2019-02-23 | End: 2019-03-12

## 2019-02-23 RX ORDER — ACETAMINOPHEN 500 MG
650 TABLET ORAL EVERY 6 HOURS
Qty: 0 | Refills: 0 | Status: DISCONTINUED | OUTPATIENT
Start: 2019-02-23 | End: 2019-02-27

## 2019-02-23 RX ADMIN — CHLORHEXIDINE GLUCONATE 15 MILLILITER(S): 213 SOLUTION TOPICAL at 18:14

## 2019-02-23 RX ADMIN — INSULIN GLARGINE 8 UNIT(S): 100 INJECTION, SOLUTION SUBCUTANEOUS at 21:56

## 2019-02-23 RX ADMIN — Medication 10 MILLIGRAM(S): at 21:56

## 2019-02-23 RX ADMIN — Medication 5 MILLILITER(S): at 18:14

## 2019-02-23 RX ADMIN — Medication 4: at 11:46

## 2019-02-23 RX ADMIN — Medication 500 MILLIGRAM(S): at 05:30

## 2019-02-23 RX ADMIN — Medication 5 MILLILITER(S): at 05:30

## 2019-02-23 RX ADMIN — LIDOCAINE 2 PATCH: 4 CREAM TOPICAL at 18:15

## 2019-02-23 RX ADMIN — LIDOCAINE 2 PATCH: 4 CREAM TOPICAL at 07:45

## 2019-02-23 RX ADMIN — Medication 20 MILLIGRAM(S): at 05:30

## 2019-02-23 RX ADMIN — Medication 500 MILLIGRAM(S): at 18:14

## 2019-02-23 RX ADMIN — LIDOCAINE 2 PATCH: 4 CREAM TOPICAL at 05:43

## 2019-02-23 RX ADMIN — AMIODARONE HYDROCHLORIDE 200 MILLIGRAM(S): 400 TABLET ORAL at 05:30

## 2019-02-23 RX ADMIN — Medication 25 MILLIGRAM(S): at 05:30

## 2019-02-23 RX ADMIN — Medication 25 MILLIGRAM(S): at 13:16

## 2019-02-23 RX ADMIN — ATORVASTATIN CALCIUM 20 MILLIGRAM(S): 80 TABLET, FILM COATED ORAL at 21:56

## 2019-02-23 RX ADMIN — METFORMIN HYDROCHLORIDE 500 MILLIGRAM(S): 850 TABLET ORAL at 11:18

## 2019-02-23 RX ADMIN — CHLORHEXIDINE GLUCONATE 15 MILLILITER(S): 213 SOLUTION TOPICAL at 05:30

## 2019-02-23 RX ADMIN — ZINC OXIDE 1 APPLICATION(S): 200 OINTMENT TOPICAL at 11:21

## 2019-02-23 RX ADMIN — Medication 650 MILLIGRAM(S): at 03:04

## 2019-02-23 RX ADMIN — Medication 650 MILLIGRAM(S): at 03:15

## 2019-02-23 RX ADMIN — Medication 1 MILLIGRAM(S): at 11:18

## 2019-02-23 RX ADMIN — Medication 25 MILLIGRAM(S): at 21:56

## 2019-02-23 RX ADMIN — Medication 5 MILLILITER(S): at 11:18

## 2019-02-23 RX ADMIN — Medication 3 MILLIGRAM(S): at 21:56

## 2019-02-23 RX ADMIN — FAMOTIDINE 20 MILLIGRAM(S): 10 INJECTION INTRAVENOUS at 11:18

## 2019-02-23 NOTE — PROGRESS NOTE ADULT - ASSESSMENT
87yo F admitted to acute rehab secondary to Left MCA CVA s/p endovascular intervention with right side hemiparesis, dysphagia s/p PEG placement, dysarthria and expressive aphasia.     #Left MCA CVA  c/w PT/OT/SLP   c/w statin     #Dysphagia/dysarthria  SLP  PEG+ , c/w PEG feed   c/w Zn Oxide , GI following     #A-Flutter  Rate controlled   c/w amiodarone and lopressor  Pacemaker in place   INR supratherapeutic = 3.99, will hold coumadin tonight         #HTN  at goal   Cont lopressor     #DM   Consider increasing lantus if AM FS not controlled    c/w 3 U premeal units TID   c/w SSI , FS monitoring   Hypoglycemia protocol.     #Diastolic CHF- stable.,   c/w Lasix , BB     DVT px  on Coumadin

## 2019-02-23 NOTE — PROGRESS NOTE ADULT - SUBJECTIVE AND OBJECTIVE BOX
Subjective: No new complaints or overnight issues      REVIEW OF SYSTEMS  Pertinent in last 24 h: Neurological deficits    VITALS  T(C): 36.3 (02-23-19 @ 08:10), Max: 36.4 (02-22-19 @ 20:44)  HR: 68 (02-23-19 @ 13:15) (66 - 69)  BP: 128/83 (02-23-19 @ 13:15) (128/83 - 140/78)  RR: 14 (02-23-19 @ 08:10) (14 - 14)  SpO2: 96% (02-23-19 @ 08:10) (96% - 98%)  Wt(kg): --    Physical Exam:  Mental Status - Patient is alert, awake, oriented X3. Able to  name and repeat.  Follows commands well and able to answer questions appropriately. Mood and affect  normal. 3/3 immediate recall, 1/3 delayed recall.   	Cranial Nerves - PERRL, EOMI, right sided facial droop, +dysarthria, +dysphagia,  	Motor Exam - right HP  		Sensory    Intact to light touch bilaterally.   	No tremors  	                              	GENERAL Exam:     Nontoxic , No Acute Distress 	  	HEENT:  normocephalic, atraumatic		  	LUNGS:	Clear bilaterally    	HEART:	 Tachycardic, S1S1 auscultated, regular 	  	GI/ ABDOMEN:  Soft, +BS,  Non tender, obese abdomen, peg tube present-no drainage or erythema noted    -    RECENT LABS/IMAGING                        13.3   6.3   )-----------( 302      ( 22 Feb 2019 05:45 )             41.2     02-22    137  |  98  |  28<H>  ----------------------------<  151<H>  3.8   |  29  |  0.86    Ca    9.4      22 Feb 2019 05:45    TPro  7.3  /  Alb  2.7<L>  /  TBili  1.2  /  DBili  x   /  AST  49<H>  /  ALT  45  /  AlkPhos  104  02-22            MEDICATIONS   acetaminophen    Suspension .. 650 milliGRAM(s) every 6 hours PRN  amiodarone    Tablet 200 milliGRAM(s) daily  artificial  tears Solution 2 Drop(s) every 2 hours PRN  ascorbic acid 500 milliGRAM(s) two times a day  atorvastatin 20 milliGRAM(s) at bedtime  bisacodyl Suppository 10 milliGRAM(s) daily PRN  chlorhexidine 0.12% Liquid 15 milliLiter(s) two times a day  dextrose 40% Gel 15 Gram(s) once PRN  dextrose 5%. 1000 milliLiter(s) <Continuous>  dextrose 50% Injectable 12.5 Gram(s) once  famotidine    Tablet 20 milliGRAM(s) daily  FLUoxetine 10 milliGRAM(s) at bedtime  folic acid 1 milliGRAM(s) daily  furosemide    Tablet 20 milliGRAM(s) daily  glucagon  Injectable 1 milliGRAM(s) once PRN  insulin glargine Injectable (LANTUS) 8 Unit(s) at bedtime  insulin lispro (HumaLOG) corrective regimen sliding scale   three times a day with meals  lidocaine   Patch 2 Patch <User Schedule>  melatonin 3 milliGRAM(s) at bedtime  metFORMIN 500 milliGRAM(s) daily  metoprolol tartrate 25 milliGRAM(s) every 8 hours  multivitamin 1 Tablet(s) daily  nystatin    Suspension 5 milliLiter(s) two times a day  polyethylene glycol 3350 17 Gram(s) daily  senna 2 Tablet(s) at bedtime  zinc oxide 20% Ointment 1 Application(s) daily      --------------------------------------------------------------------

## 2019-02-23 NOTE — PROGRESS NOTE ADULT - ASSESSMENT
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     Continue comprehensive acute inpatient rehab program consisting of PT/OT/ST.       Problem/Plan - 1:  ·  Problem: CVA (cerebral vascular accident).  Plan:  comprehensive PT/OT/ST program consisting of 3 hours a day, 5 days a week.   Continue AC with coumadin and  BP management.  INR supratherapeutic today, Hold coumadin tonight,   INR goal 2-3, f/u INR in AM    Problem/Plan - 2:  ·  Problem: Dysphagia.  Plan: s/p peg placement on 2/12  Start bolus feeds per RD.   Aspiration precautions.     Problem/Plan - 3:  ·  Problem: Atrial flutter.  Plan: Continue amiodarone 200mg daily.  TSH slightly elevated but T4 is normal.    Pacemaker in place.     Problem/Plan - 4:  ·  Problem: HTN (hypertension).  Plan: Continue amiodarone and lopressor.  HR and SBPs controlled.     Problem/Plan - 5:  ·  Problem: Diabetes.  Plan: Continue SSI.  Monitor FS  HA1C 6.6%.     Problem/Plan - 6:  Problem: Hyperlipidemia. Plan: Continue statin.      Problem/Plan - 8:  ·  Problem: Diastolic heart failure.  Plan: Pulmonary edema decreased on cxray from 2/13. Continue lasix (decreased to) 20mg daily.

## 2019-02-23 NOTE — PROGRESS NOTE ADULT - SUBJECTIVE AND OBJECTIVE BOX
Patient is a 88y old  Female who presents with a chief complaint of CVA with right sided weakness, dysphagia, dysarthria, expressive aphasia (22 Feb 2019 12:11)      Patient seen and examined at bedside, no events overnight, in no acute distress.     ALLERGIES:  No Known Allergies    MEDICATIONS:  acetaminophen    Suspension .. 650 milliGRAM(s) Oral every 6 hours PRN  artificial  tears Solution 2 Drop(s) Both EYES every 2 hours PRN  atorvastatin 20 milliGRAM(s) Oral at bedtime  bisacodyl Suppository 10 milliGRAM(s) Rectal daily PRN  dextrose 40% Gel 15 Gram(s) Oral once PRN  dextrose 5%. 1000 milliLiter(s) IV Continuous <Continuous>  dextrose 50% Injectable 12.5 Gram(s) IV Push once  famotidine    Tablet 20 milliGRAM(s) Oral daily  FLUoxetine 10 milliGRAM(s) Oral at bedtime  glucagon  Injectable 1 milliGRAM(s) IntraMuscular once PRN  insulin glargine Injectable (LANTUS) 8 Unit(s) SubCutaneous at bedtime  insulin lispro (HumaLOG) corrective regimen sliding scale   SubCutaneous three times a day with meals  lidocaine   Patch 2 Patch Transdermal <User Schedule>  melatonin 3 milliGRAM(s) Oral at bedtime  metFORMIN 500 milliGRAM(s) Oral daily  multivitamin   Solution 5 milliLiter(s) Oral daily  nystatin    Suspension 5 milliLiter(s) Oral two times a day  polyethylene glycol 3350 17 Gram(s) Oral daily  senna 2 Tablet(s) Oral at bedtime  zinc oxide 20% Ointment 1 Application(s) Topical daily    Vital Signs Last 24 Hrs  T(C): 36.3 (23 Feb 2019 08:10), Max: 36.4 (22 Feb 2019 20:44)  T(F): 97.3 (23 Feb 2019 08:10), Max: 97.5 (22 Feb 2019 20:44)  HR: 66 (23 Feb 2019 08:10) (66 - 69)  BP: 137/83 (23 Feb 2019 08:10) (137/83 - 140/78)  BP(mean): --  RR: 14 (23 Feb 2019 08:10) (14 - 14)  SpO2: 96% (23 Feb 2019 08:10) (96% - 98%)  I&O's Summary    22 Feb 2019 07:01 - 23 Feb 2019 07:00  --------------------------------------------------------  IN: 700 mL / OUT: 0 mL / NET: 700 mL    23 Feb 2019 07:01  -  23 Feb 2019 12:46  --------------------------------------------------------  IN: 150 mL / OUT: 0 mL / NET: 150 mL          PAST MEDICAL & SURGICAL HISTORY:  Valvular disease  Atrial flutter: 1/2010- [facility], Dr Tran  Diabetes  Obesity  Hyperlipidemia  HTN (hypertension)  CAD (coronary artery disease): s/p CABG- 2004-[facility]  Artificial pacemaker  Aortic valve replaced  S/P coronary artery stent placement: x 4, 2006-[facility]  S/P cholecystectomy  S/P CABG (coronary artery bypass graft)      Home Medications:  amiodarone 200 mg oral tablet: 1 tab(s) orally once a day (14 Feb 2019 10:57)  ascorbic acid 500 mg oral tablet: 1 tab(s) orally 2 times a day (13 Oct 2016 12:22)  aspirin 81 mg oral tablet, chewable: 1 tab(s) orally once a day  stop the aspirin after the INR is 2-3. (14 Feb 2019 10:57)  cefpodoxime 100 mg oral tablet: orally once a day for 5 days. (14 Feb 2019 10:57)  chlorhexidine 0.12% mucous membrane liquid:  mucous membrane   swish and spit two times a day (14 Feb 2019 13:35)  Colace 100 mg oral capsule: 1 cap(s) orally 2 times a day (09 Oct 2016 19:39)  Coumadin 5 mg oral tablet: 1 tab(s) orally once a day  Titrate for goal of INR 2-3 (14 Feb 2019 10:57)  famotidine 20 mg oral tablet: 1 tab(s) orally every 12 hours (13 Oct 2016 12:22)  folic acid 1 mg oral tablet: 1 tab(s) orally once a day (13 Oct 2016 12:22)  Lasix 20 mg oral tablet: 1 tab(s) orally once a day (14 Feb 2019 10:57)  levalbuterol 0.63 mg/3 mL inhalation solution: 3 milliliter(s) inhaled every 6 hours, As needed, SOB/Wheezing (14 Feb 2019 10:57)  metFORMIN 500 mg oral tablet: 1 tab(s) orally once a day (14 Feb 2019 13:56)  Metoprolol Tartrate 25 mg oral tablet: orally 3 times a day (14 Feb 2019 10:57)  Multiple Vitamins oral tablet: 1 tab(s) orally once a day (13 Oct 2016 12:22)  simvastatin 40 mg oral tablet: 1 tab(s) orally once a day (at bedtime) (08 Feb 2019 13:18)      PHYSICAL EXAM:  General: NAD  ENT: MMM  Neck: Supple, No JVD  Lungs: Clear to percussion bilaterally  Cardio: RRR, S1/S2, No murmurs  Abdomen: Soft, Nontender, Nondistended; Bowel sounds present  Neuro: no new deficits   Extremities: Right hemiparesis, No clubbing, cyanosis, or edema    LABS:                        13.3   6.3   )-----------( 302      ( 22 Feb 2019 05:45 )             41.2     02-22    137  |  98  |  28  ----------------------------<  151  3.8   |  29  |  0.86    Ca    9.4      22 Feb 2019 05:45    TPro  7.3  /  Alb  2.7  /  TBili  1.2  /  DBili  x   /  AST  49  /  ALT  45  /  AlkPhos  104  02-22    PT/INR - ( 23 Feb 2019 05:52 )   PT: 46.7 sec;   INR: 3.99 ratio             02-04 Chol 124 mg/dL LDL 55 mg/dL HDL 37 mg/dL Trig 159 mg/dL  TSH --   TSH with FT4 reflex --  Total T3 75        CAPILLARY BLOOD GLUCOSE      POCT Blood Glucose.: 228 mg/dL (23 Feb 2019 11:17)  POCT Blood Glucose.: 135 mg/dL (23 Feb 2019 07:42)  POCT Blood Glucose.: 137 mg/dL (22 Feb 2019 22:18)  POCT Blood Glucose.: 104 mg/dL (22 Feb 2019 16:19)    02-04 [id number] 6.6      RADIOLOGY & ADDITIONAL TESTS: non ew tests     Care Discussed with Consultants/Other Providers: PM&R team

## 2019-02-24 LAB
GLUCOSE BLDC GLUCOMTR-MCNC: 137 MG/DL — HIGH (ref 70–99)
GLUCOSE BLDC GLUCOMTR-MCNC: 147 MG/DL — HIGH (ref 70–99)
GLUCOSE BLDC GLUCOMTR-MCNC: 156 MG/DL — HIGH (ref 70–99)
GLUCOSE BLDC GLUCOMTR-MCNC: 186 MG/DL — HIGH (ref 70–99)
INR BLD: 5.37 RATIO — CRITICAL HIGH (ref 0.88–1.16)
PROTHROM AB SERPL-ACNC: 63.4 SEC — HIGH (ref 10–12.9)

## 2019-02-24 PROCEDURE — 99233 SBSQ HOSP IP/OBS HIGH 50: CPT

## 2019-02-24 RX ORDER — PHYTONADIONE (VIT K1) 5 MG
1 TABLET ORAL ONCE
Qty: 0 | Refills: 0 | Status: COMPLETED | OUTPATIENT
Start: 2019-02-24 | End: 2019-02-24

## 2019-02-24 RX ADMIN — LIDOCAINE 2 PATCH: 4 CREAM TOPICAL at 06:11

## 2019-02-24 RX ADMIN — CHLORHEXIDINE GLUCONATE 15 MILLILITER(S): 213 SOLUTION TOPICAL at 18:04

## 2019-02-24 RX ADMIN — Medication 650 MILLIGRAM(S): at 05:23

## 2019-02-24 RX ADMIN — Medication 3 MILLIGRAM(S): at 21:58

## 2019-02-24 RX ADMIN — LIDOCAINE 2 PATCH: 4 CREAM TOPICAL at 18:25

## 2019-02-24 RX ADMIN — CHLORHEXIDINE GLUCONATE 15 MILLILITER(S): 213 SOLUTION TOPICAL at 05:23

## 2019-02-24 RX ADMIN — Medication 1 TABLET(S): at 11:30

## 2019-02-24 RX ADMIN — FAMOTIDINE 20 MILLIGRAM(S): 10 INJECTION INTRAVENOUS at 11:30

## 2019-02-24 RX ADMIN — ATORVASTATIN CALCIUM 20 MILLIGRAM(S): 80 TABLET, FILM COATED ORAL at 21:58

## 2019-02-24 RX ADMIN — AMIODARONE HYDROCHLORIDE 200 MILLIGRAM(S): 400 TABLET ORAL at 05:22

## 2019-02-24 RX ADMIN — Medication 2: at 07:30

## 2019-02-24 RX ADMIN — Medication 500 MILLIGRAM(S): at 18:04

## 2019-02-24 RX ADMIN — Medication 5 MILLILITER(S): at 05:23

## 2019-02-24 RX ADMIN — Medication 2: at 11:30

## 2019-02-24 RX ADMIN — Medication 10 MILLIGRAM(S): at 21:58

## 2019-02-24 RX ADMIN — METFORMIN HYDROCHLORIDE 500 MILLIGRAM(S): 850 TABLET ORAL at 11:30

## 2019-02-24 RX ADMIN — Medication 25 MILLIGRAM(S): at 05:22

## 2019-02-24 RX ADMIN — Medication 20 MILLIGRAM(S): at 05:22

## 2019-02-24 RX ADMIN — Medication 500 MILLIGRAM(S): at 05:22

## 2019-02-24 RX ADMIN — Medication 1 MILLIGRAM(S): at 11:30

## 2019-02-24 RX ADMIN — INSULIN GLARGINE 8 UNIT(S): 100 INJECTION, SOLUTION SUBCUTANEOUS at 21:58

## 2019-02-24 RX ADMIN — Medication 650 MILLIGRAM(S): at 06:12

## 2019-02-24 RX ADMIN — ZINC OXIDE 1 APPLICATION(S): 200 OINTMENT TOPICAL at 11:43

## 2019-02-24 RX ADMIN — Medication 25 MILLIGRAM(S): at 13:00

## 2019-02-24 RX ADMIN — LIDOCAINE 2 PATCH: 4 CREAM TOPICAL at 07:31

## 2019-02-24 RX ADMIN — Medication 25 MILLIGRAM(S): at 21:58

## 2019-02-24 NOTE — PROGRESS NOTE ADULT - ASSESSMENT
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     Continue comprehensive acute inpatient rehab program consisting of PT/OT/ST.       Problem/Plan - 1:  ·  Problem: CVA (cerebral vascular accident).  Plan:  comprehensive PT/OT/ST program consisting of 3 hours a day, 5 days a week.   Continue AC with coumadin and  BP management.  INR supratherapeutic today, Hold coumadin tonight, iDosed Vit K 1 mg.  INR goal 2-3, f/u INR in AM    Problem/Plan - 2:  ·  Problem: Dysphagia.  Plan: s/p peg placement on 2/12  Start bolus feeds per RD.   Aspiration precautions.     Problem/Plan - 3:  ·  Problem: Atrial flutter.  Plan: Continue amiodarone 200mg daily.  TSH slightly elevated but T4 is normal.    Pacemaker in place.     Problem/Plan - 4:  ·  Problem: HTN (hypertension).  Plan: Continue amiodarone and lopressor.  HR and SBPs controlled.     Problem/Plan - 5:  ·  Problem: Diabetes.  Plan: Continue SSI.  Monitor FS  HA1C 6.6%.     Problem/Plan - 6:  Problem: Hyperlipidemia. Plan: Continue statin.      Problem/Plan - 8:  ·  Problem: Diastolic heart failure.  Plan: Pulmonary edema decreased on cxray from 2/13. Continue lasix (decreased to) 20mg daily.

## 2019-02-24 NOTE — PROGRESS NOTE ADULT - ASSESSMENT
87yo F admitted to acute rehab secondary to Left MCA CVA s/p endovascular intervention with right side hemiparesis, dysphagia s/p PEG placement, dysarthria and expressive aphasia.     #Left MCA CVA  c/w PT/OT/SLP   c/w statin     #Dysphagia/dysarthria  SLP  PEG+ , c/w PEG feed   PEG site has little erythema,   c/w Zn Oxide , GI following     #A-Flutter  Rate controlled   c/w amiodarone and lopressor  Pacemaker in place   INR supratherapeutic  hold coumadin tonight         #HTN  at goal   Cont lopressor     #DM   c/w Lantus 8 U , Metformin   c/w 3 U premeal units TID   c/w SSI , FS monitoring   Hypoglycemia protocol.     #Diastolic CHF- stable.,   c/w Lasix , BB     DVT px  on Coumadin

## 2019-02-24 NOTE — CONSULT NOTE ADULT - REASON FOR ADMISSION
CVA with right sided weakness, dysphagia, dysarthria, expressive aphasia

## 2019-02-24 NOTE — PROGRESS NOTE ADULT - SUBJECTIVE AND OBJECTIVE BOX
Patient is a 88y old  Female who presents with a chief complaint of CVA with right sided weakness, dysphagia, dysarthria, expressive aphasia (23 Feb 2019 17:20)      Patient seen and examined at bedside.     ALLERGIES:  No Known Allergies    MEDICATIONS:  acetaminophen    Suspension .. 650 milliGRAM(s) Oral every 6 hours PRN  amiodarone    Tablet 200 milliGRAM(s) Oral daily  artificial  tears Solution 2 Drop(s) Both EYES every 2 hours PRN  ascorbic acid 500 milliGRAM(s) Oral two times a day  atorvastatin 20 milliGRAM(s) Oral at bedtime  bisacodyl Suppository 10 milliGRAM(s) Rectal daily PRN  dextrose 40% Gel 15 Gram(s) Oral once PRN  dextrose 5%. 1000 milliLiter(s) IV Continuous <Continuous>  dextrose 50% Injectable 12.5 Gram(s) IV Push once  famotidine    Tablet 20 milliGRAM(s) Oral daily  FLUoxetine 10 milliGRAM(s) Oral at bedtime  folic acid 1 milliGRAM(s) Oral daily  furosemide    Tablet 20 milliGRAM(s) Oral daily  glucagon  Injectable 1 milliGRAM(s) IntraMuscular once PRN  insulin glargine Injectable (LANTUS) 8 Unit(s) SubCutaneous at bedtime  insulin lispro (HumaLOG) corrective regimen sliding scale   SubCutaneous three times a day with meals  lidocaine   Patch 2 Patch Transdermal <User Schedule>  melatonin 3 milliGRAM(s) Oral at bedtime  metFORMIN 500 milliGRAM(s) Oral daily  metoprolol tartrate 25 milliGRAM(s) Oral every 8 hours  multivitamin 1 Tablet(s) Oral daily  polyethylene glycol 3350 17 Gram(s) Oral daily  senna 2 Tablet(s) Oral at bedtime  zinc oxide 20% Ointment 1 Application(s) Topical daily    Vital Signs Last 24 Hrs  T(F): 97.3 (24 Feb 2019 07:50), Max: 97.8 (23 Feb 2019 20:28)  HR: 70 (24 Feb 2019 07:50) (67 - 98)  BP: 129/82 (24 Feb 2019 07:50) (128/83 - 169/81)  RR: 16 (24 Feb 2019 07:50) (12 - 16)  SpO2: 96% (24 Feb 2019 07:50) (96% - 96%)  I&O's Summary    23 Feb 2019 07:01  -  24 Feb 2019 07:00  --------------------------------------------------------  IN: 805 mL / OUT: 0 mL / NET: 805 mL        PHYSICAL EXAM:  General: NAD, comfortable   ENT: MMM  Neck: Supple, No JVD  Lungs: CTA, BLAE, No added sounds.   Cardio: RRR, S1/S2, No murmurs  Abdomen: Soft, NT/ND, BS+   Extremities: No edema  CNS: RT Hemiparesis   LABS:                        13.3   6.3   )-----------( 302      ( 22 Feb 2019 05:45 )             41.2     02-22    137  |  98  |  28  ----------------------------<  151  3.8   |  29  |  0.86    Ca    9.4      22 Feb 2019 05:45    TPro  7.3  /  Alb  2.7  /  TBili  1.2  /  DBili  x   /  AST  49  /  ALT  45  /  AlkPhos  104  02-22    eGFR if Non African American: 60 mL/min/1.73M2 (02-22-19 @ 05:45)  eGFR if African American: 70 mL/min/1.73M2 (02-22-19 @ 05:45)    PT/INR - ( 24 Feb 2019 05:45 )   PT: 63.4 sec;   INR: 5.37 ratio                 02-04 Chol 124 mg/dL LDL 55 mg/dL HDL 37 mg/dL Trig 159 mg/dL  TSH --   TSH with FT4 reflex --  Total T3 75        CAPILLARY BLOOD GLUCOSE      POCT Blood Glucose.: 186 mg/dL (24 Feb 2019 07:27)  POCT Blood Glucose.: 143 mg/dL (23 Feb 2019 20:36)  POCT Blood Glucose.: 112 mg/dL (23 Feb 2019 16:23)  POCT Blood Glucose.: 228 mg/dL (23 Feb 2019 11:17)    02-04 [id number] 6.6          RADIOLOGY & ADDITIONAL TESTS:    Care Discussed with Consultants/Other Providers:

## 2019-02-24 NOTE — PROVIDER CONTACT NOTE (CRITICAL VALUE NOTIFICATION) - ASSESSMENT
NO SXS OF BLEEDING NOTED. AREA WHERE BLOOD TAKEN- PRESSURE DSG APPLIED. NO BLEEDING NOTED ON GAUZE AFTER APPLICATION.

## 2019-02-24 NOTE — CONSULT NOTE ADULT - SUBJECTIVE AND OBJECTIVE BOX
REVIEW OF SYSTEMS  Pertinent in last 24 h: Neurological deficits    VITALS  T(C): 36.3 (02-24-19 @ 07:50), Max: 36.6 (02-23-19 @ 20:28)  HR: 70 (02-24-19 @ 07:50) (67 - 98)  BP: 129/82 (02-24-19 @ 07:50) (128/83 - 169/81)  RR: 16 (02-24-19 @ 07:50) (12 - 16)  SpO2: 96% (02-24-19 @ 07:50) (96% - 96%)  Wt(kg): --    Physical Exam:  -Mental Status - Patient is alert, awake, oriented X3.   Follows commands well and able to answer questions appropriately. Mood and affect  normal.   	Cranial Nerves - PERRL, EOMI, right sided facial droop, +dysarthria, +dysphagia,  	Motor Exam - right HP  		Sensory    Intact to light touch bilaterally.   	No tremors  	                              	GENERAL Exam:     Nontoxic , No Acute Distress 	  	HEENT:  normocephalic, atraumatic		  	LUNGS:	Clear bilaterally    	HEART:	 Tachycardic, S1S1 auscultated, regular 	  	GI/ ABDOMEN:  Soft, +BS,  Non tender, obese abdomen, peg tube present-no drainage or erythema noted      RECENT LABS/IMAGING        PT/INR - ( 24 Feb 2019 05:45 )   PT: 63.4 sec;   INR: 5.37 ratio       MEDICATIONS   acetaminophen    Suspension .. 650 milliGRAM(s) every 6 hours PRN  amiodarone    Tablet 200 milliGRAM(s) daily  artificial  tears Solution 2 Drop(s) every 2 hours PRN  ascorbic acid 500 milliGRAM(s) two times a day  atorvastatin 20 milliGRAM(s) at bedtime  bisacodyl Suppository 10 milliGRAM(s) daily PRN  chlorhexidine 0.12% Liquid 15 milliLiter(s) two times a day  dextrose 40% Gel 15 Gram(s) once PRN  dextrose 5%. 1000 milliLiter(s) <Continuous>  dextrose 50% Injectable 12.5 Gram(s) once  famotidine    Tablet 20 milliGRAM(s) daily  FLUoxetine 10 milliGRAM(s) at bedtime  folic acid 1 milliGRAM(s) daily  furosemide    Tablet 20 milliGRAM(s) daily  glucagon  Injectable 1 milliGRAM(s) once PRN  insulin glargine Injectable (LANTUS) 8 Unit(s) at bedtime  insulin lispro (HumaLOG) corrective regimen sliding scale   three times a day with meals  lidocaine   Patch 2 Patch <User Schedule>  melatonin 3 milliGRAM(s) at bedtime  metFORMIN 500 milliGRAM(s) daily  metoprolol tartrate 25 milliGRAM(s) every 8 hours  multivitamin 1 Tablet(s) daily HPI:  This is a 88 year old  female with a pmh of CAD, HTN, HLD, pacemaker who presented to [facility] as a transfer from [facility] with right hemiparesis and aphasia and left proximal MCA occlusion. The patient was last known well at 2 pm on 2/3/19 and was then found down on the ground by her daughter several hours later and was noted to be not speaking and weak on the right. At [facility] a CTA head and neck was performed which showed a proximal left MCA occlusion, and a CT head showed a dense left MCA sign with some hypodensity and early ischemic changes in the left MCA distribution. She did not receive IV tPA as she was out of the window and was transferred to [facility] for possible mechanical thrombectomy. At [facility] her CT perfusion showed zero core infarct, with a penumbra of 80 mls, and an infinite mismatch. She was deemed a candidate for intervention and recanalization was achieved with TICI 3 score on 2/13.     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 Given pacemaker showed a-flutter, high suspicion for cardiac source.   Per neurology, patient is currently on ASA 81 mg as a bridge to coumadin for goal INR 2-3. Her INR on 2/14 is 1.26, she received coumadin 5 mg on night of 2/13. Recommend to discontinue the aspirin when INR at goal.  Patient found to have EF 65-70% with moderate AS, moderate pulm HTN, mild-mod TR and no PFO, cardiac monitoring at times showed sinus tachycardia - started on Amiodarone and Metoprolol with improvement. PPM interrogation- showed episode of atrial flutter.   Patient failed speech and swallow, PEG was placed on 2/12 and has been tolerating tube feeds.   Patient found be more lethargic on 2/13. Repeat CT head was stable. UA positive and patient started on antibiotics. Urine cx sent and pending results. Of note, CT head showed sphenoid air fluid level. Recs to start Augmentin if s/s of sinusitis start.   Also, cxray from 2/13 showed pulmonary edema had decreased. Rec to continue lasix at a lower dose of 20mg daily.     Patient medically stable for discharge to [facility]'s acute IPR on 2/14/19. (14 Feb 2019 15:07)    ENDOCRINE HISTORY:      PAST MEDICAL & SURGICAL HISTORY:  Valvular disease  Atrial flutter: 1/2010- [facility], Dr Tran  Diabetes  Obesity  Hyperlipidemia  HTN (hypertension)  CAD (coronary artery disease): s/p CABG- 2004-[facility]  Artificial pacemaker  Aortic valve replaced  S/P coronary artery stent placement: x 4, 2006-[facility]  S/P cholecystectomy  S/P CABG (coronary artery bypass graft)      FAMILY HISTORY:  Family history of cerebrovascular accident (CVA) (Father)  Family history of hypertension (Father)      Social History:    Outpatient Medications:    MEDICATIONS  (STANDING):  amiodarone    Tablet 200 milliGRAM(s) Oral daily  ascorbic acid 500 milliGRAM(s) Oral two times a day  atorvastatin 20 milliGRAM(s) Oral at bedtime  chlorhexidine 0.12% Liquid 15 milliLiter(s) Swish and Spit two times a day  dextrose 5%. 1000 milliLiter(s) (50 mL/Hr) IV Continuous <Continuous>  dextrose 50% Injectable 12.5 Gram(s) IV Push once  famotidine    Tablet 20 milliGRAM(s) Oral daily  FLUoxetine 10 milliGRAM(s) Oral at bedtime  folic acid 1 milliGRAM(s) Oral daily  furosemide    Tablet 20 milliGRAM(s) Oral daily  insulin glargine Injectable (LANTUS) 8 Unit(s) SubCutaneous at bedtime  insulin lispro (HumaLOG) corrective regimen sliding scale   SubCutaneous three times a day with meals  lidocaine   Patch 2 Patch Transdermal <User Schedule>  melatonin 3 milliGRAM(s) Oral at bedtime  metFORMIN 500 milliGRAM(s) Oral daily  metoprolol tartrate 25 milliGRAM(s) Oral every 8 hours  multivitamin 1 Tablet(s) Oral daily  polyethylene glycol 3350 17 Gram(s) Oral daily  senna 2 Tablet(s) Oral at bedtime  zinc oxide 20% Ointment 1 Application(s) Topical daily    MEDICATIONS  (PRN):  acetaminophen    Suspension .. 650 milliGRAM(s) Oral every 6 hours PRN Mild Pain (1 - 3)  artificial  tears Solution 2 Drop(s) Both EYES every 2 hours PRN Dry Eyes  bisacodyl Suppository 10 milliGRAM(s) Rectal daily PRN Constipation  dextrose 40% Gel 15 Gram(s) Oral once PRN Blood Glucose LESS THAN 70 milliGRAM(s)/deciliter  glucagon  Injectable 1 milliGRAM(s) IntraMuscular once PRN Glucose LESS THAN 70 milligrams/deciliter      Allergies    No Known Allergies    Intolerances      Review of Systems:  Constitutional: No fever  Eyes: No blurry vision  Neuro: No tremors  HEENT: No pain  Cardiovascular: No chest pain, palpitations  Respiratory: No SOB, no cough  GI: No nausea, vomiting, abdominal pain  : No dysuria  Skin: no rash  Psych: no depression  Endocrine: no polyuria, polydipsia  Hem/lymph: no swelling  Osteoporosis: no fractures    ALL OTHER SYSTEMS REVIEWED AND NEGATIVE    UNABLE TO OBTAIN    PHYSICAL EXAM:  VITALS: T(C): 36.3 (02-24-19 @ 07:50)  T(F): 97.3 (02-24-19 @ 07:50), Max: 97.8 (02-23-19 @ 20:28)  HR: 96 (02-24-19 @ 12:59) (67 - 98)  BP: 136/84 (02-24-19 @ 12:59) (129/82 - 169/81)  RR:  (12 - 16)  SpO2:  (96% - 96%)  Wt(kg): --  GENERAL: NAD, well-groomed, well-developed  EYES: No proptosis, no lid lag, anicteric  HEENT:  Atraumatic, Normocephalic, moist mucous membranes  THYROID: Normal size, no palpable nodules  RESPIRATORY: Clear to auscultation bilaterally; No rales, rhonchi, wheezing, or rubs  CARDIOVASCULAR: Regular rate and rhythm; No murmurs; no peripheral edema  GI: Soft, nontender, non distended, normal bowel sounds  SKIN: Dry, intact, No rashes or lesions  MUSCULOSKELETAL: Full range of motion, normal strength  NEURO: sensation intact, extraocular movements intact, no tremor, normal reflexes  PSYCH: Alert and oriented x 3, normal affect, normal mood  CUSHING'S SIGNS: no striae    POCT Blood Glucose.: 147 mg/dL (02-24-19 @ 16:32)  POCT Blood Glucose.: 156 mg/dL (02-24-19 @ 11:28)  POCT Blood Glucose.: 186 mg/dL (02-24-19 @ 07:27)  POCT Blood Glucose.: 143 mg/dL (02-23-19 @ 20:36)  POCT Blood Glucose.: 112 mg/dL (02-23-19 @ 16:23)  POCT Blood Glucose.: 228 mg/dL (02-23-19 @ 11:17)  POCT Blood Glucose.: 135 mg/dL (02-23-19 @ 07:42)  POCT Blood Glucose.: 137 mg/dL (02-22-19 @ 22:18)  POCT Blood Glucose.: 104 mg/dL (02-22-19 @ 16:19)  POCT Blood Glucose.: 166 mg/dL (02-22-19 @ 11:54)  POCT Blood Glucose.: 135 mg/dL (02-22-19 @ 07:28)  POCT Blood Glucose.: 129 mg/dL (02-21-19 @ 22:10)                            13.3   6.3   )-----------( 302      ( 22 Feb 2019 05:45 )             41.2       02-22    137  |  98  |  28<H>  ----------------------------<  151<H>  3.8   |  29  |  0.86    EGFR if : 70  EGFR if non : 60    Ca    9.4      02-22    TPro  7.3  /  Alb  2.7<L>  /  TBili  1.2  /  DBili  x   /  AST  49<H>  /  ALT  45  /  AlkPhos  104  02-22      Thyroid Function Tests:  02-23 @ 05:52 TSH -- FreeT4 1.5 T3 75 Anti TPO <10.0 Anti Thyroglobulin Ab -- TSI --  02-22 @ 05:45 TSH 6.04 FreeT4 -- T3 -- Anti TPO -- Anti Thyroglobulin Ab -- TSI --    Hemoglobin A1C, Whole Blood: 6.6 % <H> [4.0 - 5.6] (02-04-19 @ 06:11)  02-04 Chol 124 LDL 55 HDL 37<L> Trig 159<H> HPI:  This is a 88 year old  female with a pmh of CAD, HTN, HLD, pacemaker who presented to [facility] as a transfer from [facility] with right hemiparesis and aphasia and left proximal MCA occlusion. The patient was last known well at 2 pm on 2/3/19 and was then found down on the ground by her daughter several hours later and was noted to be not speaking and weak on the right. At [facility] a CTA head and neck was performed which showed a proximal left MCA occlusion, and a CT head showed a dense left MCA sign with some hypodensity and early ischemic changes in the left MCA distribution. She did not receive IV tPA as she was out of the window and was transferred to [facility] for possible mechanical thrombectomy. At [facility] her CT perfusion showed zero core infarct, with a penumbra of 80 mls, and an infinite mismatch. She was deemed a candidate for intervention and recanalization was achieved with TICI 3 score on 2/13.     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 Given pacemaker showed a-flutter, high suspicion for cardiac source.   Per neurology, patient is currently on ASA 81 mg as a bridge to coumadin for goal INR 2-3. Her INR on 2/14 is 1.26, she received coumadin 5 mg on night of 2/13. Recommend to discontinue the aspirin when INR at goal.  Patient found to have EF 65-70% with moderate AS, moderate pulm HTN, mild-mod TR and no PFO, cardiac monitoring at times showed sinus tachycardia - started on Amiodarone and Metoprolol with improvement. PPM interrogation- showed episode of atrial flutter.   Patient failed speech and swallow, PEG was placed on 2/12 and has been tolerating tube feeds.   Patient found be more lethargic on 2/13. Repeat CT head was stable. UA positive and patient started on antibiotics. Urine cx sent and pending results. Of note, CT head showed sphenoid air fluid level. Recs to start Augmentin if s/s of sinusitis start.   Also, cxray from 2/13 showed pulmonary edema had decreased. Rec to continue lasix at a lower dose of 20mg daily.     Patient medically stable for discharge to [facility]'s acute IPR on 2/14/19. (14 Feb 2019 15:07)    ENDOCRINE HISTORY:  DM2 x 20 years. Patient denies TOD.  Optho: every 6 mos; has never received lasers or injections for retinal eye disease  Podiatrist: cuts her toenails every 2 mos  Diet: daughter tries to follow basic tenets of ADA consistent CHO diet  (+)SMBG daily 1x/day in am: 140s, 150s  Hypoglycemia: denies  DM2 meds: metformin 500mg 1x/day    Patient states that she has never been told that she has a thyroid problem. She denies low mood, unintentional weight gain, cold intolerance, dry skin or low energy symptoms prior to her stroke. She reports a good mood currently overall.       PAST MEDICAL & SURGICAL HISTORY:  Valvular disease  Atrial flutter: 1/2010- [facility], Dr Tran  Diabetes  Obesity  Hyperlipidemia  HTN (hypertension)  CAD (coronary artery disease): s/p CABG- 2004-[facility]  Artificial pacemaker  Aortic valve replaced  S/P coronary artery stent placement: x 4, 2006-[facility]  S/P cholecystectomy  S/P CABG (coronary artery bypass graft)      FAMILY HISTORY:  Family history of cerebrovascular accident (CVA) (Father)  Family history of hypertension (Father)      Social History:    Outpatient Medications:    MEDICATIONS  (STANDING):  amiodarone    Tablet 200 milliGRAM(s) Oral daily  ascorbic acid 500 milliGRAM(s) Oral two times a day  atorvastatin 20 milliGRAM(s) Oral at bedtime  chlorhexidine 0.12% Liquid 15 milliLiter(s) Swish and Spit two times a day  dextrose 5%. 1000 milliLiter(s) (50 mL/Hr) IV Continuous <Continuous>  dextrose 50% Injectable 12.5 Gram(s) IV Push once  famotidine    Tablet 20 milliGRAM(s) Oral daily  FLUoxetine 10 milliGRAM(s) Oral at bedtime  folic acid 1 milliGRAM(s) Oral daily  furosemide    Tablet 20 milliGRAM(s) Oral daily  insulin glargine Injectable (LANTUS) 8 Unit(s) SubCutaneous at bedtime  insulin lispro (HumaLOG) corrective regimen sliding scale   SubCutaneous three times a day with meals  lidocaine   Patch 2 Patch Transdermal <User Schedule>  melatonin 3 milliGRAM(s) Oral at bedtime  metFORMIN 500 milliGRAM(s) Oral daily  metoprolol tartrate 25 milliGRAM(s) Oral every 8 hours  multivitamin 1 Tablet(s) Oral daily  polyethylene glycol 3350 17 Gram(s) Oral daily  senna 2 Tablet(s) Oral at bedtime  zinc oxide 20% Ointment 1 Application(s) Topical daily    MEDICATIONS  (PRN):  acetaminophen    Suspension .. 650 milliGRAM(s) Oral every 6 hours PRN Mild Pain (1 - 3)  artificial  tears Solution 2 Drop(s) Both EYES every 2 hours PRN Dry Eyes  bisacodyl Suppository 10 milliGRAM(s) Rectal daily PRN Constipation  dextrose 40% Gel 15 Gram(s) Oral once PRN Blood Glucose LESS THAN 70 milliGRAM(s)/deciliter  glucagon  Injectable 1 milliGRAM(s) IntraMuscular once PRN Glucose LESS THAN 70 milligrams/deciliter      Allergies    No Known Allergies    Intolerances      Review of Systems:  Constitutional: No fever  Eyes: No blurry vision  Neuro: No tremors  HEENT: No pain  Cardiovascular: No chest pain, palpitations  Respiratory: No SOB, no cough  GI: No nausea, vomiting, abdominal pain  : No dysuria  Skin: no rash  Psych: no depression  Endocrine: no polyuria, polydipsia  Hem/lymph: no swelling  Osteoporosis: no fractures    ALL OTHER SYSTEMS REVIEWED AND NEGATIVE    UNABLE TO OBTAIN    PHYSICAL EXAM:  VITALS: T(C): 36.3 (02-24-19 @ 07:50)  T(F): 97.3 (02-24-19 @ 07:50), Max: 97.8 (02-23-19 @ 20:28)  HR: 96 (02-24-19 @ 12:59) (67 - 98)  BP: 136/84 (02-24-19 @ 12:59) (129/82 - 169/81)  RR:  (12 - 16)  SpO2:  (96% - 96%)  Wt(kg): --  GENERAL: NAD, well-groomed, well-developed  EYES: No proptosis, no lid lag, anicteric  HEENT:  Atraumatic, Normocephalic, moist mucous membranes  THYROID: Normal size, no palpable nodules  RESPIRATORY: Clear to auscultation bilaterally; No rales, rhonchi, wheezing, or rubs  CARDIOVASCULAR: Regular rate and rhythm; No murmurs; no peripheral edema  GI: Soft, nontender, non distended, normal bowel sounds  SKIN: Dry, intact, No rashes or lesions  MUSCULOSKELETAL: Full range of motion, normal strength  NEURO: sensation intact, extraocular movements intact, no tremor, normal reflexes  PSYCH: Alert and oriented x 3, normal affect, normal mood  CUSHING'S SIGNS: no striae    POCT Blood Glucose.: 147 mg/dL (02-24-19 @ 16:32)  POCT Blood Glucose.: 156 mg/dL (02-24-19 @ 11:28)  POCT Blood Glucose.: 186 mg/dL (02-24-19 @ 07:27)  POCT Blood Glucose.: 143 mg/dL (02-23-19 @ 20:36)  POCT Blood Glucose.: 112 mg/dL (02-23-19 @ 16:23)  POCT Blood Glucose.: 228 mg/dL (02-23-19 @ 11:17)  POCT Blood Glucose.: 135 mg/dL (02-23-19 @ 07:42)  POCT Blood Glucose.: 137 mg/dL (02-22-19 @ 22:18)  POCT Blood Glucose.: 104 mg/dL (02-22-19 @ 16:19)  POCT Blood Glucose.: 166 mg/dL (02-22-19 @ 11:54)  POCT Blood Glucose.: 135 mg/dL (02-22-19 @ 07:28)  POCT Blood Glucose.: 129 mg/dL (02-21-19 @ 22:10)                            13.3   6.3   )-----------( 302      ( 22 Feb 2019 05:45 )             41.2       02-22    137  |  98  |  28<H>  ----------------------------<  151<H>  3.8   |  29  |  0.86    EGFR if : 70  EGFR if non : 60    Ca    9.4      02-22    TPro  7.3  /  Alb  2.7<L>  /  TBili  1.2  /  DBili  x   /  AST  49<H>  /  ALT  45  /  AlkPhos  104  02-22      Thyroid Function Tests:  02-23 @ 05:52 TSH -- FreeT4 1.5 T3 75 Anti TPO <10.0 Anti Thyroglobulin Ab -- TSI --  02-22 @ 05:45 TSH 6.04 FreeT4 -- T3 -- Anti TPO -- Anti Thyroglobulin Ab -- TSI --    Hemoglobin A1C, Whole Blood: 6.6 % <H> [4.0 - 5.6] (02-04-19 @ 06:11)  02-04 Chol 124 LDL 55 HDL 37<L> Trig 159<H>

## 2019-02-24 NOTE — PROGRESS NOTE ADULT - SUBJECTIVE AND OBJECTIVE BOX
Subjective: INR high >5.  Pt denies signs or symptoms of bleeding. no pain.  sleeping ok.       REVIEW OF SYSTEMS  Pertinent in last 24 h: Neurological deficits    VITALS  T(C): 36.3 (02-24-19 @ 07:50), Max: 36.6 (02-23-19 @ 20:28)  HR: 70 (02-24-19 @ 07:50) (67 - 98)  BP: 129/82 (02-24-19 @ 07:50) (128/83 - 169/81)  RR: 16 (02-24-19 @ 07:50) (12 - 16)  SpO2: 96% (02-24-19 @ 07:50) (96% - 96%)  Wt(kg): --    Physical Exam:  -Mental Status - Patient is alert, awake, oriented X3.   Follows commands well and able to answer questions appropriately. Mood and affect  normal.   	Cranial Nerves - PERRL, EOMI, right sided facial droop, +dysarthria, +dysphagia,  	Motor Exam - right HP  		Sensory    Intact to light touch bilaterally.   	No tremors  	                              	GENERAL Exam:     Nontoxic , No Acute Distress 	  	HEENT:  normocephalic, atraumatic		  	LUNGS:	Clear bilaterally    	HEART:	 Tachycardic, S1S1 auscultated, regular 	  	GI/ ABDOMEN:  Soft, +BS,  Non tender, obese abdomen, peg tube present-no drainage or erythema noted      RECENT LABS/IMAGING        PT/INR - ( 24 Feb 2019 05:45 )   PT: 63.4 sec;   INR: 5.37 ratio                   MEDICATIONS   acetaminophen    Suspension .. 650 milliGRAM(s) every 6 hours PRN  amiodarone    Tablet 200 milliGRAM(s) daily  artificial  tears Solution 2 Drop(s) every 2 hours PRN  ascorbic acid 500 milliGRAM(s) two times a day  atorvastatin 20 milliGRAM(s) at bedtime  bisacodyl Suppository 10 milliGRAM(s) daily PRN  chlorhexidine 0.12% Liquid 15 milliLiter(s) two times a day  dextrose 40% Gel 15 Gram(s) once PRN  dextrose 5%. 1000 milliLiter(s) <Continuous>  dextrose 50% Injectable 12.5 Gram(s) once  famotidine    Tablet 20 milliGRAM(s) daily  FLUoxetine 10 milliGRAM(s) at bedtime  folic acid 1 milliGRAM(s) daily  furosemide    Tablet 20 milliGRAM(s) daily  glucagon  Injectable 1 milliGRAM(s) once PRN  insulin glargine Injectable (LANTUS) 8 Unit(s) at bedtime  insulin lispro (HumaLOG) corrective regimen sliding scale   three times a day with meals  lidocaine   Patch 2 Patch <User Schedule>  melatonin 3 milliGRAM(s) at bedtime  metFORMIN 500 milliGRAM(s) daily  metoprolol tartrate 25 milliGRAM(s) every 8 hours  multivitamin 1 Tablet(s) daily  polyethylene glycol 3350 17 Gram(s) daily  senna 2 Tablet(s) at bedtime  zinc oxide 20% Ointment 1 Application(s) daily      --------------------------------------------------------------------

## 2019-02-24 NOTE — CONSULT NOTE ADULT - ASSESSMENT
DM2: acceptable control for medical floor; consider adding metformin 500mg 1 tab daily with breakfast   Abnormal TFTs: GIven initial and new stroke and normal free T4, recommend to repeat TSH, Free T4 and Total T3 levels in one month.    Thank you for the pleasure of this consult.  Will follow. DM2: acceptable control for medical floor; continue current management.     Abnormal TFTs: GIven initial and new stroke and normal free T4, recommend to repeat TSH, Free T4 and Total T3 levels in one month. There may be component of euthyroid sick syndrome operating here.   If not already ordered, please order a TPO Ab level (thyroid peroxidase antibody level).     Thank you for the pleasure of this consult.  Will follow.

## 2019-02-25 LAB
ALBUMIN SERPL ELPH-MCNC: 2.6 G/DL — LOW (ref 3.3–5)
ALP SERPL-CCNC: 106 U/L — SIGNIFICANT CHANGE UP (ref 40–120)
ALT FLD-CCNC: 37 U/L DA — SIGNIFICANT CHANGE UP (ref 10–45)
ANION GAP SERPL CALC-SCNC: 9 MMOL/L — SIGNIFICANT CHANGE UP (ref 5–17)
AST SERPL-CCNC: 33 U/L — SIGNIFICANT CHANGE UP (ref 10–40)
BILIRUB SERPL-MCNC: 1.6 MG/DL — HIGH (ref 0.2–1.2)
BUN SERPL-MCNC: 20 MG/DL — SIGNIFICANT CHANGE UP (ref 7–23)
CALCIUM SERPL-MCNC: 9 MG/DL — SIGNIFICANT CHANGE UP (ref 8.4–10.5)
CHLORIDE SERPL-SCNC: 98 MMOL/L — SIGNIFICANT CHANGE UP (ref 96–108)
CO2 SERPL-SCNC: 30 MMOL/L — SIGNIFICANT CHANGE UP (ref 22–31)
CREAT SERPL-MCNC: 0.81 MG/DL — SIGNIFICANT CHANGE UP (ref 0.5–1.3)
GLUCOSE BLDC GLUCOMTR-MCNC: 125 MG/DL — HIGH (ref 70–99)
GLUCOSE BLDC GLUCOMTR-MCNC: 151 MG/DL — HIGH (ref 70–99)
GLUCOSE BLDC GLUCOMTR-MCNC: 172 MG/DL — HIGH (ref 70–99)
GLUCOSE BLDC GLUCOMTR-MCNC: 181 MG/DL — HIGH (ref 70–99)
GLUCOSE SERPL-MCNC: 143 MG/DL — HIGH (ref 70–99)
HCT VFR BLD CALC: 40.7 % — SIGNIFICANT CHANGE UP (ref 34.5–45)
HGB BLD-MCNC: 13 G/DL — SIGNIFICANT CHANGE UP (ref 11.5–15.5)
INR BLD: 1.72 RATIO — HIGH (ref 0.88–1.16)
MCHC RBC-ENTMCNC: 29.5 PG — SIGNIFICANT CHANGE UP (ref 27–34)
MCHC RBC-ENTMCNC: 32.1 GM/DL — SIGNIFICANT CHANGE UP (ref 32–36)
MCV RBC AUTO: 92.1 FL — SIGNIFICANT CHANGE UP (ref 80–100)
PLATELET # BLD AUTO: 258 K/UL — SIGNIFICANT CHANGE UP (ref 150–400)
POTASSIUM SERPL-MCNC: 3.6 MMOL/L — SIGNIFICANT CHANGE UP (ref 3.5–5.3)
POTASSIUM SERPL-SCNC: 3.6 MMOL/L — SIGNIFICANT CHANGE UP (ref 3.5–5.3)
PROT SERPL-MCNC: 7.2 G/DL — SIGNIFICANT CHANGE UP (ref 6–8.3)
PROTHROM AB SERPL-ACNC: 19.6 SEC — HIGH (ref 10–12.9)
RBC # BLD: 4.42 M/UL — SIGNIFICANT CHANGE UP (ref 3.8–5.2)
RBC # FLD: 12.6 % — SIGNIFICANT CHANGE UP (ref 10.3–14.5)
SODIUM SERPL-SCNC: 137 MMOL/L — SIGNIFICANT CHANGE UP (ref 135–145)
WBC # BLD: 5.3 K/UL — SIGNIFICANT CHANGE UP (ref 3.8–10.5)
WBC # FLD AUTO: 5.3 K/UL — SIGNIFICANT CHANGE UP (ref 3.8–10.5)

## 2019-02-25 PROCEDURE — 99233 SBSQ HOSP IP/OBS HIGH 50: CPT

## 2019-02-25 PROCEDURE — 99232 SBSQ HOSP IP/OBS MODERATE 35: CPT | Mod: GC

## 2019-02-25 RX ORDER — WARFARIN SODIUM 2.5 MG/1
1 TABLET ORAL AT BEDTIME
Qty: 0 | Refills: 0 | Status: DISCONTINUED | OUTPATIENT
Start: 2019-02-25 | End: 2019-03-02

## 2019-02-25 RX ORDER — INSULIN GLARGINE 100 [IU]/ML
4 INJECTION, SOLUTION SUBCUTANEOUS AT BEDTIME
Qty: 0 | Refills: 0 | Status: DISCONTINUED | OUTPATIENT
Start: 2019-02-25 | End: 2019-02-26

## 2019-02-25 RX ORDER — METFORMIN HYDROCHLORIDE 850 MG/1
500 TABLET ORAL
Qty: 0 | Refills: 0 | Status: DISCONTINUED | OUTPATIENT
Start: 2019-02-25 | End: 2019-02-28

## 2019-02-25 RX ADMIN — Medication 2: at 07:32

## 2019-02-25 RX ADMIN — Medication 25 MILLIGRAM(S): at 05:29

## 2019-02-25 RX ADMIN — LIDOCAINE 2 PATCH: 4 CREAM TOPICAL at 21:20

## 2019-02-25 RX ADMIN — METFORMIN HYDROCHLORIDE 500 MILLIGRAM(S): 850 TABLET ORAL at 16:25

## 2019-02-25 RX ADMIN — FAMOTIDINE 20 MILLIGRAM(S): 10 INJECTION INTRAVENOUS at 12:05

## 2019-02-25 RX ADMIN — Medication 650 MILLIGRAM(S): at 05:29

## 2019-02-25 RX ADMIN — Medication 650 MILLIGRAM(S): at 11:12

## 2019-02-25 RX ADMIN — Medication 20 MILLIGRAM(S): at 05:29

## 2019-02-25 RX ADMIN — LIDOCAINE 2 PATCH: 4 CREAM TOPICAL at 05:31

## 2019-02-25 RX ADMIN — Medication 1 TABLET(S): at 12:05

## 2019-02-25 RX ADMIN — CHLORHEXIDINE GLUCONATE 15 MILLILITER(S): 213 SOLUTION TOPICAL at 05:29

## 2019-02-25 RX ADMIN — ZINC OXIDE 1 APPLICATION(S): 200 OINTMENT TOPICAL at 12:06

## 2019-02-25 RX ADMIN — AMIODARONE HYDROCHLORIDE 200 MILLIGRAM(S): 400 TABLET ORAL at 05:28

## 2019-02-25 RX ADMIN — LIDOCAINE 2 PATCH: 4 CREAM TOPICAL at 11:10

## 2019-02-25 RX ADMIN — Medication 2: at 12:08

## 2019-02-25 RX ADMIN — Medication 2: at 16:33

## 2019-02-25 RX ADMIN — Medication 1 MILLIGRAM(S): at 12:05

## 2019-02-25 RX ADMIN — Medication 25 MILLIGRAM(S): at 13:38

## 2019-02-25 RX ADMIN — Medication 500 MILLIGRAM(S): at 05:29

## 2019-02-25 RX ADMIN — Medication 25 MILLIGRAM(S): at 21:15

## 2019-02-25 RX ADMIN — ATORVASTATIN CALCIUM 20 MILLIGRAM(S): 80 TABLET, FILM COATED ORAL at 21:15

## 2019-02-25 RX ADMIN — INSULIN GLARGINE 4 UNIT(S): 100 INJECTION, SOLUTION SUBCUTANEOUS at 21:16

## 2019-02-25 RX ADMIN — Medication 3 MILLIGRAM(S): at 21:15

## 2019-02-25 RX ADMIN — Medication 500 MILLIGRAM(S): at 16:25

## 2019-02-25 RX ADMIN — WARFARIN SODIUM 1 MILLIGRAM(S): 2.5 TABLET ORAL at 21:15

## 2019-02-25 RX ADMIN — Medication 10 MILLIGRAM(S): at 21:15

## 2019-02-25 RX ADMIN — CHLORHEXIDINE GLUCONATE 15 MILLILITER(S): 213 SOLUTION TOPICAL at 16:25

## 2019-02-25 NOTE — PROGRESS NOTE ADULT - SUBJECTIVE AND OBJECTIVE BOX
INTERVAL HPI/OVERNIGHT EVENTS:  HPI:  87 y/o female being followed by GI for issues with peg tube. Patient seen and examined this morning. No complaints from patient at this time.     MEDICATIONS  (STANDING):  amiodarone    Tablet 200 milliGRAM(s) Oral daily  ascorbic acid 500 milliGRAM(s) Oral two times a day  atorvastatin 20 milliGRAM(s) Oral at bedtime  chlorhexidine 0.12% Liquid 15 milliLiter(s) Swish and Spit two times a day  dextrose 5%. 1000 milliLiter(s) (50 mL/Hr) IV Continuous <Continuous>  dextrose 50% Injectable 12.5 Gram(s) IV Push once  famotidine    Tablet 20 milliGRAM(s) Oral daily  FLUoxetine 10 milliGRAM(s) Oral at bedtime  folic acid 1 milliGRAM(s) Oral daily  furosemide    Tablet 20 milliGRAM(s) Oral daily  insulin glargine Injectable (LANTUS) 4 Unit(s) SubCutaneous at bedtime  insulin lispro (HumaLOG) corrective regimen sliding scale   SubCutaneous three times a day with meals  lidocaine   Patch 2 Patch Transdermal <User Schedule>  melatonin 3 milliGRAM(s) Oral at bedtime  metFORMIN 500 milliGRAM(s) Oral two times a day  metoprolol tartrate 25 milliGRAM(s) Oral every 8 hours  multivitamin 1 Tablet(s) Oral daily  polyethylene glycol 3350 17 Gram(s) Oral daily  senna 2 Tablet(s) Oral at bedtime  warfarin 1 milliGRAM(s) Oral at bedtime  zinc oxide 20% Ointment 1 Application(s) Topical daily    MEDICATIONS  (PRN):  acetaminophen    Suspension .. 650 milliGRAM(s) Oral every 6 hours PRN Mild Pain (1 - 3)  artificial  tears Solution 2 Drop(s) Both EYES every 2 hours PRN Dry Eyes  bisacodyl Suppository 10 milliGRAM(s) Rectal daily PRN Constipation  dextrose 40% Gel 15 Gram(s) Oral once PRN Blood Glucose LESS THAN 70 milliGRAM(s)/deciliter  glucagon  Injectable 1 milliGRAM(s) IntraMuscular once PRN Glucose LESS THAN 70 milligrams/deciliter      Allergies    No Known Allergies    Intolerances    PHYSICAL EXAM:   Vital Signs:  Vital Signs Last 24 Hrs  T(C): 36.4 (25 Feb 2019 08:33), Max: 36.6 (24 Feb 2019 19:55)  T(F): 97.5 (25 Feb 2019 08:33), Max: 97.9 (24 Feb 2019 19:55)  HR: 96 (25 Feb 2019 13:39) (69 - 103)  BP: 155/85 (25 Feb 2019 13:39) (138/83 - 170/92)  BP(mean): --  RR: 14 (25 Feb 2019 08:33) (14 - 14)  SpO2: 97% (25 Feb 2019 08:33) (97% - 99%)  Daily     Daily I&O's Summary    24 Feb 2019 07:01  -  25 Feb 2019 07:00  --------------------------------------------------------  IN: 500 mL / OUT: 0 mL / NET: 500 mL    GENERAL: NAD  HEENT:  conjunctivae clear and pink,   CHEST:  Full & symmetric excursion, no increased effort, breath sounds clear  HEART:  Regular rhythm, S1, S2, no edema  ABDOMEN:  Soft, non-tender, non-distended, normoactive bowel sounds,  Peg intact, site clean and dry  EXTEREMITIES:  no cyanosis,clubbing or edema  SKIN:  No rash, warm/dry  NEURO:  Alert, oriented    LABS:                        13.0   5.3   )-----------( 258      ( 25 Feb 2019 06:00 )             40.7     02-25    137  |  98  |  20  ----------------------------<  143<H>  3.6   |  30  |  0.81    Ca    9.0      25 Feb 2019 06:00    TPro  7.2  /  Alb  2.6<L>  /  TBili  1.6<H>  /  DBili  x   /  AST  33  /  ALT  37  /  AlkPhos  106  02-25    PT/INR - ( 25 Feb 2019 06:00 )   PT: 19.6 sec;   INR: 1.72 ratio             amylase   lipase  RADIOLOGY & ADDITIONAL TESTS:

## 2019-02-25 NOTE — CHART NOTE - NSCHARTNOTEFT_GEN_A_CORE
NUTRITION FOLLOW UP    SOURCE: Patient [X)   Family [ ]     other [ ]    DIET: CONS. CHO DYSPHAGIA 2 W/ NECTAR THICK FLUIDS    PATIENT REPORT[ ] nausea  [ ] vomiting [ ] diarrhea [ ] constipation  [ ]chewing problems [ ] swallowing issues  [ ] other: no GI distress    PO INTAKE:  < 50% [ ]   50-75%  [ X]   %  []  other :    SOURCE: for PO intake [X] Patient [ ] family [ ] chart [ ] staff [ ] other    ENTERAL/PARENTERAL NUTRITION: n/a    CURRENT WEIGHT: Weight (kg): 101.1 (2/21)    PERTINENT LABS:  Date: 25 Feb 2019 06:00  Hemoglobin 13.0   Hematocrit 40.7     Date: 02-25  Sodium 137  Potassium 3.6  Glucose Serum 143<H>  BUN 20      Creatinine    ACCUCHECK  POCT Blood Glucose.: 172 mg/dL (25 Feb 2019 07:30)  POCT Blood Glucose.: 137 mg/dL (24 Feb 2019 21:55)  POCT Blood Glucose.: 147 mg/dL (24 Feb 2019 16:32)  POCT Blood Glucose.: 156 mg/dL (24 Feb 2019 11:28)      SKIN: intact, Last BM was 2/24    ESTIMATED NEEDS:   [X] no change since previous assessment  [ ] recalculated:     PREVIOUS NUTRITION DIAGNOSIS: addressed    NUTRITION DIAGNOSIS is   [X] ongoing    NEW NUTRITION DIAGNOSIS: [X] not applicable    MONITORING AND EVALUATION:   Current diet order is appropriate and is well tolerated, but will monitor for any changes that may be needed    [X] PO intake [X] Tolerance to diet prescription [X] weights [X] follow up per protocol    NUTRITION RECOMMENDATIONS: Advance diet if/when medically appropriate.    RD remains available TREVOR Rose RD CDE

## 2019-02-25 NOTE — PROGRESS NOTE ADULT - ASSESSMENT
89 y/o female with multiple comorbidities being followed by GI for Peg tube malfunction. Retention ring was loosened from abdomen on 2/19/19 . Erythema continues to improve.     Plan:   Continue with Zinc BID to Peg site

## 2019-02-25 NOTE — PROGRESS NOTE ADULT - SUBJECTIVE AND OBJECTIVE BOX
HPI: 88 year old  female with a pmh of CAD, HTN, HLD, pacemaker who presented to [facility] as a transfer from [facility] with right hemiparesis and aphasia and left proximal MCA occlusion. The patient was last known well at 2 pm on 2/3/19 and was then found down on the ground by her daughter several hours later and was noted to be not speaking and weak on the right. At [facility] a CTA head and neck was performed which showed a proximal left MCA occlusion, and a CT head showed a dense left MCA sign with some hypodensity and early ischemic changes in the left MCA distribution. She did not receive IV tPA as she was out of the window and was transferred to [facility] for possible mechanical thrombectomy. At [facility] her CT perfusion showed zero core infarct, with a penumbra of 80 mls, and an infinite mismatch. She was deemed a candidate for intervention and recanalization.  Stroke thought to be 2* cardioembolism   Patient failed speech and swallow, PEG was placed on 2/12 and has been tolerating tube feeds.   Patient found be more lethargic on 2/13. Repeat CT head was stable. UA positive and patient started on antibiotics. Urine cx sent and pending results. Of note, CT head showed sphenoid air fluid level. Recs to start Augmentin if s/s of sinusitis start. Admitted to rehab on 2/14/19      Subjective: Patient seen at bedside.   Denies any pain, SOB, CP, HA, nausea, vomiting, abdominal pain, dysuria. Reports that she is tolerating her diet. Reports continued feeling of being tired during daytime hours. Denies any issues sleeping at night.     REVIEW OF SYSTEMS  [ x  ] Constitutional WNL, no overt signs of bleeding   [ x  ] Cardio WNL  [x   ] Resp WNL  [   ] GI PEG    Vital Signs Last 24 Hrs  Vital Signs Last 24 Hrs  T(C): 36.4 (25 Feb 2019 08:33), Max: 36.6 (24 Feb 2019 19:55)  T(F): 97.5 (25 Feb 2019 08:33), Max: 97.9 (24 Feb 2019 19:55)  HR: 70 (25 Feb 2019 08:33) (69 - 103)  BP: 138/83 (25 Feb 2019 08:33) (136/84 - 170/92)  RR: 14 (25 Feb 2019 08:33) (14 - 14)  SpO2: 97% (25 Feb 2019 08:33) (97% - 99%)      PHYSICAL EXAM  General: nad, obese female  Cardio: S1S2  Resp: CTA  Abdomen: soft, PEG site with scant drainage, erythema continues to improve around peg insertion site. Small amt drainage noted.  Neuro: aao x self, place. Right facial droop, expressive deficits improving, right hemiplegia with increased tone - continued improvement in movement noted both in UE and LE, about 2-3/5 right shoulder/elbow/wrist/hand grasp, approx 3/5 right hip flexor and knee extender 2/5 ankle strengths   Extrem: no edema, no calf tenderness  skin: no overt signs of bleeding      RECENT LABS:    PT/INR - ( 25 Feb 2019 06:00 )   PT: 19.6 sec;   INR: 1.72 ratio       02-25    137  |  98  |  20  ----------------------------<  143<H>  3.6   |  30  |  0.81    Ca    9.0      25 Feb 2019 06:00    TPro  7.2  /  Alb  2.6<L>  /  TBili  1.6<H>  /  DBili  x   /  AST  33  /  ALT  37  /  AlkPhos  106  02-25                        13.0   5.3   )-----------( 258      ( 25 Feb 2019 06:00 )             40.7     HA1C 6.6% on 2/4/19    Thyroperoxidase Antibody (02.23.19 @ 05:52)    Thyroperoxidase Antibody: <10.0 IU/mL    Thyroid Stimulating Hormone, Serum in AM (02.22.19 @ 05:45)    Thyroid Stimulating Hormone, Serum: 6.04 uIU/mL    Triiodothyronine, Total (T3 Total) (02.23.19 @ 05:52)    Triiodothyronine, Total (T3 Total): 75 ng/dL    Free Thyroxine, Serum in AM (02.23.19 @ 05:52)    Free Thyroxine, Serum: 1.5 ng/dL          CAPILLARY BLOOD GLUCOSE  POCT  Blood Glucose (02.25.19 @ 07:30)    POCT Blood Glucose.: 172 mg/dL  POCT  Blood Glucose (02.24.19 @ 21:55)    POCT Blood Glucose.: 137 mg/dL  POCT  Blood Glucose (02.24.19 @ 16:32)    POCT Blood Glucose.: 147 mg/dL  POCT  Blood Glucose (02.24.19 @ 11:28)    POCT Blood Glucose.: 156 mg/dL        MEDICATIONS  (STANDING):  amiodarone    Tablet 200 milliGRAM(s) Oral daily  ascorbic acid 500 milliGRAM(s) Oral two times a day  atorvastatin 20 milliGRAM(s) Oral at bedtime  chlorhexidine 0.12% Liquid 15 milliLiter(s) Swish and Spit two times a day  dextrose 5%. 1000 milliLiter(s) (50 mL/Hr) IV Continuous <Continuous>  dextrose 50% Injectable 12.5 Gram(s) IV Push once  famotidine    Tablet 20 milliGRAM(s) Oral daily  FLUoxetine 10 milliGRAM(s) Oral at bedtime  folic acid 1 milliGRAM(s) Oral daily  furosemide    Tablet 20 milliGRAM(s) Oral daily  insulin glargine Injectable (LANTUS) 8 Unit(s) SubCutaneous at bedtime  insulin lispro (HumaLOG) corrective regimen sliding scale   SubCutaneous three times a day with meals  lidocaine   Patch 2 Patch Transdermal <User Schedule>  melatonin 3 milliGRAM(s) Oral at bedtime  metFORMIN 500 milliGRAM(s) Oral daily  metoprolol tartrate 25 milliGRAM(s) Oral every 8 hours  multivitamin 1 Tablet(s) Oral daily  polyethylene glycol 3350 17 Gram(s) Oral daily  senna 2 Tablet(s) Oral at bedtime  zinc oxide 20% Ointment 1 Application(s) Topical daily    MEDICATIONS  (PRN):  acetaminophen    Suspension .. 650 milliGRAM(s) Oral every 6 hours PRN Mild Pain (1 - 3)  artificial  tears Solution 2 Drop(s) Both EYES every 2 hours PRN Dry Eyes  bisacodyl Suppository 10 milliGRAM(s) Rectal daily PRN Constipation  dextrose 40% Gel 15 Gram(s) Oral once PRN Blood Glucose LESS THAN 70 milliGRAM(s)/deciliter  glucagon  Injectable 1 milliGRAM(s) IntraMuscular once PRN Glucose LESS THAN 70 milligrams/deciliter      A/P:    88 year old female with risk factors as stated above admitted for comprehensive rehab with left MCA infarct s/p recanalization procedure at [facility]  Continue full rehab program:PT/OT/SLP 3 hrs/day 5 days/week  On AC in setting atrial flutter,  statins, risk factor management    2. Dysphagia: Passed MBS on 2/20, now tolerating pureed diet with nectar thick liquids     3. Atrial flutter : on amiodarone metoprolol. INR 1.72 this am, will give 1mg coumadin tonight and recheck INR in am.   Supratheraputic over the weekend and received vitamin K.   EKG over weekend shows afib.     4. UTI: completed abx course    5. DM-ii:- PEG feeds Lantus-8 units, SSI  Accuchecks--ranging 130-180s  Restarted on metformin 500mg daily   Home regimen: 500mg metformin ER daily   Daughter confirms accuchecks ranged 120-160s at home   Endocrinology consult appreciated, will continue current regimen for now, consider titrating lantus off and increasing metformin dosing     6. Diastolic HF: on lasix . monitor potassium     7. Pain: on tylenol prn lidoderm patch     8. Thrush: continue nystatin swish and suction    9. Skin: PEG site irritation improved. GI fu noted. Retention ring adjusted    10: motor recovery: will start prozac 10mg qHS-discussed with daughter who agrees with starting prozac  QTc on 466, monitor closely     11: daytime fatigue:  melatonin to improve sleep regimen.     12: TSH elevation with slightly depressed total T4 and normal free T4 with normal thyroid antibody. Endocrinology consult appreciated with recommendations to follow thyroid lab values in 1 month.     12. Dry eyes: will start artificial tears PRN

## 2019-02-25 NOTE — CHART NOTE - NSCHARTNOTEFT_GEN_A_CORE
Called by nurse for patient's PEG tube having small amount of pus drainage. Patient seen and examined at bedside; was seen earlier today for erythema along PEG site by primary team and GI. Ms. Valentino was noted to have a small amount of drainage in the area at the time of evaluation. Continues to be afebrile with no leukocytosis or symptoms of fevers, chills or signs of infection. Ms. Valentino has been getting zinc oxide around the PEG site for the erythema; would continue this for now. Will d/w primary team tomorrow.

## 2019-02-25 NOTE — PROGRESS NOTE ADULT - ATTENDING COMMENTS
Patient seen and examined.  Agree with assessment and plan as above.    PEG tube now functioning without complications.  No abdominal pain.  VSS.  Abdomen soft, nontender.  G tube site clean and without signs of infection.    Continue Zinc oxide for now.

## 2019-02-25 NOTE — PROGRESS NOTE ADULT - SUBJECTIVE AND OBJECTIVE BOX
VALENTINO, SUSIE  88y  Female    Patient is a 88y old  Female who presents with a chief complaint of CVA with right sided weakness, dysphagia, dysarthria, expressive aphasia (24 Feb 2019 18:21)    Pt seen and examined, no events overnight, pt denies complaints     PAST MEDICAL & SURGICAL HISTORY:  Valvular disease  Atrial flutter: 1/2010- [facility], Dr Tran  Diabetes  Obesity  Hyperlipidemia  HTN (hypertension)  CAD (coronary artery disease): s/p CABG- 2004-[facility]  Artificial pacemaker  Aortic valve replaced  S/P coronary artery stent placement: x 4, 2006-[facility]  S/P cholecystectomy  S/P CABG (coronary artery bypass graft)        MedsMEDICATIONS  (STANDING):  amiodarone    Tablet 200 milliGRAM(s) Oral daily  ascorbic acid 500 milliGRAM(s) Oral two times a day  atorvastatin 20 milliGRAM(s) Oral at bedtime  chlorhexidine 0.12% Liquid 15 milliLiter(s) Swish and Spit two times a day  dextrose 5%. 1000 milliLiter(s) (50 mL/Hr) IV Continuous <Continuous>  dextrose 50% Injectable 12.5 Gram(s) IV Push once  famotidine    Tablet 20 milliGRAM(s) Oral daily  FLUoxetine 10 milliGRAM(s) Oral at bedtime  folic acid 1 milliGRAM(s) Oral daily  furosemide    Tablet 20 milliGRAM(s) Oral daily  insulin glargine Injectable (LANTUS) 8 Unit(s) SubCutaneous at bedtime  insulin lispro (HumaLOG) corrective regimen sliding scale   SubCutaneous three times a day with meals  lidocaine   Patch 2 Patch Transdermal <User Schedule>  melatonin 3 milliGRAM(s) Oral at bedtime  metFORMIN 500 milliGRAM(s) Oral daily  metoprolol tartrate 25 milliGRAM(s) Oral every 8 hours  multivitamin 1 Tablet(s) Oral daily  polyethylene glycol 3350 17 Gram(s) Oral daily  senna 2 Tablet(s) Oral at bedtime  zinc oxide 20% Ointment 1 Application(s) Topical daily    MEDICATIONS  (PRN):  acetaminophen    Suspension .. 650 milliGRAM(s) Oral every 6 hours PRN Mild Pain (1 - 3)  artificial  tears Solution 2 Drop(s) Both EYES every 2 hours PRN Dry Eyes  bisacodyl Suppository 10 milliGRAM(s) Rectal daily PRN Constipation  dextrose 40% Gel 15 Gram(s) Oral once PRN Blood Glucose LESS THAN 70 milliGRAM(s)/deciliter  glucagon  Injectable 1 milliGRAM(s) IntraMuscular once PRN Glucose LESS THAN 70 milligrams/deciliter      Vital Signs Last 24 Hrs  T(C): 36.4 (25 Feb 2019 08:33), Max: 36.6 (24 Feb 2019 19:55)  T(F): 97.5 (25 Feb 2019 08:33), Max: 97.9 (24 Feb 2019 19:55)  HR: 70 (25 Feb 2019 08:33) (69 - 103)  BP: 138/83 (25 Feb 2019 08:33) (136/84 - 170/92)  BP(mean): --  RR: 14 (25 Feb 2019 08:33) (14 - 14)  SpO2: 97% (25 Feb 2019 08:33) (97% - 99%)    PHYSICAL EXAM:  GENERAL: NAD  NERVOUS SYSTEM:  Alert & Oriented X3  CHEST/LUNG: Clear lungs b/l  HEART: S1S2, RRR   ABDOMEN: Soft, PEG cdi, non-tender, non-distended, + bowel sounds  EXTREMITIES:  2+ Peripheral Pulses, No clubbing, cyanosis, or edema      LABS:                          13.0   5.3   )-----------( 258      ( 25 Feb 2019 06:00 )             40.7       02-25    137  |  98  |  20  ----------------------------<  143<H>  3.6   |  30  |  0.81    Ca    9.0      25 Feb 2019 06:00    TPro  7.2  /  Alb  2.6<L>  /  TBili  1.6<H>  /  DBili  x   /  AST  33  /  ALT  37  /  AlkPhos  106  02-25      PT/INR - ( 25 Feb 2019 06:00 )   PT: 19.6 sec;   INR: 1.72 ratio                                       RADIOLOGY & ADDITIONAL TESTS:    Imaging Personally Reviewed:  [ ] YES  [ ] NO      HEALTH ISSUES - PROBLEM Dx:  UTI (urinary tract infection): UTI (urinary tract infection)  Diastolic heart failure: Diastolic heart failure  Left hip pain: Left hip pain  Hyperlipidemia: Hyperlipidemia  Diabetes: Diabetes  HTN (hypertension): HTN (hypertension)  Atrial flutter: Atrial flutter  Dysphagia: Dysphagia  CVA (cerebral vascular accident): CVA (cerebral vascular accident)          Care Discussed with Consultants/Other Providers [ x] YES  [ ] NO

## 2019-02-25 NOTE — PROGRESS NOTE ADULT - ASSESSMENT
87yo F admitted to acute rehab secondary to Left MCA CVA s/p endovascular intervention with right side hemiparesis, dysphagia s/p PEG placement, dysarthria and expressive aphasia    #Left MCA CVA  PT/OT/SLP as tolerated   continue statin     #Dysphagia/dysarthria  SLP as tolerated   PEG placed , continue with peg feeds     #A-Flutter  Rate controlled with amiodarone and lopressor  INR now subtherapeutic after oral Vit K, would dose coumadin tonight      #HTN  controlled overall     #DM   controlled on current regimen     #Diastolic CHF  stable, continue Lasix , lopressor      DVT px  on Coumadin

## 2019-02-25 NOTE — PROGRESS NOTE ADULT - ATTENDING COMMENTS
Chart reviewed. Patient seen at bedside.   Patient with elevated INR over weekend and was given Vitamin k 1mg and INR subtherapeutic today. Extremely sensitive to coumadin. Monitor closely  Patient also evaluated by endocrine. Repeat labs in one month    2. Neuro exam stable. Right HP improving slowly. UE with increased tone at right elbow. Continue ROM.     3.  PEG site with some drainage and appears loose. Will request GI fu

## 2019-02-26 LAB
GLUCOSE BLDC GLUCOMTR-MCNC: 123 MG/DL — HIGH (ref 70–99)
GLUCOSE BLDC GLUCOMTR-MCNC: 144 MG/DL — HIGH (ref 70–99)
GLUCOSE BLDC GLUCOMTR-MCNC: 147 MG/DL — HIGH (ref 70–99)
GLUCOSE BLDC GLUCOMTR-MCNC: 164 MG/DL — HIGH (ref 70–99)
INR BLD: 1.49 RATIO — HIGH (ref 0.88–1.16)
PROTHROM AB SERPL-ACNC: 16.9 SEC — HIGH (ref 10–12.9)

## 2019-02-26 PROCEDURE — 99232 SBSQ HOSP IP/OBS MODERATE 35: CPT | Mod: GC

## 2019-02-26 RX ADMIN — Medication 2: at 08:09

## 2019-02-26 RX ADMIN — ATORVASTATIN CALCIUM 20 MILLIGRAM(S): 80 TABLET, FILM COATED ORAL at 21:52

## 2019-02-26 RX ADMIN — Medication 650 MILLIGRAM(S): at 05:00

## 2019-02-26 RX ADMIN — LIDOCAINE 2 PATCH: 4 CREAM TOPICAL at 16:15

## 2019-02-26 RX ADMIN — WARFARIN SODIUM 1 MILLIGRAM(S): 2.5 TABLET ORAL at 21:54

## 2019-02-26 RX ADMIN — ZINC OXIDE 1 APPLICATION(S): 200 OINTMENT TOPICAL at 15:01

## 2019-02-26 RX ADMIN — AMIODARONE HYDROCHLORIDE 200 MILLIGRAM(S): 400 TABLET ORAL at 05:08

## 2019-02-26 RX ADMIN — LIDOCAINE 2 PATCH: 4 CREAM TOPICAL at 08:08

## 2019-02-26 RX ADMIN — CHLORHEXIDINE GLUCONATE 15 MILLILITER(S): 213 SOLUTION TOPICAL at 17:53

## 2019-02-26 RX ADMIN — Medication 650 MILLIGRAM(S): at 04:33

## 2019-02-26 RX ADMIN — LIDOCAINE 2 PATCH: 4 CREAM TOPICAL at 05:07

## 2019-02-26 RX ADMIN — Medication 500 MILLIGRAM(S): at 05:08

## 2019-02-26 RX ADMIN — Medication 10 MILLIGRAM(S): at 21:52

## 2019-02-26 RX ADMIN — Medication 1 TABLET(S): at 14:59

## 2019-02-26 RX ADMIN — Medication 3 MILLIGRAM(S): at 21:52

## 2019-02-26 RX ADMIN — Medication 20 MILLIGRAM(S): at 05:08

## 2019-02-26 RX ADMIN — METFORMIN HYDROCHLORIDE 500 MILLIGRAM(S): 850 TABLET ORAL at 08:09

## 2019-02-26 RX ADMIN — Medication 25 MILLIGRAM(S): at 14:59

## 2019-02-26 RX ADMIN — FAMOTIDINE 20 MILLIGRAM(S): 10 INJECTION INTRAVENOUS at 14:58

## 2019-02-26 RX ADMIN — Medication 25 MILLIGRAM(S): at 21:52

## 2019-02-26 RX ADMIN — Medication 25 MILLIGRAM(S): at 05:08

## 2019-02-26 RX ADMIN — SENNA PLUS 2 TABLET(S): 8.6 TABLET ORAL at 21:52

## 2019-02-26 RX ADMIN — Medication 500 MILLIGRAM(S): at 17:53

## 2019-02-26 RX ADMIN — CHLORHEXIDINE GLUCONATE 15 MILLILITER(S): 213 SOLUTION TOPICAL at 05:08

## 2019-02-26 RX ADMIN — METFORMIN HYDROCHLORIDE 500 MILLIGRAM(S): 850 TABLET ORAL at 17:53

## 2019-02-26 NOTE — PROGRESS NOTE ADULT - ATTENDING COMMENTS
Chart reviewed. Patient seen at bedside.   No new issues this am, seen by overnight resident for small amount of drainage from PEG site. None noted this am  Patient tolerating PO feeds.    2. INR subtherapeutic - given Vitamin K on sunday. Low dose coumadin as she appears to be very sensitive to low doses    3. Continue full rehab program Chart reviewed. Patient seen at bedside.   No new issues this am, seen by overnight resident for small amount of drainage from PEG site. None noted this am  Patient tolerating PO feeds.    2. INR subtherapeutic - given Vitamin K on sunday. Low dose coumadin as she appears to be very sensitive to low doses    3. DM-: increase oral agents dosing with plan to taper and dc insulin    4. Continue full rehab program

## 2019-02-26 NOTE — PROGRESS NOTE ADULT - SUBJECTIVE AND OBJECTIVE BOX
HPI: 88 year old  female with a pmh of CAD, HTN, HLD, pacemaker who presented to [facility] as a transfer from [facility] with right hemiparesis and aphasia and left proximal MCA occlusion. The patient was last known well at 2 pm on 2/3/19 and was then found down on the ground by her daughter several hours later and was noted to be not speaking and weak on the right. At [facility] a CTA head and neck was performed which showed a proximal left MCA occlusion, and a CT head showed a dense left MCA sign with some hypodensity and early ischemic changes in the left MCA distribution. She did not receive IV tPA as she was out of the window and was transferred to [facility] for possible mechanical thrombectomy. At [facility] her CT perfusion showed zero core infarct, with a penumbra of 80 mls, and an infinite mismatch. She was deemed a candidate for intervention and recanalization.  Stroke thought to be 2* cardioembolism   Patient failed speech and swallow, PEG was placed on 2/12 and has been tolerating tube feeds.   Patient found be more lethargic on 2/13. Repeat CT head was stable. UA positive and patient started on antibiotics. Urine cx sent and pending results. Of note, CT head showed sphenoid air fluid level. Recs to start Augmentin if s/s of sinusitis start. Admitted to rehab on 2/14/19      Subjective: Patient seen at bedside.   Denies any pain, SOB, CP, HA, nausea, vomiting, abdominal pain, dysuria. Reports that she is tolerating her diet. Reports still feeling of being tired during daytime hours but feels that it's related to not being stimulated during the day. Used to be very active during the day PTA-frequent outings. Denies any issues sleeping at night.     REVIEW OF SYSTEMS  [ x  ] Constitutional WNL, no overt signs of bleeding   [ x  ] Cardio WNL  [x   ] Resp WNL  [   ] GI PEG    Vital Signs Last 24 Hrs  T(C): 36.7 (26 Feb 2019 08:11), Max: 36.7 (26 Feb 2019 08:11)  T(F): 98 (26 Feb 2019 08:11), Max: 98 (26 Feb 2019 08:11)  HR: 100 (26 Feb 2019 08:11) (81 - 100)  BP: 122/80 (26 Feb 2019 08:11) (122/80 - 161/82)  RR: 14 (26 Feb 2019 08:11) (14 - 14)  SpO2: 96% (26 Feb 2019 08:11) (96% - 99%)        PHYSICAL EXAM  General: nad, obese female  Cardio: S1S2  Resp: CTA  Abdomen: soft, PEG site with scant drainage-drainage is not purulent-no signs of infection, erythema continues to improve around peg insertion site. Small amt drainage noted.  Neuro: aao x self, place. Right facial droop, expressive deficits improving, right hemiplegia with increased tone - continued improvement in movement noted both in UE and LE, about 2-3/5 right shoulder/elbow/wrist/hand grasp, approx 3/5 right hip flexor and knee extender 2/5 ankle strengths   Extrem: no edema, no calf tenderness  skin: no overt signs of bleeding      RECENT LABS:    Prothrombin Time and INR, Plasma in AM (02.26.19 @ 06:00)    Prothrombin Time, Plasma: 16.9: Effective October 30th, 2018 the reference range for PT has changed. sec    INR: 1.49 ratio         02-25    137  |  98  |  20  ----------------------------<  143<H>  3.6   |  30  |  0.81    Ca    9.0      25 Feb 2019 06:00    TPro  7.2  /  Alb  2.6<L>  /  TBili  1.6<H>  /  DBili  x   /  AST  33  /  ALT  37  /  AlkPhos  106  02-25                        13.0   5.3   )-----------( 258      ( 25 Feb 2019 06:00 )             40.7     HA1C 6.6% on 2/4/19    Thyroperoxidase Antibody (02.23.19 @ 05:52)    Thyroperoxidase Antibody: <10.0 IU/mL    Thyroid Stimulating Hormone, Serum in AM (02.22.19 @ 05:45)    Thyroid Stimulating Hormone, Serum: 6.04 uIU/mL    Triiodothyronine, Total (T3 Total) (02.23.19 @ 05:52)    Triiodothyronine, Total (T3 Total): 75 ng/dL    Free Thyroxine, Serum in AM (02.23.19 @ 05:52)    Free Thyroxine, Serum: 1.5 ng/dL          CAPILLARY BLOOD GLUCOSE  POCT  Blood Glucose (02.26.19 @ 12:03)    POCT Blood Glucose.: 147 mg/dL  POCT  Blood Glucose (02.26.19 @ 08:04)    POCT Blood Glucose.: 164 mg/dL  POCT  Blood Glucose (02.25.19 @ 20:57)    POCT Blood Glucose.: 125 mg/dL  POCT  Blood Glucose (02.25.19 @ 16:29)    POCT Blood Glucose.: 181 mg/dL  POCT  Blood Glucose (02.25.19 @ 07:30)    POCT Blood Glucose.: 172 mg/dL  POCT  Blood Glucose (02.24.19 @ 21:55)    POCT Blood Glucose.: 137 mg/dL  POCT  Blood Glucose (02.24.19 @ 16:32)    POCT Blood Glucose.: 147 mg/dL  POCT  Blood Glucose (02.24.19 @ 11:28)    POCT Blood Glucose.: 156 mg/dL        MEDICATIONS  (STANDING):  amiodarone    Tablet 200 milliGRAM(s) Oral daily  ascorbic acid 500 milliGRAM(s) Oral two times a day  atorvastatin 20 milliGRAM(s) Oral at bedtime  chlorhexidine 0.12% Liquid 15 milliLiter(s) Swish and Spit two times a day  dextrose 5%. 1000 milliLiter(s) (50 mL/Hr) IV Continuous <Continuous>  dextrose 50% Injectable 12.5 Gram(s) IV Push once  famotidine    Tablet 20 milliGRAM(s) Oral daily  FLUoxetine 10 milliGRAM(s) Oral at bedtime  folic acid 1 milliGRAM(s) Oral daily  furosemide    Tablet 20 milliGRAM(s) Oral daily  insulin glargine Injectable (LANTUS) 8 Unit(s) SubCutaneous at bedtime  insulin lispro (HumaLOG) corrective regimen sliding scale   SubCutaneous three times a day with meals  lidocaine   Patch 2 Patch Transdermal <User Schedule>  melatonin 3 milliGRAM(s) Oral at bedtime  metFORMIN 500 milliGRAM(s) Oral daily  metoprolol tartrate 25 milliGRAM(s) Oral every 8 hours  multivitamin 1 Tablet(s) Oral daily  polyethylene glycol 3350 17 Gram(s) Oral daily  senna 2 Tablet(s) Oral at bedtime  zinc oxide 20% Ointment 1 Application(s) Topical daily    MEDICATIONS  (PRN):  acetaminophen    Suspension .. 650 milliGRAM(s) Oral every 6 hours PRN Mild Pain (1 - 3)  artificial  tears Solution 2 Drop(s) Both EYES every 2 hours PRN Dry Eyes  bisacodyl Suppository 10 milliGRAM(s) Rectal daily PRN Constipation  dextrose 40% Gel 15 Gram(s) Oral once PRN Blood Glucose LESS THAN 70 milliGRAM(s)/deciliter  glucagon  Injectable 1 milliGRAM(s) IntraMuscular once PRN Glucose LESS THAN 70 milligrams/deciliter      A/P:    88 year old female with risk factors as stated above admitted for comprehensive rehab with left MCA infarct s/p recanalization procedure at [facility]  Continue full rehab program:PT/OT/SLP 3 hrs/day 5 days/week  On AC in setting atrial flutter,  statins, risk factor management    2. Dysphagia: Passed MBS on 2/20, now tolerating pureed diet with nectar thick liquids     3. Atrial flutter : on amiodarone metoprolol. INR 1.49 this am, will give 1mg coumadin again tonight and recheck INR in am.   Supratheraputic over the weekend and received vitamin K. Very sensitive to coumadin. Has been supratheraputic up to an INR of 7 this admission.   EKG over weekend shows afib with pacing.    4. UTI: completed abx course    5. DM-ii:- PEG feeds Lantus discontinued, SSI  Accuchecks--ranging 120-170s  Metformin increased to 500mg BID  Home regimen: 500mg metformin ER daily   Daughter confirms accuchecks ranged 120-160s at home   Endocrinology consult appreciated    6. Diastolic HF: on lasix . monitor potassium     7. Pain: on tylenol prn lidoderm patch     8. Thrush: continue nystatin swish and suction    9. Skin: PEG site irritation improved. GI fu noted. Retention ring adjusted    10: motor recovery: started on prozac 10mg qHS-discussed with daughter who agrees with starting prozac  QTc on 466, monitor closely     11: daytime fatigue:  melatonin to improve sleep regimen.     12: TSH elevation with slightly depressed total T4 and normal free T4 with normal thyroid antibody. Endocrinology consult appreciated with recommendations to follow thyroid lab values in 1 month.     12. Dry eyes: will start artificial tears PRN

## 2019-02-27 LAB
ALBUMIN SERPL ELPH-MCNC: 2.7 G/DL — LOW (ref 3.3–5)
ALP SERPL-CCNC: 94 U/L — SIGNIFICANT CHANGE UP (ref 40–120)
ALT FLD-CCNC: 33 U/L DA — SIGNIFICANT CHANGE UP (ref 10–45)
ANION GAP SERPL CALC-SCNC: 8 MMOL/L — SIGNIFICANT CHANGE UP (ref 5–17)
AST SERPL-CCNC: 32 U/L — SIGNIFICANT CHANGE UP (ref 10–40)
BILIRUB SERPL-MCNC: 1.3 MG/DL — HIGH (ref 0.2–1.2)
BUN SERPL-MCNC: 20 MG/DL — SIGNIFICANT CHANGE UP (ref 7–23)
CALCIUM SERPL-MCNC: 8.9 MG/DL — SIGNIFICANT CHANGE UP (ref 8.4–10.5)
CHLORIDE SERPL-SCNC: 100 MMOL/L — SIGNIFICANT CHANGE UP (ref 96–108)
CO2 SERPL-SCNC: 29 MMOL/L — SIGNIFICANT CHANGE UP (ref 22–31)
CREAT SERPL-MCNC: 0.84 MG/DL — SIGNIFICANT CHANGE UP (ref 0.5–1.3)
GLUCOSE BLDC GLUCOMTR-MCNC: 123 MG/DL — HIGH (ref 70–99)
GLUCOSE BLDC GLUCOMTR-MCNC: 126 MG/DL — HIGH (ref 70–99)
GLUCOSE BLDC GLUCOMTR-MCNC: 128 MG/DL — HIGH (ref 70–99)
GLUCOSE BLDC GLUCOMTR-MCNC: 145 MG/DL — HIGH (ref 70–99)
GLUCOSE SERPL-MCNC: 155 MG/DL — HIGH (ref 70–99)
HCT VFR BLD CALC: 38.7 % — SIGNIFICANT CHANGE UP (ref 34.5–45)
HGB BLD-MCNC: 12.7 G/DL — SIGNIFICANT CHANGE UP (ref 11.5–15.5)
INR BLD: 1.69 RATIO — HIGH (ref 0.88–1.16)
MCHC RBC-ENTMCNC: 30.4 PG — SIGNIFICANT CHANGE UP (ref 27–34)
MCHC RBC-ENTMCNC: 32.9 GM/DL — SIGNIFICANT CHANGE UP (ref 32–36)
MCV RBC AUTO: 92.5 FL — SIGNIFICANT CHANGE UP (ref 80–100)
PLATELET # BLD AUTO: 270 K/UL — SIGNIFICANT CHANGE UP (ref 150–400)
POTASSIUM SERPL-MCNC: 3.8 MMOL/L — SIGNIFICANT CHANGE UP (ref 3.5–5.3)
POTASSIUM SERPL-SCNC: 3.8 MMOL/L — SIGNIFICANT CHANGE UP (ref 3.5–5.3)
PROT SERPL-MCNC: 7 G/DL — SIGNIFICANT CHANGE UP (ref 6–8.3)
PROTHROM AB SERPL-ACNC: 19.2 SEC — HIGH (ref 10–12.9)
RBC # BLD: 4.19 M/UL — SIGNIFICANT CHANGE UP (ref 3.8–5.2)
RBC # FLD: 12.7 % — SIGNIFICANT CHANGE UP (ref 10.3–14.5)
SODIUM SERPL-SCNC: 137 MMOL/L — SIGNIFICANT CHANGE UP (ref 135–145)
WBC # BLD: 5.9 K/UL — SIGNIFICANT CHANGE UP (ref 3.8–10.5)
WBC # FLD AUTO: 5.9 K/UL — SIGNIFICANT CHANGE UP (ref 3.8–10.5)

## 2019-02-27 PROCEDURE — 99232 SBSQ HOSP IP/OBS MODERATE 35: CPT

## 2019-02-27 PROCEDURE — 99232 SBSQ HOSP IP/OBS MODERATE 35: CPT | Mod: GC

## 2019-02-27 RX ORDER — DOCUSATE SODIUM 100 MG
100 CAPSULE ORAL
Qty: 0 | Refills: 0 | Status: DISCONTINUED | OUTPATIENT
Start: 2019-02-27 | End: 2019-03-12

## 2019-02-27 RX ADMIN — LIDOCAINE 2 PATCH: 4 CREAM TOPICAL at 05:06

## 2019-02-27 RX ADMIN — POLYETHYLENE GLYCOL 3350 17 GRAM(S): 17 POWDER, FOR SOLUTION ORAL at 12:16

## 2019-02-27 RX ADMIN — SENNA PLUS 2 TABLET(S): 8.6 TABLET ORAL at 21:21

## 2019-02-27 RX ADMIN — Medication 25 MILLIGRAM(S): at 05:06

## 2019-02-27 RX ADMIN — METFORMIN HYDROCHLORIDE 500 MILLIGRAM(S): 850 TABLET ORAL at 18:16

## 2019-02-27 RX ADMIN — Medication 25 MILLIGRAM(S): at 14:14

## 2019-02-27 RX ADMIN — Medication 25 MILLIGRAM(S): at 21:21

## 2019-02-27 RX ADMIN — AMIODARONE HYDROCHLORIDE 200 MILLIGRAM(S): 400 TABLET ORAL at 05:06

## 2019-02-27 RX ADMIN — FAMOTIDINE 20 MILLIGRAM(S): 10 INJECTION INTRAVENOUS at 12:16

## 2019-02-27 RX ADMIN — LIDOCAINE 2 PATCH: 4 CREAM TOPICAL at 16:48

## 2019-02-27 RX ADMIN — CHLORHEXIDINE GLUCONATE 15 MILLILITER(S): 213 SOLUTION TOPICAL at 18:16

## 2019-02-27 RX ADMIN — Medication 3 MILLIGRAM(S): at 21:21

## 2019-02-27 RX ADMIN — Medication 100 MILLIGRAM(S): at 18:16

## 2019-02-27 RX ADMIN — Medication 10 MILLIGRAM(S): at 21:21

## 2019-02-27 RX ADMIN — CHLORHEXIDINE GLUCONATE 15 MILLILITER(S): 213 SOLUTION TOPICAL at 05:06

## 2019-02-27 RX ADMIN — Medication 1 MILLIGRAM(S): at 12:15

## 2019-02-27 RX ADMIN — Medication 500 MILLIGRAM(S): at 05:06

## 2019-02-27 RX ADMIN — ATORVASTATIN CALCIUM 20 MILLIGRAM(S): 80 TABLET, FILM COATED ORAL at 21:21

## 2019-02-27 RX ADMIN — ZINC OXIDE 1 APPLICATION(S): 200 OINTMENT TOPICAL at 13:16

## 2019-02-27 RX ADMIN — WARFARIN SODIUM 1 MILLIGRAM(S): 2.5 TABLET ORAL at 21:21

## 2019-02-27 RX ADMIN — Medication 20 MILLIGRAM(S): at 05:06

## 2019-02-27 RX ADMIN — METFORMIN HYDROCHLORIDE 500 MILLIGRAM(S): 850 TABLET ORAL at 05:06

## 2019-02-27 RX ADMIN — Medication 1 TABLET(S): at 12:15

## 2019-02-27 RX ADMIN — LIDOCAINE 2 PATCH: 4 CREAM TOPICAL at 07:42

## 2019-02-27 NOTE — PROGRESS NOTE ADULT - SUBJECTIVE AND OBJECTIVE BOX
Subjective  ·	Comfortable  ·	Offers no complaints to me  Objective  ·	98-28-/71  ·	Gen NAD  ·	Pulm CTA  ·	CVS RRR  ·	Abdo Soft  ·	Ext No Edema  ·	INR: 1.69  Assessment and plan  88F from home HTN HLD CAD with CABG Ablation and PPM and TAVR  Admit for Acute ischemic CVA  Aflutter    ·	Aflutter  Coumadin  Erratic INR  Will continue to monitor, medication review  and discontinue unnecessary PRN meds  ·	SP CVA  SP ASA  High intensity statin  Strict BP control  ·	DM  On Lantus  Recently started metformin  If patient tolerates, will escalate dose

## 2019-02-27 NOTE — PROGRESS NOTE ADULT - ATTENDING COMMENTS
Patient seen and examined.  Agree with assessment and plan as above.  No acute complaints.  PEG useable.  Erythema persists around insertion site, being treated with Zinc.  She appears to be tolerating PO intake.  Would plan to remove PEG tube 6 weeks after inserted (placed 2/12/2019)

## 2019-02-27 NOTE — PROGRESS NOTE ADULT - ATTENDING COMMENTS
Chart reviewed. Patient seen at bedside along with NP Florinda Houser.   Reports that she slept well. Denies any pain .   Neuro exam stable  PEG site: irritation improved    2. Continue full rehab program.    3. Endo fu noted.

## 2019-02-27 NOTE — PROGRESS NOTE ADULT - ASSESSMENT
89 y/o female with multiple comorbidities being followed by GI for erythema around Peg. No complaints from patient at this time. Tolerating regular food by mouth. Abd soft. Erythema around peg improved    Continue Zinc to peg site with drsg change

## 2019-02-27 NOTE — PROGRESS NOTE ADULT - SUBJECTIVE AND OBJECTIVE BOX
Patient is doing well. No complaints. No acute overnight events.     MEDICATIONS  (STANDING):  amiodarone    Tablet 200 milliGRAM(s) Oral daily  ascorbic acid 500 milliGRAM(s) Oral two times a day  atorvastatin 20 milliGRAM(s) Oral at bedtime  chlorhexidine 0.12% Liquid 15 milliLiter(s) Swish and Spit two times a day  dextrose 5%. 1000 milliLiter(s) (50 mL/Hr) IV Continuous <Continuous>  dextrose 50% Injectable 12.5 Gram(s) IV Push once  docusate sodium 100 milliGRAM(s) Oral two times a day  famotidine    Tablet 20 milliGRAM(s) Oral daily  FLUoxetine 10 milliGRAM(s) Oral at bedtime  folic acid 1 milliGRAM(s) Oral daily  furosemide    Tablet 20 milliGRAM(s) Oral daily  insulin lispro (HumaLOG) corrective regimen sliding scale   SubCutaneous three times a day with meals  lidocaine   Patch 2 Patch Transdermal <User Schedule>  melatonin 3 milliGRAM(s) Oral at bedtime  metFORMIN 500 milliGRAM(s) Oral two times a day  metoprolol tartrate 25 milliGRAM(s) Oral every 8 hours  multivitamin 1 Tablet(s) Oral daily  polyethylene glycol 3350 17 Gram(s) Oral daily  senna 2 Tablet(s) Oral at bedtime  warfarin 1 milliGRAM(s) Oral at bedtime  zinc oxide 20% Ointment 1 Application(s) Topical daily    MEDICATIONS  (PRN):  acetaminophen    Suspension .. 650 milliGRAM(s) Oral every 6 hours PRN Mild Pain (1 - 3)  artificial  tears Solution 2 Drop(s) Both EYES every 2 hours PRN Dry Eyes  bisacodyl Suppository 10 milliGRAM(s) Rectal daily PRN Constipation  dextrose 40% Gel 15 Gram(s) Oral once PRN Blood Glucose LESS THAN 70 milliGRAM(s)/deciliter  glucagon  Injectable 1 milliGRAM(s) IntraMuscular once PRN Glucose LESS THAN 70 milligrams/deciliter      Allergies    No Known Allergies    Intolerances      Review of Systems:  Constitutional: No fever  Eyes: No blurry vision  Neuro: No tremors  HEENT: No pain  Cardiovascular: No chest pain, palpitations  Respiratory: No SOB, no cough  GI: No nausea, vomiting, abdominal pain  : No dysuria  Skin: no rash  Psych: no depression  Endocrine: no polyuria, polydipsia  Hem/lymph: no swelling  Osteoporosis: no fractures    ALL OTHER SYSTEMS REVIEWED AND NEGATIVE    UNABLE TO OBTAIN    PHYSICAL EXAM:  VITALS: T(C): 36.4 (02-27-19 @ 07:45)  T(F): 97.6 (02-27-19 @ 07:45), Max: 97.8 (02-26-19 @ 20:51)  HR: 75 (02-27-19 @ 07:45) (75 - 93)  BP: 130/82 (02-27-19 @ 07:45) (110/71 - 155/81)  RR:  (15 - 16)  SpO2:  (97% - 100%)  Wt(kg): --  GENERAL: NAD, well-groomed, well-developed  EYES: No proptosis, no lid lag, anicteric  HEENT:  Atraumatic, Normocephalic, moist mucous membranes  THYROID: Normal size, no palpable nodules  RESPIRATORY: Clear to auscultation bilaterally; No rales, rhonchi, wheezing, or rubs  CARDIOVASCULAR: Regular rate and rhythm; No murmurs; no peripheral edema  GI: Soft, nontender, non distended, normal bowel sounds  SKIN: Dry, intact, No rashes or lesions  MUSCULOSKELETAL: Full range of motion, normal strength  NEURO: sensation intact, extraocular movements intact, no tremor, normal reflexes  PSYCH: Alert and oriented x 3, normal affect, normal mood  CUSHING'S SIGNS: no striae    POCT Blood Glucose.: 123 mg/dL (02-27-19 @ 12:11)  POCT Blood Glucose.: 145 mg/dL (02-27-19 @ 07:42)  POCT Blood Glucose.: 123 mg/dL (02-26-19 @ 20:53)  POCT Blood Glucose.: 144 mg/dL (02-26-19 @ 16:20)  POCT Blood Glucose.: 147 mg/dL (02-26-19 @ 12:03)  POCT Blood Glucose.: 164 mg/dL (02-26-19 @ 08:04)  POCT Blood Glucose.: 125 mg/dL (02-25-19 @ 20:57)  POCT Blood Glucose.: 181 mg/dL (02-25-19 @ 16:29)  POCT Blood Glucose.: 151 mg/dL (02-25-19 @ 12:01)  POCT Blood Glucose.: 172 mg/dL (02-25-19 @ 07:30)  POCT Blood Glucose.: 137 mg/dL (02-24-19 @ 21:55)  POCT Blood Glucose.: 147 mg/dL (02-24-19 @ 16:32)      02-27    137  |  100  |  20  ----------------------------<  155<H>  3.8   |  29  |  0.84    EGFR if : 72  EGFR if non : 62    Ca    8.9      02-27    TPro  7.0  /  Alb  2.7<L>  /  TBili  1.3<H>  /  DBili  x   /  AST  32  /  ALT  33  /  AlkPhos  94  02-27          Thyroid Function Tests:  02-23 @ 05:52 TSH -- FreeT4 1.5 T3 75 Anti TPO <10.0 Anti Thyroglobulin Ab -- TSI --  02-22 @ 05:45 TSH 6.04 FreeT4 -- T3 -- Anti TPO -- Anti Thyroglobulin Ab -- TSI --      Hemoglobin A1C, Whole Blood: 6.6 % <H> [4.0 - 5.6] (02-04-19 @ 06:11)

## 2019-02-27 NOTE — PROGRESS NOTE ADULT - SUBJECTIVE AND OBJECTIVE BOX
HPI: 88 year old  female with a pmh of CAD, HTN, HLD, pacemaker who presented to [facility] as a transfer from [facility] with right hemiparesis and aphasia and left proximal MCA occlusion. The patient was last known well at 2 pm on 2/3/19 and was then found down on the ground by her daughter several hours later and was noted to be not speaking and weak on the right. At [facility] a CTA head and neck was performed which showed a proximal left MCA occlusion, and a CT head showed a dense left MCA sign with some hypodensity and early ischemic changes in the left MCA distribution. She did not receive IV tPA as she was out of the window and was transferred to [facility] for possible mechanical thrombectomy. At [facility] her CT perfusion showed zero core infarct, with a penumbra of 80 mls, and an infinite mismatch. She was deemed a candidate for intervention and recanalization.  Stroke thought to be 2* cardioembolism   Patient failed speech and swallow, PEG was placed on 2/12 and has been tolerating tube feeds.   Patient found be more lethargic on 2/13. Repeat CT head was stable. UA positive and patient started on antibiotics. Urine cx sent and pending results. Of note, CT head showed sphenoid air fluid level. Recs to start Augmentin if s/s of sinusitis start. Admitted to rehab on 2/14/19      Subjective: Patient seen at bedside.   Denies any pain, SOB, CP, HA, nausea, vomiting, abdominal pain, dysuria. Reports that she is tolerating her diet. ST gives the OK to upgrade to thin liquids today.   Reports good sleep overnight.  LBM two days ago per patient.     REVIEW OF SYSTEMS  [ x  ] Constitutional WNL, no overt signs of bleeding   [ x  ] Cardio WNL  [x   ] Resp WNL  [   ] GI PEG    Vital Signs Last 24 Hrs  T(C): 36.4 (27 Feb 2019 07:45), Max: 36.6 (26 Feb 2019 20:51)  T(F): 97.6 (27 Feb 2019 07:45), Max: 97.8 (26 Feb 2019 20:51)  HR: 75 (27 Feb 2019 07:45) (75 - 93)  BP: 130/82 (27 Feb 2019 07:45) (110/71 - 155/81)  RR: 16 (27 Feb 2019 07:45) (15 - 16)  SpO2: 97% (27 Feb 2019 07:45) (97% - 100%)        PHYSICAL EXAM  General: nad, obese female  Cardio: S1S2  Resp: CTA  Abdomen: soft, PEG site with scant drainage-drainage is not purulent-no signs of infection, erythema continues to improve around peg insertion site. Small amt drainage noted.  Neuro: aao x self, place. Right facial droop, expressive deficits improving, right hemiplegia with increased tone - continued improvement in movement noted both in UE and LE, about 2-3/5 right shoulder/elbow/wrist/hand grasp, approx 3/5 right hip flexor and knee extender 2/5 ankle strengths   Extrem: no edema, no calf tenderness  skin: no overt signs of bleeding      RECENT LABS:    PT/INR - ( 27 Feb 2019 05:30 )   PT: 19.2 sec;   INR: 1.69 ratio         02-27    137  |  100  |  20  ----------------------------<  155<H>  3.8   |  29  |  0.84    Ca    8.9      27 Feb 2019 05:30    TPro  7.0  /  Alb  2.7<L>  /  TBili  1.3<H>  /  DBili  x   /  AST  32  /  ALT  33  /  AlkPhos  94  02-27                        12.7   5.9   )-----------( 270      ( 27 Feb 2019 05:30 )             38.7       HA1C 6.6% on 2/4/19    Thyroperoxidase Antibody (02.23.19 @ 05:52)    Thyroperoxidase Antibody: <10.0 IU/mL    Thyroid Stimulating Hormone, Serum in AM (02.22.19 @ 05:45)    Thyroid Stimulating Hormone, Serum: 6.04 uIU/mL    Triiodothyronine, Total (T3 Total) (02.23.19 @ 05:52)    Triiodothyronine, Total (T3 Total): 75 ng/dL    Free Thyroxine, Serum in AM (02.23.19 @ 05:52)    Free Thyroxine, Serum: 1.5 ng/dL          CAPILLARY BLOOD GLUCOSE  POCT  Blood Glucose (02.27.19 @ 07:42)    POCT Blood Glucose.: 145 mg/dL  POCT  Blood Glucose (02.26.19 @ 20:53)    POCT Blood Glucose.: 123 mg/dL  POCT  Blood Glucose (02.26.19 @ 16:20)    POCT Blood Glucose.: 144 mg/dL  POCT  Blood Glucose (02.26.19 @ 12:03)    POCT Blood Glucose.: 147 mg/dL  POCT  Blood Glucose (02.26.19 @ 12:03)    POCT Blood Glucose.: 147 mg/dL  POCT  Blood Glucose (02.26.19 @ 08:04)    POCT Blood Glucose.: 164 mg/dL      MEDICATIONS  (STANDING):  amiodarone    Tablet 200 milliGRAM(s) Oral daily  ascorbic acid 500 milliGRAM(s) Oral two times a day  atorvastatin 20 milliGRAM(s) Oral at bedtime  chlorhexidine 0.12% Liquid 15 milliLiter(s) Swish and Spit two times a day  dextrose 5%. 1000 milliLiter(s) (50 mL/Hr) IV Continuous <Continuous>  dextrose 50% Injectable 12.5 Gram(s) IV Push once  famotidine    Tablet 20 milliGRAM(s) Oral daily  FLUoxetine 10 milliGRAM(s) Oral at bedtime  folic acid 1 milliGRAM(s) Oral daily  furosemide    Tablet 20 milliGRAM(s) Oral daily  insulin lispro (HumaLOG) corrective regimen sliding scale   SubCutaneous three times a day with meals  lidocaine   Patch 2 Patch Transdermal <User Schedule>  melatonin 3 milliGRAM(s) Oral at bedtime  metFORMIN 500 milliGRAM(s) Oral two times a day  metoprolol tartrate 25 milliGRAM(s) Oral every 8 hours  multivitamin 1 Tablet(s) Oral daily  polyethylene glycol 3350 17 Gram(s) Oral daily  senna 2 Tablet(s) Oral at bedtime  warfarin 1 milliGRAM(s) Oral at bedtime  zinc oxide 20% Ointment 1 Application(s) Topical daily    MEDICATIONS  (PRN):  acetaminophen    Suspension .. 650 milliGRAM(s) Oral every 6 hours PRN Mild Pain (1 - 3)  artificial  tears Solution 2 Drop(s) Both EYES every 2 hours PRN Dry Eyes  bisacodyl Suppository 10 milliGRAM(s) Rectal daily PRN Constipation  dextrose 40% Gel 15 Gram(s) Oral once PRN Blood Glucose LESS THAN 70 milliGRAM(s)/deciliter  glucagon  Injectable 1 milliGRAM(s) IntraMuscular once PRN Glucose LESS THAN 70 milligrams/deciliter      A/P:    88 year old female with risk factors as stated above admitted for comprehensive rehab with left MCA infarct s/p recanalization procedure at [facility]  Continue full rehab program:PT/OT/SLP 3 hrs/day 5 days/week  On AC in setting atrial flutter,  statins, risk factor management    2. Dysphagia: Passed MBS on 2/20, now tolerating pureed diet with nectar thick liquids     3. Atrial flutter : on amiodarone metoprolol. INR 1.69 this am (trending up), will continue 1mg coumadin qHS and recheck INR in am.   Supratheraputic over the weekend and received vitamin K. Very sensitive to coumadin. Has been supratheraputic up to an INR of 7 this admission.   EKG over weekend shows afib with pacing.    4. UTI: completed abx course    5. DM-ii:- PEG feeds Lantus discontinued 2/26, SSI  Accuchecks--ranging 120-140s over the last 24 hours   Metformin increased to 500mg BID  Home regimen: 500mg metformin ER daily   Daughter confirms accuchecks ranged 120-160s at home   Endocrinology consult appreciated    6. Diastolic HF: on lasix . monitor potassium     7. Pain: on tylenol prn lidoderm patch     8. Thrush: continue nystatin swish and suction    9. Skin: PEG site irritation improved. GI fu noted. Retention ring adjusted, irritation much improved. Still small amt of drainage from around peg tube site. Continue zinc oxide BID     10: motor recovery: started on prozac 10mg qHS-discussed with daughter who agrees with starting prozac  QTc on 466, monitor closely     11: daytime fatigue:  melatonin to improve sleep regimen-effective     12: TSH elevation with slightly depressed total T4 and normal free T4 with normal thyroid antibody. Endocrinology consult appreciated with recommendations to follow thyroid lab values in 1 month.     13. Dry eyes: will start artificial tears PRN     14:  GI: patient on daily miralax and senna, would like to add colace HPI: 88 year old  female with a pmh of CAD, HTN, HLD, pacemaker who presented to [facility] as a transfer from [facility] with right hemiparesis and aphasia and left proximal MCA occlusion. The patient was last known well at 2 pm on 2/3/19 and was then found down on the ground by her daughter several hours later and was noted to be not speaking and weak on the right. At [facility] a CTA head and neck was performed which showed a proximal left MCA occlusion, and a CT head showed a dense left MCA sign with some hypodensity and early ischemic changes in the left MCA distribution. She did not receive IV tPA as she was out of the window and was transferred to [facility] for possible mechanical thrombectomy. At [facility] her CT perfusion showed zero core infarct, with a penumbra of 80 mls, and an infinite mismatch. She was deemed a candidate for intervention and recanalization.  Stroke thought to be 2* cardioembolism   Patient failed speech and swallow, PEG was placed on 2/12 and has been tolerating tube feeds.   Patient found be more lethargic on 2/13. Repeat CT head was stable. UA positive and patient started on antibiotics. Urine cx sent and pending results. Of note, CT head showed sphenoid air fluid level. Recs to start Augmentin if s/s of sinusitis start. Admitted to rehab on 2/14/19      Subjective: Patient seen at bedside.   Denies any pain, SOB, CP, HA, nausea, vomiting, abdominal pain, dysuria. Reports that she is tolerating her diet. ST gives the OK to upgrade to thin liquids today.   Reports good sleep overnight.  LBM two days ago per patient.     REVIEW OF SYSTEMS  [ x  ] Constitutional WNL, no overt signs of bleeding   [ x  ] Cardio WNL  [x   ] Resp WNL  [   ] GI PEG    Vital Signs Last 24 Hrs  T(C): 36.4 (27 Feb 2019 07:45), Max: 36.6 (26 Feb 2019 20:51)  T(F): 97.6 (27 Feb 2019 07:45), Max: 97.8 (26 Feb 2019 20:51)  HR: 75 (27 Feb 2019 07:45) (75 - 93)  BP: 130/82 (27 Feb 2019 07:45) (110/71 - 155/81)  RR: 16 (27 Feb 2019 07:45) (15 - 16)  SpO2: 97% (27 Feb 2019 07:45) (97% - 100%)        PHYSICAL EXAM  General: nad, obese female  Cardio: S1S2  Resp: CTA  Abdomen: soft, PEG site with scant drainage-drainage is not purulent-no signs of infection, erythema continues to improve around peg insertion site. Small amt drainage noted.  Neuro: aao x self, place. Right facial droop, expressive deficits improving, right hemiplegia with increased tone - continued improvement in movement noted both in UE and LE, about 2-3/5 right shoulder/elbow/wrist/hand grasp, approx 3/5 right hip flexor and knee extender 2/5 ankle strengths   Extrem: no edema, no calf tenderness  skin: no overt signs of bleeding      RECENT LABS:    PT/INR - ( 27 Feb 2019 05:30 )   PT: 19.2 sec;   INR: 1.69 ratio         02-27    137  |  100  |  20  ----------------------------<  155<H>  3.8   |  29  |  0.84    Ca    8.9      27 Feb 2019 05:30    TPro  7.0  /  Alb  2.7<L>  /  TBili  1.3<H>  /  DBili  x   /  AST  32  /  ALT  33  /  AlkPhos  94  02-27                        12.7   5.9   )-----------( 270      ( 27 Feb 2019 05:30 )             38.7       HA1C 6.6% on 2/4/19    Thyroperoxidase Antibody (02.23.19 @ 05:52)    Thyroperoxidase Antibody: <10.0 IU/mL    Thyroid Stimulating Hormone, Serum in AM (02.22.19 @ 05:45)    Thyroid Stimulating Hormone, Serum: 6.04 uIU/mL    Triiodothyronine, Total (T3 Total) (02.23.19 @ 05:52)    Triiodothyronine, Total (T3 Total): 75 ng/dL    Free Thyroxine, Serum in AM (02.23.19 @ 05:52)    Free Thyroxine, Serum: 1.5 ng/dL          CAPILLARY BLOOD GLUCOSE  POCT  Blood Glucose (02.27.19 @ 07:42)    POCT Blood Glucose.: 145 mg/dL  POCT  Blood Glucose (02.26.19 @ 20:53)    POCT Blood Glucose.: 123 mg/dL  POCT  Blood Glucose (02.26.19 @ 16:20)    POCT Blood Glucose.: 144 mg/dL  POCT  Blood Glucose (02.26.19 @ 12:03)    POCT Blood Glucose.: 147 mg/dL  POCT  Blood Glucose (02.26.19 @ 12:03)    POCT Blood Glucose.: 147 mg/dL  POCT  Blood Glucose (02.26.19 @ 08:04)    POCT Blood Glucose.: 164 mg/dL      MEDICATIONS  (STANDING):  amiodarone    Tablet 200 milliGRAM(s) Oral daily  ascorbic acid 500 milliGRAM(s) Oral two times a day  atorvastatin 20 milliGRAM(s) Oral at bedtime  chlorhexidine 0.12% Liquid 15 milliLiter(s) Swish and Spit two times a day  dextrose 5%. 1000 milliLiter(s) (50 mL/Hr) IV Continuous <Continuous>  dextrose 50% Injectable 12.5 Gram(s) IV Push once  famotidine    Tablet 20 milliGRAM(s) Oral daily  FLUoxetine 10 milliGRAM(s) Oral at bedtime  folic acid 1 milliGRAM(s) Oral daily  furosemide    Tablet 20 milliGRAM(s) Oral daily  insulin lispro (HumaLOG) corrective regimen sliding scale   SubCutaneous three times a day with meals  lidocaine   Patch 2 Patch Transdermal <User Schedule>  melatonin 3 milliGRAM(s) Oral at bedtime  metFORMIN 500 milliGRAM(s) Oral two times a day  metoprolol tartrate 25 milliGRAM(s) Oral every 8 hours  multivitamin 1 Tablet(s) Oral daily  polyethylene glycol 3350 17 Gram(s) Oral daily  senna 2 Tablet(s) Oral at bedtime  warfarin 1 milliGRAM(s) Oral at bedtime  zinc oxide 20% Ointment 1 Application(s) Topical daily    MEDICATIONS  (PRN):  acetaminophen    Suspension .. 650 milliGRAM(s) Oral every 6 hours PRN Mild Pain (1 - 3)  artificial  tears Solution 2 Drop(s) Both EYES every 2 hours PRN Dry Eyes  bisacodyl Suppository 10 milliGRAM(s) Rectal daily PRN Constipation  dextrose 40% Gel 15 Gram(s) Oral once PRN Blood Glucose LESS THAN 70 milliGRAM(s)/deciliter  glucagon  Injectable 1 milliGRAM(s) IntraMuscular once PRN Glucose LESS THAN 70 milligrams/deciliter      A/P:    88 year old female with risk factors as stated above admitted for comprehensive rehab with left MCA infarct s/p recanalization procedure at [facility]  Continue full rehab program:PT/OT/SLP 3 hrs/day 5 days/week  On AC in setting atrial flutter,  statins, risk factor management    2. Dysphagia: Passed MBS on 2/20, now tolerating pureed diet with nectar thick liquids     3. Atrial flutter : on amiodarone metoprolol. INR 1.69 this am (trending up), will continue 1mg coumadin qHS and recheck INR in am.   Supratheraputic over the weekend and received vitamin K. Very sensitive to coumadin. Has been supratheraputic up to an INR of 7 this admission.   EKG over weekend shows afib with pacing.    4. UTI: completed abx course    5. DM-ii:- PEG feeds Lantus discontinued 2/26, SSI  Accuchecks--ranging 120-140s over the last 24 hours   Metformin increased to 500mg BID  Home regimen: 500mg metformin ER daily   Daughter confirms accuchecks ranged 120-160s at home   Endocrinology consult appreciated    6. Diastolic HF: on lasix . monitor potassium     7. Pain: on tylenol prn lidoderm patch     8. Thrush: continue nystatin swish and suction    9. Skin: PEG site irritation improved. GI fu noted. Retention ring adjusted, irritation much improved. Still small amt of drainage from around peg tube site. Continue zinc oxide BID     10: motor recovery: started on prozac 10mg qHS-discussed with daughter who agrees with starting prozac  QTc on 466, monitor closely     11: daytime fatigue:  melatonin to improve sleep regimen-effective     12: TSH elevation with slightly depressed total T4 and normal free T4 with normal thyroid antibody. Endocrinology consult appreciated with recommendations to follow thyroid lab values in 1 month.     13. Dry eyes: artificial tears PRN     14:  GI: patient on daily miralax and senna, would like to add colace

## 2019-02-27 NOTE — PROGRESS NOTE ADULT - ASSESSMENT
DM2: Agree with increase of metformin 500mg bid. Patient was taking bid doses at home.   Hypothyroidism: clinically euthyroid. Current TFT abnormality may be d/t euthyroid sick syndrome d/t recent stroke or may be d/t primary hypothyroidism (non-Hashimoto type) OR may be d/t AIH (amiodarone-induced hypothyroidism). It would be useful to contact her outside PCP to see what her TSH levels have been like prior to hospitalization. Otherwise, recommend to repeat TFTs in 1 month.

## 2019-02-27 NOTE — PROGRESS NOTE ADULT - SUBJECTIVE AND OBJECTIVE BOX
INTERVAL HPI/OVERNIGHT EVENTS:  HPI:  89 y/o female with multiple comorbidities being followed by GI for erythema around Peg. No complaints from patient at this time.     MEDICATIONS  (STANDING):  amiodarone    Tablet 200 milliGRAM(s) Oral daily  atorvastatin 20 milliGRAM(s) Oral at bedtime  chlorhexidine 0.12% Liquid 15 milliLiter(s) Swish and Spit two times a day  dextrose 5%. 1000 milliLiter(s) (50 mL/Hr) IV Continuous <Continuous>  dextrose 50% Injectable 12.5 Gram(s) IV Push once  docusate sodium 100 milliGRAM(s) Oral two times a day  famotidine    Tablet 20 milliGRAM(s) Oral daily  FLUoxetine 10 milliGRAM(s) Oral at bedtime  furosemide    Tablet 20 milliGRAM(s) Oral daily  insulin lispro (HumaLOG) corrective regimen sliding scale   SubCutaneous three times a day with meals  lidocaine   Patch 2 Patch Transdermal <User Schedule>  melatonin 3 milliGRAM(s) Oral at bedtime  metFORMIN 500 milliGRAM(s) Oral two times a day  metoprolol tartrate 25 milliGRAM(s) Oral every 8 hours  polyethylene glycol 3350 17 Gram(s) Oral daily  senna 2 Tablet(s) Oral at bedtime  warfarin 1 milliGRAM(s) Oral at bedtime  zinc oxide 20% Ointment 1 Application(s) Topical daily    MEDICATIONS  (PRN):  artificial  tears Solution 2 Drop(s) Both EYES every 2 hours PRN Dry Eyes  bisacodyl Suppository 10 milliGRAM(s) Rectal daily PRN Constipation  dextrose 40% Gel 15 Gram(s) Oral once PRN Blood Glucose LESS THAN 70 milliGRAM(s)/deciliter  glucagon  Injectable 1 milliGRAM(s) IntraMuscular once PRN Glucose LESS THAN 70 milligrams/deciliter      Allergies    No Known Allergies    Intolerances      PHYSICAL EXAM:   Vital Signs:  Vital Signs Last 24 Hrs  T(C): 36.4 (27 Feb 2019 07:45), Max: 36.6 (26 Feb 2019 20:51)  T(F): 97.6 (27 Feb 2019 07:45), Max: 97.8 (26 Feb 2019 20:51)  HR: 75 (27 Feb 2019 07:45) (75 - 93)  BP: 130/82 (27 Feb 2019 07:45) (110/71 - 155/81)  BP(mean): --  RR: 16 (27 Feb 2019 07:45) (15 - 16)  SpO2: 97% (27 Feb 2019 07:45) (97% - 100%)  Daily     Daily I&O's Summary    26 Feb 2019 07:01  -  27 Feb 2019 07:00  --------------------------------------------------------  IN: 459 mL / OUT: 0 mL / NET: 459 mL      GENERAL: NAD  HEENT:  conjunctivae clear and pink,   CHEST:  Full & symmetric excursion, no increased effort, breath sounds clear  HEART:  Regular rhythm, S1, S2, no edema  ABDOMEN:  Soft, non-tender, non-distended, normoactive bowel sounds,  Peg intact, site clean and dry  EXTEREMITIES:  no cyanosis,clubbing or edema  SKIN:  No rash, warm/dry  NEURO:  Alert, oriented      LABS:                        12.7   5.9   )-----------( 270      ( 27 Feb 2019 05:30 )             38.7     02-27    137  |  100  |  20  ----------------------------<  155<H>  3.8   |  29  |  0.84    Ca    8.9      27 Feb 2019 05:30    TPro  7.0  /  Alb  2.7<L>  /  TBili  1.3<H>  /  DBili  x   /  AST  32  /  ALT  33  /  AlkPhos  94  02-27    PT/INR - ( 27 Feb 2019 05:30 )   PT: 19.2 sec;   INR: 1.69 ratio             amylase   lipase  RADIOLOGY & ADDITIONAL TESTS:

## 2019-02-28 LAB
CREAT ?TM UR-MCNC: 81 MG/DL — SIGNIFICANT CHANGE UP
GLUCOSE BLDC GLUCOMTR-MCNC: 106 MG/DL — HIGH (ref 70–99)
GLUCOSE BLDC GLUCOMTR-MCNC: 132 MG/DL — HIGH (ref 70–99)
GLUCOSE BLDC GLUCOMTR-MCNC: 141 MG/DL — HIGH (ref 70–99)
GLUCOSE BLDC GLUCOMTR-MCNC: 145 MG/DL — HIGH (ref 70–99)
INR BLD: 1.93 RATIO — HIGH (ref 0.88–1.16)
MICROALBUMIN UR-MCNC: <1.2 MG/DL — SIGNIFICANT CHANGE UP
MICROALBUMIN/CREAT UR-RTO: SIGNIFICANT CHANGE UP MG/G (ref 0–30)
PROTHROM AB SERPL-ACNC: 22.1 SEC — HIGH (ref 10–12.9)

## 2019-02-28 PROCEDURE — 99232 SBSQ HOSP IP/OBS MODERATE 35: CPT | Mod: GC

## 2019-02-28 PROCEDURE — 99232 SBSQ HOSP IP/OBS MODERATE 35: CPT

## 2019-02-28 RX ORDER — METFORMIN HYDROCHLORIDE 850 MG/1
850 TABLET ORAL EVERY 12 HOURS
Qty: 0 | Refills: 0 | Status: DISCONTINUED | OUTPATIENT
Start: 2019-02-28 | End: 2019-03-12

## 2019-02-28 RX ADMIN — Medication 10 MILLIGRAM(S): at 21:06

## 2019-02-28 RX ADMIN — CHLORHEXIDINE GLUCONATE 15 MILLILITER(S): 213 SOLUTION TOPICAL at 18:20

## 2019-02-28 RX ADMIN — LIDOCAINE 2 PATCH: 4 CREAM TOPICAL at 21:06

## 2019-02-28 RX ADMIN — Medication 25 MILLIGRAM(S): at 13:34

## 2019-02-28 RX ADMIN — AMIODARONE HYDROCHLORIDE 200 MILLIGRAM(S): 400 TABLET ORAL at 05:39

## 2019-02-28 RX ADMIN — WARFARIN SODIUM 1 MILLIGRAM(S): 2.5 TABLET ORAL at 21:05

## 2019-02-28 RX ADMIN — ZINC OXIDE 1 APPLICATION(S): 200 OINTMENT TOPICAL at 13:36

## 2019-02-28 RX ADMIN — METFORMIN HYDROCHLORIDE 850 MILLIGRAM(S): 850 TABLET ORAL at 18:20

## 2019-02-28 RX ADMIN — Medication 20 MILLIGRAM(S): at 05:39

## 2019-02-28 RX ADMIN — Medication 25 MILLIGRAM(S): at 21:06

## 2019-02-28 RX ADMIN — ATORVASTATIN CALCIUM 20 MILLIGRAM(S): 80 TABLET, FILM COATED ORAL at 21:06

## 2019-02-28 RX ADMIN — CHLORHEXIDINE GLUCONATE 15 MILLILITER(S): 213 SOLUTION TOPICAL at 05:39

## 2019-02-28 RX ADMIN — LIDOCAINE 2 PATCH: 4 CREAM TOPICAL at 08:27

## 2019-02-28 RX ADMIN — Medication 3 MILLIGRAM(S): at 21:05

## 2019-02-28 RX ADMIN — POLYETHYLENE GLYCOL 3350 17 GRAM(S): 17 POWDER, FOR SOLUTION ORAL at 12:27

## 2019-02-28 RX ADMIN — LIDOCAINE 2 PATCH: 4 CREAM TOPICAL at 18:16

## 2019-02-28 RX ADMIN — METFORMIN HYDROCHLORIDE 500 MILLIGRAM(S): 850 TABLET ORAL at 05:39

## 2019-02-28 RX ADMIN — Medication 25 MILLIGRAM(S): at 05:39

## 2019-02-28 RX ADMIN — FAMOTIDINE 20 MILLIGRAM(S): 10 INJECTION INTRAVENOUS at 12:29

## 2019-02-28 RX ADMIN — Medication 100 MILLIGRAM(S): at 05:39

## 2019-02-28 NOTE — PROGRESS NOTE ADULT - ATTENDING COMMENTS
Chart reviewed. Patient seen at bedside.   Offers no new complaints.   Right hemiparesis improving slowly.   Requested GI fu for PEG area.  Patient currently on mech soft with thin liquid diet and may not need PEG supplements or water flushes for supplement    2. Progress reviewed at team conference today. d/c planning to [place]. Still with active medical issues warranting daily physician oversight. INR trending towards therapeutic range.

## 2019-02-28 NOTE — PROGRESS NOTE ADULT - SUBJECTIVE AND OBJECTIVE BOX
HPI: 88 year old  female with a pmh of CAD, HTN, HLD, pacemaker who presented to [facility] as a transfer from [facility] with right hemiparesis and aphasia and left proximal MCA occlusion. The patient was last known well at 2 pm on 2/3/19 and was then found down on the ground by her daughter several hours later and was noted to be not speaking and weak on the right. At [facility] a CTA head and neck was performed which showed a proximal left MCA occlusion, and a CT head showed a dense left MCA sign with some hypodensity and early ischemic changes in the left MCA distribution. She did not receive IV tPA as she was out of the window and was transferred to [facility] for possible mechanical thrombectomy. At [facility] her CT perfusion showed zero core infarct, with a penumbra of 80 mls, and an infinite mismatch. She was deemed a candidate for intervention and recanalization.  Stroke thought to be 2* cardioembolism   Patient failed speech and swallow, PEG was placed on 2/12 and has been tolerating tube feeds.   Patient found be more lethargic on 2/13. Repeat CT head was stable. UA positive and patient started on antibiotics. Urine cx sent and pending results. Of note, CT head showed sphenoid air fluid level. Recs to start Augmentin if s/s of sinusitis start. Admitted to rehab on 2/14/19      Subjective: Patient seen at bedside.   Denies any pain, SOB, CP, HA, nausea, vomiting, abdominal pain, dysuria. Reports that she is tolerating her diet. Tolerating thin liquids. Eating well.   Reports good sleep overnight.  LBM 2/27.    REVIEW OF SYSTEMS  [ x  ] Constitutional WNL, no overt signs of bleeding   [ x  ] Cardio WNL  [x   ] Resp WNL  [   ] GI PEG    Vital Signs Last 24 Hrs  Vital Signs Last 24 Hrs  T(C): 36.5 (28 Feb 2019 09:41), Max: 36.9 (27 Feb 2019 20:34)  T(F): 97.7 (28 Feb 2019 09:41), Max: 98.5 (27 Feb 2019 20:34)  HR: 92 (28 Feb 2019 09:41) (92 - 93)  BP: 137/85 (28 Feb 2019 09:41) (124/84 - 137/85)  RR: 16 (28 Feb 2019 09:41) (15 - 16)  SpO2: 100% (28 Feb 2019 09:41) (97% - 100%)      PHYSICAL EXAM  General: nad, obese female  Cardio: S1S2  Resp: CTA  Abdomen: soft, PEG site with scant drainage-drainage is not purulent-no signs of infection, erythema continues to improve around peg insertion site. Insertion site appears bigger in size.   Neuro: aao x self, place. Right facial droop, expressive deficits improving, right hemiplegia with increased tone - continued improvement in movement noted both in UE and LE, about 2-3/5 right shoulder/elbow/wrist/hand grasp, approx 3/5 right hip flexor and knee extender 2/5 ankle strengths   Extrem: no edema, no calf tenderness  skin: no overt signs of bleeding      RECENT LABS:    PT/INR - ( 28 Feb 2019 05:37 )   PT: 22.1 sec;   INR: 1.93 ratio             02-27    137  |  100  |  20  ----------------------------<  155<H>  3.8   |  29  |  0.84    Ca    8.9      27 Feb 2019 05:30    TPro  7.0  /  Alb  2.7<L>  /  TBili  1.3<H>  /  DBili  x   /  AST  32  /  ALT  33  /  AlkPhos  94  02-27                        12.7   5.9   )-----------( 270      ( 27 Feb 2019 05:30 )             38.7       HA1C 6.6% on 2/4/19    Thyroperoxidase Antibody (02.23.19 @ 05:52)    Thyroperoxidase Antibody: <10.0 IU/mL    Thyroid Stimulating Hormone, Serum in AM (02.22.19 @ 05:45)    Thyroid Stimulating Hormone, Serum: 6.04 uIU/mL    Triiodothyronine, Total (T3 Total) (02.23.19 @ 05:52)    Triiodothyronine, Total (T3 Total): 75 ng/dL    Free Thyroxine, Serum in AM (02.23.19 @ 05:52)    Free Thyroxine, Serum: 1.5 ng/dL          CAPILLARY BLOOD GLUCOSE  POCT  Blood Glucose (02.28.19 @ 12:14)    POCT Blood Glucose.: 141 mg/dL  POCT  Blood Glucose (02.28.19 @ 08:21)    POCT Blood Glucose.: 145 mg/dL  POCT  Blood Glucose (02.27.19 @ 20:25)    POCT Blood Glucose.: 128 mg/dL  POCT  Blood Glucose (02.27.19 @ 16:46)    POCT Blood Glucose.: 126 mg/dL  POCT  Blood Glucose (02.27.19 @ 07:42)    POCT Blood Glucose.: 145 mg/dL  POCT  Blood Glucose (02.26.19 @ 20:53)    POCT Blood Glucose.: 123 mg/dL  POCT  Blood Glucose (02.26.19 @ 16:20)    POCT Blood Glucose.: 144 mg/dL  POCT  Blood Glucose (02.26.19 @ 12:03)    POCT Blood Glucose.: 147 mg/dL  POCT  Blood Glucose (02.26.19 @ 12:03)    POCT Blood Glucose.: 147 mg/dL  POCT  Blood Glucose (02.26.19 @ 08:04)    POCT Blood Glucose.: 164 mg/dL      MEDICATIONS  (STANDING):  amiodarone    Tablet 200 milliGRAM(s) Oral daily  atorvastatin 20 milliGRAM(s) Oral at bedtime  chlorhexidine 0.12% Liquid 15 milliLiter(s) Swish and Spit two times a day  dextrose 5%. 1000 milliLiter(s) (50 mL/Hr) IV Continuous <Continuous>  dextrose 50% Injectable 12.5 Gram(s) IV Push once  docusate sodium 100 milliGRAM(s) Oral two times a day  famotidine    Tablet 20 milliGRAM(s) Oral daily  FLUoxetine 10 milliGRAM(s) Oral at bedtime  furosemide    Tablet 20 milliGRAM(s) Oral daily  insulin lispro (HumaLOG) corrective regimen sliding scale   SubCutaneous three times a day with meals  lidocaine   Patch 2 Patch Transdermal <User Schedule>  melatonin 3 milliGRAM(s) Oral at bedtime  metFORMIN 850 milliGRAM(s) Oral every 12 hours  metoprolol tartrate 25 milliGRAM(s) Oral every 8 hours  polyethylene glycol 3350 17 Gram(s) Oral daily  senna 2 Tablet(s) Oral at bedtime  warfarin 1 milliGRAM(s) Oral at bedtime  zinc oxide 20% Ointment 1 Application(s) Topical daily    MEDICATIONS  (PRN):  artificial  tears Solution 2 Drop(s) Both EYES every 2 hours PRN Dry Eyes  bisacodyl Suppository 10 milliGRAM(s) Rectal daily PRN Constipation  dextrose 40% Gel 15 Gram(s) Oral once PRN Blood Glucose LESS THAN 70 milliGRAM(s)/deciliter  glucagon  Injectable 1 milliGRAM(s) IntraMuscular once PRN Glucose LESS THAN 70 milligrams/deciliter      A/P:    88 year old female with risk factors as stated above admitted for comprehensive rehab with left MCA infarct s/p recanalization procedure at [facility]  Continue full rehab program:PT/OT/SLP 3 hrs/day 5 days/week  On AC in setting atrial flutter,  statins, risk factor management    2. Dysphagia: Passed MBS on 2/20, now tolerating [facility] soft diet with thin liquids     3. Atrial flutter : on amiodarone metoprolol. INR 1.93 this am (trending up), will continue 1mg coumadin qHS and recheck INR in am.   Supratheraputic over the weekend and received vitamin K. Very sensitive to coumadin. Has been supratheraputic up to an INR of 7 this admission.   EKG over weekend shows afib with pacing.    4. UTI: completed abx course    5. DM-ii:- PEG feeds Lantus discontinued 2/26, SSI  Accuchecks--ranging 120-140s over the last 48 hours   Metformin 500mg BID  Home regimen: 500mg metformin ER daily   Daughter confirms accuchecks ranged 120-160s at home   Endocrinology consult appreciated    6. Diastolic HF: on lasix . monitor potassium     7. Pain: on tylenol prn lidoderm patch     8. Thrush: continue nystatin swish and suction    9. Skin: PEG site irritation improved. GI fu noted. Retention ring adjusted, irritation much improved. Still small amt of drainage from around peg tube site. Continue zinc oxide BID     10: motor recovery: started on prozac 10mg qHS-discussed with daughter who agrees with starting prozac  QTc on 466, monitor closely   Repeat EKG ordered for tomorrow morning     11: daytime fatigue:  melatonin to improve sleep regimen-effective     12: TSH elevation with slightly depressed total T4 and normal free T4 with normal thyroid antibody. Endocrinology consult appreciated with recommendations to follow thyroid lab values in 1 month.     13. Dry eyes: artificial tears PRN     14:  GI: patient on daily miralax and senna, would like to add colace HPI: 88 year old  female with a pmh of CAD, HTN, HLD, pacemaker who presented to [facility] as a transfer from [facility] with right hemiparesis and aphasia and left proximal MCA occlusion. The patient was last known well at 2 pm on 2/3/19 and was then found down on the ground by her daughter several hours later and was noted to be not speaking and weak on the right. At [facility] a CTA head and neck was performed which showed a proximal left MCA occlusion, and a CT head showed a dense left MCA sign with some hypodensity and early ischemic changes in the left MCA distribution. She did not receive IV tPA as she was out of the window and was transferred to [facility] for possible mechanical thrombectomy. At [facility] her CT perfusion showed zero core infarct, with a penumbra of 80 mls, and an infinite mismatch. She was deemed a candidate for intervention and recanalization.  Stroke thought to be 2* cardioembolism   Patient failed speech and swallow, PEG was placed on 2/12 and has been tolerating tube feeds.   Patient found be more lethargic on 2/13. Repeat CT head was stable. UA positive and patient started on antibiotics. Urine cx sent and pending results. Of note, CT head showed sphenoid air fluid level. Recs to start Augmentin if s/s of sinusitis start. Admitted to rehab on 2/14/19      Subjective: Patient seen at bedside.   Denies any pain, SOB, CP, HA, nausea, vomiting, abdominal pain, dysuria. Reports that she is tolerating her diet. Tolerating thin liquids. Eating well.   Reports good sleep overnight.  LBM 2/27.    REVIEW OF SYSTEMS  [ x  ] Constitutional WNL, no overt signs of bleeding   [ x  ] Cardio WNL  [x   ] Resp WNL  [   ] GI PEG    Vital Signs Last 24 Hrs  Vital Signs Last 24 Hrs  T(C): 36.5 (28 Feb 2019 09:41), Max: 36.9 (27 Feb 2019 20:34)  T(F): 97.7 (28 Feb 2019 09:41), Max: 98.5 (27 Feb 2019 20:34)  HR: 92 (28 Feb 2019 09:41) (92 - 93)  BP: 137/85 (28 Feb 2019 09:41) (124/84 - 137/85)  RR: 16 (28 Feb 2019 09:41) (15 - 16)  SpO2: 100% (28 Feb 2019 09:41) (97% - 100%)      PHYSICAL EXAM  General: nad, obese female  Cardio: S1S2  Resp: CTA  Abdomen: soft, PEG site with scant drainage-drainage is not purulent-no signs of infection, erythema continues to improve around peg insertion site. Insertion site appears bigger in size.   Neuro: aao x self, place. Right facial droop, expressive deficits improving, right hemiplegia with increased tone - continued improvement in movement noted both in UE and LE, about 2-3/5 right shoulder/elbow/wrist/hand grasp, approx 3/5 right hip flexor and knee extender 2/5 ankle strengths   Extrem: no edema, no calf tenderness  skin: no overt signs of bleeding      RECENT LABS:    PT/INR - ( 28 Feb 2019 05:37 )   PT: 22.1 sec;   INR: 1.93 ratio             02-27    137  |  100  |  20  ----------------------------<  155<H>  3.8   |  29  |  0.84    Ca    8.9      27 Feb 2019 05:30    TPro  7.0  /  Alb  2.7<L>  /  TBili  1.3<H>  /  DBili  x   /  AST  32  /  ALT  33  /  AlkPhos  94  02-27                        12.7   5.9   )-----------( 270      ( 27 Feb 2019 05:30 )             38.7       HA1C 6.6% on 2/4/19    Thyroperoxidase Antibody (02.23.19 @ 05:52)    Thyroperoxidase Antibody: <10.0 IU/mL    Thyroid Stimulating Hormone, Serum in AM (02.22.19 @ 05:45)    Thyroid Stimulating Hormone, Serum: 6.04 uIU/mL    Triiodothyronine, Total (T3 Total) (02.23.19 @ 05:52)    Triiodothyronine, Total (T3 Total): 75 ng/dL    Free Thyroxine, Serum in AM (02.23.19 @ 05:52)    Free Thyroxine, Serum: 1.5 ng/dL          CAPILLARY BLOOD GLUCOSE  POCT  Blood Glucose (02.28.19 @ 12:14)    POCT Blood Glucose.: 141 mg/dL  POCT  Blood Glucose (02.28.19 @ 08:21)    POCT Blood Glucose.: 145 mg/dL  POCT  Blood Glucose (02.27.19 @ 20:25)    POCT Blood Glucose.: 128 mg/dL  POCT  Blood Glucose (02.27.19 @ 16:46)    POCT Blood Glucose.: 126 mg/dL  POCT  Blood Glucose (02.27.19 @ 07:42)    POCT Blood Glucose.: 145 mg/dL  POCT  Blood Glucose (02.26.19 @ 20:53)    POCT Blood Glucose.: 123 mg/dL  POCT  Blood Glucose (02.26.19 @ 16:20)    POCT Blood Glucose.: 144 mg/dL  POCT  Blood Glucose (02.26.19 @ 12:03)    POCT Blood Glucose.: 147 mg/dL  POCT  Blood Glucose (02.26.19 @ 12:03)    POCT Blood Glucose.: 147 mg/dL  POCT  Blood Glucose (02.26.19 @ 08:04)    POCT Blood Glucose.: 164 mg/dL      MEDICATIONS  (STANDING):  amiodarone    Tablet 200 milliGRAM(s) Oral daily  atorvastatin 20 milliGRAM(s) Oral at bedtime  chlorhexidine 0.12% Liquid 15 milliLiter(s) Swish and Spit two times a day  dextrose 5%. 1000 milliLiter(s) (50 mL/Hr) IV Continuous <Continuous>  dextrose 50% Injectable 12.5 Gram(s) IV Push once  docusate sodium 100 milliGRAM(s) Oral two times a day  famotidine    Tablet 20 milliGRAM(s) Oral daily  FLUoxetine 10 milliGRAM(s) Oral at bedtime  furosemide    Tablet 20 milliGRAM(s) Oral daily  insulin lispro (HumaLOG) corrective regimen sliding scale   SubCutaneous three times a day with meals  lidocaine   Patch 2 Patch Transdermal <User Schedule>  melatonin 3 milliGRAM(s) Oral at bedtime  metFORMIN 850 milliGRAM(s) Oral every 12 hours  metoprolol tartrate 25 milliGRAM(s) Oral every 8 hours  polyethylene glycol 3350 17 Gram(s) Oral daily  senna 2 Tablet(s) Oral at bedtime  warfarin 1 milliGRAM(s) Oral at bedtime  zinc oxide 20% Ointment 1 Application(s) Topical daily    MEDICATIONS  (PRN):  artificial  tears Solution 2 Drop(s) Both EYES every 2 hours PRN Dry Eyes  bisacodyl Suppository 10 milliGRAM(s) Rectal daily PRN Constipation  dextrose 40% Gel 15 Gram(s) Oral once PRN Blood Glucose LESS THAN 70 milliGRAM(s)/deciliter  glucagon  Injectable 1 milliGRAM(s) IntraMuscular once PRN Glucose LESS THAN 70 milligrams/deciliter      A/P:    88 year old female with risk factors as stated above admitted for comprehensive rehab with left MCA infarct s/p recanalization procedure at [facility]  Continue full rehab program:PT/OT/SLP 3 hrs/day 5 days/week  On AC in setting atrial flutter,  statins, risk factor management    2. Dysphagia: Passed MBS on 2/20, now tolerating [facility] soft diet with thin liquids   Requesting GI f/u today to evaluate increased size of peg tube insertion site and whether evaluation is needed dislodgement. Nursing instructed to no longer use peg tube for fluids or meds. Patient is tolerating PO diet well.     3. Atrial flutter : on amiodarone metoprolol. INR 1.93 this am (trending up), will continue 1mg coumadin qHS and recheck INR in am.   Supratheraputic over the weekend and received vitamin K. Very sensitive to coumadin. Has been supratheraputic up to an INR of 7 this admission.   EKG over weekend shows afib with pacing.    4. UTI: completed abx course    5. DM-ii:- PEG feeds Lantus discontinued 2/26, SSI  Accuchecks--ranging 120-140s over the last 48 hours   Metformin 500mg BID  Home regimen: 500mg metformin ER daily   Daughter confirms accuchecks ranged 120-160s at home   Endocrinology consult appreciated    6. Diastolic HF: on lasix . monitor potassium     7. Pain: on tylenol prn lidoderm patch     8. Thrush: continue nystatin swish and suction    9. Skin: PEG site irritation improved. GI fu noted. Retention ring adjusted, irritation much improved. Still small amt of drainage from around peg tube site. Continue zinc oxide BID     10: motor recovery: started on prozac 10mg qHS-discussed with daughter who agrees with starting prozac  QTc on 466, monitor closely   Repeat EKG ordered for tomorrow morning     11: daytime fatigue:  melatonin to improve sleep regimen-effective     12: TSH elevation with slightly depressed total T4 and normal free T4 with normal thyroid antibody. Endocrinology consult appreciated with recommendations to follow thyroid lab values in 1 month.     13. Dry eyes: artificial tears PRN     14:  GI: patient on daily miralax and senna, would like to add colace

## 2019-02-28 NOTE — PROGRESS NOTE ADULT - SUBJECTIVE AND OBJECTIVE BOX
INTERVAL HPI/OVERNIGHT EVENTS:  HPI:  89 y/o female with multiple comorbidities being followed by GI for erythema around Peg. No complaints from patient at this time.     MEDICATIONS  (STANDING):  amiodarone    Tablet 200 milliGRAM(s) Oral daily  atorvastatin 20 milliGRAM(s) Oral at bedtime  chlorhexidine 0.12% Liquid 15 milliLiter(s) Swish and Spit two times a day  dextrose 5%. 1000 milliLiter(s) (50 mL/Hr) IV Continuous <Continuous>  dextrose 50% Injectable 12.5 Gram(s) IV Push once  docusate sodium 100 milliGRAM(s) Oral two times a day  famotidine    Tablet 20 milliGRAM(s) Oral daily  FLUoxetine 10 milliGRAM(s) Oral at bedtime  furosemide    Tablet 20 milliGRAM(s) Oral daily  insulin lispro (HumaLOG) corrective regimen sliding scale   SubCutaneous three times a day with meals  lidocaine   Patch 2 Patch Transdermal <User Schedule>  melatonin 3 milliGRAM(s) Oral at bedtime  metFORMIN 850 milliGRAM(s) Oral every 12 hours  metoprolol tartrate 25 milliGRAM(s) Oral every 8 hours  polyethylene glycol 3350 17 Gram(s) Oral daily  senna 2 Tablet(s) Oral at bedtime  warfarin 1 milliGRAM(s) Oral at bedtime  zinc oxide 20% Ointment 1 Application(s) Topical daily    MEDICATIONS  (PRN):  artificial  tears Solution 2 Drop(s) Both EYES every 2 hours PRN Dry Eyes  bisacodyl Suppository 10 milliGRAM(s) Rectal daily PRN Constipation  dextrose 40% Gel 15 Gram(s) Oral once PRN Blood Glucose LESS THAN 70 milliGRAM(s)/deciliter  glucagon  Injectable 1 milliGRAM(s) IntraMuscular once PRN Glucose LESS THAN 70 milligrams/deciliter      Allergies    No Known Allergies    Intolerances      PHYSICAL EXAM:   Vital Signs:  Vital Signs Last 24 Hrs  T(C): 36.5 (28 Feb 2019 09:41), Max: 36.9 (27 Feb 2019 20:34)  T(F): 97.7 (28 Feb 2019 09:41), Max: 98.5 (27 Feb 2019 20:34)  HR: 92 (28 Feb 2019 09:41) (92 - 93)  BP: 137/85 (28 Feb 2019 09:41) (124/84 - 137/85)  BP(mean): --  RR: 16 (28 Feb 2019 09:41) (15 - 16)  SpO2: 100% (28 Feb 2019 09:41) (97% - 100%)  Daily     Daily I&O's Summary    27 Feb 2019 07:01  -  28 Feb 2019 07:00  --------------------------------------------------------  IN: 150 mL / OUT: 0 mL / NET: 150 mL      GENERAL: NAD  HEENT:  conjunctivae clear and pink,   CHEST:  Full & symmetric excursion, no increased effort, breath sounds clear  HEART:  Regular rhythm, S1, S2, no edema  ABDOMEN:  Soft, non-tender, non-distended, normoactive bowel sounds,  Peg intact, site clean and dry  EXTEREMITIES:  no cyanosis,clubbing or edema  SKIN:  No rash, warm/dry  NEURO:  Alert, oriented      LABS:                        12.7   5.9   )-----------( 270      ( 27 Feb 2019 05:30 )             38.7     02-27    137  |  100  |  20  ----------------------------<  155<H>  3.8   |  29  |  0.84    Ca    8.9      27 Feb 2019 05:30    TPro  7.0  /  Alb  2.7<L>  /  TBili  1.3<H>  /  DBili  x   /  AST  32  /  ALT  33  /  AlkPhos  94  02-27    PT/INR - ( 28 Feb 2019 05:37 )   PT: 22.1 sec;   INR: 1.93 ratio             amylase   lipase  RADIOLOGY & ADDITIONAL TESTS:

## 2019-02-28 NOTE — PROGRESS NOTE ADULT - ATTENDING COMMENTS
Patient seen and examined.  Agree with assessment and plan as above.  PEG site erythema has improved.  Continue present treatment for now.

## 2019-02-28 NOTE — PROGRESS NOTE ADULT - SUBJECTIVE AND OBJECTIVE BOX
Subjective  ·	Comfortable  ·	Offers no complaints to me  Objective  ·	61-40-/84  ·	Gen NAD  ·	Pulm CTA  ·	CVS RRR  ·	Abdo Soft  ·	Ext No Edema  ·	INR: 1.69  Assessment and plan  88F from home HTN HLD CAD with CABG Ablation and PPM and TAVR  Admit for Acute ischemic CVA  Aflutter  ·	Aflutter  Coumadin  Erratic INR. FU INR in AM and adjust accordingly  Will continue to monitor  ·	SP CVA  SP ASA  High intensity statin  Strict BP control  ·	DM  On Lantus  Recently started metformin  Escalating dose. No reports of gastric discomfort,  No contrast studies planned

## 2019-03-01 LAB
ALBUMIN SERPL ELPH-MCNC: 2.8 G/DL — LOW (ref 3.3–5)
ALP SERPL-CCNC: 96 U/L — SIGNIFICANT CHANGE UP (ref 40–120)
ALT FLD-CCNC: 36 U/L DA — SIGNIFICANT CHANGE UP (ref 10–45)
ANION GAP SERPL CALC-SCNC: 9 MMOL/L — SIGNIFICANT CHANGE UP (ref 5–17)
AST SERPL-CCNC: 39 U/L — SIGNIFICANT CHANGE UP (ref 10–40)
BILIRUB SERPL-MCNC: 1.2 MG/DL — SIGNIFICANT CHANGE UP (ref 0.2–1.2)
BUN SERPL-MCNC: 17 MG/DL — SIGNIFICANT CHANGE UP (ref 7–23)
CALCIUM SERPL-MCNC: 9.4 MG/DL — SIGNIFICANT CHANGE UP (ref 8.4–10.5)
CHLORIDE SERPL-SCNC: 101 MMOL/L — SIGNIFICANT CHANGE UP (ref 96–108)
CO2 SERPL-SCNC: 29 MMOL/L — SIGNIFICANT CHANGE UP (ref 22–31)
CREAT SERPL-MCNC: 0.9 MG/DL — SIGNIFICANT CHANGE UP (ref 0.5–1.3)
GLUCOSE BLDC GLUCOMTR-MCNC: 118 MG/DL — HIGH (ref 70–99)
GLUCOSE BLDC GLUCOMTR-MCNC: 130 MG/DL — HIGH (ref 70–99)
GLUCOSE BLDC GLUCOMTR-MCNC: 132 MG/DL — HIGH (ref 70–99)
GLUCOSE BLDC GLUCOMTR-MCNC: 167 MG/DL — HIGH (ref 70–99)
GLUCOSE SERPL-MCNC: 139 MG/DL — HIGH (ref 70–99)
HCT VFR BLD CALC: 43.2 % — SIGNIFICANT CHANGE UP (ref 34.5–45)
HGB BLD-MCNC: 13.5 G/DL — SIGNIFICANT CHANGE UP (ref 11.5–15.5)
INR BLD: 2.35 RATIO — HIGH (ref 0.88–1.16)
MCHC RBC-ENTMCNC: 28.9 PG — SIGNIFICANT CHANGE UP (ref 27–34)
MCHC RBC-ENTMCNC: 31.3 GM/DL — LOW (ref 32–36)
MCV RBC AUTO: 92.6 FL — SIGNIFICANT CHANGE UP (ref 80–100)
PLATELET # BLD AUTO: 242 K/UL — SIGNIFICANT CHANGE UP (ref 150–400)
POTASSIUM SERPL-MCNC: 4.1 MMOL/L — SIGNIFICANT CHANGE UP (ref 3.5–5.3)
POTASSIUM SERPL-SCNC: 4.1 MMOL/L — SIGNIFICANT CHANGE UP (ref 3.5–5.3)
PROT SERPL-MCNC: 7 G/DL — SIGNIFICANT CHANGE UP (ref 6–8.3)
PROTHROM AB SERPL-ACNC: 27 SEC — HIGH (ref 10–12.9)
RBC # BLD: 4.66 M/UL — SIGNIFICANT CHANGE UP (ref 3.8–5.2)
RBC # FLD: 13.1 % — SIGNIFICANT CHANGE UP (ref 10.3–14.5)
SODIUM SERPL-SCNC: 139 MMOL/L — SIGNIFICANT CHANGE UP (ref 135–145)
WBC # BLD: 4.2 K/UL — SIGNIFICANT CHANGE UP (ref 3.8–10.5)
WBC # FLD AUTO: 4.2 K/UL — SIGNIFICANT CHANGE UP (ref 3.8–10.5)

## 2019-03-01 PROCEDURE — 99232 SBSQ HOSP IP/OBS MODERATE 35: CPT

## 2019-03-01 PROCEDURE — 99233 SBSQ HOSP IP/OBS HIGH 50: CPT

## 2019-03-01 RX ADMIN — Medication 10 MILLIGRAM(S): at 21:17

## 2019-03-01 RX ADMIN — Medication 2: at 08:12

## 2019-03-01 RX ADMIN — Medication 25 MILLIGRAM(S): at 14:36

## 2019-03-01 RX ADMIN — ATORVASTATIN CALCIUM 20 MILLIGRAM(S): 80 TABLET, FILM COATED ORAL at 21:17

## 2019-03-01 RX ADMIN — LIDOCAINE 2 PATCH: 4 CREAM TOPICAL at 08:10

## 2019-03-01 RX ADMIN — AMIODARONE HYDROCHLORIDE 200 MILLIGRAM(S): 400 TABLET ORAL at 05:21

## 2019-03-01 RX ADMIN — METFORMIN HYDROCHLORIDE 850 MILLIGRAM(S): 850 TABLET ORAL at 17:38

## 2019-03-01 RX ADMIN — CHLORHEXIDINE GLUCONATE 15 MILLILITER(S): 213 SOLUTION TOPICAL at 05:21

## 2019-03-01 RX ADMIN — WARFARIN SODIUM 1 MILLIGRAM(S): 2.5 TABLET ORAL at 21:17

## 2019-03-01 RX ADMIN — METFORMIN HYDROCHLORIDE 850 MILLIGRAM(S): 850 TABLET ORAL at 12:54

## 2019-03-01 RX ADMIN — LIDOCAINE 2 PATCH: 4 CREAM TOPICAL at 05:23

## 2019-03-01 RX ADMIN — LIDOCAINE 2 PATCH: 4 CREAM TOPICAL at 17:40

## 2019-03-01 RX ADMIN — Medication 3 MILLIGRAM(S): at 21:17

## 2019-03-01 RX ADMIN — Medication 25 MILLIGRAM(S): at 05:21

## 2019-03-01 RX ADMIN — ZINC OXIDE 1 APPLICATION(S): 200 OINTMENT TOPICAL at 12:56

## 2019-03-01 RX ADMIN — Medication 20 MILLIGRAM(S): at 05:21

## 2019-03-01 RX ADMIN — FAMOTIDINE 20 MILLIGRAM(S): 10 INJECTION INTRAVENOUS at 12:57

## 2019-03-01 RX ADMIN — Medication 25 MILLIGRAM(S): at 21:17

## 2019-03-01 RX ADMIN — CHLORHEXIDINE GLUCONATE 15 MILLILITER(S): 213 SOLUTION TOPICAL at 17:38

## 2019-03-01 NOTE — PROGRESS NOTE ADULT - SUBJECTIVE AND OBJECTIVE BOX
HPI: 88 year old  female with a pmh of CAD, HTN, HLD, pacemaker who presented to [facility] as a transfer from [facility] with right hemiparesis and aphasia and left proximal MCA occlusion. The patient was last known well at 2 pm on 2/3/19 and was then found down on the ground by her daughter several hours later and was noted to be not speaking and weak on the right. At [facility] a CTA head and neck was performed which showed a proximal left MCA occlusion, and a CT head showed a dense left MCA sign with some hypodensity and early ischemic changes in the left MCA distribution. She did not receive IV tPA as she was out of the window and was transferred to [facility] for possible mechanical thrombectomy. At [facility] her CT perfusion showed zero core infarct, with a penumbra of 80 mls, and an infinite mismatch. She was deemed a candidate for intervention and recanalization.  Stroke thought to be 2* cardioembolism   Patient failed speech and swallow, PEG was placed on 2/12 and has been tolerating tube feeds.   Patient found be more lethargic on 2/13. Repeat CT head was stable. UA positive and patient started on antibiotics. Urine cx sent and pending results. Of note, CT head showed sphenoid air fluid level. Recs to start Augmentin if s/s of sinusitis start. Admitted to rehab on 2/14/19      Subjective: Patient seen at bedside.   Denies any pain, SOB, CP, HA, nausea, vomiting, abdominal pain, dysuria. Reports that she is tolerating her diet. Tolerating thin liquids. Eating well. Per ST, may upgrade to regular consistency diet today.   Reports good sleep overnight.  LBM 2/28.    REVIEW OF SYSTEMS  [ x  ] Constitutional WNL, no overt signs of bleeding   [ x  ] Cardio WNL  [x   ] Resp WNL  [   ] GI PEG    Vital Signs Last 24 Hrs  T(C): 36.6 (01 Mar 2019 08:57), Max: 36.6 (28 Feb 2019 20:58)  T(F): 97.9 (01 Mar 2019 08:57), Max: 97.9 (28 Feb 2019 20:58)  HR: 91 (01 Mar 2019 08:57) (91 - 93)  BP: 133/84 (01 Mar 2019 08:57) (133/84 - 140/83)  RR: 14 (01 Mar 2019 08:57) (14 - 14)  SpO2: 98% (01 Mar 2019 08:57) (98% - 100%)    PHYSICAL EXAM  General: nad, obese female  Cardio: S1S2  Resp: CTA  Abdomen: soft, PEG site with scant drainage-drainage is not purulent-no signs of infection, erythema continues to improve around peg insertion site. Insertion site stable.  Neuro: aao x self, place. Right facial droop, expressive deficits improving, right hemiplegia with increased tone - continued improvement in movement noted both in UE and LE, about 2-3/5 right shoulder/elbow/wrist/hand grasp, approx 3/5 right hip flexor and knee extender 2/5 ankle strengths   Extrem: no edema, no calf tenderness  skin: no overt signs of bleeding      RECENT LABS:    PT/INR - ( 01 Mar 2019 06:00 )   PT: 27.0 sec;   INR: 2.35 ratio               03-01    139  |  101  |  17  ----------------------------<  139<H>  4.1   |  29  |  0.90    Ca    9.4      01 Mar 2019 06:00    TPro  7.0  /  Alb  2.8<L>  /  TBili  1.2  /  DBili  x   /  AST  39  /  ALT  36  /  AlkPhos  96  03-01                        13.5   4.2   )-----------( 242      ( 01 Mar 2019 06:00 )             43.2       HA1C 6.6% on 2/4/19    Thyroperoxidase Antibody (02.23.19 @ 05:52)    Thyroperoxidase Antibody: <10.0 IU/mL    Thyroid Stimulating Hormone, Serum in AM (02.22.19 @ 05:45)    Thyroid Stimulating Hormone, Serum: 6.04 uIU/mL    Triiodothyronine, Total (T3 Total) (02.23.19 @ 05:52)    Triiodothyronine, Total (T3 Total): 75 ng/dL    Free Thyroxine, Serum in AM (02.23.19 @ 05:52)    Free Thyroxine, Serum: 1.5 ng/dL          CAPILLARY BLOOD GLUCOSE  POCT  Blood Glucose (03.01.19 @ 12:07)    POCT Blood Glucose.: 130 mg/dL  POCT  Blood Glucose (03.01.19 @ 08:06)    POCT Blood Glucose.: 167 mg/dL  POCT  Blood Glucose (02.28.19 @ 21:21)    POCT Blood Glucose.: 106 mg/dL  POCT  Blood Glucose (02.28.19 @ 16:55)    POCT Blood Glucose.: 132 mg/dL  POCT  Blood Glucose (02.28.19 @ 12:14)    POCT Blood Glucose.: 141 mg/dL  POCT  Blood Glucose (02.28.19 @ 08:21)    POCT Blood Glucose.: 145 mg/dL  POCT  Blood Glucose (02.27.19 @ 20:25)    POCT Blood Glucose.: 128 mg/dL  POCT  Blood Glucose (02.27.19 @ 16:46)    POCT Blood Glucose.: 126 mg/dL  POCT  Blood Glucose (02.27.19 @ 07:42)        MEDICATIONS  (STANDING):  amiodarone    Tablet 200 milliGRAM(s) Oral daily  atorvastatin 20 milliGRAM(s) Oral at bedtime  chlorhexidine 0.12% Liquid 15 milliLiter(s) Swish and Spit two times a day  dextrose 5%. 1000 milliLiter(s) (50 mL/Hr) IV Continuous <Continuous>  dextrose 50% Injectable 12.5 Gram(s) IV Push once  docusate sodium 100 milliGRAM(s) Oral two times a day  famotidine    Tablet 20 milliGRAM(s) Oral daily  FLUoxetine 10 milliGRAM(s) Oral at bedtime  furosemide    Tablet 20 milliGRAM(s) Oral daily  insulin lispro (HumaLOG) corrective regimen sliding scale   SubCutaneous three times a day with meals  lidocaine   Patch 2 Patch Transdermal <User Schedule>  melatonin 3 milliGRAM(s) Oral at bedtime  metFORMIN 850 milliGRAM(s) Oral every 12 hours  metoprolol tartrate 25 milliGRAM(s) Oral every 8 hours  polyethylene glycol 3350 17 Gram(s) Oral daily  senna 2 Tablet(s) Oral at bedtime  warfarin 1 milliGRAM(s) Oral at bedtime  zinc oxide 20% Ointment 1 Application(s) Topical daily    MEDICATIONS  (PRN):  artificial  tears Solution 2 Drop(s) Both EYES every 2 hours PRN Dry Eyes  bisacodyl Suppository 10 milliGRAM(s) Rectal daily PRN Constipation  dextrose 40% Gel 15 Gram(s) Oral once PRN Blood Glucose LESS THAN 70 milliGRAM(s)/deciliter  glucagon  Injectable 1 milliGRAM(s) IntraMuscular once PRN Glucose LESS THAN 70 milligrams/deciliter      A/P:    88 year old female with risk factors as stated above admitted for comprehensive rehab with left MCA infarct s/p recanalization procedure at [facility]  Continue full rehab program:PT/OT/SLP 3 hrs/day 5 days/week  On AC in setting atrial flutter,  statins, risk factor management    2. Dysphagia: Passed MBS on 2/20, now tolerating mech soft diet with thin liquids   Requesting GI f/u today to evaluate increased size of peg tube insertion site and whether evaluation is needed dislodgement. Nursing instructed to no longer use peg tube for fluids or meds. Patient is tolerating PO diet well.     3. Atrial flutter : on amiodarone metoprolol. INR 2.35 this am, will continue 1mg coumadin qHS and recheck INR in am.   Supratheraputic over last weekend and received vitamin K. Very sensitive to coumadin. Has been supratheraputic up to an INR of 7 this admission.   EKG over weekend shows afib with pacing.    4. UTI: completed abx course    5. DM-ii:- PEG feeds Lantus discontinued 2/26, SSI  Accuchecks--ranging 120-140s over the last 48 hours   Metformin 500mg BID  Home regimen: 500mg metformin ER daily   Daughter confirms accuchecks ranged 120-160s at home   Endocrinology consult appreciated    6. Diastolic HF: on lasix . monitor potassium     7. Pain: on tylenol prn lidoderm patch     8. Thrush: continue nystatin swish and suction    9. Skin: PEG site irritation improved. GI fu noted. Retention ring adjusted, irritation much improved. Still small amt of drainage from around peg tube site. Continue zinc oxide BID     10: motor recovery: started on prozac 10mg qHS-discussed with daughter who agrees with starting prozac  QTc on 466, monitor closely   Repeat EKG ordered for today-will ask nurse to obtain now     11: daytime fatigue:  melatonin to improve sleep regimen-effective     12: TSH elevation with slightly depressed total T4 and normal free T4 with normal thyroid antibody. Endocrinology consult appreciated with recommendations to follow thyroid lab values in 1 month.     13. Dry eyes: artificial tears PRN     14:  GI: patient on daily miralax and senna, would like to add colace

## 2019-03-01 NOTE — PROGRESS NOTE ADULT - ASSESSMENT
88 year old female admitted to acute rehab secondary to Left MCA CVA s/p endovascular intervention with right side hemiparesis, dysphagia s/p PEG placement, dysarthria and expressive aphasia    #Left MCA CVA  PT/OT/rehab/ SLP as tolerated   continue statin     #Dysphagia/dysarthria  SLP as tolerated   PEG in place, c/d/i    #A-Flutter  Rate controlled with amiodarone and lopressor  coumadin per INR     #HTN  controlled overall on current regimen    #DM   controlled on current regimen     #Diastolic CHF  stable, continue Lasix , lopressor      DVT ppx  is on Coumadin

## 2019-03-01 NOTE — PROGRESS NOTE ADULT - SUBJECTIVE AND OBJECTIVE BOX
Patient is a 88 year old female who presents with a chief complaint of CVA with right sided weakness, dysphagia, dysarthria, expressive aphasia (01 Mar 2019 12:11)    Patient seen and examined today morning, reports she is doing better, slept well    MEDICATIONS  (STANDING):  amiodarone    Tablet 200 milliGRAM(s) Oral daily  atorvastatin 20 milliGRAM(s) Oral at bedtime  chlorhexidine 0.12% Liquid 15 milliLiter(s) Swish and Spit two times a day  dextrose 5%. 1000 milliLiter(s) (50 mL/Hr) IV Continuous <Continuous>  dextrose 50% Injectable 12.5 Gram(s) IV Push once  docusate sodium 100 milliGRAM(s) Oral two times a day  famotidine    Tablet 20 milliGRAM(s) Oral daily  FLUoxetine 10 milliGRAM(s) Oral at bedtime  furosemide    Tablet 20 milliGRAM(s) Oral daily  insulin lispro (HumaLOG) corrective regimen sliding scale   SubCutaneous three times a day with meals  lidocaine   Patch 2 Patch Transdermal <User Schedule>  melatonin 3 milliGRAM(s) Oral at bedtime  metFORMIN 850 milliGRAM(s) Oral every 12 hours  metoprolol tartrate 25 milliGRAM(s) Oral every 8 hours  polyethylene glycol 3350 17 Gram(s) Oral daily  senna 2 Tablet(s) Oral at bedtime  warfarin 1 milliGRAM(s) Oral at bedtime  zinc oxide 20% Ointment 1 Application(s) Topical daily    MEDICATIONS  (PRN):  artificial  tears Solution 2 Drop(s) Both EYES every 2 hours PRN Dry Eyes  bisacodyl Suppository 10 milliGRAM(s) Rectal daily PRN Constipation  dextrose 40% Gel 15 Gram(s) Oral once PRN Blood Glucose LESS THAN 70 milliGRAM(s)/deciliter  glucagon  Injectable 1 milliGRAM(s) IntraMuscular once PRN Glucose LESS THAN 70 milligrams/deciliter      REVIEW OF SYSTEMS:  CONSTITUTIONAL: No fever, weight loss, has fatigue  EYES: No eye pain, visual disturbances, or discharge  ENMT:  No difficulty hearing, tinnitus, vertigo; No sinus or throat pain  NECK: No pain or stiffness  RESPIRATORY: No cough, wheezing, chills or hemoptysis; No shortness of breath  CARDIOVASCULAR: No chest pain, palpitations, dizziness, or leg swelling  GASTROINTESTINAL: No abdominal or epigastric pain. No nausea, vomiting, or hematemesis; No diarrhea or constipation. No melena or hematochezia.  GENITOURINARY: No dysuria, frequency, hematuria, or incontinence  NEUROLOGICAL: No headaches, memory loss, loss of strength, numbness, or tremors  SKIN: No itching, burning, rashes, or lesions   ENDOCRINE: No heat or cold intolerance; No hair loss  MUSCULOSKELETAL: No joint pain or swelling; No muscle, back, or extremity pain  PSYCHIATRIC: No depression, anxiety, mood swings, or difficulty sleeping  HEME/LYMPH: No easy bruising, or bleeding gums  ALLERGY AND IMMUNOLOGIC: No hives or eczema    PHYSICAL EXAM:  T(C): 36.6 (03-01-19 @ 08:57), Max: 36.6 (02-28-19 @ 20:58)  HR: 91 (03-01-19 @ 08:57) (91 - 91)  BP: 133/84 (03-01-19 @ 08:57) (133/84 - 140/81)  RR: 14 (03-01-19 @ 08:57) (14 - 14)  SpO2: 98% (03-01-19 @ 08:57) (98% - 100%)    I&O's Summary    01 Mar 2019 07:01  -  01 Mar 2019 20:36  --------------------------------------------------------  IN: 234 mL / OUT: 0 mL / NET: 234 mL        GENERAL: Obese female, NAD, well-groomed, well-developed  HEAD:  Atraumatic, Normocephalic  EYES: EOMI, PERRL, conjunctiva and sclera clear  ENMT: Moist mucous membranes,  NECK: Supple, No JVD, Normal thyroid  NERVOUS SYSTEM:  Alert & Oriented X3, right sided weakness, no new deficit  CHEST/LUNG: Clear to ascultation bilaterally; No rales, rhonchi, wheezing, or rubs  HEART: Regular rate and rhythm; No murmurs, rubs, or gallops  ABDOMEN: Soft, Nontender, Nondistended; Bowel sounds present, peg site c/d/i  EXTREMITIES:  2+ Peripheral Pulses, No clubbing, cyanosis, or edema  SKIN: No rashes or lesions    LABS:                        13.5   4.2   )-----------( 242      ( 01 Mar 2019 06:00 )             43.2     03-01    139  |  101  |  17  ----------------------------<  139<H>  4.1   |  29  |  0.90    Ca    9.4      01 Mar 2019 06:00    TPro  7.0  /  Alb  2.8<L>  /  TBili  1.2  /  DBili  x   /  AST  39  /  ALT  36  /  AlkPhos  96  03-01    PT/INR - ( 01 Mar 2019 06:00 )   PT: 27.0 sec;   INR: 2.35 ratio             CAPILLARY BLOOD GLUCOSE  POCT Blood Glucose.: 132 mg/dL (01 Mar 2019 16:29)  POCT Blood Glucose.: 130 mg/dL (01 Mar 2019 12:07)  POCT Blood Glucose.: 167 mg/dL (01 Mar 2019 08:06)  POCT Blood Glucose.: 106 mg/dL (28 Feb 2019 21:21)      RADIOLOGY & ADDITIONAL TESTS:    Imaging Personally Reviewed:  [x] YES  [ ] NO    Consultant(s) Notes Reviewed:  [x] YES  [ ] NO    Care Discussed with Consultants/Other Providers [x] YES  [ ] NO

## 2019-03-01 NOTE — PROGRESS NOTE ADULT - ATTENDING COMMENTS
Patient examined, chart reviewed, care is accepted.  Continue AC/statin for ppx of the embolic CVA. Goal INR, Coumadin dosed.  GI ppx  All medication reviewed  Case discussed with all therapists, discharge plan discussed with NANDINI.

## 2019-03-02 LAB
GLUCOSE BLDC GLUCOMTR-MCNC: 109 MG/DL — HIGH (ref 70–99)
GLUCOSE BLDC GLUCOMTR-MCNC: 122 MG/DL — HIGH (ref 70–99)
GLUCOSE BLDC GLUCOMTR-MCNC: 154 MG/DL — HIGH (ref 70–99)
GLUCOSE BLDC GLUCOMTR-MCNC: 155 MG/DL — HIGH (ref 70–99)
INR BLD: 2.8 RATIO — HIGH (ref 0.88–1.16)
PROTHROM AB SERPL-ACNC: 32.4 SEC — HIGH (ref 10–12.9)

## 2019-03-02 PROCEDURE — 99232 SBSQ HOSP IP/OBS MODERATE 35: CPT

## 2019-03-02 PROCEDURE — 99233 SBSQ HOSP IP/OBS HIGH 50: CPT

## 2019-03-02 RX ORDER — WARFARIN SODIUM 2.5 MG/1
1 TABLET ORAL ONCE
Qty: 0 | Refills: 0 | Status: COMPLETED | OUTPATIENT
Start: 2019-03-02 | End: 2019-03-02

## 2019-03-02 RX ADMIN — Medication 25 MILLIGRAM(S): at 05:38

## 2019-03-02 RX ADMIN — SENNA PLUS 2 TABLET(S): 8.6 TABLET ORAL at 21:08

## 2019-03-02 RX ADMIN — Medication 100 MILLIGRAM(S): at 05:38

## 2019-03-02 RX ADMIN — Medication 2: at 08:20

## 2019-03-02 RX ADMIN — LIDOCAINE 2 PATCH: 4 CREAM TOPICAL at 18:04

## 2019-03-02 RX ADMIN — LIDOCAINE 2 PATCH: 4 CREAM TOPICAL at 06:04

## 2019-03-02 RX ADMIN — Medication 100 MILLIGRAM(S): at 17:23

## 2019-03-02 RX ADMIN — Medication 20 MILLIGRAM(S): at 05:38

## 2019-03-02 RX ADMIN — Medication 10 MILLIGRAM(S): at 21:08

## 2019-03-02 RX ADMIN — Medication 2: at 16:42

## 2019-03-02 RX ADMIN — ZINC OXIDE 1 APPLICATION(S): 200 OINTMENT TOPICAL at 12:51

## 2019-03-02 RX ADMIN — Medication 3 MILLIGRAM(S): at 21:08

## 2019-03-02 RX ADMIN — CHLORHEXIDINE GLUCONATE 15 MILLILITER(S): 213 SOLUTION TOPICAL at 05:39

## 2019-03-02 RX ADMIN — METFORMIN HYDROCHLORIDE 850 MILLIGRAM(S): 850 TABLET ORAL at 17:23

## 2019-03-02 RX ADMIN — CHLORHEXIDINE GLUCONATE 15 MILLILITER(S): 213 SOLUTION TOPICAL at 17:23

## 2019-03-02 RX ADMIN — POLYETHYLENE GLYCOL 3350 17 GRAM(S): 17 POWDER, FOR SOLUTION ORAL at 12:49

## 2019-03-02 RX ADMIN — LIDOCAINE 2 PATCH: 4 CREAM TOPICAL at 07:50

## 2019-03-02 RX ADMIN — WARFARIN SODIUM 1 MILLIGRAM(S): 2.5 TABLET ORAL at 21:09

## 2019-03-02 RX ADMIN — AMIODARONE HYDROCHLORIDE 200 MILLIGRAM(S): 400 TABLET ORAL at 05:38

## 2019-03-02 RX ADMIN — METFORMIN HYDROCHLORIDE 850 MILLIGRAM(S): 850 TABLET ORAL at 05:38

## 2019-03-02 RX ADMIN — ATORVASTATIN CALCIUM 20 MILLIGRAM(S): 80 TABLET, FILM COATED ORAL at 21:08

## 2019-03-02 RX ADMIN — Medication 25 MILLIGRAM(S): at 21:09

## 2019-03-02 RX ADMIN — Medication 25 MILLIGRAM(S): at 13:01

## 2019-03-02 RX ADMIN — FAMOTIDINE 20 MILLIGRAM(S): 10 INJECTION INTRAVENOUS at 12:49

## 2019-03-02 NOTE — PROGRESS NOTE ADULT - SUBJECTIVE AND OBJECTIVE BOX
Chief complaint: no new complaints    Patient is a 88y old  Female who presents with a chief complaint of CVA with right sided weakness, dysphagia, dysarthria, expressive aphasia (02 Mar 2019 09:33)        HEALTH ISSUES - PROBLEM Dx:  UTI (urinary tract infection): UTI (urinary tract infection)  Diastolic heart failure: Diastolic heart failure  Left hip pain: Left hip pain  Hyperlipidemia: Hyperlipidemia  Diabetes: Diabetes  HTN (hypertension): HTN (hypertension)  Atrial flutter: Atrial flutter  Dysphagia: Dysphagia  CVA (cerebral vascular accident): CVA (cerebral vascular accident)              PAST MEDICAL & SURGICAL HISTORY:  Valvular disease  Atrial flutter: 1/2010- [facility], Dr Tran  Diabetes  Obesity  Hyperlipidemia  HTN (hypertension)  CAD (coronary artery disease): s/p CABG- 2004-[facility]  Artificial pacemaker  Aortic valve replaced  S/P coronary artery stent placement: x 4, 2006-[facility]  S/P cholecystectomy  S/P CABG (coronary artery bypass graft)      VITALS  Vital Signs Last 24 Hrs  T(C): 36.7 (02 Mar 2019 09:45), Max: 36.7 (02 Mar 2019 09:45)  T(F): 98 (02 Mar 2019 09:45), Max: 98 (02 Mar 2019 09:45)  HR: 92 (02 Mar 2019 12:58) (70 - 93)  BP: 125/71 (02 Mar 2019 12:58) (119/80 - 137/75)  BP(mean): --  RR: 14 (02 Mar 2019 09:45) (14 - 14)  SpO2: 97% (02 Mar 2019 09:45) (97% - 100%)      PHYSICAL EXAM  Constitutional - NAD, Comfortable  HEENT - NCAT, EOMI  Neck - Supple, No limited ROM  Chest - CTA bilaterally  Cardiovascular - RRR, S1S2, No murmurs  Abdomen - BS+, Soft, NTND  Extremities - No C/C/E, No calf tenderness   Neurologic Exam -                    Cognitive - Awake, Alert, AAO X3     No new focal deficits         RECENT LABS                        13.5   4.2   )-----------( 242      ( 01 Mar 2019 06:00 )             43.2     03-01    139  |  101  |  17  ----------------------------<  139<H>  4.1   |  29  |  0.90    Ca    9.4      01 Mar 2019 06:00    TPro  7.0  /  Alb  2.8<L>  /  TBili  1.2  /  DBili  x   /  AST  39  /  ALT  36  /  AlkPhos  96  03-01    PT/INR - ( 02 Mar 2019 06:50 )   PT: 32.4 sec;   INR: 2.80 ratio                 RADIOLOGY/OTHER RESULTS      CURRENT MEDICATIONS    MEDICATIONS  (STANDING):  amiodarone    Tablet 200 milliGRAM(s) Oral daily  atorvastatin 20 milliGRAM(s) Oral at bedtime  chlorhexidine 0.12% Liquid 15 milliLiter(s) Swish and Spit two times a day  dextrose 5%. 1000 milliLiter(s) (50 mL/Hr) IV Continuous <Continuous>  dextrose 50% Injectable 12.5 Gram(s) IV Push once  docusate sodium 100 milliGRAM(s) Oral two times a day  famotidine    Tablet 20 milliGRAM(s) Oral daily  FLUoxetine 10 milliGRAM(s) Oral at bedtime  furosemide    Tablet 20 milliGRAM(s) Oral daily  insulin lispro (HumaLOG) corrective regimen sliding scale   SubCutaneous three times a day with meals  lidocaine   Patch 2 Patch Transdermal <User Schedule>  melatonin 3 milliGRAM(s) Oral at bedtime  metFORMIN 850 milliGRAM(s) Oral every 12 hours  metoprolol tartrate 25 milliGRAM(s) Oral every 8 hours  polyethylene glycol 3350 17 Gram(s) Oral daily  senna 2 Tablet(s) Oral at bedtime  warfarin 1 milliGRAM(s) Oral once  warfarin 1 milliGRAM(s) Oral at bedtime  zinc oxide 20% Ointment 1 Application(s) Topical daily    MEDICATIONS  (PRN):  artificial  tears Solution 2 Drop(s) Both EYES every 2 hours PRN Dry Eyes  bisacodyl Suppository 10 milliGRAM(s) Rectal daily PRN Constipation  dextrose 40% Gel 15 Gram(s) Oral once PRN Blood Glucose LESS THAN 70 milliGRAM(s)/deciliter  glucagon  Injectable 1 milliGRAM(s) IntraMuscular once PRN Glucose LESS THAN 70 milligrams/deciliter    ASSESSMENT & PLAN          GI/Bowel Management - Dulcolax PRN, Fleet PRN   Management - Toilet Q2  Skin - Turn Q2  Pain - Tylenol PRN  Therapeutic INR, Coumadin dosed       Continue comprehensive acute rehab program consisting of 3hrs/day of OT/PT and SLP.

## 2019-03-02 NOTE — PROGRESS NOTE ADULT - SUBJECTIVE AND OBJECTIVE BOX
Patient is a 88y old  Female who presents with a chief complaint of CVA with right sided weakness, dysphagia, dysarthria, expressive aphasia (01 Mar 2019 20:35)      Patient seen and examined at bedside. no complaints     ALLERGIES:  No Known Allergies    MEDICATIONS:  amiodarone    Tablet 200 milliGRAM(s) Oral daily  artificial  tears Solution 2 Drop(s) Both EYES every 2 hours PRN  atorvastatin 20 milliGRAM(s) Oral at bedtime  bisacodyl Suppository 10 milliGRAM(s) Rectal daily PRN  dextrose 40% Gel 15 Gram(s) Oral once PRN  dextrose 5%. 1000 milliLiter(s) IV Continuous <Continuous>  dextrose 50% Injectable 12.5 Gram(s) IV Push once  docusate sodium 100 milliGRAM(s) Oral two times a day  famotidine    Tablet 20 milliGRAM(s) Oral daily  FLUoxetine 10 milliGRAM(s) Oral at bedtime  furosemide    Tablet 20 milliGRAM(s) Oral daily  glucagon  Injectable 1 milliGRAM(s) IntraMuscular once PRN  insulin lispro (HumaLOG) corrective regimen sliding scale   SubCutaneous three times a day with meals  lidocaine   Patch 2 Patch Transdermal <User Schedule>  melatonin 3 milliGRAM(s) Oral at bedtime  metFORMIN 850 milliGRAM(s) Oral every 12 hours  metoprolol tartrate 25 milliGRAM(s) Oral every 8 hours  polyethylene glycol 3350 17 Gram(s) Oral daily  senna 2 Tablet(s) Oral at bedtime  warfarin 1 milliGRAM(s) Oral at bedtime  zinc oxide 20% Ointment 1 Application(s) Topical daily    Vital Signs Last 24 Hrs  T(F): 97.8 (01 Mar 2019 21:12), Max: 97.8 (01 Mar 2019 21:12)  HR: 93 (02 Mar 2019 05:46) (91 - 93)  BP: 130/84 (02 Mar 2019 05:46) (119/80 - 130/84)  RR: 14 (01 Mar 2019 21:12) (14 - 14)  SpO2: 100% (01 Mar 2019 21:12) (100% - 100%)  I&O's Summary    01 Mar 2019 07:01  -  02 Mar 2019 07:00  --------------------------------------------------------  IN: 234 mL / OUT: 0 mL / NET: 234 mL        PHYSICAL EXAM:  General: NAD, alert oriented   Neck: Supple, No JVD  Lungs: Clear to auscultation bilaterally  Cardio: RRR, S1/S2, No murmurs  Abdomen: Soft, Nontender, Nondistended; Bowel sounds present, PEG +   Extremities: No clubbing, cyanosis, or edema      LABS:                        13.5   4.2   )-----------( 242      ( 01 Mar 2019 06:00 )             43.2     03-01    139  |  101  |  17  ----------------------------<  139  4.1   |  29  |  0.90    Ca    9.4      01 Mar 2019 06:00    TPro  7.0  /  Alb  2.8  /  TBili  1.2  /  DBili  x   /  AST  39  /  ALT  36  /  AlkPhos  96  03-01    eGFR if Non African American: 57 mL/min/1.73M2 (03-01-19 @ 06:00)  eGFR if : 67 mL/min/1.73M2 (03-01-19 @ 06:00)    PT/INR - ( 02 Mar 2019 06:50 )   PT: 32.4 sec;   INR: 2.80 ratio      02-04 Chol 124 mg/dL LDL 55 mg/dL HDL 37 mg/dL Trig 159 mg/dL    CAPILLARY BLOOD GLUCOSE    POCT Blood Glucose.: 155 mg/dL (02 Mar 2019 08:13)  POCT Blood Glucose.: 118 mg/dL (01 Mar 2019 21:04)  POCT Blood Glucose.: 132 mg/dL (01 Mar 2019 16:29)  POCT Blood Glucose.: 130 mg/dL (01 Mar 2019 12:07)    02-04 [id number] 6.6    RADIOLOGY & ADDITIONAL TESTS:    Care Discussed with Consultants/Other Providers:

## 2019-03-03 LAB
GLUCOSE BLDC GLUCOMTR-MCNC: 113 MG/DL — HIGH (ref 70–99)
GLUCOSE BLDC GLUCOMTR-MCNC: 123 MG/DL — HIGH (ref 70–99)
GLUCOSE BLDC GLUCOMTR-MCNC: 132 MG/DL — HIGH (ref 70–99)
GLUCOSE BLDC GLUCOMTR-MCNC: 140 MG/DL — HIGH (ref 70–99)
INR BLD: 2.94 RATIO — HIGH (ref 0.88–1.16)
PROTHROM AB SERPL-ACNC: 34 SEC — HIGH (ref 10–12.9)

## 2019-03-03 PROCEDURE — 99232 SBSQ HOSP IP/OBS MODERATE 35: CPT

## 2019-03-03 PROCEDURE — 99233 SBSQ HOSP IP/OBS HIGH 50: CPT

## 2019-03-03 RX ORDER — WARFARIN SODIUM 2.5 MG/1
1 TABLET ORAL ONCE
Qty: 0 | Refills: 0 | Status: COMPLETED | OUTPATIENT
Start: 2019-03-03 | End: 2019-03-03

## 2019-03-03 RX ADMIN — Medication 25 MILLIGRAM(S): at 05:33

## 2019-03-03 RX ADMIN — Medication 100 MILLIGRAM(S): at 17:42

## 2019-03-03 RX ADMIN — ATORVASTATIN CALCIUM 20 MILLIGRAM(S): 80 TABLET, FILM COATED ORAL at 21:50

## 2019-03-03 RX ADMIN — Medication 10 MILLIGRAM(S): at 21:50

## 2019-03-03 RX ADMIN — POLYETHYLENE GLYCOL 3350 17 GRAM(S): 17 POWDER, FOR SOLUTION ORAL at 12:00

## 2019-03-03 RX ADMIN — Medication 25 MILLIGRAM(S): at 13:50

## 2019-03-03 RX ADMIN — Medication 25 MILLIGRAM(S): at 21:50

## 2019-03-03 RX ADMIN — ZINC OXIDE 1 APPLICATION(S): 200 OINTMENT TOPICAL at 12:02

## 2019-03-03 RX ADMIN — Medication 20 MILLIGRAM(S): at 05:32

## 2019-03-03 RX ADMIN — FAMOTIDINE 20 MILLIGRAM(S): 10 INJECTION INTRAVENOUS at 12:00

## 2019-03-03 RX ADMIN — CHLORHEXIDINE GLUCONATE 15 MILLILITER(S): 213 SOLUTION TOPICAL at 05:33

## 2019-03-03 RX ADMIN — LIDOCAINE 2 PATCH: 4 CREAM TOPICAL at 07:30

## 2019-03-03 RX ADMIN — LIDOCAINE 2 PATCH: 4 CREAM TOPICAL at 06:00

## 2019-03-03 RX ADMIN — SENNA PLUS 2 TABLET(S): 8.6 TABLET ORAL at 21:51

## 2019-03-03 RX ADMIN — METFORMIN HYDROCHLORIDE 850 MILLIGRAM(S): 850 TABLET ORAL at 05:33

## 2019-03-03 RX ADMIN — LIDOCAINE 2 PATCH: 4 CREAM TOPICAL at 18:00

## 2019-03-03 RX ADMIN — METFORMIN HYDROCHLORIDE 850 MILLIGRAM(S): 850 TABLET ORAL at 17:42

## 2019-03-03 RX ADMIN — CHLORHEXIDINE GLUCONATE 15 MILLILITER(S): 213 SOLUTION TOPICAL at 17:42

## 2019-03-03 RX ADMIN — WARFARIN SODIUM 1 MILLIGRAM(S): 2.5 TABLET ORAL at 21:52

## 2019-03-03 RX ADMIN — Medication 100 MILLIGRAM(S): at 05:33

## 2019-03-03 RX ADMIN — Medication 3 MILLIGRAM(S): at 21:50

## 2019-03-03 RX ADMIN — AMIODARONE HYDROCHLORIDE 200 MILLIGRAM(S): 400 TABLET ORAL at 05:33

## 2019-03-03 NOTE — PROGRESS NOTE ADULT - SUBJECTIVE AND OBJECTIVE BOX
Patient is a 88y old  Female who presents with a chief complaint of CVA with right sided weakness, dysphagia, dysarthria, expressive aphasia (02 Mar 2019 13:16)      Patient seen and examined at bedside. feels well, no complaints     ALLERGIES:  No Known Allergies    MEDICATIONS:  amiodarone    Tablet 200 milliGRAM(s) Oral daily  artificial  tears Solution 2 Drop(s) Both EYES every 2 hours PRN  atorvastatin 20 milliGRAM(s) Oral at bedtime  bisacodyl Suppository 10 milliGRAM(s) Rectal daily PRN  dextrose 40% Gel 15 Gram(s) Oral once PRN  dextrose 5%. 1000 milliLiter(s) IV Continuous <Continuous>  dextrose 50% Injectable 12.5 Gram(s) IV Push once  docusate sodium 100 milliGRAM(s) Oral two times a day  famotidine    Tablet 20 milliGRAM(s) Oral daily  FLUoxetine 10 milliGRAM(s) Oral at bedtime  furosemide    Tablet 20 milliGRAM(s) Oral daily  glucagon  Injectable 1 milliGRAM(s) IntraMuscular once PRN  insulin lispro (HumaLOG) corrective regimen sliding scale   SubCutaneous three times a day with meals  lidocaine   Patch 2 Patch Transdermal <User Schedule>  melatonin 3 milliGRAM(s) Oral at bedtime  metFORMIN 850 milliGRAM(s) Oral every 12 hours  metoprolol tartrate 25 milliGRAM(s) Oral every 8 hours  polyethylene glycol 3350 17 Gram(s) Oral daily  senna 2 Tablet(s) Oral at bedtime  zinc oxide 20% Ointment 1 Application(s) Topical daily    Vital Signs Last 24 Hrs  T(F): 97.6 (03 Mar 2019 09:27), Max: 98 (02 Mar 2019 09:45)  HR: 93 (03 Mar 2019 09:27) (70 - 93)  BP: 135/74 (03 Mar 2019 09:27) (115/70 - 137/75)  RR: 14 (03 Mar 2019 09:27) (14 - 15)  SpO2: 97% (03 Mar 2019 09:27) (96% - 97%)  I&O's Summary      PHYSICAL EXAM:  General: NAD, alert oriented x 3  Neck: Supple, No JVD  Lungs: Clear to auscultation bilaterally  Cardio: RRR, S1/S2, No murmurs  Abdomen: Soft, Nontender, Nondistended; Bowel sounds present, PEG +  Extremities: No clubbing, cyanosis, or edema  right hemiparesis      LABS:                        13.5   4.2   )-----------( 242      ( 01 Mar 2019 06:00 )             43.2     03-01    139  |  101  |  17  ----------------------------<  139  4.1   |  29  |  0.90    Ca    9.4      01 Mar 2019 06:00    TPro  7.0  /  Alb  2.8  /  TBili  1.2  /  DBili  x   /  AST  39  /  ALT  36  /  AlkPhos  96  03-01    eGFR if Non African American: 57 mL/min/1.73M2 (03-01-19 @ 06:00)  eGFR if : 67 mL/min/1.73M2 (03-01-19 @ 06:00)    PT/INR - ( 03 Mar 2019 05:20 )   PT: 34.0 sec;   INR: 2.94 ratio      02-04 Chol 124 mg/dL LDL 55 mg/dL HDL 37 mg/dL Trig 159 mg/dL    CAPILLARY BLOOD GLUCOSE    POCT Blood Glucose.: 132 mg/dL (03 Mar 2019 07:19)  POCT Blood Glucose.: 109 mg/dL (02 Mar 2019 21:06)  POCT Blood Glucose.: 154 mg/dL (02 Mar 2019 16:14)  POCT Blood Glucose.: 122 mg/dL (02 Mar 2019 11:52)    02-04 [id number] 6.6    RADIOLOGY & ADDITIONAL TESTS:    Care Discussed with Consultants/Other Providers:

## 2019-03-03 NOTE — PROGRESS NOTE ADULT - SUBJECTIVE AND OBJECTIVE BOX
Chief complaint: no new complaints    Patient is a 88y old  Female who presents with a chief complaint of CVA with right sided weakness, dysphagia, dysarthria, expressive aphasia (03 Mar 2019 09:42)    HEALTH ISSUES - PROBLEM Dx:  UTI (urinary tract infection): UTI (urinary tract infection)  Diastolic heart failure: Diastolic heart failure  Left hip pain: Left hip pain  Hyperlipidemia: Hyperlipidemia  Diabetes: Diabetes  HTN (hypertension): HTN (hypertension)  Atrial flutter: Atrial flutter  Dysphagia: Dysphagia  CVA (cerebral vascular accident): CVA (cerebral vascular accident)      PAST MEDICAL & SURGICAL HISTORY:  Valvular disease  Atrial flutter: 1/2010- [facility], Dr Tran  Diabetes  Obesity  Hyperlipidemia  HTN (hypertension)  CAD (coronary artery disease): s/p CABG- 2004-[facility]  Artificial pacemaker  Aortic valve replaced  S/P coronary artery stent placement: x 4, 2006-[facility]  S/P cholecystectomy  S/P CABG (coronary artery bypass graft)    VITALS  Vital Signs Last 24 Hrs  T(C): 36.4 (03 Mar 2019 09:27), Max: 36.4 (03 Mar 2019 09:27)  T(F): 97.6 (03 Mar 2019 09:27), Max: 97.6 (03 Mar 2019 09:27)  HR: 93 (03 Mar 2019 09:27) (70 - 93)  BP: 135/74 (03 Mar 2019 09:27) (115/70 - 135/74)  BP(mean): --  RR: 14 (03 Mar 2019 09:27) (14 - 15)  SpO2: 97% (03 Mar 2019 09:27) (96% - 97%)      PHYSICAL EXAM  Constitutional - NAD, Comfortable  HEENT - NCAT, EOMI  Neck - Supple, No limited ROM  Chest - CTA bilaterally  Cardiovascular - RRR,   Abdomen - BS+, Soft, NTND  Extremities - No C/C/E, No calf tenderness   Neurologic Exam -                    Cognitive - Awake, Alert     No new focal deficits                PT/INR - ( 03 Mar 2019 05:20 )   PT: 34.0 sec;   INR: 2.94 ratio                 RADIOLOGY/OTHER RESULTS      CURRENT MEDICATIONS    MEDICATIONS  (STANDING):  amiodarone    Tablet 200 milliGRAM(s) Oral daily  atorvastatin 20 milliGRAM(s) Oral at bedtime  chlorhexidine 0.12% Liquid 15 milliLiter(s) Swish and Spit two times a day  dextrose 5%. 1000 milliLiter(s) (50 mL/Hr) IV Continuous <Continuous>  dextrose 50% Injectable 12.5 Gram(s) IV Push once  docusate sodium 100 milliGRAM(s) Oral two times a day  famotidine    Tablet 20 milliGRAM(s) Oral daily  FLUoxetine 10 milliGRAM(s) Oral at bedtime  furosemide    Tablet 20 milliGRAM(s) Oral daily  insulin lispro (HumaLOG) corrective regimen sliding scale   SubCutaneous three times a day with meals  lidocaine   Patch 2 Patch Transdermal <User Schedule>  melatonin 3 milliGRAM(s) Oral at bedtime  metFORMIN 850 milliGRAM(s) Oral every 12 hours  metoprolol tartrate 25 milliGRAM(s) Oral every 8 hours  polyethylene glycol 3350 17 Gram(s) Oral daily  senna 2 Tablet(s) Oral at bedtime  zinc oxide 20% Ointment 1 Application(s) Topical daily    MEDICATIONS  (PRN):  artificial  tears Solution 2 Drop(s) Both EYES every 2 hours PRN Dry Eyes  bisacodyl Suppository 10 milliGRAM(s) Rectal daily PRN Constipation  dextrose 40% Gel 15 Gram(s) Oral once PRN Blood Glucose LESS THAN 70 milliGRAM(s)/deciliter  glucagon  Injectable 1 milliGRAM(s) IntraMuscular once PRN Glucose LESS THAN 70 milligrams/deciliter    ASSESSMENT & PLAN          GI/Bowel Management - Dulcolax PRN, Fleet PRN   Management - Toilet Q2  Skin - Turn Q2  Pain - Tylenol PRN  DVT PPX - Coumadin    Goal INR, Coumadin dosed      Continue comprehensive acute rehab program consisting of 3hrs/day of OT/PT and SLP.

## 2019-03-04 LAB
ALBUMIN SERPL ELPH-MCNC: 2.8 G/DL — LOW (ref 3.3–5)
ALP SERPL-CCNC: 81 U/L — SIGNIFICANT CHANGE UP (ref 40–120)
ALT FLD-CCNC: 30 U/L DA — SIGNIFICANT CHANGE UP (ref 10–45)
ANION GAP SERPL CALC-SCNC: 10 MMOL/L — SIGNIFICANT CHANGE UP (ref 5–17)
AST SERPL-CCNC: 32 U/L — SIGNIFICANT CHANGE UP (ref 10–40)
BILIRUB SERPL-MCNC: 1.1 MG/DL — SIGNIFICANT CHANGE UP (ref 0.2–1.2)
BUN SERPL-MCNC: 19 MG/DL — SIGNIFICANT CHANGE UP (ref 7–23)
CALCIUM SERPL-MCNC: 8.9 MG/DL — SIGNIFICANT CHANGE UP (ref 8.4–10.5)
CHLORIDE SERPL-SCNC: 101 MMOL/L — SIGNIFICANT CHANGE UP (ref 96–108)
CO2 SERPL-SCNC: 27 MMOL/L — SIGNIFICANT CHANGE UP (ref 22–31)
CREAT SERPL-MCNC: 0.84 MG/DL — SIGNIFICANT CHANGE UP (ref 0.5–1.3)
GLUCOSE BLDC GLUCOMTR-MCNC: 107 MG/DL — HIGH (ref 70–99)
GLUCOSE BLDC GLUCOMTR-MCNC: 122 MG/DL — HIGH (ref 70–99)
GLUCOSE BLDC GLUCOMTR-MCNC: 126 MG/DL — HIGH (ref 70–99)
GLUCOSE BLDC GLUCOMTR-MCNC: 134 MG/DL — HIGH (ref 70–99)
GLUCOSE SERPL-MCNC: 144 MG/DL — HIGH (ref 70–99)
HCT VFR BLD CALC: 40.3 % — SIGNIFICANT CHANGE UP (ref 34.5–45)
HGB BLD-MCNC: 12.8 G/DL — SIGNIFICANT CHANGE UP (ref 11.5–15.5)
INR BLD: 3.21 RATIO — HIGH (ref 0.88–1.16)
MCHC RBC-ENTMCNC: 29.4 PG — SIGNIFICANT CHANGE UP (ref 27–34)
MCHC RBC-ENTMCNC: 31.8 GM/DL — LOW (ref 32–36)
MCV RBC AUTO: 92.5 FL — SIGNIFICANT CHANGE UP (ref 80–100)
PLATELET # BLD AUTO: 201 K/UL — SIGNIFICANT CHANGE UP (ref 150–400)
POTASSIUM SERPL-MCNC: 3.8 MMOL/L — SIGNIFICANT CHANGE UP (ref 3.5–5.3)
POTASSIUM SERPL-SCNC: 3.8 MMOL/L — SIGNIFICANT CHANGE UP (ref 3.5–5.3)
PROT SERPL-MCNC: 6.9 G/DL — SIGNIFICANT CHANGE UP (ref 6–8.3)
PROTHROM AB SERPL-ACNC: 37.3 SEC — HIGH (ref 10–12.9)
RBC # BLD: 4.35 M/UL — SIGNIFICANT CHANGE UP (ref 3.8–5.2)
RBC # FLD: 13.1 % — SIGNIFICANT CHANGE UP (ref 10.3–14.5)
SODIUM SERPL-SCNC: 138 MMOL/L — SIGNIFICANT CHANGE UP (ref 135–145)
WBC # BLD: 5.4 K/UL — SIGNIFICANT CHANGE UP (ref 3.8–10.5)
WBC # FLD AUTO: 5.4 K/UL — SIGNIFICANT CHANGE UP (ref 3.8–10.5)

## 2019-03-04 PROCEDURE — 99233 SBSQ HOSP IP/OBS HIGH 50: CPT

## 2019-03-04 RX ORDER — IOHEXOL 300 MG/ML
30 INJECTION, SOLUTION INTRAVENOUS ONCE
Qty: 0 | Refills: 0 | Status: COMPLETED | OUTPATIENT
Start: 2019-03-04 | End: 2019-03-05

## 2019-03-04 RX ADMIN — LIDOCAINE 2 PATCH: 4 CREAM TOPICAL at 06:20

## 2019-03-04 RX ADMIN — Medication 25 MILLIGRAM(S): at 14:45

## 2019-03-04 RX ADMIN — CHLORHEXIDINE GLUCONATE 15 MILLILITER(S): 213 SOLUTION TOPICAL at 17:22

## 2019-03-04 RX ADMIN — Medication 20 MILLIGRAM(S): at 05:02

## 2019-03-04 RX ADMIN — LIDOCAINE 2 PATCH: 4 CREAM TOPICAL at 07:50

## 2019-03-04 RX ADMIN — ATORVASTATIN CALCIUM 20 MILLIGRAM(S): 80 TABLET, FILM COATED ORAL at 21:56

## 2019-03-04 RX ADMIN — Medication 25 MILLIGRAM(S): at 05:02

## 2019-03-04 RX ADMIN — LIDOCAINE 2 PATCH: 4 CREAM TOPICAL at 18:03

## 2019-03-04 RX ADMIN — METFORMIN HYDROCHLORIDE 850 MILLIGRAM(S): 850 TABLET ORAL at 05:01

## 2019-03-04 RX ADMIN — Medication 100 MILLIGRAM(S): at 05:03

## 2019-03-04 RX ADMIN — CHLORHEXIDINE GLUCONATE 15 MILLILITER(S): 213 SOLUTION TOPICAL at 05:03

## 2019-03-04 RX ADMIN — Medication 3 MILLIGRAM(S): at 21:56

## 2019-03-04 RX ADMIN — Medication 10 MILLIGRAM(S): at 21:56

## 2019-03-04 RX ADMIN — FAMOTIDINE 20 MILLIGRAM(S): 10 INJECTION INTRAVENOUS at 12:15

## 2019-03-04 RX ADMIN — AMIODARONE HYDROCHLORIDE 200 MILLIGRAM(S): 400 TABLET ORAL at 05:01

## 2019-03-04 RX ADMIN — Medication 25 MILLIGRAM(S): at 21:56

## 2019-03-04 RX ADMIN — ZINC OXIDE 1 APPLICATION(S): 200 OINTMENT TOPICAL at 12:14

## 2019-03-04 NOTE — PROGRESS NOTE ADULT - SUBJECTIVE AND OBJECTIVE BOX
CHIEF COMPLAINT: Diarrhea today, refused laxatives      HISTORY OF PRESENT ILLNESS    This is a 88 year old  female with a pmh of CAD, HTN, HLD, pacemaker who presented to [facility] as a transfer from [facility] with right hemiparesis and aphasia and left proximal MCA occlusion. The patient was last known well at 2 pm on 2/3/19 and was then found down on the ground by her daughter several hours later and was noted to be not speaking and weak on the right. At [facility] a CTA head and neck was performed which showed a proximal left MCA occlusion, and a CT head showed a dense left MCA sign with some hypodensity and early ischemic changes in the left MCA distribution. She did not receive IV tPA as she was out of the window and was transferred to [facility] for possible mechanical thrombectomy. At [facility] her CT perfusion showed zero core infarct, with a penumbra of 80 mls, and an infinite mismatch. She was deemed a candidate for intervention and recanalization was achieved with TICI 3 score on 2/13.     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 Given pacemaker showed a-flutter, high suspicion for cardiac source.   Per neurology, patient is currently on ASA 81 mg as a bridge to coumadin for goal INR 2-3. Her INR on 2/14 is 1.26, she received coumadin 5 mg on night of 2/13. Recommend to discontinue the aspirin when INR at goal.  Patient found to have EF 65-70% with moderate AS, moderate pulm HTN, mild-mod TR and no PFO, cardiac monitoring at times showed sinus tachycardia - started on Amiodarone and Metoprolol with improvement. PPM interrogation- showed episode of atrial flutter.   Patient failed speech and swallow, PEG was placed on 2/12 and has been tolerating tube feeds.   Patient found be more lethargic on 2/13. Repeat CT head was stable. UA positive and patient started on antibiotics. Urine cx sent and pending results. Of note, CT head showed sphenoid air fluid level. Recs to start Augmentin if s/s of sinusitis start.   Also, cxray from 2/13 showed pulmonary edema had decreased. Rec to continue lasix at a lower dose of 20mg daily.     Patient medically stable for discharge to [facility]'s acute IPR on 2/14/19. (14 Feb 2019 15:07)        PAST MEDICAL & SURGICAL HISTORY:  Valvular disease  Atrial flutter: 1/2010- [facility], Dr Tran  Diabetes  Obesity  Hyperlipidemia  HTN (hypertension)  CAD (coronary artery disease): s/p CABG- 2004-[facility]  Artificial pacemaker  Aortic valve replaced  S/P coronary artery stent placement: x 4, 2006-[facility]  S/P cholecystectomy  S/P CABG (coronary artery bypass graft)          VITALS  Vital Signs Last 24 Hrs  T(C): 36.4 (04 Mar 2019 08:47), Max: 36.4 (03 Mar 2019 20:21)  T(F): 97.6 (04 Mar 2019 08:47), Max: 97.6 (04 Mar 2019 08:47)  HR: 94 (04 Mar 2019 14:44) (91 - 94)  BP: 130/83 (04 Mar 2019 14:44) (115/79 - 130/85)  BP(mean): --  RR: 14 (04 Mar 2019 08:47) (14 - 14)  SpO2: 98% (04 Mar 2019 08:47) (98% - 99%)      PHYSICAL EXAM  Constitutional - NAD, Comfortable  HEENT - NCAT, EOMI  Neck - Supple, No limited ROM  Chest - CTA bilaterally,  Cardiovascular - RRR, S1S2, No murmurs  Abdomen - BS+, Soft, NTND, PEG  Extremities - No C/C/E, No calf tenderness   Skin-no rash  Neurologic Exam -  no new focal deficits                       RECENT LABS                        12.8   5.4   )-----------( 201      ( 04 Mar 2019 07:03 )             40.3     03-04    138  |  101  |  19  ----------------------------<  144<H>  3.8   |  27  |  0.84    Ca    8.9      04 Mar 2019 07:03    TPro  6.9  /  Alb  2.8<L>  /  TBili  1.1  /  DBili  x   /  AST  32  /  ALT  30  /  AlkPhos  81  03-04    PT/INR - ( 04 Mar 2019 07:03 )   PT: 37.3 sec;   INR: 3.21 ratio           LIVER FUNCTIONS - ( 04 Mar 2019 07:03 )  Alb: 2.8 g/dL / Pro: 6.9 g/dL / ALK PHOS: 81 U/L / ALT: 30 U/L DA / AST: 32 U/L / GGT: x             Direct LDL: 55 mg/dL (02-04-19 @ 06:10)    Hemoglobin A1C, Whole Blood: 6.6 % (02-04-19 @ 06:11)                  RADIOLOGY/OTHER RESULTS      CURRENT MEDICATIONS  MEDICATIONS  (STANDING):  amiodarone    Tablet 200 milliGRAM(s) Oral daily  atorvastatin 20 milliGRAM(s) Oral at bedtime  chlorhexidine 0.12% Liquid 15 milliLiter(s) Swish and Spit two times a day  dextrose 5%. 1000 milliLiter(s) (50 mL/Hr) IV Continuous <Continuous>  dextrose 50% Injectable 12.5 Gram(s) IV Push once  docusate sodium 100 milliGRAM(s) Oral two times a day  famotidine    Tablet 20 milliGRAM(s) Oral daily  FLUoxetine 10 milliGRAM(s) Oral at bedtime  furosemide    Tablet 20 milliGRAM(s) Oral daily  insulin lispro (HumaLOG) corrective regimen sliding scale   SubCutaneous three times a day with meals  iohexol 300 mG (iodine)/mL Oral Solution 30 milliLiter(s) Oral once  lidocaine   Patch 2 Patch Transdermal <User Schedule>  melatonin 3 milliGRAM(s) Oral at bedtime  metFORMIN 850 milliGRAM(s) Oral every 12 hours  metoprolol tartrate 25 milliGRAM(s) Oral every 8 hours  polyethylene glycol 3350 17 Gram(s) Oral daily  senna 2 Tablet(s) Oral at bedtime  zinc oxide 20% Ointment 1 Application(s) Topical daily    MEDICATIONS  (PRN):  artificial  tears Solution 2 Drop(s) Both EYES every 2 hours PRN Dry Eyes  bisacodyl Suppository 10 milliGRAM(s) Rectal daily PRN Constipation  dextrose 40% Gel 15 Gram(s) Oral once PRN Blood Glucose LESS THAN 70 milliGRAM(s)/deciliter  glucagon  Injectable 1 milliGRAM(s) IntraMuscular once PRN Glucose LESS THAN 70 milligrams/deciliter      ASSESSMENT & PLAN          GI/Bowel Management - Dulcolax PRN, Fleet PRN   Management - Toilet Q2  Skin - Turn Q2  Pain - Tylenol PRN  DVT PPX - TEDs, SCDs  Diet -     Continue comprehensive acute rehab program consisting of 3hrs/day of OT/PT and SLP.

## 2019-03-04 NOTE — PROGRESS NOTE ADULT - ATTENDING COMMENTS
Patient seen and examined.  Agree with assessment and plan as above.  Will order CT Abdomen/Pelvis to rule out infection at PEG site.  Bumper re-adjusted.  Continue zinc oxide.

## 2019-03-04 NOTE — PROGRESS NOTE ADULT - SUBJECTIVE AND OBJECTIVE BOX
INTERVAL HPI/OVERNIGHT EVENTS:  HPI:  89 y/o female with multiple comorbidities being followed by GI for erythema around Peg. No complaints from patient at this time.     MEDICATIONS  (STANDING):  amiodarone    Tablet 200 milliGRAM(s) Oral daily  atorvastatin 20 milliGRAM(s) Oral at bedtime  chlorhexidine 0.12% Liquid 15 milliLiter(s) Swish and Spit two times a day  dextrose 5%. 1000 milliLiter(s) (50 mL/Hr) IV Continuous <Continuous>  dextrose 50% Injectable 12.5 Gram(s) IV Push once  docusate sodium 100 milliGRAM(s) Oral two times a day  famotidine    Tablet 20 milliGRAM(s) Oral daily  FLUoxetine 10 milliGRAM(s) Oral at bedtime  furosemide    Tablet 20 milliGRAM(s) Oral daily  insulin lispro (HumaLOG) corrective regimen sliding scale   SubCutaneous three times a day with meals  iohexol 300 mG (iodine)/mL Oral Solution 30 milliLiter(s) Oral once  lidocaine   Patch 2 Patch Transdermal <User Schedule>  melatonin 3 milliGRAM(s) Oral at bedtime  metFORMIN 850 milliGRAM(s) Oral every 12 hours  metoprolol tartrate 25 milliGRAM(s) Oral every 8 hours  polyethylene glycol 3350 17 Gram(s) Oral daily  senna 2 Tablet(s) Oral at bedtime  zinc oxide 20% Ointment 1 Application(s) Topical daily    MEDICATIONS  (PRN):  artificial  tears Solution 2 Drop(s) Both EYES every 2 hours PRN Dry Eyes  bisacodyl Suppository 10 milliGRAM(s) Rectal daily PRN Constipation  dextrose 40% Gel 15 Gram(s) Oral once PRN Blood Glucose LESS THAN 70 milliGRAM(s)/deciliter  glucagon  Injectable 1 milliGRAM(s) IntraMuscular once PRN Glucose LESS THAN 70 milligrams/deciliter      Allergies    No Known Allergies    Intolerances        PHYSICAL EXAM:   Vital Signs:  Vital Signs Last 24 Hrs  T(C): 36.4 (04 Mar 2019 08:47), Max: 36.4 (03 Mar 2019 20:21)  T(F): 97.6 (04 Mar 2019 08:47), Max: 97.6 (04 Mar 2019 08:47)  HR: 94 (04 Mar 2019 14:44) (91 - 94)  BP: 130/83 (04 Mar 2019 14:44) (115/79 - 130/85)  BP(mean): --  RR: 14 (04 Mar 2019 08:47) (14 - 14)  SpO2: 98% (04 Mar 2019 08:47) (98% - 99%)  Daily     Daily I&O's Summary    03 Mar 2019 07:01  -  04 Mar 2019 07:00  --------------------------------------------------------  IN: 600 mL / OUT: 0 mL / NET: 600 mL    04 Mar 2019 07:01  -  04 Mar 2019 16:19  --------------------------------------------------------  IN: 480 mL / OUT: 0 mL / NET: 480 mL    GENERAL: NAD  HEENT:  conjunctivae clear and pink,   CHEST:  Full & symmetric excursion, no increased effort, breath sounds clear  HEART:  Regular rhythm, S1, S2, no edema  ABDOMEN:  Soft, non-tender, non-distended, normoactive bowel sounds,  Peg intact, site clean and dry  EXTEREMITIES:  no cyanosis,clubbing or edema  SKIN:  No rash, warm/dry  NEURO:  Alert, oriented      LABS:                        12.8   5.4   )-----------( 201      ( 04 Mar 2019 07:03 )             40.3     03-04    138  |  101  |  19  ----------------------------<  144<H>  3.8   |  27  |  0.84    Ca    8.9      04 Mar 2019 07:03    TPro  6.9  /  Alb  2.8<L>  /  TBili  1.1  /  DBili  x   /  AST  32  /  ALT  30  /  AlkPhos  81  03-04    PT/INR - ( 04 Mar 2019 07:03 )   PT: 37.3 sec;   INR: 3.21 ratio             amylase   lipase  RADIOLOGY & ADDITIONAL TESTS:

## 2019-03-04 NOTE — PROGRESS NOTE ADULT - ASSESSMENT
89 y/o female with multiple comorbidities being followed by GI for erythema around Peg and no with increase in size of peg insertion site. Tolerating regular food by mouth. Abd soft. Erythema around peg improved. Retention ring readjusted so its tighter to abdomen.     Continue Zinc to peg site with drsg change  Monitor peg insertion site  Will order CT Abdomen with Oral Contrast  Hold Metformin x 24 hours per pharmacy recs

## 2019-03-04 NOTE — CHART NOTE - NSCHARTNOTEFT_GEN_A_CORE
NUTRITION FOLLOW UP    SOURCE: Patient [X)   Family [ ]    Medical Record (X)    DIET: consistent carbohydrate   SPEECH THERAPY RECOMMENDATIONS: Diet upgraded to regular with thins on 2/28/19    Pt reports good appetite. States that she is enjoying meals, but sometimes there is too much food on plate. She states that she always eats to her satisfaction, although po appears variable per flow sheets 0-100%. Pt states she has no nutrition-related concerns and is very happy about her diet upgrade.                 CURRENT WEIGHT:   (2/28) 208.9lbs    PERTINENT MEDS:   Pertinent Medications: MEDICATIONS  (STANDING):  amiodarone    Tablet 200 milliGRAM(s) Oral daily  atorvastatin 20 milliGRAM(s) Oral at bedtime  chlorhexidine 0.12% Liquid 15 milliLiter(s) Swish and Spit two times a day  dextrose 5%. 1000 milliLiter(s) (50 mL/Hr) IV Continuous <Continuous>  dextrose 50% Injectable 12.5 Gram(s) IV Push once  docusate sodium 100 milliGRAM(s) Oral two times a day  famotidine    Tablet 20 milliGRAM(s) Oral daily  FLUoxetine 10 milliGRAM(s) Oral at bedtime  furosemide    Tablet 20 milliGRAM(s) Oral daily  insulin lispro (HumaLOG) corrective regimen sliding scale   SubCutaneous three times a day with meals  iohexol 300 mG (iodine)/mL Oral Solution 30 milliLiter(s) Oral once  lidocaine   Patch 2 Patch Transdermal <User Schedule>  melatonin 3 milliGRAM(s) Oral at bedtime  metFORMIN 850 milliGRAM(s) Oral every 12 hours  metoprolol tartrate 25 milliGRAM(s) Oral every 8 hours  polyethylene glycol 3350 17 Gram(s) Oral daily  senna 2 Tablet(s) Oral at bedtime  zinc oxide 20% Ointment 1 Application(s) Topical daily    MEDICATIONS  (PRN):  artificial  tears Solution 2 Drop(s) Both EYES every 2 hours PRN Dry Eyes  bisacodyl Suppository 10 milliGRAM(s) Rectal daily PRN Constipation  dextrose 40% Gel 15 Gram(s) Oral once PRN Blood Glucose LESS THAN 70 milliGRAM(s)/deciliter  glucagon  Injectable 1 milliGRAM(s) IntraMuscular once PRN Glucose LESS THAN 70 milligrams/deciliter      PERTINENT LABS:  03-04 Na138 mmol/L Glu 144 mg/dL<H> K+ 3.8 mmol/L Cr  0.84 mg/dL BUN 19 mg/dL 03-04 Alb 2.8 g/dL<L> 02-04 [id number] 6.6 %<H> 02-04 Chol 124 mg/dL LDL 55 mg/dL HDL 37 mg/dL<L> Trig 159 mg/dL<H>        SKIN: intact   EDEMA: none  LAST BM: 3/4    ESTIMATED NEEDS:   [X] no change since previous assessment  [ ] recalculated:     PREVIOUS NUTRITION DIAGNOSIS:  Difficulty swallowing     NUTRITION DIAGNOSIS is :  (   )  Ongoing      ( X) Resolved,  RD will follow as per nutrition department protocol. - Diet upgrade to regular c thins     NEW NUTRITION DIAGNOSIS: N/A    NUTRITION RECOMMENDATIONS:   1. Continue current nutrition plan of care     MONITORING AND EVALUATION:   Current diet order is appropriate and is well tolerated, but will monitor for any changes that may be needed.    RD to remain available  Ofelia Hall RDN

## 2019-03-05 DIAGNOSIS — K62.9 DISEASE OF ANUS AND RECTUM, UNSPECIFIED: ICD-10-CM

## 2019-03-05 LAB
GLUCOSE BLDC GLUCOMTR-MCNC: 126 MG/DL — HIGH (ref 70–99)
GLUCOSE BLDC GLUCOMTR-MCNC: 133 MG/DL — HIGH (ref 70–99)
GLUCOSE BLDC GLUCOMTR-MCNC: 137 MG/DL — HIGH (ref 70–99)
GLUCOSE BLDC GLUCOMTR-MCNC: 156 MG/DL — HIGH (ref 70–99)
INR BLD: 3.22 RATIO — HIGH (ref 0.88–1.16)
PROTHROM AB SERPL-ACNC: 37.4 SEC — HIGH (ref 10–12.9)

## 2019-03-05 PROCEDURE — 99233 SBSQ HOSP IP/OBS HIGH 50: CPT

## 2019-03-05 PROCEDURE — 74176 CT ABD & PELVIS W/O CONTRAST: CPT | Mod: 26

## 2019-03-05 RX ADMIN — LIDOCAINE 2 PATCH: 4 CREAM TOPICAL at 18:56

## 2019-03-05 RX ADMIN — Medication 20 MILLIGRAM(S): at 05:01

## 2019-03-05 RX ADMIN — IOHEXOL 30 MILLILITER(S): 300 INJECTION, SOLUTION INTRAVENOUS at 08:56

## 2019-03-05 RX ADMIN — CHLORHEXIDINE GLUCONATE 15 MILLILITER(S): 213 SOLUTION TOPICAL at 05:01

## 2019-03-05 RX ADMIN — Medication 25 MILLIGRAM(S): at 21:23

## 2019-03-05 RX ADMIN — ZINC OXIDE 1 APPLICATION(S): 200 OINTMENT TOPICAL at 11:22

## 2019-03-05 RX ADMIN — Medication 3 MILLIGRAM(S): at 21:23

## 2019-03-05 RX ADMIN — LIDOCAINE 2 PATCH: 4 CREAM TOPICAL at 06:15

## 2019-03-05 RX ADMIN — Medication 10 MILLIGRAM(S): at 21:23

## 2019-03-05 RX ADMIN — Medication 25 MILLIGRAM(S): at 05:01

## 2019-03-05 RX ADMIN — ATORVASTATIN CALCIUM 20 MILLIGRAM(S): 80 TABLET, FILM COATED ORAL at 21:23

## 2019-03-05 RX ADMIN — LIDOCAINE 2 PATCH: 4 CREAM TOPICAL at 07:41

## 2019-03-05 RX ADMIN — FAMOTIDINE 20 MILLIGRAM(S): 10 INJECTION INTRAVENOUS at 11:22

## 2019-03-05 RX ADMIN — AMIODARONE HYDROCHLORIDE 200 MILLIGRAM(S): 400 TABLET ORAL at 05:01

## 2019-03-05 NOTE — PROGRESS NOTE ADULT - SUBJECTIVE AND OBJECTIVE BOX
CHIEF COMPLAINT: no new complaints      HISTORY OF PRESENT ILLNESS    This is a 88 year old  female with a pmh of CAD, HTN, HLD, pacemaker who presented to [facility] as a transfer from [facility] with right hemiparesis and aphasia and left proximal MCA occlusion. The patient was last known well at 2 pm on 2/3/19 and was then found down on the ground by her daughter several hours later and was noted to be not speaking and weak on the right. At [facility] a CTA head and neck was performed which showed a proximal left MCA occlusion, and a CT head showed a dense left MCA sign with some hypodensity and early ischemic changes in the left MCA distribution. She did not receive IV tPA as she was out of the window and was transferred to [facility] for possible mechanical thrombectomy. At [facility] her CT perfusion showed zero core infarct, with a penumbra of 80 mls, and an infinite mismatch. She was deemed a candidate for intervention and recanalization was achieved with TICI 3 score on 2/13.     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 Given pacemaker showed a-flutter, high suspicion for cardiac source.   Per neurology, patient is currently on ASA 81 mg as a bridge to coumadin for goal INR 2-3. Her INR on 2/14 is 1.26, she received coumadin 5 mg on night of 2/13. Recommend to discontinue the aspirin when INR at goal.  Patient found to have EF 65-70% with moderate AS, moderate pulm HTN, mild-mod TR and no PFO, cardiac monitoring at times showed sinus tachycardia - started on Amiodarone and Metoprolol with improvement. PPM interrogation- showed episode of atrial flutter.   Patient failed speech and swallow, PEG was placed on 2/12 and has been tolerating tube feeds.   Patient found be more lethargic on 2/13. Repeat CT head was stable. UA positive and patient started on antibiotics. Urine cx sent and pending results. Of note, CT head showed sphenoid air fluid level. Recs to start Augmentin if s/s of sinusitis start.   Also, cxray from 2/13 showed pulmonary edema had decreased. Rec to continue lasix at a lower dose of 20mg daily.     Patient medically stable for discharge to [facility]'s acute IPR on 2/14/19. (14 Feb 2019 15:07)        PAST MEDICAL & SURGICAL HISTORY:  Valvular disease  Atrial flutter: 1/2010- [facility], Dr Tran  Diabetes  Obesity  Hyperlipidemia  HTN (hypertension)  CAD (coronary artery disease): s/p CABG- 2004-[facility]  Artificial pacemaker  Aortic valve replaced  S/P coronary artery stent placement: x 4, 2006-[facility]  S/P cholecystectomy  S/P CABG (coronary artery bypass graft)          VITALS  Vital Signs Last 24 Hrs  T(C): 36.5 (05 Mar 2019 08:26), Max: 36.5 (05 Mar 2019 08:26)  T(F): 97.7 (05 Mar 2019 08:26), Max: 97.7 (05 Mar 2019 08:26)  HR: 70 (05 Mar 2019 08:26) (70 - 94)  BP: 128/80 (05 Mar 2019 08:26) (124/80 - 128/80)  BP(mean): --  RR: 14 (05 Mar 2019 08:26) (13 - 14)  SpO2: 97% (05 Mar 2019 08:26) (97% - 97%)      PHYSICAL EXAM  Constitutional - NAD, Comfortable  HEENT - NCAT, EOMI  Neck - Supple, No limited ROM  Chest - CTA bilaterally,   Cardiovascular - RRR, S1S2, No murmurs  Abdomen - BS+, Soft, NTND  Extremities - No C/C/E, No calf tenderness   Skin-no rash  Neurologic Exam - no new focal deficits                       RECENT LABS                        12.8   5.4   )-----------( 201      ( 04 Mar 2019 07:03 )             40.3     03-04    138  |  101  |  19  ----------------------------<  144<H>  3.8   |  27  |  0.84    Ca    8.9      04 Mar 2019 07:03    TPro  6.9  /  Alb  2.8<L>  /  TBili  1.1  /  DBili  x   /  AST  32  /  ALT  30  /  AlkPhos  81  03-04    PT/INR - ( 05 Mar 2019 06:00 )   PT: 37.4 sec;   INR: 3.22 ratio           LIVER FUNCTIONS - ( 04 Mar 2019 07:03 )  Alb: 2.8 g/dL / Pro: 6.9 g/dL / ALK PHOS: 81 U/L / ALT: 30 U/L DA / AST: 32 U/L / GGT: x             Direct LDL: 55 mg/dL (02-04-19 @ 06:10)    Hemoglobin A1C, Whole Blood: 6.6 % (02-04-19 @ 06:11              EXAM:  CT ABDOMEN AND PELVIS OC      PROCEDURE DATE:  03/05/2019        INTERPRETATION:  CLINICAL HISTORY: 88 years  Female with Diarrhea,   Abdominal Pain, Peg Evaluation .    COMPARISON: 6/3/2018    PROCEDURE:   CT of the Abdomen and Pelvis was performed without intravenous contrast.   Intravenous contrast: None.  Enteric contrast: positive contrast was administered.  Sagittal and coronal reformats were performed.    Evaluation of the solid organs is limited by lack of IV contrast   administration.    Survey scans of the lung bases are clear. The heart is mildly enlarged. A   pacemaker lead is seen in the heart. The patient is status post TAVR.    The unenhanced spleen, pancreas, and adrenal glands are unremarkable.   There is a lobulatedhepatic contour. Correlate clinically for cirrhosis.    The gallbladder is not visualized.    The kidneys demonstrate no hydronephrosis or calculi. There is   nonspecific bilateral perinephric inflammatory stranding.    There is no retroperitoneal adenopathy or ascites. The aorta is   atherosclerotic but normal in caliber. The splenic artery is densely   calcified.    A gastrostomy tube is present in the stomach. The stomach is partially   collapsed. There is thickening of the gastric antrum and the gastric   fundus. There is also focal thickening of the terminal ileum. The bowel   demonstrates no obstruction or surrounding inflammation. Retained fecal   matter is present from cecum to rectum.  There is irregular thickening of   the rectal wall. Rule out neoplasm. The appendix is not visualized. There   is no indirect evidence of appendicitis.. There is irregular thickening   of the rectal wall. Rule out neoplasm.    Within the pelvis, the urinary bladder is unremarkable. The uterus is   normal in size. Small calcified myomata are present. are unremarkable.   There is no pelvic adenopathy.    Thoracolumbar degenerative changes are present.    IMPRESSION:    Irregular rectal wall thickening. Rule out neoplasm. Constipated colon.    Thickening gastric antrum and gastric fundus. Correlate clinically as to   need for upper endoscopy.    Focal thickening of the terminal ileum. Rule out Crohn's disease versus   other inflammatory or infectious process.. Cannot rule out underlying   mass      CURRENT MEDICATIONS  MEDICATIONS  (STANDING):  amiodarone    Tablet 200 milliGRAM(s) Oral daily  atorvastatin 20 milliGRAM(s) Oral at bedtime  chlorhexidine 0.12% Liquid 15 milliLiter(s) Swish and Spit two times a day  dextrose 5%. 1000 milliLiter(s) (50 mL/Hr) IV Continuous <Continuous>  dextrose 50% Injectable 12.5 Gram(s) IV Push once  docusate sodium 100 milliGRAM(s) Oral two times a day  famotidine    Tablet 20 milliGRAM(s) Oral daily  FLUoxetine 10 milliGRAM(s) Oral at bedtime  furosemide    Tablet 20 milliGRAM(s) Oral daily  insulin lispro (HumaLOG) corrective regimen sliding scale   SubCutaneous three times a day with meals  lidocaine   Patch 2 Patch Transdermal <User Schedule>  melatonin 3 milliGRAM(s) Oral at bedtime  metFORMIN 850 milliGRAM(s) Oral every 12 hours  metoprolol tartrate 25 milliGRAM(s) Oral every 8 hours  zinc oxide 20% Ointment 1 Application(s) Topical daily    MEDICATIONS  (PRN):  artificial  tears Solution 2 Drop(s) Both EYES every 2 hours PRN Dry Eyes  bisacodyl Suppository 10 milliGRAM(s) Rectal daily PRN Constipation  dextrose 40% Gel 15 Gram(s) Oral once PRN Blood Glucose LESS THAN 70 milliGRAM(s)/deciliter  glucagon  Injectable 1 milliGRAM(s) IntraMuscular once PRN Glucose LESS THAN 70 milligrams/deciliter      ASSESSMENT & PLAN          GI/Bowel Management - Dulcolax PRN, Fleet PRN   Management - Toilet Q2  Skin - Turn Q2  Pain - Tylenol PRN  DVT PPX - TEDs, SCDs  Diet -     Continue comprehensive acute rehab program consisting of 3hrs/day of OT/PT and SLP.

## 2019-03-05 NOTE — PROGRESS NOTE ADULT - ATTENDING COMMENTS
Patient seen and examined; agree with assessment and plan.    CT imaging shows ? rectal lesion.  Patient denies abdominal or rectal pain.  Last colonscopy >10 years ago.  Will plan for colonoscopy Friday.

## 2019-03-05 NOTE — PROGRESS NOTE ADULT - ASSESSMENT
89 y/o female with multiple comorbidities being followed by GI for erythema around Peg and no with increase in size of peg insertion site. Tolerating regular food by mouth. Abd soft. Erythema around peg improved. Retention ring readjusted so its tighter to abdomen. Peg insertion site size remains stable. Questionable rectal mass incidentally found on recent CT ABD. Discussed with patient in regards to doing colonoscopy for further evaluation, patient agreeable     Continue Zinc to peg site with drsg change  Monitor peg insertion site  Hold Metformin x 48 hours- patient had CT of Abd with PO contrast and recs to hold metformin made by pharmacy

## 2019-03-05 NOTE — PROGRESS NOTE ADULT - SUBJECTIVE AND OBJECTIVE BOX
Patient is a 88y old  Female who presents with a chief complaint of CVA with right sided weakness, dysphagia, dysarthria, expressive aphasia (04 Mar 2019 18:33)      Patient seen and examined at bedside.     ALLERGIES:  No Known Allergies    MEDICATIONS:  amiodarone    Tablet 200 milliGRAM(s) Oral daily  artificial  tears Solution 2 Drop(s) Both EYES every 2 hours PRN  atorvastatin 20 milliGRAM(s) Oral at bedtime  bisacodyl Suppository 10 milliGRAM(s) Rectal daily PRN  dextrose 40% Gel 15 Gram(s) Oral once PRN  dextrose 5%. 1000 milliLiter(s) IV Continuous <Continuous>  dextrose 50% Injectable 12.5 Gram(s) IV Push once  docusate sodium 100 milliGRAM(s) Oral two times a day  famotidine    Tablet 20 milliGRAM(s) Oral daily  FLUoxetine 10 milliGRAM(s) Oral at bedtime  furosemide    Tablet 20 milliGRAM(s) Oral daily  glucagon  Injectable 1 milliGRAM(s) IntraMuscular once PRN  insulin lispro (HumaLOG) corrective regimen sliding scale   SubCutaneous three times a day with meals  lidocaine   Patch 2 Patch Transdermal <User Schedule>  melatonin 3 milliGRAM(s) Oral at bedtime  metFORMIN 850 milliGRAM(s) Oral every 12 hours  metoprolol tartrate 25 milliGRAM(s) Oral every 8 hours  zinc oxide 20% Ointment 1 Application(s) Topical daily    Vital Signs Last 24 Hrs  T(F): 97.7 (05 Mar 2019 08:26), Max: 97.7 (05 Mar 2019 08:26)  HR: 70 (05 Mar 2019 08:26) (70 - 94)  BP: 128/80 (05 Mar 2019 08:26) (124/80 - 130/83)  RR: 14 (05 Mar 2019 08:26) (13 - 14)  SpO2: 97% (05 Mar 2019 08:26) (97% - 97%)  I&O's Summary    04 Mar 2019 07:01  -  05 Mar 2019 07:00  --------------------------------------------------------  IN: 714 mL / OUT: 0 mL / NET: 714 mL        PHYSICAL EXAM:  General: NAD, comfortable   ENT: MMM  Neck: Supple, No JVD  Lungs: CTA, BLAE, No added sounds.   Cardio: RRR, S1/S2, No murmurs  Abdomen: Soft, NT/ND, BS+ , PEG+   Extremities: No edema  CNS: no new focal deficits.     LABS:                        12.8   5.4   )-----------( 201      ( 04 Mar 2019 07:03 )             40.3     03-04    138  |  101  |  19  ----------------------------<  144  3.8   |  27  |  0.84    Ca    8.9      04 Mar 2019 07:03    TPro  6.9  /  Alb  2.8  /  TBili  1.1  /  DBili  x   /  AST  32  /  ALT  30  /  AlkPhos  81  03-04    eGFR if Non African American: 62 mL/min/1.73M2 (03-04-19 @ 07:03)  eGFR if African American: 72 mL/min/1.73M2 (03-04-19 @ 07:03)    PT/INR - ( 05 Mar 2019 06:00 )   PT: 37.4 sec;   INR: 3.22 ratio                 02-04 Chol 124 mg/dL LDL 55 mg/dL HDL 37 mg/dL Trig 159 mg/dL        CAPILLARY BLOOD GLUCOSE      POCT Blood Glucose.: 156 mg/dL (05 Mar 2019 07:40)  POCT Blood Glucose.: 107 mg/dL (04 Mar 2019 21:55)  POCT Blood Glucose.: 126 mg/dL (04 Mar 2019 16:13)  POCT Blood Glucose.: 122 mg/dL (04 Mar 2019 11:35)    02-04 [id number] 6.6          RADIOLOGY & ADDITIONAL TESTS:    Care Discussed with Consultants/Other Providers:

## 2019-03-05 NOTE — PROGRESS NOTE ADULT - SUBJECTIVE AND OBJECTIVE BOX
INTERVAL HPI/OVERNIGHT EVENTS:  HPI:  89 y/o female being followed by GI for erythema around peg, and increase in peg insertion site. No complaints from patient at this time.      MEDICATIONS  (STANDING):  amiodarone    Tablet 200 milliGRAM(s) Oral daily  atorvastatin 20 milliGRAM(s) Oral at bedtime  chlorhexidine 0.12% Liquid 15 milliLiter(s) Swish and Spit two times a day  dextrose 5%. 1000 milliLiter(s) (50 mL/Hr) IV Continuous <Continuous>  dextrose 50% Injectable 12.5 Gram(s) IV Push once  docusate sodium 100 milliGRAM(s) Oral two times a day  famotidine    Tablet 20 milliGRAM(s) Oral daily  FLUoxetine 10 milliGRAM(s) Oral at bedtime  furosemide    Tablet 20 milliGRAM(s) Oral daily  insulin lispro (HumaLOG) corrective regimen sliding scale   SubCutaneous three times a day with meals  lidocaine   Patch 2 Patch Transdermal <User Schedule>  melatonin 3 milliGRAM(s) Oral at bedtime  metFORMIN 850 milliGRAM(s) Oral every 12 hours  metoprolol tartrate 25 milliGRAM(s) Oral every 8 hours  zinc oxide 20% Ointment 1 Application(s) Topical daily    MEDICATIONS  (PRN):  artificial  tears Solution 2 Drop(s) Both EYES every 2 hours PRN Dry Eyes  bisacodyl Suppository 10 milliGRAM(s) Rectal daily PRN Constipation  dextrose 40% Gel 15 Gram(s) Oral once PRN Blood Glucose LESS THAN 70 milliGRAM(s)/deciliter  glucagon  Injectable 1 milliGRAM(s) IntraMuscular once PRN Glucose LESS THAN 70 milligrams/deciliter      Allergies    No Known Allergies    Intolerances        PHYSICAL EXAM:   Vital Signs:  Vital Signs Last 24 Hrs  T(C): 36.5 (05 Mar 2019 08:26), Max: 36.5 (05 Mar 2019 08:26)  T(F): 97.7 (05 Mar 2019 08:26), Max: 97.7 (05 Mar 2019 08:26)  HR: 70 (05 Mar 2019 08:26) (70 - 94)  BP: 128/80 (05 Mar 2019 08:26) (124/80 - 128/80)  BP(mean): --  RR: 14 (05 Mar 2019 08:26) (13 - 14)  SpO2: 97% (05 Mar 2019 08:26) (97% - 97%)  Daily     Daily I&O's Summary    04 Mar 2019 07:01  -  05 Mar 2019 07:00  --------------------------------------------------------  IN: 714 mL / OUT: 0 mL / NET: 714 mL    05 Mar 2019 07:01  -  05 Mar 2019 16:49  --------------------------------------------------------  IN: 0 mL / OUT: 1 mL / NET: -1 mL    GENERAL: NAD  HEENT:  conjunctivae clear and pink,   CHEST:  Full & symmetric excursion, no increased effort, breath sounds clear  HEART:  Regular rhythm, S1, S2, no edema  ABDOMEN:  Soft, non-tender, non-distended, normoactive bowel sounds,  Peg intact, site clean and dry  EXTEREMITIES:  no cyanosis,clubbing or edema  SKIN:  No rash, warm/dry  NEURO:  Alert, oriented      LABS:                        12.8   5.4   )-----------( 201      ( 04 Mar 2019 07:03 )             40.3     03-04    138  |  101  |  19  ----------------------------<  144<H>  3.8   |  27  |  0.84    Ca    8.9      04 Mar 2019 07:03    TPro  6.9  /  Alb  2.8<L>  /  TBili  1.1  /  DBili  x   /  AST  32  /  ALT  30  /  AlkPhos  81  03-04    PT/INR - ( 05 Mar 2019 06:00 )   PT: 37.4 sec;   INR: 3.22 ratio             amylase   lipase  RADIOLOGY & ADDITIONAL TESTS:

## 2019-03-05 NOTE — PROGRESS NOTE ADULT - ASSESSMENT
88 year old female admitted to acute rehab secondary to Left MCA CVA s/p endovascular intervention with right side hemiparesis, dysphagia s/p PEG placement, dysarthria and expressive aphasia    #Left MCA CVA  c/w PT/OT  c/w statin     #Dysphagia/dysarthria  PEG in place.   f/u CT scan abd for PEG site evaluation   GI following     #A-Flutter  Rate controlled   c/w with amiodarone and lopressor  coumadin per INR (2-3 goal)     #HTN  controlled   c/w current regimen     #DM   controlled   c/w SSI, Metformin   Pls hold metformin for contrast CT today.   FS monitoring       #Diastolic CHF  stable, continue Lasix , lopressor      DVT ppx  is on Coumadin

## 2019-03-06 LAB
ALBUMIN SERPL ELPH-MCNC: 2.9 G/DL — LOW (ref 3.3–5)
ALP SERPL-CCNC: 79 U/L — SIGNIFICANT CHANGE UP (ref 40–120)
ALT FLD-CCNC: 24 U/L DA — SIGNIFICANT CHANGE UP (ref 10–45)
ANION GAP SERPL CALC-SCNC: 11 MMOL/L — SIGNIFICANT CHANGE UP (ref 5–17)
AST SERPL-CCNC: 29 U/L — SIGNIFICANT CHANGE UP (ref 10–40)
BILIRUB SERPL-MCNC: 1.2 MG/DL — SIGNIFICANT CHANGE UP (ref 0.2–1.2)
BUN SERPL-MCNC: 15 MG/DL — SIGNIFICANT CHANGE UP (ref 7–23)
CALCIUM SERPL-MCNC: 9.1 MG/DL — SIGNIFICANT CHANGE UP (ref 8.4–10.5)
CHLORIDE SERPL-SCNC: 102 MMOL/L — SIGNIFICANT CHANGE UP (ref 96–108)
CO2 SERPL-SCNC: 27 MMOL/L — SIGNIFICANT CHANGE UP (ref 22–31)
CREAT SERPL-MCNC: 0.85 MG/DL — SIGNIFICANT CHANGE UP (ref 0.5–1.3)
GLUCOSE BLDC GLUCOMTR-MCNC: 101 MG/DL — HIGH (ref 70–99)
GLUCOSE BLDC GLUCOMTR-MCNC: 142 MG/DL — HIGH (ref 70–99)
GLUCOSE BLDC GLUCOMTR-MCNC: 154 MG/DL — HIGH (ref 70–99)
GLUCOSE BLDC GLUCOMTR-MCNC: 163 MG/DL — HIGH (ref 70–99)
GLUCOSE SERPL-MCNC: 159 MG/DL — HIGH (ref 70–99)
HCT VFR BLD CALC: 40.9 % — SIGNIFICANT CHANGE UP (ref 34.5–45)
HGB BLD-MCNC: 12.9 G/DL — SIGNIFICANT CHANGE UP (ref 11.5–15.5)
INR BLD: 2.56 RATIO — HIGH (ref 0.88–1.16)
MCHC RBC-ENTMCNC: 29.1 PG — SIGNIFICANT CHANGE UP (ref 27–34)
MCHC RBC-ENTMCNC: 31.6 GM/DL — LOW (ref 32–36)
MCV RBC AUTO: 92.1 FL — SIGNIFICANT CHANGE UP (ref 80–100)
PLATELET # BLD AUTO: 178 K/UL — SIGNIFICANT CHANGE UP (ref 150–400)
POTASSIUM SERPL-MCNC: 3.6 MMOL/L — SIGNIFICANT CHANGE UP (ref 3.5–5.3)
POTASSIUM SERPL-SCNC: 3.6 MMOL/L — SIGNIFICANT CHANGE UP (ref 3.5–5.3)
PROT SERPL-MCNC: 6.8 G/DL — SIGNIFICANT CHANGE UP (ref 6–8.3)
PROTHROM AB SERPL-ACNC: 29.5 SEC — HIGH (ref 10–12.9)
RBC # BLD: 4.44 M/UL — SIGNIFICANT CHANGE UP (ref 3.8–5.2)
RBC # FLD: 13.4 % — SIGNIFICANT CHANGE UP (ref 10.3–14.5)
SODIUM SERPL-SCNC: 140 MMOL/L — SIGNIFICANT CHANGE UP (ref 135–145)
WBC # BLD: 4.3 K/UL — SIGNIFICANT CHANGE UP (ref 3.8–10.5)
WBC # FLD AUTO: 4.3 K/UL — SIGNIFICANT CHANGE UP (ref 3.8–10.5)

## 2019-03-06 PROCEDURE — 99233 SBSQ HOSP IP/OBS HIGH 50: CPT

## 2019-03-06 RX ORDER — TIZANIDINE 4 MG/1
2 TABLET ORAL AT BEDTIME
Qty: 0 | Refills: 0 | Status: DISCONTINUED | OUTPATIENT
Start: 2019-03-06 | End: 2019-03-12

## 2019-03-06 RX ORDER — MULTIVIT WITH MIN/MFOLATE/K2 340-15/3 G
1 POWDER (GRAM) ORAL ONCE
Qty: 0 | Refills: 0 | Status: DISCONTINUED | OUTPATIENT
Start: 2019-03-06 | End: 2019-03-06

## 2019-03-06 RX ORDER — MULTIVIT WITH MIN/MFOLATE/K2 340-15/3 G
1 POWDER (GRAM) ORAL ONCE
Qty: 0 | Refills: 0 | Status: COMPLETED | OUTPATIENT
Start: 2019-03-07 | End: 2019-03-07

## 2019-03-06 RX ORDER — WARFARIN SODIUM 2.5 MG/1
1 TABLET ORAL ONCE
Qty: 0 | Refills: 0 | Status: COMPLETED | OUTPATIENT
Start: 2019-03-06 | End: 2019-03-06

## 2019-03-06 RX ADMIN — WARFARIN SODIUM 1 MILLIGRAM(S): 2.5 TABLET ORAL at 21:15

## 2019-03-06 RX ADMIN — METFORMIN HYDROCHLORIDE 850 MILLIGRAM(S): 850 TABLET ORAL at 05:17

## 2019-03-06 RX ADMIN — CHLORHEXIDINE GLUCONATE 15 MILLILITER(S): 213 SOLUTION TOPICAL at 05:16

## 2019-03-06 RX ADMIN — AMIODARONE HYDROCHLORIDE 200 MILLIGRAM(S): 400 TABLET ORAL at 05:17

## 2019-03-06 RX ADMIN — Medication 3 MILLIGRAM(S): at 21:14

## 2019-03-06 RX ADMIN — Medication 2: at 16:44

## 2019-03-06 RX ADMIN — Medication 20 MILLIGRAM(S): at 05:17

## 2019-03-06 RX ADMIN — CHLORHEXIDINE GLUCONATE 15 MILLILITER(S): 213 SOLUTION TOPICAL at 17:24

## 2019-03-06 RX ADMIN — LIDOCAINE 2 PATCH: 4 CREAM TOPICAL at 07:55

## 2019-03-06 RX ADMIN — LIDOCAINE 2 PATCH: 4 CREAM TOPICAL at 05:16

## 2019-03-06 RX ADMIN — Medication 10 MILLIGRAM(S): at 21:14

## 2019-03-06 RX ADMIN — Medication 2: at 07:59

## 2019-03-06 RX ADMIN — Medication 25 MILLIGRAM(S): at 05:17

## 2019-03-06 RX ADMIN — LIDOCAINE 2 PATCH: 4 CREAM TOPICAL at 17:25

## 2019-03-06 RX ADMIN — ATORVASTATIN CALCIUM 20 MILLIGRAM(S): 80 TABLET, FILM COATED ORAL at 21:14

## 2019-03-06 RX ADMIN — FAMOTIDINE 20 MILLIGRAM(S): 10 INJECTION INTRAVENOUS at 13:24

## 2019-03-06 RX ADMIN — ZINC OXIDE 1 APPLICATION(S): 200 OINTMENT TOPICAL at 13:24

## 2019-03-06 RX ADMIN — Medication 25 MILLIGRAM(S): at 21:15

## 2019-03-06 RX ADMIN — Medication 25 MILLIGRAM(S): at 13:32

## 2019-03-06 RX ADMIN — TIZANIDINE 2 MILLIGRAM(S): 4 TABLET ORAL at 21:14

## 2019-03-06 NOTE — PROGRESS NOTE ADULT - SUBJECTIVE AND OBJECTIVE BOX
Patient resting comfortably s/p GI procedure.  PEG tube in place. Tolerating oral food. Denies hypoglycemic symptoms.   VSSAF    MEDICATIONS  (STANDING):  amiodarone    Tablet 200 milliGRAM(s) Oral daily  atorvastatin 20 milliGRAM(s) Oral at bedtime  chlorhexidine 0.12% Liquid 15 milliLiter(s) Swish and Spit two times a day  dextrose 5%. 1000 milliLiter(s) (50 mL/Hr) IV Continuous <Continuous>  dextrose 50% Injectable 12.5 Gram(s) IV Push once  docusate sodium 100 milliGRAM(s) Oral two times a day  famotidine    Tablet 20 milliGRAM(s) Oral daily  FLUoxetine 10 milliGRAM(s) Oral at bedtime  furosemide    Tablet 20 milliGRAM(s) Oral daily  insulin lispro (HumaLOG) corrective regimen sliding scale   SubCutaneous three times a day with meals  lidocaine   Patch 2 Patch Transdermal <User Schedule>  melatonin 3 milliGRAM(s) Oral at bedtime  metFORMIN 850 milliGRAM(s) Oral every 12 hours  metoprolol tartrate 25 milliGRAM(s) Oral every 8 hours  zinc oxide 20% Ointment 1 Application(s) Topical daily    MEDICATIONS  (PRN):  artificial  tears Solution 2 Drop(s) Both EYES every 2 hours PRN Dry Eyes  bisacodyl Suppository 10 milliGRAM(s) Rectal daily PRN Constipation  dextrose 40% Gel 15 Gram(s) Oral once PRN Blood Glucose LESS THAN 70 milliGRAM(s)/deciliter  glucagon  Injectable 1 milliGRAM(s) IntraMuscular once PRN Glucose LESS THAN 70 milligrams/deciliter      Allergies    No Known Allergies    Intolerances      Review of Systems:  Constitutional: No fever  Eyes: No blurry vision  Neuro: No tremors  HEENT: No pain  Cardiovascular: No chest pain, palpitations  Respiratory: No SOB, no cough  GI: No nausea, vomiting, abdominal pain  : No dysuria  Skin: no rash  Psych: no depression  Endocrine: no polyuria, polydipsia  Hem/lymph: no swelling  Osteoporosis: no fractures    ALL OTHER SYSTEMS REVIEWED AND NEGATIVE    UNABLE TO OBTAIN    PHYSICAL EXAM:  VITALS: T(C): 36.6 (03-06-19 @ 07:59)  T(F): 97.9 (03-06-19 @ 07:59), Max: 99 (03-05-19 @ 21:24)  HR: 70 (03-06-19 @ 07:59) (69 - 93)  BP: 132/84 (03-06-19 @ 07:59) (132/84 - 142/84)  RR:  (12 - 12)  SpO2:  (97% - 98%)  Wt(kg): --  GENERAL: NAD, well-groomed, well-developed  EYES: No proptosis, no lid lag, anicteric  HEENT:  Atraumatic, Normocephalic, moist mucous membranes  THYROID: Normal size, no palpable nodules  RESPIRATORY: Clear to auscultation bilaterally; No rales, rhonchi, wheezing, or rubs  CARDIOVASCULAR: Regular rate and rhythm; No murmurs; no peripheral edema  GI: Soft, nontender, non distended, normal bowel sounds  SKIN: Dry, intact, No rashes or lesions  MUSCULOSKELETAL: Full range of motion, normal strength  NEURO: sensation intact, extraocular movements intact, no tremor, normal reflexes  PSYCH: Alert and oriented x 3, normal affect, normal mood  CUSHING'S SIGNS: no striae    POCT Blood Glucose.: 163 mg/dL (03-06-19 @ 07:56)  POCT Blood Glucose.: 126 mg/dL (03-05-19 @ 21:09)  POCT Blood Glucose.: 133 mg/dL (03-05-19 @ 16:31)  POCT Blood Glucose.: 137 mg/dL (03-05-19 @ 11:17)  POCT Blood Glucose.: 156 mg/dL (03-05-19 @ 07:40)  POCT Blood Glucose.: 107 mg/dL (03-04-19 @ 21:55)  POCT Blood Glucose.: 126 mg/dL (03-04-19 @ 16:13)  POCT Blood Glucose.: 122 mg/dL (03-04-19 @ 11:35)  POCT Blood Glucose.: 134 mg/dL (03-04-19 @ 07:35)  POCT Blood Glucose.: 113 mg/dL (03-03-19 @ 21:49)  POCT Blood Glucose.: 123 mg/dL (03-03-19 @ 16:57)      03-06    140  |  102  |  15  ----------------------------<  159<H>  3.6   |  27  |  0.85    EGFR if : 71  EGFR if non : 61    Ca    9.1      03-06    TPro  6.8  /  Alb  2.9<L>  /  TBili  1.2  /  DBili  x   /  AST  29  /  ALT  24  /  AlkPhos  79  03-06          Thyroid Function Tests:  02-23 @ 05:52 TSH -- FreeT4 1.5 T3 75 Anti TPO <10.0 Anti Thyroglobulin Ab -- TSI --  02-22 @ 05:45 TSH 6.04 FreeT4 -- T3 -- Anti TPO -- Anti Thyroglobulin Ab -- TSI --      Hemoglobin A1C, Whole Blood: 6.6 % <H> [4.0 - 5.6] (02-04-19 @ 06:11)

## 2019-03-06 NOTE — PROVIDER CONTACT NOTE (OTHER) - ASSESSMENT
RN noticed pus draining from peg site /under bumper   Zinc applied   New dressing applied   DR Bradford notified
+PEDAL PULSE NOTED, AREA COLOR WNL, NO REDNESS, NO WARMTH. DOPPLER 2/4/19 NEGATIVE FOR DVT
N/A

## 2019-03-06 NOTE — PROGRESS NOTE ADULT - SUBJECTIVE AND OBJECTIVE BOX
CHIEF COMPLAINT:no abdominal pain, no acute events overnight       HISTORY OF PRESENT ILLNESS    This is a 88 year old  female with a pmh of CAD, HTN, HLD, pacemaker who presented to [facility] as a transfer from [facility] with right hemiparesis and aphasia and left proximal MCA occlusion. The patient was last known well at 2 pm on 2/3/19 and was then found down on the ground by her daughter several hours later and was noted to be not speaking and weak on the right. At [facility] a CTA head and neck was performed which showed a proximal left MCA occlusion, and a CT head showed a dense left MCA sign with some hypodensity and early ischemic changes in the left MCA distribution. She did not receive IV tPA as she was out of the window and was transferred to [facility] for possible mechanical thrombectomy. At [facility] her CT perfusion showed zero core infarct, with a penumbra of 80 mls, and an infinite mismatch. She was deemed a candidate for intervention and recanalization was achieved with TICI 3 score on 2/13.     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 Given pacemaker showed a-flutter, high suspicion for cardiac source.   Per neurology, patient is currently on ASA 81 mg as a bridge to coumadin for goal INR 2-3. Her INR on 2/14 is 1.26, she received coumadin 5 mg on night of 2/13. Recommend to discontinue the aspirin when INR at goal.  Patient found to have EF 65-70% with moderate AS, moderate pulm HTN, mild-mod TR and no PFO, cardiac monitoring at times showed sinus tachycardia - started on Amiodarone and Metoprolol with improvement. PPM interrogation- showed episode of atrial flutter.   Patient failed speech and swallow, PEG was placed on 2/12 and has been tolerating tube feeds.   Patient found be more lethargic on 2/13. Repeat CT head was stable. UA positive and patient started on antibiotics. Urine cx sent and pending results. Of note, CT head showed sphenoid air fluid level. Recs to start Augmentin if s/s of sinusitis start.   Also, cxray from 2/13 showed pulmonary edema had decreased. Rec to continue lasix at a lower dose of 20mg daily.             PAST MEDICAL & SURGICAL HISTORY:  Valvular disease  Atrial flutter: 1/2010- [facility], Dr Tran  Diabetes  Obesity  Hyperlipidemia  HTN (hypertension)  CAD (coronary artery disease): s/p CABG- 2004-[facility]  Artificial pacemaker  Aortic valve replaced  S/P coronary artery stent placement: x 4, 2006-[facility]  S/P cholecystectomy  S/P CABG (coronary artery bypass graft)        VITALS  Vital Signs Last 24 Hrs  T(C): 36.6 (06 Mar 2019 07:59), Max: 37.2 (05 Mar 2019 21:24)  T(F): 97.9 (06 Mar 2019 07:59), Max: 99 (05 Mar 2019 21:24)  HR: 95 (06 Mar 2019 13:45) (69 - 95)  BP: 150/86 (06 Mar 2019 13:45) (132/84 - 150/86)  BP(mean): --  RR: 12 (06 Mar 2019 07:59) (12 - 12)  SpO2: 97% (06 Mar 2019 07:59) (97% - 98%)      PHYSICAL EXAM  Constitutional - NAD, Comfortable  HEENT - NCAT, EOMI  Neck - Supple, No limited ROM  Chest - CTA bilaterally,  Cardiovascular - RRR, S1S2, No murmurs  Abdomen - BS+, Soft, NTND  Extremities - No C/C/E, No calf tenderness   Skin-no rash  Neurologic Exam - increased tone in right upper extremity noted                      RECENT LABS                        12.9   4.3   )-----------( 178      ( 06 Mar 2019 05:45 )             40.9     03-06    140  |  102  |  15  ----------------------------<  159<H>  3.6   |  27  |  0.85    Ca    9.1      06 Mar 2019 05:45    TPro  6.8  /  Alb  2.9<L>  /  TBili  1.2  /  DBili  x   /  AST  29  /  ALT  24  /  AlkPhos  79  03-06    PT/INR - ( 06 Mar 2019 05:45 )   PT: 29.5 sec;   INR: 2.56 ratio           LIVER FUNCTIONS - ( 06 Mar 2019 05:45 )  Alb: 2.9 g/dL / Pro: 6.8 g/dL / ALK PHOS: 79 U/L / ALT: 24 U/L DA / AST: 29 U/L / GGT: x             Direct LDL: 55 mg/dL (02-04-19 @ 06:10)    Hemoglobin A1C, Whole Blood: 6.6 % (02-04-19 @ 06:11)        CURRENT MEDICATIONS  MEDICATIONS  (STANDING):  amiodarone    Tablet 200 milliGRAM(s) Oral daily  atorvastatin 20 milliGRAM(s) Oral at bedtime  chlorhexidine 0.12% Liquid 15 milliLiter(s) Swish and Spit two times a day  dextrose 5%. 1000 milliLiter(s) (50 mL/Hr) IV Continuous <Continuous>  dextrose 50% Injectable 12.5 Gram(s) IV Push once  docusate sodium 100 milliGRAM(s) Oral two times a day  famotidine    Tablet 20 milliGRAM(s) Oral daily  FLUoxetine 10 milliGRAM(s) Oral at bedtime  furosemide    Tablet 20 milliGRAM(s) Oral daily  insulin lispro (HumaLOG) corrective regimen sliding scale   SubCutaneous three times a day with meals  lidocaine   Patch 2 Patch Transdermal <User Schedule>  melatonin 3 milliGRAM(s) Oral at bedtime  metFORMIN 850 milliGRAM(s) Oral every 12 hours  metoprolol tartrate 25 milliGRAM(s) Oral every 8 hours  zinc oxide 20% Ointment 1 Application(s) Topical daily    MEDICATIONS  (PRN):  artificial  tears Solution 2 Drop(s) Both EYES every 2 hours PRN Dry Eyes  bisacodyl Suppository 10 milliGRAM(s) Rectal daily PRN Constipation  dextrose 40% Gel 15 Gram(s) Oral once PRN Blood Glucose LESS THAN 70 milliGRAM(s)/deciliter  glucagon  Injectable 1 milliGRAM(s) IntraMuscular once PRN Glucose LESS THAN 70 milligrams/deciliter

## 2019-03-06 NOTE — PROGRESS NOTE ADULT - ATTENDING COMMENTS
Patient seen and examined; agree with assessment and plan.  She appears comfortable in chair; no acute complaints.  Family at bedside.  Plan discuss and all questions/concerns addressed regarding colonoscopy.

## 2019-03-06 NOTE — PROGRESS NOTE ADULT - ASSESSMENT
89 y/o female with multiple comorbidities being followed by GI for erythema around Peg and no with increase in size of peg insertion site. Tolerating regular food by mouth. Abd soft. Erythema around peg improved. Retention ring readjusted so its tighter to abdomen. Peg insertion site size remains stable. Questionable rectal mass incidentally found on recent CT ABD. Discussed with patient in regards to doing colonoscopy for further evaluation, patient agreeable     Continue Zinc to peg site with drsg change  Monitor peg insertion site  Hold Metformin x 24 hours- patient had CT of Abd with PO contrast and recs to hold metformin made by pharmacy.

## 2019-03-06 NOTE — PROGRESS NOTE ADULT - ASSESSMENT
88 year old female admitted to acute rehab secondary to Left MCA CVA s/p endovascular intervention with right side hemiparesis, dysphagia s/p PEG placement, dysarthria and expressive aphasia    #Left MCA CVA  c/w PT/OT  c/w statin     #Dysphagia/dysarthria  PEG in place, erythema around site.  CT scan abd reviewed  GI following     #Rectal thickening noted on CT  planned for colonoscopy     #A-Flutter  Rate controlled   c/w with amiodarone and lopressor  coumadin per INR (2-3 goal)     #HTN  controlled   c/w current regimen     #DM   controlled   c/w SSI, Metformin  FS monitoring       #Diastolic CHF  stable, continue Lasix , lopressor      DVT ppx  is on Coumadin

## 2019-03-06 NOTE — PROGRESS NOTE ADULT - SUBJECTIVE AND OBJECTIVE BOX
Patient is a 88y old  Female who presents with a chief complaint of CVA with right sided weakness, dysphagia, dysarthria, expressive aphasia (05 Mar 2019 16:49)      Patient seen and examined at bedside. c/o diarrhea this morning, had 3 BM. no BM last night  no abdominal pain, nausea, vomiting.     ALLERGIES:  No Known Allergies    MEDICATIONS:  amiodarone    Tablet 200 milliGRAM(s) Oral daily  artificial  tears Solution 2 Drop(s) Both EYES every 2 hours PRN  atorvastatin 20 milliGRAM(s) Oral at bedtime  bisacodyl Suppository 10 milliGRAM(s) Rectal daily PRN  dextrose 40% Gel 15 Gram(s) Oral once PRN  dextrose 5%. 1000 milliLiter(s) IV Continuous <Continuous>  dextrose 50% Injectable 12.5 Gram(s) IV Push once  docusate sodium 100 milliGRAM(s) Oral two times a day  famotidine    Tablet 20 milliGRAM(s) Oral daily  FLUoxetine 10 milliGRAM(s) Oral at bedtime  furosemide    Tablet 20 milliGRAM(s) Oral daily  glucagon  Injectable 1 milliGRAM(s) IntraMuscular once PRN  insulin lispro (HumaLOG) corrective regimen sliding scale   SubCutaneous three times a day with meals  lidocaine   Patch 2 Patch Transdermal <User Schedule>  melatonin 3 milliGRAM(s) Oral at bedtime  metFORMIN 850 milliGRAM(s) Oral every 12 hours  metoprolol tartrate 25 milliGRAM(s) Oral every 8 hours  zinc oxide 20% Ointment 1 Application(s) Topical daily    Vital Signs Last 24 Hrs  T(F): 97.9 (06 Mar 2019 07:59), Max: 99 (05 Mar 2019 21:24)  HR: 70 (06 Mar 2019 07:59) (69 - 93)  BP: 132/84 (06 Mar 2019 07:59) (132/84 - 142/84)  RR: 12 (06 Mar 2019 07:59) (12 - 12)  SpO2: 97% (06 Mar 2019 07:59) (97% - 98%)  I&O's Summary    05 Mar 2019 07:01  -  06 Mar 2019 07:00  --------------------------------------------------------  IN: 0 mL / OUT: 1 mL / NET: -1 mL        PHYSICAL EXAM:  General: NAD, alert oriented x 3  Neck: Supple, No JVD  Lungs: Clear to auscultation bilaterally  Cardio: RRR, S1/S2, No murmurs  Abdomen: Soft, Nontender, Nondistended; Bowel sounds present  Extremities: No clubbing, cyanosis, or edema    LABS:                        12.9   4.3   )-----------( 178      ( 06 Mar 2019 05:45 )             40.9     03-06    140  |  102  |  15  ----------------------------<  159  3.6   |  27  |  0.85    Ca    9.1      06 Mar 2019 05:45    TPro  6.8  /  Alb  2.9  /  TBili  1.2  /  DBili  x   /  AST  29  /  ALT  24  /  AlkPhos  79  03-06    eGFR if Non African American: 61 mL/min/1.73M2 (03-06-19 @ 05:45)  eGFR if : 71 mL/min/1.73M2 (03-06-19 @ 05:45)    PT/INR - ( 06 Mar 2019 05:45 )   PT: 29.5 sec;   INR: 2.56 ratio        02-04 Chol 124 mg/dL LDL 55 mg/dL HDL 37 mg/dL Trig 159 mg/dL    CAPILLARY BLOOD GLUCOSE    POCT Blood Glucose.: 163 mg/dL (06 Mar 2019 07:56)  POCT Blood Glucose.: 126 mg/dL (05 Mar 2019 21:09)  POCT Blood Glucose.: 133 mg/dL (05 Mar 2019 16:31)  POCT Blood Glucose.: 137 mg/dL (05 Mar 2019 11:17)    02-04 [id number] 6.6    RADIOLOGY & ADDITIONAL TESTS:    Care Discussed with Consultants/Other Providers:

## 2019-03-06 NOTE — PROGRESS NOTE ADULT - ATTENDING COMMENTS
Multidisciplinary team meeting today:  patient's functional goals and needs, functional and clinical  progress were discussed, barriers to discharge were identified. Anticipate  [facility] facility placement.     EDOD 3/11/19    Will start Zanaflex trial for spastisity Multidisciplinary team meeting today:  patient's functional goals and needs, functional and clinical  progress were discussed, barriers to discharge were identified. Anticipate  [facility] facility placement.     EDOD 3/11/19    Will start Zanaflex trial for spasticity

## 2019-03-06 NOTE — PROGRESS NOTE ADULT - SUBJECTIVE AND OBJECTIVE BOX
INTERVAL HPI/OVERNIGHT EVENTS:  HPI:  89 y/o female being followed by GI for erythema around peg, and increase in peg insertion site now with abnormal CT Abd findings. No complaints from patient at this time.     MEDICATIONS  (STANDING):  amiodarone    Tablet 200 milliGRAM(s) Oral daily  atorvastatin 20 milliGRAM(s) Oral at bedtime  chlorhexidine 0.12% Liquid 15 milliLiter(s) Swish and Spit two times a day  dextrose 5%. 1000 milliLiter(s) (50 mL/Hr) IV Continuous <Continuous>  dextrose 50% Injectable 12.5 Gram(s) IV Push once  docusate sodium 100 milliGRAM(s) Oral two times a day  famotidine    Tablet 20 milliGRAM(s) Oral daily  FLUoxetine 10 milliGRAM(s) Oral at bedtime  furosemide    Tablet 20 milliGRAM(s) Oral daily  insulin lispro (HumaLOG) corrective regimen sliding scale   SubCutaneous three times a day with meals  lidocaine   Patch 2 Patch Transdermal <User Schedule>  melatonin 3 milliGRAM(s) Oral at bedtime  metFORMIN 850 milliGRAM(s) Oral every 12 hours  metoprolol tartrate 25 milliGRAM(s) Oral every 8 hours  zinc oxide 20% Ointment 1 Application(s) Topical daily    MEDICATIONS  (PRN):  artificial  tears Solution 2 Drop(s) Both EYES every 2 hours PRN Dry Eyes  bisacodyl Suppository 10 milliGRAM(s) Rectal daily PRN Constipation  dextrose 40% Gel 15 Gram(s) Oral once PRN Blood Glucose LESS THAN 70 milliGRAM(s)/deciliter  glucagon  Injectable 1 milliGRAM(s) IntraMuscular once PRN Glucose LESS THAN 70 milligrams/deciliter      Allergies    No Known Allergies    Intolerances        PHYSICAL EXAM:   Vital Signs:  Vital Signs Last 24 Hrs  T(C): 36.6 (06 Mar 2019 07:59), Max: 37.2 (05 Mar 2019 21:24)  T(F): 97.9 (06 Mar 2019 07:59), Max: 99 (05 Mar 2019 21:24)  HR: 70 (06 Mar 2019 07:59) (69 - 93)  BP: 132/84 (06 Mar 2019 07:59) (132/84 - 142/84)  BP(mean): --  RR: 12 (06 Mar 2019 07:59) (12 - 12)  SpO2: 97% (06 Mar 2019 07:59) (97% - 98%)  Daily     Daily I&O's Summary    05 Mar 2019 07:01  -  06 Mar 2019 07:00  --------------------------------------------------------  IN: 0 mL / OUT: 1 mL / NET: -1 mL    GENERAL: NAD  HEENT:  conjunctivae clear and pink,   CHEST:  Full & symmetric excursion, no increased effort, breath sounds clear  HEART:  Regular rhythm, S1, S2, no edema  ABDOMEN:  Soft, non-tender, non-distended, normoactive bowel sounds,  Peg intact, site clean and dry  EXTEREMITIES:  no cyanosis,clubbing or edema  SKIN:  No rash, warm/dry  NEURO:  Alert, oriented        LABS:                        12.9   4.3   )-----------( 178      ( 06 Mar 2019 05:45 )             40.9     03-06    140  |  102  |  15  ----------------------------<  159<H>  3.6   |  27  |  0.85    Ca    9.1      06 Mar 2019 05:45    TPro  6.8  /  Alb  2.9<L>  /  TBili  1.2  /  DBili  x   /  AST  29  /  ALT  24  /  AlkPhos  79  03-06    PT/INR - ( 06 Mar 2019 05:45 )   PT: 29.5 sec;   INR: 2.56 ratio        RADIOLOGY & ADDITIONAL TESTS:  EXAM:  CT ABDOMEN AND PELVIS OC      PROCEDURE DATE:  03/05/2019        INTERPRETATION:  CLINICAL HISTORY: 88 years  Female with Diarrhea,   Abdominal Pain, Peg Evaluation .    COMPARISON: 6/3/2018    PROCEDURE:   CT of the Abdomen and Pelvis was performed without intravenous contrast.   Intravenous contrast: None.  Enteric contrast: positive contrast was administered.  Sagittal and coronal reformats were performed.    Evaluation of the solid organs is limited by lack of IV contrast   administration.    Survey scans of the lung bases are clear. The heart is mildly enlarged. A   pacemaker lead is seen in the heart. The patient is status post TAVR.    The unenhanced spleen, pancreas, and adrenal glands are unremarkable.   There is a lobulated hepatic contour. Correlate clinically for cirrhosis.    The gallbladder is not visualized.    The kidneys demonstrate no hydronephrosis or calculi. There is   nonspecific bilateral perinephric inflammatory stranding.    There is no retroperitoneal adenopathy or ascites. The aorta is   atherosclerotic but normal in caliber. The splenic artery is densely   calcified.    A gastrostomy tube is present in the stomach. The stomach is partially   collapsed. There is thickening of the gastric antrum and the gastric   fundus. There is also focal thickening of the terminal ileum. The bowel   demonstrates no obstruction or surrounding inflammation. Retained fecal   matter is present from cecum to rectum.  There is irregular thickening of   the rectal wall. Rule out neoplasm. The appendix is not visualized. There   is no indirect evidence of appendicitis.. There is irregular thickening   of the rectal wall. Rule out neoplasm.    Within the pelvis, the urinary bladder is unremarkable. The uterus is   normal in size. Small calcified myomata are present. are unremarkable.   There is no pelvic adenopathy.    Thoracolumbar degenerative changes are present.    IMPRESSION:    Irregular rectal wall thickening. Rule out neoplasm. Constipated colon.    Thickening gastric antrum and gastric fundus. Correlate clinically as to   need for upper endoscopy.    Focal thickening of the terminal ileum. Rule out Crohn's disease versus   other inflammatory or infectious process.. Cannot rule out underlying   mass however no obstruction is present.    The appendix is not visualized and cannot be assessed.    Gastrostomy tube in situ.                  DARIN MALAVE M.D., ATTENDING RADIOLOGIST  This document has been electronically signed. Mar  5 2019 10:51AM

## 2019-03-06 NOTE — PROGRESS NOTE ADULT - ASSESSMENT
88 year old female admitted to acute rehab secondary to Left MCA CVA s/p endovascular intervention with right side hemiparesis, dysphagia s/p PEG placement, dysarthria and expressive aphasia    DM2 Uncontrolled: stable glycemic control  Continue metformin and lispro sliding scale.    Abnormal TFTs: 2/22 TSH mildly elevated to 6.04 although patient is clinically euthyroid. TPO Ab negative.  Can't r/o possibility of amiodarone-induced thyroiditis. Recommend to repeat TSH, Free T4 and Total T3 levels on/after March 22.     Will follow. Thank you.

## 2019-03-06 NOTE — PROVIDER CONTACT NOTE (OTHER) - ACTION/TREATMENT ORDERED:
MD TO ASSESS PT WHEN SHE ARRIVES. NO TX ORDERED AT THIS TIME.
Mg citrate x1 noon 3/7. MD to pass onto team regarding accurate time and clarify time as necessary.

## 2019-03-07 ENCOUNTER — TRANSCRIPTION ENCOUNTER (OUTPATIENT)
Age: 84
End: 2019-03-07

## 2019-03-07 LAB
GLUCOSE BLDC GLUCOMTR-MCNC: 124 MG/DL — HIGH (ref 70–99)
GLUCOSE BLDC GLUCOMTR-MCNC: 128 MG/DL — HIGH (ref 70–99)
GLUCOSE BLDC GLUCOMTR-MCNC: 142 MG/DL — HIGH (ref 70–99)
INR BLD: 1.85 RATIO — HIGH (ref 0.88–1.16)
PROTHROM AB SERPL-ACNC: 21.1 SEC — HIGH (ref 10–12.9)

## 2019-03-07 PROCEDURE — 99233 SBSQ HOSP IP/OBS HIGH 50: CPT

## 2019-03-07 RX ORDER — ENOXAPARIN SODIUM 100 MG/ML
60 INJECTION SUBCUTANEOUS EVERY 12 HOURS
Qty: 0 | Refills: 0 | Status: DISCONTINUED | OUTPATIENT
Start: 2019-03-07 | End: 2019-03-08

## 2019-03-07 RX ORDER — SOD SULF/SODIUM/NAHCO3/KCL/PEG
1000 SOLUTION, RECONSTITUTED, ORAL ORAL
Qty: 0 | Refills: 0 | Status: DISCONTINUED | OUTPATIENT
Start: 2019-03-07 | End: 2019-03-08

## 2019-03-07 RX ADMIN — CHLORHEXIDINE GLUCONATE 15 MILLILITER(S): 213 SOLUTION TOPICAL at 05:20

## 2019-03-07 RX ADMIN — AMIODARONE HYDROCHLORIDE 200 MILLIGRAM(S): 400 TABLET ORAL at 05:20

## 2019-03-07 RX ADMIN — Medication 1 BOTTLE: at 14:33

## 2019-03-07 RX ADMIN — Medication 10 MILLIGRAM(S): at 21:50

## 2019-03-07 RX ADMIN — TIZANIDINE 2 MILLIGRAM(S): 4 TABLET ORAL at 21:50

## 2019-03-07 RX ADMIN — CHLORHEXIDINE GLUCONATE 15 MILLILITER(S): 213 SOLUTION TOPICAL at 18:40

## 2019-03-07 RX ADMIN — Medication 20 MILLIGRAM(S): at 05:20

## 2019-03-07 RX ADMIN — Medication 25 MILLIGRAM(S): at 21:50

## 2019-03-07 RX ADMIN — Medication 25 MILLIGRAM(S): at 05:20

## 2019-03-07 RX ADMIN — Medication 25 MILLIGRAM(S): at 14:35

## 2019-03-07 RX ADMIN — LIDOCAINE 2 PATCH: 4 CREAM TOPICAL at 05:20

## 2019-03-07 RX ADMIN — LIDOCAINE 2 PATCH: 4 CREAM TOPICAL at 09:26

## 2019-03-07 RX ADMIN — FAMOTIDINE 20 MILLIGRAM(S): 10 INJECTION INTRAVENOUS at 12:50

## 2019-03-07 RX ADMIN — ENOXAPARIN SODIUM 60 MILLIGRAM(S): 100 INJECTION SUBCUTANEOUS at 14:32

## 2019-03-07 RX ADMIN — Medication 3 MILLIGRAM(S): at 21:50

## 2019-03-07 RX ADMIN — Medication 0: at 12:49

## 2019-03-07 RX ADMIN — ATORVASTATIN CALCIUM 20 MILLIGRAM(S): 80 TABLET, FILM COATED ORAL at 21:51

## 2019-03-07 RX ADMIN — LIDOCAINE 2 PATCH: 4 CREAM TOPICAL at 20:00

## 2019-03-07 RX ADMIN — ZINC OXIDE 1 APPLICATION(S): 200 OINTMENT TOPICAL at 14:38

## 2019-03-07 RX ADMIN — Medication 1000 MILLILITER(S): at 18:39

## 2019-03-07 NOTE — PROGRESS NOTE ADULT - SUBJECTIVE AND OBJECTIVE BOX
Patient is a 88y old  Female who presents with a chief complaint of CVA with right sided weakness, dysphagia, dysarthria, expressive aphasia (06 Mar 2019 14:46)      Patient seen and examined at bedside.     ALLERGIES:  No Known Allergies    MEDICATIONS:  amiodarone    Tablet 200 milliGRAM(s) Oral daily  artificial  tears Solution 2 Drop(s) Both EYES every 2 hours PRN  atorvastatin 20 milliGRAM(s) Oral at bedtime  bisacodyl Suppository 10 milliGRAM(s) Rectal daily PRN  dextrose 40% Gel 15 Gram(s) Oral once PRN  dextrose 5%. 1000 milliLiter(s) IV Continuous <Continuous>  dextrose 50% Injectable 12.5 Gram(s) IV Push once  docusate sodium 100 milliGRAM(s) Oral two times a day  famotidine    Tablet 20 milliGRAM(s) Oral daily  FLUoxetine 10 milliGRAM(s) Oral at bedtime  furosemide    Tablet 20 milliGRAM(s) Oral daily  glucagon  Injectable 1 milliGRAM(s) IntraMuscular once PRN  insulin lispro (HumaLOG) corrective regimen sliding scale   SubCutaneous three times a day with meals  lidocaine   Patch 2 Patch Transdermal <User Schedule>  magnesium citrate Oral Solution 1 Bottle Oral once  melatonin 3 milliGRAM(s) Oral at bedtime  metFORMIN 850 milliGRAM(s) Oral every 12 hours  metoprolol tartrate 25 milliGRAM(s) Oral every 8 hours  polyethylene glycol/electrolyte Solution 1000 milliLiter(s) Oral <User Schedule>  polyethylene glycol/electrolyte Solution 1000 milliLiter(s) Oral <User Schedule>  tiZANidine 2 milliGRAM(s) Oral at bedtime  zinc oxide 20% Ointment 1 Application(s) Topical daily    Vital Signs Last 24 Hrs  T(F): 97.8 (07 Mar 2019 07:49), Max: 98.7 (06 Mar 2019 20:20)  HR: 80 (07 Mar 2019 07:49) (75 - 95)  BP: 135/85 (07 Mar 2019 07:49) (121/78 - 150/86)  RR: 13 (07 Mar 2019 07:49) (13 - 14)  SpO2: 98% (07 Mar 2019 07:49) (97% - 98%)  I&O's Summary    06 Mar 2019 07:01  -  07 Mar 2019 07:00  --------------------------------------------------------  IN: 234 mL / OUT: 1 mL / NET: 233 mL        PHYSICAL EXAM:  General: NAD, comfortable   ENT: MMM  Neck: Supple, No JVD  Lungs: CTA, BLAE, No added sounds.   Cardio: RRR, S1/S2, No murmurs  Abdomen: Soft, NT/ND, BS+   Extremities: No edema      LABS:                        12.9   4.3   )-----------( 178      ( 06 Mar 2019 05:45 )             40.9     03-06    140  |  102  |  15  ----------------------------<  159  3.6   |  27  |  0.85    Ca    9.1      06 Mar 2019 05:45    TPro  6.8  /  Alb  2.9  /  TBili  1.2  /  DBili  x   /  AST  29  /  ALT  24  /  AlkPhos  79  03-06    eGFR if Non African American: 61 mL/min/1.73M2 (03-06-19 @ 05:45)  eGFR if : 71 mL/min/1.73M2 (03-06-19 @ 05:45)    PT/INR - ( 07 Mar 2019 05:22 )   PT: 21.1 sec;   INR: 1.85 ratio                 02-04 Chol 124 mg/dL LDL 55 mg/dL HDL 37 mg/dL Trig 159 mg/dL        CAPILLARY BLOOD GLUCOSE      POCT Blood Glucose.: 142 mg/dL (07 Mar 2019 07:38)  POCT Blood Glucose.: 101 mg/dL (06 Mar 2019 21:12)  POCT Blood Glucose.: 154 mg/dL (06 Mar 2019 16:40)  POCT Blood Glucose.: 142 mg/dL (06 Mar 2019 12:24)    02-04 [id number] 6.6          RADIOLOGY & ADDITIONAL TESTS:    Care Discussed with Consultants/Other Providers:

## 2019-03-07 NOTE — PROGRESS NOTE ADULT - SUBJECTIVE AND OBJECTIVE BOX
CHIEF COMPLAINT: no abdominal pain , no new complaints      HISTORY OF PRESENT ILLNESS  This is a 88 year old  female with a pmh of CAD, HTN, HLD, pacemaker who presented to [facility] as a transfer from [facility] with right hemiparesis and aphasia and left proximal MCA occlusion. The patient was last known well at 2 pm on 2/3/19 and was then found down on the ground by her daughter several hours later and was noted to be not speaking and weak on the right. At [facility] a CTA head and neck was performed which showed a proximal left MCA occlusion, and a CT head showed a dense left MCA sign with some hypodensity and early ischemic changes in the left MCA distribution. She did not receive IV tPA as she was out of the window and was transferred to [facility] for possible mechanical thrombectomy. At [facility] her CT perfusion showed zero core infarct, with a penumbra of 80 mls, and an infinite mismatch. She was deemed a candidate for intervention and recanalization was achieved with TICI 3 score on 2/13.     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 Given pacemaker showed a-flutter, high suspicion for cardiac source.   Per neurology, patient is currently on ASA 81 mg as a bridge to coumadin for goal INR 2-3. Her INR on 2/14 is 1.26, she received coumadin 5 mg on night of 2/13. Recommend to discontinue the aspirin when INR at goal.  Patient found to have EF 65-70% with moderate AS, moderate pulm HTN, mild-mod TR and no PFO, cardiac monitoring at times showed sinus tachycardia - started on Amiodarone and Metoprolol with improvement. PPM interrogation- showed episode of atrial flutter.   Patient failed speech and swallow, PEG was placed on 2/12 and has been tolerating tube feeds.   Patient found be more lethargic on 2/13. Repeat CT head was stable. UA positive and patient started on antibiotics. Urine cx sent and pending results. Of note, CT head showed sphenoid air fluid level. Recs to start Augmentin if s/s of sinusitis start.   Also, cxray from 2/13 showed pulmonary edema had decreased. Rec to continue lasix at a lower dose of 20mg daily.         PAST MEDICAL & SURGICAL HISTORY:  Valvular disease  Atrial flutter: 1/2010- [facility], Dr Tran  Diabetes  Obesity  Hyperlipidemia  HTN (hypertension)  CAD (coronary artery disease): s/p CABG- 2004-[facility]  Artificial pacemaker  Aortic valve replaced  S/P coronary artery stent placement: x 4, 2006-[facility]  S/P cholecystectomy  S/P CABG (coronary artery bypass graft)          VITALS  Vital Signs Last 24 Hrs  T(C): 36.6 (07 Mar 2019 07:49), Max: 37.1 (06 Mar 2019 20:20)  T(F): 97.8 (07 Mar 2019 07:49), Max: 98.7 (06 Mar 2019 20:20)  HR: 80 (07 Mar 2019 07:49) (75 - 85)  BP: 135/85 (07 Mar 2019 07:49) (121/78 - 135/85)  BP(mean): --  RR: 13 (07 Mar 2019 07:49) (13 - 14)  SpO2: 98% (07 Mar 2019 07:49) (97% - 98%)      PHYSICAL EXAM  Constitutional - NAD, Comfortable  HEENT - NCAT, EOMI  Neck - Supple, No limited ROM  Chest - CTA bilaterally,  Cardiovascular - RRR, S1S2, No murmurs  Abdomen - BS+, Soft, NTND  Extremities - No C/C/E, No calf tenderness   Skin-no rash  Neurologic Exam -  no new complaints                       RECENT LABS                        12.9   4.3   )-----------( 178      ( 06 Mar 2019 05:45 )             40.9     03-06    140  |  102  |  15  ----------------------------<  159<H>  3.6   |  27  |  0.85    Ca    9.1      06 Mar 2019 05:45    TPro  6.8  /  Alb  2.9<L>  /  TBili  1.2  /  DBili  x   /  AST  29  /  ALT  24  /  AlkPhos  79  03-06    PT/INR - ( 07 Mar 2019 05:22 )   PT: 21.1 sec;   INR: 1.85 ratio           LIVER FUNCTIONS - ( 06 Mar 2019 05:45 )  Alb: 2.9 g/dL / Pro: 6.8 g/dL / ALK PHOS: 79 U/L / ALT: 24 U/L DA / AST: 29 U/L / GGT: x             Direct LDL: 55 mg/dL (02-04-19 @ 06:10)    Hemoglobin A1C, Whole Blood: 6.6 % (02-04-19 @ 06:11)            CURRENT MEDICATIONS  MEDICATIONS  (STANDING):  amiodarone    Tablet 200 milliGRAM(s) Oral daily  atorvastatin 20 milliGRAM(s) Oral at bedtime  chlorhexidine 0.12% Liquid 15 milliLiter(s) Swish and Spit two times a day  dextrose 5%. 1000 milliLiter(s) (50 mL/Hr) IV Continuous <Continuous>  dextrose 50% Injectable 12.5 Gram(s) IV Push once  docusate sodium 100 milliGRAM(s) Oral two times a day  enoxaparin Injectable 60 milliGRAM(s) SubCutaneous every 12 hours  famotidine    Tablet 20 milliGRAM(s) Oral daily  FLUoxetine 10 milliGRAM(s) Oral at bedtime  furosemide    Tablet 20 milliGRAM(s) Oral daily  insulin lispro (HumaLOG) corrective regimen sliding scale   SubCutaneous three times a day with meals  lidocaine   Patch 2 Patch Transdermal <User Schedule>  melatonin 3 milliGRAM(s) Oral at bedtime  metFORMIN 850 milliGRAM(s) Oral every 12 hours  metoprolol tartrate 25 milliGRAM(s) Oral every 8 hours  polyethylene glycol/electrolyte Solution 1000 milliLiter(s) Oral <User Schedule>  polyethylene glycol/electrolyte Solution 1000 milliLiter(s) Oral <User Schedule>  tiZANidine 2 milliGRAM(s) Oral at bedtime  zinc oxide 20% Ointment 1 Application(s) Topical daily    MEDICATIONS  (PRN):  artificial  tears Solution 2 Drop(s) Both EYES every 2 hours PRN Dry Eyes  bisacodyl Suppository 10 milliGRAM(s) Rectal daily PRN Constipation  dextrose 40% Gel 15 Gram(s) Oral once PRN Blood Glucose LESS THAN 70 milliGRAM(s)/deciliter  glucagon  Injectable 1 milliGRAM(s) IntraMuscular once PRN Glucose LESS THAN 70 milligrams/deciliter

## 2019-03-07 NOTE — PROGRESS NOTE ADULT - ASSESSMENT
87 y/o female with multiple comorbidities being followed by GI for erythema around Peg and no with increase in size of peg insertion site. Tolerating regular food by mouth. Abd soft. Erythema around peg improved. Retention ring readjusted so its tighter to abdomen. Peg insertion site size remains stable. Questionable rectal mass incidentally found on recent CT ABD. Discussed with patient in regards to doing colonoscopy for further evaluation, patient agreeable     Continue Zinc to peg site with drsg change  Monitor peg insertion site  Hold Metformin x 24 hours- patient had CT of Abd with PO contrast and recs to hold metformin made by pharmacy. 87 y/o female with multiple comorbidities being followed by GI for erythema around Peg and no with increase in size of peg insertion site. Tolerating regular food by mouth. Abd soft. Erythema around peg improved. Retention ring readjusted so its tighter to abdomen. Peg insertion site size remains stable. Questionable rectal mass incidentally found on recent CT ABD. Discussed with patient in regards to doing colonoscopy for further evaluation, patient agreeable     Continue Zinc to peg site with drsg change  Monitor peg insertion site.

## 2019-03-07 NOTE — PROGRESS NOTE ADULT - SUBJECTIVE AND OBJECTIVE BOX
INTERVAL HPI/OVERNIGHT EVENTS:  HPI:  89 y/o female being followed by GI for erythema around peg, and increase in peg insertion site now with incidental findings of possible rectal mass on CT Abd. No complaints from patient at this time.    MEDICATIONS  (STANDING):  amiodarone    Tablet 200 milliGRAM(s) Oral daily  atorvastatin 20 milliGRAM(s) Oral at bedtime  chlorhexidine 0.12% Liquid 15 milliLiter(s) Swish and Spit two times a day  dextrose 5%. 1000 milliLiter(s) (50 mL/Hr) IV Continuous <Continuous>  dextrose 50% Injectable 12.5 Gram(s) IV Push once  docusate sodium 100 milliGRAM(s) Oral two times a day  famotidine    Tablet 20 milliGRAM(s) Oral daily  FLUoxetine 10 milliGRAM(s) Oral at bedtime  furosemide    Tablet 20 milliGRAM(s) Oral daily  insulin lispro (HumaLOG) corrective regimen sliding scale   SubCutaneous three times a day with meals  lidocaine   Patch 2 Patch Transdermal <User Schedule>  magnesium citrate Oral Solution 1 Bottle Oral once  melatonin 3 milliGRAM(s) Oral at bedtime  metFORMIN 850 milliGRAM(s) Oral every 12 hours  metoprolol tartrate 25 milliGRAM(s) Oral every 8 hours  polyethylene glycol/electrolyte Solution 1000 milliLiter(s) Oral <User Schedule>  polyethylene glycol/electrolyte Solution 1000 milliLiter(s) Oral <User Schedule>  tiZANidine 2 milliGRAM(s) Oral at bedtime  zinc oxide 20% Ointment 1 Application(s) Topical daily    MEDICATIONS  (PRN):  artificial  tears Solution 2 Drop(s) Both EYES every 2 hours PRN Dry Eyes  bisacodyl Suppository 10 milliGRAM(s) Rectal daily PRN Constipation  dextrose 40% Gel 15 Gram(s) Oral once PRN Blood Glucose LESS THAN 70 milliGRAM(s)/deciliter  glucagon  Injectable 1 milliGRAM(s) IntraMuscular once PRN Glucose LESS THAN 70 milligrams/deciliter      Allergies    No Known Allergies    Intolerances        PHYSICAL EXAM:   Vital Signs:  Vital Signs Last 24 Hrs  T(C): 36.6 (07 Mar 2019 07:49), Max: 37.1 (06 Mar 2019 20:20)  T(F): 97.8 (07 Mar 2019 07:49), Max: 98.7 (06 Mar 2019 20:20)  HR: 80 (07 Mar 2019 07:49) (75 - 95)  BP: 135/85 (07 Mar 2019 07:49) (121/78 - 150/86)  BP(mean): --  RR: 13 (07 Mar 2019 07:49) (13 - 14)  SpO2: 98% (07 Mar 2019 07:49) (97% - 98%)  Daily     Daily I&O's Summary    06 Mar 2019 07:01  -  07 Mar 2019 07:00  --------------------------------------------------------  IN: 234 mL / OUT: 1 mL / NET: 233 mL    GENERAL: NAD  HEENT:  conjunctivae clear and pink,   CHEST:  Full & symmetric excursion, no increased effort, breath sounds clear  HEART:  Regular rhythm, S1, S2, no edema  ABDOMEN:  Soft, non-tender, non-distended, normoactive bowel sounds,  Peg intact, site clean and dry  EXTEREMITIES:  no cyanosis,clubbing or edema  SKIN:  No rash, warm/dry  NEURO:  Alert, oriented    LABS:                        12.9   4.3   )-----------( 178      ( 06 Mar 2019 05:45 )             40.9     03-06    140  |  102  |  15  ----------------------------<  159<H>  3.6   |  27  |  0.85    Ca    9.1      06 Mar 2019 05:45    TPro  6.8  /  Alb  2.9<L>  /  TBili  1.2  /  DBili  x   /  AST  29  /  ALT  24  /  AlkPhos  79  03-06    PT/INR - ( 07 Mar 2019 05:22 )   PT: 21.1 sec;   INR: 1.85 ratio             amylase   lipase  RADIOLOGY & ADDITIONAL TESTS:

## 2019-03-07 NOTE — PROGRESS NOTE ADULT - ATTENDING COMMENTS
Patient seen and examined.  Agree with assessment and plan as above.    She has been informed of CT imaging findings and agrees to proceed with colonoscopy for further evaluation.  No abdominal pain at present time.  Bowel prep ordered.  VSS.  Abdomen soft, nontender

## 2019-03-07 NOTE — PROGRESS NOTE ADULT - ASSESSMENT
88 year old female admitted to acute rehab secondary to Left MCA CVA s/p endovascular intervention with right side hemiparesis, dysphagia s/p PEG placement, dysarthria and expressive aphasia    #Left MCA CVA  c/w PT/OT  c/w statin     #Dysphagia/dysarthria  PEG in place, erythema around site.  GI following     #Rectal thickening noted on CT  planned for colonoscopy 3/8: GI following     #A-Flutter  Rate controlled   c/w with amiodarone and lopressor  coumadin per INR (2-3 goal)     #HTN  controlled   c/w current regimen     #DM   controlled   c/w SSI, Metformin  FS monitoring       #Diastolic CHF  stable, continue Lasix , lopressor      DVT ppx  is on Coumadin 88 year old female admitted to acute rehab secondary to Left MCA CVA s/p endovascular intervention with right side hemiparesis, dysphagia s/p PEG placement, dysarthria and expressive aphasia    #Left MCA CVA  c/w PT/OT  c/w statin     #Dysphagia/dysarthria  PEG in place, erythema around site.  GI following     #Rectal thickening noted on CT  planned for colonoscopy 3/8: GI following     #A-Flutter  Rate controlled   c/w with amiodarone and lopressor  coumadin per INR (2-3 goal) , Subtherapeutic at 1.85 today     #HTN  controlled   c/w current regimen     #DM   controlled   c/w SSI, Metformin  FS monitoring       #Diastolic CHF  stable, continue Lasix , lopressor      DVT ppx  is on Coumadin 88 year old female admitted to acute rehab secondary to Left MCA CVA s/p endovascular intervention with right side hemiparesis, dysphagia s/p PEG placement, dysarthria and expressive aphasia    #Left MCA CVA  c/w PT/OT  c/w statin     #Dysphagia/dysarthria  PEG in place, erythema around site.  GI following     #Rectal thickening noted on CT  planned for colonoscopy 3/8: GI following     #A-Flutter  Rate controlled   c/w with amiodarone and lopressor  coumadin per INR (2-3 goal) , Subtherapeutic at 1.85 today   Coumadin to be held for colonoscopy , Bridge with lovenox.     #HTN  controlled   c/w current regimen     #DM   controlled   c/w SSI, Metformin  FS monitoring       #Diastolic CHF  stable, continue Lasix , lopressor      DVT ppx  is on Coumadin

## 2019-03-08 LAB
ALBUMIN SERPL ELPH-MCNC: 3.1 G/DL — LOW (ref 3.3–5)
ALP SERPL-CCNC: 74 U/L — SIGNIFICANT CHANGE UP (ref 40–120)
ALT FLD-CCNC: 27 U/L DA — SIGNIFICANT CHANGE UP (ref 10–45)
ANION GAP SERPL CALC-SCNC: 10 MMOL/L — SIGNIFICANT CHANGE UP (ref 5–17)
AST SERPL-CCNC: 34 U/L — SIGNIFICANT CHANGE UP (ref 10–40)
BILIRUB SERPL-MCNC: 1.1 MG/DL — SIGNIFICANT CHANGE UP (ref 0.2–1.2)
BUN SERPL-MCNC: 12 MG/DL — SIGNIFICANT CHANGE UP (ref 7–23)
CALCIUM SERPL-MCNC: 9.3 MG/DL — SIGNIFICANT CHANGE UP (ref 8.4–10.5)
CHLORIDE SERPL-SCNC: 103 MMOL/L — SIGNIFICANT CHANGE UP (ref 96–108)
CO2 SERPL-SCNC: 29 MMOL/L — SIGNIFICANT CHANGE UP (ref 22–31)
CREAT SERPL-MCNC: 0.76 MG/DL — SIGNIFICANT CHANGE UP (ref 0.5–1.3)
GLUCOSE BLDC GLUCOMTR-MCNC: 105 MG/DL — HIGH (ref 70–99)
GLUCOSE BLDC GLUCOMTR-MCNC: 133 MG/DL — HIGH (ref 70–99)
GLUCOSE BLDC GLUCOMTR-MCNC: 142 MG/DL — HIGH (ref 70–99)
GLUCOSE SERPL-MCNC: 150 MG/DL — HIGH (ref 70–99)
HCT VFR BLD CALC: 41.4 % — SIGNIFICANT CHANGE UP (ref 34.5–45)
HGB BLD-MCNC: 13.1 G/DL — SIGNIFICANT CHANGE UP (ref 11.5–15.5)
INR BLD: 1.92 RATIO — HIGH (ref 0.88–1.16)
INR BLD: 2.16 RATIO — HIGH (ref 0.88–1.16)
MCHC RBC-ENTMCNC: 29.2 PG — SIGNIFICANT CHANGE UP (ref 27–34)
MCHC RBC-ENTMCNC: 31.6 GM/DL — LOW (ref 32–36)
MCV RBC AUTO: 92.3 FL — SIGNIFICANT CHANGE UP (ref 80–100)
PLATELET # BLD AUTO: 171 K/UL — SIGNIFICANT CHANGE UP (ref 150–400)
POTASSIUM SERPL-MCNC: 3.3 MMOL/L — LOW (ref 3.5–5.3)
POTASSIUM SERPL-SCNC: 3.3 MMOL/L — LOW (ref 3.5–5.3)
PROT SERPL-MCNC: 6.9 G/DL — SIGNIFICANT CHANGE UP (ref 6–8.3)
PROTHROM AB SERPL-ACNC: 22 SEC — HIGH (ref 10–12.9)
PROTHROM AB SERPL-ACNC: 24.8 SEC — HIGH (ref 10–12.9)
RBC # BLD: 4.49 M/UL — SIGNIFICANT CHANGE UP (ref 3.8–5.2)
RBC # FLD: 13.6 % — SIGNIFICANT CHANGE UP (ref 10.3–14.5)
SODIUM SERPL-SCNC: 142 MMOL/L — SIGNIFICANT CHANGE UP (ref 135–145)
WBC # BLD: 4.6 K/UL — SIGNIFICANT CHANGE UP (ref 3.8–10.5)
WBC # FLD AUTO: 4.6 K/UL — SIGNIFICANT CHANGE UP (ref 3.8–10.5)

## 2019-03-08 PROCEDURE — 99233 SBSQ HOSP IP/OBS HIGH 50: CPT

## 2019-03-08 RX ORDER — WARFARIN SODIUM 2.5 MG/1
1 TABLET ORAL ONCE
Qty: 0 | Refills: 0 | Status: COMPLETED | OUTPATIENT
Start: 2019-03-08 | End: 2019-03-08

## 2019-03-08 RX ORDER — SODIUM CHLORIDE 9 MG/ML
1000 INJECTION, SOLUTION INTRAVENOUS
Qty: 0 | Refills: 0 | Status: DISCONTINUED | OUTPATIENT
Start: 2019-03-08 | End: 2019-03-12

## 2019-03-08 RX ORDER — ONDANSETRON 8 MG/1
4 TABLET, FILM COATED ORAL ONCE
Qty: 0 | Refills: 0 | Status: DISCONTINUED | OUTPATIENT
Start: 2019-03-08 | End: 2019-03-12

## 2019-03-08 RX ORDER — WARFARIN SODIUM 2.5 MG/1
2 TABLET ORAL ONCE
Qty: 0 | Refills: 0 | Status: DISCONTINUED | OUTPATIENT
Start: 2019-03-08 | End: 2019-03-08

## 2019-03-08 RX ORDER — ENOXAPARIN SODIUM 100 MG/ML
60 INJECTION SUBCUTANEOUS EVERY 12 HOURS
Qty: 0 | Refills: 0 | Status: DISCONTINUED | OUTPATIENT
Start: 2019-03-08 | End: 2019-03-08

## 2019-03-08 RX ADMIN — CHLORHEXIDINE GLUCONATE 15 MILLILITER(S): 213 SOLUTION TOPICAL at 05:09

## 2019-03-08 RX ADMIN — Medication 25 MILLIGRAM(S): at 14:18

## 2019-03-08 RX ADMIN — Medication 10 MILLIGRAM(S): at 22:08

## 2019-03-08 RX ADMIN — ENOXAPARIN SODIUM 60 MILLIGRAM(S): 100 INJECTION SUBCUTANEOUS at 01:47

## 2019-03-08 RX ADMIN — ATORVASTATIN CALCIUM 20 MILLIGRAM(S): 80 TABLET, FILM COATED ORAL at 22:08

## 2019-03-08 RX ADMIN — Medication 3 MILLIGRAM(S): at 22:08

## 2019-03-08 RX ADMIN — TIZANIDINE 2 MILLIGRAM(S): 4 TABLET ORAL at 22:08

## 2019-03-08 RX ADMIN — LIDOCAINE 2 PATCH: 4 CREAM TOPICAL at 18:31

## 2019-03-08 RX ADMIN — CHLORHEXIDINE GLUCONATE 15 MILLILITER(S): 213 SOLUTION TOPICAL at 17:20

## 2019-03-08 RX ADMIN — Medication 25 MILLIGRAM(S): at 22:08

## 2019-03-08 RX ADMIN — METFORMIN HYDROCHLORIDE 850 MILLIGRAM(S): 850 TABLET ORAL at 17:20

## 2019-03-08 RX ADMIN — WARFARIN SODIUM 1 MILLIGRAM(S): 2.5 TABLET ORAL at 22:08

## 2019-03-08 RX ADMIN — METFORMIN HYDROCHLORIDE 850 MILLIGRAM(S): 850 TABLET ORAL at 05:09

## 2019-03-08 RX ADMIN — Medication 20 MILLIGRAM(S): at 05:09

## 2019-03-08 RX ADMIN — AMIODARONE HYDROCHLORIDE 200 MILLIGRAM(S): 400 TABLET ORAL at 05:09

## 2019-03-08 RX ADMIN — LIDOCAINE 2 PATCH: 4 CREAM TOPICAL at 20:00

## 2019-03-08 RX ADMIN — Medication 25 MILLIGRAM(S): at 05:08

## 2019-03-08 RX ADMIN — ENOXAPARIN SODIUM 60 MILLIGRAM(S): 100 INJECTION SUBCUTANEOUS at 17:20

## 2019-03-08 RX ADMIN — LIDOCAINE 2 PATCH: 4 CREAM TOPICAL at 08:13

## 2019-03-08 NOTE — PROGRESS NOTE ADULT - SUBJECTIVE AND OBJECTIVE BOX
Patient is a 88y old  Female who presents with a chief complaint of CVA with right sided weakness, dysphagia, dysarthria, expressive aphasia (07 Mar 2019 20:11)      Patient seen and examined at bedside.     ALLERGIES:  No Known Allergies    MEDICATIONS:  amiodarone    Tablet 200 milliGRAM(s) Oral daily  artificial  tears Solution 2 Drop(s) Both EYES every 2 hours PRN  atorvastatin 20 milliGRAM(s) Oral at bedtime  bisacodyl Suppository 10 milliGRAM(s) Rectal daily PRN  dextrose 40% Gel 15 Gram(s) Oral once PRN  dextrose 5%. 1000 milliLiter(s) IV Continuous <Continuous>  dextrose 50% Injectable 12.5 Gram(s) IV Push once  docusate sodium 100 milliGRAM(s) Oral two times a day  enoxaparin Injectable 60 milliGRAM(s) SubCutaneous every 12 hours  famotidine    Tablet 20 milliGRAM(s) Oral daily  FLUoxetine 10 milliGRAM(s) Oral at bedtime  furosemide    Tablet 20 milliGRAM(s) Oral daily  glucagon  Injectable 1 milliGRAM(s) IntraMuscular once PRN  insulin lispro (HumaLOG) corrective regimen sliding scale   SubCutaneous three times a day with meals  lidocaine   Patch 2 Patch Transdermal <User Schedule>  melatonin 3 milliGRAM(s) Oral at bedtime  metFORMIN 850 milliGRAM(s) Oral every 12 hours  metoprolol tartrate 25 milliGRAM(s) Oral every 8 hours  polyethylene glycol/electrolyte Solution 1000 milliLiter(s) Oral <User Schedule>  polyethylene glycol/electrolyte Solution 1000 milliLiter(s) Oral <User Schedule>  tiZANidine 2 milliGRAM(s) Oral at bedtime  zinc oxide 20% Ointment 1 Application(s) Topical daily    Vital Signs Last 24 Hrs  T(F): 97.4 (08 Mar 2019 08:22), Max: 98.5 (07 Mar 2019 20:27)  HR: 72 (08 Mar 2019 08:22) (72 - 91)  BP: 131/77 (08 Mar 2019 08:22) (131/77 - 152/79)  RR: 15 (08 Mar 2019 08:22) (14 - 15)  SpO2: 98% (08 Mar 2019 08:22) (98% - 98%)  I&O's Summary      PHYSICAL EXAM:  General: NAD, comfortable   ENT: MMM  Neck: Supple, No JVD  Lungs: CTA, BLAE, No added sounds.   Cardio: RRR, S1/S2, No murmurs  Abdomen: Soft, NT/ND, BS+ , PEG+   Extremities: No edema  CNS: no new deficits     LABS:                        13.1   4.6   )-----------( 171      ( 08 Mar 2019 05:30 )             41.4     03-08    142  |  103  |  12  ----------------------------<  150  3.3   |  29  |  0.76    Ca    9.3      08 Mar 2019 05:30    TPro  6.9  /  Alb  3.1  /  TBili  1.1  /  DBili  x   /  AST  34  /  ALT  27  /  AlkPhos  74  03-08    eGFR if Non African American: 70 mL/min/1.73M2 (03-08-19 @ 05:30)  eGFR if African American: 81 mL/min/1.73M2 (03-08-19 @ 05:30)    PT/INR - ( 08 Mar 2019 05:30 )   PT: 22.0 sec;   INR: 1.92 ratio                 02-04 Chol 124 mg/dL LDL 55 mg/dL HDL 37 mg/dL Trig 159 mg/dL        CAPILLARY BLOOD GLUCOSE      POCT Blood Glucose.: 142 mg/dL (08 Mar 2019 07:39)  POCT Blood Glucose.: 128 mg/dL (07 Mar 2019 21:53)  POCT Blood Glucose.: 124 mg/dL (07 Mar 2019 12:18)    02-04 [id number] 6.6          RADIOLOGY & ADDITIONAL TESTS:    Care Discussed with Consultants/Other Providers:

## 2019-03-08 NOTE — PROGRESS NOTE ADULT - ATTENDING COMMENTS
Spoke at length with patient's daughter Izabela [phone], and GI Dr. Han.  Family is refusing colonoscopy at this time. Patient is scheduled to be discharged to [facility] on Monday. Patient to follow up with Dr. Han as outpatient for further treatment and investigations. Will resume PO diet and Coumadin. Discontinue Lovenox. INR 1.92 today

## 2019-03-08 NOTE — PROGRESS NOTE ADULT - SUBJECTIVE AND OBJECTIVE BOX
CHIEF COMPLAINT: no new complaints, colposcopy was cancelled earlier today due to poor prep      HISTORY OF PRESENT ILLNESS    This is a 88 year old  female with a pmh of CAD, HTN, HLD, pacemaker who presented to [facility] as a transfer from [facility] with right hemiparesis and aphasia and left proximal MCA occlusion. The patient was last known well at 2 pm on 2/3/19 and was then found down on the ground by her daughter several hours later and was noted to be not speaking and weak on the right. At [facility] a CTA head and neck was performed which showed a proximal left MCA occlusion, and a CT head showed a dense left MCA sign with some hypodensity and early ischemic changes in the left MCA distribution. She did not receive IV tPA as she was out of the window and was transferred to [facility] for possible mechanical thrombectomy. At [facility] her CT perfusion showed zero core infarct, with a penumbra of 80 mls, and an infinite mismatch. She was deemed a candidate for intervention and recanalization was achieved with TICI 3 score on 2/13.     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 Given pacemaker showed a-flutter, high suspicion for cardiac source.   Per neurology, patient is currently on ASA 81 mg as a bridge to coumadin for goal INR 2-3. Her INR on 2/14 is 1.26, she received coumadin 5 mg on night of 2/13. Recommend to discontinue the aspirin when INR at goal.  Patient found to have EF 65-70% with moderate AS, moderate pulm HTN, mild-mod TR and no PFO, cardiac monitoring at times showed sinus tachycardia - started on Amiodarone and Metoprolol with improvement. PPM interrogation- showed episode of atrial flutter.   Patient failed speech and swallow, PEG was placed on 2/12 and has been tolerating tube feeds.   Patient found be more lethargic on 2/13. Repeat CT head was stable. UA positive and patient started on antibiotics. Urine cx sent and pending results. Of note, CT head showed sphenoid air fluid level. Recs to start Augmentin if s/s of sinusitis start.   Also, cxray from 2/13 showed pulmonary edema had decreased. Rec to continue lasix at a lower dose of 20mg daily.             PAST MEDICAL & SURGICAL HISTORY:  Valvular disease  Atrial flutter: 1/2010- [facility], Dr Tran  Diabetes  Obesity  Hyperlipidemia  HTN (hypertension)  CAD (coronary artery disease): s/p CABG- 2004-[facility]  Artificial pacemaker  Aortic valve replaced  S/P coronary artery stent placement: x 4, 2006-[facility]  S/P cholecystectomy  S/P CABG (coronary artery bypass graft)          VITALS  Vital Signs Last 24 Hrs  T(C): 36.7 (08 Mar 2019 14:22), Max: 36.9 (07 Mar 2019 20:27)  T(F): 98.1 (08 Mar 2019 14:22), Max: 98.5 (07 Mar 2019 20:27)  HR: 92 (08 Mar 2019 14:22) (72 - 92)  BP: 152/92 (08 Mar 2019 14:22) (131/77 - 152/92)  BP(mean): --  RR: 15 (08 Mar 2019 14:22) (14 - 15)  SpO2: 95% (08 Mar 2019 14:22) (95% - 98%)      PHYSICAL EXAM  Constitutional - NAD, Comfortable  HEENT - NCAT, EOMI  Neck - Supple, No limited ROM  Chest - CTA bilaterally,   Cardiovascular - RRR, S1S2, No murmurs  Abdomen - BS+, Soft, NTND, PEG  Extremities - No C/C/E, No calf tenderness   Skin-no rash  Neurologic Exam -  stable exam                       RECENT LABS                        13.1   4.6   )-----------( 171      ( 08 Mar 2019 05:30 )             41.4     03-08    142  |  103  |  12  ----------------------------<  150<H>  3.3<L>   |  29  |  0.76    Ca    9.3      08 Mar 2019 05:30    TPro  6.9  /  Alb  3.1<L>  /  TBili  1.1  /  DBili  x   /  AST  34  /  ALT  27  /  AlkPhos  74  03-08    PT/INR - ( 08 Mar 2019 05:30 )   PT: 22.0 sec;   INR: 1.92 ratio           LIVER FUNCTIONS - ( 08 Mar 2019 05:30 )  Alb: 3.1 g/dL / Pro: 6.9 g/dL / ALK PHOS: 74 U/L / ALT: 27 U/L DA / AST: 34 U/L / GGT: x             Direct LDL: 55 mg/dL (02-04-19 @ 06:10)    Hemoglobin A1C, Whole Blood: 6.6 % (02-04-19 @ 06:11)                  RADIOLOGY/OTHER RESULTS      CURRENT MEDICATIONS  MEDICATIONS  (STANDING):  amiodarone    Tablet 200 milliGRAM(s) Oral daily  atorvastatin 20 milliGRAM(s) Oral at bedtime  chlorhexidine 0.12% Liquid 15 milliLiter(s) Swish and Spit two times a day  dextrose 5%. 1000 milliLiter(s) (50 mL/Hr) IV Continuous <Continuous>  dextrose 50% Injectable 12.5 Gram(s) IV Push once  docusate sodium 100 milliGRAM(s) Oral two times a day  enoxaparin Injectable 60 milliGRAM(s) SubCutaneous every 12 hours  famotidine    Tablet 20 milliGRAM(s) Oral daily  FLUoxetine 10 milliGRAM(s) Oral at bedtime  furosemide    Tablet 20 milliGRAM(s) Oral daily  insulin lispro (HumaLOG) corrective regimen sliding scale   SubCutaneous three times a day with meals  lactated ringers. 1000 milliLiter(s) (50 mL/Hr) IV Continuous <Continuous>  lidocaine   Patch 2 Patch Transdermal <User Schedule>  melatonin 3 milliGRAM(s) Oral at bedtime  metFORMIN 850 milliGRAM(s) Oral every 12 hours  metoprolol tartrate 25 milliGRAM(s) Oral every 8 hours  tiZANidine 2 milliGRAM(s) Oral at bedtime  warfarin 2 milliGRAM(s) Oral once  zinc oxide 20% Ointment 1 Application(s) Topical daily    MEDICATIONS  (PRN):  artificial  tears Solution 2 Drop(s) Both EYES every 2 hours PRN Dry Eyes  bisacodyl Suppository 10 milliGRAM(s) Rectal daily PRN Constipation  dextrose 40% Gel 15 Gram(s) Oral once PRN Blood Glucose LESS THAN 70 milliGRAM(s)/deciliter  glucagon  Injectable 1 milliGRAM(s) IntraMuscular once PRN Glucose LESS THAN 70 milligrams/deciliter  ondansetron Injectable 4 milliGRAM(s) IV Push once PRN Nausea and/or Vomiting      ASSESSMENT & PLAN          GI/Bowel Management - Dulcolax PRN, Fleet PRN   Management - Toilet Q2  Skin - Turn Q2  Pain - Tylenol PRN  DVT PPX - TEDs, SCDs  Diet -     Continue comprehensive acute rehab program consisting of 3hrs/day of OT/PT and SLP.

## 2019-03-08 NOTE — PROGRESS NOTE ADULT - ASSESSMENT
88 year old female admitted to acute rehab secondary to Left MCA CVA s/p endovascular intervention with right side hemiparesis, dysphagia s/p PEG placement, dysarthria and expressive aphasia    #Left MCA CVA  c/w PT/OT  c/w statin     #Dysphagia/dysarthria  PEG in place, erythema around site.  GI following     #Rectal thickening noted on CT  planned for colonoscopy today.   GI following     #A-Flutter  Rate controlled   c/w with amiodarone and lopressor  Coumadin  held for colonoscopy ,   Bridged with Lovenox     #HTN  controlled   c/w current regimen     #DM   controlled   c/w SSI, Metformin  FS monitoring     #Diastolic CHF  stable, c/w  Lasix , lopressor      DVT ppx  Lovenox

## 2019-03-09 LAB
GLUCOSE BLDC GLUCOMTR-MCNC: 108 MG/DL — HIGH (ref 70–99)
GLUCOSE BLDC GLUCOMTR-MCNC: 133 MG/DL — HIGH (ref 70–99)
GLUCOSE BLDC GLUCOMTR-MCNC: 141 MG/DL — HIGH (ref 70–99)
GLUCOSE BLDC GLUCOMTR-MCNC: 147 MG/DL — HIGH (ref 70–99)
INR BLD: 2.07 RATIO — HIGH (ref 0.88–1.16)
PROTHROM AB SERPL-ACNC: 23.7 SEC — HIGH (ref 10–12.9)

## 2019-03-09 PROCEDURE — 99233 SBSQ HOSP IP/OBS HIGH 50: CPT

## 2019-03-09 RX ORDER — WARFARIN SODIUM 2.5 MG/1
1.5 TABLET ORAL ONCE
Qty: 0 | Refills: 0 | Status: COMPLETED | OUTPATIENT
Start: 2019-03-09 | End: 2019-03-09

## 2019-03-09 RX ADMIN — ZINC OXIDE 1 APPLICATION(S): 200 OINTMENT TOPICAL at 12:03

## 2019-03-09 RX ADMIN — Medication 10 MILLIGRAM(S): at 21:31

## 2019-03-09 RX ADMIN — Medication 25 MILLIGRAM(S): at 05:34

## 2019-03-09 RX ADMIN — LIDOCAINE 2 PATCH: 4 CREAM TOPICAL at 17:52

## 2019-03-09 RX ADMIN — ATORVASTATIN CALCIUM 20 MILLIGRAM(S): 80 TABLET, FILM COATED ORAL at 21:31

## 2019-03-09 RX ADMIN — TIZANIDINE 2 MILLIGRAM(S): 4 TABLET ORAL at 21:31

## 2019-03-09 RX ADMIN — METFORMIN HYDROCHLORIDE 850 MILLIGRAM(S): 850 TABLET ORAL at 17:53

## 2019-03-09 RX ADMIN — AMIODARONE HYDROCHLORIDE 200 MILLIGRAM(S): 400 TABLET ORAL at 05:34

## 2019-03-09 RX ADMIN — CHLORHEXIDINE GLUCONATE 15 MILLILITER(S): 213 SOLUTION TOPICAL at 17:54

## 2019-03-09 RX ADMIN — WARFARIN SODIUM 1.5 MILLIGRAM(S): 2.5 TABLET ORAL at 21:33

## 2019-03-09 RX ADMIN — Medication 3 MILLIGRAM(S): at 21:31

## 2019-03-09 RX ADMIN — LIDOCAINE 2 PATCH: 4 CREAM TOPICAL at 06:15

## 2019-03-09 RX ADMIN — Medication 25 MILLIGRAM(S): at 13:01

## 2019-03-09 RX ADMIN — Medication 20 MILLIGRAM(S): at 05:34

## 2019-03-09 RX ADMIN — Medication 25 MILLIGRAM(S): at 21:31

## 2019-03-09 RX ADMIN — LIDOCAINE 2 PATCH: 4 CREAM TOPICAL at 07:09

## 2019-03-09 RX ADMIN — FAMOTIDINE 20 MILLIGRAM(S): 10 INJECTION INTRAVENOUS at 12:02

## 2019-03-09 RX ADMIN — METFORMIN HYDROCHLORIDE 850 MILLIGRAM(S): 850 TABLET ORAL at 06:15

## 2019-03-09 RX ADMIN — CHLORHEXIDINE GLUCONATE 15 MILLILITER(S): 213 SOLUTION TOPICAL at 05:34

## 2019-03-09 NOTE — PROGRESS NOTE ADULT - SUBJECTIVE AND OBJECTIVE BOX
No overnight events.  Patient feels well.   Having multiple BM. Despite this, patient's bowel prep insufficient and colonoscopy DC. Family also refusing.   Reports no pain.  Complains of weakness of the right side.     REVIEW OF SYSTEMS  Constitutional - No fever,  No fatigue  HEENT - No vertigo, No neck pain  Neurological - No headaches, +loss of strength  Musculoskeletal - No joint pain, No joint swelling, No muscle pain    VITALS  T(C): 36.6 (03-09-19 @ 07:28), Max: 37.1 (03-08-19 @ 20:15)  HR: 66 (03-09-19 @ 07:28) (66 - 92)  BP: 137/68 (03-09-19 @ 07:28) (112/75 - 152/92)  RR: 15 (03-09-19 @ 07:28) (15 - 15)  SpO2: 96% (03-09-19 @ 07:28) (95% - 98%)  Wt(kg): --       MEDICATIONS   amiodarone    Tablet 200 milliGRAM(s) daily  artificial  tears Solution 2 Drop(s) every 2 hours PRN  atorvastatin 20 milliGRAM(s) at bedtime  bisacodyl Suppository 10 milliGRAM(s) daily PRN  chlorhexidine 0.12% Liquid 15 milliLiter(s) two times a day  dextrose 40% Gel 15 Gram(s) once PRN  dextrose 5%. 1000 milliLiter(s) <Continuous>  dextrose 50% Injectable 12.5 Gram(s) once  docusate sodium 100 milliGRAM(s) two times a day  famotidine    Tablet 20 milliGRAM(s) daily  FLUoxetine 10 milliGRAM(s) at bedtime  furosemide    Tablet 20 milliGRAM(s) daily  glucagon  Injectable 1 milliGRAM(s) once PRN  insulin lispro (HumaLOG) corrective regimen sliding scale   three times a day with meals  lactated ringers. 1000 milliLiter(s) <Continuous>  lidocaine   Patch 2 Patch <User Schedule>  melatonin 3 milliGRAM(s) at bedtime  metFORMIN 850 milliGRAM(s) every 12 hours  metoprolol tartrate 25 milliGRAM(s) every 8 hours  ondansetron Injectable 4 milliGRAM(s) once PRN  tiZANidine 2 milliGRAM(s) at bedtime  zinc oxide 20% Ointment 1 Application(s) daily      RECENT LABS/IMAGING  CBC Full  -  ( 08 Mar 2019 05:30 )  WBC Count : 4.6 K/uL  Hemoglobin : 13.1 g/dL  Hematocrit : 41.4 %  Platelet Count - Automated : 171 K/uL  Mean Cell Volume : 92.3 fl  Mean Cell Hemoglobin : 29.2 pg  Mean Cell Hemoglobin Concentration : 31.6 gm/dL  Auto Neutrophil # : x  Auto Lymphocyte # : x  Auto Monocyte # : x  Auto Eosinophil # : x  Auto Basophil # : x  Auto Neutrophil % : x  Auto Lymphocyte % : x  Auto Monocyte % : x  Auto Eosinophil % : x  Auto Basophil % : x    03-08    142  |  103  |  12  ----------------------------<  150<H>  3.3<L>   |  29  |  0.76    Ca    9.3      08 Mar 2019 05:30    TPro  6.9  /  Alb  3.1<L>  /  TBili  1.1  /  DBili  x   /  AST  34  /  ALT  27  /  AlkPhos  74  03-08        POCT Blood Glucose.: 141 mg/dL (03-09-19 @ 07:25)  POCT Blood Glucose.: 105 mg/dL (03-08-19 @ 22:05)  POCT Blood Glucose.: 133 mg/dL (03-08-19 @ 16:30)    ---------  PHYSICAL EXAM  Constitutional - NAD, Comfortable  Pulm - Breathing comfortably, No wheezing  Abd - Soft, NTND, Obese, +PEG	  Extremities - No edema, No calf tenderness  Neurologic Exam -                    Cognitive - Awake, Alert     Communication - Dysarthria, Expressive deficits     Motor - Right spastic hemiparesis of LE, plegia of UE     Sensory - Intact to LT  Psychiatric - Mood WNL, Affect WNL    ASSESSMENT/PLAN  88y Female with functional deficits after left MCA CVA  CVA - Coumadin (3/9), Lipitor  Tone - Zanaflex  Mood/Motor Recovery - Prozac  Sleep - Melatonin  DM2 - Metformin, ISS  Pain - Tylenol, Lidoderm  DVT PPX - SCDs    Continue 3hrs a day of comprehensive rehab program.

## 2019-03-09 NOTE — PROGRESS NOTE ADULT - ASSESSMENT
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

## 2019-03-09 NOTE — PROGRESS NOTE ADULT - SUBJECTIVE AND OBJECTIVE BOX
Patient is a 88y old  Female who presents with a chief complaint of CVA with right sided weakness, dysphagia, dysarthria, expressive aphasia (09 Mar 2019 08:58)          Patient seen and examined at bedside.    ALLERGIES:  No Known Allergies        Vital Signs Last 24 Hrs  T(F): 97.8 (09 Mar 2019 07:28), Max: 98.7 (08 Mar 2019 20:15)  HR: 91 (09 Mar 2019 13:02) (66 - 92)  BP: 137/81 (09 Mar 2019 13:02) (112/75 - 152/92)  RR: 15 (09 Mar 2019 07:28) (15 - 15)  SpO2: 96% (09 Mar 2019 07:28) (95% - 97%)  I&O's Summary    MEDICATIONS:  amiodarone    Tablet 200 milliGRAM(s) Oral daily  artificial  tears Solution 2 Drop(s) Both EYES every 2 hours PRN  atorvastatin 20 milliGRAM(s) Oral at bedtime  bisacodyl Suppository 10 milliGRAM(s) Rectal daily PRN  chlorhexidine 0.12% Liquid 15 milliLiter(s) Swish and Spit two times a day  dextrose 40% Gel 15 Gram(s) Oral once PRN  dextrose 5%. 1000 milliLiter(s) IV Continuous <Continuous>  dextrose 50% Injectable 12.5 Gram(s) IV Push once  docusate sodium 100 milliGRAM(s) Oral two times a day  famotidine    Tablet 20 milliGRAM(s) Oral daily  FLUoxetine 10 milliGRAM(s) Oral at bedtime  furosemide    Tablet 20 milliGRAM(s) Oral daily  glucagon  Injectable 1 milliGRAM(s) IntraMuscular once PRN  insulin lispro (HumaLOG) corrective regimen sliding scale   SubCutaneous three times a day with meals  lactated ringers. 1000 milliLiter(s) IV Continuous <Continuous>  lidocaine   Patch 2 Patch Transdermal <User Schedule>  melatonin 3 milliGRAM(s) Oral at bedtime  metFORMIN 850 milliGRAM(s) Oral every 12 hours  metoprolol tartrate 25 milliGRAM(s) Oral every 8 hours  ondansetron Injectable 4 milliGRAM(s) IV Push once PRN  tiZANidine 2 milliGRAM(s) Oral at bedtime  warfarin 1.5 milliGRAM(s) Oral once  zinc oxide 20% Ointment 1 Application(s) Topical daily      PHYSICAL EXAM:  General: NAD  ENT: MMM  Neck: Supple, No JVD  Lungs: Clear to auscultation bilaterally  Cardio: RRR, S1/S2, No murmurs  Abdomen: Soft, Nontender, Nondistended; Bowel sounds present  Extremities: No cyanosis, No edema    LABS:                        13.1   4.6   )-----------( 171      ( 08 Mar 2019 05:30 )             41.4     03-08    142  |  103  |  12  ----------------------------<  150  3.3   |  29  |  0.76    Ca    9.3      08 Mar 2019 05:30    TPro  6.9  /  Alb  3.1  /  TBili  1.1  /  DBili  x   /  AST  34  /  ALT  27  /  AlkPhos  74  03-08    eGFR if Non African American: 70 mL/min/1.73M2 (03-08-19 @ 05:30)  eGFR if African American: 81 mL/min/1.73M2 (03-08-19 @ 05:30)    PT/INR - ( 09 Mar 2019 05:34 )   PT: 23.7 sec;   INR: 2.07 ratio                 02-04 Chol 124 mg/dL LDL 55 mg/dL HDL 37 mg/dL Trig 159 mg/dL        CAPILLARY BLOOD GLUCOSE      POCT Blood Glucose.: 147 mg/dL (09 Mar 2019 12:01)  POCT Blood Glucose.: 141 mg/dL (09 Mar 2019 07:25)  POCT Blood Glucose.: 105 mg/dL (08 Mar 2019 22:05)  POCT Blood Glucose.: 133 mg/dL (08 Mar 2019 16:30)    02-04 [id number] 6.6          RADIOLOGY & ADDITIONAL TESTS:    Care Discussed with Consultants/Other Providers:

## 2019-03-09 NOTE — PROGRESS NOTE ADULT - PROBLEM SELECTOR PLAN 6
All medications reviewed  Medicine is following
Continue statin

## 2019-03-09 NOTE — PROGRESS NOTE ADULT - PROBLEM SELECTOR PROBLEM 6
HTN (hypertension)
Hyperlipidemia

## 2019-03-10 LAB
GLUCOSE BLDC GLUCOMTR-MCNC: 119 MG/DL — HIGH (ref 70–99)
GLUCOSE BLDC GLUCOMTR-MCNC: 123 MG/DL — HIGH (ref 70–99)
GLUCOSE BLDC GLUCOMTR-MCNC: 132 MG/DL — HIGH (ref 70–99)
GLUCOSE BLDC GLUCOMTR-MCNC: 146 MG/DL — HIGH (ref 70–99)
INR BLD: 1.66 RATIO — HIGH (ref 0.88–1.16)
PROTHROM AB SERPL-ACNC: 18.9 SEC — HIGH (ref 10–12.9)

## 2019-03-10 PROCEDURE — 99232 SBSQ HOSP IP/OBS MODERATE 35: CPT

## 2019-03-10 RX ORDER — WARFARIN SODIUM 2.5 MG/1
2 TABLET ORAL ONCE
Qty: 0 | Refills: 0 | Status: COMPLETED | OUTPATIENT
Start: 2019-03-10 | End: 2019-03-10

## 2019-03-10 RX ADMIN — Medication 10 MILLIGRAM(S): at 21:36

## 2019-03-10 RX ADMIN — ZINC OXIDE 1 APPLICATION(S): 200 OINTMENT TOPICAL at 11:21

## 2019-03-10 RX ADMIN — CHLORHEXIDINE GLUCONATE 15 MILLILITER(S): 213 SOLUTION TOPICAL at 17:41

## 2019-03-10 RX ADMIN — AMIODARONE HYDROCHLORIDE 200 MILLIGRAM(S): 400 TABLET ORAL at 05:07

## 2019-03-10 RX ADMIN — Medication 25 MILLIGRAM(S): at 13:27

## 2019-03-10 RX ADMIN — CHLORHEXIDINE GLUCONATE 15 MILLILITER(S): 213 SOLUTION TOPICAL at 05:07

## 2019-03-10 RX ADMIN — LIDOCAINE 2 PATCH: 4 CREAM TOPICAL at 17:41

## 2019-03-10 RX ADMIN — FAMOTIDINE 20 MILLIGRAM(S): 10 INJECTION INTRAVENOUS at 11:21

## 2019-03-10 RX ADMIN — Medication 25 MILLIGRAM(S): at 05:07

## 2019-03-10 RX ADMIN — METFORMIN HYDROCHLORIDE 850 MILLIGRAM(S): 850 TABLET ORAL at 05:07

## 2019-03-10 RX ADMIN — Medication 3 MILLIGRAM(S): at 21:36

## 2019-03-10 RX ADMIN — METFORMIN HYDROCHLORIDE 850 MILLIGRAM(S): 850 TABLET ORAL at 17:41

## 2019-03-10 RX ADMIN — Medication 25 MILLIGRAM(S): at 21:36

## 2019-03-10 RX ADMIN — LIDOCAINE 2 PATCH: 4 CREAM TOPICAL at 06:54

## 2019-03-10 RX ADMIN — Medication 20 MILLIGRAM(S): at 05:07

## 2019-03-10 RX ADMIN — ATORVASTATIN CALCIUM 20 MILLIGRAM(S): 80 TABLET, FILM COATED ORAL at 21:36

## 2019-03-10 RX ADMIN — WARFARIN SODIUM 2 MILLIGRAM(S): 2.5 TABLET ORAL at 21:36

## 2019-03-10 RX ADMIN — TIZANIDINE 2 MILLIGRAM(S): 4 TABLET ORAL at 21:36

## 2019-03-10 RX ADMIN — LIDOCAINE 2 PATCH: 4 CREAM TOPICAL at 05:08

## 2019-03-10 NOTE — PROGRESS NOTE ADULT - SUBJECTIVE AND OBJECTIVE BOX
No overnight events.  Patient feels well.   No more BM.  Reports no pain.  Slept well last night.     REVIEW OF SYSTEMS  Constitutional - No fever,  No fatigue  HEENT - No vertigo, No neck pain  Neurological - No headaches, +loss of strength  Musculoskeletal - No joint pain, No joint swelling, No muscle pain    VITALS  T(C): 36.4 (03-10-19 @ 08:04)  T(F): 97.5 (03-10-19 @ 08:04), Max: 97.9 (03-09-19 @ 20:22)  HR: 87 (03-10-19 @ 08:04) (87 - 91)  BP: 126/75 (03-10-19 @ 08:04) (126/75 - 141/83)  RR:  (12 - 14)  SpO2:  (97% - 99%)  Wt(kg): --       MEDICATIONS   amiodarone    Tablet 200 milliGRAM(s) daily  artificial  tears Solution 2 Drop(s) every 2 hours PRN  atorvastatin 20 milliGRAM(s) at bedtime  bisacodyl Suppository 10 milliGRAM(s) daily PRN  chlorhexidine 0.12% Liquid 15 milliLiter(s) two times a day  dextrose 40% Gel 15 Gram(s) once PRN  dextrose 5%. 1000 milliLiter(s) <Continuous>  dextrose 50% Injectable 12.5 Gram(s) once  docusate sodium 100 milliGRAM(s) two times a day  famotidine    Tablet 20 milliGRAM(s) daily  FLUoxetine 10 milliGRAM(s) at bedtime  furosemide    Tablet 20 milliGRAM(s) daily  glucagon  Injectable 1 milliGRAM(s) once PRN  insulin lispro (HumaLOG) corrective regimen sliding scale   three times a day with meals  lactated ringers. 1000 milliLiter(s) <Continuous>  lidocaine   Patch 2 Patch <User Schedule>  melatonin 3 milliGRAM(s) at bedtime  metFORMIN 850 milliGRAM(s) every 12 hours  metoprolol tartrate 25 milliGRAM(s) every 8 hours  ondansetron Injectable 4 milliGRAM(s) once PRN  tiZANidine 2 milliGRAM(s) at bedtime  zinc oxide 20% Ointment 1 Application(s) daily      RECENT LABS/IMAGING  CBC Full  -  ( 08 Mar 2019 05:30 )  WBC Count : 4.6 K/uL  Hemoglobin : 13.1 g/dL  Hematocrit : 41.4 %  Platelet Count - Automated : 171 K/uL  Mean Cell Volume : 92.3 fl  Mean Cell Hemoglobin : 29.2 pg  Mean Cell Hemoglobin Concentration : 31.6 gm/dL  Auto Neutrophil # : x  Auto Lymphocyte # : x  Auto Monocyte # : x  Auto Eosinophil # : x  Auto Basophil # : x  Auto Neutrophil % : x  Auto Lymphocyte % : x  Auto Monocyte % : x  Auto Eosinophil % : x  Auto Basophil % : x    03-08    142  |  103  |  12  ----------------------------<  150<H>  3.3<L>   |  29  |  0.76    Ca    9.3      08 Mar 2019 05:30    TPro  6.9  /  Alb  3.1<L>  /  TBili  1.1  /  DBili  x   /  AST  34  /  ALT  27  /  AlkPhos  74  03-08            PT/INR - ( 10 Mar 2019 06:00 )   PT: 18.9 sec;   INR: 1.66 ratio         	            ---------  PHYSICAL EXAM  Constitutional - NAD, Comfortable  Pulm - Breathing comfortably, No wheezing  Abd - Soft, NTND, Obese, +PEG	  Extremities - No edema, No calf tenderness  Neurologic Exam -                    Cognitive - Awake, Alert     Communication - Dysarthria, Expressive deficits     Motor - Right spastic hemiparesis of LE, plegia of UE     Sensory - Intact to LT  Psychiatric - Mood WNL, Affect WNL    ASSESSMENT/PLAN  88y Female with functional deficits after left MCA CVA  CVA - Coumadin 2mg (3/10), Lipitor  Tone - Zanaflex  Mood/Motor Recovery - Prozac  Sleep - Melatonin  DM2 - Metformin, ISS  Pain - Tylenol, Lidoderm  DVT PPX - SCDs    Continue 3hrs a day of comprehensive rehab program.

## 2019-03-11 LAB
ALBUMIN SERPL ELPH-MCNC: 2.8 G/DL — LOW (ref 3.3–5)
ALP SERPL-CCNC: 59 U/L — SIGNIFICANT CHANGE UP (ref 40–120)
ALT FLD-CCNC: 20 U/L DA — SIGNIFICANT CHANGE UP (ref 10–45)
ANION GAP SERPL CALC-SCNC: 12 MMOL/L — SIGNIFICANT CHANGE UP (ref 5–17)
AST SERPL-CCNC: 25 U/L — SIGNIFICANT CHANGE UP (ref 10–40)
BILIRUB SERPL-MCNC: 1 MG/DL — SIGNIFICANT CHANGE UP (ref 0.2–1.2)
BUN SERPL-MCNC: 24 MG/DL — HIGH (ref 7–23)
CALCIUM SERPL-MCNC: 9.1 MG/DL — SIGNIFICANT CHANGE UP (ref 8.4–10.5)
CHLORIDE SERPL-SCNC: 101 MMOL/L — SIGNIFICANT CHANGE UP (ref 96–108)
CO2 SERPL-SCNC: 27 MMOL/L — SIGNIFICANT CHANGE UP (ref 22–31)
CREAT SERPL-MCNC: 0.97 MG/DL — SIGNIFICANT CHANGE UP (ref 0.5–1.3)
GLUCOSE BLDC GLUCOMTR-MCNC: 102 MG/DL — HIGH (ref 70–99)
GLUCOSE BLDC GLUCOMTR-MCNC: 136 MG/DL — HIGH (ref 70–99)
GLUCOSE BLDC GLUCOMTR-MCNC: 138 MG/DL — HIGH (ref 70–99)
GLUCOSE BLDC GLUCOMTR-MCNC: 147 MG/DL — HIGH (ref 70–99)
GLUCOSE SERPL-MCNC: 140 MG/DL — HIGH (ref 70–99)
HCT VFR BLD CALC: 38.4 % — SIGNIFICANT CHANGE UP (ref 34.5–45)
HGB BLD-MCNC: 12.5 G/DL — SIGNIFICANT CHANGE UP (ref 11.5–15.5)
INR BLD: 1.76 RATIO — HIGH (ref 0.88–1.16)
INR BLD: 1.98 RATIO — HIGH (ref 0.88–1.16)
MCHC RBC-ENTMCNC: 30.2 PG — SIGNIFICANT CHANGE UP (ref 27–34)
MCHC RBC-ENTMCNC: 32.4 GM/DL — SIGNIFICANT CHANGE UP (ref 32–36)
MCV RBC AUTO: 93 FL — SIGNIFICANT CHANGE UP (ref 80–100)
PLATELET # BLD AUTO: 150 K/UL — SIGNIFICANT CHANGE UP (ref 150–400)
POTASSIUM SERPL-MCNC: 3.6 MMOL/L — SIGNIFICANT CHANGE UP (ref 3.5–5.3)
POTASSIUM SERPL-SCNC: 3.6 MMOL/L — SIGNIFICANT CHANGE UP (ref 3.5–5.3)
PROT SERPL-MCNC: 6.5 G/DL — SIGNIFICANT CHANGE UP (ref 6–8.3)
PROTHROM AB SERPL-ACNC: 20.1 SEC — HIGH (ref 10–12.9)
PROTHROM AB SERPL-ACNC: 22.7 SEC — HIGH (ref 10–12.9)
RBC # BLD: 4.13 M/UL — SIGNIFICANT CHANGE UP (ref 3.8–5.2)
RBC # FLD: 14 % — SIGNIFICANT CHANGE UP (ref 10.3–14.5)
SODIUM SERPL-SCNC: 140 MMOL/L — SIGNIFICANT CHANGE UP (ref 135–145)
WBC # BLD: 4 K/UL — SIGNIFICANT CHANGE UP (ref 3.8–10.5)
WBC # FLD AUTO: 4 K/UL — SIGNIFICANT CHANGE UP (ref 3.8–10.5)

## 2019-03-11 PROCEDURE — 99233 SBSQ HOSP IP/OBS HIGH 50: CPT

## 2019-03-11 RX ORDER — ENOXAPARIN SODIUM 100 MG/ML
60 INJECTION SUBCUTANEOUS EVERY 12 HOURS
Qty: 0 | Refills: 0 | Status: DISCONTINUED | OUTPATIENT
Start: 2019-03-11 | End: 2019-03-12

## 2019-03-11 RX ORDER — WARFARIN SODIUM 2.5 MG/1
3 TABLET ORAL ONCE
Qty: 0 | Refills: 0 | Status: COMPLETED | OUTPATIENT
Start: 2019-03-11 | End: 2019-03-11

## 2019-03-11 RX ADMIN — CHLORHEXIDINE GLUCONATE 15 MILLILITER(S): 213 SOLUTION TOPICAL at 05:30

## 2019-03-11 RX ADMIN — Medication 10 MILLIGRAM(S): at 22:07

## 2019-03-11 RX ADMIN — Medication 25 MILLIGRAM(S): at 13:52

## 2019-03-11 RX ADMIN — TIZANIDINE 2 MILLIGRAM(S): 4 TABLET ORAL at 22:07

## 2019-03-11 RX ADMIN — Medication 20 MILLIGRAM(S): at 05:31

## 2019-03-11 RX ADMIN — ENOXAPARIN SODIUM 60 MILLIGRAM(S): 100 INJECTION SUBCUTANEOUS at 19:38

## 2019-03-11 RX ADMIN — METFORMIN HYDROCHLORIDE 850 MILLIGRAM(S): 850 TABLET ORAL at 07:39

## 2019-03-11 RX ADMIN — METFORMIN HYDROCHLORIDE 850 MILLIGRAM(S): 850 TABLET ORAL at 18:55

## 2019-03-11 RX ADMIN — LIDOCAINE 2 PATCH: 4 CREAM TOPICAL at 18:52

## 2019-03-11 RX ADMIN — FAMOTIDINE 20 MILLIGRAM(S): 10 INJECTION INTRAVENOUS at 12:03

## 2019-03-11 RX ADMIN — AMIODARONE HYDROCHLORIDE 200 MILLIGRAM(S): 400 TABLET ORAL at 05:30

## 2019-03-11 RX ADMIN — WARFARIN SODIUM 3 MILLIGRAM(S): 2.5 TABLET ORAL at 22:07

## 2019-03-11 RX ADMIN — CHLORHEXIDINE GLUCONATE 15 MILLILITER(S): 213 SOLUTION TOPICAL at 18:55

## 2019-03-11 RX ADMIN — LIDOCAINE 2 PATCH: 4 CREAM TOPICAL at 05:36

## 2019-03-11 RX ADMIN — Medication 25 MILLIGRAM(S): at 22:07

## 2019-03-11 RX ADMIN — Medication 3 MILLIGRAM(S): at 22:07

## 2019-03-11 RX ADMIN — LIDOCAINE 2 PATCH: 4 CREAM TOPICAL at 05:32

## 2019-03-11 RX ADMIN — Medication 25 MILLIGRAM(S): at 05:31

## 2019-03-11 RX ADMIN — ATORVASTATIN CALCIUM 20 MILLIGRAM(S): 80 TABLET, FILM COATED ORAL at 22:07

## 2019-03-11 RX ADMIN — ZINC OXIDE 1 APPLICATION(S): 200 OINTMENT TOPICAL at 12:03

## 2019-03-11 NOTE — PROGRESS NOTE ADULT - SUBJECTIVE AND OBJECTIVE BOX
Patient is a 88y old  Female who presents with a chief complaint of CVA with right sided weakness, dysphagia, dysarthria, expressive aphasia (10 Mar 2019 08:26)      Patient seen and examined at bedside.     ALLERGIES:  No Known Allergies    MEDICATIONS:  amiodarone    Tablet 200 milliGRAM(s) Oral daily  artificial  tears Solution 2 Drop(s) Both EYES every 2 hours PRN  atorvastatin 20 milliGRAM(s) Oral at bedtime  bisacodyl Suppository 10 milliGRAM(s) Rectal daily PRN  dextrose 40% Gel 15 Gram(s) Oral once PRN  dextrose 5%. 1000 milliLiter(s) IV Continuous <Continuous>  dextrose 50% Injectable 12.5 Gram(s) IV Push once  docusate sodium 100 milliGRAM(s) Oral two times a day  famotidine    Tablet 20 milliGRAM(s) Oral daily  FLUoxetine 10 milliGRAM(s) Oral at bedtime  furosemide    Tablet 20 milliGRAM(s) Oral daily  glucagon  Injectable 1 milliGRAM(s) IntraMuscular once PRN  insulin lispro (HumaLOG) corrective regimen sliding scale   SubCutaneous three times a day with meals  lidocaine   Patch 2 Patch Transdermal <User Schedule>  melatonin 3 milliGRAM(s) Oral at bedtime  metFORMIN 850 milliGRAM(s) Oral every 12 hours  metoprolol tartrate 25 milliGRAM(s) Oral every 8 hours  tiZANidine 2 milliGRAM(s) Oral at bedtime  zinc oxide 20% Ointment 1 Application(s) Topical daily    Vital Signs Last 24 Hrs  T(F): 97.7 (11 Mar 2019 07:41), Max: 98.7 (10 Mar 2019 20:08)  HR: 87 (11 Mar 2019 07:41) (87 - 92)  BP: 147/92 (11 Mar 2019 07:41) (120/79 - 152/84)  RR: 14 (11 Mar 2019 07:41) (14 - 14)  SpO2: 100% (11 Mar 2019 07:41) (97% - 100%)  I&O's Summary    10 Mar 2019 07:01  -  11 Mar 2019 07:00  --------------------------------------------------------  IN: 250 mL / OUT: 0 mL / NET: 250 mL        PHYSICAL EXAM:  General: NAD, comfortable   ENT: MMM  Neck: Supple, No JVD  Lungs: CTA, BLAE, No added sounds.   Cardio: RRR, S1/S2, No murmurs  Abdomen: Soft, NT/ND, BS+   Extremities: No edema  CNS: nonfocal     LABS:                        12.5   4.0   )-----------( 150      ( 11 Mar 2019 05:40 )             38.4     03-11    140  |  101  |  24  ----------------------------<  140  3.6   |  27  |  0.97    Ca    9.1      11 Mar 2019 05:40    TPro  6.5  /  Alb  2.8  /  TBili  1.0  /  DBili  x   /  AST  25  /  ALT  20  /  AlkPhos  59  03-11    eGFR if Non African American: 52 mL/min/1.73M2 (03-11-19 @ 05:40)  eGFR if : 61 mL/min/1.73M2 (03-11-19 @ 05:40)    PT/INR - ( 11 Mar 2019 06:10 )   PT: 20.1 sec;   INR: 1.76 ratio                 02-04 Chol 124 mg/dL LDL 55 mg/dL HDL 37 mg/dL Trig 159 mg/dL        CAPILLARY BLOOD GLUCOSE      POCT Blood Glucose.: 138 mg/dL (11 Mar 2019 07:38)  POCT Blood Glucose.: 119 mg/dL (10 Mar 2019 21:39)  POCT Blood Glucose.: 123 mg/dL (10 Mar 2019 16:26)  POCT Blood Glucose.: 132 mg/dL (10 Mar 2019 11:20)    02-04 [id number] 6.6          RADIOLOGY & ADDITIONAL TESTS:    Care Discussed with Consultants/Other Providers: Patient is a 88y old  Female who presents with a chief complaint of CVA with right sided weakness, dysphagia, dysarthria, expressive aphasia (10 Mar 2019 08:26)      Patient seen and examined at bedside.     ALLERGIES:  No Known Allergies    MEDICATIONS:  amiodarone    Tablet 200 milliGRAM(s) Oral daily  artificial  tears Solution 2 Drop(s) Both EYES every 2 hours PRN  atorvastatin 20 milliGRAM(s) Oral at bedtime  bisacodyl Suppository 10 milliGRAM(s) Rectal daily PRN  dextrose 40% Gel 15 Gram(s) Oral once PRN  dextrose 5%. 1000 milliLiter(s) IV Continuous <Continuous>  dextrose 50% Injectable 12.5 Gram(s) IV Push once  docusate sodium 100 milliGRAM(s) Oral two times a day  famotidine    Tablet 20 milliGRAM(s) Oral daily  FLUoxetine 10 milliGRAM(s) Oral at bedtime  furosemide    Tablet 20 milliGRAM(s) Oral daily  glucagon  Injectable 1 milliGRAM(s) IntraMuscular once PRN  insulin lispro (HumaLOG) corrective regimen sliding scale   SubCutaneous three times a day with meals  lidocaine   Patch 2 Patch Transdermal <User Schedule>  melatonin 3 milliGRAM(s) Oral at bedtime  metFORMIN 850 milliGRAM(s) Oral every 12 hours  metoprolol tartrate 25 milliGRAM(s) Oral every 8 hours  tiZANidine 2 milliGRAM(s) Oral at bedtime  zinc oxide 20% Ointment 1 Application(s) Topical daily    Vital Signs Last 24 Hrs  T(F): 97.7 (11 Mar 2019 07:41), Max: 98.7 (10 Mar 2019 20:08)  HR: 87 (11 Mar 2019 07:41) (87 - 92)  BP: 147/92 (11 Mar 2019 07:41) (120/79 - 152/84)  RR: 14 (11 Mar 2019 07:41) (14 - 14)  SpO2: 100% (11 Mar 2019 07:41) (97% - 100%)  I&O's Summary    10 Mar 2019 07:01  -  11 Mar 2019 07:00  --------------------------------------------------------  IN: 250 mL / OUT: 0 mL / NET: 250 mL        PHYSICAL EXAM:  General: NAD, comfortable   ENT: MMM  Neck: Supple, No JVD  Lungs: CTA, BLAE, No added sounds.   Cardio: RRR, S1/S2, No murmurs  Abdomen: Soft, NT/ND, BS+ , peg +   Extremities: No edema  CNS: no new deficits     LABS:                        12.5   4.0   )-----------( 150      ( 11 Mar 2019 05:40 )             38.4     03-11    140  |  101  |  24  ----------------------------<  140  3.6   |  27  |  0.97    Ca    9.1      11 Mar 2019 05:40    TPro  6.5  /  Alb  2.8  /  TBili  1.0  /  DBili  x   /  AST  25  /  ALT  20  /  AlkPhos  59  03-11    eGFR if Non African American: 52 mL/min/1.73M2 (03-11-19 @ 05:40)  eGFR if : 61 mL/min/1.73M2 (03-11-19 @ 05:40)    PT/INR - ( 11 Mar 2019 06:10 )   PT: 20.1 sec;   INR: 1.76 ratio                 02-04 Chol 124 mg/dL LDL 55 mg/dL HDL 37 mg/dL Trig 159 mg/dL        CAPILLARY BLOOD GLUCOSE      POCT Blood Glucose.: 138 mg/dL (11 Mar 2019 07:38)  POCT Blood Glucose.: 119 mg/dL (10 Mar 2019 21:39)  POCT Blood Glucose.: 123 mg/dL (10 Mar 2019 16:26)  POCT Blood Glucose.: 132 mg/dL (10 Mar 2019 11:20)    02-04 [id number] 6.6          RADIOLOGY & ADDITIONAL TESTS:    Care Discussed with Consultants/Other Providers:

## 2019-03-11 NOTE — CHART NOTE - NSCHARTNOTEFT_GEN_A_CORE
NUTRITION FOLLOW UP    SOURCE: Patient [X)   Family [ ]     other [ ]    DIET: Regular  Cons. cho     PATIENT REPORT[ ] nausea  [ ] vomiting [ ] diarrhea [ ] constipation  [ ]chewing problems [ ] swallowing issues  [ ] other: no GI distress    PO INTAKE:  < 50% [ ]   50-75%  [ X]   %  []  other :    SOURCE: for PO intake [X] Patient [ ] family [ ] chart [ ] staff [ ] other    ENTERAL/PARENTERAL NUTRITION: n/a    CURRENT WEIGHT: Weight (kg): 92.2 (03-08 @ 10:37)     PERTINENT LABS:  Date: 11 Mar 2019 05:40  Hemoglobin 12.5   Hematocrit 38.4     Date: 03-11  Sodium 140  Potassium 3.6  Glucose Serum 140<H>  BUN 24<H>      Creatinine    ACCUCHECK  POCT Blood Glucose.: 138 mg/dL (11 Mar 2019 07:38)  POCT Blood Glucose.: 119 mg/dL (10 Mar 2019 21:39)  POCT Blood Glucose.: 123 mg/dL (10 Mar 2019 16:26)  POCT Blood Glucose.: 132 mg/dL (10 Mar 2019 11:20)      SKIN: INTACT, Last BM WAS 3/8, NO EDEMA NOTED    ESTIMATED NEEDS:   [X] no change since previous assessment  [ ] recalculated:     PREVIOUS NUTRITION DIAGNOSIS: addressed    NUTRITION DIAGNOSIS is   [X] ONGOING    NEW NUTRITION DIAGNOSIS: [X] not applicable    MONITORING AND EVALUATION:   Current diet order is appropriate and is well tolerated, but will monitor for any changes that may be needed    [X] PO intake [X] Tolerance to diet prescription [X] weights [X] follow up per protocol    NUTRITION RECOMMENDATIONS: Continue to monitor tolerance to diet/ po intake.    RD remains available TREVOR Rose RD, CDE

## 2019-03-11 NOTE — PROGRESS NOTE ADULT - PROBLEM SELECTOR PROBLEM 2
Atrial flutter
Dysphagia
Atrial flutter

## 2019-03-11 NOTE — PROGRESS NOTE ADULT - PROBLEM SELECTOR PROBLEM 5
Hyperlipidemia
HTN (hypertension)
Hyperlipidemia
Diabetes
Hyperlipidemia

## 2019-03-11 NOTE — PROGRESS NOTE ADULT - ASSESSMENT
88 year old female admitted to acute rehab secondary to Left MCA CVA s/p endovascular intervention with right side hemiparesis, dysphagia s/p PEG placement, dysarthria and expressive aphasia    #Left MCA CVA  c/w PT/OT  c/w statin     #Dysphagia/dysarthria  PEG+ , c/w Tube feeds   GI following     #Rectal thickening noted on CT   colonoscopy 3/8 was cancelled due to poor prep    GI following     #A-Flutter  Rate controlled   c/w with amiodarone and lopressor  Resumed coumadin per INR (2-3 goal) , Subtherapeutic INR   Redose       #HTN  controlled   c/w current regimen     #DM   controlled   c/w SSI, Metformin  FS monitoring       #Diastolic CHF  stable, continue Lasix , lopressor      DVT ppx  is on Coumadin

## 2019-03-11 NOTE — PROGRESS NOTE ADULT - PROBLEM SELECTOR PROBLEM 1
CVA (cerebral vascular accident)
Rectal mass
CVA (cerebral vascular accident)

## 2019-03-11 NOTE — PROGRESS NOTE ADULT - PROBLEM SELECTOR PLAN 2
Coumadin with goal INR 2-3  All medications reviewed  Medicine is following
Coumadin with goal INR 2-3 after colonoscopy  All medications reviewed  Medicine is following
s/p peg placement on 2/12  Start bolus feeds per RD.   Aspiration precautions
Coumadin with goal INR 2-3  All medications reviewed  Medicine is following

## 2019-03-11 NOTE — PROGRESS NOTE ADULT - SUBJECTIVE AND OBJECTIVE BOX
CHIEF COMPLAINT: good spirits, no complaints, tolerates therapy well      HISTORY OF PRESENT ILLNESS    This is a 88 year old  female with a pmh of CAD, HTN, HLD, pacemaker who presented to [facility] as a transfer from [facility] with right hemiparesis and aphasia and left proximal MCA occlusion. The patient was last known well at 2 pm on 2/3/19 and was then found down on the ground by her daughter several hours later and was noted to be not speaking and weak on the right. At [facility] a CTA head and neck was performed which showed a proximal left MCA occlusion, and a CT head showed a dense left MCA sign with some hypodensity and early ischemic changes in the left MCA distribution. She did not receive IV tPA as she was out of the window and was transferred to [facility] for possible mechanical thrombectomy. At [facility] her CT perfusion showed zero core infarct, with a penumbra of 80 mls, and an infinite mismatch. She was deemed a candidate for intervention and recanalization was achieved with TICI 3 score on 2/13.     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 Given pacemaker showed a-flutter, high suspicion for cardiac source.   Per neurology, patient is currently on ASA 81 mg as a bridge to coumadin for goal INR 2-3. Her INR on 2/14 is 1.26, she received coumadin 5 mg on night of 2/13. Recommend to discontinue the aspirin when INR at goal.  Patient found to have EF 65-70% with moderate AS, moderate pulm HTN, mild-mod TR and no PFO, cardiac monitoring at times showed sinus tachycardia - started on Amiodarone and Metoprolol with improvement. PPM interrogation- showed episode of atrial flutter.   Patient failed speech and swallow, PEG was placed on 2/12 and has been tolerating tube feeds.   Patient found be more lethargic on 2/13. Repeat CT head was stable. UA positive and patient started on antibiotics. Urine cx sent and pending results. Of note, CT head showed sphenoid air fluid level. Recs to start Augmentin if s/s of sinusitis start.   Also, cxray from 2/13 showed pulmonary edema had decreased. Rec to continue lasix at a lower dose of 20mg daily.             PAST MEDICAL & SURGICAL HISTORY:  Valvular disease  Atrial flutter: 1/2010- [facility], Dr Tran  Diabetes  Obesity  Hyperlipidemia  HTN (hypertension)  CAD (coronary artery disease): s/p CABG- 2004-[facility]  Artificial pacemaker  Aortic valve replaced  S/P coronary artery stent placement: x 4, 2006-[facility]  S/P cholecystectomy  S/P CABG (coronary artery bypass graft)      VITALS  Vital Signs Last 24 Hrs  T(C): 36.5 (11 Mar 2019 07:41), Max: 37.1 (10 Mar 2019 20:08)  T(F): 97.7 (11 Mar 2019 07:41), Max: 98.7 (10 Mar 2019 20:08)  HR: 87 (11 Mar 2019 07:41) (87 - 92)  BP: 147/92 (11 Mar 2019 07:41) (120/79 - 152/84)  BP(mean): --  RR: 14 (11 Mar 2019 07:41) (14 - 14)  SpO2: 100% (11 Mar 2019 07:41) (97% - 100%)      PHYSICAL EXAM  Constitutional - NAD, Comfortable  HEENT - NCAT, EOMI  Neck - Supple, No limited ROM  Chest - CTA bilaterally,   Cardiovascular - RRR, S  Abdomen - BS+, Soft, NTND  Extremities - No C/C/E, No calf tenderness   Skin-no rash  Neurologic Exam - stable, no new focal deficits                        RECENT LABS                        12.5   4.0   )-----------( 150      ( 11 Mar 2019 05:40 )             38.4     03-11    140  |  101  |  24<H>  ----------------------------<  140<H>  3.6   |  27  |  0.97    Ca    9.1      11 Mar 2019 05:40    TPro  6.5  /  Alb  2.8<L>  /  TBili  1.0  /  DBili  x   /  AST  25  /  ALT  20  /  AlkPhos  59  03-11    PT/INR - ( 11 Mar 2019 06:10 )   PT: 20.1 sec;   INR: 1.76 ratio           LIVER FUNCTIONS - ( 11 Mar 2019 05:40 )  Alb: 2.8 g/dL / Pro: 6.5 g/dL / ALK PHOS: 59 U/L / ALT: 20 U/L DA / AST: 25 U/L / GGT: x             Direct LDL: 55 mg/dL (02-04-19 @ 06:10)    Hemoglobin A1C, Whole Blood: 6.6 % (02-04-19 @ 06:11)        CURRENT MEDICATIONS  MEDICATIONS  (STANDING):  amiodarone    Tablet 200 milliGRAM(s) Oral daily  atorvastatin 20 milliGRAM(s) Oral at bedtime  chlorhexidine 0.12% Liquid 15 milliLiter(s) Swish and Spit two times a day  dextrose 5%. 1000 milliLiter(s) (50 mL/Hr) IV Continuous <Continuous>  dextrose 50% Injectable 12.5 Gram(s) IV Push once  docusate sodium 100 milliGRAM(s) Oral two times a day  famotidine    Tablet 20 milliGRAM(s) Oral daily  FLUoxetine 10 milliGRAM(s) Oral at bedtime  furosemide    Tablet 20 milliGRAM(s) Oral daily  insulin lispro (HumaLOG) corrective regimen sliding scale   SubCutaneous three times a day with meals  lactated ringers. 1000 milliLiter(s) (50 mL/Hr) IV Continuous <Continuous>  lidocaine   Patch 2 Patch Transdermal <User Schedule>  melatonin 3 milliGRAM(s) Oral at bedtime  metFORMIN 850 milliGRAM(s) Oral every 12 hours  metoprolol tartrate 25 milliGRAM(s) Oral every 8 hours  tiZANidine 2 milliGRAM(s) Oral at bedtime  warfarin 3 milliGRAM(s) Oral once  zinc oxide 20% Ointment 1 Application(s) Topical daily    MEDICATIONS  (PRN):  artificial  tears Solution 2 Drop(s) Both EYES every 2 hours PRN Dry Eyes  bisacodyl Suppository 10 milliGRAM(s) Rectal daily PRN Constipation  dextrose 40% Gel 15 Gram(s) Oral once PRN Blood Glucose LESS THAN 70 milliGRAM(s)/deciliter  glucagon  Injectable 1 milliGRAM(s) IntraMuscular once PRN Glucose LESS THAN 70 milligrams/deciliter  ondansetron Injectable 4 milliGRAM(s) IV Push once PRN Nausea and/or Vomiting

## 2019-03-11 NOTE — PROGRESS NOTE ADULT - PROBLEM SELECTOR PLAN 1
Coumadin/ statin   therapeutic still  INR today,  Coumadin  restarted  Repeat INR in the morning  Pepcid for GI ppx
Coumadin/ statin  Supra therapeutic still  INR today, will hold Coumadin  again tonight  Repeat INR in the morning  Pepcid for GI ppx
on Coumadin/ statin
on Coumadin/ statin  Subtherapeutic INR, Coumadin dose  increased, will add Lovenox bridge
on Coumadin/ statin  while preparing for colonoscopy tomorrow will hold Coumadin and switch to full dose Lovenox which will be held prior to procedure for potential biopsies
Colonoscopy on 3/8/19  Clear liquids today  NPO after midnight  Movi Prep and Mag Citrate ordered  Discussed anticoagulation management prior to procedure with Dr. Sandy  Discussed with patient and primary team
Colonoscopy on 3/8/19  Discussed with patient and primary team
Colonoscopy on 3/8/19  Discussed with patient and primary team
Start comprehensive PT/OT/ST program consisting of 3 hours a day, 5 days a week.   Continue AC with coumadin and  BP management.  INR 1.73 today, continue coumadin 5mg qHS. When INR 2-3, will stop ASA.
on Coumadin/ statin  continue comprehensive pt/ot
Coumadin/ statin  Supra therapeutic INR today, will hold Coumadin tonight  Repeat INR in the morning  Pepcid for GI ppx

## 2019-03-11 NOTE — PROGRESS NOTE ADULT - ATTENDING COMMENTS
Discharge plan discussed with tea, patient and SW. Spoke with patient's daughter Angy separately [phone].  Will postpone transfer to [facility] until INR is over 2. All questions answered.

## 2019-03-11 NOTE — PROGRESS NOTE ADULT - PROBLEM SELECTOR PLAN 5
Statin with goal LDL < 70
All medications reviewed  Medicine is following
Statin with goal LDL < 70
Continue SSI.  Accuchecks elevated over the last 24 hours, will d/c with hospitalist  (Home use of metformin)  HA1C 6.6%.
Statin with goal LDL < 70

## 2019-03-11 NOTE — PROGRESS NOTE ADULT - PROBLEM SELECTOR PLAN 3
Insulin, Glucophage  Monitor FS
Insulin, Glucophage  Monitor FS
Insulin, Glucophage  Monitor FS  Case discussed with Endocrinology
Continue amiodarone 200mg daily.  TSH on 2/4 elevated with normal T3 and T4. TSH pending--want to ensure TSH stable. If increased, will obtain another T3 and T4.   Pacemaker in place.
Insulin, Glucophage  Monitor FS  Case discussed with Endocrinology

## 2019-03-11 NOTE — PROGRESS NOTE ADULT - PROBLEM SELECTOR PLAN 4
PEG  Case discussed with GI, suggested CT of the abdomen to evaluate PEG site
CT of the abdomen /pelvis results discussed with GI. Colonoscopy  is scheduled for Friday
CT of the abdomen /pelvis results discussed with GI. Colonoscopy was cancelled due to poor prep
PEG  CT of the abdomen /pelvis results discussed with GI. Colonoscopy  is scheduled for Friday
PEG  CT of the abdomen /pelvis results discussed with GI. Colonoscopy suggested
Statin with goal LDL < 70
CT of the abdomen /pelvis results discussed with GI. plan for colonoscopy
Continue amiodarone and lopressor.  HR and SBPs controlled.

## 2019-03-12 ENCOUNTER — TRANSCRIPTION ENCOUNTER (OUTPATIENT)
Age: 84
End: 2019-03-12

## 2019-03-12 VITALS — HEART RATE: 90 BPM | SYSTOLIC BLOOD PRESSURE: 135 MMHG | DIASTOLIC BLOOD PRESSURE: 87 MMHG

## 2019-03-12 LAB
GLUCOSE BLDC GLUCOMTR-MCNC: 128 MG/DL — HIGH (ref 70–99)
GLUCOSE BLDC GLUCOMTR-MCNC: 133 MG/DL — HIGH (ref 70–99)
INR BLD: 2.37 RATIO — HIGH (ref 0.88–1.16)
PROTHROM AB SERPL-ACNC: 27.3 SEC — HIGH (ref 10–12.9)

## 2019-03-12 PROCEDURE — 99239 HOSP IP/OBS DSCHRG MGMT >30: CPT

## 2019-03-12 PROCEDURE — 99233 SBSQ HOSP IP/OBS HIGH 50: CPT

## 2019-03-12 RX ORDER — WARFARIN SODIUM 2.5 MG/1
1 TABLET ORAL
Qty: 0 | Refills: 0 | DISCHARGE
Start: 2019-03-12

## 2019-03-12 RX ORDER — CEFPODOXIME PROXETIL 100 MG
0 TABLET ORAL
Qty: 0 | Refills: 0 | COMMUNITY

## 2019-03-12 RX ORDER — AMIODARONE HYDROCHLORIDE 400 MG/1
1 TABLET ORAL
Qty: 0 | Refills: 0 | DISCHARGE
Start: 2019-03-12

## 2019-03-12 RX ORDER — METOPROLOL TARTRATE 50 MG
1 TABLET ORAL
Qty: 0 | Refills: 0 | DISCHARGE
Start: 2019-03-12

## 2019-03-12 RX ORDER — LIDOCAINE 4 G/100G
2 CREAM TOPICAL
Qty: 0 | Refills: 0 | DISCHARGE
Start: 2019-03-12

## 2019-03-12 RX ORDER — ATORVASTATIN CALCIUM 80 MG/1
1 TABLET, FILM COATED ORAL
Qty: 0 | Refills: 0 | DISCHARGE
Start: 2019-03-12

## 2019-03-12 RX ORDER — LANOLIN ALCOHOL/MO/W.PET/CERES
1 CREAM (GRAM) TOPICAL
Qty: 0 | Refills: 0 | DISCHARGE
Start: 2019-03-12

## 2019-03-12 RX ORDER — FUROSEMIDE 40 MG
1 TABLET ORAL
Qty: 0 | Refills: 0 | DISCHARGE
Start: 2019-03-12

## 2019-03-12 RX ORDER — METFORMIN HYDROCHLORIDE 850 MG/1
1 TABLET ORAL
Qty: 0 | Refills: 0 | DISCHARGE
Start: 2019-03-12

## 2019-03-12 RX ORDER — ZINC OXIDE 200 MG/G
1 OINTMENT TOPICAL
Qty: 0 | Refills: 0 | DISCHARGE
Start: 2019-03-12

## 2019-03-12 RX ORDER — WARFARIN SODIUM 2.5 MG/1
1 TABLET ORAL ONCE
Qty: 0 | Refills: 0 | Status: DISCONTINUED | OUTPATIENT
Start: 2019-03-12 | End: 2019-03-12

## 2019-03-12 RX ORDER — WARFARIN SODIUM 2.5 MG/1
0.5 TABLET ORAL
Qty: 0 | Refills: 0 | DISCHARGE
Start: 2019-03-12

## 2019-03-12 RX ORDER — FAMOTIDINE 10 MG/ML
1 INJECTION INTRAVENOUS
Qty: 0 | Refills: 0 | DISCHARGE
Start: 2019-03-12

## 2019-03-12 RX ORDER — FUROSEMIDE 40 MG
1 TABLET ORAL
Qty: 0 | Refills: 0 | COMMUNITY

## 2019-03-12 RX ORDER — TIZANIDINE 4 MG/1
1 TABLET ORAL
Qty: 0 | Refills: 0 | DISCHARGE
Start: 2019-03-12

## 2019-03-12 RX ORDER — WARFARIN SODIUM 2.5 MG/1
1 TABLET ORAL
Qty: 0 | Refills: 0 | COMMUNITY

## 2019-03-12 RX ORDER — METFORMIN HYDROCHLORIDE 850 MG/1
1 TABLET ORAL
Qty: 0 | Refills: 0 | COMMUNITY

## 2019-03-12 RX ORDER — METOPROLOL TARTRATE 50 MG
0 TABLET ORAL
Qty: 0 | Refills: 0 | COMMUNITY

## 2019-03-12 RX ORDER — DOCUSATE SODIUM 100 MG
1 CAPSULE ORAL
Qty: 0 | Refills: 0 | DISCHARGE
Start: 2019-03-12

## 2019-03-12 RX ORDER — FLUOXETINE HCL 10 MG
1 CAPSULE ORAL
Qty: 0 | Refills: 0 | DISCHARGE
Start: 2019-03-12

## 2019-03-12 RX ADMIN — METFORMIN HYDROCHLORIDE 850 MILLIGRAM(S): 850 TABLET ORAL at 07:46

## 2019-03-12 RX ADMIN — Medication 25 MILLIGRAM(S): at 05:46

## 2019-03-12 RX ADMIN — Medication 100 MILLIGRAM(S): at 05:46

## 2019-03-12 RX ADMIN — ZINC OXIDE 1 APPLICATION(S): 200 OINTMENT TOPICAL at 12:06

## 2019-03-12 RX ADMIN — Medication 20 MILLIGRAM(S): at 05:46

## 2019-03-12 RX ADMIN — Medication 25 MILLIGRAM(S): at 13:44

## 2019-03-12 RX ADMIN — LIDOCAINE 2 PATCH: 4 CREAM TOPICAL at 06:29

## 2019-03-12 RX ADMIN — ENOXAPARIN SODIUM 60 MILLIGRAM(S): 100 INJECTION SUBCUTANEOUS at 05:46

## 2019-03-12 RX ADMIN — CHLORHEXIDINE GLUCONATE 15 MILLILITER(S): 213 SOLUTION TOPICAL at 05:46

## 2019-03-12 RX ADMIN — AMIODARONE HYDROCHLORIDE 200 MILLIGRAM(S): 400 TABLET ORAL at 05:46

## 2019-03-12 RX ADMIN — FAMOTIDINE 20 MILLIGRAM(S): 10 INJECTION INTRAVENOUS at 12:05

## 2019-03-12 RX ADMIN — LIDOCAINE 2 PATCH: 4 CREAM TOPICAL at 07:07

## 2019-03-12 NOTE — DISCHARGE NOTE PROVIDER - CARE PROVIDER_API CALL
Montez Ackerman (DO)  Cardiology; Internal Medicine  [address], [address]  [place] [address]  Phone: [phone]  Fax: [phone]  Follow Up Time:     Yang Pedroza (DO)  Gastroenterology; Internal Medicine  [address] [place] [address]  Phone: [phone]  Fax: [phone]  Follow Up Time:     Chuy Mulligan)  Neurology; Vascular Neurology  [address], [address] [place] [address]  Phone: [phone]  Fax: [phone]  Follow Up Time:

## 2019-03-12 NOTE — DISCHARGE NOTE PROVIDER - NSDCCPCAREPLAN_GEN_ALL_CORE_FT
PRINCIPAL DISCHARGE DIAGNOSIS  Problem: Acute ischemic left MCA stroke  Assessment and Plan of Treatment:       SECONDARY DISCHARGE DIAGNOSES  Problem: CAD, multiple vessel  Assessment and Plan of Treatment:     Problem: Benign essential HTN  Assessment and Plan of Treatment:     Problem: HLD (hyperlipidemia)  Assessment and Plan of Treatment:

## 2019-03-12 NOTE — DISCHARGE NOTE NURSING/CASE MANAGEMENT/SOCIAL WORK - NSDCPEPTSTRK_GEN_ALL_CORE
Stroke support groups for patients, families, and friends/Call 911 for stroke/Prescribed medications/Need for follow up after discharge/Stroke education booklet/Signs and symptoms of stroke/Risk factors for stroke/Stroke warning signs and symptoms

## 2019-03-12 NOTE — PROGRESS NOTE ADULT - SUBJECTIVE AND OBJECTIVE BOX
Patient is a 88y old  Female who presents with a chief complaint of CVA with right sided weakness, dysphagia, dysarthria, expressive aphasia (11 Mar 2019 13:18)      Patient seen and examined at bedside.     ALLERGIES:  No Known Allergies    MEDICATIONS:  amiodarone    Tablet 200 milliGRAM(s) Oral daily  artificial  tears Solution 2 Drop(s) Both EYES every 2 hours PRN  atorvastatin 20 milliGRAM(s) Oral at bedtime  bisacodyl Suppository 10 milliGRAM(s) Rectal daily PRN  dextrose 40% Gel 15 Gram(s) Oral once PRN  dextrose 5%. 1000 milliLiter(s) IV Continuous <Continuous>  dextrose 50% Injectable 12.5 Gram(s) IV Push once  docusate sodium 100 milliGRAM(s) Oral two times a day  enoxaparin Injectable 60 milliGRAM(s) SubCutaneous every 12 hours  famotidine    Tablet 20 milliGRAM(s) Oral daily  FLUoxetine 10 milliGRAM(s) Oral at bedtime  furosemide    Tablet 20 milliGRAM(s) Oral daily  glucagon  Injectable 1 milliGRAM(s) IntraMuscular once PRN  insulin lispro (HumaLOG) corrective regimen sliding scale   SubCutaneous three times a day with meals  lidocaine   Patch 2 Patch Transdermal <User Schedule>  melatonin 3 milliGRAM(s) Oral at bedtime  metFORMIN 850 milliGRAM(s) Oral every 12 hours  metoprolol tartrate 25 milliGRAM(s) Oral every 8 hours  tiZANidine 2 milliGRAM(s) Oral at bedtime  zinc oxide 20% Ointment 1 Application(s) Topical daily    Vital Signs Last 24 Hrs  T(F): 97.8 (12 Mar 2019 07:51), Max: 98.3 (11 Mar 2019 20:48)  HR: 90 (12 Mar 2019 07:51) (89 - 93)  BP: 122/83 (12 Mar 2019 07:51) (122/83 - 138/75)  RR: 14 (12 Mar 2019 07:51) (14 - 16)  SpO2: 97% (12 Mar 2019 07:51) (97% - 99%)  I&O's Summary    11 Mar 2019 07:01  -  12 Mar 2019 07:00  --------------------------------------------------------  IN: 834 mL / OUT: 0 mL / NET: 834 mL        PHYSICAL EXAM:  General: NAD, comfortable   ENT: MMM  Neck: Supple, No JVD  Lungs: CTA, BLAE, No added sounds.   Cardio: RRR, S1/S2, No murmurs  Abdomen: Soft, NT/ND, BS+   Extremities: No edema  CNS: no new deficits     LABS:                        12.5   4.0   )-----------( 150      ( 11 Mar 2019 05:40 )             38.4     03-11    140  |  101  |  24  ----------------------------<  140  3.6   |  27  |  0.97    Ca    9.1      11 Mar 2019 05:40    TPro  6.5  /  Alb  2.8  /  TBili  1.0  /  DBili  x   /  AST  25  /  ALT  20  /  AlkPhos  59  03-11    eGFR if Non African American: 52 mL/min/1.73M2 (03-11-19 @ 05:40)  eGFR if : 61 mL/min/1.73M2 (03-11-19 @ 05:40)    PT/INR - ( 12 Mar 2019 05:20 )   PT: 27.3 sec;   INR: 2.37 ratio                 02-04 Chol 124 mg/dL LDL 55 mg/dL HDL 37 mg/dL Trig 159 mg/dL        CAPILLARY BLOOD GLUCOSE      POCT Blood Glucose.: 133 mg/dL (12 Mar 2019 07:48)  POCT Blood Glucose.: 102 mg/dL (11 Mar 2019 22:05)  POCT Blood Glucose.: 136 mg/dL (11 Mar 2019 16:20)  POCT Blood Glucose.: 147 mg/dL (11 Mar 2019 11:34)    02-04 [id number] 6.6          RADIOLOGY & ADDITIONAL TESTS:    Care Discussed with Consultants/Other Providers:

## 2019-03-12 NOTE — DISCHARGE NOTE PROVIDER - PROVIDER TOKENS
PROVIDER:[TOKEN:[[id number]]],PROVIDER:[TOKEN:[[id number]]],PROVIDER:[TOKEN:[[id number]]]

## 2019-03-12 NOTE — DISCHARGE NOTE PROVIDER - HOSPITAL COURSE
This is a 88 year old  female with a pmh of CAD, HTN, HLD, pacemaker who presented to [facility] as a transfer from [facility] with right hemiparesis and aphasia and left proximal MCA occlusion. The patient was last known well at 2 pm on 2/3/19 and was then found down on the ground by her daughter several hours later and was noted to be not speaking and weak on the right. At [facility] a CTA head and neck was performed which showed a proximal left MCA occlusion, and a CT head showed a dense left MCA sign with some hypodensity and early ischemic changes in the left MCA distribution. She did not receive IV tPA as she was out of the window and was transferred to [facility] for possible mechanical thrombectomy. At [facility] her CT perfusion showed zero core infarct, with a penumbra of 80 mls, and an infinite mismatch. She was deemed a candidate for intervention and recanalization was achieved with TICI 3 score on 2/13.     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 Given pacemaker showed a-flutter, high suspicion for cardiac source.     Per neurology, patient is currently on ASA 81 mg as a bridge to coumadin for goal INR 2-3. Her INR on 2/14 is 1.26, she received coumadin 5 mg on night of 2/13. Recommend to discontinue the aspirin when INR at goal.    Patient found to have EF 65-70% with moderate AS, moderate pulm HTN, mild-mod TR and no PFO, cardiac monitoring at times showed sinus tachycardia - started on Amiodarone and Metoprolol with improvement. PPM interrogation- showed episode of atrial flutter.     Patient failed speech and swallow, PEG was placed on 2/12 and has been tolerating tube feeds.     Patient found be more lethargic on 2/13. Repeat CT head was stable. UA positive and patient started on antibiotics. Urine cx sent and pending results. Of note, CT head showed sphenoid air fluid level. Recs to start Augmentin if s/s of sinusitis start.     Also, cxray from 2/13 showed pulmonary edema had decreased. Rec to continue lasix at a lower dose of 20mg daily.         Admitted to  [facility] for acute inpatient rehabilitation program, improved functionally and clinically and functionally. Tolerates PO diet, PEG to be removed 6 weeks post placement. Follow up with GI for Abdominal CT results findings as outpatient ( Dr. Linda left contact information with patient and daughter). Goal INR 2-3, needs Coumadin 1 mg tonight, patient needs PT/INR in the morning, very sensative to Coumadin.

## 2019-03-12 NOTE — PROGRESS NOTE ADULT - REASON FOR ADMISSION
CVA with right sided weakness, dysphagia, dysarthria, expressive aphasia

## 2019-03-12 NOTE — DISCHARGE NOTE PROVIDER - CARE PROVIDERS DIRECT ADDRESSES
,DirectAddress_Unknown,DirectAddress_Unknown,[email or web address]

## 2019-03-12 NOTE — PROGRESS NOTE ADULT - SUBJECTIVE AND OBJECTIVE BOX
CHIEF COMPLAINT: no complaints, ready for discharge      HISTORY OF PRESENT ILLNESS    This is a 88 year old  female with a pmh of CAD, HTN, HLD, pacemaker who presented to [facility] as a transfer from [facility] with right hemiparesis and aphasia and left proximal MCA occlusion. The patient was last known well at 2 pm on 2/3/19 and was then found down on the ground by her daughter several hours later and was noted to be not speaking and weak on the right. At [facility] a CTA head and neck was performed which showed a proximal left MCA occlusion, and a CT head showed a dense left MCA sign with some hypodensity and early ischemic changes in the left MCA distribution. She did not receive IV tPA as she was out of the window and was transferred to [facility] for possible mechanical thrombectomy. At [facility] her CT perfusion showed zero core infarct, with a penumbra of 80 mls, and an infinite mismatch. She was deemed a candidate for intervention and recanalization was achieved with TICI 3 score on 2/13.     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 Given pacemaker showed a-flutter, high suspicion for cardiac source.   Per neurology, patient is currently on ASA 81 mg as a bridge to coumadin for goal INR 2-3. Her INR on 2/14 is 1.26, she received coumadin 5 mg on night of 2/13. Recommend to discontinue the aspirin when INR at goal.  Patient found to have EF 65-70% with moderate AS, moderate pulm HTN, mild-mod TR and no PFO, cardiac monitoring at times showed sinus tachycardia - started on Amiodarone and Metoprolol with improvement. PPM interrogation- showed episode of atrial flutter.   Patient failed speech and swallow, PEG was placed on 2/12 and has been tolerating tube feeds.   Patient found be more lethargic on 2/13. Repeat CT head was stable. UA positive and patient started on antibiotics. Urine cx sent and pending results. Of note, CT head showed sphenoid air fluid level. Recs to start Augmentin if s/s of sinusitis start.   Also, cxray from 2/13 showed pulmonary edema had decreased. Rec to continue lasix at a lower dose of 20mg daily.     Patient medically stable for discharge to [facility]'s acute IPR on 2/14/19. (14 Feb 2019 15:07)        PAST MEDICAL & SURGICAL HISTORY:  Valvular disease  Atrial flutter: 1/2010- [facility], Dr Tran  Diabetes  Obesity  Hyperlipidemia  HTN (hypertension)  CAD (coronary artery disease): s/p CABG- 2004-[facility]  Artificial pacemaker  Aortic valve replaced  S/P coronary artery stent placement: x 4, 2006-[facility]  S/P cholecystectomy  S/P CABG (coronary artery bypass graft)      VITALS  Vital Signs Last 24 Hrs  T(C): 36.6 (12 Mar 2019 07:51), Max: 36.8 (11 Mar 2019 20:48)  T(F): 97.8 (12 Mar 2019 07:51), Max: 98.3 (11 Mar 2019 20:48)  HR: 90 (12 Mar 2019 13:42) (89 - 93)  BP: 135/87 (12 Mar 2019 13:42) (122/83 - 138/75)  BP(mean): --  RR: 14 (12 Mar 2019 07:51) (14 - 16)  SpO2: 97% (12 Mar 2019 07:51) (97% - 99%)      PHYSICAL EXAM  Constitutional - NAD, Comfortable  HEENT - NCAT, EOMI  Neck - Supple, No limited ROM  Chest - CTA bilaterally, No wheeze, No rhonchi, No crackles  Cardiovascular - RRR, S1S2, No murmurs  Abdomen - BS+, Soft, NTND  Extremities - No C/C/E, No calf tenderness   Skin-no rash  Neurologic Exam -  stable                       RECENT LABS                        12.5   4.0   )-----------( 150      ( 11 Mar 2019 05:40 )             38.4     03-11    140  |  101  |  24<H>  ----------------------------<  140<H>  3.6   |  27  |  0.97    Ca    9.1      11 Mar 2019 05:40    TPro  6.5  /  Alb  2.8<L>  /  TBili  1.0  /  DBili  x   /  AST  25  /  ALT  20  /  AlkPhos  59  03-11    PT/INR - ( 12 Mar 2019 05:20 )   PT: 27.3 sec;   INR: 2.37 ratio           LIVER FUNCTIONS - ( 11 Mar 2019 05:40 )  Alb: 2.8 g/dL / Pro: 6.5 g/dL / ALK PHOS: 59 U/L / ALT: 20 U/L DA / AST: 25 U/L / GGT: x             Direct LDL: 55 mg/dL (02-04-19 @ 06:10)    Hemoglobin A1C, Whole Blood: 6.6 % (02-04-19 @ 06:11)                CURRENT MEDICATIONS  MEDICATIONS  (STANDING):  amiodarone    Tablet 200 milliGRAM(s) Oral daily  atorvastatin 20 milliGRAM(s) Oral at bedtime  chlorhexidine 0.12% Liquid 15 milliLiter(s) Swish and Spit two times a day  dextrose 5%. 1000 milliLiter(s) (50 mL/Hr) IV Continuous <Continuous>  dextrose 50% Injectable 12.5 Gram(s) IV Push once  docusate sodium 100 milliGRAM(s) Oral two times a day  famotidine    Tablet 20 milliGRAM(s) Oral daily  FLUoxetine 10 milliGRAM(s) Oral at bedtime  furosemide    Tablet 20 milliGRAM(s) Oral daily  insulin lispro (HumaLOG) corrective regimen sliding scale   SubCutaneous three times a day with meals  lactated ringers. 1000 milliLiter(s) (50 mL/Hr) IV Continuous <Continuous>  lidocaine   Patch 2 Patch Transdermal <User Schedule>  melatonin 3 milliGRAM(s) Oral at bedtime  metFORMIN 850 milliGRAM(s) Oral every 12 hours  metoprolol tartrate 25 milliGRAM(s) Oral every 8 hours  tiZANidine 2 milliGRAM(s) Oral at bedtime  warfarin 1 milliGRAM(s) Oral once  zinc oxide 20% Ointment 1 Application(s) Topical daily    MEDICATIONS  (PRN):  artificial  tears Solution 2 Drop(s) Both EYES every 2 hours PRN Dry Eyes  bisacodyl Suppository 10 milliGRAM(s) Rectal daily PRN Constipation  dextrose 40% Gel 15 Gram(s) Oral once PRN Blood Glucose LESS THAN 70 milliGRAM(s)/deciliter  glucagon  Injectable 1 milliGRAM(s) IntraMuscular once PRN Glucose LESS THAN 70 milligrams/deciliter  ondansetron Injectable 4 milliGRAM(s) IV Push once PRN Nausea and/or Vomiting

## 2019-03-12 NOTE — PROGRESS NOTE ADULT - ASSESSMENT
88 year old female admitted to acute rehab secondary to Left MCA CVA s/p endovascular intervention with right side hemiparesis, dysphagia s/p PEG placement, dysarthria and expressive aphasia    #Left MCA CVA  c/w PT/OT  c/w statin     #Dysphagia/dysarthria  PEG+ , c/w Tube feeds   GI following     #Rectal thickening noted on CT  colonoscopy 3/8 was cancelled due to poor prep    GI following     #A-Flutter  Rate controlled   c/w with amiodarone and lopressor  c/w  coumadin per INR (2-3 goal)   INR at goal today, maintain     #HTN  controlled   c/w current regimen     #DM   controlled   c/w SSI, Metformin  FS monitoring     #Diastolic CHF  stable,   c/w  Lasix , lopressor      DVT ppx  is on Coumadin

## 2019-03-12 NOTE — DISCHARGE NOTE NURSING/CASE MANAGEMENT/SOCIAL WORK - NSDCPEPT PROEDHF_GEN_ALL_CORE
Report signs and symptoms to primary care provider/Call primary care provider for follow up after discharge/Activities as tolerated/Low salt diet/Monitor weight daily

## 2019-03-13 PROCEDURE — 74230 X-RAY XM SWLNG FUNCJ C+: CPT | Mod: 26

## 2019-03-22 PROCEDURE — 84480 ASSAY TRIIODOTHYRONINE (T3): CPT

## 2019-03-22 PROCEDURE — 74230 X-RAY XM SWLNG FUNCJ C+: CPT

## 2019-03-22 PROCEDURE — 92526 ORAL FUNCTION THERAPY: CPT

## 2019-03-22 PROCEDURE — 97110 THERAPEUTIC EXERCISES: CPT

## 2019-03-22 PROCEDURE — 97116 GAIT TRAINING THERAPY: CPT

## 2019-03-22 PROCEDURE — 92507 TX SP LANG VOICE COMM INDIV: CPT

## 2019-03-22 PROCEDURE — 93005 ELECTROCARDIOGRAM TRACING: CPT

## 2019-03-22 PROCEDURE — 92610 EVALUATE SWALLOWING FUNCTION: CPT

## 2019-03-22 PROCEDURE — 85610 PROTHROMBIN TIME: CPT

## 2019-03-22 PROCEDURE — 97112 NEUROMUSCULAR REEDUCATION: CPT

## 2019-03-22 PROCEDURE — 83735 ASSAY OF MAGNESIUM: CPT

## 2019-03-22 PROCEDURE — 84443 ASSAY THYROID STIM HORMONE: CPT

## 2019-03-22 PROCEDURE — 86376 MICROSOMAL ANTIBODY EACH: CPT

## 2019-03-22 PROCEDURE — 97535 SELF CARE MNGMENT TRAINING: CPT

## 2019-03-22 PROCEDURE — 36415 COLL VENOUS BLD VENIPUNCTURE: CPT

## 2019-03-22 PROCEDURE — 80053 COMPREHEN METABOLIC PANEL: CPT

## 2019-03-22 PROCEDURE — 82043 UR ALBUMIN QUANTITATIVE: CPT

## 2019-03-22 PROCEDURE — 74176 CT ABD & PELVIS W/O CONTRAST: CPT

## 2019-03-22 PROCEDURE — 92523 SPEECH SOUND LANG COMPREHEN: CPT

## 2019-03-22 PROCEDURE — 97163 PT EVAL HIGH COMPLEX 45 MIN: CPT

## 2019-03-22 PROCEDURE — 82962 GLUCOSE BLOOD TEST: CPT

## 2019-03-22 PROCEDURE — 97140 MANUAL THERAPY 1/> REGIONS: CPT

## 2019-03-22 PROCEDURE — 85027 COMPLETE CBC AUTOMATED: CPT

## 2019-03-22 PROCEDURE — 92611 MOTION FLUOROSCOPY/SWALLOW: CPT

## 2019-03-22 PROCEDURE — 84439 ASSAY OF FREE THYROXINE: CPT

## 2019-03-22 PROCEDURE — 97530 THERAPEUTIC ACTIVITIES: CPT

## 2019-03-28 NOTE — ED PROVIDER NOTE - NSTIMEPROVIDERCAREINITIATE_GEN_ER
13-Aug-2018 15:28
,[email or web address],DirectAddress_Unknown

## 2019-05-06 ENCOUNTER — APPOINTMENT (OUTPATIENT)
Age: 84
End: 2019-05-06
Payer: MEDICARE

## 2019-05-06 VITALS
HEIGHT: 58 IN | BODY MASS INDEX: 45.13 KG/M2 | DIASTOLIC BLOOD PRESSURE: 68 MMHG | WEIGHT: 215 LBS | SYSTOLIC BLOOD PRESSURE: 136 MMHG | HEART RATE: 63 BPM

## 2019-05-06 DIAGNOSIS — Z78.9 OTHER SPECIFIED HEALTH STATUS: ICD-10-CM

## 2019-05-06 DIAGNOSIS — Z87.39 PERSONAL HISTORY OF OTHER DISEASES OF THE MUSCULOSKELETAL SYSTEM AND CONNECTIVE TISSUE: ICD-10-CM

## 2019-05-06 DIAGNOSIS — Z86.73 PERSONAL HISTORY OF TRANSIENT ISCHEMIC ATTACK (TIA), AND CEREBRAL INFARCTION W/OUT RESIDUAL DEFICITS: ICD-10-CM

## 2019-05-06 DIAGNOSIS — Z86.79 PERSONAL HISTORY OF OTHER DISEASES OF THE CIRCULATORY SYSTEM: ICD-10-CM

## 2019-05-06 DIAGNOSIS — Z82.61 FAMILY HISTORY OF ARTHRITIS: ICD-10-CM

## 2019-05-06 DIAGNOSIS — Z86.39 PERSONAL HISTORY OF OTHER ENDOCRINE, NUTRITIONAL AND METABOLIC DISEASE: ICD-10-CM

## 2019-05-06 PROCEDURE — 99213 OFFICE O/P EST LOW 20 MIN: CPT

## 2019-05-06 RX ORDER — FUROSEMIDE 20 MG/1
20 TABLET ORAL
Refills: 0 | Status: ACTIVE | COMMUNITY

## 2019-05-06 RX ORDER — BISACODYL 10 MG/1
10 SUPPOSITORY RECTAL
Refills: 0 | Status: ACTIVE | COMMUNITY

## 2019-05-06 RX ORDER — ATORVASTATIN CALCIUM 20 MG/1
20 TABLET, FILM COATED ORAL
Refills: 0 | Status: ACTIVE | COMMUNITY

## 2019-05-06 RX ORDER — TETRAHYDROZOLINE HCL 0.5 OZ
EYE DROP SOLUTION
Refills: 0 | Status: ACTIVE | COMMUNITY

## 2019-05-06 RX ORDER — RANITIDINE 150 MG/1
150 TABLET, FILM COATED ORAL
Refills: 0 | Status: ACTIVE | COMMUNITY

## 2019-05-06 RX ORDER — LOPERAMIDE HCL 1 MG/5 ML
1 LIQUID (ML) ORAL
Refills: 0 | Status: ACTIVE | COMMUNITY

## 2019-05-06 RX ORDER — MELATONIN 3 MG
3 CAPSULE ORAL
Refills: 0 | Status: ACTIVE | COMMUNITY

## 2019-05-06 RX ORDER — MAGNESIUM HYDROXIDE 400 MG/5ML
400 SUSPENSION ORAL
Refills: 0 | Status: ACTIVE | COMMUNITY

## 2019-05-06 RX ORDER — FOLIC ACID 1 MG/1
1 TABLET ORAL
Refills: 0 | Status: ACTIVE | COMMUNITY

## 2019-05-06 RX ORDER — MULTIVITAMIN
CAPSULE ORAL
Refills: 0 | Status: ACTIVE | COMMUNITY

## 2019-05-06 RX ORDER — FLUOXETINE HYDROCHLORIDE 20 MG/1
20 CAPSULE ORAL
Refills: 0 | Status: ACTIVE | COMMUNITY

## 2019-06-01 ENCOUNTER — EMERGENCY (EMERGENCY)
Facility: HOSPITAL | Age: 84
LOS: 1 days | Discharge: HOME | End: 2019-06-01
Attending: EMERGENCY MEDICINE
Payer: MEDICARE

## 2019-06-01 VITALS
RESPIRATION RATE: 17 BRPM | DIASTOLIC BLOOD PRESSURE: 84 MMHG | TEMPERATURE: 98 F | HEART RATE: 69 BPM | WEIGHT: 175.05 LBS | HEIGHT: 64 IN | SYSTOLIC BLOOD PRESSURE: 154 MMHG | OXYGEN SATURATION: 98 %

## 2019-06-01 DIAGNOSIS — Z95.0 PRESENCE OF CARDIAC PACEMAKER: Chronic | ICD-10-CM

## 2019-06-01 DIAGNOSIS — Z95.5 PRESENCE OF CORONARY ANGIOPLASTY IMPLANT AND GRAFT: Chronic | ICD-10-CM

## 2019-06-01 DIAGNOSIS — Z90.49 ACQUIRED ABSENCE OF OTHER SPECIFIED PARTS OF DIGESTIVE TRACT: Chronic | ICD-10-CM

## 2019-06-01 DIAGNOSIS — Z95.1 PRESENCE OF AORTOCORONARY BYPASS GRAFT: Chronic | ICD-10-CM

## 2019-06-01 DIAGNOSIS — Z95.2 PRESENCE OF PROSTHETIC HEART VALVE: Chronic | ICD-10-CM

## 2019-06-01 LAB
ALBUMIN SERPL ELPH-MCNC: 3.9 G/DL — SIGNIFICANT CHANGE UP (ref 3.3–5.2)
ALP SERPL-CCNC: 76 U/L — SIGNIFICANT CHANGE UP (ref 40–120)
ALT FLD-CCNC: 17 U/L — SIGNIFICANT CHANGE UP
ANION GAP SERPL CALC-SCNC: 14 MMOL/L — SIGNIFICANT CHANGE UP (ref 5–17)
APTT BLD: 36.1 SEC — SIGNIFICANT CHANGE UP (ref 27.5–36.3)
AST SERPL-CCNC: 22 U/L — SIGNIFICANT CHANGE UP
BASOPHILS # BLD AUTO: 0 K/UL — SIGNIFICANT CHANGE UP (ref 0–0.2)
BASOPHILS NFR BLD AUTO: 0.3 % — SIGNIFICANT CHANGE UP (ref 0–2)
BILIRUB SERPL-MCNC: 0.9 MG/DL — SIGNIFICANT CHANGE UP (ref 0.4–2)
BUN SERPL-MCNC: 13 MG/DL — SIGNIFICANT CHANGE UP (ref 8–20)
CALCIUM SERPL-MCNC: 9.6 MG/DL — SIGNIFICANT CHANGE UP (ref 8.6–10.2)
CHLORIDE SERPL-SCNC: 97 MMOL/L — LOW (ref 98–107)
CO2 SERPL-SCNC: 28 MMOL/L — SIGNIFICANT CHANGE UP (ref 22–29)
CREAT SERPL-MCNC: 0.65 MG/DL — SIGNIFICANT CHANGE UP (ref 0.5–1.3)
EOSINOPHIL # BLD AUTO: 0.1 K/UL — SIGNIFICANT CHANGE UP (ref 0–0.5)
EOSINOPHIL NFR BLD AUTO: 1.3 % — SIGNIFICANT CHANGE UP (ref 0–6)
GLUCOSE SERPL-MCNC: 114 MG/DL — SIGNIFICANT CHANGE UP (ref 70–115)
HCT VFR BLD CALC: 41.7 % — SIGNIFICANT CHANGE UP (ref 37–47)
HGB BLD-MCNC: 13.4 G/DL — SIGNIFICANT CHANGE UP (ref 12–16)
INR BLD: 2.09 RATIO — HIGH (ref 0.88–1.16)
LYMPHOCYTES # BLD AUTO: 1.1 K/UL — SIGNIFICANT CHANGE UP (ref 1–4.8)
LYMPHOCYTES # BLD AUTO: 18.5 % — LOW (ref 20–55)
MCHC RBC-ENTMCNC: 29.2 PG — SIGNIFICANT CHANGE UP (ref 27–31)
MCHC RBC-ENTMCNC: 32.1 G/DL — SIGNIFICANT CHANGE UP (ref 32–36)
MCV RBC AUTO: 90.8 FL — SIGNIFICANT CHANGE UP (ref 81–99)
MONOCYTES # BLD AUTO: 0.6 K/UL — SIGNIFICANT CHANGE UP (ref 0–0.8)
MONOCYTES NFR BLD AUTO: 9.4 % — SIGNIFICANT CHANGE UP (ref 3–10)
NEUTROPHILS # BLD AUTO: 4.2 K/UL — SIGNIFICANT CHANGE UP (ref 1.8–8)
NEUTROPHILS NFR BLD AUTO: 70 % — SIGNIFICANT CHANGE UP (ref 37–73)
PLATELET # BLD AUTO: 256 K/UL — SIGNIFICANT CHANGE UP (ref 150–400)
POTASSIUM SERPL-MCNC: 3.9 MMOL/L — SIGNIFICANT CHANGE UP (ref 3.5–5.3)
POTASSIUM SERPL-SCNC: 3.9 MMOL/L — SIGNIFICANT CHANGE UP (ref 3.5–5.3)
PROT SERPL-MCNC: 7.4 G/DL — SIGNIFICANT CHANGE UP (ref 6.6–8.7)
PROTHROM AB SERPL-ACNC: 24.6 SEC — HIGH (ref 10–12.9)
RBC # BLD: 4.59 M/UL — SIGNIFICANT CHANGE UP (ref 4.4–5.2)
RBC # FLD: 15.7 % — HIGH (ref 11–15.6)
SODIUM SERPL-SCNC: 139 MMOL/L — SIGNIFICANT CHANGE UP (ref 135–145)
WBC # BLD: 6 K/UL — SIGNIFICANT CHANGE UP (ref 4.8–10.8)
WBC # FLD AUTO: 6 K/UL — SIGNIFICANT CHANGE UP (ref 4.8–10.8)

## 2019-06-01 PROCEDURE — 85610 PROTHROMBIN TIME: CPT

## 2019-06-01 PROCEDURE — 70450 CT HEAD/BRAIN W/O DYE: CPT

## 2019-06-01 PROCEDURE — 36415 COLL VENOUS BLD VENIPUNCTURE: CPT

## 2019-06-01 PROCEDURE — 73562 X-RAY EXAM OF KNEE 3: CPT | Mod: 26,50

## 2019-06-01 PROCEDURE — 99284 EMERGENCY DEPT VISIT MOD MDM: CPT | Mod: 25

## 2019-06-01 PROCEDURE — 70450 CT HEAD/BRAIN W/O DYE: CPT | Mod: 26

## 2019-06-01 PROCEDURE — 80053 COMPREHEN METABOLIC PANEL: CPT

## 2019-06-01 PROCEDURE — 72125 CT NECK SPINE W/O DYE: CPT | Mod: 26

## 2019-06-01 PROCEDURE — 85730 THROMBOPLASTIN TIME PARTIAL: CPT

## 2019-06-01 PROCEDURE — 99284 EMERGENCY DEPT VISIT MOD MDM: CPT

## 2019-06-01 PROCEDURE — 73562 X-RAY EXAM OF KNEE 3: CPT

## 2019-06-01 PROCEDURE — 72125 CT NECK SPINE W/O DYE: CPT

## 2019-06-01 PROCEDURE — 85027 COMPLETE CBC AUTOMATED: CPT

## 2019-06-01 RX ORDER — ACETAMINOPHEN 500 MG
975 TABLET ORAL ONCE
Refills: 0 | Status: COMPLETED | OUTPATIENT
Start: 2019-06-01 | End: 2019-06-01

## 2019-06-01 RX ADMIN — Medication 975 MILLIGRAM(S): at 15:23

## 2019-06-01 NOTE — ED PROVIDER NOTE - PSH
Aortic valve replaced    Artificial pacemaker    S/P CABG (coronary artery bypass graft)    S/P cholecystectomy    S/P coronary artery stent placement  x 4, 2006-[facility]

## 2019-06-01 NOTE — ED PROVIDER NOTE - PHYSICAL EXAMINATION
Const: Awake, alert and oriented. In no acute distress. Well appearing.  HEENT: NC/AT. Moist mucous membranes.  Eyes: No scleral icterus. EOMI.  Neck:. Soft and supple. Full ROM without pain. No midline TTP or palpable step offs.  Cardiac: Regular rate and regular rhythm. +S1/S2. No murmurs. Peripheral pulses 2+ and symmetric. No LE edema.  Resp: Speaking in full sentences. No evidence of respiratory distress. No wheezes, rales or rhonchi.  Abd: Soft, non-tender, non-distended. Normal bowel sounds in all 4 quadrants. No guarding or rebound.  Back: Spine midline and non-tender. No CVAT.  MSK: Full ROM of the left knee, with ecchymosis bilateral knees, no bony TTP.  Skin: No rashes, abrasions or lacerations.  Neuro: CN II-XII grossly in tact. Symmetrical smile. PERRL. EOMI. Bilateral and symmetric sensation of face. Tongue midline. Normal finger to nose on the left. Normal heel to shin on the left. No pronator drift on the left. Sensation intact left upper and lower extremities. 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

## 2019-06-01 NOTE — ED PROVIDER NOTE - CARE PLAN
Principal Discharge DX:	Fall from bed, initial encounter  Secondary Diagnosis:	Acute pain of both knees

## 2019-06-01 NOTE — ED PROVIDER NOTE - PMH
Atrial flutter  1/2010- [facility], Dr Tran  CAD (coronary artery disease)  s/p CABG- 2004[facility]  Diabetes    HTN (hypertension)    Hyperlipidemia    Obesity    Valvular disease

## 2019-06-01 NOTE — ED ADULT NURSE NOTE - PMH
Atrial flutter  1/2010- [facility], Dr Tran  CAD (coronary artery disease)  s/p CABG- 2004[facility]  Diabetes    HTN (hypertension)    Hyperlipidemia    Obesity    Valvular disease

## 2019-06-01 NOTE — ED ADULT NURSE NOTE - NSIMPLEMENTINTERV_GEN_ALL_ED
Implemented All Fall with Harm Risk Interventions:  [place] to call system. Call bell, personal items and telephone within reach. Instruct patient to call for assistance. Room bathroom lighting operational. Non-slip footwear when patient is off stretcher. Physically safe environment: no spills, clutter or unnecessary equipment. Stretcher in lowest position, wheels locked, appropriate side rails in place. Provide visual cue, wrist band, yellow gown, etc. Monitor gait and stability. Monitor for mental status changes and reorient to person, place, and time. Review medications for side effects contributing to fall risk. Reinforce activity limits and safety measures with patient and family. Provide visual clues: red socks.
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
## 2019-06-01 NOTE — ED PROVIDER NOTE - CLINICAL SUMMARY MEDICAL DECISION MAKING FREE TEXT BOX
Patient presents s/p fall off bed at the nursing home today, sent in to be checked out, unclear if there was head trauma, but patient denies LOC, complains of bilateral knee pain. Neuro-baseline intact (right flaccid paralysis 2/2 prior CVA), full ROM of the left knee with no bony TTP. Will CT head and C/S as unclear if patient hit head, and xray knees, tylenol for pain, and if negative, will send back to nursing home.
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
## 2019-06-01 NOTE — ED PROVIDER NOTE - NS ED ROS FT
Const: Denies fever, chills  HEENT: Denies blurry vision, sore throat  Neck: Denies neck pain/stiffness  Resp: Denies coughing, SOB  Cardiovascular: Denies CP, palpitations, LE edema  GI: Denies nausea, vomiting, abdominal pain, diarrhea, constipation, blood in stool  : Denies urinary frequency/urgency/dysuria, hematuria  MSK: + bilateral knee pain. Denies back pain  Neuro: Denies HA, dizziness, numbness, weakness  Skin: Denies rashes.

## 2019-06-01 NOTE — ED ADULT NURSE NOTE - OBJECTIVE STATEMENT
pt a+ox3, reports falling out of bed while being changed by aide at [facility]. pt denies hitting head or loc. denies headache or dizziness. no obvious deformities.

## 2019-06-01 NOTE — ED PROVIDER NOTE - PROGRESS NOTE DETAILS
No guardrails on beds, rolled out of bed, hit occipital aspect of head, no LOC. Daughter was notified by me: Angy Neves [phone]. I will keep her updated.

## 2019-06-11 ENCOUNTER — APPOINTMENT (OUTPATIENT)
Age: 84
End: 2019-06-11
Payer: MEDICARE

## 2019-06-11 VITALS
HEIGHT: 58 IN | DIASTOLIC BLOOD PRESSURE: 80 MMHG | RESPIRATION RATE: 16 BRPM | TEMPERATURE: 97.7 F | SYSTOLIC BLOOD PRESSURE: 165 MMHG | WEIGHT: 215 LBS | HEART RATE: 64 BPM | BODY MASS INDEX: 45.13 KG/M2 | OXYGEN SATURATION: 97 %

## 2019-06-11 DIAGNOSIS — I48.0 PAROXYSMAL ATRIAL FIBRILLATION: ICD-10-CM

## 2019-06-11 PROCEDURE — 99215 OFFICE O/P EST HI 40 MIN: CPT

## 2019-06-11 RX ORDER — AMIODARONE HYDROCHLORIDE 200 MG/1
200 TABLET ORAL
Refills: 0 | Status: ACTIVE | COMMUNITY

## 2019-06-11 RX ORDER — MENTHOL 1.4 %
ADHESIVE PATCH, MEDICATED TOPICAL
Refills: 0 | Status: ACTIVE | COMMUNITY

## 2019-06-11 RX ORDER — WARFARIN SODIUM 1 MG/1
1 TABLET ORAL
Refills: 0 | Status: ACTIVE | COMMUNITY

## 2019-06-11 NOTE — REVIEW OF SYSTEMS
[Arm Weakness] : arm weakness [Hand Weakness] :  hand weakness [Leg Weakness] : leg weakness [Difficulties in Speech] : speech difficulties [Difficulty Walking] : difficulty walking [Incontinence] : incontinence [Fever] : no fever [Chills] : no chills [Feeling Tired] : not feeling tired [Feeling Poorly] : not feeling poorly [Confused or Disoriented] : no confusion [Memory Lapses or Loss] : no memory loss [Difficulty with Language] : no ~M difficulty with language [Decr. Concentrating Ability] : no decrease in concentrating ability [Changed Thought Patterns] : no change in thought patterns [Facial Weakness] : no facial weakness [Repeating Questions] : no repeated questioning about recent events [Poor Coordination] : good coordination [Numbness] : no numbness [Difficulty Writing] : no difficulty writing [Lightheadedness] : no lightheadedness [Fainting] : no fainting [Seizures] : no convulsions [Vertigo] : no vertigo [Tension Headache] : no tension-type headache [Frequent Falls] : not falling [Ataxia] : no ataxia [Inability to Walk] : able to walk [Suicidal] : not suicidal [Depression] : no depression [Anxiety] : no anxiety [Red Eyes] : eyes not red [Eye Pain] : no eye pain [Earache] : no earache [Loss Of Hearing] : no hearing loss [Chest Pain] : no chest pain [Shortness Of Breath] : no shortness of breath [Palpitations] : no palpitations [Abdominal Pain] : no abdominal pain [Wheezing] : no wheezing [Diarrhea] : no diarrhea [Vomiting] : no vomiting [Constipation] : no constipation [Pelvic Pain] : no pelvic pain [Joint Pain] : no joint pain [Joint Swelling] : no joint swelling [Skin Wound] : no skin wound [Skin Lesions] : no skin lesions [Easy Bleeding] : no tendency for easy bleeding [Easy Bruising] : no tendency for easy bruising

## 2019-06-11 NOTE — HISTORY OF PRESENT ILLNESS
[FreeTextEntry1] : Mrs. Valentino is an 88 year old woman who presented as a transfer from [facility] for stroke rescue on 02/03. CTA showed left M1 occlusion and she underwent thrombectomy (extended window) and TICI 3 recanalization was achieved. Patient's pacemaker was interrogated and showed A. flutter. Patient was started on Coumadin. Patient had PEG placed for dysphagia and was removed 6 weeks later. She was discharged to [facility] rehab on 02/19 and is now at [facility]. She is now tolerating a regular diet.  She comes in today for a follow up visit and has made significant improvement. She has residual right sided hemiparesis and some slurred speech. Her global aphasia resolved. Her primary doctor is following her INR levels. She denies any interval stroke/TIA symptoms.

## 2019-06-11 NOTE — DISCUSSION/SUMMARY
[FreeTextEntry1] : Ms. Valentino is an 88 year old woman now 4.5 months s/p left MCA stroke and thrombectomy secondary to cerebral embolism status post EST resulting in TICI 3 reperfusion. Her mRS is 4. She is currently at [place] rehab and has made significant recovery. She has residual right sided hemiparesis and we will refer her to see Dr. Omalley in [place]. She will continue the Coumadin for stroke prevention. I will see her again in 6 months. All of their questions and concerns were addressed.

## 2019-06-11 NOTE — PHYSICAL EXAM
[General Appearance - Alert] : alert [General Appearance - Well Nourished] : well nourished [General Appearance - Well Developed] : well developed [Oriented To Time, Place, And Person] : oriented to person, place, and time [Affect] : the affect was normal [Mood] : the mood was normal [Person] : oriented to person [Place] : oriented to place [Time] : oriented to time [Concentration Intact] : normal concentrating ability [Visual Intact] : visual attention was ~T not ~L decreased [Naming Objects] : no difficulty naming common objects [Repeating Phrases] : no difficulty repeating a phrase [Writing A Sentence] : no difficulty writing a sentence [Fluency] : fluency intact [Comprehension] : comprehension intact [Reading] : reading intact [Past History] : adequate knowledge of personal past history [Cranial Nerves Optic (II)] : visual acuity intact bilaterally,  visual fields full to confrontation, pupils equal round and reactive to light [Cranial Nerves Oculomotor (III)] : extraocular motion intact [Cranial Nerves Trigeminal (V)] : facial sensation intact symmetrically [Cranial Nerves Facial (VII)] : face symmetrical [Cranial Nerves Vestibulocochlear (VIII)] : hearing was intact bilaterally [Cranial Nerves Glossopharyngeal (IX)] : tongue and palate midline [Cranial Nerves Accessory (XI - Cranial And Spinal)] : head turning and shoulder shrug symmetric [Cranial Nerves Hypoglossal (XII)] : there was no tongue deviation with protrusion [Motor Strength] : muscle strength was normal in all four extremities [Motor Tone] : muscle tone was normal in all four extremities [Motor Handedness Right-Handed] : the patient is right hand dominant [No Muscle Atrophy] : normal bulk in all four extremities [Hemiparesis Of Right Side] : hemiparesis was present on the right [Motor Strength Upper Extremities Right] : there was weakness of the right upper extremity [Motor Strength Lower Extremities Right] : there was weakness of the right lower extremity [Sensation Tactile Decrease] : light touch was intact [Full Visual Field] : full visual field [Optic Disc Abnormality] : the optic disc were normal in size and color [Extraocular Movements] : extraocular movements were intact [Hearing Threshold Finger Rub Not [place]] : hearing was normal [Neck Appearance] : the appearance of the neck was normal [Heart Rate And Rhythm] : heart rate was normal and rhythm regular [] : no respiratory distress [Edema] : there was no peripheral edema [Skin Turgor] : normal skin turgor [Musculoskeletal - Swelling] : no joint swelling seen [Abdomen Soft] : soft

## 2019-08-06 ENCOUNTER — APPOINTMENT (OUTPATIENT)
Dept: PHYSICAL MEDICINE AND REHAB | Facility: CLINIC | Age: 84
End: 2019-08-06
Payer: MEDICARE

## 2019-08-06 VITALS
BODY MASS INDEX: 45.13 KG/M2 | WEIGHT: 215 LBS | DIASTOLIC BLOOD PRESSURE: 73 MMHG | HEIGHT: 58 IN | SYSTOLIC BLOOD PRESSURE: 132 MMHG

## 2019-08-06 DIAGNOSIS — F80.1 EXPRESSIVE LANGUAGE DISORDER: ICD-10-CM

## 2019-08-06 PROCEDURE — 99214 OFFICE O/P EST MOD 30 MIN: CPT

## 2019-08-06 RX ORDER — NYSTATIN 100000 [USP'U]/G
100000 POWDER TOPICAL
Qty: 15 | Refills: 0 | Status: ACTIVE | COMMUNITY
Start: 2019-07-24

## 2019-08-06 RX ORDER — NYSTATIN 100000 [USP'U]/G
100000 CREAM TOPICAL
Qty: 30 | Refills: 0 | Status: ACTIVE | COMMUNITY
Start: 2019-06-22

## 2019-08-06 RX ORDER — METOPROLOL TARTRATE 25 MG/1
25 TABLET, FILM COATED ORAL
Qty: 90 | Refills: 0 | Status: ACTIVE | COMMUNITY
Start: 2019-07-23

## 2019-08-06 RX ORDER — WARFARIN 3 MG/1
3 TABLET ORAL
Qty: 1 | Refills: 0 | Status: ACTIVE | COMMUNITY
Start: 2019-07-15

## 2019-08-06 RX ORDER — FLUOXETINE HYDROCHLORIDE 20 MG/1
20 TABLET ORAL
Qty: 30 | Refills: 0 | Status: ACTIVE | COMMUNITY
Start: 2019-07-07

## 2019-08-06 RX ORDER — METFORMIN HYDROCHLORIDE 850 MG/1
850 TABLET, COATED ORAL
Qty: 60 | Refills: 0 | Status: ACTIVE | COMMUNITY
Start: 2019-07-05

## 2019-08-06 NOTE — PHYSICAL EXAM
[Hemiparesis Of Right Side] : hemiparesis was present on the right [Arm, Leg Weaker than Face] : with the arm and leg weaker than the face [Paresis Pronator Drift Right-Sided] : a right-sided pronator drift was present [Motor Strength Upper Extremities Right] : there was weakness of the right upper extremity [Motor Strength Upper Extremities Left] : strength was normal in the left upper extremity [Motor Strength Lower Extremities Right] : there was weakness of the right lower extremity [Motor Strength Lower Extremities Left] : strength was normal in the left lower extremity [0] : shoulder flexion 0/5 [2] : biceps 2/5 [Hand Weakness Right] : the hand  was weak [Hand Weakness Left] : normal hand  [3] : knee extension 3/5 [1] : ankle dorsiflexion 1/5 [4] : knee extension 4/5 [Hemispasticity Of Right Side] : spasticity of the right side was present [Monospasticity Of Right Arm] : spasticity of the right arm was present [Monospasticity Of Right Leg] : spasticity of the right leg was present [None] : no muscle rigidity was observed [Involuntary Movements] : no involuntary movements were seen [Cranial Nerves Optic (II)] : visual acuity and visual fields were intact [Cranial Nerves Trigeminal (V)] : sensation to the face and masseter strength were intact [Cranial Nerves Oculomotor (III)] : the extraocular motions were intact [Cranial Nerves Vestibulocochlear (VIII)] : hearing was intact [Cranial Nerves Glossopharyngeal (IX)] : there was normal movement of the soft palate and normal gag [Cranial Nerves Accessory (XI - Cranial And Spinal)] : shoulder shrug was intact bilaterally [Cranial Nerves Hypoglossal (XII)] : there was no tongue deviation with protrusion [Sensation Tactile Decrease] : light touch was intact [Flattening Of Nasolabial Fold On The Right] : flattening of the nasolabial fold [Limited Balance] : the patient's balance was impaired [Dysdiadochokinesia On The Right] : present on the ride side [3+] : right 3+ [2+] : left 2+ [Plantar Reflex Right Only] : present on the right [___] : absent on the right [Normal] : Oriented to person, place, and time, insight and judgement were intact and the affect was normal [de-identified] : dysarthria

## 2019-08-06 NOTE — REVIEW OF SYSTEMS
[Patient Intake Form Reviewed] : Patient intake form was reviewed [Fatigue] : fatigue [Joint Pain] : joint pain [Joint Stiffness] : joint stiffness [Muscle Weakness] : muscle weakness [Difficulty Walking] : difficulty walking [Negative] : Heme/Lymph

## 2019-08-06 NOTE — HISTORY OF PRESENT ILLNESS
[FreeTextEntry1] : 88F presents for a consultation for chemodenervation of her right UE and LE.\par \par HPI - Patient sustained a left MCA infarct at [facility] on 2/3/19 and was transferred to [facility] for excision of occlusion and mechanical thrombectomy. She had a PEG was placed on 2/12. Course complicated by UTI and eventually transferred to [facility] on 2/14. She was discharged to [facility] on 3/12 for 100 days and then to [place] for one month. Now is back at [facility] as a long term resident. \par \par She has not been ambulatory since a few weeks ago and would need a 2 person assist with RW to ambulate 4-5 steps. She requires assist with her ADLs and mobility. \par \par She reports no pain in her arm or leg, feels weak in her leg due to heaviness and tightness of the right UE. \par

## 2019-08-13 ENCOUNTER — APPOINTMENT (OUTPATIENT)
Dept: PHYSICAL MEDICINE AND REHAB | Facility: CLINIC | Age: 84
End: 2019-08-13

## 2019-08-13 VITALS
HEIGHT: 58 IN | DIASTOLIC BLOOD PRESSURE: 84 MMHG | WEIGHT: 215 LBS | BODY MASS INDEX: 45.13 KG/M2 | SYSTOLIC BLOOD PRESSURE: 137 MMHG

## 2019-08-22 ENCOUNTER — APPOINTMENT (OUTPATIENT)
Dept: PHYSICAL MEDICINE AND REHAB | Facility: CLINIC | Age: 84
End: 2019-08-22
Payer: MEDICARE

## 2019-08-22 PROCEDURE — 64643 CHEMODENERV 1 EXTREM 1-4 EA: CPT | Mod: GC

## 2019-08-22 PROCEDURE — 95874 GUIDE NERV DESTR NEEDLE EMG: CPT | Mod: GC

## 2019-08-22 PROCEDURE — 64642 CHEMODENERV 1 EXTREMITY 1-4: CPT | Mod: GC

## 2019-10-02 ENCOUNTER — APPOINTMENT (OUTPATIENT)
Dept: PHYSICAL MEDICINE AND REHAB | Facility: CLINIC | Age: 84
End: 2019-10-02
Payer: MEDICARE

## 2019-10-02 DIAGNOSIS — M12.811 OTHER SPECIFIC ARTHROPATHIES, NOT ELSEWHERE CLASSIFIED, RIGHT SHOULDER: ICD-10-CM

## 2019-10-02 PROCEDURE — 99213 OFFICE O/P EST LOW 20 MIN: CPT | Mod: GC

## 2019-10-03 NOTE — REVIEW OF SYSTEMS
[Joint Stiffness] : joint stiffness [Negative] : Psychiatric [Muscle Pain] : no muscle pain [Skin Rash] : no skin rash [Skin Wound] : no skin wound [Headache] : no headaches [Dizziness] : no dizziness

## 2019-10-03 NOTE — PHYSICAL EXAM
[Normal] : The appearance of the neck was normal, no neck mass was observed, the thyroid was not enlarged and there were no palpable thyroid nodules [FreeTextEntry1] : AAOx3, NAD, follows commands approprietly\par Patient presents in wheelchair\par Muscle strength: \par Patient noted to have improvements with spasticity of RUE and RLE; improved ROM at the ankle, improved supination of the right arm; patient noted to have improved in elbow extension but still has room for improvement in tone. \par LUE 4/5EF, 4/5EE, 4/5WE\par LLE 4/5HF, 4/5KE\par RUE EF3-/5, EE 3-/5; MAS 2 elbow, MAS 1 supination, MAS 1 wrist\par RLE HF3-5, KE 3-5, DF 3/5, PF3/5; MAS 1 ankle\par \par Sensation intact to light touch, no rashes observed\par CN 2-12 grossly intact\par No clonus on right\par Reflexes 3+ on right; 2+ on left\par

## 2019-10-03 NOTE — ASSESSMENT
[FreeTextEntry1] : 87 yo F s/p Left MCA infarct 2/3/19 with right sided spasticity\par \par - Patient is s/p Botox on 8/22/19 with improvements in tone of RUE and RLE. Patient will benefit from repeat Botox.\par Will plan for Botox for 11/2019 with plan to inject PT, Medial Gastrocs, and increase Botox to Biceps. Will also add Brachioradialis. \par - Patient advised to continue with stretching and exercise program\par -Patient was given script to restart therapy (PT/OT) to focus on RUE/RLE\par - Patient to follow up next month for Botox

## 2019-10-03 NOTE — HISTORY OF PRESENT ILLNESS
[FreeTextEntry1] : 89 yo F s/p left MCA infarct at [facility] on 2/3/19 and was transferred to [facility] for excision of occlusion and mechanical thrombectomy. Transferred to [facility] on 2/14.and was discharged to [facility] on 3/12 for 100 days and then to [place] for one month. Patient is back att [facility] as a long term resident. \par \par In last clinic visit patient received Botox to the RUE and RLE (Total 100 units to Right Biceps-40 units; PT-20 units; Medial Gastrocs- 40units). Patient reports no adverse reactions from the procedure. Since the Botox patient has noticed improved use of the right arm. She has been able to hold items in her hand. Patient's daughter also notices improved ROM of the arm compared to prior. Patient has also reported improved ability to stand and ambulate short distance with RW (approx 15 feet)\par \par Prior visit patient had concerns about her wheelchair which has been addressed by the facility. Patient now has a right hand rest and leg rests have been adjusted.\par Patient reports that she is currently not receiving any therapy for her RUE

## 2019-10-03 NOTE — REASON FOR VISIT
[Follow-Up] : a follow-up visit [Family Member] : family member [FreeTextEntry1] : s/p Botox to RUE and RLE

## 2019-11-12 ENCOUNTER — APPOINTMENT (OUTPATIENT)
Dept: PHYSICAL MEDICINE AND REHAB | Facility: CLINIC | Age: 84
End: 2019-11-12
Payer: MEDICARE

## 2019-11-12 VITALS
SYSTOLIC BLOOD PRESSURE: 107 MMHG | BODY MASS INDEX: 45.13 KG/M2 | DIASTOLIC BLOOD PRESSURE: 71 MMHG | HEIGHT: 58 IN | WEIGHT: 215 LBS

## 2019-11-12 PROCEDURE — 95874 GUIDE NERV DESTR NEEDLE EMG: CPT

## 2019-11-12 PROCEDURE — 64642 CHEMODENERV 1 EXTREMITY 1-4: CPT

## 2019-11-12 PROCEDURE — 64643 CHEMODENERV 1 EXTREM 1-4 EA: CPT

## 2020-02-25 ENCOUNTER — APPOINTMENT (OUTPATIENT)
Dept: PHYSICAL MEDICINE AND REHAB | Facility: CLINIC | Age: 85
End: 2020-02-25
Payer: MEDICARE

## 2020-02-25 PROCEDURE — 95874 GUIDE NERV DESTR NEEDLE EMG: CPT

## 2020-02-25 PROCEDURE — 64642 CHEMODENERV 1 EXTREMITY 1-4: CPT

## 2020-02-25 PROCEDURE — 64643 CHEMODENERV 1 EXTREM 1-4 EA: CPT

## 2020-05-26 ENCOUNTER — APPOINTMENT (OUTPATIENT)
Dept: PHYSICAL MEDICINE AND REHAB | Facility: CLINIC | Age: 85
End: 2020-05-26

## 2020-07-07 ENCOUNTER — APPOINTMENT (OUTPATIENT)
Dept: PHYSICAL MEDICINE AND REHAB | Facility: CLINIC | Age: 85
End: 2020-07-07
Payer: MEDICARE

## 2020-07-07 VITALS — BODY MASS INDEX: 45.13 KG/M2 | HEIGHT: 58 IN | TEMPERATURE: 98.4 F | WEIGHT: 215 LBS

## 2020-07-07 PROCEDURE — 64642 CHEMODENERV 1 EXTREMITY 1-4: CPT

## 2020-07-07 PROCEDURE — 64643 CHEMODENERV 1 EXTREM 1-4 EA: CPT

## 2020-07-07 PROCEDURE — 99215 OFFICE O/P EST HI 40 MIN: CPT | Mod: 25

## 2020-07-07 PROCEDURE — 95874 GUIDE NERV DESTR NEEDLE EMG: CPT

## 2020-07-07 NOTE — PHYSICAL EXAM
[Hand Weakness Right] : the hand  was weak [2] : fingers flexion 2/5 [Hand Weakness Left] : normal hand  [1] : ankle plantar flexion 1/5 [5] : ankle dorsiflexion 5/5 [Monospasticity Of Right Arm] : spasticity of the right arm was present [Monospasticity Of Right Leg] : spasticity of the right leg was present [Cranial Nerves Trigeminal (V)] : sensation to the face and masseter strength were intact [Cranial Nerves Oculomotor (III)] : the extraocular motions were intact [Cranial Nerves Vestibulocochlear (VIII)] : hearing was intact [Cranial Nerves Optic (II)] : visual acuity and visual fields were intact [Cranial Nerves Hypoglossal (XII)] : there was no tongue deviation with protrusion [Cranial Nerves Glossopharyngeal (IX)] : there was normal movement of the soft palate and normal gag [Flattening Of Nasolabial Fold On The Right] : flattening of the nasolabial fold [CNS Accessory - Diminished Shoulder Elev. - Right Trapezius] : weakness of shoulder elevation was present on the right [Tactile Decrease Hemisensory Entire Left Side] : normal left side [CNS Accessory - Sternocleidomastoid Weakness Bilateral] : no sternocleidomastoid weakness  [Tactile Decrease Hemisensory Entire Right Side] : diminished right side [___] : [unfilled] ~Ubeats present on the right [Limited Balance] : the patient's balance was impaired [Dysdiadochokinesia On The Right] : present on the ride side [Normal] : Oriented to person, place, and time, insight and judgement were intact and the affect was normal [de-identified] : Nonambulatory in WC. Needs assist with ADLs and transfers.

## 2020-07-07 NOTE — HISTORY OF PRESENT ILLNESS
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 \par \par Patient was last seen in February for her last injection and had good results, but was not optimized as her therapies stopped due to COVID19. \par \par HPI - Patient sustained a left MCA infarct at [facility] on 2/3/19 and was transferred to [facility] for excision of occlusion and mechanical thrombectomy. She had a PEG was placed on 2/12. Course complicated by UTI and eventually transferred to [facility] on 2/14. She was discharged to [facility] on 3/12 for 100 days and then to [place] for one month. Now is back at [facility] as a long term resident. \par \par She has not been ambulatory since a few weeks ago and would need a 2 person assist with RW to ambulate 4-5 steps. She requires assist with her ADLs and mobility. \par \par She reports no pain in her arm or leg, feels weak in her leg due to heaviness and tightness of the right UE. \par

## 2020-07-07 NOTE — PROCEDURE
[Consent] : consent was given by patient or guardian [Site Verification] : the injection site was verified [Post-Injection Instructions Provided] : post-injection instructions were provided [____] : in a total of [unfilled] sites [Total Vials: ___] : [unfilled] vials were used [] : EMG Guidance was used during the procedure [Total Units: ___] : [unfilled] units [Total Waste: ___] : with [unfilled] wasted [de-identified] : INR 1.38 \par \par Site localized, procedure discussed and risks/benefits discussed. Agreed with procedure and answered all questions. \par \par Tolerated procedure without complications.  [TWNoteComboBox1] : Biceps [TWNoteComboBox2] : 50 Units [de-identified] : Medial Gastrocnemius [de-identified] : Brachioradialis [de-identified] : 50 Units [de-identified] : 100 Units

## 2020-07-07 NOTE — REVIEW OF SYSTEMS
[Patient Intake Form Reviewed] : Patient intake form was reviewed [Fatigue] : fatigue [Joint Pain] : joint pain [Joint Stiffness] : joint stiffness [Muscle Weakness] : muscle weakness [Muscle Pain] : muscle pain [Difficulty Walking] : difficulty walking [Negative] : Heme/Lymph

## 2020-09-15 NOTE — PROVIDER CONTACT NOTE (OTHER) - SITUATION
Received patient from previous RN, Patient is A&OX4 in NAD on CCM. Patient had a syncope episode tonight. Denies any chest pain, sob, dizziness, blurry vision at this time. Patient is awaiting ct scan pt is in sinus tachycardia 103-113, pt assessed for nonverbal pain cues,  pt has severe aphasia, coarse lungs on ascultation

## 2020-10-07 NOTE — PRE-ANESTHESIA EVALUATION ADULT - NSANTHOSAYNRD_GEN_A_CORE
No. RAMANDEEP screening performed.  STOP BANG Legend: 0-2 = LOW Risk; 3-4 = INTERMEDIATE Risk; 5-8 = HIGH Risk
No

## 2020-11-17 ENCOUNTER — APPOINTMENT (OUTPATIENT)
Dept: PHYSICAL MEDICINE AND REHAB | Facility: CLINIC | Age: 85
End: 2020-11-17
Payer: MEDICARE

## 2020-11-17 PROCEDURE — 64642 CHEMODENERV 1 EXTREMITY 1-4: CPT

## 2020-11-17 PROCEDURE — 99215 OFFICE O/P EST HI 40 MIN: CPT | Mod: 25

## 2020-11-17 PROCEDURE — 95874 GUIDE NERV DESTR NEEDLE EMG: CPT

## 2020-11-17 PROCEDURE — 64643 CHEMODENERV 1 EXTREM 1-4 EA: CPT

## 2020-11-17 NOTE — PHYSICAL EXAM
[Hand Weakness Right] : the hand  was weak [2] : fingers flexion 2/5 [Hand Weakness Left] : normal hand  [1] : ankle plantar flexion 1/5 [5] : ankle plantar flexion 5/5 [Monospasticity Of Right Arm] : spasticity of the right arm was present [Monospasticity Of Right Leg] : spasticity of the right leg was present [Cranial Nerves Optic (II)] : visual acuity and visual fields were intact [Cranial Nerves Oculomotor (III)] : the extraocular motions were intact [Cranial Nerves Trigeminal (V)] : sensation to the face and masseter strength were intact [Cranial Nerves Vestibulocochlear (VIII)] : hearing was intact [Cranial Nerves Glossopharyngeal (IX)] : there was normal movement of the soft palate and normal gag [Cranial Nerves Hypoglossal (XII)] : there was no tongue deviation with protrusion [Flattening Of Nasolabial Fold On The Right] : flattening of the nasolabial fold [CNS Accessory - Diminished Shoulder Elev. - Right Trapezius] : weakness of shoulder elevation was present on the right [CNS Accessory - Sternocleidomastoid Weakness Bilateral] : no sternocleidomastoid weakness  [Tactile Decrease Hemisensory Entire Right Side] : diminished right side [Tactile Decrease Hemisensory Entire Left Side] : normal left side [Limited Balance] : the patient's balance was impaired [Dysdiadochokinesia On The Right] : present on the ride side [___] : [unfilled] ~Ubeats present on the right [Normal] : Oriented to person, place, and time, insight and judgement were intact and the affect was normal [de-identified] : Nonambulatory in WC. Needs assist with ADLs and transfers.  [de-identified] : Meghan Biceps 2, Calf

## 2020-11-17 NOTE — HISTORY OF PRESENT ILLNESS
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 \par \par Patient was last seen in July for her last injection and had good results. She was provided with PT and OT prescriptions and has not been provided with these services at her NH. As per patient, has 10 minutes where she can walk. Otherwise, stays in her WC throughout the day in a diaper. \par \par \par HPI - Patient sustained a left MCA infarct at [facility] on 2/3/19 and was transferred to [facility] for excision of occlusion and mechanical thrombectomy. She had a PEG was placed on 2/12. Course complicated by UTI and eventually transferred to [facility] on 2/14. She was discharged to [facility] on 3/12 for 100 days and then to [place] for one month. Now is back at [facility] as a long term resident. \par \par She has not been ambulatory since a few weeks ago and would need a 2 person assist with  to ambulate 4-5 steps. She requires assist with her ADLs and mobility. \par \par She reports no pain in her arm or leg, feels weak in her leg due to heaviness and tightness of the right UE. \par

## 2020-11-17 NOTE — PROCEDURE
[Consent] : consent was given by patient or guardian [Site Verification] : the injection site was verified [Post-Injection Instructions Provided] : post-injection instructions were provided [____] : in a total of [unfilled] sites [] : EMG Guidance was used during the procedure [Total Units: ___] : [unfilled] units [Total Vials: ___] : [unfilled] vials were used [Total Waste: ___] : with [unfilled] wasted [de-identified] : Site localized, procedure discussed and risks/benefits discussed. Agreed with procedure and answered all questions. \par \par Tolerated procedure without complications.  [TWNoteComboBox1] : Biceps [TWNoteComboBox2] : 50 Units [de-identified] : Brachioradialis [de-identified] : 50 Units [de-identified] : Medial Gastrocnemius [de-identified] : 100 Units

## 2020-11-20 NOTE — PROGRESS NOTE ADULT - ASSESSMENT
NURSE NOTES:

reposition and suction 89 y/o female with multiple comorbidities being followed by GI for erythema around Peg. No complaints from patient at this time. Tolerating regular food by mouth. Abd soft. Erythema around peg improved. Retention ring readjusted so its tighter to abdomen.     Continue Zinc to peg site with drsg change  Monitor peg insertion site

## 2020-12-07 NOTE — ED ADULT TRIAGE NOTE - ESI TRIAGE ACUITY LEVEL, MLM
Samples obtained at 1131, 1133, 1135, and 1136. Specimens labeled per policy by tech.    Clip placed at 1137. MD holding firm pressure.    RN took over holding firm pressure to biopsy site.  Reviewed verbally discharge instructions with pt who verbalized good understanding.  Sterile dressing applied, no bleeding noted at this time.  Assisted pt to side of bed, pt states she is feeling well.       2

## 2021-01-01 ENCOUNTER — APPOINTMENT (OUTPATIENT)
Dept: PHYSICAL MEDICINE AND REHAB | Facility: CLINIC | Age: 86
End: 2021-01-01

## 2021-01-01 ENCOUNTER — TRANSCRIPTION ENCOUNTER (OUTPATIENT)
Age: 86
End: 2021-01-01

## 2021-01-01 ENCOUNTER — APPOINTMENT (OUTPATIENT)
Dept: PHYSICAL MEDICINE AND REHAB | Facility: CLINIC | Age: 86
End: 2021-01-01
Payer: MEDICARE

## 2021-01-01 ENCOUNTER — INPATIENT (INPATIENT)
Facility: HOSPITAL | Age: 86
LOS: 14 days | Discharge: NURSING, REHAB OR LONG-TERM CARE | DRG: 163 | End: 2021-06-22
Attending: FAMILY MEDICINE | Admitting: HOSPITALIST
Payer: MEDICARE

## 2021-01-01 ENCOUNTER — APPOINTMENT (OUTPATIENT)
Dept: THORACIC SURGERY | Facility: HOSPITAL | Age: 86
End: 2021-01-01

## 2021-01-01 ENCOUNTER — RESULT REVIEW (OUTPATIENT)
Age: 86
End: 2021-01-01

## 2021-01-01 ENCOUNTER — INPATIENT (INPATIENT)
Facility: HOSPITAL | Age: 86
LOS: 0 days | DRG: 951 | End: 2021-08-20
Attending: STUDENT IN AN ORGANIZED HEALTH CARE EDUCATION/TRAINING PROGRAM | Admitting: STUDENT IN AN ORGANIZED HEALTH CARE EDUCATION/TRAINING PROGRAM
Payer: MEDICARE

## 2021-01-01 VITALS
WEIGHT: 184.97 LBS | OXYGEN SATURATION: 100 % | RESPIRATION RATE: 38 BRPM | HEIGHT: 64 IN | DIASTOLIC BLOOD PRESSURE: 50 MMHG | SYSTOLIC BLOOD PRESSURE: 132 MMHG | HEART RATE: 69 BPM

## 2021-01-01 VITALS
RESPIRATION RATE: 20 BRPM | DIASTOLIC BLOOD PRESSURE: 85 MMHG | OXYGEN SATURATION: 96 % | SYSTOLIC BLOOD PRESSURE: 130 MMHG | WEIGHT: 184.97 LBS | HEIGHT: 64 IN | TEMPERATURE: 98 F | HEART RATE: 122 BPM

## 2021-01-01 VITALS
DIASTOLIC BLOOD PRESSURE: 83 MMHG | RESPIRATION RATE: 20 BRPM | SYSTOLIC BLOOD PRESSURE: 127 MMHG | OXYGEN SATURATION: 99 % | TEMPERATURE: 98 F | HEART RATE: 80 BPM

## 2021-01-01 DIAGNOSIS — Z95.2 PRESENCE OF PROSTHETIC HEART VALVE: Chronic | ICD-10-CM

## 2021-01-01 DIAGNOSIS — G81.11 SPASTIC HEMIPLEGIA AFFECTING RIGHT DOMINANT SIDE: ICD-10-CM

## 2021-01-01 DIAGNOSIS — R26.9 UNSPECIFIED ABNORMALITIES OF GAIT AND MOBILITY: ICD-10-CM

## 2021-01-01 DIAGNOSIS — Z90.49 ACQUIRED ABSENCE OF OTHER SPECIFIED PARTS OF DIGESTIVE TRACT: Chronic | ICD-10-CM

## 2021-01-01 DIAGNOSIS — Z95.5 PRESENCE OF CORONARY ANGIOPLASTY IMPLANT AND GRAFT: Chronic | ICD-10-CM

## 2021-01-01 DIAGNOSIS — Z91.89 OTHER SPECIFIED PERSONAL RISK FACTORS, NOT ELSEWHERE CLASSIFIED: ICD-10-CM

## 2021-01-01 DIAGNOSIS — I63.512 CEREBRAL INFARCTION DUE TO UNSPECIFIED OCCLUSION OR STENOSIS OF LEFT MIDDLE CEREBRAL ARTERY: ICD-10-CM

## 2021-01-01 DIAGNOSIS — Z74.09 OTHER REDUCED MOBILITY: ICD-10-CM

## 2021-01-01 DIAGNOSIS — Z95.0 PRESENCE OF CARDIAC PACEMAKER: Chronic | ICD-10-CM

## 2021-01-01 DIAGNOSIS — J90 PLEURAL EFFUSION, NOT ELSEWHERE CLASSIFIED: ICD-10-CM

## 2021-01-01 DIAGNOSIS — Z23 ENCOUNTER FOR IMMUNIZATION: ICD-10-CM

## 2021-01-01 DIAGNOSIS — J96.01 ACUTE RESPIRATORY FAILURE WITH HYPOXIA: ICD-10-CM

## 2021-01-01 DIAGNOSIS — R47.1 DYSARTHRIA AND ANARTHRIA: ICD-10-CM

## 2021-01-01 DIAGNOSIS — Z78.9 OTHER REDUCED MOBILITY: ICD-10-CM

## 2021-01-01 DIAGNOSIS — Z95.1 PRESENCE OF AORTOCORONARY BYPASS GRAFT: Chronic | ICD-10-CM

## 2021-01-01 LAB
A1C WITH ESTIMATED AVERAGE GLUCOSE RESULT: 5.8 % — HIGH (ref 4–5.6)
ALBUMIN FLD-MCNC: 2.3 G/DL — SIGNIFICANT CHANGE UP
ALBUMIN SERPL ELPH-MCNC: 2.3 G/DL — LOW (ref 3.3–5.2)
ALBUMIN SERPL ELPH-MCNC: 2.4 G/DL — LOW (ref 3.3–5.2)
ALBUMIN SERPL ELPH-MCNC: 2.4 G/DL — LOW (ref 3.3–5.2)
ALBUMIN SERPL ELPH-MCNC: 2.5 G/DL — LOW (ref 3.3–5.2)
ALBUMIN SERPL ELPH-MCNC: 2.6 G/DL — LOW (ref 3.3–5.2)
ALBUMIN SERPL ELPH-MCNC: 2.9 G/DL — LOW (ref 3.3–5.2)
ALP SERPL-CCNC: 101 U/L — SIGNIFICANT CHANGE UP (ref 40–120)
ALP SERPL-CCNC: 108 U/L — SIGNIFICANT CHANGE UP (ref 40–120)
ALP SERPL-CCNC: 79 U/L — SIGNIFICANT CHANGE UP (ref 40–120)
ALP SERPL-CCNC: 80 U/L — SIGNIFICANT CHANGE UP (ref 40–120)
ALP SERPL-CCNC: 84 U/L — SIGNIFICANT CHANGE UP (ref 40–120)
ALP SERPL-CCNC: 90 U/L — SIGNIFICANT CHANGE UP (ref 40–120)
ALT FLD-CCNC: 10 U/L — SIGNIFICANT CHANGE UP
ALT FLD-CCNC: 8 U/L — SIGNIFICANT CHANGE UP
ALT FLD-CCNC: 9 U/L — SIGNIFICANT CHANGE UP
ANION GAP SERPL CALC-SCNC: 10 MMOL/L — SIGNIFICANT CHANGE UP (ref 5–17)
ANION GAP SERPL CALC-SCNC: 10 MMOL/L — SIGNIFICANT CHANGE UP (ref 5–17)
ANION GAP SERPL CALC-SCNC: 11 MMOL/L — SIGNIFICANT CHANGE UP (ref 5–17)
ANION GAP SERPL CALC-SCNC: 11 MMOL/L — SIGNIFICANT CHANGE UP (ref 5–17)
ANION GAP SERPL CALC-SCNC: 12 MMOL/L — SIGNIFICANT CHANGE UP (ref 5–17)
ANION GAP SERPL CALC-SCNC: 13 MMOL/L — SIGNIFICANT CHANGE UP (ref 5–17)
ANION GAP SERPL CALC-SCNC: 14 MMOL/L — SIGNIFICANT CHANGE UP (ref 5–17)
ANION GAP SERPL CALC-SCNC: 14 MMOL/L — SIGNIFICANT CHANGE UP (ref 5–17)
ANION GAP SERPL CALC-SCNC: 15 MMOL/L — SIGNIFICANT CHANGE UP (ref 5–17)
ANION GAP SERPL CALC-SCNC: 16 MMOL/L — SIGNIFICANT CHANGE UP (ref 5–17)
ANION GAP SERPL CALC-SCNC: 18 MMOL/L — HIGH (ref 5–17)
APPEARANCE UR: ABNORMAL
APPEARANCE UR: ABNORMAL
APTT BLD: 32.2 SEC — SIGNIFICANT CHANGE UP (ref 27.5–35.5)
APTT BLD: 43.8 SEC — HIGH (ref 27.5–35.5)
AST SERPL-CCNC: 16 U/L — SIGNIFICANT CHANGE UP
AST SERPL-CCNC: 18 U/L — SIGNIFICANT CHANGE UP
AST SERPL-CCNC: 28 U/L — SIGNIFICANT CHANGE UP
AST SERPL-CCNC: 29 U/L — SIGNIFICANT CHANGE UP
AST SERPL-CCNC: 31 U/L — SIGNIFICANT CHANGE UP
AST SERPL-CCNC: 35 U/L — HIGH
B PERT IGG+IGM PNL SER: CLEAR — SIGNIFICANT CHANGE UP
BACTERIA # UR AUTO: ABNORMAL
BACTERIA # UR AUTO: ABNORMAL
BASE EXCESS BLDV CALC-SCNC: 6.6 MMOL/L — HIGH (ref -2–2)
BASOPHILS # BLD AUTO: 0.02 K/UL — SIGNIFICANT CHANGE UP (ref 0–0.2)
BASOPHILS # BLD AUTO: 0.02 K/UL — SIGNIFICANT CHANGE UP (ref 0–0.2)
BASOPHILS # BLD AUTO: 0.03 K/UL — SIGNIFICANT CHANGE UP (ref 0–0.2)
BASOPHILS # BLD AUTO: 0.03 K/UL — SIGNIFICANT CHANGE UP (ref 0–0.2)
BASOPHILS # BLD AUTO: 0.04 K/UL — SIGNIFICANT CHANGE UP (ref 0–0.2)
BASOPHILS # BLD AUTO: 0.05 K/UL — SIGNIFICANT CHANGE UP (ref 0–0.2)
BASOPHILS NFR BLD AUTO: 0.2 % — SIGNIFICANT CHANGE UP (ref 0–2)
BASOPHILS NFR BLD AUTO: 0.2 % — SIGNIFICANT CHANGE UP (ref 0–2)
BASOPHILS NFR BLD AUTO: 0.3 % — SIGNIFICANT CHANGE UP (ref 0–2)
BASOPHILS NFR BLD AUTO: 0.4 % — SIGNIFICANT CHANGE UP (ref 0–2)
BASOPHILS NFR BLD AUTO: 0.4 % — SIGNIFICANT CHANGE UP (ref 0–2)
BASOPHILS NFR BLD AUTO: 0.5 % — SIGNIFICANT CHANGE UP (ref 0–2)
BILIRUB SERPL-MCNC: 0.3 MG/DL — LOW (ref 0.4–2)
BILIRUB SERPL-MCNC: 0.3 MG/DL — LOW (ref 0.4–2)
BILIRUB SERPL-MCNC: 0.4 MG/DL — SIGNIFICANT CHANGE UP (ref 0.4–2)
BILIRUB SERPL-MCNC: 0.5 MG/DL — SIGNIFICANT CHANGE UP (ref 0.4–2)
BILIRUB SERPL-MCNC: 0.6 MG/DL — SIGNIFICANT CHANGE UP (ref 0.4–2)
BILIRUB SERPL-MCNC: 0.7 MG/DL — SIGNIFICANT CHANGE UP (ref 0.4–2)
BILIRUB UR-MCNC: ABNORMAL
BILIRUB UR-MCNC: NEGATIVE — SIGNIFICANT CHANGE UP
BLD GP AB SCN SERPL QL: SIGNIFICANT CHANGE UP
BUN SERPL-MCNC: 12.6 MG/DL — SIGNIFICANT CHANGE UP (ref 8–20)
BUN SERPL-MCNC: 13.6 MG/DL — SIGNIFICANT CHANGE UP (ref 8–20)
BUN SERPL-MCNC: 14 MG/DL — SIGNIFICANT CHANGE UP (ref 8–20)
BUN SERPL-MCNC: 14.3 MG/DL — SIGNIFICANT CHANGE UP (ref 8–20)
BUN SERPL-MCNC: 14.9 MG/DL — SIGNIFICANT CHANGE UP (ref 8–20)
BUN SERPL-MCNC: 15 MG/DL — SIGNIFICANT CHANGE UP (ref 8–20)
BUN SERPL-MCNC: 15 MG/DL — SIGNIFICANT CHANGE UP (ref 8–20)
BUN SERPL-MCNC: 15.5 MG/DL — SIGNIFICANT CHANGE UP (ref 8–20)
BUN SERPL-MCNC: 15.8 MG/DL — SIGNIFICANT CHANGE UP (ref 8–20)
BUN SERPL-MCNC: 15.8 MG/DL — SIGNIFICANT CHANGE UP (ref 8–20)
BUN SERPL-MCNC: 16.7 MG/DL — SIGNIFICANT CHANGE UP (ref 8–20)
BUN SERPL-MCNC: 17.6 MG/DL — SIGNIFICANT CHANGE UP (ref 8–20)
BUN SERPL-MCNC: 18.3 MG/DL — SIGNIFICANT CHANGE UP (ref 8–20)
BUN SERPL-MCNC: 18.5 MG/DL — SIGNIFICANT CHANGE UP (ref 8–20)
BUN SERPL-MCNC: 18.7 MG/DL — SIGNIFICANT CHANGE UP (ref 8–20)
BUN SERPL-MCNC: 19.5 MG/DL — SIGNIFICANT CHANGE UP (ref 8–20)
CA-I SERPL-SCNC: 1.09 MMOL/L — LOW (ref 1.15–1.33)
CALCIUM SERPL-MCNC: 7.9 MG/DL — LOW (ref 8.6–10.2)
CALCIUM SERPL-MCNC: 7.9 MG/DL — LOW (ref 8.6–10.2)
CALCIUM SERPL-MCNC: 8 MG/DL — LOW (ref 8.6–10.2)
CALCIUM SERPL-MCNC: 8.2 MG/DL — LOW (ref 8.6–10.2)
CALCIUM SERPL-MCNC: 8.2 MG/DL — LOW (ref 8.6–10.2)
CALCIUM SERPL-MCNC: 8.3 MG/DL — LOW (ref 8.6–10.2)
CALCIUM SERPL-MCNC: 8.3 MG/DL — LOW (ref 8.6–10.2)
CALCIUM SERPL-MCNC: 8.4 MG/DL — LOW (ref 8.6–10.2)
CALCIUM SERPL-MCNC: 8.4 MG/DL — LOW (ref 8.6–10.2)
CALCIUM SERPL-MCNC: 8.5 MG/DL — LOW (ref 8.6–10.2)
CALCIUM SERPL-MCNC: 8.5 MG/DL — LOW (ref 8.6–10.2)
CALCIUM SERPL-MCNC: 8.6 MG/DL — SIGNIFICANT CHANGE UP (ref 8.6–10.2)
CALCIUM SERPL-MCNC: 8.7 MG/DL — SIGNIFICANT CHANGE UP (ref 8.6–10.2)
CALCIUM SERPL-MCNC: 9 MG/DL — SIGNIFICANT CHANGE UP (ref 8.6–10.2)
CHLORIDE BLDV-SCNC: 98 MMOL/L — SIGNIFICANT CHANGE UP (ref 98–107)
CHLORIDE SERPL-SCNC: 102 MMOL/L — SIGNIFICANT CHANGE UP (ref 98–107)
CHLORIDE SERPL-SCNC: 102 MMOL/L — SIGNIFICANT CHANGE UP (ref 98–107)
CHLORIDE SERPL-SCNC: 103 MMOL/L — SIGNIFICANT CHANGE UP (ref 98–107)
CHLORIDE SERPL-SCNC: 107 MMOL/L — SIGNIFICANT CHANGE UP (ref 98–107)
CHLORIDE SERPL-SCNC: 108 MMOL/L — HIGH (ref 98–107)
CHLORIDE SERPL-SCNC: 108 MMOL/L — HIGH (ref 98–107)
CHLORIDE SERPL-SCNC: 109 MMOL/L — HIGH (ref 98–107)
CHLORIDE SERPL-SCNC: 92 MMOL/L — LOW (ref 98–107)
CHLORIDE SERPL-SCNC: 94 MMOL/L — LOW (ref 98–107)
CHLORIDE SERPL-SCNC: 95 MMOL/L — LOW (ref 98–107)
CHLORIDE SERPL-SCNC: 96 MMOL/L — LOW (ref 98–107)
CHLORIDE SERPL-SCNC: 97 MMOL/L — LOW (ref 98–107)
CHLORIDE SERPL-SCNC: 98 MMOL/L — SIGNIFICANT CHANGE UP (ref 98–107)
CHLORIDE SERPL-SCNC: 99 MMOL/L — SIGNIFICANT CHANGE UP (ref 98–107)
CO2 SERPL-SCNC: 20 MMOL/L — LOW (ref 22–29)
CO2 SERPL-SCNC: 20 MMOL/L — LOW (ref 22–29)
CO2 SERPL-SCNC: 21 MMOL/L — LOW (ref 22–29)
CO2 SERPL-SCNC: 22 MMOL/L — SIGNIFICANT CHANGE UP (ref 22–29)
CO2 SERPL-SCNC: 22 MMOL/L — SIGNIFICANT CHANGE UP (ref 22–29)
CO2 SERPL-SCNC: 23 MMOL/L — SIGNIFICANT CHANGE UP (ref 22–29)
CO2 SERPL-SCNC: 25 MMOL/L — SIGNIFICANT CHANGE UP (ref 22–29)
CO2 SERPL-SCNC: 26 MMOL/L — SIGNIFICANT CHANGE UP (ref 22–29)
CO2 SERPL-SCNC: 27 MMOL/L — SIGNIFICANT CHANGE UP (ref 22–29)
CO2 SERPL-SCNC: 27 MMOL/L — SIGNIFICANT CHANGE UP (ref 22–29)
COD CRY URNS QL: ABNORMAL
COLOR FLD: YELLOW
COLOR SPEC: YELLOW — SIGNIFICANT CHANGE UP
COLOR SPEC: YELLOW — SIGNIFICANT CHANGE UP
COMMENT - URINE: SIGNIFICANT CHANGE UP
COVID-19 SPIKE DOMAIN AB INTERP: POSITIVE
COVID-19 SPIKE DOMAIN ANTIBODY RESULT: >250 U/ML — HIGH
CREAT SERPL-MCNC: 0.57 MG/DL — SIGNIFICANT CHANGE UP (ref 0.5–1.3)
CREAT SERPL-MCNC: 0.65 MG/DL — SIGNIFICANT CHANGE UP (ref 0.5–1.3)
CREAT SERPL-MCNC: 0.66 MG/DL — SIGNIFICANT CHANGE UP (ref 0.5–1.3)
CREAT SERPL-MCNC: 0.67 MG/DL — SIGNIFICANT CHANGE UP (ref 0.5–1.3)
CREAT SERPL-MCNC: 0.69 MG/DL — SIGNIFICANT CHANGE UP (ref 0.5–1.3)
CREAT SERPL-MCNC: 0.76 MG/DL — SIGNIFICANT CHANGE UP (ref 0.5–1.3)
CREAT SERPL-MCNC: 0.8 MG/DL — SIGNIFICANT CHANGE UP (ref 0.5–1.3)
CREAT SERPL-MCNC: 0.91 MG/DL — SIGNIFICANT CHANGE UP (ref 0.5–1.3)
CREAT SERPL-MCNC: 0.93 MG/DL — SIGNIFICANT CHANGE UP (ref 0.5–1.3)
CREAT SERPL-MCNC: 0.93 MG/DL — SIGNIFICANT CHANGE UP (ref 0.5–1.3)
CREAT SERPL-MCNC: 0.94 MG/DL — SIGNIFICANT CHANGE UP (ref 0.5–1.3)
CREAT SERPL-MCNC: 0.96 MG/DL — SIGNIFICANT CHANGE UP (ref 0.5–1.3)
CREAT SERPL-MCNC: 0.97 MG/DL — SIGNIFICANT CHANGE UP (ref 0.5–1.3)
CREAT SERPL-MCNC: 1.01 MG/DL — SIGNIFICANT CHANGE UP (ref 0.5–1.3)
CREAT SERPL-MCNC: 1.33 MG/DL — HIGH (ref 0.5–1.3)
CREAT SERPL-MCNC: 1.41 MG/DL — HIGH (ref 0.5–1.3)
CULTURE RESULTS: SIGNIFICANT CHANGE UP
CULTURE RESULTS: SIGNIFICANT CHANGE UP
DIFF PNL FLD: ABNORMAL
DIFF PNL FLD: NEGATIVE — SIGNIFICANT CHANGE UP
EOSINOPHIL # BLD AUTO: 0 K/UL — SIGNIFICANT CHANGE UP (ref 0–0.5)
EOSINOPHIL # BLD AUTO: 0.02 K/UL — SIGNIFICANT CHANGE UP (ref 0–0.5)
EOSINOPHIL # BLD AUTO: 0.02 K/UL — SIGNIFICANT CHANGE UP (ref 0–0.5)
EOSINOPHIL # BLD AUTO: 0.04 K/UL — SIGNIFICANT CHANGE UP (ref 0–0.5)
EOSINOPHIL # BLD AUTO: 0.05 K/UL — SIGNIFICANT CHANGE UP (ref 0–0.5)
EOSINOPHIL # BLD AUTO: 0.09 K/UL — SIGNIFICANT CHANGE UP (ref 0–0.5)
EOSINOPHIL NFR BLD AUTO: 0 % — SIGNIFICANT CHANGE UP (ref 0–6)
EOSINOPHIL NFR BLD AUTO: 0.2 % — SIGNIFICANT CHANGE UP (ref 0–6)
EOSINOPHIL NFR BLD AUTO: 0.2 % — SIGNIFICANT CHANGE UP (ref 0–6)
EOSINOPHIL NFR BLD AUTO: 0.5 % — SIGNIFICANT CHANGE UP (ref 0–6)
EOSINOPHIL NFR BLD AUTO: 0.5 % — SIGNIFICANT CHANGE UP (ref 0–6)
EOSINOPHIL NFR BLD AUTO: 1 % — SIGNIFICANT CHANGE UP (ref 0–6)
EPI CELLS # UR: SIGNIFICANT CHANGE UP
EPI CELLS # UR: SIGNIFICANT CHANGE UP
ESTIMATED AVERAGE GLUCOSE: 120 MG/DL — HIGH (ref 68–114)
FERRITIN SERPL-MCNC: 334 NG/ML — HIGH (ref 15–150)
FLUID INTAKE SUBSTANCE CLASS: SIGNIFICANT CHANGE UP
FLUID SEGMENTED GRANULOCYTES: 18 % — SIGNIFICANT CHANGE UP
GAS PNL BLDV: 139 MMOL/L — SIGNIFICANT CHANGE UP (ref 135–145)
GAS PNL BLDV: SIGNIFICANT CHANGE UP
GAS PNL BLDV: SIGNIFICANT CHANGE UP
GLUCOSE BLDC GLUCOMTR-MCNC: 108 MG/DL — HIGH (ref 70–99)
GLUCOSE BLDC GLUCOMTR-MCNC: 112 MG/DL — HIGH (ref 70–99)
GLUCOSE BLDC GLUCOMTR-MCNC: 113 MG/DL — HIGH (ref 70–99)
GLUCOSE BLDC GLUCOMTR-MCNC: 114 MG/DL — HIGH (ref 70–99)
GLUCOSE BLDC GLUCOMTR-MCNC: 115 MG/DL — HIGH (ref 70–99)
GLUCOSE BLDC GLUCOMTR-MCNC: 117 MG/DL — HIGH (ref 70–99)
GLUCOSE BLDC GLUCOMTR-MCNC: 122 MG/DL — HIGH (ref 70–99)
GLUCOSE BLDC GLUCOMTR-MCNC: 123 MG/DL — HIGH (ref 70–99)
GLUCOSE BLDC GLUCOMTR-MCNC: 124 MG/DL — HIGH (ref 70–99)
GLUCOSE BLDC GLUCOMTR-MCNC: 124 MG/DL — HIGH (ref 70–99)
GLUCOSE BLDC GLUCOMTR-MCNC: 125 MG/DL — HIGH (ref 70–99)
GLUCOSE BLDC GLUCOMTR-MCNC: 126 MG/DL — HIGH (ref 70–99)
GLUCOSE BLDC GLUCOMTR-MCNC: 127 MG/DL — HIGH (ref 70–99)
GLUCOSE BLDC GLUCOMTR-MCNC: 128 MG/DL — HIGH (ref 70–99)
GLUCOSE BLDC GLUCOMTR-MCNC: 131 MG/DL — HIGH (ref 70–99)
GLUCOSE BLDC GLUCOMTR-MCNC: 132 MG/DL — HIGH (ref 70–99)
GLUCOSE BLDC GLUCOMTR-MCNC: 137 MG/DL — HIGH (ref 70–99)
GLUCOSE BLDC GLUCOMTR-MCNC: 138 MG/DL — HIGH (ref 70–99)
GLUCOSE BLDC GLUCOMTR-MCNC: 140 MG/DL — HIGH (ref 70–99)
GLUCOSE BLDC GLUCOMTR-MCNC: 140 MG/DL — HIGH (ref 70–99)
GLUCOSE BLDC GLUCOMTR-MCNC: 144 MG/DL — HIGH (ref 70–99)
GLUCOSE BLDC GLUCOMTR-MCNC: 144 MG/DL — HIGH (ref 70–99)
GLUCOSE BLDC GLUCOMTR-MCNC: 148 MG/DL — HIGH (ref 70–99)
GLUCOSE BLDC GLUCOMTR-MCNC: 149 MG/DL — HIGH (ref 70–99)
GLUCOSE BLDC GLUCOMTR-MCNC: 149 MG/DL — HIGH (ref 70–99)
GLUCOSE BLDC GLUCOMTR-MCNC: 151 MG/DL — HIGH (ref 70–99)
GLUCOSE BLDC GLUCOMTR-MCNC: 155 MG/DL — HIGH (ref 70–99)
GLUCOSE BLDC GLUCOMTR-MCNC: 156 MG/DL — HIGH (ref 70–99)
GLUCOSE BLDC GLUCOMTR-MCNC: 157 MG/DL — HIGH (ref 70–99)
GLUCOSE BLDC GLUCOMTR-MCNC: 158 MG/DL — HIGH (ref 70–99)
GLUCOSE BLDC GLUCOMTR-MCNC: 160 MG/DL — HIGH (ref 70–99)
GLUCOSE BLDC GLUCOMTR-MCNC: 162 MG/DL — HIGH (ref 70–99)
GLUCOSE BLDC GLUCOMTR-MCNC: 165 MG/DL — HIGH (ref 70–99)
GLUCOSE BLDC GLUCOMTR-MCNC: 167 MG/DL — HIGH (ref 70–99)
GLUCOSE BLDC GLUCOMTR-MCNC: 167 MG/DL — HIGH (ref 70–99)
GLUCOSE BLDC GLUCOMTR-MCNC: 169 MG/DL — HIGH (ref 70–99)
GLUCOSE BLDC GLUCOMTR-MCNC: 169 MG/DL — HIGH (ref 70–99)
GLUCOSE BLDC GLUCOMTR-MCNC: 170 MG/DL — HIGH (ref 70–99)
GLUCOSE BLDC GLUCOMTR-MCNC: 171 MG/DL — HIGH (ref 70–99)
GLUCOSE BLDC GLUCOMTR-MCNC: 171 MG/DL — HIGH (ref 70–99)
GLUCOSE BLDC GLUCOMTR-MCNC: 172 MG/DL — HIGH (ref 70–99)
GLUCOSE BLDC GLUCOMTR-MCNC: 172 MG/DL — HIGH (ref 70–99)
GLUCOSE BLDC GLUCOMTR-MCNC: 173 MG/DL — HIGH (ref 70–99)
GLUCOSE BLDC GLUCOMTR-MCNC: 175 MG/DL — HIGH (ref 70–99)
GLUCOSE BLDC GLUCOMTR-MCNC: 176 MG/DL — HIGH (ref 70–99)
GLUCOSE BLDC GLUCOMTR-MCNC: 177 MG/DL — HIGH (ref 70–99)
GLUCOSE BLDC GLUCOMTR-MCNC: 181 MG/DL — HIGH (ref 70–99)
GLUCOSE BLDC GLUCOMTR-MCNC: 182 MG/DL — HIGH (ref 70–99)
GLUCOSE BLDC GLUCOMTR-MCNC: 186 MG/DL — HIGH (ref 70–99)
GLUCOSE BLDC GLUCOMTR-MCNC: 192 MG/DL — HIGH (ref 70–99)
GLUCOSE BLDC GLUCOMTR-MCNC: 195 MG/DL — HIGH (ref 70–99)
GLUCOSE BLDV-MCNC: 150 MG/DL — HIGH (ref 70–99)
GLUCOSE FLD-MCNC: 125 MG/DL — SIGNIFICANT CHANGE UP
GLUCOSE SERPL-MCNC: 101 MG/DL — HIGH (ref 70–99)
GLUCOSE SERPL-MCNC: 104 MG/DL — HIGH (ref 70–99)
GLUCOSE SERPL-MCNC: 106 MG/DL — HIGH (ref 70–99)
GLUCOSE SERPL-MCNC: 120 MG/DL — HIGH (ref 70–99)
GLUCOSE SERPL-MCNC: 121 MG/DL — HIGH (ref 70–99)
GLUCOSE SERPL-MCNC: 121 MG/DL — HIGH (ref 70–99)
GLUCOSE SERPL-MCNC: 123 MG/DL — HIGH (ref 70–99)
GLUCOSE SERPL-MCNC: 131 MG/DL — HIGH (ref 70–99)
GLUCOSE SERPL-MCNC: 154 MG/DL — HIGH (ref 70–99)
GLUCOSE SERPL-MCNC: 155 MG/DL — HIGH (ref 70–99)
GLUCOSE SERPL-MCNC: 162 MG/DL — HIGH (ref 70–99)
GLUCOSE SERPL-MCNC: 179 MG/DL — HIGH (ref 70–99)
GLUCOSE SERPL-MCNC: 180 MG/DL — HIGH (ref 70–99)
GLUCOSE SERPL-MCNC: 186 MG/DL — HIGH (ref 70–99)
GLUCOSE SERPL-MCNC: 92 MG/DL — SIGNIFICANT CHANGE UP (ref 70–99)
GLUCOSE SERPL-MCNC: 97 MG/DL — SIGNIFICANT CHANGE UP (ref 70–99)
GLUCOSE UR QL: NEGATIVE MG/DL — SIGNIFICANT CHANGE UP
GLUCOSE UR QL: NEGATIVE — SIGNIFICANT CHANGE UP
GRAM STN FLD: SIGNIFICANT CHANGE UP
HCO3 BLDV-SCNC: 30 MMOL/L — HIGH (ref 21–29)
HCT VFR BLD CALC: 26.6 % — LOW (ref 34.5–45)
HCT VFR BLD CALC: 27.4 % — LOW (ref 34.5–45)
HCT VFR BLD CALC: 27.9 % — LOW (ref 34.5–45)
HCT VFR BLD CALC: 28.4 % — LOW (ref 34.5–45)
HCT VFR BLD CALC: 29.2 % — LOW (ref 34.5–45)
HCT VFR BLD CALC: 29.4 % — LOW (ref 34.5–45)
HCT VFR BLD CALC: 29.6 % — LOW (ref 34.5–45)
HCT VFR BLD CALC: 33.4 % — LOW (ref 34.5–45)
HCT VFR BLD CALC: 34.1 % — LOW (ref 34.5–45)
HCT VFR BLD CALC: 35 % — SIGNIFICANT CHANGE UP (ref 34.5–45)
HCT VFR BLD CALC: 35.1 % — SIGNIFICANT CHANGE UP (ref 34.5–45)
HCT VFR BLD CALC: 35.3 % — SIGNIFICANT CHANGE UP (ref 34.5–45)
HCT VFR BLD CALC: 36.5 % — SIGNIFICANT CHANGE UP (ref 34.5–45)
HCT VFR BLD CALC: 39 % — SIGNIFICANT CHANGE UP (ref 34.5–45)
HCT VFR BLDA CALC: 37 — LOW (ref 39–50)
HGB BLD CALC-MCNC: 12.1 G/DL — SIGNIFICANT CHANGE UP (ref 11.5–15.5)
HGB BLD-MCNC: 10.2 G/DL — LOW (ref 11.5–15.5)
HGB BLD-MCNC: 10.7 G/DL — LOW (ref 11.5–15.5)
HGB BLD-MCNC: 10.9 G/DL — LOW (ref 11.5–15.5)
HGB BLD-MCNC: 11.1 G/DL — LOW (ref 11.5–15.5)
HGB BLD-MCNC: 11.2 G/DL — LOW (ref 11.5–15.5)
HGB BLD-MCNC: 11.6 G/DL — SIGNIFICANT CHANGE UP (ref 11.5–15.5)
HGB BLD-MCNC: 12.4 G/DL — SIGNIFICANT CHANGE UP (ref 11.5–15.5)
HGB BLD-MCNC: 8.2 G/DL — LOW (ref 11.5–15.5)
HGB BLD-MCNC: 8.4 G/DL — LOW (ref 11.5–15.5)
HGB BLD-MCNC: 8.7 G/DL — LOW (ref 11.5–15.5)
HGB BLD-MCNC: 8.7 G/DL — LOW (ref 11.5–15.5)
HGB BLD-MCNC: 8.8 G/DL — LOW (ref 11.5–15.5)
HGB BLD-MCNC: 8.9 G/DL — LOW (ref 11.5–15.5)
HGB BLD-MCNC: 9.2 G/DL — LOW (ref 11.5–15.5)
IMM GRANULOCYTES NFR BLD AUTO: 0.6 % — SIGNIFICANT CHANGE UP (ref 0–1.5)
IMM GRANULOCYTES NFR BLD AUTO: 0.7 % — SIGNIFICANT CHANGE UP (ref 0–1.5)
IMM GRANULOCYTES NFR BLD AUTO: 0.7 % — SIGNIFICANT CHANGE UP (ref 0–1.5)
IMM GRANULOCYTES NFR BLD AUTO: 1.1 % — SIGNIFICANT CHANGE UP (ref 0–1.5)
IMM GRANULOCYTES NFR BLD AUTO: 1.2 % — SIGNIFICANT CHANGE UP (ref 0–1.5)
IMM GRANULOCYTES NFR BLD AUTO: 1.5 % — SIGNIFICANT CHANGE UP (ref 0–1.5)
INR BLD: 1.22 RATIO — HIGH (ref 0.88–1.16)
INR BLD: 1.32 RATIO — HIGH (ref 0.88–1.16)
INR BLD: 1.36 RATIO — HIGH (ref 0.88–1.16)
INR BLD: 1.6 RATIO — HIGH (ref 0.88–1.16)
INR BLD: 1.95 RATIO — HIGH (ref 0.88–1.16)
IRON SATN MFR SERPL: 22 % — SIGNIFICANT CHANGE UP (ref 14–50)
IRON SATN MFR SERPL: 33 UG/DL — LOW (ref 37–145)
KETONES UR-MCNC: ABNORMAL
KETONES UR-MCNC: ABNORMAL
LACTATE BLDV-MCNC: 2 MMOL/L — SIGNIFICANT CHANGE UP (ref 0.5–2)
LDH SERPL L TO P-CCNC: 357 U/L — SIGNIFICANT CHANGE UP
LDH SERPL L TO P-CCNC: 431 U/L — HIGH (ref 98–192)
LEUKOCYTE ESTERASE UR-ACNC: ABNORMAL
LEUKOCYTE ESTERASE UR-ACNC: ABNORMAL
LYMPHOCYTES # BLD AUTO: 0.54 K/UL — LOW (ref 1–3.3)
LYMPHOCYTES # BLD AUTO: 0.64 K/UL — LOW (ref 1–3.3)
LYMPHOCYTES # BLD AUTO: 0.66 K/UL — LOW (ref 1–3.3)
LYMPHOCYTES # BLD AUTO: 0.72 K/UL — LOW (ref 1–3.3)
LYMPHOCYTES # BLD AUTO: 0.84 K/UL — LOW (ref 1–3.3)
LYMPHOCYTES # BLD AUTO: 0.92 K/UL — LOW (ref 1–3.3)
LYMPHOCYTES # BLD AUTO: 4.1 % — LOW (ref 13–44)
LYMPHOCYTES # BLD AUTO: 5.6 % — LOW (ref 13–44)
LYMPHOCYTES # BLD AUTO: 7.9 % — LOW (ref 13–44)
LYMPHOCYTES # BLD AUTO: 8.8 % — LOW (ref 13–44)
LYMPHOCYTES # BLD AUTO: 9.5 % — LOW (ref 13–44)
LYMPHOCYTES # BLD AUTO: 9.6 % — LOW (ref 13–44)
LYMPHOCYTES # FLD: 77 % — SIGNIFICANT CHANGE UP
MAGNESIUM SERPL-MCNC: 1.1 MG/DL — LOW (ref 1.8–2.6)
MAGNESIUM SERPL-MCNC: 1.7 MG/DL — SIGNIFICANT CHANGE UP (ref 1.6–2.6)
MAGNESIUM SERPL-MCNC: 1.7 MG/DL — SIGNIFICANT CHANGE UP (ref 1.6–2.6)
MAGNESIUM SERPL-MCNC: 1.8 MG/DL — SIGNIFICANT CHANGE UP (ref 1.6–2.6)
MAGNESIUM SERPL-MCNC: 1.8 MG/DL — SIGNIFICANT CHANGE UP (ref 1.8–2.6)
MAGNESIUM SERPL-MCNC: 2 MG/DL — SIGNIFICANT CHANGE UP (ref 1.6–2.6)
MCHC RBC-ENTMCNC: 26.9 PG — LOW (ref 27–34)
MCHC RBC-ENTMCNC: 27.1 PG — SIGNIFICANT CHANGE UP (ref 27–34)
MCHC RBC-ENTMCNC: 27.2 PG — SIGNIFICANT CHANGE UP (ref 27–34)
MCHC RBC-ENTMCNC: 27.5 PG — SIGNIFICANT CHANGE UP (ref 27–34)
MCHC RBC-ENTMCNC: 27.6 PG — SIGNIFICANT CHANGE UP (ref 27–34)
MCHC RBC-ENTMCNC: 27.7 PG — SIGNIFICANT CHANGE UP (ref 27–34)
MCHC RBC-ENTMCNC: 27.8 PG — SIGNIFICANT CHANGE UP (ref 27–34)
MCHC RBC-ENTMCNC: 29.8 GM/DL — LOW (ref 32–36)
MCHC RBC-ENTMCNC: 30.3 GM/DL — LOW (ref 32–36)
MCHC RBC-ENTMCNC: 30.5 GM/DL — LOW (ref 32–36)
MCHC RBC-ENTMCNC: 30.7 GM/DL — LOW (ref 32–36)
MCHC RBC-ENTMCNC: 30.8 GM/DL — LOW (ref 32–36)
MCHC RBC-ENTMCNC: 31 GM/DL — LOW (ref 32–36)
MCHC RBC-ENTMCNC: 31.1 GM/DL — LOW (ref 32–36)
MCHC RBC-ENTMCNC: 31.1 GM/DL — LOW (ref 32–36)
MCHC RBC-ENTMCNC: 31.2 GM/DL — LOW (ref 32–36)
MCHC RBC-ENTMCNC: 31.4 GM/DL — LOW (ref 32–36)
MCHC RBC-ENTMCNC: 31.6 GM/DL — LOW (ref 32–36)
MCHC RBC-ENTMCNC: 31.7 GM/DL — LOW (ref 32–36)
MCHC RBC-ENTMCNC: 31.8 GM/DL — LOW (ref 32–36)
MCHC RBC-ENTMCNC: 31.8 GM/DL — LOW (ref 32–36)
MCV RBC AUTO: 85.3 FL — SIGNIFICANT CHANGE UP (ref 80–100)
MCV RBC AUTO: 85.3 FL — SIGNIFICANT CHANGE UP (ref 80–100)
MCV RBC AUTO: 85.5 FL — SIGNIFICANT CHANGE UP (ref 80–100)
MCV RBC AUTO: 85.6 FL — SIGNIFICANT CHANGE UP (ref 80–100)
MCV RBC AUTO: 86.5 FL — SIGNIFICANT CHANGE UP (ref 80–100)
MCV RBC AUTO: 87.1 FL — SIGNIFICANT CHANGE UP (ref 80–100)
MCV RBC AUTO: 89.1 FL — SIGNIFICANT CHANGE UP (ref 80–100)
MCV RBC AUTO: 89.1 FL — SIGNIFICANT CHANGE UP (ref 80–100)
MCV RBC AUTO: 89.4 FL — SIGNIFICANT CHANGE UP (ref 80–100)
MCV RBC AUTO: 89.5 FL — SIGNIFICANT CHANGE UP (ref 80–100)
MCV RBC AUTO: 89.6 FL — SIGNIFICANT CHANGE UP (ref 80–100)
MCV RBC AUTO: 89.9 FL — SIGNIFICANT CHANGE UP (ref 80–100)
MCV RBC AUTO: 90.4 FL — SIGNIFICANT CHANGE UP (ref 80–100)
MCV RBC AUTO: 91 FL — SIGNIFICANT CHANGE UP (ref 80–100)
MONOCYTES # BLD AUTO: 0.31 K/UL — SIGNIFICANT CHANGE UP (ref 0–0.9)
MONOCYTES # BLD AUTO: 0.65 K/UL — SIGNIFICANT CHANGE UP (ref 0–0.9)
MONOCYTES # BLD AUTO: 0.75 K/UL — SIGNIFICANT CHANGE UP (ref 0–0.9)
MONOCYTES # BLD AUTO: 0.84 K/UL — SIGNIFICANT CHANGE UP (ref 0–0.9)
MONOCYTES # BLD AUTO: 0.84 K/UL — SIGNIFICANT CHANGE UP (ref 0–0.9)
MONOCYTES # BLD AUTO: 0.89 K/UL — SIGNIFICANT CHANGE UP (ref 0–0.9)
MONOCYTES NFR BLD AUTO: 2.4 % — SIGNIFICANT CHANGE UP (ref 2–14)
MONOCYTES NFR BLD AUTO: 7.1 % — SIGNIFICANT CHANGE UP (ref 2–14)
MONOCYTES NFR BLD AUTO: 8 % — SIGNIFICANT CHANGE UP (ref 2–14)
MONOCYTES NFR BLD AUTO: 8.6 % — SIGNIFICANT CHANGE UP (ref 2–14)
MONOCYTES NFR BLD AUTO: 9.6 % — SIGNIFICANT CHANGE UP (ref 2–14)
MONOCYTES NFR BLD AUTO: 9.9 % — SIGNIFICANT CHANGE UP (ref 2–14)
MONOS+MACROS # FLD: 5 % — SIGNIFICANT CHANGE UP
MRSA PCR RESULT.: SIGNIFICANT CHANGE UP
MRSA PCR RESULT.: SIGNIFICANT CHANGE UP
NEUTROPHILS # BLD AUTO: 10.16 K/UL — HIGH (ref 1.8–7.4)
NEUTROPHILS # BLD AUTO: 12.06 K/UL — HIGH (ref 1.8–7.4)
NEUTROPHILS # BLD AUTO: 5.94 K/UL — SIGNIFICANT CHANGE UP (ref 1.8–7.4)
NEUTROPHILS # BLD AUTO: 6.69 K/UL — SIGNIFICANT CHANGE UP (ref 1.8–7.4)
NEUTROPHILS # BLD AUTO: 6.92 K/UL — SIGNIFICANT CHANGE UP (ref 1.8–7.4)
NEUTROPHILS # BLD AUTO: 8.39 K/UL — HIGH (ref 1.8–7.4)
NEUTROPHILS NFR BLD AUTO: 78.6 % — HIGH (ref 43–77)
NEUTROPHILS NFR BLD AUTO: 78.9 % — HIGH (ref 43–77)
NEUTROPHILS NFR BLD AUTO: 80.6 % — HIGH (ref 43–77)
NEUTROPHILS NFR BLD AUTO: 82.8 % — HIGH (ref 43–77)
NEUTROPHILS NFR BLD AUTO: 86 % — HIGH (ref 43–77)
NEUTROPHILS NFR BLD AUTO: 91.8 % — HIGH (ref 43–77)
NITRITE UR-MCNC: NEGATIVE — SIGNIFICANT CHANGE UP
NITRITE UR-MCNC: POSITIVE
OTHER CELLS CSF MANUAL: 16 ML/DL — LOW (ref 18–22)
PCO2 BLDV: 41 MMHG — SIGNIFICANT CHANGE UP (ref 35–50)
PH BLDV: 7.48 — HIGH (ref 7.32–7.43)
PH FLD: 8 — SIGNIFICANT CHANGE UP
PH UR: 5 — SIGNIFICANT CHANGE UP (ref 5–8)
PH UR: 6 — SIGNIFICANT CHANGE UP (ref 5–8)
PHOSPHATE SERPL-MCNC: 3.2 MG/DL — SIGNIFICANT CHANGE UP (ref 2.4–4.7)
PHOSPHATE SERPL-MCNC: 3.6 MG/DL — SIGNIFICANT CHANGE UP (ref 2.4–4.7)
PLATELET # BLD AUTO: 304 K/UL — SIGNIFICANT CHANGE UP (ref 150–400)
PLATELET # BLD AUTO: 313 K/UL — SIGNIFICANT CHANGE UP (ref 150–400)
PLATELET # BLD AUTO: 315 K/UL — SIGNIFICANT CHANGE UP (ref 150–400)
PLATELET # BLD AUTO: 326 K/UL — SIGNIFICANT CHANGE UP (ref 150–400)
PLATELET # BLD AUTO: 343 K/UL — SIGNIFICANT CHANGE UP (ref 150–400)
PLATELET # BLD AUTO: 345 K/UL — SIGNIFICANT CHANGE UP (ref 150–400)
PLATELET # BLD AUTO: 352 K/UL — SIGNIFICANT CHANGE UP (ref 150–400)
PLATELET # BLD AUTO: 363 K/UL — SIGNIFICANT CHANGE UP (ref 150–400)
PLATELET # BLD AUTO: 373 K/UL — SIGNIFICANT CHANGE UP (ref 150–400)
PLATELET # BLD AUTO: 385 K/UL — SIGNIFICANT CHANGE UP (ref 150–400)
PLATELET # BLD AUTO: 390 K/UL — SIGNIFICANT CHANGE UP (ref 150–400)
PLATELET # BLD AUTO: 390 K/UL — SIGNIFICANT CHANGE UP (ref 150–400)
PLATELET # BLD AUTO: 425 K/UL — HIGH (ref 150–400)
PLATELET # BLD AUTO: 471 K/UL — HIGH (ref 150–400)
PO2 BLDV: 83 MMHG — HIGH (ref 25–45)
POTASSIUM BLDV-SCNC: 3.5 MMOL/L — SIGNIFICANT CHANGE UP (ref 3.4–4.5)
POTASSIUM SERPL-MCNC: 3 MMOL/L — LOW (ref 3.5–5.3)
POTASSIUM SERPL-MCNC: 3.3 MMOL/L — LOW (ref 3.5–5.3)
POTASSIUM SERPL-MCNC: 3.4 MMOL/L — LOW (ref 3.5–5.3)
POTASSIUM SERPL-MCNC: 3.6 MMOL/L — SIGNIFICANT CHANGE UP (ref 3.5–5.3)
POTASSIUM SERPL-MCNC: 3.7 MMOL/L — SIGNIFICANT CHANGE UP (ref 3.5–5.3)
POTASSIUM SERPL-MCNC: 3.9 MMOL/L — SIGNIFICANT CHANGE UP (ref 3.5–5.3)
POTASSIUM SERPL-MCNC: 3.9 MMOL/L — SIGNIFICANT CHANGE UP (ref 3.5–5.3)
POTASSIUM SERPL-MCNC: 4 MMOL/L — SIGNIFICANT CHANGE UP (ref 3.5–5.3)
POTASSIUM SERPL-MCNC: 4 MMOL/L — SIGNIFICANT CHANGE UP (ref 3.5–5.3)
POTASSIUM SERPL-MCNC: 4.3 MMOL/L — SIGNIFICANT CHANGE UP (ref 3.5–5.3)
POTASSIUM SERPL-MCNC: 4.3 MMOL/L — SIGNIFICANT CHANGE UP (ref 3.5–5.3)
POTASSIUM SERPL-MCNC: 4.5 MMOL/L — SIGNIFICANT CHANGE UP (ref 3.5–5.3)
POTASSIUM SERPL-MCNC: 4.6 MMOL/L — SIGNIFICANT CHANGE UP (ref 3.5–5.3)
POTASSIUM SERPL-MCNC: 5 MMOL/L — SIGNIFICANT CHANGE UP (ref 3.5–5.3)
POTASSIUM SERPL-SCNC: 3 MMOL/L — LOW (ref 3.5–5.3)
POTASSIUM SERPL-SCNC: 3.3 MMOL/L — LOW (ref 3.5–5.3)
POTASSIUM SERPL-SCNC: 3.4 MMOL/L — LOW (ref 3.5–5.3)
POTASSIUM SERPL-SCNC: 3.6 MMOL/L — SIGNIFICANT CHANGE UP (ref 3.5–5.3)
POTASSIUM SERPL-SCNC: 3.7 MMOL/L — SIGNIFICANT CHANGE UP (ref 3.5–5.3)
POTASSIUM SERPL-SCNC: 3.9 MMOL/L — SIGNIFICANT CHANGE UP (ref 3.5–5.3)
POTASSIUM SERPL-SCNC: 3.9 MMOL/L — SIGNIFICANT CHANGE UP (ref 3.5–5.3)
POTASSIUM SERPL-SCNC: 4 MMOL/L — SIGNIFICANT CHANGE UP (ref 3.5–5.3)
POTASSIUM SERPL-SCNC: 4 MMOL/L — SIGNIFICANT CHANGE UP (ref 3.5–5.3)
POTASSIUM SERPL-SCNC: 4.3 MMOL/L — SIGNIFICANT CHANGE UP (ref 3.5–5.3)
POTASSIUM SERPL-SCNC: 4.3 MMOL/L — SIGNIFICANT CHANGE UP (ref 3.5–5.3)
POTASSIUM SERPL-SCNC: 4.5 MMOL/L — SIGNIFICANT CHANGE UP (ref 3.5–5.3)
POTASSIUM SERPL-SCNC: 4.6 MMOL/L — SIGNIFICANT CHANGE UP (ref 3.5–5.3)
POTASSIUM SERPL-SCNC: 5 MMOL/L — SIGNIFICANT CHANGE UP (ref 3.5–5.3)
PROCALCITONIN SERPL-MCNC: 0.09 NG/ML — SIGNIFICANT CHANGE UP (ref 0.02–0.1)
PROT FLD-MCNC: 5 G/DL — SIGNIFICANT CHANGE UP
PROT SERPL-MCNC: 5.8 G/DL — LOW (ref 6.6–8.7)
PROT SERPL-MCNC: 5.9 G/DL — LOW (ref 6.6–8.7)
PROT SERPL-MCNC: 6.1 G/DL — LOW (ref 6.6–8.7)
PROT SERPL-MCNC: 6.3 G/DL — LOW (ref 6.6–8.7)
PROT SERPL-MCNC: 7.1 G/DL — SIGNIFICANT CHANGE UP (ref 6.6–8.7)
PROT SERPL-MCNC: 7.4 G/DL — SIGNIFICANT CHANGE UP (ref 6.6–8.7)
PROT UR-MCNC: 30 MG/DL
PROT UR-MCNC: 30 MG/DL
PROTHROM AB SERPL-ACNC: 14 SEC — HIGH (ref 10.6–13.6)
PROTHROM AB SERPL-ACNC: 15.1 SEC — HIGH (ref 10.6–13.6)
PROTHROM AB SERPL-ACNC: 15.5 SEC — HIGH (ref 10.6–13.6)
PROTHROM AB SERPL-ACNC: 18.1 SEC — HIGH (ref 10.6–13.6)
PROTHROM AB SERPL-ACNC: 21.9 SEC — HIGH (ref 10.6–13.6)
RBC # BLD: 2.96 M/UL — LOW (ref 3.8–5.2)
RBC # BLD: 3.06 M/UL — LOW (ref 3.8–5.2)
RBC # BLD: 3.13 M/UL — LOW (ref 3.8–5.2)
RBC # BLD: 3.17 M/UL — LOW (ref 3.8–5.2)
RBC # BLD: 3.23 M/UL — LOW (ref 3.8–5.2)
RBC # BLD: 3.23 M/UL — LOW (ref 3.8–5.2)
RBC # BLD: 3.31 M/UL — LOW (ref 3.8–5.2)
RBC # BLD: 3.75 M/UL — LOW (ref 3.8–5.2)
RBC # BLD: 3.94 M/UL — SIGNIFICANT CHANGE UP (ref 3.8–5.2)
RBC # BLD: 4.02 M/UL — SIGNIFICANT CHANGE UP (ref 3.8–5.2)
RBC # BLD: 4.1 M/UL — SIGNIFICANT CHANGE UP (ref 3.8–5.2)
RBC # BLD: 4.14 M/UL — SIGNIFICANT CHANGE UP (ref 3.8–5.2)
RBC # BLD: 4.27 M/UL — SIGNIFICANT CHANGE UP (ref 3.8–5.2)
RBC # BLD: 4.57 M/UL — SIGNIFICANT CHANGE UP (ref 3.8–5.2)
RBC # FLD: 15.5 % — HIGH (ref 10.3–14.5)
RBC # FLD: 15.7 % — HIGH (ref 10.3–14.5)
RBC # FLD: 17.5 % — HIGH (ref 10.3–14.5)
RBC # FLD: 17.9 % — HIGH (ref 10.3–14.5)
RBC # FLD: 18.3 % — HIGH (ref 10.3–14.5)
RBC # FLD: 18.6 % — HIGH (ref 10.3–14.5)
RBC # FLD: 19.4 % — HIGH (ref 10.3–14.5)
RBC # FLD: 19.9 % — HIGH (ref 10.3–14.5)
RBC # FLD: 20 % — HIGH (ref 10.3–14.5)
RBC # FLD: 20 % — HIGH (ref 10.3–14.5)
RBC # FLD: 20.8 % — HIGH (ref 10.3–14.5)
RBC # FLD: 21.1 % — HIGH (ref 10.3–14.5)
RBC CASTS # UR COMP ASSIST: NEGATIVE /HPF — SIGNIFICANT CHANGE UP (ref 0–4)
RBC CASTS # UR COMP ASSIST: SIGNIFICANT CHANGE UP /HPF (ref 0–4)
RCV VOL RI: 310 /UL — HIGH (ref 0–0)
S AUREUS DNA NOSE QL NAA+PROBE: SIGNIFICANT CHANGE UP
S AUREUS DNA NOSE QL NAA+PROBE: SIGNIFICANT CHANGE UP
SAO2 % BLDV: 96 % — SIGNIFICANT CHANGE UP
SARS-COV-2 IGG+IGM SERPL QL IA: >250 U/ML — HIGH
SARS-COV-2 IGG+IGM SERPL QL IA: POSITIVE
SARS-COV-2 RNA SPEC QL NAA+PROBE: SIGNIFICANT CHANGE UP
SODIUM SERPL-SCNC: 131 MMOL/L — LOW (ref 135–145)
SODIUM SERPL-SCNC: 131 MMOL/L — LOW (ref 135–145)
SODIUM SERPL-SCNC: 132 MMOL/L — LOW (ref 135–145)
SODIUM SERPL-SCNC: 132 MMOL/L — LOW (ref 135–145)
SODIUM SERPL-SCNC: 133 MMOL/L — LOW (ref 135–145)
SODIUM SERPL-SCNC: 135 MMOL/L — SIGNIFICANT CHANGE UP (ref 135–145)
SODIUM SERPL-SCNC: 135 MMOL/L — SIGNIFICANT CHANGE UP (ref 135–145)
SODIUM SERPL-SCNC: 136 MMOL/L — SIGNIFICANT CHANGE UP (ref 135–145)
SODIUM SERPL-SCNC: 137 MMOL/L — SIGNIFICANT CHANGE UP (ref 135–145)
SODIUM SERPL-SCNC: 139 MMOL/L — SIGNIFICANT CHANGE UP (ref 135–145)
SODIUM SERPL-SCNC: 141 MMOL/L — SIGNIFICANT CHANGE UP (ref 135–145)
SODIUM SERPL-SCNC: 142 MMOL/L — SIGNIFICANT CHANGE UP (ref 135–145)
SODIUM SERPL-SCNC: 142 MMOL/L — SIGNIFICANT CHANGE UP (ref 135–145)
SODIUM SERPL-SCNC: 144 MMOL/L — SIGNIFICANT CHANGE UP (ref 135–145)
SP GR SPEC: 1.01 — SIGNIFICANT CHANGE UP (ref 1.01–1.02)
SP GR SPEC: 1.02 — SIGNIFICANT CHANGE UP (ref 1.01–1.02)
SPECIMEN SOURCE FLD: SIGNIFICANT CHANGE UP
SPECIMEN SOURCE: SIGNIFICANT CHANGE UP
T4 FREE SERPL-MCNC: 1.2 NG/DL — SIGNIFICANT CHANGE UP (ref 0.9–1.8)
TIBC SERPL-MCNC: 149 UG/DL — LOW (ref 220–430)
TOTAL NUCLEATED CELL COUNT, BODY FLUID: 1002 /UL — SIGNIFICANT CHANGE UP
TRANSFERRIN SERPL-MCNC: 104 MG/DL — LOW (ref 192–382)
TROPONIN T SERPL-MCNC: <0.01 NG/ML — SIGNIFICANT CHANGE UP (ref 0–0.06)
TROPONIN T SERPL-MCNC: <0.01 NG/ML — SIGNIFICANT CHANGE UP (ref 0–0.06)
TSH SERPL-MCNC: 5.05 UIU/ML — HIGH (ref 0.27–4.2)
TUBE TYPE: SIGNIFICANT CHANGE UP
UROBILINOGEN FLD QL: 1
UROBILINOGEN FLD QL: NEGATIVE MG/DL — SIGNIFICANT CHANGE UP
WBC # BLD: 10.4 K/UL — SIGNIFICANT CHANGE UP (ref 3.8–10.5)
WBC # BLD: 10.95 K/UL — HIGH (ref 3.8–10.5)
WBC # BLD: 10.98 K/UL — HIGH (ref 3.8–10.5)
WBC # BLD: 11.22 K/UL — HIGH (ref 3.8–10.5)
WBC # BLD: 11.81 K/UL — HIGH (ref 3.8–10.5)
WBC # BLD: 11.83 K/UL — HIGH (ref 3.8–10.5)
WBC # BLD: 13.13 K/UL — HIGH (ref 3.8–10.5)
WBC # BLD: 14.64 K/UL — HIGH (ref 3.8–10.5)
WBC # BLD: 16.09 K/UL — HIGH (ref 3.8–10.5)
WBC # BLD: 17.58 K/UL — HIGH (ref 3.8–10.5)
WBC # BLD: 7.56 K/UL — SIGNIFICANT CHANGE UP (ref 3.8–10.5)
WBC # BLD: 8.09 K/UL — SIGNIFICANT CHANGE UP (ref 3.8–10.5)
WBC # BLD: 8.77 K/UL — SIGNIFICANT CHANGE UP (ref 3.8–10.5)
WBC # BLD: 9.98 K/UL — SIGNIFICANT CHANGE UP (ref 3.8–10.5)
WBC # FLD AUTO: 10.4 K/UL — SIGNIFICANT CHANGE UP (ref 3.8–10.5)
WBC # FLD AUTO: 10.95 K/UL — HIGH (ref 3.8–10.5)
WBC # FLD AUTO: 10.98 K/UL — HIGH (ref 3.8–10.5)
WBC # FLD AUTO: 11.22 K/UL — HIGH (ref 3.8–10.5)
WBC # FLD AUTO: 11.81 K/UL — HIGH (ref 3.8–10.5)
WBC # FLD AUTO: 11.83 K/UL — HIGH (ref 3.8–10.5)
WBC # FLD AUTO: 13.13 K/UL — HIGH (ref 3.8–10.5)
WBC # FLD AUTO: 14.64 K/UL — HIGH (ref 3.8–10.5)
WBC # FLD AUTO: 16.09 K/UL — HIGH (ref 3.8–10.5)
WBC # FLD AUTO: 17.58 K/UL — HIGH (ref 3.8–10.5)
WBC # FLD AUTO: 7.56 K/UL — SIGNIFICANT CHANGE UP (ref 3.8–10.5)
WBC # FLD AUTO: 8.09 K/UL — SIGNIFICANT CHANGE UP (ref 3.8–10.5)
WBC # FLD AUTO: 8.77 K/UL — SIGNIFICANT CHANGE UP (ref 3.8–10.5)
WBC # FLD AUTO: 9.98 K/UL — SIGNIFICANT CHANGE UP (ref 3.8–10.5)
WBC UR QL: ABNORMAL
WBC UR QL: SIGNIFICANT CHANGE UP

## 2021-01-01 PROCEDURE — 84145 PROCALCITONIN (PCT): CPT

## 2021-01-01 PROCEDURE — 73560 X-RAY EXAM OF KNEE 1 OR 2: CPT

## 2021-01-01 PROCEDURE — 80048 BASIC METABOLIC PNL TOTAL CA: CPT

## 2021-01-01 PROCEDURE — 85027 COMPLETE CBC AUTOMATED: CPT

## 2021-01-01 PROCEDURE — 83986 ASSAY PH BODY FLUID NOS: CPT

## 2021-01-01 PROCEDURE — 83615 LACTATE (LD) (LDH) ENZYME: CPT

## 2021-01-01 PROCEDURE — 86850 RBC ANTIBODY SCREEN: CPT

## 2021-01-01 PROCEDURE — 99024 POSTOP FOLLOW-UP VISIT: CPT

## 2021-01-01 PROCEDURE — 87640 STAPH A DNA AMP PROBE: CPT

## 2021-01-01 PROCEDURE — 84443 ASSAY THYROID STIM HORMONE: CPT

## 2021-01-01 PROCEDURE — 85610 PROTHROMBIN TIME: CPT

## 2021-01-01 PROCEDURE — 99232 SBSQ HOSP IP/OBS MODERATE 35: CPT

## 2021-01-01 PROCEDURE — 84100 ASSAY OF PHOSPHORUS: CPT

## 2021-01-01 PROCEDURE — 87070 CULTURE OTHR SPECIMN AEROBIC: CPT

## 2021-01-01 PROCEDURE — 93005 ELECTROCARDIOGRAM TRACING: CPT

## 2021-01-01 PROCEDURE — 88305 TISSUE EXAM BY PATHOLOGIST: CPT

## 2021-01-01 PROCEDURE — 99231 SBSQ HOSP IP/OBS SF/LOW 25: CPT

## 2021-01-01 PROCEDURE — 87635 SARS-COV-2 COVID-19 AMP PRB: CPT

## 2021-01-01 PROCEDURE — 99285 EMERGENCY DEPT VISIT HI MDM: CPT | Mod: 25

## 2021-01-01 PROCEDURE — 85018 HEMOGLOBIN: CPT

## 2021-01-01 PROCEDURE — 85730 THROMBOPLASTIN TIME PARTIAL: CPT

## 2021-01-01 PROCEDURE — 99291 CRITICAL CARE FIRST HOUR: CPT | Mod: 25

## 2021-01-01 PROCEDURE — 71045 X-RAY EXAM CHEST 1 VIEW: CPT | Mod: 26,77

## 2021-01-01 PROCEDURE — 64642 CHEMODENERV 1 EXTREMITY 1-4: CPT

## 2021-01-01 PROCEDURE — 95874 GUIDE NERV DESTR NEEDLE EMG: CPT

## 2021-01-01 PROCEDURE — 99223 1ST HOSP IP/OBS HIGH 75: CPT

## 2021-01-01 PROCEDURE — 87205 SMEAR GRAM STAIN: CPT

## 2021-01-01 PROCEDURE — 99239 HOSP IP/OBS DSCHRG MGMT >30: CPT

## 2021-01-01 PROCEDURE — 36415 COLL VENOUS BLD VENIPUNCTURE: CPT

## 2021-01-01 PROCEDURE — 71045 X-RAY EXAM CHEST 1 VIEW: CPT | Mod: 26

## 2021-01-01 PROCEDURE — 83540 ASSAY OF IRON: CPT

## 2021-01-01 PROCEDURE — 36000 PLACE NEEDLE IN VEIN: CPT

## 2021-01-01 PROCEDURE — 83735 ASSAY OF MAGNESIUM: CPT

## 2021-01-01 PROCEDURE — 82435 ASSAY OF BLOOD CHLORIDE: CPT

## 2021-01-01 PROCEDURE — 74177 CT ABD & PELVIS W/CONTRAST: CPT | Mod: 26,MG

## 2021-01-01 PROCEDURE — 12002 RPR S/N/AX/GEN/TRNK2.6-7.5CM: CPT | Mod: GC

## 2021-01-01 PROCEDURE — 82330 ASSAY OF CALCIUM: CPT

## 2021-01-01 PROCEDURE — 99233 SBSQ HOSP IP/OBS HIGH 50: CPT

## 2021-01-01 PROCEDURE — 99291 CRITICAL CARE FIRST HOUR: CPT | Mod: CS,GC

## 2021-01-01 PROCEDURE — 96374 THER/PROPH/DIAG INJ IV PUSH: CPT

## 2021-01-01 PROCEDURE — 92610 EVALUATE SWALLOWING FUNCTION: CPT

## 2021-01-01 PROCEDURE — 99285 EMERGENCY DEPT VISIT HI MDM: CPT | Mod: 25,GC

## 2021-01-01 PROCEDURE — 87641 MR-STAPH DNA AMP PROBE: CPT

## 2021-01-01 PROCEDURE — 73560 X-RAY EXAM OF KNEE 1 OR 2: CPT | Mod: 26,RT

## 2021-01-01 PROCEDURE — 82728 ASSAY OF FERRITIN: CPT

## 2021-01-01 PROCEDURE — 71260 CT THORAX DX C+: CPT | Mod: 26

## 2021-01-01 PROCEDURE — 81001 URINALYSIS AUTO W/SCOPE: CPT

## 2021-01-01 PROCEDURE — U0005: CPT

## 2021-01-01 PROCEDURE — 99231 SBSQ HOSP IP/OBS SF/LOW 25: CPT | Mod: 25

## 2021-01-01 PROCEDURE — 99497 ADVNCD CARE PLAN 30 MIN: CPT | Mod: 25

## 2021-01-01 PROCEDURE — 71045 X-RAY EXAM CHEST 1 VIEW: CPT | Mod: 26,76

## 2021-01-01 PROCEDURE — 82803 BLOOD GASES ANY COMBINATION: CPT

## 2021-01-01 PROCEDURE — 93306 TTE W/DOPPLER COMPLETE: CPT | Mod: 26

## 2021-01-01 PROCEDURE — 32551 INSERTION OF CHEST TUBE: CPT

## 2021-01-01 PROCEDURE — P9047: CPT

## 2021-01-01 PROCEDURE — 88305 TISSUE EXAM BY PATHOLOGIST: CPT | Mod: 26

## 2021-01-01 PROCEDURE — 83690 ASSAY OF LIPASE: CPT

## 2021-01-01 PROCEDURE — 99223 1ST HOSP IP/OBS HIGH 75: CPT | Mod: AI

## 2021-01-01 PROCEDURE — 71045 X-RAY EXAM CHEST 1 VIEW: CPT

## 2021-01-01 PROCEDURE — 87075 CULTR BACTERIA EXCEPT BLOOD: CPT

## 2021-01-01 PROCEDURE — 64643 CHEMODENERV 1 EXTREM 1-4 EA: CPT

## 2021-01-01 PROCEDURE — 88112 CYTOPATH CELL ENHANCE TECH: CPT | Mod: 26

## 2021-01-01 PROCEDURE — 82945 GLUCOSE OTHER FLUID: CPT

## 2021-01-01 PROCEDURE — 95874 GUIDE NERV DESTR NEEDLE EMG: CPT | Mod: 26

## 2021-01-01 PROCEDURE — 82962 GLUCOSE BLOOD TEST: CPT

## 2021-01-01 PROCEDURE — 76937 US GUIDE VASCULAR ACCESS: CPT | Mod: 26

## 2021-01-01 PROCEDURE — 86769 SARS-COV-2 COVID-19 ANTIBODY: CPT

## 2021-01-01 PROCEDURE — 80053 COMPREHEN METABOLIC PANEL: CPT

## 2021-01-01 PROCEDURE — 84466 ASSAY OF TRANSFERRIN: CPT

## 2021-01-01 PROCEDURE — 74177 CT ABD & PELVIS W/CONTRAST: CPT

## 2021-01-01 PROCEDURE — 85014 HEMATOCRIT: CPT

## 2021-01-01 PROCEDURE — 93010 ELECTROCARDIOGRAM REPORT: CPT

## 2021-01-01 PROCEDURE — L8699: CPT

## 2021-01-01 PROCEDURE — 84157 ASSAY OF PROTEIN OTHER: CPT

## 2021-01-01 PROCEDURE — 71260 CT THORAX DX C+: CPT

## 2021-01-01 PROCEDURE — 84132 ASSAY OF SERUM POTASSIUM: CPT

## 2021-01-01 PROCEDURE — 84439 ASSAY OF FREE THYROXINE: CPT

## 2021-01-01 PROCEDURE — 83605 ASSAY OF LACTIC ACID: CPT

## 2021-01-01 PROCEDURE — C1729: CPT

## 2021-01-01 PROCEDURE — 99291 CRITICAL CARE FIRST HOUR: CPT

## 2021-01-01 PROCEDURE — C8929: CPT

## 2021-01-01 PROCEDURE — 87102 FUNGUS ISOLATION CULTURE: CPT

## 2021-01-01 PROCEDURE — 86901 BLOOD TYPING SEROLOGIC RH(D): CPT

## 2021-01-01 PROCEDURE — 82947 ASSAY GLUCOSE BLOOD QUANT: CPT

## 2021-01-01 PROCEDURE — 32651 THORACOSCOPY REMOVE CORTEX: CPT

## 2021-01-01 PROCEDURE — 89051 BODY FLUID CELL COUNT: CPT

## 2021-01-01 PROCEDURE — 84295 ASSAY OF SERUM SODIUM: CPT

## 2021-01-01 PROCEDURE — G1004: CPT

## 2021-01-01 PROCEDURE — 83036 HEMOGLOBIN GLYCOSYLATED A1C: CPT

## 2021-01-01 PROCEDURE — 32550 INSERT PLEURAL CATH: CPT

## 2021-01-01 PROCEDURE — 86900 BLOOD TYPING SEROLOGIC ABO: CPT

## 2021-01-01 PROCEDURE — 85025 COMPLETE CBC W/AUTO DIFF WBC: CPT

## 2021-01-01 PROCEDURE — 88112 CYTOPATH CELL ENHANCE TECH: CPT

## 2021-01-01 PROCEDURE — U0003: CPT

## 2021-01-01 PROCEDURE — 82042 OTHER SOURCE ALBUMIN QUAN EA: CPT

## 2021-01-01 PROCEDURE — 84484 ASSAY OF TROPONIN QUANT: CPT

## 2021-01-01 PROCEDURE — 83550 IRON BINDING TEST: CPT

## 2021-01-01 RX ORDER — SODIUM CHLORIDE 9 MG/ML
1000 INJECTION INTRAMUSCULAR; INTRAVENOUS; SUBCUTANEOUS
Refills: 0 | Status: DISCONTINUED | OUTPATIENT
Start: 2021-01-01 | End: 2021-01-01

## 2021-01-01 RX ORDER — GLUCAGON INJECTION, SOLUTION 0.5 MG/.1ML
1 INJECTION, SOLUTION SUBCUTANEOUS ONCE
Refills: 0 | Status: DISCONTINUED | OUTPATIENT
Start: 2021-01-01 | End: 2021-01-01

## 2021-01-01 RX ORDER — HYDROMORPHONE HYDROCHLORIDE 2 MG/ML
4 INJECTION INTRAMUSCULAR; INTRAVENOUS; SUBCUTANEOUS
Refills: 0 | Status: DISCONTINUED | OUTPATIENT
Start: 2021-01-01 | End: 2021-01-01

## 2021-01-01 RX ORDER — FLUOXETINE HCL 10 MG
10 CAPSULE ORAL AT BEDTIME
Refills: 0 | Status: DISCONTINUED | OUTPATIENT
Start: 2021-01-01 | End: 2021-01-01

## 2021-01-01 RX ORDER — HYDROMORPHONE HYDROCHLORIDE 2 MG/ML
1 INJECTION INTRAMUSCULAR; INTRAVENOUS; SUBCUTANEOUS ONCE
Refills: 0 | Status: DISCONTINUED | OUTPATIENT
Start: 2021-01-01 | End: 2021-01-01

## 2021-01-01 RX ORDER — SODIUM CHLORIDE 9 MG/ML
1000 INJECTION, SOLUTION INTRAVENOUS
Refills: 0 | Status: DISCONTINUED | OUTPATIENT
Start: 2021-01-01 | End: 2021-01-01

## 2021-01-01 RX ORDER — FUROSEMIDE 40 MG
40 TABLET ORAL DAILY
Refills: 0 | Status: DISCONTINUED | OUTPATIENT
Start: 2021-01-01 | End: 2021-01-01

## 2021-01-01 RX ORDER — AMIODARONE HYDROCHLORIDE 400 MG/1
1 TABLET ORAL
Qty: 30 | Refills: 0
Start: 2021-01-01 | End: 2021-01-01

## 2021-01-01 RX ORDER — DEXTROSE 50 % IN WATER 50 %
25 SYRINGE (ML) INTRAVENOUS ONCE
Refills: 0 | Status: DISCONTINUED | OUTPATIENT
Start: 2021-01-01 | End: 2021-01-01

## 2021-01-01 RX ORDER — POTASSIUM CHLORIDE 20 MEQ
40 PACKET (EA) ORAL ONCE
Refills: 0 | Status: COMPLETED | OUTPATIENT
Start: 2021-01-01 | End: 2021-01-01

## 2021-01-01 RX ORDER — ATORVASTATIN CALCIUM 80 MG/1
20 TABLET, FILM COATED ORAL AT BEDTIME
Refills: 0 | Status: DISCONTINUED | OUTPATIENT
Start: 2021-01-01 | End: 2021-01-01

## 2021-01-01 RX ORDER — POTASSIUM CHLORIDE 20 MEQ
40 PACKET (EA) ORAL ONCE
Refills: 0 | Status: DISCONTINUED | OUTPATIENT
Start: 2021-01-01 | End: 2021-01-01

## 2021-01-01 RX ORDER — FAMOTIDINE 10 MG/ML
20 INJECTION INTRAVENOUS DAILY
Refills: 0 | Status: DISCONTINUED | OUTPATIENT
Start: 2021-01-01 | End: 2021-01-01

## 2021-01-01 RX ORDER — DEXTROSE 50 % IN WATER 50 %
12.5 SYRINGE (ML) INTRAVENOUS ONCE
Refills: 0 | Status: DISCONTINUED | OUTPATIENT
Start: 2021-01-01 | End: 2021-01-01

## 2021-01-01 RX ORDER — METOPROLOL TARTRATE 50 MG
25 TABLET ORAL DAILY
Refills: 0 | Status: DISCONTINUED | OUTPATIENT
Start: 2021-01-01 | End: 2021-01-01

## 2021-01-01 RX ORDER — ENOXAPARIN SODIUM 100 MG/ML
80 INJECTION SUBCUTANEOUS ONCE
Refills: 0 | Status: COMPLETED | OUTPATIENT
Start: 2021-01-01 | End: 2021-01-01

## 2021-01-01 RX ORDER — ONDANSETRON 8 MG/1
4 TABLET, FILM COATED ORAL EVERY 6 HOURS
Refills: 0 | Status: DISCONTINUED | OUTPATIENT
Start: 2021-01-01 | End: 2021-01-01

## 2021-01-01 RX ORDER — LEVOTHYROXINE SODIUM 125 MCG
1 TABLET ORAL
Qty: 30 | Refills: 0
Start: 2021-01-01 | End: 2021-01-01

## 2021-01-01 RX ORDER — INSULIN LISPRO 100/ML
VIAL (ML) SUBCUTANEOUS
Refills: 0 | Status: DISCONTINUED | OUTPATIENT
Start: 2021-01-01 | End: 2021-01-01

## 2021-01-01 RX ORDER — MULTIVIT-MIN/FERROUS GLUCONATE 9 MG/15 ML
1 LIQUID (ML) ORAL DAILY
Refills: 0 | Status: DISCONTINUED | OUTPATIENT
Start: 2021-01-01 | End: 2021-01-01

## 2021-01-01 RX ORDER — LIDOCAINE 4 G/100G
1 CREAM TOPICAL
Qty: 10 | Refills: 0
Start: 2021-01-01 | End: 2021-01-01

## 2021-01-01 RX ORDER — ALBUMIN HUMAN 25 %
100 VIAL (ML) INTRAVENOUS ONCE
Refills: 0 | Status: COMPLETED | OUTPATIENT
Start: 2021-01-01 | End: 2021-01-01

## 2021-01-01 RX ORDER — HYDROMORPHONE HYDROCHLORIDE 2 MG/ML
2 INJECTION INTRAMUSCULAR; INTRAVENOUS; SUBCUTANEOUS
Qty: 100 | Refills: 0 | Status: DISCONTINUED | OUTPATIENT
Start: 2021-01-01 | End: 2021-01-01

## 2021-01-01 RX ORDER — FUROSEMIDE 40 MG
0.5 TABLET ORAL
Qty: 0 | Refills: 0 | DISCHARGE

## 2021-01-01 RX ORDER — PHYTONADIONE (VIT K1) 5 MG
10 TABLET ORAL ONCE
Refills: 0 | Status: COMPLETED | OUTPATIENT
Start: 2021-01-01 | End: 2021-01-01

## 2021-01-01 RX ORDER — ATORVASTATIN CALCIUM 80 MG/1
1 TABLET, FILM COATED ORAL
Qty: 10 | Refills: 0
Start: 2021-01-01 | End: 2021-01-01

## 2021-01-01 RX ORDER — FLUOXETINE HCL 10 MG
1 CAPSULE ORAL
Qty: 10 | Refills: 0
Start: 2021-01-01 | End: 2021-01-01

## 2021-01-01 RX ORDER — INSULIN LISPRO 100/ML
VIAL (ML) SUBCUTANEOUS AT BEDTIME
Refills: 0 | Status: DISCONTINUED | OUTPATIENT
Start: 2021-01-01 | End: 2021-01-01

## 2021-01-01 RX ORDER — ACETAMINOPHEN 500 MG
650 TABLET ORAL EVERY 6 HOURS
Refills: 0 | Status: DISCONTINUED | OUTPATIENT
Start: 2021-01-01 | End: 2021-01-01

## 2021-01-01 RX ORDER — MIDODRINE HYDROCHLORIDE 2.5 MG/1
20 TABLET ORAL EVERY 8 HOURS
Refills: 0 | Status: DISCONTINUED | OUTPATIENT
Start: 2021-01-01 | End: 2021-01-01

## 2021-01-01 RX ORDER — CHOLECALCIFEROL (VITAMIN D3) 125 MCG
3 CAPSULE ORAL
Qty: 0 | Refills: 0 | DISCHARGE

## 2021-01-01 RX ORDER — ENOXAPARIN SODIUM 100 MG/ML
80 INJECTION SUBCUTANEOUS EVERY 12 HOURS
Refills: 0 | Status: DISCONTINUED | OUTPATIENT
Start: 2021-01-01 | End: 2021-01-01

## 2021-01-01 RX ORDER — LANOLIN ALCOHOL/MO/W.PET/CERES
1 CREAM (GRAM) TOPICAL
Qty: 10 | Refills: 0
Start: 2021-01-01 | End: 2021-01-01

## 2021-01-01 RX ORDER — HYDROCORTISONE 20 MG
50 TABLET ORAL EVERY 6 HOURS
Refills: 0 | Status: DISCONTINUED | OUTPATIENT
Start: 2021-01-01 | End: 2021-01-01

## 2021-01-01 RX ORDER — AMIODARONE HYDROCHLORIDE 400 MG/1
200 TABLET ORAL DAILY
Refills: 0 | Status: DISCONTINUED | OUTPATIENT
Start: 2021-01-01 | End: 2021-01-01

## 2021-01-01 RX ORDER — ENOXAPARIN SODIUM 100 MG/ML
40 INJECTION SUBCUTANEOUS DAILY
Refills: 0 | Status: DISCONTINUED | OUTPATIENT
Start: 2021-01-01 | End: 2021-01-01

## 2021-01-01 RX ORDER — FOLIC ACID 0.8 MG
1 TABLET ORAL DAILY
Refills: 0 | Status: DISCONTINUED | OUTPATIENT
Start: 2021-01-01 | End: 2021-01-01

## 2021-01-01 RX ORDER — FUROSEMIDE 40 MG
40 TABLET ORAL ONCE
Refills: 0 | Status: COMPLETED | OUTPATIENT
Start: 2021-01-01 | End: 2021-01-01

## 2021-01-01 RX ORDER — MAGNESIUM HYDROXIDE 400 MG/1
30 TABLET, CHEWABLE ORAL
Qty: 0 | Refills: 0 | DISCHARGE

## 2021-01-01 RX ORDER — MORPHINE SULFATE 50 MG/1
4 CAPSULE, EXTENDED RELEASE ORAL ONCE
Refills: 0 | Status: DISCONTINUED | OUTPATIENT
Start: 2021-01-01 | End: 2021-01-01

## 2021-01-01 RX ORDER — METOPROLOL TARTRATE 50 MG
25 TABLET ORAL EVERY 8 HOURS
Refills: 0 | Status: DISCONTINUED | OUTPATIENT
Start: 2021-01-01 | End: 2021-01-01

## 2021-01-01 RX ORDER — ONDANSETRON 8 MG/1
4 TABLET, FILM COATED ORAL ONCE
Refills: 0 | Status: DISCONTINUED | OUTPATIENT
Start: 2021-01-01 | End: 2021-01-01

## 2021-01-01 RX ORDER — METOPROLOL TARTRATE 50 MG
25 TABLET ORAL ONCE
Refills: 0 | Status: COMPLETED | OUTPATIENT
Start: 2021-01-01 | End: 2021-01-01

## 2021-01-01 RX ORDER — LEVOTHYROXINE SODIUM 125 MCG
1 TABLET ORAL
Qty: 0 | Refills: 0 | DISCHARGE

## 2021-01-01 RX ORDER — MIDODRINE HYDROCHLORIDE 2.5 MG/1
1 TABLET ORAL
Qty: 24 | Refills: 0
Start: 2021-01-01 | End: 2021-01-01

## 2021-01-01 RX ORDER — PHENYLEPHRINE HYDROCHLORIDE 10 MG/ML
0.2 INJECTION INTRAVENOUS
Qty: 40 | Refills: 0 | Status: DISCONTINUED | OUTPATIENT
Start: 2021-01-01 | End: 2021-01-01

## 2021-01-01 RX ORDER — MAGNESIUM SULFATE 500 MG/ML
1 VIAL (ML) INJECTION ONCE
Refills: 0 | Status: COMPLETED | OUTPATIENT
Start: 2021-01-01 | End: 2021-01-01

## 2021-01-01 RX ORDER — LANOLIN ALCOHOL/MO/W.PET/CERES
3 CREAM (GRAM) TOPICAL AT BEDTIME
Refills: 0 | Status: DISCONTINUED | OUTPATIENT
Start: 2021-01-01 | End: 2021-01-01

## 2021-01-01 RX ORDER — ASCORBIC ACID 60 MG
500 TABLET,CHEWABLE ORAL DAILY
Refills: 0 | Status: DISCONTINUED | OUTPATIENT
Start: 2021-01-01 | End: 2021-01-01

## 2021-01-01 RX ORDER — IBUPROFEN 200 MG
600 TABLET ORAL ONCE
Refills: 0 | Status: COMPLETED | OUTPATIENT
Start: 2021-01-01 | End: 2021-01-01

## 2021-01-01 RX ORDER — METOPROLOL TARTRATE 50 MG
12.5 TABLET ORAL
Refills: 0 | Status: DISCONTINUED | OUTPATIENT
Start: 2021-01-01 | End: 2021-01-01

## 2021-01-01 RX ORDER — ALBUMIN HUMAN 25 %
100 VIAL (ML) INTRAVENOUS EVERY 6 HOURS
Refills: 0 | Status: COMPLETED | OUTPATIENT
Start: 2021-01-01 | End: 2021-01-01

## 2021-01-01 RX ORDER — DEXTROSE 50 % IN WATER 50 %
15 SYRINGE (ML) INTRAVENOUS ONCE
Refills: 0 | Status: DISCONTINUED | OUTPATIENT
Start: 2021-01-01 | End: 2021-01-01

## 2021-01-01 RX ORDER — MIDODRINE HYDROCHLORIDE 2.5 MG/1
10 TABLET ORAL EVERY 8 HOURS
Refills: 0 | Status: DISCONTINUED | OUTPATIENT
Start: 2021-01-01 | End: 2021-01-01

## 2021-01-01 RX ORDER — CHLORHEXIDINE GLUCONATE 213 G/1000ML
1 SOLUTION TOPICAL
Refills: 0 | Status: DISCONTINUED | OUTPATIENT
Start: 2021-01-01 | End: 2021-01-01

## 2021-01-01 RX ORDER — CIPROFLOXACIN LACTATE 400MG/40ML
400 VIAL (ML) INTRAVENOUS EVERY 12 HOURS
Refills: 0 | Status: DISCONTINUED | OUTPATIENT
Start: 2021-01-01 | End: 2021-01-01

## 2021-01-01 RX ORDER — ENOXAPARIN SODIUM 100 MG/ML
40 INJECTION SUBCUTANEOUS
Qty: 10 | Refills: 0
Start: 2021-01-01 | End: 2021-01-01

## 2021-01-01 RX ORDER — FENTANYL CITRATE 50 UG/ML
25 INJECTION INTRAVENOUS
Refills: 0 | Status: DISCONTINUED | OUTPATIENT
Start: 2021-01-01 | End: 2021-01-01

## 2021-01-01 RX ORDER — METOPROLOL TARTRATE 50 MG
5 TABLET ORAL ONCE
Refills: 0 | Status: COMPLETED | OUTPATIENT
Start: 2021-01-01 | End: 2021-01-01

## 2021-01-01 RX ORDER — METFORMIN HYDROCHLORIDE 850 MG/1
1 TABLET ORAL
Qty: 0 | Refills: 0 | DISCHARGE

## 2021-01-01 RX ORDER — FAMOTIDINE 10 MG/ML
1 INJECTION INTRAVENOUS
Qty: 30 | Refills: 0
Start: 2021-01-01

## 2021-01-01 RX ORDER — METRONIDAZOLE 500 MG
500 TABLET ORAL EVERY 8 HOURS
Refills: 0 | Status: DISCONTINUED | OUTPATIENT
Start: 2021-01-01 | End: 2021-01-01

## 2021-01-01 RX ORDER — ROBINUL 0.2 MG/ML
0.4 INJECTION INTRAMUSCULAR; INTRAVENOUS EVERY 6 HOURS
Refills: 0 | Status: DISCONTINUED | OUTPATIENT
Start: 2021-01-01 | End: 2021-01-01

## 2021-01-01 RX ORDER — MAGNESIUM SULFATE 500 MG/ML
2 VIAL (ML) INJECTION ONCE
Refills: 0 | Status: COMPLETED | OUTPATIENT
Start: 2021-01-01 | End: 2021-01-01

## 2021-01-01 RX ORDER — SACCHAROMYCES BOULARDII 250 MG
250 POWDER IN PACKET (EA) ORAL
Refills: 0 | Status: DISCONTINUED | OUTPATIENT
Start: 2021-01-01 | End: 2021-01-01

## 2021-01-01 RX ORDER — ZINC OXIDE 200 MG/G
1 OINTMENT TOPICAL
Qty: 1 | Refills: 0
Start: 2021-01-01 | End: 2021-01-01

## 2021-01-01 RX ORDER — METOPROLOL TARTRATE 50 MG
5 TABLET ORAL ONCE
Refills: 0 | Status: DISCONTINUED | OUTPATIENT
Start: 2021-01-01 | End: 2021-01-01

## 2021-01-01 RX ADMIN — Medication 100 MILLIGRAM(S): at 05:33

## 2021-01-01 RX ADMIN — MORPHINE SULFATE 4 MILLIGRAM(S): 50 CAPSULE, EXTENDED RELEASE ORAL at 12:00

## 2021-01-01 RX ADMIN — Medication 1: at 12:48

## 2021-01-01 RX ADMIN — FAMOTIDINE 20 MILLIGRAM(S): 10 INJECTION INTRAVENOUS at 11:58

## 2021-01-01 RX ADMIN — Medication 3 MILLIGRAM(S): at 22:03

## 2021-01-01 RX ADMIN — Medication 650 MILLIGRAM(S): at 12:45

## 2021-01-01 RX ADMIN — Medication 250 MILLIGRAM(S): at 18:40

## 2021-01-01 RX ADMIN — Medication 3 MILLIGRAM(S): at 21:29

## 2021-01-01 RX ADMIN — MIDODRINE HYDROCHLORIDE 20 MILLIGRAM(S): 2.5 TABLET ORAL at 15:40

## 2021-01-01 RX ADMIN — Medication 100 MILLIGRAM(S): at 22:57

## 2021-01-01 RX ADMIN — ATORVASTATIN CALCIUM 20 MILLIGRAM(S): 80 TABLET, FILM COATED ORAL at 21:54

## 2021-01-01 RX ADMIN — MIDODRINE HYDROCHLORIDE 20 MILLIGRAM(S): 2.5 TABLET ORAL at 21:28

## 2021-01-01 RX ADMIN — Medication 500 MILLIGRAM(S): at 11:58

## 2021-01-01 RX ADMIN — Medication 25 MILLIGRAM(S): at 13:24

## 2021-01-01 RX ADMIN — Medication 500 MILLIGRAM(S): at 12:15

## 2021-01-01 RX ADMIN — Medication 500 MILLIGRAM(S): at 12:20

## 2021-01-01 RX ADMIN — Medication 3 MILLIGRAM(S): at 21:26

## 2021-01-01 RX ADMIN — Medication 40 MILLIGRAM(S): at 06:35

## 2021-01-01 RX ADMIN — Medication 500 MILLIGRAM(S): at 12:11

## 2021-01-01 RX ADMIN — FAMOTIDINE 20 MILLIGRAM(S): 10 INJECTION INTRAVENOUS at 13:24

## 2021-01-01 RX ADMIN — ATORVASTATIN CALCIUM 20 MILLIGRAM(S): 80 TABLET, FILM COATED ORAL at 21:26

## 2021-01-01 RX ADMIN — Medication 1: at 08:07

## 2021-01-01 RX ADMIN — Medication 200 MILLIGRAM(S): at 06:35

## 2021-01-01 RX ADMIN — Medication 50 MILLIGRAM(S): at 23:15

## 2021-01-01 RX ADMIN — Medication 1 TABLET(S): at 13:04

## 2021-01-01 RX ADMIN — FAMOTIDINE 20 MILLIGRAM(S): 10 INJECTION INTRAVENOUS at 14:01

## 2021-01-01 RX ADMIN — Medication 500 MILLIGRAM(S): at 18:49

## 2021-01-01 RX ADMIN — ATORVASTATIN CALCIUM 20 MILLIGRAM(S): 80 TABLET, FILM COATED ORAL at 21:43

## 2021-01-01 RX ADMIN — Medication 250 MILLIGRAM(S): at 05:21

## 2021-01-01 RX ADMIN — ENOXAPARIN SODIUM 80 MILLIGRAM(S): 100 INJECTION SUBCUTANEOUS at 17:18

## 2021-01-01 RX ADMIN — Medication 1: at 12:32

## 2021-01-01 RX ADMIN — ENOXAPARIN SODIUM 80 MILLIGRAM(S): 100 INJECTION SUBCUTANEOUS at 18:24

## 2021-01-01 RX ADMIN — Medication 600 MILLIGRAM(S): at 16:33

## 2021-01-01 RX ADMIN — Medication 25 MILLIGRAM(S): at 22:25

## 2021-01-01 RX ADMIN — Medication 650 MILLIGRAM(S): at 01:49

## 2021-01-01 RX ADMIN — Medication 25 MILLIGRAM(S): at 12:25

## 2021-01-01 RX ADMIN — Medication 200 MILLIGRAM(S): at 17:50

## 2021-01-01 RX ADMIN — Medication 3 MILLIGRAM(S): at 21:44

## 2021-01-01 RX ADMIN — Medication 1 TABLET(S): at 12:21

## 2021-01-01 RX ADMIN — Medication 100 MILLIGRAM(S): at 13:22

## 2021-01-01 RX ADMIN — Medication 3 MILLIGRAM(S): at 00:04

## 2021-01-01 RX ADMIN — FAMOTIDINE 20 MILLIGRAM(S): 10 INJECTION INTRAVENOUS at 12:15

## 2021-01-01 RX ADMIN — ENOXAPARIN SODIUM 80 MILLIGRAM(S): 100 INJECTION SUBCUTANEOUS at 05:34

## 2021-01-01 RX ADMIN — MIDODRINE HYDROCHLORIDE 20 MILLIGRAM(S): 2.5 TABLET ORAL at 05:49

## 2021-01-01 RX ADMIN — Medication 40 MILLIGRAM(S): at 18:40

## 2021-01-01 RX ADMIN — Medication 100 MILLIGRAM(S): at 22:25

## 2021-01-01 RX ADMIN — ENOXAPARIN SODIUM 40 MILLIGRAM(S): 100 INJECTION SUBCUTANEOUS at 11:44

## 2021-01-01 RX ADMIN — ATORVASTATIN CALCIUM 20 MILLIGRAM(S): 80 TABLET, FILM COATED ORAL at 21:40

## 2021-01-01 RX ADMIN — MIDODRINE HYDROCHLORIDE 20 MILLIGRAM(S): 2.5 TABLET ORAL at 22:03

## 2021-01-01 RX ADMIN — ATORVASTATIN CALCIUM 20 MILLIGRAM(S): 80 TABLET, FILM COATED ORAL at 23:27

## 2021-01-01 RX ADMIN — ENOXAPARIN SODIUM 80 MILLIGRAM(S): 100 INJECTION SUBCUTANEOUS at 06:07

## 2021-01-01 RX ADMIN — AMIODARONE HYDROCHLORIDE 200 MILLIGRAM(S): 400 TABLET ORAL at 05:49

## 2021-01-01 RX ADMIN — CHLORHEXIDINE GLUCONATE 1 APPLICATION(S): 213 SOLUTION TOPICAL at 06:11

## 2021-01-01 RX ADMIN — AMIODARONE HYDROCHLORIDE 200 MILLIGRAM(S): 400 TABLET ORAL at 05:06

## 2021-01-01 RX ADMIN — Medication 100 GRAM(S): at 13:54

## 2021-01-01 RX ADMIN — Medication 10 MILLIGRAM(S): at 00:04

## 2021-01-01 RX ADMIN — Medication 1: at 11:44

## 2021-01-01 RX ADMIN — Medication 200 MILLIGRAM(S): at 18:14

## 2021-01-01 RX ADMIN — CHLORHEXIDINE GLUCONATE 1 APPLICATION(S): 213 SOLUTION TOPICAL at 15:26

## 2021-01-01 RX ADMIN — AMIODARONE HYDROCHLORIDE 200 MILLIGRAM(S): 400 TABLET ORAL at 05:59

## 2021-01-01 RX ADMIN — ATORVASTATIN CALCIUM 20 MILLIGRAM(S): 80 TABLET, FILM COATED ORAL at 22:25

## 2021-01-01 RX ADMIN — MIDODRINE HYDROCHLORIDE 20 MILLIGRAM(S): 2.5 TABLET ORAL at 00:04

## 2021-01-01 RX ADMIN — Medication 10 MILLIGRAM(S): at 21:52

## 2021-01-01 RX ADMIN — Medication 3 MILLIGRAM(S): at 23:27

## 2021-01-01 RX ADMIN — MIDODRINE HYDROCHLORIDE 20 MILLIGRAM(S): 2.5 TABLET ORAL at 16:00

## 2021-01-01 RX ADMIN — Medication 650 MILLIGRAM(S): at 22:53

## 2021-01-01 RX ADMIN — Medication 1 TABLET(S): at 14:01

## 2021-01-01 RX ADMIN — Medication 40 MILLIEQUIVALENT(S): at 13:54

## 2021-01-01 RX ADMIN — SODIUM CHLORIDE 40 MILLILITER(S): 9 INJECTION INTRAMUSCULAR; INTRAVENOUS; SUBCUTANEOUS at 09:11

## 2021-01-01 RX ADMIN — Medication 500 MILLIGRAM(S): at 13:23

## 2021-01-01 RX ADMIN — Medication 250 MILLIGRAM(S): at 05:13

## 2021-01-01 RX ADMIN — AMIODARONE HYDROCHLORIDE 200 MILLIGRAM(S): 400 TABLET ORAL at 05:21

## 2021-01-01 RX ADMIN — Medication 1 TABLET(S): at 17:43

## 2021-01-01 RX ADMIN — Medication 200 MILLIGRAM(S): at 18:17

## 2021-01-01 RX ADMIN — MIDODRINE HYDROCHLORIDE 20 MILLIGRAM(S): 2.5 TABLET ORAL at 05:43

## 2021-01-01 RX ADMIN — Medication 100 MILLIGRAM(S): at 05:27

## 2021-01-01 RX ADMIN — Medication 25 MILLIGRAM(S): at 14:24

## 2021-01-01 RX ADMIN — ENOXAPARIN SODIUM 80 MILLIGRAM(S): 100 INJECTION SUBCUTANEOUS at 18:14

## 2021-01-01 RX ADMIN — Medication 1 TABLET(S): at 18:48

## 2021-01-01 RX ADMIN — Medication 650 MILLIGRAM(S): at 12:25

## 2021-01-01 RX ADMIN — Medication 1 MILLIGRAM(S): at 12:20

## 2021-01-01 RX ADMIN — Medication 1: at 18:39

## 2021-01-01 RX ADMIN — Medication 650 MILLIGRAM(S): at 12:47

## 2021-01-01 RX ADMIN — AMIODARONE HYDROCHLORIDE 200 MILLIGRAM(S): 400 TABLET ORAL at 05:13

## 2021-01-01 RX ADMIN — Medication 50 MILLIGRAM(S): at 23:13

## 2021-01-01 RX ADMIN — Medication 25 MILLIGRAM(S): at 14:13

## 2021-01-01 RX ADMIN — Medication 1 MILLIGRAM(S): at 11:44

## 2021-01-01 RX ADMIN — ATORVASTATIN CALCIUM 20 MILLIGRAM(S): 80 TABLET, FILM COATED ORAL at 21:09

## 2021-01-01 RX ADMIN — SODIUM CHLORIDE 75 MILLILITER(S): 9 INJECTION INTRAMUSCULAR; INTRAVENOUS; SUBCUTANEOUS at 05:55

## 2021-01-01 RX ADMIN — CHLORHEXIDINE GLUCONATE 1 APPLICATION(S): 213 SOLUTION TOPICAL at 08:49

## 2021-01-01 RX ADMIN — ENOXAPARIN SODIUM 80 MILLIGRAM(S): 100 INJECTION SUBCUTANEOUS at 17:57

## 2021-01-01 RX ADMIN — ATORVASTATIN CALCIUM 20 MILLIGRAM(S): 80 TABLET, FILM COATED ORAL at 21:28

## 2021-01-01 RX ADMIN — Medication 40 MILLIEQUIVALENT(S): at 16:11

## 2021-01-01 RX ADMIN — Medication 200 MILLIGRAM(S): at 05:21

## 2021-01-01 RX ADMIN — Medication 250 MILLIGRAM(S): at 18:41

## 2021-01-01 RX ADMIN — AMIODARONE HYDROCHLORIDE 200 MILLIGRAM(S): 400 TABLET ORAL at 05:33

## 2021-01-01 RX ADMIN — Medication 600 MILLIGRAM(S): at 18:17

## 2021-01-01 RX ADMIN — FAMOTIDINE 20 MILLIGRAM(S): 10 INJECTION INTRAVENOUS at 12:38

## 2021-01-01 RX ADMIN — Medication 25 MILLIGRAM(S): at 10:57

## 2021-01-01 RX ADMIN — Medication 3 MILLIGRAM(S): at 22:25

## 2021-01-01 RX ADMIN — Medication 25 MILLIGRAM(S): at 21:54

## 2021-01-01 RX ADMIN — Medication 200 MILLIGRAM(S): at 17:04

## 2021-01-01 RX ADMIN — ENOXAPARIN SODIUM 80 MILLIGRAM(S): 100 INJECTION SUBCUTANEOUS at 18:48

## 2021-01-01 RX ADMIN — Medication 50 MILLILITER(S): at 06:09

## 2021-01-01 RX ADMIN — Medication 1: at 06:20

## 2021-01-01 RX ADMIN — Medication 500 MILLIGRAM(S): at 12:51

## 2021-01-01 RX ADMIN — Medication 25 MILLIGRAM(S): at 21:08

## 2021-01-01 RX ADMIN — ENOXAPARIN SODIUM 80 MILLIGRAM(S): 100 INJECTION SUBCUTANEOUS at 18:40

## 2021-01-01 RX ADMIN — Medication 250 MILLIGRAM(S): at 05:27

## 2021-01-01 RX ADMIN — Medication 100 MILLIGRAM(S): at 14:01

## 2021-01-01 RX ADMIN — ENOXAPARIN SODIUM 80 MILLIGRAM(S): 100 INJECTION SUBCUTANEOUS at 17:05

## 2021-01-01 RX ADMIN — Medication 100 MILLIGRAM(S): at 12:46

## 2021-01-01 RX ADMIN — Medication 3 MILLIGRAM(S): at 21:40

## 2021-01-01 RX ADMIN — Medication 25 MILLIGRAM(S): at 23:27

## 2021-01-01 RX ADMIN — Medication 50 MILLIGRAM(S): at 05:07

## 2021-01-01 RX ADMIN — Medication 25 MILLIGRAM(S): at 13:54

## 2021-01-01 RX ADMIN — Medication 1 TABLET(S): at 12:46

## 2021-01-01 RX ADMIN — ENOXAPARIN SODIUM 80 MILLIGRAM(S): 100 INJECTION SUBCUTANEOUS at 05:25

## 2021-01-01 RX ADMIN — Medication 100 MILLIGRAM(S): at 15:16

## 2021-01-01 RX ADMIN — FAMOTIDINE 20 MILLIGRAM(S): 10 INJECTION INTRAVENOUS at 12:19

## 2021-01-01 RX ADMIN — Medication 100 MILLIGRAM(S): at 14:13

## 2021-01-01 RX ADMIN — Medication 100 MILLIGRAM(S): at 12:24

## 2021-01-01 RX ADMIN — FAMOTIDINE 20 MILLIGRAM(S): 10 INJECTION INTRAVENOUS at 12:32

## 2021-01-01 RX ADMIN — MORPHINE SULFATE 4 MILLIGRAM(S): 50 CAPSULE, EXTENDED RELEASE ORAL at 17:56

## 2021-01-01 RX ADMIN — Medication 3 MILLIGRAM(S): at 21:08

## 2021-01-01 RX ADMIN — Medication 500 MILLIGRAM(S): at 13:04

## 2021-01-01 RX ADMIN — Medication 500 MILLIGRAM(S): at 12:25

## 2021-01-01 RX ADMIN — MIDODRINE HYDROCHLORIDE 20 MILLIGRAM(S): 2.5 TABLET ORAL at 13:04

## 2021-01-01 RX ADMIN — AMIODARONE HYDROCHLORIDE 200 MILLIGRAM(S): 400 TABLET ORAL at 05:24

## 2021-01-01 RX ADMIN — Medication 200 MILLIGRAM(S): at 05:07

## 2021-01-01 RX ADMIN — FAMOTIDINE 20 MILLIGRAM(S): 10 INJECTION INTRAVENOUS at 12:51

## 2021-01-01 RX ADMIN — Medication 50 MILLILITER(S): at 09:12

## 2021-01-01 RX ADMIN — Medication 2 GRAM(S): at 15:12

## 2021-01-01 RX ADMIN — Medication 25 MILLIGRAM(S): at 05:24

## 2021-01-01 RX ADMIN — Medication 600 MILLIGRAM(S): at 05:43

## 2021-01-01 RX ADMIN — Medication 25 MILLIGRAM(S): at 05:06

## 2021-01-01 RX ADMIN — Medication 1 MILLIGRAM(S): at 11:58

## 2021-01-01 RX ADMIN — Medication 1 MILLIGRAM(S): at 12:32

## 2021-01-01 RX ADMIN — Medication 25 MILLIGRAM(S): at 12:38

## 2021-01-01 RX ADMIN — Medication 250 MILLIGRAM(S): at 17:38

## 2021-01-01 RX ADMIN — MORPHINE SULFATE 4 MILLIGRAM(S): 50 CAPSULE, EXTENDED RELEASE ORAL at 15:12

## 2021-01-01 RX ADMIN — Medication 600 MILLIGRAM(S): at 17:18

## 2021-01-01 RX ADMIN — Medication 10 MILLIGRAM(S): at 21:41

## 2021-01-01 RX ADMIN — Medication 10 MILLIGRAM(S): at 23:27

## 2021-01-01 RX ADMIN — AMIODARONE HYDROCHLORIDE 200 MILLIGRAM(S): 400 TABLET ORAL at 05:27

## 2021-01-01 RX ADMIN — FAMOTIDINE 20 MILLIGRAM(S): 10 INJECTION INTRAVENOUS at 12:21

## 2021-01-01 RX ADMIN — CHLORHEXIDINE GLUCONATE 1 APPLICATION(S): 213 SOLUTION TOPICAL at 06:00

## 2021-01-01 RX ADMIN — Medication 1: at 16:18

## 2021-01-01 RX ADMIN — Medication 200 MILLIGRAM(S): at 05:13

## 2021-01-01 RX ADMIN — Medication 10 MILLIGRAM(S): at 21:22

## 2021-01-01 RX ADMIN — Medication 200 MILLIGRAM(S): at 17:36

## 2021-01-01 RX ADMIN — Medication 10 MILLIGRAM(S): at 21:26

## 2021-01-01 RX ADMIN — Medication 10 MILLIGRAM(S): at 21:10

## 2021-01-01 RX ADMIN — Medication 100 MILLIGRAM(S): at 21:54

## 2021-01-01 RX ADMIN — Medication 1: at 11:58

## 2021-01-01 RX ADMIN — Medication 25 MILLIGRAM(S): at 05:34

## 2021-01-01 RX ADMIN — Medication 10 MILLIGRAM(S): at 21:54

## 2021-01-01 RX ADMIN — ENOXAPARIN SODIUM 80 MILLIGRAM(S): 100 INJECTION SUBCUTANEOUS at 05:13

## 2021-01-01 RX ADMIN — ATORVASTATIN CALCIUM 20 MILLIGRAM(S): 80 TABLET, FILM COATED ORAL at 21:22

## 2021-01-01 RX ADMIN — FAMOTIDINE 20 MILLIGRAM(S): 10 INJECTION INTRAVENOUS at 18:40

## 2021-01-01 RX ADMIN — Medication 100 MILLIGRAM(S): at 07:45

## 2021-01-01 RX ADMIN — Medication 25 MILLIGRAM(S): at 12:47

## 2021-01-01 RX ADMIN — AMIODARONE HYDROCHLORIDE 200 MILLIGRAM(S): 400 TABLET ORAL at 05:43

## 2021-01-01 RX ADMIN — Medication 100 MILLIGRAM(S): at 21:47

## 2021-01-01 RX ADMIN — Medication 25 MILLIGRAM(S): at 06:35

## 2021-01-01 RX ADMIN — Medication 50 MILLILITER(S): at 23:12

## 2021-01-01 RX ADMIN — ATORVASTATIN CALCIUM 20 MILLIGRAM(S): 80 TABLET, FILM COATED ORAL at 23:08

## 2021-01-01 RX ADMIN — MIDODRINE HYDROCHLORIDE 20 MILLIGRAM(S): 2.5 TABLET ORAL at 05:21

## 2021-01-01 RX ADMIN — Medication 1: at 12:50

## 2021-01-01 RX ADMIN — Medication 1 MILLIGRAM(S): at 12:46

## 2021-01-01 RX ADMIN — MIDODRINE HYDROCHLORIDE 10 MILLIGRAM(S): 2.5 TABLET ORAL at 13:22

## 2021-01-01 RX ADMIN — Medication 10 MILLIGRAM(S): at 21:28

## 2021-01-01 RX ADMIN — Medication 200 MILLIGRAM(S): at 05:33

## 2021-01-01 RX ADMIN — Medication 600 MILLIGRAM(S): at 17:39

## 2021-01-01 RX ADMIN — Medication 3 MILLIGRAM(S): at 21:22

## 2021-01-01 RX ADMIN — Medication 600 MILLIGRAM(S): at 05:21

## 2021-01-01 RX ADMIN — Medication 250 MILLIGRAM(S): at 05:24

## 2021-01-01 RX ADMIN — Medication 500 MILLIGRAM(S): at 12:46

## 2021-01-01 RX ADMIN — ENOXAPARIN SODIUM 80 MILLIGRAM(S): 100 INJECTION SUBCUTANEOUS at 06:35

## 2021-01-01 RX ADMIN — Medication 100 MILLIGRAM(S): at 21:21

## 2021-01-01 RX ADMIN — Medication 1 MILLIGRAM(S): at 18:40

## 2021-01-01 RX ADMIN — Medication 1: at 08:59

## 2021-01-01 RX ADMIN — Medication 1 MILLIGRAM(S): at 14:01

## 2021-01-01 RX ADMIN — ENOXAPARIN SODIUM 80 MILLIGRAM(S): 100 INJECTION SUBCUTANEOUS at 05:21

## 2021-01-01 RX ADMIN — Medication 100 MILLIGRAM(S): at 05:21

## 2021-01-01 RX ADMIN — Medication 1 MILLIGRAM(S): at 13:24

## 2021-01-01 RX ADMIN — Medication 100 MILLIGRAM(S): at 13:54

## 2021-01-01 RX ADMIN — Medication 650 MILLIGRAM(S): at 13:16

## 2021-01-01 RX ADMIN — Medication 25 MILLIGRAM(S): at 05:27

## 2021-01-01 RX ADMIN — ENOXAPARIN SODIUM 40 MILLIGRAM(S): 100 INJECTION SUBCUTANEOUS at 12:20

## 2021-01-01 RX ADMIN — Medication 500 MILLIGRAM(S): at 12:37

## 2021-01-01 RX ADMIN — Medication 1 TABLET(S): at 13:24

## 2021-01-01 RX ADMIN — MIDODRINE HYDROCHLORIDE 20 MILLIGRAM(S): 2.5 TABLET ORAL at 21:26

## 2021-01-01 RX ADMIN — PHENYLEPHRINE HYDROCHLORIDE 6.23 MICROGRAM(S)/KG/MIN: 10 INJECTION INTRAVENOUS at 17:54

## 2021-01-01 RX ADMIN — Medication 3 MILLIGRAM(S): at 22:53

## 2021-01-01 RX ADMIN — Medication 1: at 17:46

## 2021-01-01 RX ADMIN — Medication 10 MILLIGRAM(S): at 22:03

## 2021-01-01 RX ADMIN — Medication 1: at 18:38

## 2021-01-01 RX ADMIN — CHLORHEXIDINE GLUCONATE 1 APPLICATION(S): 213 SOLUTION TOPICAL at 06:01

## 2021-01-01 RX ADMIN — ATORVASTATIN CALCIUM 20 MILLIGRAM(S): 80 TABLET, FILM COATED ORAL at 21:47

## 2021-01-01 RX ADMIN — AMIODARONE HYDROCHLORIDE 200 MILLIGRAM(S): 400 TABLET ORAL at 06:35

## 2021-01-01 RX ADMIN — AMIODARONE HYDROCHLORIDE 200 MILLIGRAM(S): 400 TABLET ORAL at 18:49

## 2021-01-01 RX ADMIN — Medication 25 MILLIGRAM(S): at 05:13

## 2021-01-01 RX ADMIN — Medication 40 MILLIGRAM(S): at 18:26

## 2021-01-01 RX ADMIN — MIDODRINE HYDROCHLORIDE 20 MILLIGRAM(S): 2.5 TABLET ORAL at 18:17

## 2021-01-01 RX ADMIN — Medication 100 MILLIGRAM(S): at 05:06

## 2021-01-01 RX ADMIN — Medication 10 MILLIGRAM(S): at 22:26

## 2021-01-01 RX ADMIN — ENOXAPARIN SODIUM 80 MILLIGRAM(S): 100 INJECTION SUBCUTANEOUS at 05:06

## 2021-01-01 RX ADMIN — AMIODARONE HYDROCHLORIDE 200 MILLIGRAM(S): 400 TABLET ORAL at 06:07

## 2021-01-01 RX ADMIN — AMIODARONE HYDROCHLORIDE 200 MILLIGRAM(S): 400 TABLET ORAL at 05:54

## 2021-01-01 RX ADMIN — FAMOTIDINE 20 MILLIGRAM(S): 10 INJECTION INTRAVENOUS at 11:44

## 2021-01-01 RX ADMIN — Medication 10 MILLIGRAM(S): at 21:08

## 2021-01-01 RX ADMIN — Medication 600 MILLIGRAM(S): at 17:00

## 2021-01-01 RX ADMIN — Medication 1 MILLIGRAM(S): at 12:38

## 2021-01-01 RX ADMIN — Medication 50 MILLIGRAM(S): at 05:44

## 2021-01-01 RX ADMIN — MIDODRINE HYDROCHLORIDE 20 MILLIGRAM(S): 2.5 TABLET ORAL at 21:41

## 2021-01-01 RX ADMIN — Medication 25 MILLIGRAM(S): at 21:46

## 2021-01-01 RX ADMIN — Medication 100 MILLIGRAM(S): at 23:27

## 2021-01-01 RX ADMIN — ENOXAPARIN SODIUM 40 MILLIGRAM(S): 100 INJECTION SUBCUTANEOUS at 12:51

## 2021-01-01 RX ADMIN — ATORVASTATIN CALCIUM 20 MILLIGRAM(S): 80 TABLET, FILM COATED ORAL at 22:53

## 2021-01-01 RX ADMIN — Medication 25 MILLIGRAM(S): at 05:15

## 2021-01-01 RX ADMIN — Medication 50 MILLIGRAM(S): at 17:10

## 2021-01-01 RX ADMIN — ENOXAPARIN SODIUM 40 MILLIGRAM(S): 100 INJECTION SUBCUTANEOUS at 12:24

## 2021-01-01 RX ADMIN — MIDODRINE HYDROCHLORIDE 20 MILLIGRAM(S): 2.5 TABLET ORAL at 06:27

## 2021-01-01 RX ADMIN — Medication 1 MILLIGRAM(S): at 13:37

## 2021-01-01 RX ADMIN — MIDODRINE HYDROCHLORIDE 20 MILLIGRAM(S): 2.5 TABLET ORAL at 21:52

## 2021-01-01 RX ADMIN — Medication 3 MILLIGRAM(S): at 21:54

## 2021-01-01 RX ADMIN — Medication 200 MILLIGRAM(S): at 05:27

## 2021-01-01 RX ADMIN — SODIUM CHLORIDE 75 MILLILITER(S): 9 INJECTION, SOLUTION INTRAVENOUS at 19:30

## 2021-01-01 RX ADMIN — Medication 1: at 16:29

## 2021-01-01 RX ADMIN — Medication 1: at 17:38

## 2021-01-01 RX ADMIN — Medication 10 MILLIGRAM(S): at 21:46

## 2021-01-01 RX ADMIN — Medication 500 MILLIGRAM(S): at 11:44

## 2021-01-01 RX ADMIN — Medication 1: at 16:57

## 2021-01-01 RX ADMIN — Medication 100 MILLIGRAM(S): at 05:25

## 2021-01-01 RX ADMIN — Medication 50 MILLIGRAM(S): at 11:59

## 2021-01-01 RX ADMIN — Medication 100 MILLIGRAM(S): at 05:14

## 2021-01-01 RX ADMIN — Medication 1 TABLET(S): at 12:19

## 2021-01-01 RX ADMIN — FAMOTIDINE 20 MILLIGRAM(S): 10 INJECTION INTRAVENOUS at 12:25

## 2021-01-01 RX ADMIN — Medication 500 MILLIGRAM(S): at 12:32

## 2021-01-01 RX ADMIN — Medication 1 TABLET(S): at 12:33

## 2021-01-01 RX ADMIN — Medication 50 MILLILITER(S): at 16:17

## 2021-01-01 RX ADMIN — MIDODRINE HYDROCHLORIDE 10 MILLIGRAM(S): 2.5 TABLET ORAL at 03:29

## 2021-01-01 RX ADMIN — Medication 50 MILLILITER(S): at 11:43

## 2021-01-01 RX ADMIN — Medication 3 MILLIGRAM(S): at 21:47

## 2021-01-01 RX ADMIN — Medication 1 TABLET(S): at 12:25

## 2021-01-01 RX ADMIN — ATORVASTATIN CALCIUM 20 MILLIGRAM(S): 80 TABLET, FILM COATED ORAL at 22:03

## 2021-01-01 RX ADMIN — Medication 25 MILLIGRAM(S): at 22:53

## 2021-01-01 RX ADMIN — Medication 250 MILLIGRAM(S): at 18:25

## 2021-01-01 RX ADMIN — Medication 3 MILLIGRAM(S): at 21:10

## 2021-01-01 RX ADMIN — Medication 1 MILLIGRAM(S): at 13:04

## 2021-01-01 RX ADMIN — Medication 1: at 08:36

## 2021-01-01 RX ADMIN — Medication 1 TABLET(S): at 16:11

## 2021-01-01 RX ADMIN — Medication 1 TABLET(S): at 11:58

## 2021-01-01 RX ADMIN — Medication 1 MILLIGRAM(S): at 12:15

## 2021-01-01 RX ADMIN — Medication 1 MILLIGRAM(S): at 12:51

## 2021-01-01 RX ADMIN — Medication 200 MILLIGRAM(S): at 06:31

## 2021-01-01 RX ADMIN — Medication 200 MILLIGRAM(S): at 17:57

## 2021-01-01 RX ADMIN — FAMOTIDINE 20 MILLIGRAM(S): 10 INJECTION INTRAVENOUS at 13:04

## 2021-01-01 RX ADMIN — Medication 10 MILLIGRAM(S): at 22:53

## 2021-01-01 RX ADMIN — FAMOTIDINE 20 MILLIGRAM(S): 10 INJECTION INTRAVENOUS at 12:11

## 2021-01-01 RX ADMIN — Medication 1 MILLIGRAM(S): at 12:11

## 2021-01-01 RX ADMIN — Medication 600 MILLIGRAM(S): at 05:49

## 2021-01-01 RX ADMIN — PHENYLEPHRINE HYDROCHLORIDE 6.23 MICROGRAM(S)/KG/MIN: 10 INJECTION INTRAVENOUS at 17:10

## 2021-01-01 RX ADMIN — Medication 650 MILLIGRAM(S): at 12:17

## 2021-01-01 RX ADMIN — Medication 1: at 13:22

## 2021-01-01 RX ADMIN — Medication 10 MILLIGRAM(S): at 23:08

## 2021-01-01 RX ADMIN — HYDROMORPHONE HYDROCHLORIDE 1 MILLIGRAM(S): 2 INJECTION INTRAMUSCULAR; INTRAVENOUS; SUBCUTANEOUS at 13:37

## 2021-01-01 RX ADMIN — ENOXAPARIN SODIUM 40 MILLIGRAM(S): 100 INJECTION SUBCUTANEOUS at 13:03

## 2021-01-01 RX ADMIN — Medication 500 MILLIGRAM(S): at 14:02

## 2021-01-01 RX ADMIN — Medication 650 MILLIGRAM(S): at 03:16

## 2021-01-01 RX ADMIN — Medication 250 MILLIGRAM(S): at 18:14

## 2021-01-01 RX ADMIN — Medication 650 MILLIGRAM(S): at 23:35

## 2021-01-01 RX ADMIN — Medication 250 MILLIGRAM(S): at 06:37

## 2021-01-01 RX ADMIN — Medication 1 TABLET(S): at 12:38

## 2021-01-01 RX ADMIN — Medication 1: at 18:17

## 2021-01-01 RX ADMIN — FAMOTIDINE 20 MILLIGRAM(S): 10 INJECTION INTRAVENOUS at 12:46

## 2021-01-01 RX ADMIN — Medication 100 MILLIGRAM(S): at 21:09

## 2021-01-01 RX ADMIN — Medication 102 MILLIGRAM(S): at 13:14

## 2021-01-01 RX ADMIN — Medication 1 MILLIGRAM(S): at 12:25

## 2021-01-01 RX ADMIN — Medication 5 MILLIGRAM(S): at 08:27

## 2021-01-01 RX ADMIN — ATORVASTATIN CALCIUM 20 MILLIGRAM(S): 80 TABLET, FILM COATED ORAL at 21:08

## 2021-01-01 RX ADMIN — Medication 50 GRAM(S): at 12:09

## 2021-01-01 RX ADMIN — Medication 1 TABLET(S): at 12:51

## 2021-01-01 RX ADMIN — MIDODRINE HYDROCHLORIDE 20 MILLIGRAM(S): 2.5 TABLET ORAL at 14:13

## 2021-01-01 RX ADMIN — AMIODARONE HYDROCHLORIDE 200 MILLIGRAM(S): 400 TABLET ORAL at 05:15

## 2021-01-01 RX ADMIN — ENOXAPARIN SODIUM 80 MILLIGRAM(S): 100 INJECTION SUBCUTANEOUS at 17:38

## 2021-01-01 RX ADMIN — ATORVASTATIN CALCIUM 20 MILLIGRAM(S): 80 TABLET, FILM COATED ORAL at 00:04

## 2021-01-01 RX ADMIN — Medication 600 MILLIGRAM(S): at 17:03

## 2021-01-01 RX ADMIN — Medication 25 MILLIGRAM(S): at 05:21

## 2021-01-01 RX ADMIN — Medication 25 MILLIGRAM(S): at 21:10

## 2021-04-27 PROBLEM — Z91.89 STROKE RISK: Status: ACTIVE | Noted: 2021-01-01

## 2021-04-27 PROBLEM — R47.1 DYSARTHRIA: Status: ACTIVE | Noted: 2021-01-01

## 2021-04-27 NOTE — HISTORY OF PRESENT ILLNESS
[FreeTextEntry1] : 89F presents for chemodenervation of her right spastic hemiparesis related to a left MCA CVA from Feb 2019. She was last injected on 11/17/21. \par \par Patient has noted maintenance of her chemodenervation. \par \par Of note, patient is more dysarthric. Patient has been not been as mobile. Sits in chair for most of the day and does perform her exercises. \par \par HPI - Patient sustained a left MCA infarct at [facility] on 2/3/19 and was transferred to [facility] for excision of occlusion and mechanical thrombectomy. She had a PEG was placed on 2/12. Course complicated by UTI and eventually transferred to [facility] on 2/14. She was discharged to [facility] on 3/12 for 100 days and then to [place] for one month. Now is back at [facility] as a long term resident.

## 2021-04-27 NOTE — REVIEW OF SYSTEMS
[Patient Intake Form Reviewed] : Patient intake form was reviewed [Fatigue] : fatigue [Joint Stiffness] : joint stiffness [Muscle Weakness] : muscle weakness [Difficulty Walking] : difficulty walking [Negative] : Heme/Lymph

## 2021-04-27 NOTE — PHYSICAL EXAM
[Hand Weakness Right] : the hand  was weak [2] : fingers flexion 2/5 [Hand Weakness Left] : normal hand  [0] : hip flexion 0/5 [3] : knee flexion 3/5 [1] : toe extension 1/5 [5] : ankle plantar flexion 5/5 [Monospasticity Of Right Arm] : spasticity of the right arm was present [Monospasticity Of Right Leg] : spasticity of the right leg was present [Cranial Nerves Optic (II)] : visual acuity and visual fields were intact [Cranial Nerves Oculomotor (III)] : the extraocular motions were intact [Cranial Nerves Trigeminal (V)] : sensation to the face and masseter strength were intact [Cranial Nerves Vestibulocochlear (VIII)] : hearing was intact [Cranial Nerves Glossopharyngeal (IX)] : there was normal movement of the soft palate and normal gag [Cranial Nerves Hypoglossal (XII)] : there was no tongue deviation with protrusion [Flattening Of Nasolabial Fold On The Right] : flattening of the nasolabial fold [CNS Accessory - Diminished Shoulder Elev. - Right Trapezius] : weakness of shoulder elevation was present on the right [CNS Accessory - Sternocleidomastoid Weakness Bilateral] : no sternocleidomastoid weakness  [Tactile Decrease Hemisensory Entire Right Side] : diminished right side [Tactile Decrease Hemisensory Entire Left Side] : normal left side [Limited Balance] : the patient's balance was impaired [Dysdiadochokinesia On The Right] : present on the ride side [___] : [unfilled] ~Ubeats present on the right [Normal] : Oriented to person, place, and time, insight and judgement were intact and the affect was normal [de-identified] : Nonambulatory in WC. Needs assist with ADLs and transfers.  [de-identified] : Meghan Biceps 1+, Calf 2/4 Clonus unsustained

## 2021-04-27 NOTE — PROCEDURE
[Consent] : consent was given by patient or guardian [Site Verification] : the injection site was verified [Post-Injection Instructions Provided] : post-injection instructions were provided [____] : in a total of [unfilled] sites [] : EMG Guidance was used during the procedure [Total Units: ___] : [unfilled] units [Total Vials: ___] : [unfilled] vials were used [Total Waste: ___] : with [unfilled] wasted [de-identified] : Consent was obtained. Site verified and localized.\par \par Procedure and risks/benefits were discussed. Agreed with procedure and answered all questions. \par \par Tolerated procedure without complications. \par \par INR was less than 2. [TWNoteComboBox1] : Biceps [TWNoteComboBox2] : 100 Units [de-identified] : Medial Gastrocnemius [de-identified] : 100 Units

## 2021-06-07 NOTE — CONSULT NOTE ADULT - ASSESSMENT
89F multiple medical problems who reports 100 lb weight loss in the last year presents with laceration to knee.  Subsequently found with R loculated pleural effusion seen on Abd CT.  Patient stable NAD

## 2021-06-07 NOTE — CONSULT NOTE ADULT - PROBLEM SELECTOR RECOMMENDATION 9
INR <2   pocus revealed pleural effusion  will place bedside pigtail catheter, leave to waterseal  cxr post procedure and again next am   d/w Dr Pena in rounds

## 2021-06-07 NOTE — ED PROVIDER NOTE - CLINICAL SUMMARY MEDICAL DECISION MAKING FREE TEXT BOX
89 year old female with history of CVA (left MCA, residual R sided hemiparesis), HTN, DM, atrial flutter, pacemaker, and diastolic CHF who presents following a fall. On interview also reported diarrhea and decreased appetite. On initial exam had HR in 140s. 89 year old female with history of CVA (left MCA, residual R sided hemiparesis), HTN, DM, atrial flutter, pacemaker, and diastolic CHF who presents following a fall. Sustained laceration to R knee which was repaired. On interview also reported diarrhea, decreased appetite, and 100 lb weight loss over the last 1 year. On initial exam had HR in 140s. Given metoprolol PO with spontaneous conversion to NSR. Found "foci of low attenuation in the atelectatic lung measuring up to 5.2 cm suspicious for masses" in addition to moderate size partially loculated pleural effusion in addition to colitis, diffuse atherosclerosis, and splenic infarcts ?chronic in nature. CT surgery consulted and patient admitted for further care.

## 2021-06-07 NOTE — ED ADULT NURSE NOTE - PSH
Aortic valve replaced    Artificial pacemaker    S/P CABG (coronary artery bypass graft)    S/P cholecystectomy    S/P coronary artery stent placement  x 4, 2006-[facility]

## 2021-06-07 NOTE — H&P ADULT - NSICDXPASTMEDICALHX_GEN_ALL_CORE_FT
PAST MEDICAL HISTORY:  Atrial flutter 1/2010- [facility], Dr Tran    CAD (coronary artery disease) s/p CABG- 2004-[facility]    Diabetes     HTN (hypertension)     Hyperlipidemia     Obesity     Valvular disease

## 2021-06-07 NOTE — H&P ADULT - HISTORY OF PRESENT ILLNESS
89 year old female with PMH HTN, T2DM, Dyslipidemia, CAD s/p CABG, Atrial Flutter s/p PPM and Left MCA CVA with residual right sided weakness leading to residence at [facility] since CVA was brought in for laceration of Right knee after being transferred ROS revealed diarrhea, CT abdomen with colitis and Right pleural effusion.     Doesn't feel hungry any more and lost weight over past year. Cough occasionally whilst eating. Denies any fever, chills, dizziness, chest pain, palpitations, nausea, vomiting or abdominal pain;

## 2021-06-07 NOTE — ED PROVIDER NOTE - PROGRESS NOTE DETAILS
Dylan: I rechecked the patient. She denies pain. HR of 83. Dylan: I rechecked the patient. She denies chest pain, shortness of breath, abdominal pain. Reports low back pain that has been ongoing. Provided hot packs to low butt. Dylan: I re-examined the patient after she complained of low back/butt pain. No sacral ulcers noted. Rectal area with external hemorrhoid and hemorrhoid cream previously applied. Dylan: Radiologist called with CT a/p results. Possible malignancy and R lung effusion. CT chest IV contrast ordered for further eval. Dylan: Given the significant and immediate threats to this patient based on initial presentation, the benefits of emergency contrast-enhanced CT imaging without obtaining GFR/creatinine serum level results greatly outweigh the potential risk of harm due to contrast-induced nephropathy. Dylan: Conversation held with the patient regarding her CT findings.

## 2021-06-07 NOTE — H&P ADULT - NSICDXPASTSURGICALHX_GEN_ALL_CORE_FT
PAST SURGICAL HISTORY:  Aortic valve replaced     Artificial pacemaker     S/P CABG (coronary artery bypass graft)     S/P cholecystectomy     S/P coronary artery stent placement x 4, 2006-[facility]

## 2021-06-07 NOTE — ED PROVIDER NOTE - PHYSICAL EXAMINATION
General: NAD  Head: NC, AT   EENT: EOMI, no scleral icterus  Cardiac: irregular rhythm, rate 70s-80s, no apparent murmurs, no lower extremity edema  Respiratory: CTA anteriorly, no respiratory distress   Abdomen: soft, ND, slight ttp RUQ and LUQ, nonperitonitic  MSK/Vascular: soft compartments, warm extremities  Neuro: Alert and oriented to self and place and age; not to time, sensation to light touch intact  Skin: Linear horizontal superficial abrasion to upper R knee, covered in steristrips, no surrounding erythema, no drainage  Psych: calm, cooperative General: NAD  Head: NC, AT   EENT: EOMI, no scleral icterus  Cardiac: irregular rhythm, rate 70s-80s, no apparent murmurs, no lower extremity edema  Respiratory: CTA anteriorly, no respiratory distress   Abdomen: soft, ND, slight ttp RUQ and LUQ, nonperitonitic  : external hemorrhoid visualized, hemorrhoid cream previously applied   MSK/Vascular: soft compartments, warm extremities  Neuro: Alert and oriented to self and place and age; not to time, sensation to light touch intact  Skin: Linear horizontal superficial abrasion to upper R knee, covered in steristrips, no surrounding erythema, no drainage  Psych: calm, cooperative General: NAD  Head: NC, AT   EENT: EOMI, no scleral icterus  Cardiac: irregular rhythm, rate 70s-80s, no apparent murmurs, no lower extremity edema  Respiratory: CTA anteriorly, no respiratory distress, low 90s on RA  Abdomen: soft, ND, slight ttp RUQ and LUQ, nonperitonitic  : external hemorrhoid visualized, hemorrhoid cream previously applied   MSK/Vascular: soft compartments, warm extremities  Neuro: Alert and oriented to self and place and age; not to time, sensation to light touch intact  Skin: Linear horizontal superficial abrasion to upper R knee, covered in steristrips, no surrounding erythema, no drainage  Psych: calm, cooperative

## 2021-06-07 NOTE — H&P ADULT - NSHPSOCIALHISTORY_GEN_ALL_CORE
, 2 daughters.  Resident of [facility] after CVA  Denies any smoking, alcohol or drug use

## 2021-06-07 NOTE — ED PROVIDER NOTE - OBJECTIVE STATEMENT
89 year old female with history of CVA (left MCA, residual R sided hemiparesis), HTN, DM, atrial flutter, pacemaker, and diastolic CHF who presents with RLE injury. The patient lives at [facility] in [place]. This morning at 07:30 she was transferring from her bed to a metal chair with the assistance of 2 nurses when she sustained a cut to her R knee. The cut stopped bleeding spontaneously, steri strips were applied, and patient brought to ED for further evaluation. Here in the ED the patient denies RLE pain. She reports daily diarrhea (about 2 times daily) for the last few weeks. No fevers, chills, chest pain, shortness of breath, nausea, vomiting, or abdominal pain. States she was living independently prior to her CVA in 3/2021. 89 year old female with history of CVA (left MCA, residual R sided hemiparesis), HTN, DM, atrial flutter, pacemaker, and diastolic CHF who presents with RLE injury. The patient lives at [facility] in [place]. This morning at 07:30 she was transferring from her bed to a metal chair with the assistance of 2 nurses when she sustained a cut to her R knee. The cut stopped bleeding spontaneously, steri strips were applied, and patient brought to ED for further evaluation. Here in the ED the patient denies RLE pain. She reports decreased appetite and diarrhea (about 2 times daily) for the last few weeks. No fevers, chills, chest pain, shortness of breath, nausea, vomiting, or abdominal pain. States she was living independently prior to her CVA in 3/2021. 89 year old female with history of CVA (left MCA, residual R sided hemiparesis), HTN, DM, atrial flutter, pacemaker, and diastolic CHF who presents with RLE injury. The patient lives at [facility] in [place]. This morning at 07:30 she was transferring from her bed to a metal chair with the assistance of 2 nurses when she sustained a cut to her R knee. The cut stopped bleeding spontaneously, steri strips were applied, and patient brought to ED for further evaluation. Here in the ED the patient denies RLE pain. She reports decreased appetite and diarrhea (about 2 times daily) for the last few weeks. Notes 100 lb weight loss over the last 1 year. No fevers, chills, chest pain, shortness of breath, nausea, vomiting, or abdominal pain. States she was living independently prior to her CVA in 3/2021.

## 2021-06-07 NOTE — H&P ADULT - ASSESSMENT
89 year old female with PMH HTN, T2DM, Dyslipidemia, CAD s/p CABG, Atrial Flutter s/p PPM and Left MCA CVA with residual right sided weakness leading to residence at [facility] since CVA was brought in for laceration of Right knee after being transferred ROS revealed diarrhea, CT abdomen with colitis and Right pleural effusion.     Right Pleural effusion s/p Right thoracocentesis. Asymptomatic  - Admit to any monitored  bed  - Chest tubes management as per CTS  - Fluid chemistry, cytology, cultures  - Repeat imaging    Diarrhea; Colitis on CT. Hypomagnesemia due to GI losses  - Low residue diet  - IV Flagyl and Cipro  - Replace Magnesium  - Stool culture, GI-PCR  - Colonoscopy after discharge    Chronic AFlutter  - Continue BB  - Hold Coumadin as may need biopsy - LMWH q12    HTN  - Continue antihypertensives    T2DM  - Hold OHA  - BGM with SSI  - Consider adding basal and prandial Insulins  - A1c    Dyslipidemia  - Continue Statin    CAD  - Continue BB, Statin. Hold ASA as possibly may need biopsy    Prophylactic measure  - VTEp; LMWH for stroke prevention  - Probiotic  - PT    Patient somewhat fatigued and forgetful. Will need to revisit to establish GOC, advance directives - Will enlist Palliative Medicine assistance.  States daughter Donna would be Surrogate/HCP if need arises

## 2021-06-07 NOTE — CONSULT NOTE ADULT - SUBJECTIVE AND OBJECTIVE BOX
Surgeon: MD Jean    Consult requesting by: Dr Nettles    HISTORY OF PRESENT ILLNESS:  89y Female multiple medical problems brought in from [facility] [place] after injury to leg with transfer to wheelchair.  Pt admits to generalized fatigue and 100lb weight loss over the last year, states she was living independently until her CVA 3/21.  f/w R pleural effusion on CT scan performed in ED.  Denies CP SOB N/V.     PAST MEDICAL & SURGICAL HISTORY:  CAD (coronary artery disease)  s/p CABG- 2004-[facility]    HTN (hypertension)    Hyperlipidemia    Obesity    Diabetes    Atrial flutter  1/2010- [facility], Dr Tran    Valvular disease    S/P CABG (coronary artery bypass graft)    S/P cholecystectomy    S/P coronary artery stent placement  x 4, 2006-[facility]    Aortic valve replaced    Artificial pacemaker        MEDICATIONS  (STANDING):    MEDICATIONS  (PRN):    Antiplatelet therapy:      coumadin 1mg          Allergies    No Known Allergies    Intolerances        SOCIAL HISTORY:  Smoker: [ ] Yes  [x ] No        PACK YEARS:                         WHEN QUIT?  ETOH use: [ ] Yes  [ x] No              FREQUENCY / QUANTITY:  Ilicit Drug use:  [ ] Yes  [x ] No    Live with: [facility] rehab  Assisted device use: wheelchair    FAMILY HISTORY:  Family history of hypertension (Father)    Family history of cerebrovascular accident (CVA) (Father)        Review of Systems   CONSTITUTIONAL:  Fevers[ ] chills[ ] sweats[ ] fatigue[ ] weight loss[x ] weight gain [ ]                                     NEGATIVE [ ]   NEURO:  parathesias[ ] seizures [ ]  syncope [ ]  confusion [ ]                                                                                NEGATIVE[ ]   EYES: glasses[ ]  blurry vision[ ]  discharge[ ] pain[ ] glaucoma [ ]                                                                          NEGATIVE[ ]   ENMT:  difficulty hearing [ ]  vertigo[ ]  dysphagia[ ] epistaxis[ ] recent dental work [ ]                                    NEGATIVE[ ]   CV:  chest pain[ ] palpitations[ ] CURTIS [ ] diaphoresis [ ]                                                                                           NEGATIVE[ x]   RESPIRATORY:  wheezing[ ] SOB[ ] cough [ ] sputum[ ] hemoptysis[ ]                                                                  NEGATIVE[ x]   GI:  nausea[ ]  vommiting [ ]  diarrhea[x ] constipation [ ] melena [ ]   abd pain                                                                   NEGATIVE[x ]   : hematuria[ ]  dysuria[ ] urgency[ ] incontinence[ ]                                                                                            NEGATIVE[ ]   MUSKULOSKELETAL:  arthritis[ ]  joint swelling [ ] muscle weakness [x ]  +laceration R knee                                                              NEGATIVE[ ]   SKIN/BREAST:  rash[ ] itching [ ]  hair loss[ ] masses[ ]                                                                                              NEGATIVE[x ]   PSYCH:  dementia [ ] depresion [ ] anxiety[ ]                                                                                                               NEGATIVE[x ]   HEME/LYMPH:  bruises easily[ ] enlarged lymph nodes[ ] tender lymph nodes[ ]                                               NEGATIVE[ ]   ENDOCRINE:  cold intolerance[ ] heat intolerance[ ] polydipsia[ ]                                                                          NEGATIVE[ ]     PHYSICAL EXAM  Vital Signs Last 24 Hrs  T(C): 36.4 (07 Jun 2021 12:45), Max: 36.6 (07 Jun 2021 08:53)  T(F): 97.5 (07 Jun 2021 12:45), Max: 97.8 (07 Jun 2021 08:53)  HR: 66 (07 Jun 2021 12:45) (66 - 122)  BP: 150/66 (07 Jun 2021 12:45) (127/58 - 150/66)  BP(mean): --  RR: 18 (07 Jun 2021 12:45) (18 - 20)  SpO2: 93% (07 Jun 2021 12:45) (93% - 96%)    CONSTITUTIONAL:                                                                          WNL[s ]   Neuro: WNL[ ] Normal exam oriented to person/place & time with no focal motor or sensory  deficits. Other ________LLE paralysis, LUE paresis__                    Eyes: WNL[s ]   Normal exam of conjunctiva & lids, pupils equally reactive. Other______________________________     ENT: WNL[s ]    Normal exam of nasal/oral mucosa with absence of cyanosis. Other_____________________________  Neck: WNL[s ]  Normal exam of jugular veins, trachea & thyroid. Other_________________________________________  Chest: WNL[ ] Normal lung exam with good air movement absence of wheezes, rales, or rhonchi: Other______diminished BS R, CTA on L no r/r/w___________________________________                                                                                CV:  Auscultation: normal [x ] S3[ ] S4[ ] Irregular [ ] Rub[ ] Clicks[ ]    Murmurs none:[ ]systolic [ ]  diastolic [ ] holosystolic [ ]  Carotids: No Bruits[x ] Other____________ Abdominal Aorta: normal [ ] nonpalpable[ ]Other___________                                                                                      GI: WNL[] Normal exam of abdomen, liver & spleen with no noted masses or tenderness. Other___________                                                                                                        Extremities: WNL[ ] Normal no evidence of cyanosis or deformity Edema: none[x ]trace[ ]1+[ ]2+[ ]3+[ ]4+[ ]  Lower Extremity Pulses: Right[ ] Left[ ]Varicosities[ ]  SKIN :WNL[ ] Normal exam to inspection & palation. Other:____________________                                                          LABS:                        11.6   11.81 )-----------( 363      ( 07 Jun 2021 10:14 )             36.5     06-07    136  |  96<L>  |  18.3  ----------------------------<  155<H>  3.7   |  25.0  |  0.69    Ca    9.0      07 Jun 2021 10:14  Mg     1.1     06-07    TPro  7.1  /  Alb  2.6<L>  /  TBili  0.7  /  DBili  x   /  AST  18  /  ALT  9   /  AlkPhos  101  06-07    PT/INR - ( 07 Jun 2021 16:21 )   PT: 18.1 sec;   INR: 1.60 ratio         PTT - ( 07 Jun 2021 16:21 )  PTT:32.2 sec    CARDIAC MARKERS ( 07 Jun 2021 10:14 )  x     / <0.01 ng/mL / x     / x     / x               < from: CT Abdomen and Pelvis w/ IV Cont (06.07.21 @ 11:57) >  IMPRESSION:  Mild diffuse colonic wall thickening, likely colitis.    Partially imaged at least moderate-sized partially loculated right pleural effusion with associated compressive atelectasis in the right lower and middle lobes with foci of low attenuation in the atelectatic lung measuring up to 5.2 cm suspicious for masses; a chest CT could be obtained for complete evaluation of the chest, preferably with intravenous contrast.    Nodularity in the pleura, possibly metastasis.    Mildly enlarged upper abdominal lymph node adjacent to the celiac artery, new.    The findings were discussed with Dr. Richardson on 6/7/2021 1:43 PM    < end of copied text >

## 2021-06-07 NOTE — ED PROVIDER NOTE - ATTENDING CONTRIBUTION TO CARE
I, Oren Nettles, performed a face to face bedside interview with this patient regarding history of present illness, review of symptoms and relevant past medical, social and family history.  I completed an independent physical examination. I have communicated the patient’s plan of care and disposition with the resident.  89 year old female with PMh aflutter, CAD, HTN, HLD presents with fall. Pt was pivoting while at the [facility]F, struck her leg on a metal chair and sustained a laceration. In addition, the pt reports that she has had diarrhea and crampoing abd pain.  Gen: NAD, well appearing  CV: RRR  Pul: decreased breath sound son the R  Abd: Soft, non-distended, ttp epigastric  Neuro: no focal deficits  Pt with colitis, hypomagn, and found to have likely malignant lung mass with pleural effusion.

## 2021-06-07 NOTE — ED PROVIDER NOTE - NS ED ROS FT
Constitutional: no fever, no chills  Head: NC, AT   Eyes: no redness   ENMT: no nasal congestion/drainage, no sore throat   CV: no chest pain, no edema  Resp: no cough, no dyspnea  GI: no abdominal pain, no nausea, no vomiting, +diarrhea  : no dysuria, no hematuria   Skin: +horizontal laceration to R knee, no rashes   Neuro: no LOC, no headache, no sensory deficits, no weakness

## 2021-06-07 NOTE — H&P ADULT - NSICDXFAMILYHX_GEN_ALL_CORE_FT
FAMILY HISTORY:  Father  Still living? Unknown  Family history of cerebrovascular accident (CVA), Age at diagnosis: Age Unknown  Family history of hypertension, Age at diagnosis: Age Unknown

## 2021-06-07 NOTE — ED ADULT NURSE NOTE - PMH
Atrial flutter  1/2010- [facility], Dr Tran  CAD (coronary artery disease)  s/p CABG- 2004[facility]  Diabetes    HTN (hypertension)    Hyperlipidemia    Obesity    Valvular disease

## 2021-06-07 NOTE — ED ADULT NURSE NOTE - OBJECTIVE STATEMENT
Pt was being transferred into a chair and she fell. 6cm laceration to right thigh with bleeding controlled. Did not hit head. Pt also reports skin tears to b/l forearms from falling and banging into things over the last few weeks.

## 2021-06-07 NOTE — ED PROVIDER NOTE - CARE PLAN
Principal Discharge DX:	Pleural effusion  Secondary Diagnosis:	Colitis  Secondary Diagnosis:	Mass in chest  Secondary Diagnosis:	Laceration of knee

## 2021-06-07 NOTE — ED ADULT NURSE NOTE - CHIEF COMPLAINT QUOTE
pt a+ox3, BIBA from [facility] c/o skin tear to right knee. pt states she was being transferred from Wheelchair to bed and noticed right knee/leg. bandage applied by [facility] staff, bleeding controlled.

## 2021-06-07 NOTE — ED PROVIDER NOTE - PMH
Atrial flutter  1/2010- [facility], Dr Tran  CAD (coronary artery disease)  s/p CABG- 2004[facility]  Diabetes    HTN (hypertension)    Hyperlipidemia    Obesity    Valvular disease

## 2021-06-07 NOTE — ED ADULT TRIAGE NOTE - CHIEF COMPLAINT QUOTE
pt a+ox3, BIBA from [facility] c/o skin tear to right knee. pt states she was being transferred from Wheelchair to bed and noticed right knee/leg. bandage applied by [facility] staff, bleeding controlled.

## 2021-06-07 NOTE — ED ADULT NURSE NOTE - NSIMPLEMENTINTERV_GEN_ALL_ED
Implemented All Fall Risk Interventions:  [place] to call system. Call bell, personal items and telephone within reach. Instruct patient to call for assistance. Room bathroom lighting operational. Non-slip footwear when patient is off stretcher. Physically safe environment: no spills, clutter or unnecessary equipment. Stretcher in lowest position, wheels locked, appropriate side rails in place. Provide visual cue, wrist band, yellow gown, etc. Monitor gait and stability. Monitor for mental status changes and reorient to person, place, and time. Review medications for side effects contributing to fall risk. Reinforce activity limits and safety measures with patient and family.

## 2021-06-08 NOTE — PROGRESS NOTE ADULT - ASSESSMENT
89 year old female with PMH HTN, T2DM, Dyslipidemia, CAD s/p CABG, Atrial Flutter s/p PPM and Left MCA CVA with residual right sided weakness leading to residence at [facility] since CVA was brought in for laceration of Right knee after being transferred ROS revealed diarrhea, CT abdomen with colitis and Right pleural effusion.     1. Right Pleural effusion s/p Right thoracocentesis. Asymptomatic  - Chest tubes management as per CTS  - Fluid chemistry, cytology, cultures  - Repeat imaging    2. Diarrhea; Colitis on CT. Hypomagnesemia due to GI losses  - Low residue diet  - IV Flagyl and Cipro  Magnesium repleted       3. Chronic AFlutter  Rate not well controlled on Amiodarone   Coumadin on hold     4. HTN - BP controlled       5. T2DM: HBA1C 5.8  - Hold OHA  - BG controlled on SS      6. Dyslipidemia  - Continue Statin      Prophylactic measure  - VTEp; LMWH for stroke prevention  - Probiotic  - PT    Patient somewhat fatigued and forgetful. Will need to revisit to establish GOC, advance directives - Will enlist Palliative Medicine assistance.  States daughter Donna would be Surrogate/HCP if need arises   89 year old female with PMH HTN, T2DM, Dyslipidemia, CAD s/p CABG, Atrial Flutter s/p PPM and Left MCA CVA with residual right sided weakness leading to residence at [facility] since CVA was brought in for laceration of Right knee after being transferred ROS revealed diarrhea, CT abdomen with colitis and Right pleural effusion.     1. Right Pleural effusion s/p Right thoracocentesis. Asymptomatic  - Chest tubes management as per CTS  - Fluid chemistry, cytology, cultures  - Repeat imaging  d/c Furosemide as pt already having diarrhea    2. Diarrhea; Colitis on CT. Hypomagnesemia due to GI losses  - Low residue diet  - IV Flagyl and Cipro  Magnesium repleted       3. Chronic AFlutter  Rate not well controlled on Amiodarone   Coumadin on hold     4. HTN - BP controlled       5. T2DM: HBA1C 5.8  - Hold OHA  - BG controlled on SS      6. Dyslipidemia  - Continue Statin      Prophylactic measure  - VTEp; LMWH for stroke prevention  - Probiotic  - PT    Patient somewhat fatigued and forgetful. Will need to revisit to establish GOC, advance directives - Will enlist Palliative Medicine assistance.  States daughter Donna would be Surrogate/HCP if need arises

## 2021-06-08 NOTE — PROGRESS NOTE ADULT - PROBLEM SELECTOR PLAN 1
Likely malignant  appears exudative  LDH to be checked in AM  Continue chest tube (~500 cc drainage today)  Consider lung bx  Fluid path pending  D/W Dr Pena in AM  Will continue to monitor

## 2021-06-08 NOTE — PATIENT PROFILE ADULT - BRAND OF COVID-19 VACCINATION
Pt just remember getting vaccine but can not remember which one Pt just remember getting vaccine but can not remember which one/Pfizer dose 1 and 2

## 2021-06-08 NOTE — PROGRESS NOTE ADULT - SUBJECTIVE AND OBJECTIVE BOX
Subjective:  "I feel a little better" NAD denies cp ,sob at this time    T(C): 36.5 (06-08-21 @ 16:56), Max: 36.7 (06-08-21 @ 05:34)  HR: 91 (06-08-21 @ 16:56) (66 - 139)  BP: 104/68 (06-08-21 @ 16:56) (104/68 - 150/89)  RR: 18 (06-08-21 @ 16:56) (18 - 18)  SpO2: 97% (06-08-21 @ 16:56) (94% - 97%) rA      I&O's Detail    07 Jun 2021 07:01  -  08 Jun 2021 07:00  --------------------------------------------------------  IN:  Total IN: 0 mL    OUT:    Chest Tube (mL): 1180 mL  Total OUT: 1180 mL    Total NET: -1180 mL          Patient's  laboratory results and current medications reviewed.    Physical Exam:  Gen: WN/WD NAD  Neuro: AAOx3, r hemiparesis  Pulm: significantly diminished bases  CV: RRR  Tube: no air leak, persistent drainage    Today's CXR: < from: Xray Chest 1 View- PORTABLE-Routine (Xray Chest 1 View- PORTABLE-Routine in AM.) (06.08.21 @ 07:46) >    IMPRESSION:  Effusion/consolidation/mass in the right hemithorax with chest tube in place, unchanged. No pneumothorax in these portable views    < end of copied text >    < from: CT Chest w/ IV Cont (06.07.21 @ 18:19) >  IMPRESSION:  Decreased size in loculated right pleural effusion status post placement of chest tube. Small amount of air in the pleural space.  Redemonstration of large right upper lobe medially placed heterogeneous mass suspicious for malignancy in the posterior segment of the right upper lobe.  TAVR  Pacemaker  No evidence of pulmonary embolism      < end of copied text >

## 2021-06-08 NOTE — PROGRESS NOTE ADULT - SUBJECTIVE AND OBJECTIVE BOX
Patient is a 89y old  Female who presents with a chief complaint of diarrhea and pleural effusion (08 Jun 2021 12:37)      INTERVAL HPI/OVERNIGHT EVENTS: seen and examined. Reports feeling better.     MEDICATIONS  (STANDING):  aMIOdarone    Tablet 200 milliGRAM(s) Oral daily  ascorbic acid 500 milliGRAM(s) Oral daily  atorvastatin 20 milliGRAM(s) Oral at bedtime  ciprofloxacin   IVPB 400 milliGRAM(s) IV Intermittent every 12 hours  dextrose 40% Gel 15 Gram(s) Oral once  dextrose 5%. 1000 milliLiter(s) (50 mL/Hr) IV Continuous <Continuous>  dextrose 5%. 1000 milliLiter(s) (100 mL/Hr) IV Continuous <Continuous>  dextrose 50% Injectable 25 Gram(s) IV Push once  dextrose 50% Injectable 12.5 Gram(s) IV Push once  dextrose 50% Injectable 25 Gram(s) IV Push once  enoxaparin Injectable 80 milliGRAM(s) SubCutaneous every 12 hours  famotidine    Tablet 20 milliGRAM(s) Oral daily  FLUoxetine 10 milliGRAM(s) Oral at bedtime  folic acid 1 milliGRAM(s) Oral daily  furosemide   Injectable 40 milliGRAM(s) IV Push daily  glucagon  Injectable 1 milliGRAM(s) IntraMuscular once  insulin lispro (ADMELOG) corrective regimen sliding scale   SubCutaneous three times a day before meals  insulin lispro (ADMELOG) corrective regimen sliding scale   SubCutaneous at bedtime  melatonin 3 milliGRAM(s) Oral at bedtime  metoprolol tartrate 25 milliGRAM(s) Oral every 8 hours  metroNIDAZOLE  IVPB 500 milliGRAM(s) IV Intermittent every 8 hours  multivitamin/minerals 1 Tablet(s) Oral daily  saccharomyces boulardii 250 milliGRAM(s) Oral two times a day    MEDICATIONS  (PRN):  ondansetron Injectable 4 milliGRAM(s) IV Push every 6 hours PRN Nausea and/or Vomiting      Allergies    No Known Allergies    Intolerances        REVIEW OF SYSTEMS:  CONSTITUTIONAL: No fever, weight loss, or fatigue  RESPIRATORY: No cough, wheezing, chills or hemoptysis; No shortness of breath  CARDIOVASCULAR: No chest pain, palpitations, dizziness, or leg swelling  GASTROINTESTINAL: No abdominal or epigastric pain. No nausea, vomiting, or hematemesis; No diarrhea or constipation. No melena or hematochezia.  NEUROLOGICAL: No headaches, memory loss, loss of strength, numbness, or tremors  MUSCULOSKELETAL: No joint pain or swelling; No muscle, back, or extremity pain      Vital Signs Last 24 Hrs  T(C): 36.5 (08 Jun 2021 08:50), Max: 36.7 (08 Jun 2021 05:34)  T(F): 97.7 (08 Jun 2021 08:50), Max: 98 (08 Jun 2021 05:34)  HR: 109 (08 Jun 2021 14:24) (66 - 139)  BP: 125/67 (08 Jun 2021 14:24) (121/80 - 150/89)  BP(mean): --  RR: 18 (08 Jun 2021 08:50) (18 - 20)  SpO2: 96% (08 Jun 2021 08:50) (93% - 96%)    PHYSICAL EXAM:  GENERAL: fragile elderly lady, sitting on the bed   HEAD:  Atraumatic, Normocephalic  EYES: conjunctiva and sclera clear  NECK: Supple, No JVD  NERVOUS SYSTEM:  Alert & Oriented X2,  CHEST/LUNG: Clear to percussion bilaterally; No rales, rhonchi, wheezing, or rubs, chest tube in place   HEART: Regular rate and rhythm; No murmurs, rubs, or gallops  ABDOMEN: Soft, Nontender, Nondistended; Bowel sounds present  EXTREMITIES:  No clubbing, cyanosis, or edema  SKIN: No rashes or lesions    LABS:                        12.4   10.95 )-----------( 390      ( 08 Jun 2021 08:12 )             39.0     06-08    135  |  92<L>  |  18.7  ----------------------------<  179<H>  3.7   |  25.0  |  0.94    Ca    8.6      08 Jun 2021 08:12  Mg     1.7     06-08    TPro  7.4  /  Alb  2.9<L>  /  TBili  0.6  /  DBili  x   /  AST  16  /  ALT  8   /  AlkPhos  108  06-08    PT/INR - ( 07 Jun 2021 16:21 )   PT: 18.1 sec;   INR: 1.60 ratio         PTT - ( 07 Jun 2021 16:21 )  PTT:32.2 sec    CAPILLARY BLOOD GLUCOSE      POCT Blood Glucose.: 157 mg/dL (08 Jun 2021 13:20)  POCT Blood Glucose.: 158 mg/dL (08 Jun 2021 08:52)  POCT Blood Glucose.: 132 mg/dL (07 Jun 2021 22:05)  POCT Blood Glucose.: 177 mg/dL (07 Jun 2021 18:19)      RADIOLOGY & ADDITIONAL TESTS:    Imaging Personally Reviewed:  [ ] YES  [ ] NO    Consultant(s) Notes Reviewed:  [ ] YES  [ ] NO    Care Discussed with Consultants/Other Providers [ ] YES  [ ] NO    Plan of Care discussed with Housestaff [ ]YES [ ] NO

## 2021-06-08 NOTE — CONSULT NOTE ADULT - ASSESSMENT
89 yo F Transfer from [facility] after lacerating her knee, found to have diarrhea-colitis and Loculated R Pleural Effusion    R Pleural Effusion  S/P Thoracentesis  Pigtail catheter left insitu to Pleurovac suction  Fluid sent to lab    Colitis    Mass in chest  Unintentional weight loss last 6 mos  Newly discovered  May be doing Biopsy-holding coumadin for same  Cipro and Flagyl IV combo       GOC  Left a message with Donna- traveling  Spoke with Víctor Thompson will be home later this afternoon and will call me back 89 yo F Transfer from [facility] after lacerating her knee, found to have diarrhea-colitis and Loculated R Pleural Effusion    R Pleural Effusion  S/P Thoracentesis  Pigtail catheter left insitu to Pleurovac suction  Large volume serous fluid collecting  Fluid sent to lab  Mass seen in Chest thought to be Malignancy    Colitis  Flagyl and Cipro combination  Erath diet  Parul care-assess Perirectal skin area-no breakdown or rash  Keep track of Diarrhea BM's  Colonoscopy can be done as Out-Pt    Mass in chest  Unintentional weight loss last 6 mos  Newly discovered  May be doing Biopsy-holding coumadin for same  Denies SOB or pain in chest    GOC  Spoke with Víctor wagner  Conservative treatment; Donna will not consent to Biopsy or any other painful invasive testing    See GOC documentation- Donna will bring back MOLST form completed by she, myself and reviewed by Víctor  DNR/DNI already designated at [facility]

## 2021-06-08 NOTE — PROGRESS NOTE ADULT - ASSESSMENT
89F multiple medical problems who reports 100 lb weight loss in the last year presents with laceration to knee.  Subsequently found with R loculated pleural effusion seen on Abd CT.  Patient stable NAD    6/7 R lateral pigtail catheter placed at bedside.

## 2021-06-08 NOTE — CONSULT NOTE ADULT - SUBJECTIVE AND OBJECTIVE BOX
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 Patient was moved to [facility] after CVA. Send t from [facility] to [facility] after sustaining a laceration  in R knee while transferring from Wheel chair. She had been experiencing diarrhea Abdominal CT reveals Colitis and R Pleural Effusion which needed to be drained. R CT placed for Loculated effusion to Pleurovac suction. Large quantity of yellow serous fluid draining. specimen sent to lab after  Thoracentesis. R Chest tube left in place, continues to drain. POX on room air 93% She was incontinent or urine earlier this morning, Bladder scanned-inconclusive. RN will be doing a F/U Bladder scan this afternoon. Patient lying on L side,  is answering all mquestions calmly.  She is forgetful, denies Pain, SOB, CP Palpitations, N/V/D Constipation. Poor appetite losing weight. 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        Doesn't feel hungry any more and lost weight over past year. Cough occasionally whilst eating. Denies any fever, chills, dizziness, chest pain, palpitations, nausea, vomiting or abdominal pain;  (07 Jun 2021 17:59)      PERTINENT PMH REVIEWED: Yes     PAST MEDICAL & SURGICAL HISTORY:  CAD (coronary artery disease)  s/p CABG- 2004-[facility]  HTN (hypertension)  Hyperlipidemia  Obesity  Diabetes  Atrial flutter  1/2010- [facility], Dr Tran  Valvular disease    S/P CABG (coronary artery bypass graft)  S/P cholecystectomy  S/P coronary artery stent placement  x 4, 2006-[facility]  Aortic valve replaced  Artificial pacemaker        SOCIAL HISTORY:  EtOH    No                              Drugs    No                             nonsmoker                              Admitted from: [facility] ___HCP-daughter Donna Neves [phone]    FAMILY HISTORY:  Family history of hypertension (Father)    Family history of cerebrovascular accident (CVA) (Father)    Father:  Mother:   No family history related to admission diagnosis    No Known Allergies  Baseline ADLs (prior to admission):   Dependent      Present Symptoms:     Dyspnea: 0 1  Nausea/Vomiting: No  Anxiety:   No  Depression:  No  Fatigue: Yes   Loss of appetite: Yes     Pain: denies            Character-            Duration-            Effect-            Factors-            Frequency-            Location-            Severity-    Review of Systems: Reviewed                        All others negative    MEDICATIONS  (STANDING):  aMIOdarone    Tablet 200 milliGRAM(s) Oral daily  ascorbic acid 500 milliGRAM(s) Oral daily  atorvastatin 20 milliGRAM(s) Oral at bedtime  ciprofloxacin   IVPB 400 milliGRAM(s) IV Intermittent every 12 hours  dextrose 40% Gel 15 Gram(s) Oral once  dextrose 5%. 1000 milliLiter(s) (50 mL/Hr) IV Continuous <Continuous>  dextrose 5%. 1000 milliLiter(s) (100 mL/Hr) IV Continuous <Continuous>  dextrose 50% Injectable 25 Gram(s) IV Push once  dextrose 50% Injectable 12.5 Gram(s) IV Push once  dextrose 50% Injectable 25 Gram(s) IV Push once  enoxaparin Injectable 80 milliGRAM(s) SubCutaneous every 12 hours  famotidine    Tablet 20 milliGRAM(s) Oral daily  FLUoxetine 10 milliGRAM(s) Oral at bedtime  folic acid 1 milliGRAM(s) Oral daily  furosemide   Injectable 40 milliGRAM(s) IV Push daily  glucagon  Injectable 1 milliGRAM(s) IntraMuscular once  insulin lispro (ADMELOG) corrective regimen sliding scale   SubCutaneous three times a day before meals  insulin lispro (ADMELOG) corrective regimen sliding scale   SubCutaneous at bedtime  melatonin 3 milliGRAM(s) Oral at bedtime  metoprolol tartrate 25 milliGRAM(s) Oral every 8 hours  metroNIDAZOLE  IVPB 500 milliGRAM(s) IV Intermittent every 8 hours  multivitamin/minerals 1 Tablet(s) Oral daily  saccharomyces boulardii 250 milliGRAM(s) Oral two times a day    MEDICATIONS  (PRN):  ondansetron Injectable 4 milliGRAM(s) IV Push every 6 hours PRN Nausea and/or Vomiting      PHYSICAL EXAM:    Vital Signs Last 24 Hrs  T(C): 36.5 (08 Jun 2021 08:50), Max: 36.7 (08 Jun 2021 05:34)  T(F): 97.7 (08 Jun 2021 08:50), Max: 98 (08 Jun 2021 05:34)  HR: 109 (08 Jun 2021 08:50) (66 - 139)  BP: 121/80 (08 Jun 2021 08:50) (121/80 - 150/89)  BP(mean): --  RR: 18 (08 Jun 2021 08:50) (18 - 20)  SpO2: 96% (08 Jun 2021 08:50) (93% - 96%)    General: alert  oriented x __3__ lethargic eyes closed" resting"            obese verbal      Karnofsky:  30%    HEENT: normal     Lungs: comfortable     CV:  tachycardia    GI: normal  distended                   : incontinent      MSK:  weakness              bedbound/wheelchair bound L Knee laceration    Skin: normal  no rash    LABS:                        12.4   10.95 )-----------( 390      ( 08 Jun 2021 08:12 )             39.0     06-08    135  |  92<L>  |  18.7  ----------------------------<  179<H>  3.7   |  25.0  |  0.94    Ca    8.6      08 Jun 2021 08:12  Mg     1.7     06-08    TPro  7.4  /  Alb  2.9<L>  /  TBili  0.6  /  DBili  x   /  AST  16  /  ALT  8   /  AlkPhos  108  06-08    PT/INR - ( 07 Jun 2021 16:21 )   PT: 18.1 sec;   INR: 1.60 ratio       PT- ( 07 Jun 2021 16:21 )  PTT:32.2 sec    I&O's Summary    07 Jun 2021 07:01  -  08 Jun 2021 07:00  --------------------------------------------------------  IN: 0 mL / OUT: 1180 mL / NET: -1180 mL    RADIOLOGY & ADDITIONAL STUDIES:    ADVANCE DIRECTIVES:   DNR NO  Completed on:                     MOLST   NO   Completed on:  Living Will  NO   Completed on:      COUNSELING:  10  Face to face meeting to discuss Advanced Care Planning - Time Spent _10_____ Minutes.  See goals of care note.    More than 50% time spent in counseling and coordinating care. ____40__ Minutes.     Thank you for the opportunity to assist with the care of this patient.   [place] [facility] Consult [facility] [phone].        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 Patient was moved to [facility] after CVA. Send t from [facility] to [facility] after sustaining a laceration  in R knee while transferring from Wheel chair. She had been experiencing diarrhea Abdominal CT reveals Colitis and R Pleural Effusion which needed to be drained. R CT placed for Loculated effusion to Pleurovac suction. Large quantity of yellow serous fluid draining. specimen sent to lab after  Thoracentesis. R Chest tube left in place, continues to drain. POX on room air 93% She was incontinent or urine earlier this morning, Bladder scanned-inconclusive. RN will be doing a F/U Bladder scan this afternoon. Patient lying on L side,  is answering all mquestions calmly.  She is forgetful, denies Pain, SOB, CP Palpitations, N/V/D Constipation. Poor appetite losing weight. 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        Doesn't feel hungry any more and lost weight over past year. Cough occasionally whilst eating. Denies any fever, chills, dizziness, chest pain, palpitations, nausea, vomiting or abdominal pain;  (07 Jun 2021 17:59)      PERTINENT PMH REVIEWED: Yes     PAST MEDICAL & SURGICAL HISTORY:  CAD (coronary artery disease)  s/p CABG- 2004-[facility]  HTN (hypertension)  Hyperlipidemia  Obesity  Diabetes  Atrial flutter  1/2010- [facility], Dr Tran  Valvular disease    S/P CABG (coronary artery bypass graft)  S/P cholecystectomy  S/P coronary artery stent placement  x 4, 2006-[facility]  Aortic valve replaced  Artificial pacemaker        SOCIAL HISTORY:  EtOH    No                              Drugs    No                             nonsmoker                              Admitted from: [facility] ___HCP-daughter Donna Neves [phone]    FAMILY HISTORY:  Family history of hypertension (Father)    Family history of cerebrovascular accident (CVA) (Father)    Father:  Mother:   No family history related to admission diagnosis    No Known Allergies  Baseline ADLs (prior to admission):   Dependent      Present Symptoms:     Dyspnea: 0 1  Nausea/Vomiting: No  Anxiety:   No  Depression:  No  Fatigue: Yes   Loss of appetite: Yes     Pain: denies            Character-            Duration-            Effect-            Factors-            Frequency-            Location-            Severity-    Review of Systems: Reviewed                        All others negative    MEDICATIONS  (STANDING):  aMIOdarone    Tablet 200 milliGRAM(s) Oral daily  ascorbic acid 500 milliGRAM(s) Oral daily  atorvastatin 20 milliGRAM(s) Oral at bedtime  ciprofloxacin   IVPB 400 milliGRAM(s) IV Intermittent every 12 hours  dextrose 40% Gel 15 Gram(s) Oral once  dextrose 5%. 1000 milliLiter(s) (50 mL/Hr) IV Continuous <Continuous>  dextrose 5%. 1000 milliLiter(s) (100 mL/Hr) IV Continuous <Continuous>  dextrose 50% Injectable 25 Gram(s) IV Push once  dextrose 50% Injectable 12.5 Gram(s) IV Push once  dextrose 50% Injectable 25 Gram(s) IV Push once  enoxaparin Injectable 80 milliGRAM(s) SubCutaneous every 12 hours  famotidine    Tablet 20 milliGRAM(s) Oral daily  FLUoxetine 10 milliGRAM(s) Oral at bedtime  folic acid 1 milliGRAM(s) Oral daily  furosemide   Injectable 40 milliGRAM(s) IV Push daily  glucagon  Injectable 1 milliGRAM(s) IntraMuscular once  insulin lispro (ADMELOG) corrective regimen sliding scale   SubCutaneous three times a day before meals  insulin lispro (ADMELOG) corrective regimen sliding scale   SubCutaneous at bedtime  melatonin 3 milliGRAM(s) Oral at bedtime  metoprolol tartrate 25 milliGRAM(s) Oral every 8 hours  metroNIDAZOLE  IVPB 500 milliGRAM(s) IV Intermittent every 8 hours  multivitamin/minerals 1 Tablet(s) Oral daily  saccharomyces boulardii 250 milliGRAM(s) Oral two times a day    MEDICATIONS  (PRN):  ondansetron Injectable 4 milliGRAM(s) IV Push every 6 hours PRN Nausea and/or Vomiting      PHYSICAL EXAM:    Vital Signs Last 24 Hrs  T(C): 36.5 (08 Jun 2021 08:50), Max: 36.7 (08 Jun 2021 05:34)  T(F): 97.7 (08 Jun 2021 08:50), Max: 98 (08 Jun 2021 05:34)  HR: 109 (08 Jun 2021 08:50) (66 - 139)  BP: 121/80 (08 Jun 2021 08:50) (121/80 - 150/89)  BP(mean): --  RR: 18 (08 Jun 2021 08:50) (18 - 20)  SpO2: 96% (08 Jun 2021 08:50) (93% - 96%)    General: alert  oriented x __3__ lethargic eyes closed" resting"            obese verbal      Karnofsky:  30%    HEENT: normal     Lungs: comfortable     CV:  tachycardia    GI: normal  distended                   : incontinent      MSK:  weakness              bedbound/wheelchair bound L Knee laceration    Skin: normal  no rash    LABS:                        12.4   10.95 )-----------( 390      ( 08 Jun 2021 08:12 )             39.0     06-08    135  |  92<L>  |  18.7  ----------------------------<  179<H>  3.7   |  25.0  |  0.94    Ca    8.6      08 Jun 2021 08:12  Mg     1.7     06-08    TPro  7.4  /  Alb  2.9<L>  /  TBili  0.6  /  DBili  x   /  AST  16  /  ALT  8   /  AlkPhos  108  06-08    PT/INR - ( 07 Jun 2021 16:21 )   PT: 18.1 sec;   INR: 1.60 ratio       PT- ( 07 Jun 2021 16:21 )  PTT:32.2 sec    I&O's Summary    07 Jun 2021 07:01  -  08 Jun 2021 07:00  --------------------------------------------------------  IN: 0 mL / OUT: 1180 mL / NET: -1180 mL    RADIOLOGY & ADDITIONAL STUDIES:    ADVANCE DIRECTIVES:   DNR NO  Completed on:                     MOLST   NO   Completed on: Reviewed with daughter today see [facility] documentation  Will complete with SW tomorrow                         DNR/DNI has been designated in [employer]  Living Will  NO   Completed on:      COUNSELING:  10  Face to face meeting to discuss Advanced Care Planning - Time Spent _30____ Minutes.  See goals of care note.    More than 50% time spent in counseling and coordinating care. ____40__ Minutes.     Thank you for the opportunity to assist with the care of this patient.   [place] [facility] Consult [facility] [phone].

## 2021-06-09 NOTE — PROGRESS NOTE ADULT - SUBJECTIVE AND OBJECTIVE BOX
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     Doesn't feel hungry any more and lost weight over past year. Cough occasionally whilst eating. Denies any fever, chills, dizziness, chest pain, palpitations, nausea, vomiting or abdominal pain;  (2021 17:59)    PAST MEDICAL & SURGICAL HISTORY:  CAD (coronary artery disease)  s/p CABG- -[facility]  HTN (hypertension)  Hyperlipidemia  Obesity  Diabetes  Atrial flutter  2010- [facility], Dr Tran    Valvular disease  S/P CABG (coronary artery bypass graft)  S/P cholecystectomy  S/P coronary artery stent placement  x , -[facility]  Aortic valve replaced  Artificial pacemaker    FAMILY HISTORY:  Family history of hypertension (Father)  Family history of cerebrovascular accident (CVA) (Father)    Vitals   ICU Vital Signs Last 24 Hrs  T(C): 36.5 (2021 23:22), Max: 36.5 (2021 16:56)  T(F): 97.7 (2021 23:22), Max: 97.7 (2021 16:56)  HR: 102 (2021 23:22) (91 - 109)  BP: 122/60 (2021 23:22) (104/68 - 125/67)  RR: 18 (2021 23:22) (18 - 18)  SpO2: 98% (2021 23:22) (97% - 98%)      I&O's Detail    2021 07:01  -  2021 07:00  --------------------------------------------------------  IN:  Total IN: 0 mL    OUT:    Chest Tube (mL): 550 mL    Voided (mL): 150 mL  Total OUT: 700 mL  Total NET: -700 mL      LABS                        12.4   10.95 )-----------( 390      ( 2021 08:12 )             39.0     06-08    135  |  92<L>  |  18.7  ----------------------------<  179<H>  3.7   |  25.0  |  0.94    Ca    8.6      2021 08:12  Mg     1.7         TPro  7.4  /  Alb  2.9<L>  /  TBili  0.6  /  DBili  x   /  AST  16  /  ALT  8   /  AlkPhos  108  06-08    LIVER FUNCTIONS - ( 2021 08:12 )  Alb: 2.9 g/dL / Pro: 7.4 g/dL / ALK PHOS: 108 U/L / ALT: 8 U/L / AST: 16 U/L / GGT: x         PT/INR - ( 2021 16:21 )   PT: 18.1 sec;   INR: 1.60 ratio    PTT - ( 2021 16:21 )  PTT:32.2 sec    Urinalysis Basic - ( 2021 06:32 )    Color: Yellow / Appearance: Slightly Turbid / S.025 / pH: x  Gluc: x / Ketone: Trace  / Bili: Small / Urobili: 1   Blood: x / Protein: 30 mg/dL / Nitrite: Positive   Leuk Esterase: Moderate / RBC: 0-2 /HPF / WBC 11-25   Sq Epi: x / Non Sq Epi: Occasional / Bacteria: Moderate    POCT Blood Glucose.: 155 mg/dL (21 @ 08:06)  POCT Blood Glucose.: 125 mg/dL (21 @ 22:27)  POCT Blood Glucose.: 162 mg/dL (21 @ 18:35)  POCT Blood Glucose.: 157 mg/dL (21 @ 13:20)        MEDICATIONS  (STANDING):  aMIOdarone    Tablet 200 milliGRAM(s) Oral daily  ascorbic acid 500 milliGRAM(s) Oral daily  atorvastatin 20 milliGRAM(s) Oral at bedtime  ciprofloxacin   IVPB 400 milliGRAM(s) IV Intermittent every 12 hours  dextrose 40% Gel 15 Gram(s) Oral once  dextrose 5%. 1000 milliLiter(s) (50 mL/Hr) IV Continuous <Continuous>  dextrose 5%. 1000 milliLiter(s) (100 mL/Hr) IV Continuous <Continuous>  dextrose 50% Injectable 25 Gram(s) IV Push once  dextrose 50% Injectable 12.5 Gram(s) IV Push once  dextrose 50% Injectable 25 Gram(s) IV Push once  enoxaparin Injectable 80 milliGRAM(s) SubCutaneous every 12 hours  famotidine    Tablet 20 milliGRAM(s) Oral daily  FLUoxetine 10 milliGRAM(s) Oral at bedtime  folic acid 1 milliGRAM(s) Oral daily  glucagon  Injectable 1 milliGRAM(s) IntraMuscular once  insulin lispro (ADMELOG) corrective regimen sliding scale   SubCutaneous three times a day before meals  insulin lispro (ADMELOG) corrective regimen sliding scale   SubCutaneous at bedtime  melatonin 3 milliGRAM(s) Oral at bedtime  metoprolol tartrate 25 milliGRAM(s) Oral every 8 hours  metroNIDAZOLE  IVPB 500 milliGRAM(s) IV Intermittent every 8 hours  multivitamin/minerals 1 Tablet(s) Oral daily  saccharomyces boulardii 250 milliGRAM(s) Oral two times a day    MEDICATIONS  (PRN):  ondansetron Injectable 4 milliGRAM(s) IV Push every 6 hours PRN Nausea and/or Vomiting    Allergies:  No Known Allergies    Physical Exam:  Gen: NAD  Neuro: A&Ox3,  CV: S1S2 irregular  Pulm: decreased at R base with crackles, clear on left  Abd:+ BS soft NT ND  Ext: RUE paresis, no edema, RLE shin dressing C/D/I, b/l UE skin tears covered with C/D/I  R pigtail to waterseal, no air leak ,site intact

## 2021-06-09 NOTE — GOALS OF CARE CONVERSATION - ADVANCED CARE PLANNING - WHAT MATTERS MOST
That Medical Providers should not speak to Patient without a family member herself or Víctor be also at her bedside for support.  Specifically to discuss Cancer diagnosis or her treatment preferences.  That she should  not suffer through painful procedures and diagnostics- seeking conservative treatment
No suffering

## 2021-06-09 NOTE — DIETITIAN INITIAL EVALUATION ADULT. - ETIOLOGY
Related to inadequate protein energy intake with persistent lack of appetite in setting of advanced age; now admitted w/ Right Pleural effusion s/p Right thoracocentesis and Diarrhea (Colitis)

## 2021-06-09 NOTE — DIETITIAN NUTRITION RISK NOTIFICATION - TREATMENT: THE FOLLOWING DIET HAS BEEN RECOMMENDED
Diet, Mechanical Soft:   Consistent Carbohydrate {Evening Snack} (CSTCHOSN)  Fiber/Residue Restricted (LOWFIBER)  DASH/TLC {Sodium & Cholesterol Restricted} (DASH)  Supplement Feeding Modality:  Oral  Ensure Enlive Cans or Servings Per Day:  1       Frequency:  Three Times a day (06-07-21 @ 17:48) [Active]

## 2021-06-09 NOTE — PROGRESS NOTE ADULT - SUBJECTIVE AND OBJECTIVE BOX
Patient is a 89y old  Female who presents with a chief complaint of diarrhea and pleural effusion (2021 11:42)      INTERVAL HPI/OVERNIGHT EVENTS: No complains. Does not like the food.   Chest tube still in and draining     MEDICATIONS  (STANDING):  aMIOdarone    Tablet 200 milliGRAM(s) Oral daily  ascorbic acid 500 milliGRAM(s) Oral daily  atorvastatin 20 milliGRAM(s) Oral at bedtime  ciprofloxacin   IVPB 400 milliGRAM(s) IV Intermittent every 12 hours  dextrose 40% Gel 15 Gram(s) Oral once  dextrose 5%. 1000 milliLiter(s) (50 mL/Hr) IV Continuous <Continuous>  dextrose 5%. 1000 milliLiter(s) (100 mL/Hr) IV Continuous <Continuous>  dextrose 50% Injectable 25 Gram(s) IV Push once  dextrose 50% Injectable 12.5 Gram(s) IV Push once  dextrose 50% Injectable 25 Gram(s) IV Push once  enoxaparin Injectable 80 milliGRAM(s) SubCutaneous every 12 hours  famotidine    Tablet 20 milliGRAM(s) Oral daily  FLUoxetine 10 milliGRAM(s) Oral at bedtime  folic acid 1 milliGRAM(s) Oral daily  glucagon  Injectable 1 milliGRAM(s) IntraMuscular once  insulin lispro (ADMELOG) corrective regimen sliding scale   SubCutaneous three times a day before meals  insulin lispro (ADMELOG) corrective regimen sliding scale   SubCutaneous at bedtime  melatonin 3 milliGRAM(s) Oral at bedtime  metoprolol tartrate 25 milliGRAM(s) Oral every 8 hours  metroNIDAZOLE  IVPB 500 milliGRAM(s) IV Intermittent every 8 hours  multivitamin/minerals 1 Tablet(s) Oral daily  saccharomyces boulardii 250 milliGRAM(s) Oral two times a day    MEDICATIONS  (PRN):  ondansetron Injectable 4 milliGRAM(s) IV Push every 6 hours PRN Nausea and/or Vomiting      Allergies    No Known Allergies    Intolerances        REVIEW OF SYSTEMS:  CONSTITUTIONAL: No fever, weight loss, or fatigue  RESPIRATORY: No cough, wheezing, chills or hemoptysis; No shortness of breath  CARDIOVASCULAR: No chest pain, palpitations, dizziness, or leg swelling  GASTROINTESTINAL: No abdominal or epigastric pain. No nausea, vomiting, or hematemesis; No diarrhea or constipation. No melena or hematochezia.  NEUROLOGICAL: No headaches, memory loss, loss of strength, numbness, or tremors  MUSCULOSKELETAL: No joint pain or swelling; No muscle, back, or extremity pain      Vital Signs Last 24 Hrs  T(C): 36.5 (2021 23:22), Max: 36.5 (2021 16:56)  T(F): 97.7 (2021 23:22), Max: 97.7 (2021 16:56)  HR: 102 (2021 23:22) (91 - 109)  BP: 122/60 (2021 23:22) (104/68 - 125/67)  BP(mean): --  RR: 18 (2021 23:22) (18 - 18)  SpO2: 98% (2021 23:22) (97% - 98%)    PHYSICAL EXAM:  GENERAL: sitting on the bed NAD  HEAD:  Atraumatic, Normocephalic  EYES: EOMI, PERRLA, conjunctiva and sclera clear  NECK: Supple, No JVD, Normal thyroid  NERVOUS SYSTEM:  Alert & Oriented X3, No gross focal deficits  CHEST/LUNG: Clear to percussion bilaterally; No rales, rhonchi, wheezing, or rubs, chest tube in place   HEART: Regular rate and rhythm; No murmurs, rubs, or gallops  ABDOMEN: Soft, Nontender, Nondistended; Bowel sounds present  EXTREMITIES:  Superficial ulcer of right arm. No clubbing, cyanosis, or edema      LABS:                        12.4   10.95 )-----------( 390      ( 2021 08:12 )             39.0     06-08    135  |  92<L>  |  18.7  ----------------------------<  179<H>  3.7   |  25.0  |  0.94    Ca    8.6      2021 08:12  Mg     1.7     06-08    TPro  7.4  /  Alb  2.9<L>  /  TBili  0.6  /  DBili  x   /  AST  16  /  ALT  8   /  AlkPhos  108  06-08    PT/INR - ( 2021 16:21 )   PT: 18.1 sec;   INR: 1.60 ratio         PTT - ( 2021 16:21 )  PTT:32.2 sec  Urinalysis Basic - ( 2021 06:32 )    Color: Yellow / Appearance: Slightly Turbid / S.025 / pH: x  Gluc: x / Ketone: Trace  / Bili: Small / Urobili: 1   Blood: x / Protein: 30 mg/dL / Nitrite: Positive   Leuk Esterase: Moderate / RBC: 0-2 /HPF / WBC 11-25   Sq Epi: x / Non Sq Epi: Occasional / Bacteria: Moderate      CAPILLARY BLOOD GLUCOSE      POCT Blood Glucose.: 155 mg/dL (2021 08:06)  POCT Blood Glucose.: 125 mg/dL (2021 22:27)  POCT Blood Glucose.: 162 mg/dL (2021 18:35)  POCT Blood Glucose.: 157 mg/dL (2021 13:20)      RADIOLOGY & ADDITIONAL TESTS:    Imaging Personally Reviewed:  [ ] YES  [ ] NO    Consultant(s) Notes Reviewed:  [ ] YES  [ ] NO    Care Discussed with Consultants/Other Providers [ ] YES  [ ] NO    Plan of Care discussed with Housestaff [ ]YES [ ] NO

## 2021-06-09 NOTE — PROGRESS NOTE ADULT - ASSESSMENT
89F multiple medical problems who reports 100 lb weight loss in the last year presents with laceration to RLE, Subsequently found with large R pleural effusion on abd CT, now s/p pigtail placement initially with 1L serous fluid, CT chest demonstrated large lung mass;

## 2021-06-09 NOTE — PHYSICAL THERAPY INITIAL EVALUATION ADULT - ADDITIONAL COMMENTS
Pt reports limited mobility at [employer], SPT bed to w/c with assist x1; participates in PT "sometimes".

## 2021-06-09 NOTE — PHYSICAL THERAPY INITIAL EVALUATION ADULT - PERTINENT HX OF CURRENT PROBLEM, REHAB EVAL
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

## 2021-06-09 NOTE — GOALS OF CARE CONVERSATION - ADVANCED CARE PLANNING - CONVERSATION/DISCUSSION
MOLST Discussed
Diagnosis/Prognosis/MOLST Discussed/Treatment Options
attending Psychiatrist without NP/Trainee

## 2021-06-09 NOTE — DIETITIAN INITIAL EVALUATION ADULT. - OTHER INFO
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 Per H&P Doesn't feel hungry any more and lost weight over past year. Cough occasionally while eating.   Pt admitted with Right Pleural effusion s/p Right thoracocentesis and Diarrhea; Colitis on CT.   Pt lethargic during interview; pt endorsees having extremely poor appetite and has lost over 100# in past year. Pt declined food preferences at this time. Encouraged HBV protein foods and sipping Ensure between meals. Skin tears noted. NFPE conducted.

## 2021-06-09 NOTE — PROGRESS NOTE ADULT - PROBLEM SELECTOR PLAN 1
Likely malignant  appears exudative  maintain pigtail to waterseal  will consider pleurX if conts to drain, will need to d/w family, pt unsure if she wants  Consider lung bx  cytology pending  daily cxR  remainder of care per primary team  thoracic surgery to follow  D/W Dr Pena in AM

## 2021-06-09 NOTE — PHYSICAL THERAPY INITIAL EVALUATION ADULT - IMPAIRMENTS FOUND, PT EVAL
aerobic capacity/endurance/cognitive impairment/gait, locomotion, and balance/muscle strength/neuromotor development and sensory integration/ROM/tone

## 2021-06-09 NOTE — DIETITIAN INITIAL EVALUATION ADULT. - PERTINENT LABORATORY DATA
06-08 Na135 mmol/L Glu 179 mg/dL<H> K+ 3.7 mmol/L Cr  0.94 mg/dL BUN 18.7 mg/dL Phos n/a   Alb 2.9 g/dL<L> PAB n/a     n/a  HgbA1C

## 2021-06-09 NOTE — GOALS OF CARE CONVERSATION - ADVANCED CARE PLANNING - CONVERSATION DETAILS
I spoke with Víctor by phone earlier this afternoon; answering questions about his Grandma Ashely condition.    Since I was expecting Donna his mother to call me back I briefly described what was happening with the CT>pleurovac  comfort level, appetite-not eating much...    Donna called short time later, we arranged to meet this afternoon to discuss , test results probability of cancer diagnosis  Should Biopsy be done on chest mass knowing that Patient will not be pursuing aggressive treatments such as RT  or chemotherapy. Donna does not want to cause her mother more anxiety, expose her to painful diagnostic testing  and procedures, knowing that Bx results will not change outcome of the plan to treat conservatively.    We discussed rationale for CT-Large quantity of yellow serous fluid draining into Pleurovac. specimen sent to lab, awaiting results  whether fluid tests + for  Malignant Effusion    I spoke about hooking up with Hospice services once she is stable enough to return to [facility]. SW will coordinate  request for Hospice eval when she returns to SNF    We went over the MOLST directive, point by point.Donna wants DNR/DNI  but needs to review with Víctor before signing it.  SW from our Palliative team will contact Donna tomorrow and will  meet her to witness and complete MOLST for chart
Palliative care  contacted daughter earlier today to provide support and address with daughter decision making for advance care planning. daughter reviewed questions regarding MOLST form and coordinating with [facility]. Questions clarified and SW to meet further with daughter when she arrives.    SW met with patient who reports no complaints with symptoms at present. daughter Donna met privately to discuss decisions for MOLST. MOLST completed with DNR/DNI , no Peg and comfort at SNF when she returns.  Daughter interested in Hospice services. SW advised that current facility where patient resides does not have Hospice contract but provides comfort measures for residents.    SW contacted hospitalist Dr Bonner to advise of directives established. Palliative care to follow.

## 2021-06-09 NOTE — PROGRESS NOTE ADULT - SUBJECTIVE AND OBJECTIVE BOX
Patient is a 89y old  Female who presents with a chief complaint of diarrhea and pleural effusion (2021 09:00)      INTERVAL HPI/OVERNIGHT EVENTS:    MEDICATIONS  (STANDING):  aMIOdarone    Tablet 200 milliGRAM(s) Oral daily  ascorbic acid 500 milliGRAM(s) Oral daily  atorvastatin 20 milliGRAM(s) Oral at bedtime  ciprofloxacin   IVPB 400 milliGRAM(s) IV Intermittent every 12 hours  dextrose 40% Gel 15 Gram(s) Oral once  dextrose 5%. 1000 milliLiter(s) (50 mL/Hr) IV Continuous <Continuous>  dextrose 5%. 1000 milliLiter(s) (100 mL/Hr) IV Continuous <Continuous>  dextrose 50% Injectable 25 Gram(s) IV Push once  dextrose 50% Injectable 12.5 Gram(s) IV Push once  dextrose 50% Injectable 25 Gram(s) IV Push once  enoxaparin Injectable 80 milliGRAM(s) SubCutaneous every 12 hours  famotidine    Tablet 20 milliGRAM(s) Oral daily  FLUoxetine 10 milliGRAM(s) Oral at bedtime  folic acid 1 milliGRAM(s) Oral daily  glucagon  Injectable 1 milliGRAM(s) IntraMuscular once  insulin lispro (ADMELOG) corrective regimen sliding scale   SubCutaneous three times a day before meals  insulin lispro (ADMELOG) corrective regimen sliding scale   SubCutaneous at bedtime  melatonin 3 milliGRAM(s) Oral at bedtime  metoprolol tartrate 25 milliGRAM(s) Oral every 8 hours  metroNIDAZOLE  IVPB 500 milliGRAM(s) IV Intermittent every 8 hours  multivitamin/minerals 1 Tablet(s) Oral daily  saccharomyces boulardii 250 milliGRAM(s) Oral two times a day    MEDICATIONS  (PRN):  ondansetron Injectable 4 milliGRAM(s) IV Push every 6 hours PRN Nausea and/or Vomiting      Allergies    No Known Allergies    Intolerances        REVIEW OF SYSTEMS:  CONSTITUTIONAL: No fever, weight loss, or fatigue  RESPIRATORY: No cough, wheezing, chills or hemoptysis; No shortness of breath  CARDIOVASCULAR: No chest pain, palpitations, dizziness, or leg swelling  GASTROINTESTINAL: No abdominal or epigastric pain. No nausea, vomiting, or hematemesis; No diarrhea or constipation. No melena or hematochezia.  NEUROLOGICAL: No headaches, memory loss, loss of strength, numbness, or tremors  MUSCULOSKELETAL: No joint pain or swelling; No muscle, back, or extremity pain      Vital Signs Last 24 Hrs  T(C): 36.5 (2021 23:22), Max: 36.5 (2021 16:56)  T(F): 97.7 (2021 23:22), Max: 97.7 (2021 16:56)  HR: 102 (2021 23:22) (91 - 109)  BP: 122/60 (2021 23:22) (104/68 - 125/67)  BP(mean): --  RR: 18 (2021 23:22) (18 - 18)  SpO2: 98% (2021 23:22) (97% - 98%)    PHYSICAL EXAM:  GENERAL: NAD, well-groomed, well-developed  HEAD:  Atraumatic, Normocephalic  EYES: EOMI, PERRLA, conjunctiva and sclera clear  NECK: Supple, No JVD, Normal thyroid  NERVOUS SYSTEM:  Alert & Oriented X3, No gross focal deficits  CHEST/LUNG: Clear to percussion bilaterally; No rales, rhonchi, wheezing, or rubs  HEART: Regular rate and rhythm; No murmurs, rubs, or gallops  ABDOMEN: Soft, Nontender, Nondistended; Bowel sounds present  EXTREMITIES:  No clubbing, cyanosis, or edema  SKIN: No rashes or lesions    LABS:                        12.4   10.95 )-----------( 390      ( 2021 08:12 )             39.0     06-08    135  |  92<L>  |  18.7  ----------------------------<  179<H>  3.7   |  25.0  |  0.94    Ca    8.6      2021 08:12  Mg     1.7     06-08    TPro  7.4  /  Alb  2.9<L>  /  TBili  0.6  /  DBili  x   /  AST  16  /  ALT  8   /  AlkPhos  108  06-08    PT/INR - ( 2021 16:21 )   PT: 18.1 sec;   INR: 1.60 ratio         PTT - ( 2021 16:21 )  PTT:32.2 sec  Urinalysis Basic - ( 2021 06:32 )    Color: Yellow / Appearance: Slightly Turbid / S.025 / pH: x  Gluc: x / Ketone: Trace  / Bili: Small / Urobili: 1   Blood: x / Protein: 30 mg/dL / Nitrite: Positive   Leuk Esterase: Moderate / RBC: 0-2 /HPF / WBC 11-25   Sq Epi: x / Non Sq Epi: Occasional / Bacteria: Moderate      CAPILLARY BLOOD GLUCOSE      POCT Blood Glucose.: 155 mg/dL (2021 08:06)  POCT Blood Glucose.: 125 mg/dL (2021 22:27)  POCT Blood Glucose.: 162 mg/dL (2021 18:35)  POCT Blood Glucose.: 157 mg/dL (2021 13:20)      RADIOLOGY & ADDITIONAL TESTS:    Imaging Personally Reviewed:  [ ] YES  [ ] NO    Consultant(s) Notes Reviewed:  [ ] YES  [ ] NO    Care Discussed with Consultants/Other Providers [ ] YES  [ ] NO    Plan of Care discussed with Housestaff [ ]YES [ ] NO

## 2021-06-09 NOTE — PROGRESS NOTE ADULT - ASSESSMENT
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     1. Right Pleural effusion s/p Right thoracocentesis. Asymptomatic  - Chest tubes management as per CTS  - Fluid chemistry, cytology, cultures sent, no growth yet \  Add Tylenol for pain         2. Diarrhea; Colitis on CT. Hypomagnesemia due to GI losses  No further diarrhea noted   Change diet to soft   c/w Metronidazole and  Ciprofloxacin   Magnesium repleted       3. Chronic AFlutter - rate better controlled on Amiodarone   Coumadin on hold     4. HTN - BP controlled       5. T2DM: HBA1C 5.8  - Hold OHA  - BG controlled on SS      6. Dyslipidemia  - Continue Statin      Prophylactic measure  - VTEp; LMWH for stroke prevention  - Probiotic  - PT    Patient somewhat fatigued and forgetful. Will need to revisit to establish GOC, advance directives -  Daughter Donna would be Surrogate/HCP if need arises

## 2021-06-10 NOTE — PROGRESS NOTE ADULT - SUBJECTIVE AND OBJECTIVE BOX
Subjective: "I'm ok" NAD lying in bed. Denies cp, sob. at this time    T(C): 36.7 (06-10-21 @ 10:19), Max: 36.8 (06-09-21 @ 16:20)  HR: 69 (06-10-21 @ 10:19) (69 - 89)  BP: 99/60 (06-10-21 @ 10:19) (98/56 - 115/66)  RR: 18 (06-10-21 @ 10:19) (18 - 18)  SpO2: 98% (06-10-21 @ 10:19) (97% - 99%)    I&O's Detail    09 Jun 2021 07:01  -  10 Gilmer 2021 07:00  --------------------------------------------------------  IN:  Total IN: 0 mL    OUT:    Chest Tube (mL): 20 mL  Total OUT: 20 mL    Total NET: -20 mL          Patient's  laboratory results and current medications reviewed.    Physical Exam:  Gen: WN/WD NAD  Neuro: AAOx3, nonfocal  Pulm: slight decrease left base  CV: RRR  Tube: serous drainage, ~ 100 cc today so far, no air leak    Today's CXR: < from: Xray Chest 1 View- PORTABLE-Routine (Xray Chest 1 View- PORTABLE-Routine in AM.) (06.09.21 @ 05:53) >    IMPRESSION:  Right chest tube in place with marked improvement in pleural effusion.    Patchy areas of consolidation in the right lung again evident    < end of copied text >

## 2021-06-10 NOTE — PROGRESS NOTE ADULT - SUBJECTIVE AND OBJECTIVE BOX
Patient is a 89y old  Female who presents with a chief complaint of diarrhea and pleural effusion (2021 12:03)      INTERVAL HPI/OVERNIGHT EVENTS: No complains. Chest tube still draining   Cytology pending      MEDICATIONS  (STANDING):  aMIOdarone    Tablet 200 milliGRAM(s) Oral daily  ascorbic acid 500 milliGRAM(s) Oral daily  atorvastatin 20 milliGRAM(s) Oral at bedtime  ciprofloxacin   IVPB 400 milliGRAM(s) IV Intermittent every 12 hours  dextrose 40% Gel 15 Gram(s) Oral once  dextrose 5%. 1000 milliLiter(s) (50 mL/Hr) IV Continuous <Continuous>  dextrose 5%. 1000 milliLiter(s) (100 mL/Hr) IV Continuous <Continuous>  dextrose 50% Injectable 25 Gram(s) IV Push once  dextrose 50% Injectable 12.5 Gram(s) IV Push once  dextrose 50% Injectable 25 Gram(s) IV Push once  enoxaparin Injectable 80 milliGRAM(s) SubCutaneous every 12 hours  famotidine    Tablet 20 milliGRAM(s) Oral daily  FLUoxetine 10 milliGRAM(s) Oral at bedtime  folic acid 1 milliGRAM(s) Oral daily  glucagon  Injectable 1 milliGRAM(s) IntraMuscular once  insulin lispro (ADMELOG) corrective regimen sliding scale   SubCutaneous three times a day before meals  insulin lispro (ADMELOG) corrective regimen sliding scale   SubCutaneous at bedtime  melatonin 3 milliGRAM(s) Oral at bedtime  metoprolol tartrate 25 milliGRAM(s) Oral every 8 hours  metroNIDAZOLE  IVPB 500 milliGRAM(s) IV Intermittent every 8 hours  multivitamin/minerals 1 Tablet(s) Oral daily  saccharomyces boulardii 250 milliGRAM(s) Oral two times a day    MEDICATIONS  (PRN):  ondansetron Injectable 4 milliGRAM(s) IV Push every 6 hours PRN Nausea and/or Vomiting      Allergies    No Known Allergies    Intolerances        REVIEW OF SYSTEMS:  CONSTITUTIONAL: No fever, weight loss, or fatigue  RESPIRATORY: No cough, wheezing, chills or hemoptysis; No shortness of breath  CARDIOVASCULAR: No chest pain, palpitations, dizziness, or leg swelling  GASTROINTESTINAL: No abdominal or epigastric pain. No nausea, vomiting, or hematemesis; No diarrhea or constipation. No melena or hematochezia.  NEUROLOGICAL: No headaches, memory loss, loss of strength, numbness, or tremors  MUSCULOSKELETAL: No joint pain or swelling; No muscle, back, or extremity pain      Vital Signs Last 24 Hrs  T(C): 36.7 (10 Gilmer 2021 10:19), Max: 36.8 (2021 16:20)  T(F): 98 (10 Gilmer 2021 10:19), Max: 98.2 (2021 16:20)  HR: 69 (10 Gilmer 2021 10:19) (69 - 89)  BP: 99/60 (10 Gilmer 2021 10:19) (98/56 - 115/66)  BP(mean): --  RR: 18 (10 Gilmer 2021 10:19) (18 - 18)  SpO2: 98% (10 Gilmer 2021 10:19) (97% - 99%)    PHYSICAL EXAM:  GENERAL: Elderly lady, sitting on the bed.   HEAD:  Atraumatic, Normocephalic  EYES: EOMI, PERRLA, conjunctiva and sclera clear  NECK: Supple, No JVD  NERVOUS SYSTEM:  Alert & Oriented X2, No gross focal deficits  CHEST/LUNG: Clear to percussion bilaterally; No rales, rhonchi, wheezing, or rubs, chest tube in place   HEART: Regular rate and rhythm; No murmurs, rubs, or gallops  ABDOMEN: Soft, Nontender, Nondistended; Bowel sounds present  EXTREMITIES:  No clubbing, cyanosis, or edema  SKIN: No rashes or lesions    LABS:            Urinalysis Basic - ( 2021 06:32 )    Color: Yellow / Appearance: Slightly Turbid / S.025 / pH: x  Gluc: x / Ketone: Trace  / Bili: Small / Urobili: 1   Blood: x / Protein: 30 mg/dL / Nitrite: Positive   Leuk Esterase: Moderate / RBC: 0-2 /HPF / WBC 11-25   Sq Epi: x / Non Sq Epi: Occasional / Bacteria: Moderate      CAPILLARY BLOOD GLUCOSE      POCT Blood Glucose.: 172 mg/dL (10 Gilmer 2021 12:48)  POCT Blood Glucose.: 138 mg/dL (10 Gilmer 2021 08:43)  POCT Blood Glucose.: 108 mg/dL (2021 22:24)  POCT Blood Glucose.: 167 mg/dL (2021 18:15)      RADIOLOGY & ADDITIONAL TESTS:    Imaging Personally Reviewed:  [ ] YES  [ ] NO    Consultant(s) Notes Reviewed:  [ ] YES  [ ] NO    Care Discussed with Consultants/Other Providers [ ] YES  [ ] NO    Plan of Care discussed with Housestaff [ ]YES [ ] NO

## 2021-06-10 NOTE — PROGRESS NOTE ADULT - ASSESSMENT
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     1. Right Pleural effusion s/p Right thoracocentesis. Asymptomatic  Cytology results pending, likely malignant   - Chest tubes management as per CTS  Add Tylenol for pain       2. Diarrhea; Colitis on CT. Hypomagnesemia due to GI losses  No further diarrhea noted   c/w Metronidazole and  Ciprofloxacin for 7 days   Magnesium repleted       3. Chronic AFlutter - rate better controlled on Amiodarone   Coumadin on hold     4. HTN - BP controlled       5. T2DM: HBA1C 5.8  - Hold OHA  - BG controlled on SS      6. 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     1. Right Pleural effusion s/p Right thoracocentesis. Asymptomatic  Cytology results pending, likely malignant   - Chest tubes management as per CTS  Add Tylenol for pain       2. Diarrhea; Colitis on CT. Hypomagnesemia due to GI losses  No further diarrhea noted   c/w Metronidazole and  Ciprofloxacin for 7 days   Magnesium repleted       3. Chronic AFlutter - rate better controlled on Amiodarone   Coumadin on hold   On Enoxaparin for anticoagulation     4. HTN - BP controlled       5. T2DM: HBA1C 5.8  - Hold OHA  - BG controlled on SS      6. Dyslipidemia  - Continue Statin      Prophylactic measure  - VTEp; LMWH for stroke prevention  - Probiotic  - PT      Left  message for daughter Donna

## 2021-06-10 NOTE — PROGRESS NOTE ADULT - PROBLEM SELECTOR PLAN 1
Likely malignant  appears exudative  maintain pigtail to waterseal, hope to remove tomorrow  will consider pleurX if conts to drain, will need to d/w family, pt unsure if she wants  Consider lung bx  cytology pending  daily cxR  remainder of care per primary team  thoracic surgery to follow  D/W Dr Pena in AM

## 2021-06-10 NOTE — PROGRESS NOTE ADULT - ASSESSMENT
89F multiple medical problems who reports 100 lb weight loss in the last year presents with laceration to RLE, Subsequently found with large R pleural effusion on abd CT, now s/p pigtail placement initially with 1L serous fluid, CT chest demonstrated large lung mass;    6/10 chest tube only 20 cc yesterday but with milking tube, over 100cc drainage this morning. Will maintain for now.

## 2021-06-11 NOTE — PROGRESS NOTE ADULT - PROBLEM SELECTOR PLAN 1
Likely malignant  appears exudative  maintain pigtail to waterseal  will consider pleurX if conts to drain, will need to d/w family  Consider lung bx  cytology pending  daily cxR  remainder of care per primary team  thoracic surgery to follow  Dr Joy will reach out to the family

## 2021-06-11 NOTE — PROGRESS NOTE ADULT - SUBJECTIVE AND OBJECTIVE BOX
Subjective:  patient in bed NAD     T(C): 36.2 (06-11-21 @ 04:20), Max: 36.6 (06-10-21 @ 16:52)  HR: 68 (06-11-21 @ 04:20) (68 - 82)  BP: 104/65 (06-11-21 @ 04:20) (93/52 - 104/65)  ABP: --  ABP(mean): --  RR: 18 (06-11-21 @ 04:20) (18 - 18)  SpO2: 98% (06-11-21 @ 04:20) (97% - 98%) 2 L NC    Wt(kg): --  CVP(mm Hg): --  CO: --  CI: --  PA: --                                              Tele: Afib    CHEST TUBE:      140cc          per 24 hours    AIR LEAKS:  [ ] YES [ x] NO          06-11    135  |  94<L>  |  15.8  ----------------------------<  101<H>  3.0<L>   |  27.0  |  0.91    Ca    8.7      11 Jun 2021 07:54  Mg     1.8     06-11                                 11.1   7.56  )-----------( 304      ( 11 Jun 2021 07:54 )             35.1                 CAPILLARY BLOOD GLUCOSE      POCT Blood Glucose.: 165 mg/dL (11 Jun 2021 08:57)  POCT Blood Glucose.: 175 mg/dL (10 Gilmer 2021 21:19)  POCT Blood Glucose.: 151 mg/dL (10 Gilmer 2021 17:37)  POCT Blood Glucose.: 172 mg/dL (10 Gilmer 2021 12:48)           CXR: < from: Xray Chest 1 View- PORTABLE-Routine (Xray Chest 1 View- PORTABLE-Routine in AM.) (06.11.21 @ 06:42) >    Two expiratory views of the chest are compared to 6/10/2021 and demonstrate a right pigtail catheter in satisfactory position with no pneumothorax, unchanged.    Cardiac silhouette and pulmonary vasculature are within normal limits with no effusions.    Mass in the posterior segment of the right upper lobe and patchy atelectasis in the right lower lobe, unchanged.    Pacemaker with wire tips in right atrium and right ventricle again evident.    IMPRESSION:  Right chest tube in satisfactory position with no pneumothorax, unchanged.    Right lung mass/consolidation,unchanged.    < end of copied text >        Exam   Physical Exam:  Gen: WN/WD NAD  Neuro: AAOx3, nonfocal  Pulm: slight decrease left base  CV: RRR    Assessment:  89yFemale    with PAST MEDICAL & SURGICAL HISTORY:  CAD (coronary artery disease)  s/p CABG- [facility]    HTN (hypertension)    Hyperlipidemia    Obesity    Diabetes    [facility]    Valvular disease    S/P CABG (coronary artery bypass graft)    S/P cholecystectomy    S/P coronary artery stent placement  x 4, 2006-[facility]    Aortic valve replaced    Artificial pacemaker          Plan:

## 2021-06-11 NOTE — PROGRESS NOTE ADULT - SUBJECTIVE AND OBJECTIVE BOX
Patient is a 89y old  Female who presents with a chief complaint of diarrhea and pleural effusion (11 Jun 2021 11:49)      INTERVAL HPI/OVERNIGHT EVENTS: Seen and examined. NO complains. The chest tube still draining.       MEDICATIONS  (STANDING):  aMIOdarone    Tablet 200 milliGRAM(s) Oral daily  ascorbic acid 500 milliGRAM(s) Oral daily  atorvastatin 20 milliGRAM(s) Oral at bedtime  ciprofloxacin   IVPB 400 milliGRAM(s) IV Intermittent every 12 hours  dextrose 40% Gel 15 Gram(s) Oral once  dextrose 5%. 1000 milliLiter(s) (50 mL/Hr) IV Continuous <Continuous>  dextrose 5%. 1000 milliLiter(s) (100 mL/Hr) IV Continuous <Continuous>  dextrose 50% Injectable 25 Gram(s) IV Push once  dextrose 50% Injectable 12.5 Gram(s) IV Push once  dextrose 50% Injectable 25 Gram(s) IV Push once  enoxaparin Injectable 80 milliGRAM(s) SubCutaneous every 12 hours  famotidine    Tablet 20 milliGRAM(s) Oral daily  FLUoxetine 10 milliGRAM(s) Oral at bedtime  folic acid 1 milliGRAM(s) Oral daily  glucagon  Injectable 1 milliGRAM(s) IntraMuscular once  insulin lispro (ADMELOG) corrective regimen sliding scale   SubCutaneous three times a day before meals  insulin lispro (ADMELOG) corrective regimen sliding scale   SubCutaneous at bedtime  melatonin 3 milliGRAM(s) Oral at bedtime  metoprolol tartrate 25 milliGRAM(s) Oral every 8 hours  metroNIDAZOLE  IVPB 500 milliGRAM(s) IV Intermittent every 8 hours  multivitamin/minerals 1 Tablet(s) Oral daily  saccharomyces boulardii 250 milliGRAM(s) Oral two times a day    MEDICATIONS  (PRN):  ondansetron Injectable 4 milliGRAM(s) IV Push every 6 hours PRN Nausea and/or Vomiting      Allergies    No Known Allergies    Intolerances        REVIEW OF SYSTEMS:  CONSTITUTIONAL: No fever, weight loss, or fatigue  RESPIRATORY: No cough, wheezing, chills or hemoptysis; No shortness of breath  CARDIOVASCULAR: No chest pain, palpitations, dizziness, or leg swelling  GASTROINTESTINAL: No abdominal or epigastric pain. No nausea, vomiting, or hematemesis; No diarrhea or constipation. No melena or hematochezia.  NEUROLOGICAL: No headaches, memory loss, loss of strength, numbness, or tremors  MUSCULOSKELETAL: No joint pain or swelling; No muscle, back, or extremity pain      Vital Signs Last 24 Hrs  T(C): 36.6 (11 Jun 2021 11:54), Max: 36.6 (10 Gilmer 2021 16:52)  T(F): 97.9 (11 Jun 2021 11:54), Max: 97.9 (11 Jun 2021 11:54)  HR: 67 (11 Jun 2021 11:54) (67 - 82)  BP: 103/62 (11 Jun 2021 11:54) (93/52 - 104/65)  BP(mean): --  RR: 18 (11 Jun 2021 11:54) (18 - 18)  SpO2: 96% (11 Jun 2021 11:54) (96% - 98%)    PHYSICAL EXAM:  GENERAL: Elderly lady, sitting on the bed. NAD, well-groomed, well-developed  HEAD:  Atraumatic, Normocephalic  EYES: EOMI, PERRLA, conjunctiva and sclera clear  NECK: Supple, No JVD, Normal thyroid  NERVOUS SYSTEM:  Alert & Oriented X2, No gross focal deficits  CHEST/LUNG: Clear to percussion bilaterally; No rales, rhonchi, wheezing, or rubs, right sided chest tube in place   HEART: Regular rate and rhythm; No murmurs, rubs, or gallops  ABDOMEN: Soft, Nontender, Nondistended; Bowel sounds present  EXTREMITIES:  No clubbing, cyanosis, or edema      LABS:                        11.1   7.56  )-----------( 304      ( 11 Jun 2021 07:54 )             35.1     06-11    135  |  94<L>  |  15.8  ----------------------------<  101<H>  3.0<L>   |  27.0  |  0.91    Ca    8.7      11 Jun 2021 07:54  Mg     1.8     06-11          CAPILLARY BLOOD GLUCOSE      POCT Blood Glucose.: 173 mg/dL (11 Jun 2021 12:47)  POCT Blood Glucose.: 165 mg/dL (11 Jun 2021 08:57)  POCT Blood Glucose.: 175 mg/dL (10 Gilmer 2021 21:19)  POCT Blood Glucose.: 151 mg/dL (10 Gilmer 2021 17:37)      RADIOLOGY & ADDITIONAL TESTS:    Imaging Personally Reviewed:  [ ] YES  [ ] NO    Consultant(s) Notes Reviewed:  [ ] YES  [ ] NO    Care Discussed with Consultants/Other Providers [ ] YES  [ ] NO    Plan of Care discussed with Housestaff [ ]YES [ ] NO

## 2021-06-11 NOTE — PROGRESS NOTE ADULT - ASSESSMENT
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     1. Right Pleural effusion s/p Right thoracocentesis. Asymptomatic  Cytology results pending, likely malignant  - Chest tubes management as per CTS  Add Tylenol for pain   Spoke to patients daughter Donna, she discussed with her son and after agreed for Pleurx placement.   She does not want to do any further malignancy  work up given patient age and her other choric issues. She would like her to go back to NH.       2. Diarrhea; Colitis on CT. Hypomagnesemia due to GI losses  No further diarrhea noted   c/w Metronidazole and  Ciprofloxacin for 7 days (5/7)  Magnesium repleted       3. Chronic AFlutter - rate better controlled on Amiodarone   Coumadin on hold   On Enoxaparin for anticoagulation     4. HTN - BP controlled       5. T2DM: HBA1C 5.8  - Hold OHA  - BG controlled on SS      6. Dyslipidemia  - Continue Statin      Prophylactic measure  - VTEp; LMWH for stroke prevention  - Probiotic  - PT      Left  message for sumanth Thompson

## 2021-06-12 NOTE — PROGRESS NOTE ADULT - ASSESSMENT
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     1. Right Pleural effusion s/p Right thoracocentesis.   Cytology results pending, likely malignant  - Chest tubes management as per CTS  - spoke to CT surgery, decision to be made about pleur ex tomorrow     2. Diarrhea; Colitis on CT. Hypomagnesemia due to GI losses  No further diarrhea noted   c/w Metronidazole and  Ciprofloxacin for 7 days (6/7)    3. Chronic AFlutter - rate better controlled on Amiodarone   Coumadin on hold   On Enoxaparin for anticoagulation     4. HTN - BP controlled     5. T2DM: HBA1C 5.8  - BG controlled on SS    6. Dyslipidemia  - Continue Statin    Prophylactic measure  - VTEp; LMWH for stroke prevention    [place] back to [facility]

## 2021-06-12 NOTE — PROVIDER CONTACT NOTE (OTHER) - ACTION/TREATMENT ORDERED:
As per PA, hold 14:00 dose of lopressor, recheck BP in one hour after pt eats lunch and drinks some fluids.
will continue to monitor

## 2021-06-12 NOTE — PROGRESS NOTE ADULT - ASSESSMENT
89F multiple medical problems who reports 100 lb weight loss in the last year presents with laceration to RLE, Subsequently found with large R pleural effusion on abd CT, now s/p pigtail placement initially with 1L serous fluid, CT chest demonstrated large lung mass;    6/10 chest tube only 20 cc yesterday but with milking tube, over 100cc drainage this morning. Will maintain for now.  6/12 again a sudden increase in chest tube drainage

## 2021-06-12 NOTE — PROGRESS NOTE ADULT - SUBJECTIVE AND OBJECTIVE BOX
SUSIE VALENTINO    [id number]    89y      Female    INTERVAL HPI/OVERNIGHT EVENTS: patient being seen for pleural effusions. patient seen at bedside and states feeling ok.       REVIEW OF SYSTEMS:    CONSTITUTIONAL: No fever, weight loss, or fatigue  RESPIRATORY: No cough, wheezing, hemoptysis; No shortness of breath  CARDIOVASCULAR: No chest pain, palpitations  GASTROINTESTINAL: No abdominal or epigastric pain. No nausea, vomiting  NEUROLOGICAL: No headaches, memory loss, loss of strength.  MISCELLANEOUS:      Vital Signs Last 24 Hrs  T(C): 36.6 (12 Jun 2021 12:20), Max: 37.3 (11 Jun 2021 21:03)  T(F): 97.8 (12 Jun 2021 12:20), Max: 99.1 (11 Jun 2021 21:03)  HR: 64 (12 Jun 2021 13:47) (63 - 75)  BP: 97/61 (12 Jun 2021 13:47) (94/58 - 151/69)  BP(mean): --  RR: 18 (12 Jun 2021 12:20) (18 - 18)  SpO2: 97% (12 Jun 2021 12:20) (95% - 97%)    PHYSICAL EXAM:  GENERAL: Elderly lady, sitting on the bed. NAD, well-groomed,  HEAD:  Atraumatic, Normocephalic  EYES: EOMI, PERRLA, conjunctiva and sclera clear  NECK: Supple, No JVD, Normal thyroid  NERVOUS SYSTEM:  Alert & Oriented X2, No gross focal deficits  CHEST/LUNG: Clear to percussion bilaterally;  right sided chest tube in place   HEART: Regular rate and rhythm; No murmurs, rubs, or gallops  ABDOMEN: Soft, Nontender, Nondistended; Bowel sounds present  EXTREMITIES:  No clubbing, cyanosis, or edema      LABS:                        11.1   7.56  )-----------( 304      ( 11 Jun 2021 07:54 )             35.1     06-11    135  |  94<L>  |  15.8  ----------------------------<  101<H>  3.0<L>   |  27.0  |  0.91    Ca    8.7      11 Jun 2021 07:54  Mg     1.8     06-11      MEDICATIONS  (STANDING):  aMIOdarone    Tablet 200 milliGRAM(s) Oral daily  ascorbic acid 500 milliGRAM(s) Oral daily  atorvastatin 20 milliGRAM(s) Oral at bedtime  ciprofloxacin   IVPB 400 milliGRAM(s) IV Intermittent every 12 hours  dextrose 40% Gel 15 Gram(s) Oral once  dextrose 5%. 1000 milliLiter(s) (50 mL/Hr) IV Continuous <Continuous>  dextrose 5%. 1000 milliLiter(s) (100 mL/Hr) IV Continuous <Continuous>  dextrose 50% Injectable 25 Gram(s) IV Push once  dextrose 50% Injectable 12.5 Gram(s) IV Push once  dextrose 50% Injectable 25 Gram(s) IV Push once  enoxaparin Injectable 80 milliGRAM(s) SubCutaneous every 12 hours  famotidine    Tablet 20 milliGRAM(s) Oral daily  FLUoxetine 10 milliGRAM(s) Oral at bedtime  folic acid 1 milliGRAM(s) Oral daily  glucagon  Injectable 1 milliGRAM(s) IntraMuscular once  insulin lispro (ADMELOG) corrective regimen sliding scale   SubCutaneous three times a day before meals  insulin lispro (ADMELOG) corrective regimen sliding scale   SubCutaneous at bedtime  melatonin 3 milliGRAM(s) Oral at bedtime  metoprolol tartrate 25 milliGRAM(s) Oral every 8 hours  metroNIDAZOLE  IVPB 500 milliGRAM(s) IV Intermittent every 8 hours  multivitamin/minerals 1 Tablet(s) Oral daily    MEDICATIONS  (PRN):  acetaminophen   Tablet .. 650 milliGRAM(s) Oral every 6 hours PRN Temp greater or equal to 38C (100.4F), Mild Pain (1 - 3)  ondansetron Injectable 4 milliGRAM(s) IV Push every 6 hours PRN Nausea and/or Vomiting      RADIOLOGY & ADDITIONAL TESTS:

## 2021-06-12 NOTE — PROGRESS NOTE ADULT - PROBLEM SELECTOR PLAN 1
Likely malignant  appears exudative  maintain pigtail to waterseal  will need pleurX if conts to drain, will need to d/w family > likely early this week  Consider lung bx  cytology pending  daily cxR  remainder of care per primary team  thoracic surgery to follow  D/W Dr Gamboa

## 2021-06-12 NOTE — PROVIDER CONTACT NOTE (OTHER) - ASSESSMENT
Pt denies discomfort/palpitations/light headedness. is tired. BP 93/51. HR currently 65 on monitor, v-paced. spo2 99% on room air.

## 2021-06-12 NOTE — PROGRESS NOTE ADULT - SUBJECTIVE AND OBJECTIVE BOX
Subjective: pt resting, offers no complaints    T(C): 36.2 (06-12-21 @ 20:42), Max: 37 (06-12-21 @ 04:20)  HR: 65 (06-12-21 @ 20:42) (63 - 75)  BP: 93/51 (06-12-21 @ 20:42) (93/51 - 151/69)  RR: 18 (06-12-21 @ 20:42) (18 - 18)  SpO2: 99% (06-12-21 @ 20:42) (95% - 99%)    I&O's Detail    11 Jun 2021 07:01  -  12 Jun 2021 07:00  --------------------------------------------------------  IN:  Total IN: 0 mL    OUT:    Chest Tube (mL): 145 mL  Total OUT: 145 mL    Total NET: -145 mL      12 Jun 2021 07:01  -  12 Jun 2021 22:12  --------------------------------------------------------  IN:  Total IN: 0 mL    OUT:    Chest Tube (mL): 420 mL  Total OUT: 420 mL    Total NET: -420 mL          Patient's  laboratory results and current medications reviewed.    Physical Exam:  Gen: WN/WD NAD  Neuro: AAOx3, R hemiparesis  Pulm: diminished bases  CV: RRR  Tube: serosang. 400 cc today day shift    Last CXR:  < from: Xray Chest 1 View- PORTABLE-Routine (Xray Chest 1 View- PORTABLE-Routine in AM.) (06.11.21 @ 06:42) >    IMPRESSION:  Right chest tube in satisfactory position with no pneumothorax, unchanged.      < end of copied text >

## 2021-06-12 NOTE — PROVIDER CONTACT NOTE (OTHER) - SITUATION
Pt is VS Q4hrs, pt also has R Chest Tube to water seal. Pt is laying in bed asymptomatic however upon VS check pts BP is 94/58 and this AM it was 102/51. Chest tube has put out 300cc since this AM.
pt with 8 beats of vtach on cardiac monitor

## 2021-06-13 NOTE — PROGRESS NOTE ADULT - PROBLEM SELECTOR PLAN 1
Likely malignant  appears exudative  maintain pigtail to waterseal  will need pleurX if conts to drain, will need to d/w family > likely early this week  cytology pending  daily CXR  remainder of care per primary team  thoracic surgery to follow  D/W Dr Gamboa

## 2021-06-13 NOTE — PROGRESS NOTE ADULT - ASSESSMENT
89F multiple medical problems who reports 100 lb weight loss in the last year presents with laceration to RLE, Subsequently found with large R pleural effusion on abd CT, now s/p pigtail placement initially with 1L serous fluid, CT chest demonstrated large lung mass;    6/10 chest tube only 20 cc yesterday but with milking tube, over 100cc drainage this morning. Will maintain for now.  6/12 again a sudden increase in chest tube drainage  6/13 Tube noted to be kinked once un-kinked drained 150cc

## 2021-06-13 NOTE — PROGRESS NOTE ADULT - ASSESSMENT
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     1. Right Pleural effusion s/p Right thoracocentesis.   Cytology results pending, likely malignant  - Chest tubes management as per CTS  - eventual pleur ex ? possible as per ct surgery     2. Diarrhea; Colitis on CT. Hypomagnesemia due to GI losses  No further diarrhea noted   c/w Metronidazole and  Ciprofloxacin for 7 days (7/7)    3. Chronic AFlutter - rate better controlled on Amiodarone   Coumadin on hold   On Enoxaparin for anticoagulation     4. HTN - BP controlled     5. T2DM: HBA1C 5.8  - BG controlled on SS    6. 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     1. Right Pleural effusion s/p Right thoracocentesis.   Cytology results pending, likely malignant  - Chest tubes management as per CTS  - eventual pleur ex ? possible as per ct surgery     2. Diarrhea; Colitis on CT. Hypomagnesemia due to GI losses  No further diarrhea noted   c/w Metronidazole and  Ciprofloxacin for 7 days (7/7)    3. Chronic AFlutter - rate better controlled on Amiodarone   Coumadin on hold   On Enoxaparin for anticoagulation     4. HTN - BP controlled     5. T2DM: HBA1C 5.8  - BG controlled on SS    6. Dyslipidemia  - Continue Statin    7. luis - monitor  - did receive motrin yesterday   - gentle hydration 40cc an hour  bmp in am     Prophylactic measure  - VTEp; LMWH for stroke prevention    eventual dc back to [facility]

## 2021-06-13 NOTE — PROGRESS NOTE ADULT - SUBJECTIVE AND OBJECTIVE BOX
SUSIE VALENTINO    [id number]    89y      Female    INTERVAL HPI/OVERNIGHT EVENTS: patient being seen for new lung mass and effusions. Patient seen at bedside and denies any complaints    REVIEW OF SYSTEMS:    CONSTITUTIONAL: No fever, weight loss, or fatigue  RESPIRATORY: No cough, wheezing, hemoptysis; No shortness of breath  CARDIOVASCULAR: No chest pain, palpitations  GASTROINTESTINAL: No abdominal or epigastric pain. No nausea, vomiting  NEUROLOGICAL: No headaches, memory loss, loss of strength.  MISCELLANEOUS:      Vital Signs Last 24 Hrs  T(C): 36.4 (13 Jun 2021 11:25), Max: 36.8 (12 Jun 2021 16:20)  T(F): 97.6 (13 Jun 2021 11:25), Max: 98.3 (12 Jun 2021 16:20)  HR: 73 (13 Jun 2021 11:25) (65 - 76)  BP: 121/60 (13 Jun 2021 11:25) (93/51 - 121/60)  BP(mean): --  RR: 18 (13 Jun 2021 11:25) (18 - 18)  SpO2: 96% (13 Jun 2021 11:25) (96% - 99%)    PHYSICAL EXAM:    GENERAL: Elderly lady, sitting on the bed.   HEAD:  Atraumatic, Normocephalic  EYES: EOMI, PERRLA, conjunctiva and sclera clear  NECK: Supple, No JVD, Normal thyroid  NERVOUS SYSTEM:  Alert & Oriented X3, No gross focal deficits  CHEST/LUNG:  right sided chest tube in place   HEART: Regular rate and rhythm; No murmurs, rubs, or gallops  ABDOMEN: Soft, Nontender, Nondistended; Bowel sounds present  EXTREMITIES:  No clubbing, cyanosis, or edema      LABS:    06-13    131<L>  |  95<L>  |  19.5  ----------------------------<  131<H>  3.4<L>   |  26.0  |  1.41<H>    Ca    8.2<L>      13 Jun 2021 08:56  Mg     1.8     06-13    TPro  5.8<L>  /  Alb  2.4<L>  /  TBili  0.3<L>  /  DBili  x   /  AST  29  /  ALT  9   /  AlkPhos  80  06-13      MEDICATIONS  (STANDING):  aMIOdarone    Tablet 200 milliGRAM(s) Oral daily  ascorbic acid 500 milliGRAM(s) Oral daily  atorvastatin 20 milliGRAM(s) Oral at bedtime  ciprofloxacin   IVPB 400 milliGRAM(s) IV Intermittent every 12 hours  dextrose 40% Gel 15 Gram(s) Oral once  dextrose 5%. 1000 milliLiter(s) (50 mL/Hr) IV Continuous <Continuous>  dextrose 5%. 1000 milliLiter(s) (100 mL/Hr) IV Continuous <Continuous>  dextrose 50% Injectable 25 Gram(s) IV Push once  dextrose 50% Injectable 12.5 Gram(s) IV Push once  dextrose 50% Injectable 25 Gram(s) IV Push once  enoxaparin Injectable 80 milliGRAM(s) SubCutaneous every 12 hours  famotidine    Tablet 20 milliGRAM(s) Oral daily  FLUoxetine 10 milliGRAM(s) Oral at bedtime  folic acid 1 milliGRAM(s) Oral daily  glucagon  Injectable 1 milliGRAM(s) IntraMuscular once  insulin lispro (ADMELOG) corrective regimen sliding scale   SubCutaneous three times a day before meals  insulin lispro (ADMELOG) corrective regimen sliding scale   SubCutaneous at bedtime  melatonin 3 milliGRAM(s) Oral at bedtime  metoprolol tartrate 25 milliGRAM(s) Oral every 8 hours  metroNIDAZOLE  IVPB 500 milliGRAM(s) IV Intermittent every 8 hours  multivitamin/minerals 1 Tablet(s) Oral daily  sodium chloride 0.9%. 1000 milliLiter(s) (40 mL/Hr) IV Continuous <Continuous>    MEDICATIONS  (PRN):  acetaminophen   Tablet .. 650 milliGRAM(s) Oral every 6 hours PRN Temp greater or equal to 38C (100.4F), Mild Pain (1 - 3)  ondansetron Injectable 4 milliGRAM(s) IV Push every 6 hours PRN Nausea and/or Vomiting      RADIOLOGY & ADDITIONAL TESTS:

## 2021-06-13 NOTE — PROGRESS NOTE ADULT - SUBJECTIVE AND OBJECTIVE BOX
SUBJECTIVE:  Pt OOB to the chair finishing breakfast, Pt states" I am OK"   Patient denies acute pain with radiating or aggravating factors.  She denies chest pain, shortness of breath, palpitations, headache, dizziness, nausea, or vomiting.       Overnight events:  none    PAST MEDICAL & SURGICAL HISTORY:  CAD (coronary artery disease)  s/p CABG- 2004-[facility]    HTN (hypertension)    Hyperlipidemia    Obesity    Diabetes    [facility]    Valvular disease    S/P CABG (coronary artery bypass graft)    S/P cholecystectomy    S/P coronary artery stent placement  x 4, 2006-[facility]    Aortic valve replaced    Artificial pacemaker          acetaminophen   Tablet .. 650 milliGRAM(s) Oral every 6 hours PRN  aMIOdarone    Tablet 200 milliGRAM(s) Oral daily  ascorbic acid 500 milliGRAM(s) Oral daily  atorvastatin 20 milliGRAM(s) Oral at bedtime  ciprofloxacin   IVPB 400 milliGRAM(s) IV Intermittent every 12 hours  dextrose 40% Gel 15 Gram(s) Oral once  dextrose 5%. 1000 milliLiter(s) IV Continuous <Continuous>  dextrose 5%. 1000 milliLiter(s) IV Continuous <Continuous>  dextrose 50% Injectable 25 Gram(s) IV Push once  dextrose 50% Injectable 12.5 Gram(s) IV Push once  dextrose 50% Injectable 25 Gram(s) IV Push once  enoxaparin Injectable 80 milliGRAM(s) SubCutaneous every 12 hours  famotidine    Tablet 20 milliGRAM(s) Oral daily  FLUoxetine 10 milliGRAM(s) Oral at bedtime  folic acid 1 milliGRAM(s) Oral daily  glucagon  Injectable 1 milliGRAM(s) IntraMuscular once  insulin lispro (ADMELOG) corrective regimen sliding scale   SubCutaneous three times a day before meals  insulin lispro (ADMELOG) corrective regimen sliding scale   SubCutaneous at bedtime  melatonin 3 milliGRAM(s) Oral at bedtime  metoprolol tartrate 25 milliGRAM(s) Oral every 8 hours  metroNIDAZOLE  IVPB 500 milliGRAM(s) IV Intermittent every 8 hours  multivitamin/minerals 1 Tablet(s) Oral daily  ondansetron Injectable 4 milliGRAM(s) IV Push every 6 hours PRN  sodium chloride 0.9%. 1000 milliLiter(s) IV Continuous <Continuous>  MEDICATIONS  (PRN):  acetaminophen   Tablet .. 650 milliGRAM(s) Oral every 6 hours PRN Temp greater or equal to 38C (100.4F), Mild Pain (1 - 3)  ondansetron Injectable 4 milliGRAM(s) IV Push every 6 hours PRN Nausea and/or Vomiting      Daily     Daily         06-13    131<L>  |  95<L>  |  19.5  ----------------------------<  131<H>  3.4<L>   |  26.0  |  1.41<H>    Ca    8.2<L>      13 Jun 2021 08:56  Mg     1.8     06-13    TPro  5.8<L>  /  Alb  2.4<L>  /  TBili  0.3<L>  /  DBili  x   /  AST  29  /  ALT  9   /  AlkPhos  80  06-13            Objective:  T(C): 36.4 (06-13-21 @ 17:06), Max: 36.4 (06-13-21 @ 04:34)  HR: 62 (06-13-21 @ 17:06) (62 - 76)  BP: 110/61 (06-13-21 @ 17:06) (93/51 - 121/60)  RR: 18 (06-13-21 @ 17:06) (18 - 18)  SpO2: 100% (06-13-21 @ 17:06) (96% - 100%)  Wt(kg): --CAPILLARY BLOOD GLUCOSE      POCT Blood Glucose.: 149 mg/dL (13 Jun 2021 17:03)  POCT Blood Glucose.: 186 mg/dL (13 Jun 2021 12:31)  POCT Blood Glucose.: 128 mg/dL (13 Jun 2021 08:29)  POCT Blood Glucose.: 169 mg/dL (12 Jun 2021 21:08)  I&O's Summary    12 Jun 2021 07:01  -  13 Jun 2021 07:00  --------------------------------------------------------  IN: 0 mL / OUT: 450 mL / NET: -450 mL    13 Jun 2021 07:01  -  13 Jun 2021 19:11  --------------------------------------------------------  IN: 0 mL / OUT: 220 mL / NET: -220 mL        Physical Exam  Neuro: A+O x 3, non-focal, speech clear and intact  Pulm: bilaterally diminished  CV: +S1S2  Abd: soft, NT, ND, +BS  Ext: +DP Pulses b/l, +PT pulses, no edema  Chest tubes: right pigtail to waterseal, Dressing C/D/I     Imaging:  CXR:  < from: Xray Chest 1 View- PORTABLE-Routine (Xray Chest 1 View- PORTABLE-Routine in AM.) (06.11.21 @ 06:42) >  IMPRESSION:  Right chest tube in satisfactory position with no pneumothorax, unchanged.    Right lung mass/consolidation,unchanged.    < end of copied text >      ECG:    < from: 12 Lead ECG (06.07.21 @ 08:57) >    Ventricular Rate 114 BPM    Atrial Rate 133 BPM    QRS Duration 82 ms    Q-T Interval 328 ms    QTC Calculation(Bazett) 452 ms    R Axis -3 degrees    T Axis 143 degrees    Diagnosis Line Atrial fibrillation with rapid ventricular response  ST & T wave abnormality, consider lateral ischemia  Abnormal ECG    < end of copied text >

## 2021-06-14 PROBLEM — Z23 COVID-19 VACCINE ADMINISTERED: Status: ACTIVE | Noted: 2021-01-01

## 2021-06-14 NOTE — PROGRESS NOTE ADULT - SUBJECTIVE AND OBJECTIVE BOX
SUSIE VALENTINO    [id number]    89y      Female    INTERVAL HPI/OVERNIGHT EVENTS: patient being seen for effusions. patient continues to have chest tube,.     REVIEW OF SYSTEMS:    CONSTITUTIONAL: No fever, weight loss, or fatigue  RESPIRATORY: No cough, wheezing, hemoptysis; No shortness of breath  CARDIOVASCULAR: No chest pain, palpitations  GASTROINTESTINAL: No abdominal or epigastric pain. No nausea, vomiting  NEUROLOGICAL: No headaches, memory loss, loss of strength.  MISCELLANEOUS:      Vital Signs Last 24 Hrs  T(C): 36.3 (14 Jun 2021 10:37), Max: 36.4 (13 Jun 2021 17:06)  T(F): 97.4 (14 Jun 2021 10:37), Max: 97.6 (13 Jun 2021 22:51)  HR: 62 (14 Jun 2021 10:37) (62 - 68)  BP: 103/73 (14 Jun 2021 10:37) (103/73 - 116/71)  BP(mean): --  RR: 18 (14 Jun 2021 10:37) (18 - 18)  SpO2: 95% (14 Jun 2021 10:37) (95% - 100%)    PHYSICAL EXAM:    GENERAL: Elderly lady, sitting on the bed.   HEAD:  Atraumatic, Normocephalic  EYES: EOMI, PERRLA, conjunctiva and sclera clear  NECK: Supple, No JVD, Normal thyroid  NERVOUS SYSTEM:  Alert & Oriented X3, No gross focal deficits  CHEST/LUNG:  right sided chest tube in place   HEART: Regular rate and rhythm; No murmurs, rubs, or gallops  ABDOMEN: Soft, Nontender, Nondistended; Bowel sounds present  EXTREMITIES:  No clubbing, cyanosis, or edema    LABS:    06-14    132<L>  |  98  |  15.8  ----------------------------<  106<H>  4.3   |  22.0  |  0.96    Ca    8.2<L>      14 Jun 2021 08:09  Mg     2.0     06-14    TPro  5.8<L>  /  Alb  2.4<L>  /  TBili  0.3<L>  /  DBili  x   /  AST  29  /  ALT  9   /  AlkPhos  80  06-13        MEDICATIONS  (STANDING):  aMIOdarone    Tablet 200 milliGRAM(s) Oral daily  ascorbic acid 500 milliGRAM(s) Oral daily  atorvastatin 20 milliGRAM(s) Oral at bedtime  dextrose 40% Gel 15 Gram(s) Oral once  dextrose 5%. 1000 milliLiter(s) (50 mL/Hr) IV Continuous <Continuous>  dextrose 5%. 1000 milliLiter(s) (100 mL/Hr) IV Continuous <Continuous>  dextrose 50% Injectable 25 Gram(s) IV Push once  dextrose 50% Injectable 12.5 Gram(s) IV Push once  dextrose 50% Injectable 25 Gram(s) IV Push once  enoxaparin Injectable 80 milliGRAM(s) SubCutaneous every 12 hours  famotidine    Tablet 20 milliGRAM(s) Oral daily  FLUoxetine 10 milliGRAM(s) Oral at bedtime  folic acid 1 milliGRAM(s) Oral daily  glucagon  Injectable 1 milliGRAM(s) IntraMuscular once  insulin lispro (ADMELOG) corrective regimen sliding scale   SubCutaneous three times a day before meals  insulin lispro (ADMELOG) corrective regimen sliding scale   SubCutaneous at bedtime  melatonin 3 milliGRAM(s) Oral at bedtime  metoprolol tartrate 25 milliGRAM(s) Oral every 8 hours  multivitamin/minerals 1 Tablet(s) Oral daily  sodium chloride 0.9%. 1000 milliLiter(s) (40 mL/Hr) IV Continuous <Continuous>    MEDICATIONS  (PRN):  acetaminophen   Tablet .. 650 milliGRAM(s) Oral every 6 hours PRN Temp greater or equal to 38C (100.4F), Mild Pain (1 - 3)  ondansetron Injectable 4 milliGRAM(s) IV Push every 6 hours PRN Nausea and/or Vomiting      RADIOLOGY & ADDITIONAL TESTS:

## 2021-06-14 NOTE — PROGRESS NOTE ADULT - ASSESSMENT
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     1. Right Pleural effusion s/p Right thoracocentesis.    likely malignant  - Chest tubes management as per CTS  - eventual pleur ex  tomorrow?  npo pmn     2. Diarrhea; Colitis on CT. Hypomagnesemia due to GI losses  No further diarrhea noted   completed Metronidazole and  Ciprofloxacin 7 days     3. Chronic AFlutter - rate better controlled on Amiodarone   Coumadin on hold   On Enoxaparin for anticoagulation , tonights dose and hold am dose    4. HTN - BP controlled     5. T2DM: HBA1C 5.8  - BG controlled on SS    6. Dyslipidemia  - Continue Statin    7. luis - resolved     spoke to daughter   npo pmn

## 2021-06-14 NOTE — PROGRESS NOTE ADULT - ASSESSMENT
Anesthesiology pre-op note.    chart and labs reviewed.  History of CAD with stenting, Chronic aflutter on amiodarone, Residual weakness after MCA stroke.  Would request cardiology consult prior to proceeding with Right VATS. Goals of care discussion and consent to be done day of surgery.   Repeat CBC and active type and screen day of surgery.

## 2021-06-14 NOTE — PROGRESS NOTE ADULT - PROBLEM SELECTOR PLAN 1
Likely malignant  appears exudative  maintain pigtail to water-seal  cytology (-) 6/7  Pleurx scheduled for tomorrow as per Dr. Joy   NPO after MN,   Labs including type and screen ordered for the AM   1 unit PRBC on call to the OR   daily CXR  remainder of care per primary team    Plan of care D/W Dr. Joy

## 2021-06-14 NOTE — PROGRESS NOTE ADULT - SUBJECTIVE AND OBJECTIVE BOX
SUBJECTIVE:   Pt in bed alert and oriented eating lunch. Pt states, " I am ok, i didnt get much sleep last night".  pt c/o right knee pain, Pt denies chest pain, SOB, palpitations, Dizziness.       Overnight events:  none    PAST MEDICAL & SURGICAL HISTORY:  CAD (coronary artery disease)  s/p CABG- 2004-[facility]    HTN (hypertension)    Hyperlipidemia    Obesity    Diabetes    [facility]    Valvular disease    S/P CABG (coronary artery bypass graft)    S/P cholecystectomy    S/P coronary artery stent placement  x 4, 2006-[facility]    Aortic valve replaced    Artificial pacemaker          acetaminophen   Tablet .. 650 milliGRAM(s) Oral every 6 hours PRN  aMIOdarone    Tablet 200 milliGRAM(s) Oral daily  ascorbic acid 500 milliGRAM(s) Oral daily  atorvastatin 20 milliGRAM(s) Oral at bedtime  dextrose 40% Gel 15 Gram(s) Oral once  dextrose 5%. 1000 milliLiter(s) IV Continuous <Continuous>  dextrose 5%. 1000 milliLiter(s) IV Continuous <Continuous>  dextrose 50% Injectable 25 Gram(s) IV Push once  dextrose 50% Injectable 12.5 Gram(s) IV Push once  dextrose 50% Injectable 25 Gram(s) IV Push once  enoxaparin Injectable 80 milliGRAM(s) SubCutaneous once  famotidine    Tablet 20 milliGRAM(s) Oral daily  FLUoxetine 10 milliGRAM(s) Oral at bedtime  folic acid 1 milliGRAM(s) Oral daily  glucagon  Injectable 1 milliGRAM(s) IntraMuscular once  insulin lispro (ADMELOG) corrective regimen sliding scale   SubCutaneous three times a day before meals  insulin lispro (ADMELOG) corrective regimen sliding scale   SubCutaneous at bedtime  melatonin 3 milliGRAM(s) Oral at bedtime  metoprolol tartrate 25 milliGRAM(s) Oral every 8 hours  multivitamin/minerals 1 Tablet(s) Oral daily  ondansetron Injectable 4 milliGRAM(s) IV Push every 6 hours PRN  sodium chloride 0.9%. 1000 milliLiter(s) IV Continuous <Continuous>  MEDICATIONS  (PRN):  acetaminophen   Tablet .. 650 milliGRAM(s) Oral every 6 hours PRN Temp greater or equal to 38C (100.4F), Mild Pain (1 - 3)  ondansetron Injectable 4 milliGRAM(s) IV Push every 6 hours PRN Nausea and/or Vomiting      Daily     Daily         06-14    132<L>  |  98  |  15.8  ----------------------------<  106<H>  4.3   |  22.0  |  0.96    Ca    8.2<L>      14 Jun 2021 08:09  Mg     2.0     06-14    TPro  5.8<L>  /  Alb  2.4<L>  /  TBili  0.3<L>  /  DBili  x   /  AST  29  /  ALT  9   /  AlkPhos  80  06-13            Objective:  T(C): 36.3 (06-14-21 @ 10:37), Max: 36.4 (06-13-21 @ 17:06)  HR: 62 (06-14-21 @ 10:37) (62 - 68)  BP: 103/73 (06-14-21 @ 10:37) (103/73 - 116/71)  RR: 18 (06-14-21 @ 10:37) (18 - 18)  SpO2: 95% (06-14-21 @ 10:37) (95% - 100%)  Wt(kg): --CAPILLARY BLOOD GLUCOSE      POCT Blood Glucose.: 140 mg/dL (14 Jun 2021 12:26)  POCT Blood Glucose.: 160 mg/dL (14 Jun 2021 08:35)  POCT Blood Glucose.: 123 mg/dL (13 Jun 2021 22:56)  POCT Blood Glucose.: 149 mg/dL (13 Jun 2021 17:03)  I&O's Summary    13 Jun 2021 07:01  -  14 Jun 2021 07:00  --------------------------------------------------------  IN: 880 mL / OUT: 405 mL / NET: 475 mL      PHYSICAL EXAM   Neuro: A+O x 3, non-focal, speech clear and intact  Pulm: left diminished, rhochi on right side   CV: +S1S2  Abd: soft, NT, ND, +BS  Ext: +DP Pulses b/l, +PT pulses, no edema  Chest tubes: right pigtail to waterseal, Dressing C/D/I    Imaging:  CXR:  < from: Xray Chest 1 View- PORTABLE-Routine (Xray Chest 1 View- PORTABLE-Routine in AM.) (06.13.21 @ 02:07) >  IMPRESSION:  Progression of right infiltrate. No pneumothorax.    Thank you for the courtesy of this referral.    < end of copied text >      ECG:  < from: 12 Lead ECG (06.07.21 @ 08:57) >    Ventricular Rate 114 BPM    Atrial Rate 133 BPM    QRS Duration 82 ms    Q-T Interval 328 ms    QTC Calculation(Bazett) 452 ms    R Axis -3 degrees    T Axis 143 degrees    Diagnosis Line Atrial fibrillation with rapid ventricular response  ST & T wave abnormality, consider lateral ischemia  Abnormal ECG    < end of copied text >

## 2021-06-15 NOTE — CONSULT NOTE ADULT - SUBJECTIVE AND OBJECTIVE BOX
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      Events last 24 hours:   Patient went for right pleurex catheter today, in PACU patient hypotensive requiring prince gtt. Patient thus far has drained total ~575 cc pleural fluid thus far.  Patient receiving LR IVF.  Admit to MICU.       PAST MEDICAL & SURGICAL HISTORY:  CAD (coronary artery disease)  s/p CABG- 2004-[facility]  HTN (hypertension)  Hyperlipidemia  Obesity  Diabetes  Atrial flutter  1/2010- [facility], Dr Tran  Valvular disease  S/P CABG (coronary artery bypass graft)  S/P cholecystectomy  S/P coronary artery stent placement  x 4, 2006-[facility]  Aortic valve replaced  Artificial pacemaker      Allergies  No Known Allergies      FAMILY HISTORY:  Family history of hypertension (Father)  Family history of cerebrovascular accident (CVA) (Father)      Social History:   nonsmoker      Review of Systems:  No active complaints      Physical Examination:    General: Elderly female, lying in bed, resting comfortably    HEENT: NC/AT    PULM: Symmetrical thorax expansion upon respiration.  diminished on right, left coarse to auscultation, no significant sputum production    CVS: Paced rhythm, no murmurs, rubs, or gallops    ABD: Soft, nondistended, nontender, normoactive bowel sounds, no masses appreciated     EXT: +1 edema, nontender    SKIN: Warm and well perfused, no rashes noted.    NEURO: Alert, oriented, interactive, nonfocal      Medications:  aMIOdarone    Tablet 200 milliGRAM(s) Oral daily  metoprolol tartrate 25 milliGRAM(s) Oral every 8 hours  phenylephrine    Infusion 0.2 MICROgram(s)/kG/Min IV Continuous <Continuous>  acetaminophen   Tablet .. 650 milliGRAM(s) Oral every 6 hours PRN  fentaNYL    Injectable 25 MICROGram(s) IV Push every 5 minutes PRN  FLUoxetine 10 milliGRAM(s) Oral at bedtime  melatonin 3 milliGRAM(s) Oral at bedtime  ondansetron Injectable 4 milliGRAM(s) IV Push once PRN  famotidine    Tablet 20 milliGRAM(s) Oral daily  atorvastatin 20 milliGRAM(s) Oral at bedtime  dextrose 40% Gel 15 Gram(s) Oral once  dextrose 50% Injectable 25 Gram(s) IV Push once  dextrose 50% Injectable 12.5 Gram(s) IV Push once  dextrose 50% Injectable 25 Gram(s) IV Push once  glucagon  Injectable 1 milliGRAM(s) IntraMuscular once  hydrocortisone sodium succinate Injectable 50 milliGRAM(s) IV Push every 6 hours  insulin lispro (ADMELOG) corrective regimen sliding scale   SubCutaneous three times a day before meals  insulin lispro (ADMELOG) corrective regimen sliding scale   SubCutaneous at bedtime  ascorbic acid 500 milliGRAM(s) Oral daily  dextrose 5%. 1000 milliLiter(s) IV Continuous <Continuous>  dextrose 5%. 1000 milliLiter(s) IV Continuous <Continuous>  folic acid 1 milliGRAM(s) Oral daily  lactated ringers. 1000 milliLiter(s) IV Continuous <Continuous>  multivitamin/minerals 1 Tablet(s) Oral daily  sodium chloride 0.9%. 1000 milliLiter(s) IV Continuous <Continuous>  chlorhexidine 4% Liquid 1 Application(s) Topical <User Schedule>      ICU Vital Signs Last 24 Hrs  T(C): 36.4 (15 Gilmer 2021 19:30), Max: 36.9 (15 Gilmer 2021 04:51)  T(F): 97.6 (15 Gilmer 2021 19:30), Max: 98.4 (15 Gilmer 2021 04:51)  HR: 110 (15 Gilmer 2021 21:30) (60 - 110)  BP: 129/54 (15 Gilmer 2021 21:30) (73/33 - 136/48)  BP(mean): 75 (15 Gilmer 2021 21:30) (51 - 75)  ABP: --  ABP(mean): --  RR: 20 (15 Gilmer 2021 21:30) (11 - 20)  SpO2: 100% (15 Gilmer 2021 21:30) (96% - 100%)    Vital Signs Last 24 Hrs  T(C): 36.4 (15 Gilmer 2021 19:30), Max: 36.9 (15 Gilmer 2021 04:51)  T(F): 97.6 (15 Gilmer 2021 19:30), Max: 98.4 (15 Gilmer 2021 04:51)  HR: 110 (15 Gilmer 2021 21:30) (60 - 110)  BP: 129/54 (15 Gilmer 2021 21:30) (73/33 - 136/48)  BP(mean): 75 (15 Gilmer 2021 21:30) (51 - 75)  RR: 20 (15 Gilmer 2021 21:30) (11 - 20)  SpO2: 100% (15 Gilmer 2021 21:30) (96% - 100%)      I&O's Detail    14 Jun 2021 07:01  -  15 Gilmer 2021 07:00  --------------------------------------------------------  IN:    Oral Fluid: 240 mL  Total IN: 240 mL    OUT:    Chest Tube (mL): 440 mL  Total OUT: 440 mL  Total NET: -200 mL      15 Gilmer 2021 07:01  -  15 Gilmer 2021 23:14  --------------------------------------------------------  IN:    Lactated Ringers: 375 mL    Phenylephrine: 49.8 mL  Total IN: 424.8 mL    OUT:    Chest Tube (mL): 155 mL  Total OUT: 155 mL  Total NET: 269.8 mL    LABS:                        10.9   17.58 )-----------( 471      ( 15 Gilmer 2021 21:29 )             35.0     06-15    132<L>  |  97<L>  |  15.0  ----------------------------<  92  4.3   |  23.0  |  0.93    Ca    8.5<L>      15 Gilmer 2021 06:47  Mg     2.0     06-15    TPro  6.1<L>  /  Alb  2.4<L>  /  TBili  0.5  /  DBili  x   /  AST  28  /  ALT  9   /  AlkPhos  84  06-15    CAPILLARY BLOOD GLUCOSE  POCT Blood Glucose.: 176 mg/dL (15 Gilmer 2021 22:02)      PT/INR - ( 15 Gilmer 2021 06:43 )   PT: 21.9 sec;   INR: 1.95 ratio    PTT - ( 15 Gilmer 2021 06:43 )  PTT:43.8 sec      RADIOLOGY:   < from: Xray Chest 1 View- PORTABLE-Urgent (Xray Chest 1 View- PORTABLE-Urgent .) (06.15.21 @ 17:01) >   EXAM:  XR CHEST PORTABLE URGENT 1V                          PROCEDURE DATE:  06/15/2021      INTERPRETATION:  AP chest on Alison 15, 2021 at 4:50 PM. Patient right Pleurx catheter placement.    Heart magnified by technique. Sternotomy, left-sided pacemaker, and TAVR aortic valve again noted.    Large mass density in the right upper lung field medially again noted.    Present film shows removal of catheter right chest tube and placement of Pleurx catheter.    Mild loculated effusion at right base is relatively unchanged. No pneumothorax.    IMPRESSION: Right Pleurx catheter replacing chest tube. Otherwise no change.    YARIEL MELCHOR MD; Attending Radiologist  This document has been electronically signed. Gilmer 15 2021  5:06PM    < end of copied text >    < from: CT Chest w/ IV Cont (06.07.21 @ 18:19) >   EXAM:  CT CHEST IC                          PROCEDURE DATE:  06/07/2021      INTERPRETATION:  CLINICAL INFORMATION: Fall. Decreased appetite. Tachycardia    COMPARISON: Chest x-ray of earlier today. CT abdomen pelvis of the previous day is also appreciated    CONTRAST/COMPLICATIONS:  IV Contrast: Omnipaque 300  70 cc administered   30 cc discarded  Oral Contrast: NONE  Complications: None reported at time of study completion    PROCEDURE:  CT Angiography of the Chest.  Sagittal and coronalreformats were performed as well as 3D (MIP) reconstructions.    FINDINGS:    LUNGS AND AIRWAYS: Patent central airways.  Lungs are remarkable for passive atelectasis in the right lower lobe. There is improved aeration in the right lung compared to the prior study. There is some patchy groundglass opacification in the right middle lobe which may be secondary to reexpansion.  PLEURA: Pigtail thoracotomy tube is seen at the right lower which has been placed since the CT abdomen pelvis of the previous day. There is decrease in the right pleural effusion which is now small to moderate and size and appears loculated..  MEDIASTINUM AND JUDY: Right upper lobe medially positioned posterior segment all heterogeneous mass is again seen as appreciated on the CT of the abdomen of the previous day. This measures 5.9 cm AP by 4.9 cm transverse  VESSELS: TAVR. Coronary artery calcification. Atherosclerotic aortic calcification  HEART: Heart size is normal. No pericardial effusion. Dual-lead pacemaker enters from the left.  CHEST WALL AND LOWER NECK: Within normal limits.  VISUALIZED UPPER ABDOMEN: Within normal limits.  BONES: Degenerative changes.  Sternal wires are seen.    IMPRESSION:  Decreased size in loculated right pleural effusion status post placement of chest tube. Small amount of air in the pleural space.  Redemonstration of large right upper lobe medially placed heterogeneous mass suspicious for malignancy in the posterior segment of the right upper lobe.  TAVR  Pacemaker  No evidence of pulmonary embolis    TRINO MCMILLAN MD; Attending Radiologist  This document has been electronically signed. Jun 7 2021  6:38PM    < end of copied text >      SUPPLEMENTAL O2: RA  LINES: Peripheral  IVF: LR  GRAHAM: N  PPx: Pepcid  CONTACT: N

## 2021-06-15 NOTE — DISCHARGE NOTE PROVIDER - NSDCFUADDINST_GEN_ALL_CORE_FT
fall precaution  aspiration precaution fall precaution  aspiration precaution  cbc  am---hold coumadin for anemia  TSH,ft4 am

## 2021-06-15 NOTE — PROGRESS NOTE ADULT - ASSESSMENT
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     1. Right Pleural effusion s/p Right thoracocentesis.    likely malignant  - Chest tubes management as per CTS  - for pleur ex today     2. Diarrhea; Colitis on CT. Hypomagnesemia due to GI losses  No further diarrhea noted   completed Metronidazole and  Ciprofloxacin 7 days     3. Chronic AFlutter -  Amiodarone   Coumadin on hold     4. HTN - BP controlled     5. T2DM: HBA1C 5.8  - BG controlled on SS    6. Dyslipidemia  - Continue Statin    7. luis - resolved     dc to [facility] tomorrow karlie

## 2021-06-15 NOTE — PROGRESS NOTE ADULT - SUBJECTIVE AND OBJECTIVE BOX
SUSIE VALENTINO    [id number]    89y      Female    INTERVAL HPI/OVERNIGHT EVENTS: patient being seen for effusions. patient is for pleur ex today       REVIEW OF SYSTEMS:    CONSTITUTIONAL: No fever, weight loss, or fatigue  RESPIRATORY: No cough, wheezing, hemoptysis; No shortness of breath  CARDIOVASCULAR: No chest pain, palpitations  GASTROINTESTINAL: No abdominal or epigastric pain. No nausea, vomiting  NEUROLOGICAL: No headaches, memory loss, loss of strength.  MISCELLANEOUS:      Vital Signs Last 24 Hrs  T(C): 36.3 (15 Gilmer 2021 16:04), Max: 36.9 (15 Gilmer 2021 04:51)  T(F): 97.4 (15 Gilmer 2021 16:04), Max: 98.4 (15 Gilmer 2021 04:51)  HR: 64 (15 Gilmer 2021 17:20) (61 - 79)  BP: 73/33 (15 Gilmer 2021 17:20) (73/33 - 126/66)  BP(mean): --  RR: 15 (15 Gilmer 2021 17:20) (14 - 19)  SpO2: 100% (15 Gilmer 2021 17:20) (92% - 100%)    PHYSICAL EXAM:  GENERAL: Elderly lady, sitting on the bed.   HEAD:  Atraumatic, Normocephalic  EYES: EOMI, PERRLA, conjunctiva and sclera clear  NECK: Supple, No JVD, Normal thyroid  NERVOUS SYSTEM:  Alert & Oriented X3, No gross focal deficits  CHEST/LUNG:  right sided chest tube in place   HEART: Regular rate and rhythm; No murmurs, rubs, or gallops  ABDOMEN: Soft, Nontender, Nondistended; Bowel sounds present  EXTREMITIES:  No clubbing, cyanosis, or edema      LABS:                        10.7   10.40 )-----------( 373      ( 15 Gilmer 2021 06:47 )             34.1     06-15    132<L>  |  97<L>  |  15.0  ----------------------------<  92  4.3   |  23.0  |  0.93    Ca    8.5<L>      15 Gilmer 2021 06:47  Mg     2.0     06-15    TPro  6.1<L>  /  Alb  2.4<L>  /  TBili  0.5  /  DBili  x   /  AST  28  /  ALT  9   /  AlkPhos  84  06-15    PT/INR - ( 15 Gilmer 2021 06:43 )   PT: 21.9 sec;   INR: 1.95 ratio         PTT - ( 15 Gilmer 2021 06:43 )  PTT:43.8 sec        MEDICATIONS  (STANDING):  aMIOdarone    Tablet 200 milliGRAM(s) Oral daily  ascorbic acid 500 milliGRAM(s) Oral daily  atorvastatin 20 milliGRAM(s) Oral at bedtime  dextrose 40% Gel 15 Gram(s) Oral once  dextrose 5%. 1000 milliLiter(s) (50 mL/Hr) IV Continuous <Continuous>  dextrose 5%. 1000 milliLiter(s) (100 mL/Hr) IV Continuous <Continuous>  dextrose 50% Injectable 25 Gram(s) IV Push once  dextrose 50% Injectable 12.5 Gram(s) IV Push once  dextrose 50% Injectable 25 Gram(s) IV Push once  famotidine    Tablet 20 milliGRAM(s) Oral daily  FLUoxetine 10 milliGRAM(s) Oral at bedtime  folic acid 1 milliGRAM(s) Oral daily  glucagon  Injectable 1 milliGRAM(s) IntraMuscular once  insulin lispro (ADMELOG) corrective regimen sliding scale   SubCutaneous three times a day before meals  insulin lispro (ADMELOG) corrective regimen sliding scale   SubCutaneous at bedtime  lactated ringers. 1000 milliLiter(s) (75 mL/Hr) IV Continuous <Continuous>  melatonin 3 milliGRAM(s) Oral at bedtime  metoprolol tartrate 25 milliGRAM(s) Oral every 8 hours  multivitamin/minerals 1 Tablet(s) Oral daily  phenylephrine    Infusion 0.2 MICROgram(s)/kG/Min (6.23 mL/Hr) IV Continuous <Continuous>  sodium chloride 0.9%. 1000 milliLiter(s) (40 mL/Hr) IV Continuous <Continuous>    MEDICATIONS  (PRN):  acetaminophen   Tablet .. 650 milliGRAM(s) Oral every 6 hours PRN Temp greater or equal to 38C (100.4F), Mild Pain (1 - 3)  fentaNYL    Injectable 25 MICROGram(s) IV Push every 5 minutes PRN Moderate Pain (4 - 6)  ondansetron Injectable 4 milliGRAM(s) IV Push once PRN Nausea and/or Vomiting      RADIOLOGY & ADDITIONAL TESTS:

## 2021-06-15 NOTE — CONSULT NOTE ADULT - ATTENDING COMMENTS
89y/o female  with  aflutter  cabag   PPM   h/o  left  MCA  in past    found to have a right   lung mass  with pleural effusion had  pleurX placed -- highly  likely to have malignancy  patient DNR/DNI,  after pleurX  found to be hypotensive     general  frail f  no distress   alert  heent sclera anicteric no lesion     neck supple  lung  b/l air entry  RLL crackles  coarse exp w    catheter in place draining fluid    heart s1 s2 rrr  abd   + bowel sounds sof antolin  extremities    no edema no calf tenderness no cord      assesment and plan   1-   hypotension  on   pressors   s/p PleurX  2- likely malignancy possible trapped lung /   effusion   3- cabg  aflutter on  amiodarone   4- dm    -  agree with above  procalcitonin   ? infection   -troponin   - steroids  - wean off pressors  -admit to ICU   -case discussed at bedside with PA

## 2021-06-15 NOTE — DISCHARGE NOTE PROVIDER - HOSPITAL COURSE
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 Patient is s.p right thora with chest tube palcement, and is for pleur ex placement on 6/15. Patient karlie has lung cancer but family did not want any further investigation  -     1. Right Pleural effusion s/p Right thoracocentesis.    likely malignant  - family wants no more investigation for lung cancer  -  2. Diarrhea; Colitis on CT. Hypomagnesemia due to GI losses  No further diarrhea noted   completed Metronidazole and  Ciprofloxacin 7 days     3. Chronic AFlutter - rate better controlled on Amiodarone   Coumadin   metoproll    4. HTN - BP controlled     5. T2DM: HBA1C 5.8  - BG controlled on SS    6. Dyslipidemia  - Continue Statin    7. luis - resolved   time spent on dc 34 miutes    pe  GENERAL: Elderly lady, sitting on the bed.   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 Patient is s.p right thora with chest tube palcement, and is for pleur ex placement on 6/15. Patient karlie has lung cancer but family did not want any further investigation  -     1. Right Pleural effusion s/p Right thoracocentesis.    likely malignant  - family wants no more investigation for lung cancer  s/p pleur ex on 6/15    2. Diarrhea; Colitis on CT. Hypomagnesemia due to GI losses  No further diarrhea noted   completed Metronidazole and  Ciprofloxacin 7 days     3. Chronic AFlutter - rate better controlled on Amiodarone   Coumadin   metoproll    4. HTN - BP controlled     5. T2DM: HBA1C 5.8  - BG controlled on SS    6. Dyslipidemia  - Continue Statin    7. luis - resolved   time spent on dc 34 miutes    pe  GENERAL: Elderly lady, sitting on the bed.   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 Patient is s.p right thora with chest tube palcement, and is for pleur ex placement on 6/15. Patient karlie has lung cancer but family did not want any further investigatio        Anemia -iron dif   -hb 8.7  off coumadin ,   -long dw daughter marino. does not want any aggressive treatment. she wants her mom to be on hospice care. ok with transfusion at time moment if needs temporarily  - continue to hold coumadin. daughter understood risk of thromboembolism and stroke.   - ppi    R pleural effusion s/p R thoracentesis   -likely malignant   -s/p Is/p Pleurx 6/15 w/ Dr. Joy . CAP pleurx   -TTE with trivial pericardial effusion.  -s/p shock, was on midodrine 20mg q8,   -oxygen to keep so2 >90%  -cxr -no acute change. mild leukocytosis. monitor off abx for now      Diarrhea, colitis on CT   Hypomagnesemia   -s/p cipro, flagyl     Chronic aflutter   -on amiodarone   -coumadin on hold for anemia.     HTN, HLD   -cont statin   -on midodrine    DM2  -hgba1c 5.8  -ISS   -resome metformin on dc     ALEA resolved           dysphagia   Diet  mech Soft necter thick  Severe protein-calorie malnutrition.  Ensure Muscle Health Cans or Servings Per Day.  Frequency:  Three Times a day    care of plan dw pt  dw jonoshelley above care of plan. 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 Patient is s.p right thora with chest tube palcement, and is for pleur ex placement on 6/15. Patient karlie has lung cancer but family did not want any further investigatio        Anemia -iron dif   -hb 8.7  off coumadin ,   -long dw daughter marino. does not want any aggressive treatment. she wants her mom to be on hospice care. ok with transfusion at time moment if needs temporarily  - continue to hold coumadin. daughter understood risk of thromboembolism and stroke. daughter will talk to hospice if she wants to continue coumadin.   - ppi    R pleural effusion s/p R thoracentesis   -likely malignant   -s/p Is/p Pleurx 6/15 w/ Dr. Joy . CAP pleurx   -TTE with trivial pericardial effusion.  -s/p shock, was on midodrine 20mg q8, taper to 10 mg tid on dc  -oxygen to keep so2 >90%  -cxr -no acute change. mild leukocytosis. monitor off abx for now      Diarrhea, colitis on CT   Hypomagnesemia   -s/p cipro, flagyl     Chronic aflutter   -on amiodarone   -coumadin on hold for anemia.     HTN, HLD   -cont statin   -on midodrine    DM2  -hgba1c 5.8  -ISS   -resome metformin on dc     ALEA resolved   hold lasix        dysphagia   Diet  mech Soft necter thick  Severe protein-calorie malnutrition.  Ensure Muscle Health Cans or Servings Per Day.  Frequency:  Three Times a day    care of plan dw pt  dw marino above care of plan. dc plan to momentus with  hospice . per daughter pt was not on synthroid which was on hold per her but [facility] med list has synthorid. daughter will talk to [facility] about synthoid recommends to check tsh,FT4 at facility before restart synthroid.Daughter did not  want us to start synthroid.  Further care per hospice. Over all prognosis guarded.     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 Patient is s.p right thora with chest tube palcement, and is for pleur ex placement on 6/15. Patient karlie has lung cancer but family did not want any further investigatio        Anemia -iron dif   -hb 8.7  off coumadin ,   -long dw daughter marino. does not want any aggressive treatment. she wants her mom to be on hospice care. ok with transfusion at time moment if needs temporarily  - continue to hold coumadin. daughter understood risk of thromboembolism and stroke. daughter will talk to hospice if she wants to continue coumadin.   - ppi    R pleural effusion s/p R thoracentesis   -likely malignant , unable to specify as family does not want further work up  -s/p Is/p Pleurx 6/15 w/ Dr. Joy . CAP pleurx   -TTE with trivial pericardial effusion.  -s/p hypovolumic shock, was on pressor then changed to midodrine 20mg q8, taper to 10 mg tid on dc  -oxygen to keep so2 >90%  -cxr -no acute change. mild leukocytosis. monitor off abx for now      Diarrhea, colitis on CT   Hypomagnesemia   -s/p cipro, flagyl     Chronic aflutter   -on amiodarone   -coumadin on hold for anemia.     HTN, HLD   -cont statin   -on midodrine    DM2  -hgba1c 5.8  -ISS   -resome metformin on dc     ALEA resolved   hold lasix        dysphagia   Diet  mech Soft necter thick  Severe protein-calorie malnutrition.  Ensure Muscle Health Cans or Servings Per Day.  Frequency:  Three Times a day    care of plan dw pt  dw marino above care of plan. dc plan to momentus with  hospice . per daughter pt was not on synthroid which was on hold per her but [facility] med list has synthorid. daughter will talk to [facility] about synthoid recommends to check tsh,FT4 at facility before restart synthroid.Daughter did not  want us to start synthroid.  Further care per hospice. Over all prognosis guarded.

## 2021-06-15 NOTE — BRIEF OPERATIVE NOTE - OPERATION/FINDINGS
Right chest tube removed. 1% lidocaine injected at new catheter site and into pleural space. Tunneled Pleurex catheter placed into right chest under thorascopic guidance and confirmed in place. Dressing applied over new catheter.

## 2021-06-15 NOTE — DISCHARGE NOTE PROVIDER - DETAILS OF MALNUTRITION DIAGNOSIS/DIAGNOSES
This patient has been assessed with a concern for Malnutrition and was treated during this hospitalization for the following Nutrition diagnosis/diagnoses:     -  06/09/2021: Severe protein-calorie malnutrition

## 2021-06-15 NOTE — CONSULT NOTE ADULT - ASSESSMENT
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        NEURO: No active issues  CV: Shock state requiring vasopressor therapy, actively titrating prince for MAP >65, wean as tolerated.  Receiving crystalloid IVF.  Solucorteff. Repeat labs pending. Lopressor discontinued. HLD on lipitor. Amio for rate control.    RESP: Right sided pleural effusion s/p pleurex placement, total 575cc drained thus far. Right upper lobe mass suggestive of malignancy   RENAL: Monitor lytes, replace as needed  GI: Soft diet as tolerated; pecpid  ENDO: DM on ISS for glycemic control    ID: WBC up to ~17, likely reactive.  Repeat labs and procalcitonin ordered.    HEME: Holding ac/antiplt/vte ppx in periop setting  DISPO: DNR/ISami CARBAJAL in chart    Critical Care time: 50 mins assessing presenting problems of acute illness that poses high probability of life threatening deterioration or end organ damage/dysfunction.  Medical decision making including Initiating plan of care, reviewing data, reviewing radiology, discussing with multidisciplinary team, non inclusive of procedures, discussing goals of care with patient/family

## 2021-06-15 NOTE — DISCHARGE NOTE PROVIDER - NSDCCPCAREPLAN_GEN_ALL_CORE_FT
PRINCIPAL DISCHARGE DIAGNOSIS  Diagnosis: Pleural effusion  Assessment and Plan of Treatment:       SECONDARY DISCHARGE DIAGNOSES  Diagnosis: Colitis  Assessment and Plan of Treatment:     Diagnosis: Mass in chest  Assessment and Plan of Treatment:     Diagnosis: Laceration of knee  Assessment and Plan of Treatment:      PRINCIPAL DISCHARGE DIAGNOSIS  Diagnosis: Pleural effusion  Assessment and Plan of Treatment:       SECONDARY DISCHARGE DIAGNOSES  Diagnosis: Colitis  Assessment and Plan of Treatment:     Diagnosis: Mass in chest  Assessment and Plan of Treatment:     Diagnosis: Laceration of knee  Assessment and Plan of Treatment:     Diagnosis: Anemia  Assessment and Plan of Treatment:     Diagnosis: Dysphagia  Assessment and Plan of Treatment:     Diagnosis: Atrial fibrillation  Assessment and Plan of Treatment:     Diagnosis: Lung mass  Assessment and Plan of Treatment:     Diagnosis: Severe protein-calorie malnutrition  Assessment and Plan of Treatment:

## 2021-06-15 NOTE — DISCHARGE NOTE PROVIDER - NSDCMRMEDTOKEN_GEN_ALL_CORE_FT
amiodarone 200 mg oral tablet: 1 tab(s) orally once a day  ascorbic acid 500 mg oral tablet: 1 tab(s) orally 2 times a day  atorvastatin 20 mg oral tablet: 1 tab(s) orally once a day (at bedtime)  bisacodyl 10 mg rectal suppository: 1 suppository(ies) rectal once a day, As needed, Constipation  docusate sodium 100 mg oral capsule: 1 cap(s) orally 2 times a day  famotidine 20 mg oral tablet: 1 tab(s) orally once a day  FLUoxetine 10 mg oral capsule: 1 cap(s) orally once a day (at bedtime)  folic acid 1 mg oral tablet: 1 tab(s) orally once a day  furosemide 20 mg oral tablet: 1 tab(s) orally once a day  lidocaine 5% topical film: Apply topically to affected area once a day  melatonin 3 mg oral tablet: 1 tab(s) orally once a day (at bedtime)  metFORMIN 850 mg oral tablet: 1 tab(s) orally every 12 hours  metoprolol tartrate 25 mg oral tablet: 1 tab(s) orally every 8 hours  Multiple Vitamins oral tablet: 1 tab(s) orally once a day  ocular lubricant ophthalmic solution: 2 drop(s) to each affected eye every 2 hours, As needed, Dry Eyes  warfarin 1 mg oral tablet: 1 tab(s) orally once a day (after a meal)  zinc oxide 20% topical ointment: 1 application topically once a day   amiodarone 200 mg oral tablet: 1 tab(s) orally once a day  ascorbic acid 500 mg oral tablet: 1 tab(s) orally 2 times a day  atorvastatin 20 mg oral tablet: 1 tab(s) orally once a day (at bedtime)  bisacodyl 10 mg rectal suppository: 1 suppository(ies) rectal once a day, As needed, Constipation  docusate sodium 100 mg oral capsule: 1 cap(s) orally 2 times a day  famotidine 20 mg oral tablet: 1 tab(s) orally once a day  FLUoxetine 10 mg oral capsule: 1 cap(s) orally once a day (at bedtime)  folic acid 1 mg oral tablet: 1 tab(s) orally once a day  furosemide 20 mg oral tablet: 1 tab(s) orally once a day  lidocaine 5% topical film: Apply topically to affected area once a day  melatonin 3 mg oral tablet: 1 tab(s) orally once a day (at bedtime)  metoprolol tartrate 25 mg oral tablet: 1 tab(s) orally every 8 hours  Multiple Vitamins oral tablet: 1 tab(s) orally once a day  ocular lubricant ophthalmic solution: 2 drop(s) to each affected eye every 2 hours, As needed, Dry Eyes  warfarin 1 mg oral tablet: 1 tab(s) orally once a day (after a meal)  zinc oxide 20% topical ointment: 1 application topically once a day   amiodarone 200 mg oral tablet: 1 tab(s) orally once a day  atorvastatin 20 mg oral tablet: 1 tab(s) orally once a day (at bedtime)  bisacodyl 10 mg rectal suppository: 1 suppository(ies) rectal once a day, As needed, Constipation  famotidine 20 mg oral tablet: 1 tab(s) orally once a day  FLUoxetine 10 mg oral capsule: 1 cap(s) orally once a day (at bedtime)  furosemide 20 mg oral tablet: 0.5 tab(s) orally once a day  levothyroxine 50 mcg (0.05 mg) oral tablet: 1 tab(s) orally once a day  Lidoderm 5% topical film: 1 patch topically once a day  melatonin 3 mg oral tablet: 1 tab(s) orally once a day (at bedtime)  metFORMIN 500 mg oral tablet: 1 tab(s) orally 2 times a day  Milk of Magnesia 8% oral suspension: 30 milliliter(s) orally once a day (at bedtime), As Needed  ocular lubricant ophthalmic solution: 1 drop(s) to each affected eye 2 times a day   Vitamin D3 1000 intl units (25 mcg) oral tablet: 3 tab(s) orally once a day  zinc oxide 10% topical ointment: 1 application topically 6 times a day   amiodarone 200 mg oral tablet: 1 tab(s) orally once a day  atorvastatin 20 mg oral tablet: 1 tab(s) orally once a day (at bedtime)  bisacodyl 10 mg rectal suppository: 1 suppository(ies) rectal once a day, As needed, Constipation  famotidine 20 mg oral tablet: 1 tab(s) orally once a day  FLUoxetine 10 mg oral capsule: 1 cap(s) orally once a day (at bedtime)  Lidoderm 5% topical film: 1 patch topically once a day  Lovenox 40 mg/0.4 mL injectable solution: 40 milligram(s) subcutaneously once a day   melatonin 3 mg oral tablet: 1 tab(s) orally once a day (at bedtime)  metFORMIN 500 mg oral tablet: 1 tab(s) orally 2 times a day  midodrine 10 mg oral tablet: 1 tab(s) orally every 8 hours   Milk of Magnesia 8% oral suspension: 30 milliliter(s) orally once a day (at bedtime), As Needed  ocular lubricant ophthalmic solution: 1 drop(s) to each affected eye 2 times a day   Vitamin D3 1000 intl units (25 mcg) oral tablet: 3 tab(s) orally once a day  zinc oxide 10% topical ointment: 1 application topically 6 times a day

## 2021-06-16 NOTE — PROGRESS NOTE ADULT - SUBJECTIVE AND OBJECTIVE BOX
Brief summary:  89yFemale POD# 1 s/p Tunneled Pleurex catheter placed into right chest under thorascopic guidance and confirmed in place by Dr. Joy   on 6/15    SUBJECTIVE:   pt in bed alert, NAD. pt states, My ass hurts"  pt c/o of her buttock hurt. Pt repositioned with relief   Pt denies any chest pain, SOB, palpitations, n/v/d    Overnight events:  Pt was in PACU and Hypotensive post pleurex , MICU consulted and admitted under MICU       PAST MEDICAL & SURGICAL HISTORY:  CAD (coronary artery disease)  s/p CABG- 2004-[facility]    HTN (hypertension)    Hyperlipidemia    Obesity    Diabetes    Atrial flutter  1/2010- [facility], Dr Tran    Valvular disease    S/P CABG (coronary artery bypass graft)    S/P cholecystectomy    S/P coronary artery stent placement  x 4, 2006-[facility]    Aortic valve replaced    Artificial pacemaker          acetaminophen   Tablet .. 650 milliGRAM(s) Oral every 6 hours PRN  albumin human 25% IVPB 100 milliLiter(s) IV Intermittent every 6 hours  aMIOdarone    Tablet 200 milliGRAM(s) Oral daily  ascorbic acid 500 milliGRAM(s) Oral daily  atorvastatin 20 milliGRAM(s) Oral at bedtime  chlorhexidine 4% Liquid 1 Application(s) Topical <User Schedule>  dextrose 40% Gel 15 Gram(s) Oral once  dextrose 5%. 1000 milliLiter(s) IV Continuous <Continuous>  dextrose 5%. 1000 milliLiter(s) IV Continuous <Continuous>  dextrose 50% Injectable 25 Gram(s) IV Push once  dextrose 50% Injectable 12.5 Gram(s) IV Push once  dextrose 50% Injectable 25 Gram(s) IV Push once  famotidine    Tablet 20 milliGRAM(s) Oral daily  FLUoxetine 10 milliGRAM(s) Oral at bedtime  folic acid 1 milliGRAM(s) Oral daily  glucagon  Injectable 1 milliGRAM(s) IntraMuscular once  hydrocortisone sodium succinate Injectable 50 milliGRAM(s) IV Push every 6 hours  insulin lispro (ADMELOG) corrective regimen sliding scale   SubCutaneous three times a day before meals  insulin lispro (ADMELOG) corrective regimen sliding scale   SubCutaneous at bedtime  melatonin 3 milliGRAM(s) Oral at bedtime  midodrine 20 milliGRAM(s) Oral every 8 hours  multivitamin/minerals 1 Tablet(s) Oral daily  phenylephrine    Infusion 0.2 MICROgram(s)/kG/Min IV Continuous <Continuous>  MEDICATIONS  (PRN):  acetaminophen   Tablet .. 650 milliGRAM(s) Oral every 6 hours PRN Temp greater or equal to 38C (100.4F), Mild Pain (1 - 3)      Daily     Daily                               10.2   13.13 )-----------( 425      ( 16 Jun 2021 09:59 )             33.4   06-16    136  |  102  |  15.0  ----------------------------<  154<H>  5.0   |  20.0<L>  |  0.76    Ca    7.9<L>      16 Jun 2021 09:59  Phos  3.6     06-15  Mg     2.0     06-16    TPro  6.3<L>  /  Alb  2.5<L>  /  TBili  0.3<L>  /  DBili  x   /  AST  31  /  ALT  9   /  AlkPhos  79  06-16    CARDIAC MARKERS ( 15 Gilmer 2021 23:49 )  x     / <0.01 ng/mL / x     / x     / x        PT/INR - ( 15 Gilmer 2021 06:43 )   PT: 21.9 sec;   INR: 1.95 ratio         PTT - ( 15 Gilmer 2021 06:43 )  PTT:43.8 sec      Objective:  T(C): 36.4 (06-16-21 @ 19:17), Max: 36.4 (06-15-21 @ 19:30)  HR: 64 (06-16-21 @ 18:00) (60 - 110)  BP: 120/54 (06-16-21 @ 18:00) (89/45 - 136/50)  RR: 17 (06-16-21 @ 18:00) (13 - 24)  SpO2: 100% (06-16-21 @ 18:00) (98% - 100%)  Wt(kg): --CAPILLARY BLOOD GLUCOSE      POCT Blood Glucose.: 182 mg/dL (16 Jun 2021 16:05)  POCT Blood Glucose.: 172 mg/dL (16 Jun 2021 11:04)  POCT Blood Glucose.: 195 mg/dL (16 Jun 2021 05:33)  POCT Blood Glucose.: 176 mg/dL (15 Gilmer 2021 22:02)  I&O's Summary    15 Gilmer 2021 07:01  -  16 Jun 2021 07:00  --------------------------------------------------------  IN: 1167.8 mL / OUT: 486 mL / NET: 681.8 mL    16 Jun 2021 07:01  -  16 Jun 2021 19:28  --------------------------------------------------------  IN: 927.6 mL / OUT: 25 mL / NET: 902.6 mL        Physical Exam  General: NAD, alert , non-focal, speech clear and intact  Pulm: right diminished Left CTA   CV:  +S1S2  Abd: soft, NT, ND, +BS  Ext: +DP Pulses b/l, +PT pulses,  Chest tubes: right Pleurx in place  to pleurvac   drips: Pt on Brian drip     Imaging:  CXR:  < from: Xray Chest 1 View- PORTABLE-Urgent (Xray Chest 1 View- PORTABLE-Urgent .) (06.16.21 @ 07:01) >    IMPRESSION:  Bilateral pleural effusions/adjacent atelectasis, improved.    < end of copied text >         Brief summary:  89yFemale POD# 1 s/p right VATS, Tunneled Pleurex catheter placed into right chest under thorascopic guidance and confirmed in place by Dr. Joy   on 6/15    SUBJECTIVE:   pt in bed alert, NAD. pt states, My ass hurts"  pt c/o of her buttock hurt. Pt repositioned with relief   Pt denies any chest pain, SOB, palpitations, n/v/d    Overnight events:  Pt was in PACU and Hypotensive post pleurex , MICU consulted and admitted under MICU       PAST MEDICAL & SURGICAL HISTORY:  CAD (coronary artery disease)  s/p CABG- 2004-[facility]    HTN (hypertension)    Hyperlipidemia    Obesity    Diabetes    Atrial flutter  1/2010- [facility], Dr Tran    Valvular disease    S/P CABG (coronary artery bypass graft)    S/P cholecystectomy    S/P coronary artery stent placement  x 4, 2006-[facility]    Aortic valve replaced    Artificial pacemaker          acetaminophen   Tablet .. 650 milliGRAM(s) Oral every 6 hours PRN  albumin human 25% IVPB 100 milliLiter(s) IV Intermittent every 6 hours  aMIOdarone    Tablet 200 milliGRAM(s) Oral daily  ascorbic acid 500 milliGRAM(s) Oral daily  atorvastatin 20 milliGRAM(s) Oral at bedtime  chlorhexidine 4% Liquid 1 Application(s) Topical <User Schedule>  dextrose 40% Gel 15 Gram(s) Oral once  dextrose 5%. 1000 milliLiter(s) IV Continuous <Continuous>  dextrose 5%. 1000 milliLiter(s) IV Continuous <Continuous>  dextrose 50% Injectable 25 Gram(s) IV Push once  dextrose 50% Injectable 12.5 Gram(s) IV Push once  dextrose 50% Injectable 25 Gram(s) IV Push once  famotidine    Tablet 20 milliGRAM(s) Oral daily  FLUoxetine 10 milliGRAM(s) Oral at bedtime  folic acid 1 milliGRAM(s) Oral daily  glucagon  Injectable 1 milliGRAM(s) IntraMuscular once  hydrocortisone sodium succinate Injectable 50 milliGRAM(s) IV Push every 6 hours  insulin lispro (ADMELOG) corrective regimen sliding scale   SubCutaneous three times a day before meals  insulin lispro (ADMELOG) corrective regimen sliding scale   SubCutaneous at bedtime  melatonin 3 milliGRAM(s) Oral at bedtime  midodrine 20 milliGRAM(s) Oral every 8 hours  multivitamin/minerals 1 Tablet(s) Oral daily  phenylephrine    Infusion 0.2 MICROgram(s)/kG/Min IV Continuous <Continuous>  MEDICATIONS  (PRN):  acetaminophen   Tablet .. 650 milliGRAM(s) Oral every 6 hours PRN Temp greater or equal to 38C (100.4F), Mild Pain (1 - 3)      Daily     Daily                               10.2   13.13 )-----------( 425      ( 16 Jun 2021 09:59 )             33.4   06-16    136  |  102  |  15.0  ----------------------------<  154<H>  5.0   |  20.0<L>  |  0.76    Ca    7.9<L>      16 Jun 2021 09:59  Phos  3.6     06-15  Mg     2.0     06-16    TPro  6.3<L>  /  Alb  2.5<L>  /  TBili  0.3<L>  /  DBili  x   /  AST  31  /  ALT  9   /  AlkPhos  79  06-16    CARDIAC MARKERS ( 15 Gilmer 2021 23:49 )  x     / <0.01 ng/mL / x     / x     / x        PT/INR - ( 15 Gilmer 2021 06:43 )   PT: 21.9 sec;   INR: 1.95 ratio         PTT - ( 15 Gimler 2021 06:43 )  PTT:43.8 sec      Objective:  T(C): 36.4 (06-16-21 @ 19:17), Max: 36.4 (06-15-21 @ 19:30)  HR: 64 (06-16-21 @ 18:00) (60 - 110)  BP: 120/54 (06-16-21 @ 18:00) (89/45 - 136/50)  RR: 17 (06-16-21 @ 18:00) (13 - 24)  SpO2: 100% (06-16-21 @ 18:00) (98% - 100%)  Wt(kg): --CAPILLARY BLOOD GLUCOSE      POCT Blood Glucose.: 182 mg/dL (16 Jun 2021 16:05)  POCT Blood Glucose.: 172 mg/dL (16 Jun 2021 11:04)  POCT Blood Glucose.: 195 mg/dL (16 Jun 2021 05:33)  POCT Blood Glucose.: 176 mg/dL (15 Gilmer 2021 22:02)  I&O's Summary    15 Gilmer 2021 07:01  -  16 Jun 2021 07:00  --------------------------------------------------------  IN: 1167.8 mL / OUT: 486 mL / NET: 681.8 mL    16 Jun 2021 07:01  -  16 Jun 2021 19:28  --------------------------------------------------------  IN: 927.6 mL / OUT: 25 mL / NET: 902.6 mL        Physical Exam  General: NAD, alert , non-focal, speech clear and intact  Pulm: right diminished Left CTA   CV:  +S1S2  Abd: soft, NT, ND, +BS  Ext: +DP Pulses b/l, +PT pulses,  Chest tubes: right Pleurx in place  to pleurvac   drips: Pt on Brian drip     Imaging:  CXR:  < from: Xray Chest 1 View- PORTABLE-Urgent (Xray Chest 1 View- PORTABLE-Urgent .) (06.16.21 @ 07:01) >    IMPRESSION:  Bilateral pleural effusions/adjacent atelectasis, improved.    < end of copied text >

## 2021-06-16 NOTE — PROGRESS NOTE ADULT - ATTENDING COMMENTS
Patient seen and examined independently.  89 yo female with multiple comorbidities, found to have large right sided lung mass with pleural effusion, underwent tunneled pleural catheter placement for suspected malignant effusion, post procedure patient noted to be hypotensive requiring low dose pressor, likely secondary to anesthesia.

## 2021-06-16 NOTE — PROGRESS NOTE ADULT - ASSESSMENT
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 Underwent Right pleur-ex catheter placement 6/15, complicated by shock, metabolic acidosis.      - Remains on Phenylephrine gtt, actively titrating to maintain MAP >65  - Stress steroids added   - Appears to be third-spacing due to severe protein calorie malnutrition  - Start colloid resuscitation, d/c crystalloid IV fluids  - Increase dose of Midodrine   - R pleur-ex to waterseal, thus far cytology negative for malignant cells

## 2021-06-16 NOTE — PROGRESS NOTE ADULT - SUBJECTIVE AND OBJECTIVE BOX
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 Underwent Right pleur-ex catheter placement 6/15, developed shock intra-op requiring vasopressors.        Events last 24 hours: Remains in shock on pressors. Lethargic.          PAST MEDICAL & SURGICAL HISTORY:  CAD (coronary artery disease)  s/p CABG- 2004-[facility]    HTN (hypertension)    Hyperlipidemia    Obesity    Diabetes    Atrial flutter  1/2010- [facility], Dr Tran    Valvular disease    S/P CABG (coronary artery bypass graft)    S/P cholecystectomy    S/P coronary artery stent placement  x 4, 2006-[facility]    Aortic valve replaced    Artificial pacemaker        SOCIAL HISTORY: uato due to ams        Review of Systems: Due to altered mental status, subjective information was not able to be obtained from the patient. History was obtained, to the extent possible, from review of the chart and collateral sources of information.        Medications:  aMIOdarone    Tablet 200 milliGRAM(s) Oral daily  midodrine 20 milliGRAM(s) Oral every 8 hours  phenylephrine    Infusion 0.2 MICROgram(s)/kG/Min IV Continuous <Continuous>  acetaminophen   Tablet .. 650 milliGRAM(s) Oral every 6 hours PRN  FLUoxetine 10 milliGRAM(s) Oral at bedtime  melatonin 3 milliGRAM(s) Oral at bedtime  ondansetron Injectable 4 milliGRAM(s) IV Push once PRN  famotidine    Tablet 20 milliGRAM(s) Oral daily  atorvastatin 20 milliGRAM(s) Oral at bedtime  dextrose 40% Gel 15 Gram(s) Oral once  dextrose 50% Injectable 25 Gram(s) IV Push once  dextrose 50% Injectable 12.5 Gram(s) IV Push once  dextrose 50% Injectable 25 Gram(s) IV Push once  glucagon  Injectable 1 milliGRAM(s) IntraMuscular once  hydrocortisone sodium succinate Injectable 50 milliGRAM(s) IV Push every 6 hours  insulin lispro (ADMELOG) corrective regimen sliding scale   SubCutaneous three times a day before meals  insulin lispro (ADMELOG) corrective regimen sliding scale   SubCutaneous at bedtime  albumin human 25% IVPB 100 milliLiter(s) IV Intermittent every 6 hours  ascorbic acid 500 milliGRAM(s) Oral daily  dextrose 5%. 1000 milliLiter(s) IV Continuous <Continuous>  dextrose 5%. 1000 milliLiter(s) IV Continuous <Continuous>  folic acid 1 milliGRAM(s) Oral daily  lactated ringers. 1000 milliLiter(s) IV Continuous <Continuous>  multivitamin/minerals 1 Tablet(s) Oral daily  chlorhexidine 4% Liquid 1 Application(s) Topical <User Schedule>          ICU Vital Signs Last 24 Hrs  T(C): 35.2 (16 Jun 2021 12:00), Max: 36.4 (15 Gilmer 2021 17:45)  T(F): 95.4 (16 Jun 2021 12:00), Max: 97.6 (15 Gilmer 2021 19:30)  HR: 60 (16 Jun 2021 15:00) (60 - 110)  BP: 107/49 (16 Jun 2021 15:00) (73/33 - 136/50)  BP(mean): 65 (16 Jun 2021 15:00) (51 - 86)  ABP: --  ABP(mean): --  RR: 14 (16 Jun 2021 15:00) (11 - 24)  SpO2: 100% (16 Jun 2021 15:00) (96% - 100%)          I&O's Detail    15 Gilmer 2021 07:01  -  16 Jun 2021 07:00  --------------------------------------------------------  IN:    Lactated Ringers: 975 mL    Oral Fluid: 50 mL    Phenylephrine: 142.8 mL  Total IN: 1167.8 mL    OUT:    Chest Tube (mL): 385 mL    Stool (mL): 1 mL    Voided (mL): 100 mL  Total OUT: 486 mL    Total NET: 681.8 mL      16 Jun 2021 07:01  -  16 Jun 2021 15:28  --------------------------------------------------------  IN:    Albumin 25%  -  50 mL: 100 mL    Lactated Ringers: 675 mL    Phenylephrine: 40.3 mL  Total IN: 815.3 mL    OUT:  Total OUT: 0 mL    Total NET: 815.3 mL            LABS:                        10.2   13.13 )-----------( 425      ( 16 Jun 2021 09:59 )             33.4     06-16    136  |  102  |  15.0  ----------------------------<  154<H>  5.0   |  20.0<L>  |  0.76    Ca    7.9<L>      16 Jun 2021 09:59  Phos  3.6     06-15  Mg     2.0     06-16    TPro  6.3<L>  /  Alb  2.5<L>  /  TBili  0.3<L>  /  DBili  x   /  AST  31  /  ALT  9   /  AlkPhos  79  06-16      CARDIAC MARKERS ( 15 Gilmer 2021 23:49 )  x     / <0.01 ng/mL / x     / x     / x          CAPILLARY BLOOD GLUCOSE      POCT Blood Glucose.: 172 mg/dL (16 Jun 2021 11:04)    PT/INR - ( 15 Gilmer 2021 06:43 )   PT: 21.9 sec;   INR: 1.95 ratio         PTT - ( 15 Gilmer 2021 06:43 )  PTT:43.8 sec    CULTURES:            Physical Examination:    General: Frail appearing. No acute distress.      HEENT: Pupils equal, reactive to light. Symmetric.    PULM: Clear to auscultation bilaterally, R chest wall pleur-ex catheter in place with serosanguinous fluid draining    CVS: Regular rate and rhythm, no murmurs    ABD: Soft, nondistended, nontender, normoactive bowel sounds    EXT: Generalized anasarca.    SKIN: Pale. Warm and well perfused.     NEURO: Lethargic, easily arousable to verbal stimuli, follows commands. RUE contracture         RADIOLOGY: < from: Xray Chest 1 View- PORTABLE-Urgent (Xray Chest 1 View- PORTABLE-Urgent .) (06.16.21 @ 07:01) >     EXAM:  XR CHEST PORTABLE URGENT 1V                          PROCEDURE DATE:  06/16/2021        INTERPRETATION:  Clinical history: 89-year-old female, pleural effusion.    Two expiratory/kyphotic views are compared to//21 and demonstrate a normalcardiac silhouette and normal pulmonary vasculature with no pneumothorax or acute osseous finding.    Bilateral pleural effusions/adjacent atelectasis have improved, each hemidiaphragm is partially visualized.    Mass in the posterior segment of the right upper lobe characterized on CT of 6/10/2021, grossly unchanged.    Post sternotomy/TAVR.    Pacemaker with wire tips in right atrium and right ventricle, unchanged.    IMPRESSION:  Bilateral pleural effusions/adjacent atelectasis, improved.      APARNA WOLFE DO; Attending Radiologist  This document has been electronically signed. Jun 16 2021  9:15AM        CRITICAL CARE TIME SPENT: 35 minutes spent performing frequent bedside reassessments and augmenting plan of care to address problems of acute critical illness that pose high probability of life threatening deterioration and/or end organ damage/dysfunction and discussing goals of care, non-inclusive of time spent on procedures performed.

## 2021-06-16 NOTE — PROCEDURE NOTE - NSPROCDETAILS_GEN_ALL_CORE
blood seen on insertion/dressing applied/flushes easily/secured in place
Seldinger technique/dressing applied/secured in place/sterile dressing applied/thoracostomy tube placed percutaneously/ultrasound assessment of fluid (location)

## 2021-06-16 NOTE — PROGRESS NOTE ADULT - ASSESSMENT
89F multiple medical problems who reports 100 lb weight loss in the last year presents with laceration to RLE, Subsequently found with large R pleural effusion on abd CT, now s/p pigtail placement initially with 1L serous fluid, CT chest demonstrated large lung mass;    6/10 chest tube only 20 cc yesterday but with milking tube, over 100cc drainage this morning. Will maintain for now.  6/12 again a sudden increase in chest tube drainage  6/13 Tube noted to be kinked once un-kinked drained 150cc  6/15 pleurx placed by Dr. Joy, post-op hypotension  6/16 transferred to MICU service

## 2021-06-16 NOTE — PROGRESS NOTE ADULT - PROBLEM SELECTOR PLAN 1
cytology (-) 6/7  s/p Pleurx 6/15 w/ Dr. Joy   pt hypotensive post-op, admitted to MICU   care as per primary team   daily CXR  Record drainage output drainage for now  CAP pleurx on Discharge     plan of care d/w Dr. Joy and thoracic team in AM rounds         Plan of care D/W Dr. Joy cytology (-) 6/7  s/p Pleurx 6/15 w/ Dr. Joy   pt hypotensive post-op, admitted to MICU   care as per primary team   TTE ordered as per Benita to r/o Pericardial Effusion   daily CXR  Record drainage output drainage for now  CAP pleurx on Discharge       plan of care d/w Dr. Joy and thoracic team in AM rounds         Plan of care D/W Dr. Joy

## 2021-06-17 NOTE — PROGRESS NOTE ADULT - ASSESSMENT
89 female with HTN, CAD, DM, Atrial flutter w/ PPD, and CVA admitted with abdominal pain and recurrent right pleural effusion    Right Pleural effusion s/p Right thoracocentesis.   likely malignant  s/p pleurex catheter 6/15  Shock resolved > on midodrine 20 mg Q8 for now, wean as tolerated     Diarrhea; Colitis on CT. Hypomagnesemia due to GI losses  completed Metronidazole and  Ciprofloxacin 7 days     Chronic AFlutter -  Amiodarone   Coumadin on hold     HTN  BP controlled     T2DM: HBA1C 5.8  BG controlled on SS    Dyslipidemia  Continue Statin    ALEA > resolved         HEME/DVT PPX:   -Lovenox   -SCD

## 2021-06-17 NOTE — PROGRESS NOTE ADULT - SUBJECTIVE AND OBJECTIVE BOX
[facility] Division of Hospital Medicine    Transfer from ICU to Medicine service acceptance note    Brief hospital course to date   89 year old female with CAD, HTN, DM, Atrial flutter w/ PPD, h/o CVA (residual Rt sided weakness) presented from [facility] with a right knee laceration. She was evaluated for abdominal pain and found to have a right pleural effusion. A chest tube was placed 6/5. Fluid analysis indicated an exudate. 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          pain, found to have  who presented to the ED for abdominal pain. Patient was found to have a right sided pleural effusion. Chest tube was placed on 6/5, with exudative drainage. Right sided pleur-ex was placed in the OR on 6/15. Patient became hypotensive, requiring prince gtt. Patient was upgraded to MICU.     Events last 24 hours:   Patient was successfully weaned off prince gtt. Remains on midodrine 20mg q8hrs. out of bed to chair eating breakfast pleurx cath in place           Patient denies chest pain, SOB, abd pain, N/V, fever, chills, dysuria or any other complaints. All remainder ROS negative.     MEDICATIONS  (STANDING):  aMIOdarone    Tablet 200 milliGRAM(s) Oral daily  ascorbic acid 500 milliGRAM(s) Oral daily  atorvastatin 20 milliGRAM(s) Oral at bedtime  chlorhexidine 4% Liquid 1 Application(s) Topical <User Schedule>  dextrose 40% Gel 15 Gram(s) Oral once  dextrose 5%. 1000 milliLiter(s) (50 mL/Hr) IV Continuous <Continuous>  dextrose 5%. 1000 milliLiter(s) (100 mL/Hr) IV Continuous <Continuous>  dextrose 50% Injectable 25 Gram(s) IV Push once  dextrose 50% Injectable 12.5 Gram(s) IV Push once  dextrose 50% Injectable 25 Gram(s) IV Push once  famotidine    Tablet 20 milliGRAM(s) Oral daily  FLUoxetine 10 milliGRAM(s) Oral at bedtime  folic acid 1 milliGRAM(s) Oral daily  glucagon  Injectable 1 milliGRAM(s) IntraMuscular once  insulin lispro (ADMELOG) corrective regimen sliding scale   SubCutaneous three times a day before meals  insulin lispro (ADMELOG) corrective regimen sliding scale   SubCutaneous at bedtime  melatonin 3 milliGRAM(s) Oral at bedtime  midodrine 20 milliGRAM(s) Oral every 8 hours  multivitamin/minerals 1 Tablet(s) Oral daily    MEDICATIONS  (PRN):  acetaminophen   Tablet .. 650 milliGRAM(s) Oral every 6 hours PRN Temp greater or equal to 38C (100.4F), Mild Pain (1 - 3)        I&O's Summary    16 Jun 2021 07:01  -  17 Jun 2021 07:00  --------------------------------------------------------  IN: 1149.3 mL / OUT: 705 mL / NET: 444.3 mL    17 Jun 2021 07:01  -  17 Jun 2021 13:04  --------------------------------------------------------  IN: 540 mL / OUT: 300 mL / NET: 240 mL        PHYSICAL EXAM:  Vital Signs Last 24 Hrs  T(C): 36.4 (17 Jun 2021 12:05), Max: 36.4 (16 Jun 2021 19:17)  T(F): 97.5 (17 Jun 2021 12:05), Max: 97.5 (16 Jun 2021 19:17)  HR: 87 (17 Jun 2021 12:00) (60 - 87)  BP: 117/61 (17 Jun 2021 12:00) (85/38 - 134/58)  BP(mean): 78 (17 Jun 2021 12:00) (53 - 93)  RR: 22 (17 Jun 2021 12:00) (14 - 24)  SpO2: 94% (17 Jun 2021 12:00) (94% - 100%)        CONSTITUTIONAL: NAD, well-developed, well-groomed  ENMT: Moist oral mucosa, no pharyngeal injection or exudates; normal dentition  RESPIRATORY: Normal respiratory effort; lungs are clear to auscultation bilaterally  CARDIOVASCULAR: Regular rate and rhythm, normal S1 and S2, no murmur/rub/gallop; No lower extremity edema; Peripheral pulses are 2+ bilaterally  ABDOMEN: Nontender to palpation, normoactive bowel sounds, no rebound/guarding; No hepatosplenomegaly  MUSCLOSKELETAL:  Normal gait; no clubbing or cyanosis of digits; no joint swelling or tenderness to palpation  PSYCH: A+O to person, place, and time; affect appropriate  NEUROLOGY: CN 2-12 are intact and symmetric; no gross sensory deficits;   SKIN: No rashes; no palpable lesions    LABS:                        8.4    16.09 )-----------( 390      ( 17 Jun 2021 05:07 )             27.4     06-17    137  |  102  |  16.7  ----------------------------<  162<H>  4.5   |  22.0  |  0.80    Ca    8.0<L>      17 Jun 2021 05:07  Phos  3.2     06-17  Mg     2.0     06-17    TPro  6.3<L>  /  Alb  2.5<L>  /  TBili  0.3<L>  /  DBili  x   /  AST  31  /  ALT  9   /  AlkPhos  79  06-16      CARDIAC MARKERS ( 15 Gilmer 2021 23:49 )  x     / <0.01 ng/mL / x     / x     / x              CAPILLARY BLOOD GLUCOSE      POCT Blood Glucose.: 171 mg/dL (17 Jun 2021 11:41)  POCT Blood Glucose.: 171 mg/dL (16 Jun 2021 21:55)  POCT Blood Glucose.: 182 mg/dL (16 Jun 2021 16:05)        RADIOLOGY & ADDITIONAL TESTS:  Results Reviewed:   Imaging Personally Reviewed:  Electrocardiogram Personally Reviewed:                                           [facility] Division of Hospital Medicine    Transfer from ICU to Medicine service acceptance note    Brief hospital course to date   89 year old female with CAD, HTN, DM, Atrial flutter w/ PPD, h/o CVA (residual Rt sided weakness) presented from [facility] with a right knee laceration. She was evaluated for abdominal pain and found to have a right pleural effusion. A chest tube was placed 6/5. Fluid analysis indicated an exudate. During her w/u she was taken to the OR 6/15 for placement of a pigtail catheter   Was hypotensive following procedure requiring pressors and ICU transfer  Currently normotensive off IV pressors, maintaining MAP > 65 on Midodrine 20 mg TID        MEDICATIONS  (STANDING):  aMIOdarone    Tablet 200 milliGRAM(s) Oral daily  ascorbic acid 500 milliGRAM(s) Oral daily  atorvastatin 20 milliGRAM(s) Oral at bedtime  chlorhexidine 4% Liquid 1 Application(s) Topical <User Schedule>  dextrose 40% Gel 15 Gram(s) Oral once  dextrose 5%. 1000 milliLiter(s) (50 mL/Hr) IV Continuous <Continuous>  dextrose 5%. 1000 milliLiter(s) (100 mL/Hr) IV Continuous <Continuous>  dextrose 50% Injectable 25 Gram(s) IV Push once  dextrose 50% Injectable 12.5 Gram(s) IV Push once  dextrose 50% Injectable 25 Gram(s) IV Push once  famotidine    Tablet 20 milliGRAM(s) Oral daily  FLUoxetine 10 milliGRAM(s) Oral at bedtime  folic acid 1 milliGRAM(s) Oral daily  glucagon  Injectable 1 milliGRAM(s) IntraMuscular once  insulin lispro (ADMELOG) corrective regimen sliding scale   SubCutaneous three times a day before meals  insulin lispro (ADMELOG) corrective regimen sliding scale   SubCutaneous at bedtime  melatonin 3 milliGRAM(s) Oral at bedtime  midodrine 20 milliGRAM(s) Oral every 8 hours  multivitamin/minerals 1 Tablet(s) Oral daily    MEDICATIONS  (PRN):  acetaminophen   Tablet .. 650 milliGRAM(s) Oral every 6 hours PRN Temp greater or equal to 38C (100.4F), Mild Pain (1 - 3)        I&O's Summary    16 Jun 2021 07:01  -  17 Jun 2021 07:00  --------------------------------------------------------  IN: 1149.3 mL / OUT: 705 mL / NET: 444.3 mL    17 Jun 2021 07:01  -  17 Jun 2021 13:04  --------------------------------------------------------  IN: 540 mL / OUT: 300 mL / NET: 240 mL        PHYSICAL EXAM:  Vital Signs Last 24 Hrs  T(C): 36.4 (17 Jun 2021 12:05), Max: 36.4 (16 Jun 2021 19:17)  T(F): 97.5 (17 Jun 2021 12:05), Max: 97.5 (16 Jun 2021 19:17)  HR: 87 (17 Jun 2021 12:00) (60 - 87)  BP: 117/61 (17 Jun 2021 12:00) (85/38 - 134/58)  BP(mean): 78 (17 Jun 2021 12:00) (53 - 93)  RR: 22 (17 Jun 2021 12:00) (14 - 24)  SpO2: 94% (17 Jun 2021 12:00) (94% - 100%)        CONSTITUTIONAL: NAD, elderly female in NAD  ENMT: Moist oral mucosa, no pharyngeal injection or exudates; normal dentition  RESPIRATORY: Normal respiratory effort; pigtail catheter in place   CARDIOVASCULAR: Irregularly irregular   ABDOMEN: Nontender to palpation, normoactive bowel sounds, no rebound/guarding; No hepatosplenomegaly  MUSCLOSKELETAL:  No clubbing or cyanosis of digits; no joint swelling or tenderness to palpation  PSYCH: A+O to person, place,   SKIN: No rashes; no palpable lesions    LABS:                        8.4    16.09 )-----------( 390      ( 17 Jun 2021 05:07 )             27.4     06-17    137  |  102  |  16.7  ----------------------------<  162<H>  4.5   |  22.0  |  0.80    Ca    8.0<L>      17 Jun 2021 05:07  Phos  3.2     06-17  Mg     2.0     06-17    TPro  6.3<L>  /  Alb  2.5<L>  /  TBili  0.3<L>  /  DBili  x   /  AST  31  /  ALT  9   /  AlkPhos  79  06-16      CARDIAC MARKERS ( 15 Gilmer 2021 23:49 )  x     / <0.01 ng/mL / x     / x     / x              CAPILLARY BLOOD GLUCOSE      POCT Blood Glucose.: 171 mg/dL (17 Jun 2021 11:41)  POCT Blood Glucose.: 171 mg/dL (16 Jun 2021 21:55)  POCT Blood Glucose.: 182 mg/dL (16 Jun 2021 16:05)

## 2021-06-17 NOTE — PROGRESS NOTE ADULT - SUBJECTIVE AND OBJECTIVE BOX
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     Doesn't feel hungry any more and lost weight over past year. Cough occasionally whilst eating. Denies any fever, chills, dizziness, chest pain, palpitations, nausea, vomiting or abdominal pain;  (07 Jun 2021 17:59)    PAST MEDICAL & SURGICAL HISTORY:  CAD (coronary artery disease)  s/p CABG- 2004-[facility]  HTN (hypertension)  Hyperlipidemia  Obesity  Diabetes  Atrial flutter  1/2010- [facility], Dr Tran  Valvular disease  S/P CABG (coronary artery bypass graft)  S/P cholecystectomy  S/P coronary artery stent placement  x 4, 2006-[facility]  Aortic valve replaced  Artificial pacemaker    FAMILY HISTORY:  Family history of hypertension (Father)    Family history of cerebrovascular accident (CVA) (Father)    Vitals   ICU Vital Signs Last 24 Hrs  T(C): 36.6 (17 Jun 2021 17:37), Max: 36.6 (17 Jun 2021 17:37)  T(F): 97.8 (17 Jun 2021 17:37), Max: 97.8 (17 Jun 2021 17:37)  HR: 63 (17 Jun 2021 17:37) (63 - 87)  BP: 113/64 (17 Jun 2021 17:37) (85/38 - 134/58)  BP(mean): 78 (17 Jun 2021 12:00) (53 - 93)  RR: 20 (17 Jun 2021 17:37) (16 - 24)  SpO2: 98% (17 Jun 2021 17:37) (94% - 100%)    I&O's Detail    16 Jun 2021 07:01  -  17 Jun 2021 07:00  --------------------------------------------------------  IN:    Albumin 25%  -  50 mL: 400 mL    Lactated Ringers: 675 mL    Phenylephrine: 74.3 mL  Total IN: 1149.3 mL    OUT:    Chest Tube (mL): 25 mL    Chest Tube (mL): 30 mL    Voided (mL): 650 mL  Total OUT: 705 mL  Total NET: 444.3 mL    17 Jun 2021 07:01  -  17 Jun 2021 20:33  --------------------------------------------------------  IN:    Albumin 25%  -  50 mL: 100 mL    Oral Fluid: 440 mL  Total IN: 540 mL    OUT:    Chest Tube (mL): 100 mL    Voided (mL): 200 mL  Total OUT: 300 mL  Total NET: 240 mL    LABS                        8.4    16.09 )-----------( 390      ( 17 Jun 2021 05:07 )             27.4     06-17    137  |  102  |  16.7  ----------------------------<  162<H>  4.5   |  22.0  |  0.80    Ca    8.0<L>      17 Jun 2021 05:07  Phos  3.2     06-17  Mg     2.0     06-17  TPro  6.3<L>  /  Alb  2.5<L>  /  TBili  0.3<L>  /  DBili  x   /  AST  31  /  ALT  9   /  AlkPhos  79  06-16  LIVER FUNCTIONS - ( 16 Jun 2021 09:59 )  Alb: 2.5 g/dL / Pro: 6.3 g/dL / ALK PHOS: 79 U/L / ALT: 9 U/L / AST: 31 U/L / GGT: x           POCT Blood Glucose.: 156 mg/dL (06-17-21 @ 16:57)  POCT Blood Glucose.: 171 mg/dL (06-17-21 @ 11:41)  POCT Blood Glucose.: 171 mg/dL (06-16-21 @ 21:55)    MEDICATIONS  (STANDING):  aMIOdarone    Tablet 200 milliGRAM(s) Oral daily  ascorbic acid 500 milliGRAM(s) Oral daily  atorvastatin 20 milliGRAM(s) Oral at bedtime  chlorhexidine 4% Liquid 1 Application(s) Topical <User Schedule>  dextrose 40% Gel 15 Gram(s) Oral once  dextrose 5%. 1000 milliLiter(s) (50 mL/Hr) IV Continuous <Continuous>  dextrose 5%. 1000 milliLiter(s) (100 mL/Hr) IV Continuous <Continuous>  dextrose 50% Injectable 25 Gram(s) IV Push once  dextrose 50% Injectable 12.5 Gram(s) IV Push once  dextrose 50% Injectable 25 Gram(s) IV Push once  enoxaparin Injectable 40 milliGRAM(s) SubCutaneous daily  famotidine    Tablet 20 milliGRAM(s) Oral daily  FLUoxetine 10 milliGRAM(s) Oral at bedtime  folic acid 1 milliGRAM(s) Oral daily  glucagon  Injectable 1 milliGRAM(s) IntraMuscular once  insulin lispro (ADMELOG) corrective regimen sliding scale   SubCutaneous three times a day before meals  insulin lispro (ADMELOG) corrective regimen sliding scale   SubCutaneous at bedtime  melatonin 3 milliGRAM(s) Oral at bedtime  midodrine 20 milliGRAM(s) Oral every 8 hours  multivitamin/minerals 1 Tablet(s) Oral daily    MEDICATIONS  (PRN):  acetaminophen   Tablet .. 650 milliGRAM(s) Oral every 6 hours PRN Temp greater or equal to 38C (100.4F), Mild Pain (1 - 3)    Allergies:  No Known Allergies    Physical Exam:  Gen: NAD, w  Neuro: A&Ox3, non-focal, speech clear and intact  CV: S1S2 RRR, no murmurs/rubs  Pulm: decreased BS b/l, no wheezing  Abd:+ BS soft NT ND  Ext: no clubbing/cyanosis/edema  Vascular:2+ DP/radial pulses b/l  b/l healing skin tears of UE

## 2021-06-17 NOTE — PROGRESS NOTE ADULT - PROBLEM SELECTOR PLAN 1
cytology (-) 6/7  s/p Pleurx 6/15 w/ Dr. Joy   pt hypotensive post-op, admitted to MICU, BP now improved off prince early this morning transferred to floor   care as per primary team   TTE ordered as per Benita to r/o Pericardial Effusion, performed, results pending  daily CXR  Record drainage output drainage for now  CAP pleurx on Discharge       d/w Dr. Joy in AM rounds

## 2021-06-17 NOTE — PROGRESS NOTE ADULT - ASSESSMENT
ASSESSMENT   1.   2.   3.   4.   5.     PLAN     NEURO:     PULM:      CV:     GI:      :     ENDO:     ID:     HEME/DVT PPX:     ETHICS        CODE STATUS: ***      Care discussed with bedside provider  ASSESSMENT   89yo female with PMH HTN, CAD, DM, Atrial flutter w/ PPD, and CVA admitted with abdominal pain and recurrent right pleural effusion  1. pleural effusion   2. shock      PLAN     NEURO:   -not currently sedated   -fluoxetine 10mg daily   -melatonin at bedtime     PULM:    -pleurex in place, on water seal   -exudative transfusion, cytology negative     CV:   -now off prince gtt, MAP>65  -remains on midodrine 20mg q8hrs   -amiodarone 200mg daily   -atorvastatin 20mg daily   -TTE ordered    GI:    -soft diet   -famotidine     :   -fluid resuscitation with albumin   -monitor electrolytes and replete as needed    ENDO:   -SSI  -monitor blood glucose     ID:   -WBC 16, afebrile     HEME/DVT PPX:   -Lovenox   -SCD    ETHICS        CODE STATUS: DNR      Care discussed with bedside provider  ASSESSMENT   89yo female with PMH HTN, CAD, DM, Atrial flutter w/ PPD, and CVA admitted with abdominal pain and recurrent right pleural effusion  1. pleural effusion s/p pleux cath on 6/15  2. perioperative vs. distributive shock      PLAN     NEURO:   -not currently sedated   -fluoxetine 10mg daily   -melatonin at bedtime     PULM:    -pleurex in place, on water seal   -exudative transfusion, cytology negative for malignancy     CV:   -now off prince gtt, MAP>65   -remains on midodrine 20mg q8hrs   -amiodarone 200mg daily   -atorvastatin 20mg daily   -TTE ordered    GI:    -soft diet   -famotidine     :   -fluid resuscitation with albumin   -monitor electrolytes and replete as needed    ENDO:   -SSI  -monitor blood glucose     ID:   -WBC 16, afebrile     HEME/DVT PPX:   -Lovenox   -SCD         CODE STATUS: DNR      Care discussed with bedside provider       Time Spent: 35min

## 2021-06-17 NOTE — PROGRESS NOTE ADULT - ASSESSMENT
89F multiple medical problems who reports 100 lb weight loss in the last year presents with laceration to RLE, Subsequently found with large R pleural effusion on abd CT, now s/p pigtail placement initially with 1L serous fluid, CT chest demonstrated large lung mass, s/p R pleurX 6/15 with postop hypotension requiring prince gtt and transferred to ICU, now off and transferred to floor;

## 2021-06-17 NOTE — PROGRESS NOTE ADULT - SUBJECTIVE AND OBJECTIVE BOX
CC:  Patient is a 89y old  Female who presents with a chief complaint of diarrhea and pleural effusion (16 Jun 2021 19:28)      HPI/BRIEF HOSPITAL COURSE: ***    Events last 24 hours: ***    PAST MEDICAL & SURGICAL HISTORY:  CAD (coronary artery disease)  s/p CABG- 2004-[facility]    HTN (hypertension)    Hyperlipidemia    Obesity    Diabetes    [facility]    Valv[facility] disease    S/P CABG (coronary artery bypass graft)    S/P cholecystectomy    S/P coronary artery stent placement  x 4, 2006-[facility]    Aortic valve replaced    Artificial pacemaker      Allergies    No Known Allergies    Intolerances      FAMILY HISTORY:  Family history of hypertension (Father)    Family history of cerebrovascular accident (CVA) (Father)        Review of Systems:  CONSTITUTIONAL: No fever, chills, or fatigue  EYES: No eye pain, visual disturbances, or discharge  ENMT:  No difficulty hearing, tinnitus, vertigo; No sinus or throat pain  NECK: No pain or stiffness  RESPIRATORY: No cough, wheezing, chills or hemoptysis; No shortness of breath  CARDIOVASCULAR: No chest pain, palpitations, dizziness, or leg swelling  GASTROINTESTINAL: No abdominal or epigastric pain. No nausea, vomiting, or hematemesis; No diarrhea or constipation. No melena or hematochezia.  GENITOURINARY: No dysuria, frequency, hematuria, or incontinence  NEUROLOGICAL: No headaches, memory loss, loss of strength, numbness, or tremors  SKIN: No itching, burning, rashes, or lesions   MUSCULOSKELETAL: No joint pain or swelling; No muscle, back, or extremity pain  PSYCHIATRIC: No depression, anxiety, mood swings, or difficulty sleeping      Medications:    aMIOdarone    Tablet 200 milliGRAM(s) Oral daily  midodrine 20 milliGRAM(s) Oral every 8 hours  phenylephrine    Infusion 0.2 MICROgram(s)/kG/Min IV Continuous <Continuous>      acetaminophen   Tablet .. 650 milliGRAM(s) Oral every 6 hours PRN  FLUoxetine 10 milliGRAM(s) Oral at bedtime  melatonin 3 milliGRAM(s) Oral at bedtime        famotidine    Tablet 20 milliGRAM(s) Oral daily      atorvastatin 20 milliGRAM(s) Oral at bedtime  dextrose 40% Gel 15 Gram(s) Oral once  dextrose 50% Injectable 25 Gram(s) IV Push once  dextrose 50% Injectable 12.5 Gram(s) IV Push once  dextrose 50% Injectable 25 Gram(s) IV Push once  glucagon  Injectable 1 milliGRAM(s) IntraMuscular once  hydrocortisone sodium succinate Injectable 50 milliGRAM(s) IV Push every 6 hours  insulin lispro (ADMELOG) corrective regimen sliding scale   SubCutaneous three times a day before meals  insulin lispro (ADMELOG) corrective regimen sliding scale   SubCutaneous at bedtime    albumin human 25% IVPB 100 milliLiter(s) IV Intermittent every 6 hours  ascorbic acid 500 milliGRAM(s) Oral daily  dextrose 5%. 1000 milliLiter(s) IV Continuous <Continuous>  dextrose 5%. 1000 milliLiter(s) IV Continuous <Continuous>  folic acid 1 milliGRAM(s) Oral daily  multivitamin/minerals 1 Tablet(s) Oral daily      chlorhexidine 4% Liquid 1 Application(s) Topical <User Schedule>            ICU Vital Signs Last 24 Hrs  T(C): 36.4 (17 Jun 2021 07:30), Max: 36.4 (16 Jun 2021 19:17)  T(F): 97.5 (17 Jun 2021 07:30), Max: 97.5 (16 Jun 2021 19:17)  HR: 68 (17 Jun 2021 08:00) (60 - 76)  BP: 128/62 (17 Jun 2021 08:00) (85/38 - 134/58)  BP(mean): 83 (17 Jun 2021 08:00) (53 - 93)  ABP: --  ABP(mean): --  RR: 24 (17 Jun 2021 08:00) (13 - 24)  SpO2: 96% (17 Jun 2021 08:00) (96% - 100%)    Vital Signs Last 24 Hrs  T(C): 36.4 (17 Jun 2021 07:30), Max: 36.4 (16 Jun 2021 19:17)  T(F): 97.5 (17 Jun 2021 07:30), Max: 97.5 (16 Jun 2021 19:17)  HR: 68 (17 Jun 2021 08:00) (60 - 76)  BP: 128/62 (17 Jun 2021 08:00) (85/38 - 134/58)  BP(mean): 83 (17 Jun 2021 08:00) (53 - 93)  RR: 24 (17 Jun 2021 08:00) (13 - 24)  SpO2: 96% (17 Jun 2021 08:00) (96% - 100%)        I&O's Detail    16 Jun 2021 07:01  -  17 Jun 2021 07:00  --------------------------------------------------------  IN:    Albumin 25%  -  50 mL: 400 mL    Lactated Ringers: 675 mL    Phenylephrine: 74.3 mL  Total IN: 1149.3 mL    OUT:    Chest Tube (mL): 25 mL    Chest Tube (mL): 30 mL    Voided (mL): 650 mL  Total OUT: 705 mL    Total NET: 444.3 mL      17 Jun 2021 07:01  -  17 Jun 2021 09:17  --------------------------------------------------------  IN:    Oral Fluid: 220 mL  Total IN: 220 mL    OUT:  Total OUT: 0 mL    Total NET: 220 mL            LABS:                        8.4    16.09 )-----------( 390      ( 17 Jun 2021 05:07 )             27.4     06-17    137  |  102  |  16.7  ----------------------------<  162<H>  4.5   |  22.0  |  0.80    Ca    8.0<L>      17 Jun 2021 05:07  Phos  3.2     06-17  Mg     2.0     06-17    TPro  6.3<L>  /  Alb  2.5<L>  /  TBili  0.3<L>  /  DBili  x   /  AST  31  /  ALT  9   /  AlkPhos  79  06-16      CARDIAC MARKERS ( 15 Gilmer 2021 23:49 )  x     / <0.01 ng/mL / x     / x     / x          CAPILLARY BLOOD GLUCOSE      POCT Blood Glucose.: 171 mg/dL (16 Jun 2021 21:55)        CULTURES:        Physical Examination:  General: No acute distress.  Alert, oriented, interactive, nonfocal  NEURO: A&O X3, motor function 5/5 BL UE/LE  HEENT: Pupils equal, reactive to light.  Symmetric.  PULM: CTA BL, no significant sputum production, no wheezes, rales, rhonchi  CVS: Regular rate and rhythm, no murmurs, rubs, or gallops  ABD: Soft, nondistended, nontender, normoactive bowel sounds, no masses  EXT: No edema, nontender  SKIN: Warm and well perfused, no rashes noted      EKG: ***    RADIOLOGY: ***      CENTRAL LINE: N          DATE INSERTED:                  GRAHAM: N                        DATE INSERTED:                  A-LINE: N                       DATE INSERTED:                  GLOBAL ISSUE/BEST PRACTICE:  Analgesia: N/A  Sedation: N/A  HOB elevation: yes  Stress ulcer prophylaxis: protonix   VTE prophylaxis: SCD/Heparin SQ/Lovenox SQ  Glycemic control: N/A  Nutrition: NPO/Diet/TF       CC:  Patient is a 89y old  Female who presents with a chief complaint of diarrhea and pleural effusion (16 Jun 2021 19:28)      HPI/BRIEF HOSPITAL COURSE:   89yo female with PMH HTN, CAD, DM, Atrial flutter w/ PPD, and CVA who presented to the ED for abdominal pain. Patient was found to have a right sided pleural effusion. Chest tube was placed on 6/5, with exudative drainage. Right sided pleur-ex was placed in the OR on 6/15. Patient became hypotensive, requiring prince gtt. Patient was upgraded to MICU.     Events last 24 hours:   Patient was successfully weaned off prince gtt. Remains on midodrine 20mg q8hrs.     PAST MEDICAL & SURGICAL HISTORY:  CAD (coronary artery disease)  s/p CABG- 2004-[facility]    HTN (hypertension)    Hyperlipidemia    Obesity    Diabetes    [facility]    [facility]    S/P CABG (coronary artery bypass graft)    S/P cholecystectomy    S/P coronary artery stent placement  x 4, 2006-[facility]    Aortic valve replaced    Artificial pacemaker      Allergies    No Known Allergies    Intolerances      FAMILY HISTORY:  Family history of hypertension (Father)    Family history of cerebrovascular accident (CVA) (Father)        Review of Systems:  CONSTITUTIONAL: No fever, chills, or fatigue  EYES: No eye pain, visual disturbances, or discharge  ENMT:  No difficulty hearing, tinnitus, vertigo; No sinus or throat pain  NECK: No pain or stiffness  RESPIRATORY: No cough, wheezing, chills or hemoptysis; No shortness of breath  CARDIOVASCULAR: No chest pain, palpitations, dizziness, or leg swelling  GASTROINTESTINAL: No abdominal or epigastric pain. No nausea, vomiting, or hematemesis; No diarrhea or constipation. No melena or hematochezia.  GENITOURINARY: No dysuria, frequency, hematuria, or incontinence  NEUROLOGICAL: No headaches, memory loss, loss of strength, numbness, or tremors  SKIN: No itching, burning, rashes, or lesions   MUSCULOSKELETAL: No joint pain or swelling; No muscle, back, or extremity pain  PSYCHIATRIC: No depression, anxiety, mood swings, or difficulty sleeping      Medications:    aMIOdarone    Tablet 200 milliGRAM(s) Oral daily  midodrine 20 milliGRAM(s) Oral every 8 hours  phenylephrine    Infusion 0.2 MICROgram(s)/kG/Min IV Continuous <Continuous>      acetaminophen   Tablet .. 650 milliGRAM(s) Oral every 6 hours PRN  FLUoxetine 10 milliGRAM(s) Oral at bedtime  melatonin 3 milliGRAM(s) Oral at bedtime        famotidine    Tablet 20 milliGRAM(s) Oral daily      atorvastatin 20 milliGRAM(s) Oral at bedtime  dextrose 40% Gel 15 Gram(s) Oral once  dextrose 50% Injectable 25 Gram(s) IV Push once  dextrose 50% Injectable 12.5 Gram(s) IV Push once  dextrose 50% Injectable 25 Gram(s) IV Push once  glucagon  Injectable 1 milliGRAM(s) IntraMuscular once  hydrocortisone sodium succinate Injectable 50 milliGRAM(s) IV Push every 6 hours  insulin lispro (ADMELOG) corrective regimen sliding scale   SubCutaneous three times a day before meals  insulin lispro (ADMELOG) corrective regimen sliding scale   SubCutaneous at bedtime    albumin human 25% IVPB 100 milliLiter(s) IV Intermittent every 6 hours  ascorbic acid 500 milliGRAM(s) Oral daily  dextrose 5%. 1000 milliLiter(s) IV Continuous <Continuous>  dextrose 5%. 1000 milliLiter(s) IV Continuous <Continuous>  folic acid 1 milliGRAM(s) Oral daily  multivitamin/minerals 1 Tablet(s) Oral daily      chlorhexidine 4% Liquid 1 Application(s) Topical <User Schedule>            ICU Vital Signs Last 24 Hrs  T(C): 36.4 (17 Jun 2021 07:30), Max: 36.4 (16 Jun 2021 19:17)  T(F): 97.5 (17 Jun 2021 07:30), Max: 97.5 (16 Jun 2021 19:17)  HR: 68 (17 Jun 2021 08:00) (60 - 76)  BP: 128/62 (17 Jun 2021 08:00) (85/38 - 134/58)  BP(mean): 83 (17 Jun 2021 08:00) (53 - 93)  ABP: --  ABP(mean): --  RR: 24 (17 Jun 2021 08:00) (13 - 24)  SpO2: 96% (17 Jun 2021 08:00) (96% - 100%)    Vital Signs Last 24 Hrs  T(C): 36.4 (17 Jun 2021 07:30), Max: 36.4 (16 Jun 2021 19:17)  T(F): 97.5 (17 Jun 2021 07:30), Max: 97.5 (16 Jun 2021 19:17)  HR: 68 (17 Jun 2021 08:00) (60 - 76)  BP: 128/62 (17 Jun 2021 08:00) (85/38 - 134/58)  BP(mean): 83 (17 Jun 2021 08:00) (53 - 93)  RR: 24 (17 Jun 2021 08:00) (13 - 24)  SpO2: 96% (17 Jun 2021 08:00) (96% - 100%)        I&O's Detail    16 Jun 2021 07:01  -  17 Jun 2021 07:00  --------------------------------------------------------  IN:    Albumin 25%  -  50 mL: 400 mL    Lactated Ringers: 675 mL    Phenylephrine: 74.3 mL  Total IN: 1149.3 mL    OUT:    Chest Tube (mL): 25 mL    Chest Tube (mL): 30 mL    Voided (mL): 650 mL  Total OUT: 705 mL    Total NET: 444.3 mL      17 Jun 2021 07:01  -  17 Jun 2021 09:17  --------------------------------------------------------  IN:    Oral Fluid: 220 mL  Total IN: 220 mL    OUT:  Total OUT: 0 mL    Total NET: 220 mL            LABS:                        8.4    16.09 )-----------( 390      ( 17 Jun 2021 05:07 )             27.4     06-17    137  |  102  |  16.7  ----------------------------<  162<H>  4.5   |  22.0  |  0.80    Ca    8.0<L>      17 Jun 2021 05:07  Phos  3.2     06-17  Mg     2.0     06-17    TPro  6.3<L>  /  Alb  2.5<L>  /  TBili  0.3<L>  /  DBili  x   /  AST  31  /  ALT  9   /  AlkPhos  79  06-16      CARDIAC MARKERS ( 15 Gilmer 2021 23:49 )  x     / <0.01 ng/mL / x     / x     / x          CAPILLARY BLOOD GLUCOSE      POCT Blood Glucose.: 171 mg/dL (16 Jun 2021 21:55)        CULTURES:        Physical Examination:  General: No acute distress.  Alert, oriented, interactive, nonfocal  NEURO: A&O X3, motor function 5/5 BL UE/LE  HEENT: Pupils equal, reactive to light.  Symmetric.  PULM: CTA BL, no significant sputum production, no wheezes, rales, rhonchi  CVS: Regular rate and rhythm, no murmurs, rubs, or gallops  ABD: Soft, nondistended, nontender, normoactive bowel sounds, no masses  EXT: No edema, nontender  SKIN: Warm and well perfused, no rashes noted      EKG: ***    RADIOLOGY:  < from: Xray Chest 1 View- PORTABLE-Urgent (Xray Chest 1 View- PORTABLE-Urgent .) (06.16.21 @ 07:01) >    IMPRESSION:  Bilateral pleural effusions/adjacent atelectasis, improved.    < end of copied text >      CENTRAL LINE: N          DATE INSERTED:                  GRAHAM: N                        DATE INSERTED:                  A-LINE: N                       DATE INSERTED:                  GLOBAL ISSUE/BEST PRACTICE:  Analgesia: N/A  Sedation: N/A  HOB elevation: yes  Stress ulcer prophylaxis: famotidine   VTE prophylaxis: Lovenox  Glycemic control: SSI  Nutrition: Soft diet        CC:  Patient is a 89y old  Female who presents with a chief complaint of diarrhea and pleural effusion (16 Jun 2021 19:28)      HPI/BRIEF HOSPITAL COURSE:   89yo female with PMH HTN, CAD, DM, Atrial flutter w/ PPD, and CVA who presented to the ED for abdominal pain. Patient was found to have a right sided pleural effusion. Chest tube was placed on 6/5, with exudative drainage. Right sided pleur-ex was placed in the OR on 6/15. Patient became hypotensive, requiring prince gtt. Patient was upgraded to MICU.     Events last 24 hours:   Patient was successfully weaned off prince gtt. Remains on midodrine 20mg q8hrs. out of bed to chair eating breakfast pleurx cath in place     PAST MEDICAL & SURGICAL HISTORY:  CAD (coronary artery disease)  s/p CABG- 2004-[facility]    HTN (hypertension)    Hyperlipidemia    Obesity    Diabetes    [facility]    [facility]    S/P CABG (coronary artery bypass graft)    S/P cholecystectomy    S/P coronary artery stent placement  x 4, 2006-[facility]    Aortic valve replaced    Artificial pacemaker      Allergies    No Known Allergies    Intolerances      FAMILY HISTORY:  Family history of hypertension (Father)    Family history of cerebrovascular accident (CVA) (Father)        Review of Systems:  CONSTITUTIONAL: No fever, chills, or fatigue  EYES: No  visual disturbances  ENMT:   No sinus or throat pain  NECK: No pain or stiffness  RESPIRATORY: No cough, wheezing, chills or hemoptysis; No shortness of breath  CARDIOVASCULAR: No chest pain, palpitations, dizziness, or leg swelling  GASTROINTESTINAL: No abdominal  pain. No nausea, vomiting, or hematemesis; No diarrhea or constipation. No melena  GENITOURINARY: No dysuria, frequency, hematuria, or incontinence  NEUROLOGICAL: No headaches,loss of strength, numbness, or tremors  SKIN: No itching, burning, rashes, or lesions   MUSCULOSKELETAL: No muscle, back, or extremity pain  PSYCHIATRIC: No difficulty sleeping      Medications:    aMIOdarone    Tablet 200 milliGRAM(s) Oral daily  midodrine 20 milliGRAM(s) Oral every 8 hours  phenylephrine    Infusion 0.2 MICROgram(s)/kG/Min IV Continuous <Continuous>      acetaminophen   Tablet .. 650 milliGRAM(s) Oral every 6 hours PRN  FLUoxetine 10 milliGRAM(s) Oral at bedtime  melatonin 3 milliGRAM(s) Oral at bedtime        famotidine    Tablet 20 milliGRAM(s) Oral daily      atorvastatin 20 milliGRAM(s) Oral at bedtime  dextrose 40% Gel 15 Gram(s) Oral once  dextrose 50% Injectable 25 Gram(s) IV Push once  dextrose 50% Injectable 12.5 Gram(s) IV Push once  dextrose 50% Injectable 25 Gram(s) IV Push once  glucagon  Injectable 1 milliGRAM(s) IntraMuscular once  hydrocortisone sodium succinate Injectable 50 milliGRAM(s) IV Push every 6 hours  insulin lispro (ADMELOG) corrective regimen sliding scale   SubCutaneous three times a day before meals  insulin lispro (ADMELOG) corrective regimen sliding scale   SubCutaneous at bedtime    albumin human 25% IVPB 100 milliLiter(s) IV Intermittent every 6 hours  ascorbic acid 500 milliGRAM(s) Oral daily  dextrose 5%. 1000 milliLiter(s) IV Continuous <Continuous>  dextrose 5%. 1000 milliLiter(s) IV Continuous <Continuous>  folic acid 1 milliGRAM(s) Oral daily  multivitamin/minerals 1 Tablet(s) Oral daily      chlorhexidine 4% Liquid 1 Application(s) Topical <User Schedule>            ICU Vital Signs Last 24 Hrs  T(C): 36.4 (17 Jun 2021 07:30), Max: 36.4 (16 Jun 2021 19:17)  T(F): 97.5 (17 Jun 2021 07:30), Max: 97.5 (16 Jun 2021 19:17)  HR: 68 (17 Jun 2021 08:00) (60 - 76)  BP: 128/62 (17 Jun 2021 08:00) (85/38 - 134/58)  BP(mean): 83 (17 Jun 2021 08:00) (53 - 93)  ABP: --  ABP(mean): --  RR: 24 (17 Jun 2021 08:00) (13 - 24)  SpO2: 96% (17 Jun 2021 08:00) (96% - 100%)    Vital Signs Last 24 Hrs  T(C): 36.4 (17 Jun 2021 07:30), Max: 36.4 (16 Jun 2021 19:17)  T(F): 97.5 (17 Jun 2021 07:30), Max: 97.5 (16 Jun 2021 19:17)  HR: 68 (17 Jun 2021 08:00) (60 - 76)  BP: 128/62 (17 Jun 2021 08:00) (85/38 - 134/58)  BP(mean): 83 (17 Jun 2021 08:00) (53 - 93)  RR: 24 (17 Jun 2021 08:00) (13 - 24)  SpO2: 96% (17 Jun 2021 08:00) (96% - 100%)        I&O's Detail    16 Jun 2021 07:01  -  17 Jun 2021 07:00  --------------------------------------------------------  IN:    Albumin 25%  -  50 mL: 400 mL    Lactated Ringers: 675 mL    Phenylephrine: 74.3 mL  Total IN: 1149.3 mL    OUT:    Chest Tube (mL): 25 mL    Chest Tube (mL): 30 mL    Voided (mL): 650 mL  Total OUT: 705 mL    Total NET: 444.3 mL      17 Jun 2021 07:01  -  17 Jun 2021 09:17  --------------------------------------------------------  IN:    Oral Fluid: 220 mL  Total IN: 220 mL    OUT:  Total OUT: 0 mL    Total NET: 220 mL            LABS:                        8.4    16.09 )-----------( 390      ( 17 Jun 2021 05:07 )             27.4     06-17    137  |  102  |  16.7  ----------------------------<  162<H>  4.5   |  22.0  |  0.80    Ca    8.0<L>      17 Jun 2021 05:07  Phos  3.2     06-17  Mg     2.0     06-17    TPro  6.3<L>  /  Alb  2.5<L>  /  TBili  0.3<L>  /  DBili  x   /  AST  31  /  ALT  9   /  AlkPhos  79  06-16      CARDIAC MARKERS ( 15 Gilmer 2021 23:49 )  x     / <0.01 ng/mL / x     / x     / x          CAPILLARY BLOOD GLUCOSE      POCT Blood Glucose.: 171 mg/dL (16 Jun 2021 21:55)        CULTURES:        Physical Examination:  General: No acute distress.  Alert, oriented, interactive, nonfocal  NEURO: A&O X3, motor function 5/5 BL UE/LE  HEENT: Pupils equal, reactive to light.  Symmetric.  PULM: CTA BL, no significant sputum production, no wheezes, rales, rhonchi  CVS: Regular rate and rhythm, no murmurs, rubs, or gallops  ABD: Soft, nondistended, nontender, normoactive bowel sounds, no masses  EXT: No edema, nontender  SKIN: Warm and well perfused, no rashes noted      RADIOLOGY:  < from: Xray Chest 1 View- PORTABLE-Urgent (Xray Chest 1 View- PORTABLE-Urgent .) (06.16.21 @ 07:01) >    IMPRESSION:  Bilateral pleural effusions/adjacent atelectasis, improved.    < end of copied text >      CENTRAL LINE: N          DATE INSERTED:                  GRAHAM: N                        DATE INSERTED:                  A-LINE: N                       DATE INSERTED:                  GLOBAL ISSUE/BEST PRACTICE:  Analgesia: N/A  Sedation: N/A  HOB elevation: yes  Stress ulcer prophylaxis: famotidine   VTE prophylaxis: Lovenox  Glycemic control: SSI  Nutrition: Soft diet

## 2021-06-18 NOTE — PROGRESS NOTE ADULT - SUBJECTIVE AND OBJECTIVE BOX
Subjective: Pt lying in bed sleeping.  NAD noted.    Vital Signs:  Vital Signs Last 24 Hrs  T(C): 36.8 (06-18-21 @ 08:15), Max: 36.8 (06-18-21 @ 05:00)  T(F): 98.3 (06-18-21 @ 08:15), Max: 98.3 (06-18-21 @ 08:15)  HR: 82 (06-18-21 @ 08:15) (63 - 89)  BP: 102/61 (06-18-21 @ 08:15) (91/56 - 119/74)  RR: 18 (06-18-21 @ 08:15) (18 - 20)  SpO2: 95% (06-18-21 @ 08:15) (92% - 100%)      Neuro: A&Ox3.  CV: S1S2 RRR, no murmurs/rubs  Pulm: decreased BS b/l, no wheezing  Abd:+ BS soft NT ND      06-17 @ 07:01  -  06-18 @ 07:00  --------------------------------------------------------  IN:    Albumin 25%  -  50 mL: 100 mL    Oral Fluid: 440 mL  Total IN: 540 mL    OUT:    Chest Tube (mL): 250 mL    Voided (mL): 200 mL  Total OUT: 450 mL    Total NET: 90 mL

## 2021-06-18 NOTE — PROGRESS NOTE ADULT - ASSESSMENT
89y/oF PMH HTN, CAD, DM, atrial flutter w/PPD, CVA admitted with abd pain and recurrent R pleural effusion     R pleural effusion s/p R thoracentesis   -likely malignant   -s/p pleurx cath 6/15  -s/p ICU   -s/p shock, on midodrine 20mg q8, wean as tolerated     Diarrhea, colitis on CT   Hypomagnesemia   -s/p cipro, flagyl     Chronic aflutter   -on amiodarone   -coumadin on hold     HTN, HLD   -monitor, on midodrine currently   -cont statin     DM2  -hgba1c 5.8  -ISS     ALEA resolved     DVT ppx: SCDs, started on lovenox

## 2021-06-18 NOTE — PROGRESS NOTE ADULT - PROBLEM SELECTOR PLAN 1
cytology (-) 6/7  s/p Pleurx 6/15 w/ Dr. Joy   TTE ordered as per Benita to r/o Pericardial Effusion, performed, results pending  daily CXR  Hypotensive post procedure.  Now resolved.    TTE with trivial pericardial effusion.  Plan to CAP pleurx on Discharge       d/w Dr. Joy in AM rounds

## 2021-06-18 NOTE — PROGRESS NOTE ADULT - SUBJECTIVE AND OBJECTIVE BOX
SUSIE VALENTINO    [id number]    89y      Female    CC: pleural effusion     INTERVAL HPI/OVERNIGHT EVENTS: pt seen and examined. denies cp, sob, abd pain, n/v/c/d. says she is having difficulty swallowing food. unclear if new     REVIEW OF SYSTEMS:    CONSTITUTIONAL: No fever, weight loss  RESPIRATORY: No cough, wheezing, hemoptysis; No shortness of breath  CARDIOVASCULAR: No chest pain, palpitations  GASTROINTESTINAL: No abdominal or epigastric pain. No nausea, vomiting  NEUROLOGICAL: No headaches    Vital Signs Last 24 Hrs  T(C): 36.6 (18 Jun 2021 15:52), Max: 36.8 (18 Jun 2021 05:00)  T(F): 97.9 (18 Jun 2021 15:52), Max: 98.3 (18 Jun 2021 08:15)  HR: 112 (18 Jun 2021 15:52) (73 - 112)  BP: 96/58 (18 Jun 2021 15:52) (91/56 - 119/74)  BP(mean): --  RR: 18 (18 Jun 2021 15:52) (18 - 18)  SpO2: 93% (18 Jun 2021 15:52) (92% - 95%)    PHYSICAL EXAM:    GENERAL: NAD  HEENT: PERRL, +EOMI  NECK: soft, supple  CHEST/LUNG: respirations unlabored; +R pleurx   HEART: S1S2+, Regular rate and rhythm  ABDOMEN: Soft, Nontender, Nondistended; Bowel sounds present  SKIN: warm, dry   NEURO: AAOX3, no focal deficits  PSYCH: normal affect     LABS:                        8.8    14.64 )-----------( 352      ( 18 Jun 2021 07:42 )             28.4     06-18    139  |  103  |  17.6  ----------------------------<  97  3.9   |  20.0<L>  |  0.97    Ca    8.3<L>      18 Jun 2021 07:42  Phos  3.2     06-17  Mg     2.0     06-17      PT/INR - ( 18 Jun 2021 07:42 )   PT: 14.0 sec;   INR: 1.22 ratio                 MEDICATIONS  (STANDING):  aMIOdarone    Tablet 200 milliGRAM(s) Oral daily  ascorbic acid 500 milliGRAM(s) Oral daily  atorvastatin 20 milliGRAM(s) Oral at bedtime  chlorhexidine 4% Liquid 1 Application(s) Topical <User Schedule>  dextrose 40% Gel 15 Gram(s) Oral once  dextrose 5%. 1000 milliLiter(s) (50 mL/Hr) IV Continuous <Continuous>  dextrose 5%. 1000 milliLiter(s) (100 mL/Hr) IV Continuous <Continuous>  dextrose 50% Injectable 25 Gram(s) IV Push once  dextrose 50% Injectable 12.5 Gram(s) IV Push once  dextrose 50% Injectable 25 Gram(s) IV Push once  enoxaparin Injectable 40 milliGRAM(s) SubCutaneous daily  famotidine    Tablet 20 milliGRAM(s) Oral daily  FLUoxetine 10 milliGRAM(s) Oral at bedtime  folic acid 1 milliGRAM(s) Oral daily  glucagon  Injectable 1 milliGRAM(s) IntraMuscular once  insulin lispro (ADMELOG) corrective regimen sliding scale   SubCutaneous three times a day before meals  insulin lispro (ADMELOG) corrective regimen sliding scale   SubCutaneous at bedtime  melatonin 3 milliGRAM(s) Oral at bedtime  midodrine 20 milliGRAM(s) Oral every 8 hours  multivitamin/minerals 1 Tablet(s) Oral daily    MEDICATIONS  (PRN):  acetaminophen   Tablet .. 650 milliGRAM(s) Oral every 6 hours PRN Temp greater or equal to 38C (100.4F), Mild Pain (1 - 3)      RADIOLOGY & ADDITIONAL TESTS:

## 2021-06-19 NOTE — PROGRESS NOTE ADULT - SUBJECTIVE AND OBJECTIVE BOX
Patient is a 89y old  Female who presents with a chief complaint of diarrhea and pleural effusion (18 Jun 2021 19:04)    Pt seen and exam. C/O fatigue. denies cp,sob,fever,chill      REVIEW OF SYSTEMS: All systems are reviewed and found to be negative except above    MEDICATIONS  (STANDING):  aMIOdarone    Tablet 200 milliGRAM(s) Oral daily  ascorbic acid 500 milliGRAM(s) Oral daily  atorvastatin 20 milliGRAM(s) Oral at bedtime  chlorhexidine 4% Liquid 1 Application(s) Topical <User Schedule>  dextrose 40% Gel 15 Gram(s) Oral once  dextrose 5%. 1000 milliLiter(s) (50 mL/Hr) IV Continuous <Continuous>  dextrose 5%. 1000 milliLiter(s) (100 mL/Hr) IV Continuous <Continuous>  dextrose 50% Injectable 25 Gram(s) IV Push once  dextrose 50% Injectable 12.5 Gram(s) IV Push once  dextrose 50% Injectable 25 Gram(s) IV Push once  enoxaparin Injectable 40 milliGRAM(s) SubCutaneous daily  famotidine    Tablet 20 milliGRAM(s) Oral daily  FLUoxetine 10 milliGRAM(s) Oral at bedtime  folic acid 1 milliGRAM(s) Oral daily  glucagon  Injectable 1 milliGRAM(s) IntraMuscular once  insulin lispro (ADMELOG) corrective regimen sliding scale   SubCutaneous three times a day before meals  insulin lispro (ADMELOG) corrective regimen sliding scale   SubCutaneous at bedtime  melatonin 3 milliGRAM(s) Oral at bedtime  midodrine 20 milliGRAM(s) Oral every 8 hours  multivitamin/minerals 1 Tablet(s) Oral daily  sodium chloride 0.9%. 1000 milliLiter(s) (75 mL/Hr) IV Continuous <Continuous>    MEDICATIONS  (PRN):  acetaminophen   Tablet .. 650 milliGRAM(s) Oral every 6 hours PRN Temp greater or equal to 38C (100.4F), Mild Pain (1 - 3)      CAPILLARY BLOOD GLUCOSE      POCT Blood Glucose.: 126 mg/dL (19 Jun 2021 08:45)  POCT Blood Glucose.: 125 mg/dL (18 Jun 2021 20:54)  POCT Blood Glucose.: 181 mg/dL (18 Jun 2021 16:28)    I&O's Summary      PHYSICAL EXAM:  Vital Signs Last 24 Hrs  T(C): 36.3 (19 Jun 2021 08:20), Max: 37.1 (18 Jun 2021 21:32)  T(F): 97.4 (19 Jun 2021 08:20), Max: 98.7 (18 Jun 2021 21:32)  HR: 85 (19 Jun 2021 08:20) (66 - 112)  BP: 110/64 (19 Jun 2021 08:20) (96/58 - 122/76)  BP(mean): --  RR: 18 (19 Jun 2021 08:20) (16 - 18)  SpO2: 98% (19 Jun 2021 08:20) (93% - 98%)    CONSTITUTIONAL: NAD,  EYES: PERRLA; conjunctiva and sclera clear  ENMT: Moist oral mucosa,   RESPIRATORY: Normal respiratory effort; lungs are clear to auscultation bilaterally  CARDIOVASCULAR:IRR, normal S1 and S2, no murmur   EXTS: No lower extremity edema; Peripheral pulses are 2+ bilaterally  ABDOMEN: Nontender to palpation, normoactive bowel sounds, no rebound/guarding;   MUSCLOSKELETAL:   no joint swelling or tenderness to palpation  PSYCH: affect appropriate  NEUROLOGY: A+O to person, place, and time;  no gross  deficits;       LABS:                        9.2    11.22 )-----------( 315      ( 19 Jun 2021 08:16 )             29.6     06-19    141  |  108<H>  |  14.9  ----------------------------<  121<H>  3.9   |  23.0  |  0.67    Ca    8.4<L>      19 Jun 2021 08:16  Mg     2.0     06-19      PT/INR - ( 18 Jun 2021 07:42 )   PT: 14.0 sec;   INR: 1.22 ratio                     RADIOLOGY & ADDITIONAL TESTS:  Results Reviewed:   Imaging Personally Reviewed:  Electrocardiogram Personally Reviewed:    COORDINATION OF CARE:  Care Discussed with Consultants/Other Providers [Y/N]:  Prior or Outpatient Records Reviewed [Y/N]:

## 2021-06-19 NOTE — PROGRESS NOTE ADULT - ASSESSMENT
89y/oF PMH HTN, CAD, DM, atrial flutter w/PPD, CVA admitted with abd pain and recurrent R pleural effusion     R pleural effusion s/p R thoracentesis   -likely malignant   -s/p Is/p Pleurx 6/15 w/ Dr. Joy . Plan to CAP pleurx on Discharge [facility]   -TTE with trivial pericardial effusion.  -s/p shock, on midodrine 20mg q8, wean as tolerated   -oxygen to keep so2 >90%      Diarrhea, colitis on CT   Hypomagnesemia   -s/p cipro, flagyl     Chronic aflutter   -on amiodarone   -coumadin on hold .     HTN, HLD   -monitor, on midodrine currently   -cont statin     DM2  -hgba1c 5.8  -ISS     ALEA resolved     Severe protein-calorie malnutrition.    Diet Soft-  Supplement Feeding Modality:  Oral  Ensure Muscle Health Cans or Servings Per Day.  Frequency:  Three Times a day        DVT ppx: SCDs, started on lovenox  care of plan mariama pt  mariama rn

## 2021-06-20 NOTE — PROGRESS NOTE ADULT - ASSESSMENT
89y/oF PMH HTN, CAD, DM, atrial flutter w/PPD, CVA admitted with abd pain and recurrent R pleural effusion     Anemia  -h/h trending down. recent hx of colitis. off coumadin , on lovenox ppx dose  -long dw daughter marino. does not want any aggressive treatment. she wants her mom to be on hospice care. ok with transfusion at time moment if needs temporarily  - continue to hold coumadin. daughter understood risk of thromboembolism and stroke.   -repeat cbc,iron panel,stool occult    R pleural effusion s/p R thoracentesis   -likely malignant   -s/p Is/p Pleurx 6/15 w/ Dr. Joy . Plan to CAP pleurx on Discharge [facility]   -TTE with trivial pericardial effusion.  -s/p shock, on midodrine 20mg q8, wean as tolerated   -oxygen to keep so2 >90%      Diarrhea, colitis on CT   Hypomagnesemia   -s/p cipro, flagyl     Chronic aflutter   -on amiodarone   -coumadin on hold .     HTN, HLD   -monitor, on midodrine currently   -cont statin     DM2  -hgba1c 5.8  -ISS     ALEA resolved     Severe protein-calorie malnutrition.    Diet Soft-  Supplement Feeding Modality:  Oral  Ensure Muscle Health Cans or Servings Per Day.  Frequency:  Three Times a day        DVT ppx: SCDs, started on lovenox  care of plan dw pt  mariama pichardo above care of plan. she wants to talk to hospice while she is here.  will c/s hospice  mariama rn 89y/oF PMH HTN, CAD, DM, atrial flutter w/PPD, CVA admitted with abd pain and recurrent R pleural effusion     Anemia  -h/h trending down. recent hx of colitis. off coumadin , on lovenox ppx dose  -long dw daughter marino. does not want any aggressive treatment. she wants her mom to be on hospice care. ok with transfusion at time moment if needs temporarily  - continue to hold coumadin. daughter understood risk of thromboembolism and stroke.   -repeat cbc,iron panel,stool occult    R pleural effusion s/p R thoracentesis   -likely malignant   -s/p Is/p Pleurx 6/15 w/ Dr. Joy . Plan to CAP pleurx on Discharge, chest tube present.   -TTE with trivial pericardial effusion.  -s/p shock, on midodrine 20mg q8, wean as tolerated   -oxygen to keep so2 >90%      Diarrhea, colitis on CT   Hypomagnesemia   -s/p cipro, flagyl     Chronic aflutter   -on amiodarone   -coumadin on hold .     HTN, HLD   -monitor, on midodrine currently   -cont statin     DM2  -hgba1c 5.8  -ISS     ALEA resolved     Severe protein-calorie malnutrition.    Diet Soft-  Supplement Feeding Modality:  Oral  Ensure Muscle Health Cans or Servings Per Day.  Frequency:  Three Times a day        DVT ppx: SCDs, started on lovenox  care of plan dw pt  mariama marino above care of plan. she wants to talk to hospice while she is here.  will c/s hospice  mariama rn

## 2021-06-20 NOTE — PROGRESS NOTE ADULT - SUBJECTIVE AND OBJECTIVE BOX
SUBJECTIVE:  Pt in  bed alert watching TV. Pt states, "I am alright"  Pt c/o of right knee hurting, pt denies chest pain, SOB, palpitations, N/V/D    Overnight events:  none      PAST MEDICAL & SURGICAL HISTORY:  CAD (coronary artery disease)  s/p CABG- 2004-J    HTN (hypertension)    Hyperlipidemia    Obesity    Diabetes    [facility]    Valvular disease    S/P CABG (coronary artery bypass graft)    S/P cholecystectomy    S/P coronary artery stent placement  x 4, 2006-[facility]    Aortic valve replaced    Artificial pacemaker          acetaminophen   Tablet .. 650 milliGRAM(s) Oral every 6 hours PRN  aMIOdarone    Tablet 200 milliGRAM(s) Oral daily  ascorbic acid 500 milliGRAM(s) Oral daily  atorvastatin 20 milliGRAM(s) Oral at bedtime  chlorhexidine 4% Liquid 1 Application(s) Topical <User Schedule>  dextrose 40% Gel 15 Gram(s) Oral once  dextrose 5%. 1000 milliLiter(s) IV Continuous <Continuous>  dextrose 5%. 1000 milliLiter(s) IV Continuous <Continuous>  dextrose 50% Injectable 25 Gram(s) IV Push once  dextrose 50% Injectable 12.5 Gram(s) IV Push once  dextrose 50% Injectable 25 Gram(s) IV Push once  enoxaparin Injectable 40 milliGRAM(s) SubCutaneous daily  famotidine    Tablet 20 milliGRAM(s) Oral daily  FLUoxetine 10 milliGRAM(s) Oral at bedtime  folic acid 1 milliGRAM(s) Oral daily  glucagon  Injectable 1 milliGRAM(s) IntraMuscular once  guaiFENesin  milliGRAM(s) Oral every 12 hours  insulin lispro (ADMELOG) corrective regimen sliding scale   SubCutaneous three times a day before meals  insulin lispro (ADMELOG) corrective regimen sliding scale   SubCutaneous at bedtime  melatonin 3 milliGRAM(s) Oral at bedtime  midodrine 20 milliGRAM(s) Oral every 8 hours  multivitamin/minerals 1 Tablet(s) Oral daily  MEDICATIONS  (PRN):  acetaminophen   Tablet .. 650 milliGRAM(s) Oral every 6 hours PRN Temp greater or equal to 38C (100.4F), Mild Pain (1 - 3)      Daily     Daily                               8.9    10.98 )-----------( 313      ( 20 Jun 2021 14:24 )             29.4   06-20    142  |  108<H>  |  14.3  ----------------------------<  123<H>  3.7   |  23.0  |  0.66    Ca    8.3<L>      20 Jun 2021 06:42  Mg     2.0     06-19        PT/INR - ( 20 Jun 2021 06:42 )   PT: 15.1 sec;   INR: 1.32 ratio               Objective:  T(C): 36.4 (06-20-21 @ 15:50), Max: 37 (06-19-21 @ 20:20)  HR: 89 (06-20-21 @ 15:50) (76 - 92)  BP: 108/66 (06-20-21 @ 15:50) (105/54 - 142/91)  RR: 18 (06-20-21 @ 15:50) (14 - 18)  SpO2: 97% (06-20-21 @ 15:50) (94% - 98%)  Wt(kg): --CAPILLARY BLOOD GLUCOSE      POCT Blood Glucose.: 131 mg/dL (20 Jun 2021 16:53)  POCT Blood Glucose.: 138 mg/dL (20 Jun 2021 12:10)  POCT Blood Glucose.: 132 mg/dL (20 Jun 2021 08:17)  POCT Blood Glucose.: 167 mg/dL (19 Jun 2021 21:13)  I&O's Summary    19 Jun 2021 07:01  -  20 Jun 2021 07:00  --------------------------------------------------------  IN: 0 mL / OUT: 20 mL / NET: -20 mL    20 Jun 2021 07:01  -  20 Jun 2021 17:09  --------------------------------------------------------  IN: 0 mL / OUT: 200 mL / NET: -200 mL        Physical Exam:  Gen: NAD  Neuro: Alert and oriented to person and place, non-focal, speech clear and intact  CV: S1S2 RRR, no murmurs/rubs  Pulm: decreased BS b/l, no wheezing  Abd:+ BS soft NT ND  Ext: no clubbing/cyanosis/edema, +2+ DP/radial pulses b/l  b/l healing skin tears of UE, Right knee with + prolene sutures, area ecchymotic     Imaging:  CXR:  < from: Xray Chest 1 View- PORTABLE-Urgent (Xray Chest 1 View- PORTABLE-Urgent .) (06.16.21 @ 07:01) >    IMPRESSION:  Bilateral pleural effusions/adjacent atelectasis, improved.    < end of copied text >

## 2021-06-20 NOTE — PROCEDURE NOTE - ADDITIONAL PROCEDURE DETAILS
sono-guided 18G IV secured. Good flash, flushes easily. Tourniquet removed, bed lowered, and sharps were disposed. Pt tolerated procedure well.
ultrasound guidance utilized, able to visualize vessel, fully compressible no evidence of thrombosis

## 2021-06-20 NOTE — PROGRESS NOTE ADULT - PROBLEM SELECTOR PLAN 1
cytology (-) 6/7  s/p Pleurx 6/15 w/ Dr. Joy   TTE ordered as per Benita to r/o Pericardial Effusion, performed TTE with trivial pericardial effusion.  daily CXR  Hypotensive post procedure.  Now resolved.      Plan to CAP pleurx on Discharge       d/w Dr. Joy in AM rounds

## 2021-06-20 NOTE — PROGRESS NOTE ADULT - SUBJECTIVE AND OBJECTIVE BOX
Patient is a 89y old  Female who presents with a chief complaint of diarrhea and pleural effusion (19 Jun 2021 12:05)    Pt denies any cp,sob,abd pain,n/v/d  Denies any active bleed    REVIEW OF SYSTEMS: All systems are reviewed and found to be negative except above    MEDICATIONS  (STANDING):  aMIOdarone    Tablet 200 milliGRAM(s) Oral daily  ascorbic acid 500 milliGRAM(s) Oral daily  atorvastatin 20 milliGRAM(s) Oral at bedtime  chlorhexidine 4% Liquid 1 Application(s) Topical <User Schedule>  dextrose 40% Gel 15 Gram(s) Oral once  dextrose 5%. 1000 milliLiter(s) (50 mL/Hr) IV Continuous <Continuous>  dextrose 5%. 1000 milliLiter(s) (100 mL/Hr) IV Continuous <Continuous>  dextrose 50% Injectable 25 Gram(s) IV Push once  dextrose 50% Injectable 12.5 Gram(s) IV Push once  dextrose 50% Injectable 25 Gram(s) IV Push once  enoxaparin Injectable 40 milliGRAM(s) SubCutaneous daily  famotidine    Tablet 20 milliGRAM(s) Oral daily  FLUoxetine 10 milliGRAM(s) Oral at bedtime  folic acid 1 milliGRAM(s) Oral daily  glucagon  Injectable 1 milliGRAM(s) IntraMuscular once  guaiFENesin  milliGRAM(s) Oral every 12 hours  insulin lispro (ADMELOG) corrective regimen sliding scale   SubCutaneous three times a day before meals  insulin lispro (ADMELOG) corrective regimen sliding scale   SubCutaneous at bedtime  melatonin 3 milliGRAM(s) Oral at bedtime  midodrine 20 milliGRAM(s) Oral every 8 hours  multivitamin/minerals 1 Tablet(s) Oral daily    MEDICATIONS  (PRN):  acetaminophen   Tablet .. 650 milliGRAM(s) Oral every 6 hours PRN Temp greater or equal to 38C (100.4F), Mild Pain (1 - 3)      CAPILLARY BLOOD GLUCOSE      POCT Blood Glucose.: 132 mg/dL (20 Jun 2021 08:17)  POCT Blood Glucose.: 167 mg/dL (19 Jun 2021 21:13)  POCT Blood Glucose.: 169 mg/dL (19 Jun 2021 17:00)  POCT Blood Glucose.: 192 mg/dL (19 Jun 2021 12:35)    I&O's Summary    19 Jun 2021 07:01  -  20 Jun 2021 07:00  --------------------------------------------------------  IN: 0 mL / OUT: 20 mL / NET: -20 mL    20 Jun 2021 07:01  -  20 Jun 2021 12:01  --------------------------------------------------------  IN: 0 mL / OUT: 200 mL / NET: -200 mL        PHYSICAL EXAM:  Vital Signs Last 24 Hrs  T(C): 36.4 (20 Jun 2021 08:12), Max: 37 (19 Jun 2021 20:20)  T(F): 97.6 (20 Jun 2021 08:12), Max: 98.6 (19 Jun 2021 20:20)  HR: 78 (20 Jun 2021 08:43) (75 - 92)  BP: 142/91 (20 Jun 2021 08:12) (94/61 - 142/91)  BP(mean): 71 (19 Jun 2021 20:20) (71 - 71)  RR: 18 (20 Jun 2021 08:12) (14 - 18)  SpO2: 97% (20 Jun 2021 08:12) (92% - 98%)    CONSTITUTIONAL: NAD,  EYES: PERRLA; conjunctiva and sclera clear  ENMT: Moist oral mucosa,   RESPIRATORY: Normal respiratory effort; occasional crackle  to auscultation bilaterally  CARDIOVASCULAR: normal S1 and S2, no murmur   EXTS: trace lower extremity edema; Peripheral pulses are 2+ bilaterally  ABDOMEN: Nontender to palpation, normoactive bowel sounds, no rebound/guarding;   MUSCLOSKELETAL: no joint swelling or tenderness to palpation  PSYCH: affect appropriate  NEUROLOGY: A+O to person, place, and time;  chronic LE weakness. no gross acute  deficits;       LABS:                        8.2    9.98  )-----------( 326      ( 20 Jun 2021 06:42 )             26.6     06-20    142  |  108<H>  |  14.3  ----------------------------<  123<H>  3.7   |  23.0  |  0.66    Ca    8.3<L>      20 Jun 2021 06:42  Mg     2.0     06-19      PT/INR - ( 20 Jun 2021 06:42 )   PT: 15.1 sec;   INR: 1.32 ratio                     RADIOLOGY & ADDITIONAL TESTS:  Results Reviewed:   Imaging Personally Reviewed:  Electrocardiogram Personally Reviewed:    COORDINATION OF CARE:  Care Discussed with Consultants/Other Providers [Y/N]:  Prior or Outpatient Records Reviewed [Y/N]:

## 2021-06-21 NOTE — PROGRESS NOTE ADULT - SUBJECTIVE AND OBJECTIVE BOX
Patient is a 89y old  Female who presents with a chief complaint of diarrhea and pleural effusion (20 Jun 2021 17:08)    Pt denies any cp,sob,fever,chill.     REVIEW OF SYSTEMS: All systems are reviewed and found to be negative except above    MEDICATIONS  (STANDING):  aMIOdarone    Tablet 200 milliGRAM(s) Oral daily  ascorbic acid 500 milliGRAM(s) Oral daily  atorvastatin 20 milliGRAM(s) Oral at bedtime  chlorhexidine 4% Liquid 1 Application(s) Topical <User Schedule>  dextrose 40% Gel 15 Gram(s) Oral once  dextrose 5%. 1000 milliLiter(s) (50 mL/Hr) IV Continuous <Continuous>  dextrose 5%. 1000 milliLiter(s) (100 mL/Hr) IV Continuous <Continuous>  dextrose 50% Injectable 25 Gram(s) IV Push once  dextrose 50% Injectable 12.5 Gram(s) IV Push once  dextrose 50% Injectable 25 Gram(s) IV Push once  enoxaparin Injectable 40 milliGRAM(s) SubCutaneous daily  famotidine    Tablet 20 milliGRAM(s) Oral daily  FLUoxetine 10 milliGRAM(s) Oral at bedtime  folic acid 1 milliGRAM(s) Oral daily  glucagon  Injectable 1 milliGRAM(s) IntraMuscular once  guaiFENesin  milliGRAM(s) Oral every 12 hours  insulin lispro (ADMELOG) corrective regimen sliding scale   SubCutaneous three times a day before meals  insulin lispro (ADMELOG) corrective regimen sliding scale   SubCutaneous at bedtime  melatonin 3 milliGRAM(s) Oral at bedtime  midodrine 20 milliGRAM(s) Oral every 8 hours  multivitamin/minerals 1 Tablet(s) Oral daily    MEDICATIONS  (PRN):  acetaminophen   Tablet .. 650 milliGRAM(s) Oral every 6 hours PRN Temp greater or equal to 38C (100.4F), Mild Pain (1 - 3)      CAPILLARY BLOOD GLUCOSE      POCT Blood Glucose.: 114 mg/dL (21 Jun 2021 12:04)  POCT Blood Glucose.: 123 mg/dL (21 Jun 2021 08:47)  POCT Blood Glucose.: 124 mg/dL (20 Jun 2021 21:21)  POCT Blood Glucose.: 131 mg/dL (20 Jun 2021 16:53)    I&O's Summary    20 Jun 2021 07:01  -  21 Jun 2021 07:00  --------------------------------------------------------  IN: 0 mL / OUT: 200 mL / NET: -200 mL        PHYSICAL EXAM:  Vital Signs Last 24 Hrs  T(C): 36.5 (21 Jun 2021 08:00), Max: 36.8 (20 Jun 2021 21:02)  T(F): 97.7 (21 Jun 2021 08:00), Max: 98.2 (20 Jun 2021 21:02)  HR: 88 (21 Jun 2021 08:00) (75 - 89)  BP: 150/83 (21 Jun 2021 08:00) (108/66 - 150/83)  BP(mean): --  RR: 18 (21 Jun 2021 08:00) (16 - 18)  SpO2: 96% (21 Jun 2021 08:00) (96% - 98%)    CONSTITUTIONAL: NAD,  EYES: PERRLA; conjunctiva and sclera clear  ENMT: Moist oral mucosa,   RESPIRATORY: Normal respiratory effort; mild crackle  to auscultation bilaterally  CARDIOVASCULAR:, normal S1 and S2, no murmur   EXTS: trace lower extremity edema; Peripheral pulses are 2+ bilaterally  ABDOMEN: Nontender to palpation, normoactive bowel sounds, no rebound/guarding;   MUSCLOSKELETAL:  no joint swelling or tenderness to palpation  PSYCH: affect appropriate  NEUROLOGY: A+O to person, place, and on/off time;  no acute  gross deficits;       LABS:                        8.7    11.83 )-----------( 385      ( 21 Jun 2021 07:30 )             27.9     06-21    142  |  107  |  13.6  ----------------------------<  121<H>  4.0   |  23.0  |  0.65    Ca    8.6      21 Jun 2021 07:30      PT/INR - ( 21 Jun 2021 07:30 )   PT: 15.5 sec;   INR: 1.36 ratio                     RADIOLOGY & ADDITIONAL TESTS:  Results Reviewed:   Imaging Personally Reviewed:  Electrocardiogram Personally Reviewed:    COORDINATION OF CARE:  Care Discussed with Consultants/Other Providers [Y/N]:  Prior or Outpatient Records Reviewed [Y/N]:

## 2021-06-21 NOTE — PROGRESS NOTE ADULT - SUBJECTIVE AND OBJECTIVE BOX
SUBJECTIVE:  Pt in bed alert, Pt states, " you think you can given me water".  Patient denies acute pain with radiating or aggravating factors.  She denies chest pain, shortness of breath, palpitations, headache, dizziness, nausea, or vomiting.      Overnight events:  none    PAST MEDICAL & SURGICAL HISTORY:  CAD (coronary artery disease)  s/p CABG- 2004-[facility]    HTN (hypertension)    Hyperlipidemia    Obesity    Diabetes    [facility]    Valv[facility] disease    S/P CABG (coronary artery bypass graft)    S/P cholecystectomy    S/P coronary artery stent placement  x 4, 2006-[facility]    Aortic valve replaced    Artificial pacemaker          acetaminophen   Tablet .. 650 milliGRAM(s) Oral every 6 hours PRN  aMIOdarone    Tablet 200 milliGRAM(s) Oral daily  ascorbic acid 500 milliGRAM(s) Oral daily  atorvastatin 20 milliGRAM(s) Oral at bedtime  chlorhexidine 4% Liquid 1 Application(s) Topical <User Schedule>  dextrose 40% Gel 15 Gram(s) Oral once  dextrose 5%. 1000 milliLiter(s) IV Continuous <Continuous>  dextrose 5%. 1000 milliLiter(s) IV Continuous <Continuous>  dextrose 50% Injectable 25 Gram(s) IV Push once  dextrose 50% Injectable 12.5 Gram(s) IV Push once  dextrose 50% Injectable 25 Gram(s) IV Push once  enoxaparin Injectable 40 milliGRAM(s) SubCutaneous daily  famotidine    Tablet 20 milliGRAM(s) Oral daily  FLUoxetine 10 milliGRAM(s) Oral at bedtime  folic acid 1 milliGRAM(s) Oral daily  glucagon  Injectable 1 milliGRAM(s) IntraMuscular once  guaiFENesin  milliGRAM(s) Oral every 12 hours  insulin lispro (ADMELOG) corrective regimen sliding scale   SubCutaneous three times a day before meals  insulin lispro (ADMELOG) corrective regimen sliding scale   SubCutaneous at bedtime  melatonin 3 milliGRAM(s) Oral at bedtime  midodrine 20 milliGRAM(s) Oral every 8 hours  multivitamin/minerals 1 Tablet(s) Oral daily  MEDICATIONS  (PRN):  acetaminophen   Tablet .. 650 milliGRAM(s) Oral every 6 hours PRN Temp greater or equal to 38C (100.4F), Mild Pain (1 - 3)      Daily                                   8.7    11.83 )-----------( 385      ( 21 Jun 2021 07:30 )             27.9   06-21    142  |  107  |  13.6  ----------------------------<  121<H>  4.0   |  23.0  |  0.65    Ca    8.6      21 Jun 2021 07:30        PT/INR - ( 21 Jun 2021 07:30 )   PT: 15.5 sec;   INR: 1.36 ratio               Objective:  T(C): 36.5 (06-21-21 @ 08:00), Max: 36.8 (06-20-21 @ 21:02)  HR: 88 (06-21-21 @ 08:00) (75 - 89)  BP: 150/83 (06-21-21 @ 08:00) (108/66 - 150/83)  RR: 18 (06-21-21 @ 08:00) (16 - 18)  SpO2: 96% (06-21-21 @ 08:00) (96% - 98%)  Wt(kg): --CAPILLARY BLOOD GLUCOSE      POCT Blood Glucose.: 114 mg/dL (21 Jun 2021 12:04)  POCT Blood Glucose.: 123 mg/dL (21 Jun 2021 08:47)  POCT Blood Glucose.: 124 mg/dL (20 Jun 2021 21:21)  POCT Blood Glucose.: 131 mg/dL (20 Jun 2021 16:53)  I&O's Summary    20 Jun 2021 07:01  -  21 Jun 2021 07:00  --------------------------------------------------------  IN: 0 mL / OUT: 200 mL / NET: -200 mL        Physical Exam      Imaging:  CXR:      ECG:               SUBJECTIVE:  Pt in bed alert, Pt states, " you think you can given me water".  Patient denies acute pain with radiating or aggravating factors.  She denies chest pain, shortness of breath, palpitations, headache, dizziness, nausea, or vomiting.      Overnight events:  none    PAST MEDICAL & SURGICAL HISTORY:  CAD (coronary artery disease)  s/p CABG- 2004-[facility]    HTN (hypertension)    Hyperlipidemia    Obesity    Diabetes    [facility]    [facility] disease    S/P CABG (coronary artery bypass graft)    S/P cholecystectomy    S/P coronary artery stent placement  x 4, 2006-[facility]    Aortic valve replaced    Artificial pacemaker          acetaminophen   Tablet .. 650 milliGRAM(s) Oral every 6 hours PRN  aMIOdarone    Tablet 200 milliGRAM(s) Oral daily  ascorbic acid 500 milliGRAM(s) Oral daily  atorvastatin 20 milliGRAM(s) Oral at bedtime  chlorhexidine 4% Liquid 1 Application(s) Topical <User Schedule>  dextrose 40% Gel 15 Gram(s) Oral once  dextrose 5%. 1000 milliLiter(s) IV Continuous <Continuous>  dextrose 5%. 1000 milliLiter(s) IV Continuous <Continuous>  dextrose 50% Injectable 25 Gram(s) IV Push once  dextrose 50% Injectable 12.5 Gram(s) IV Push once  dextrose 50% Injectable 25 Gram(s) IV Push once  enoxaparin Injectable 40 milliGRAM(s) SubCutaneous daily  famotidine    Tablet 20 milliGRAM(s) Oral daily  FLUoxetine 10 milliGRAM(s) Oral at bedtime  folic acid 1 milliGRAM(s) Oral daily  glucagon  Injectable 1 milliGRAM(s) IntraMuscular once  guaiFENesin  milliGRAM(s) Oral every 12 hours  insulin lispro (ADMELOG) corrective regimen sliding scale   SubCutaneous three times a day before meals  insulin lispro (ADMELOG) corrective regimen sliding scale   SubCutaneous at bedtime  melatonin 3 milliGRAM(s) Oral at bedtime  midodrine 20 milliGRAM(s) Oral every 8 hours  multivitamin/minerals 1 Tablet(s) Oral daily  MEDICATIONS  (PRN):  acetaminophen   Tablet .. 650 milliGRAM(s) Oral every 6 hours PRN Temp greater or equal to 38C (100.4F), Mild Pain (1 - 3)      Daily                                   8.7    11.83 )-----------( 385      ( 21 Jun 2021 07:30 )             27.9   06-21    142  |  107  |  13.6  ----------------------------<  121<H>  4.0   |  23.0  |  0.65    Ca    8.6      21 Jun 2021 07:30        PT/INR - ( 21 Jun 2021 07:30 )   PT: 15.5 sec;   INR: 1.36 ratio               Objective:  T(C): 36.5 (06-21-21 @ 08:00), Max: 36.8 (06-20-21 @ 21:02)  HR: 88 (06-21-21 @ 08:00) (75 - 89)  BP: 150/83 (06-21-21 @ 08:00) (108/66 - 150/83)  RR: 18 (06-21-21 @ 08:00) (16 - 18)  SpO2: 96% (06-21-21 @ 08:00) (96% - 98%)  Wt(kg): --CAPILLARY BLOOD GLUCOSE      POCT Blood Glucose.: 114 mg/dL (21 Jun 2021 12:04)  POCT Blood Glucose.: 123 mg/dL (21 Jun 2021 08:47)  POCT Blood Glucose.: 124 mg/dL (20 Jun 2021 21:21)  POCT Blood Glucose.: 131 mg/dL (20 Jun 2021 16:53)  I&O's Summary    20 Jun 2021 07:01  -  21 Jun 2021 07:00  --------------------------------------------------------  IN: 0 mL / OUT: 200 mL / NET: -200 mL          Physical Exam:  Gen: NAD  Neuro: Alert and oriented to person and place, non-focal, speech clear and intact  CV: S1S2 RRR, no murmurs/rubs  Pulm: decreased BS b/l, no wheezing  Abd:+ BS soft NT ND  Ext: no clubbing/cyanosis/edema, +2+ DP/radial pulses b/l  b/l healing skin tears of UE, Right knee with + prolene sutures, area ecchymotic     Imaging:  CXR:    < from: Xray Chest 1 View- PORTABLE-Urgent (Xray Chest 1 View- PORTABLE-Urgent .) (06.21.21 @ 08:38) >    IMPRESSION:    Peripheral consolidations, loculated effusions/heterogeneous mass in the right hemithorax and infiltrate/atelectasis/possible effusion at the left base, unchanged    < end of copied text >

## 2021-06-21 NOTE — CHART NOTE - NSCHARTNOTEFT_GEN_A_CORE
Source: Patient [ ]  Family [ ]   other [x ]EMR    Current Diet: Soft with Ensure TID    Patient reports [ ] nausea  [ ] vomiting [ ] diarrhea [ ] constipation  [ ]chewing problems [ ] swallowing issues  [ ] other:     PO intake:  < 50% [x ]   50-75%  [ ]   %  [ ]  other :    Source for PO intake [ ] Patient [ ] family [ ] chart [ ] staff [ ] other    Enteral /Parenteral Nutrition:     Current Weight: 184.9# 6/7  no edema    % Weight Change     Pertinent Medications: MEDICATIONS  (STANDING):  aMIOdarone    Tablet 200 milliGRAM(s) Oral daily  ascorbic acid 500 milliGRAM(s) Oral daily  atorvastatin 20 milliGRAM(s) Oral at bedtime  ciprofloxacin   IVPB 400 milliGRAM(s) IV Intermittent every 12 hours  dextrose 40% Gel 15 Gram(s) Oral once  dextrose 5%. 1000 milliLiter(s) (50 mL/Hr) IV Continuous <Continuous>  dextrose 5%. 1000 milliLiter(s) (100 mL/Hr) IV Continuous <Continuous>  dextrose 50% Injectable 25 Gram(s) IV Push once  dextrose 50% Injectable 12.5 Gram(s) IV Push once  dextrose 50% Injectable 25 Gram(s) IV Push once  enoxaparin Injectable 80 milliGRAM(s) SubCutaneous every 12 hours  famotidine    Tablet 20 milliGRAM(s) Oral daily  FLUoxetine 10 milliGRAM(s) Oral at bedtime  folic acid 1 milliGRAM(s) Oral daily  glucagon  Injectable 1 milliGRAM(s) IntraMuscular once  insulin lispro (ADMELOG) corrective regimen sliding scale   SubCutaneous three times a day before meals  insulin lispro (ADMELOG) corrective regimen sliding scale   SubCutaneous at bedtime  melatonin 3 milliGRAM(s) Oral at bedtime  metoprolol tartrate 25 milliGRAM(s) Oral every 8 hours  metroNIDAZOLE  IVPB 500 milliGRAM(s) IV Intermittent every 8 hours  multivitamin/minerals 1 Tablet(s) Oral daily  sodium chloride 0.9%. 1000 milliLiter(s) (40 mL/Hr) IV Continuous <Continuous>    MEDICATIONS  (PRN):  acetaminophen   Tablet .. 650 milliGRAM(s) Oral every 6 hours PRN Temp greater or equal to 38C (100.4F), Mild Pain (1 - 3)  ondansetron Injectable 4 milliGRAM(s) IV Push every 6 hours PRN Nausea and/or Vomiting    Pertinent Labs: 06-14 Na132 mmol/L<L> Glu 106 mg/dL<H> K+ 4.3 mmol/L Cr  0.96 mg/dL BUN 15.8 mg/dL Phos n/a   Alb n/a   PAB n/a               Skin:     Nutrition focused physical exam conducted - found signs of malnutrition [ ]absent [ x]present    Subcutaneous fat loss: [x ] Orbital fat pads region, [x ]Buccal fat region, [ ]Triceps region,  [ ]Ribs region    Muscle wasting: [ x]Temples region, [x ]Clavicle region, [x ]Shoulder region, [ ]Scapula region, [ ]Interosseous region,  [ ]thigh region, [ ]Calf region    Estimated Needs:   [ x] no change since previous assessment  [ ] recalculated:     Current Nutrition Diagnosis: Malnutrition Severe (Chronic) Related to inadequate protein energy intake with persistent lack of appetite in setting of advanced age; now admitted w/ Right Pleural effusion s/p Right thoracocentesis and Diarrhea (Colitis) as evidenced by 100#(35%) weight loss over 1 year, meeting <75% est needs > 1 mo, NFPE. Last bowel movement 6/13 fecal incontinence.       Recommendations: Continue Ensure TID  Continue MVI, folic acid, Vit C  provide encouragement during meals    Monitoring and Evaluation:   [x] PO intake [ x] Tolerance to diet prescription [X] Weights  [X] Follow up per protocol [X] Labs:
Palliative NP Note:  Chart reviewed, GOC to be conservative treatment returning to [employer] [facility] DNR completed. No further recommendations from Palliative team; please reconsult if goals or  condition changes. Signing off.  Nava Morrow NP
Source: Patient [ ]  Family [ ]   other [x ]    Current Diet: Diet, Soft:   Supplement Feeding Modality:  Oral  Ensure Muscle Health Cans or Servings Per Day:  1       Frequency:  Three Times a day (06-15-21 @ 16:14)    PO intake:  < 50% [x ]   50-75%  [ ]   %  [ ]  other :    Current Weight:   (6/17) 154 lbs  -Obtain daily wts, continue to monitor. 2+ R leg edema noted.      Pertinent Medications: MEDICATIONS  (STANDING):  aMIOdarone    Tablet 200 milliGRAM(s) Oral daily  ascorbic acid 500 milliGRAM(s) Oral daily  atorvastatin 20 milliGRAM(s) Oral at bedtime  chlorhexidine 4% Liquid 1 Application(s) Topical <User Schedule>  dextrose 40% Gel 15 Gram(s) Oral once  dextrose 5%. 1000 milliLiter(s) (50 mL/Hr) IV Continuous <Continuous>  dextrose 5%. 1000 milliLiter(s) (100 mL/Hr) IV Continuous <Continuous>  dextrose 50% Injectable 25 Gram(s) IV Push once  dextrose 50% Injectable 12.5 Gram(s) IV Push once  dextrose 50% Injectable 25 Gram(s) IV Push once  enoxaparin Injectable 40 milliGRAM(s) SubCutaneous daily  famotidine    Tablet 20 milliGRAM(s) Oral daily  FLUoxetine 10 milliGRAM(s) Oral at bedtime  folic acid 1 milliGRAM(s) Oral daily  glucagon  Injectable 1 milliGRAM(s) IntraMuscular once  guaiFENesin  milliGRAM(s) Oral every 12 hours  insulin lispro (ADMELOG) corrective regimen sliding scale   SubCutaneous three times a day before meals  insulin lispro (ADMELOG) corrective regimen sliding scale   SubCutaneous at bedtime  melatonin 3 milliGRAM(s) Oral at bedtime  midodrine 20 milliGRAM(s) Oral every 8 hours  multivitamin/minerals 1 Tablet(s) Oral daily    MEDICATIONS  (PRN):  acetaminophen   Tablet .. 650 milliGRAM(s) Oral every 6 hours PRN Temp greater or equal to 38C (100.4F), Mild Pain (1 - 3)    Pertinent Labs: CBC Full  -  ( 21 Jun 2021 07:30 )  WBC Count : 11.83 K/uL  RBC Count : 3.13 M/uL  Hemoglobin : 8.7 g/dL  Hematocrit : 27.9 %  Platelet Count - Automated : 385 K/uL  Mean Cell Volume : 89.1 fl  Mean Cell Hemoglobin : 27.8 pg  Mean Cell Hemoglobin Concentration : 31.2 gm/dL  Auto Neutrophil # : x  Auto Lymphocyte # : x  Auto Monocyte # : x  Auto Eosinophil # : x  Auto Basophil # : x  Auto Neutrophil % : x  Auto Lymphocyte % : x  Auto Monocyte % : x  Auto Eosinophil % : x  Auto Basophil % : x  06-21 Na142 mmol/L Glu 121 mg/dL<H> K+ 4.0 mmol/L Cr  0.65 mg/dL BUN 13.6 mg/dL Phos n/a   Alb n/a   PAB n/a       Skin: surgical incision    Nutrition focused physical exam previously conducted - found signs of malnutrition [ ]absent [ x]present    Subcutaneous fat loss: [x ] Orbital fat pads region, [ x]Buccal fat region, [ ]Triceps region,  [ ]Ribs region    Muscle wasting: [x ]Temples region, [ x]Clavicle region, [x ]Shoulder region, [ ]Scapula region, [ ]Interosseous region,  [ ]thigh region, [ ]Calf region    Estimated Needs:   [x ] no change since previous assessment  [ ] recalculated:     Current Nutrition Diagnosis: Pt remains at high nutrition risk and meets criteria for Severe (Chronic) malnutrition related to inadequate protein energy intake with persistent lack of appetite in setting of advanced age; now admitted w/ Right Pleural effusion s/p Right thoracocentesis and Diarrhea (Colitis) as evidenced by 100#(35%) weight loss over 1 year, meeting <75% est needs > 1 mo, NFPE. s/p Is/p Pleurx 6/15. Pt requires assistance with meals, po intake varying per documentation. Pt consuming % of meals, noted with poor appetite today per RN flowsheet. Last documented BM 6/17. RD to remain available.     Recommendations:   1) d/c Ensure Muscle Health. Add Ensure Enlive TID to optimize po intake and provide an additional 350kcal, 20g protein per serving.  2) Continue diet as tolerated  3) Continue MVI, Vitamin C, folic acid daily  4) Encourage po intake and HBV protein  5) Monitor wts and labs    Monitoring and Evaluation:   [x ] PO intake [ x] Tolerance to diet prescription [X] Weights  [X] Follow up per protocol [X] Labs:

## 2021-06-21 NOTE — PROGRESS NOTE ADULT - ASSESSMENT
89y/oF PMH HTN, CAD, DM, atrial flutter w/PPD, CVA admitted with abd pain and recurrent R pleural effusion     Anemia  -hb 8.7  off coumadin , on lovenox ppx dose  -long dw daughter marino. does not want any aggressive treatment. she wants her mom to be on hospice care. ok with transfusion at time moment if needs temporarily  - continue to hold coumadin. daughter understood risk of thromboembolism and stroke.   -cbc,iron low side. pending stool occult  - ppi    R pleural effusion s/p R thoracentesis   -likely malignant   -s/p Is/p Pleurx 6/15 w/ Dr. Joy . Plan to CAP pleurx on Discharge [facility]   -TTE with trivial pericardial effusion.  -s/p shock, on midodrine 20mg q8, wean as tolerated   -oxygen to keep so2 >90%  -cxr -no acute change. mild leukocytosis. monitor off abx for now      Diarrhea, colitis on CT   Hypomagnesemia   -s/p cipro, flagyl     Chronic aflutter   -on amiodarone   -coumadin on hold .     HTN, HLD   -monitor, on midodrine currently   -cont statin     DM2  -hgba1c 5.8  -ISS     ALEA resolved     Severe protein-calorie malnutrition.    Diet Soft-  Supplement Feeding Modality:  Oral  Ensure Muscle Health Cans or Servings Per Day.  Frequency:  Three Times a day        DVT ppx: SCDs, started on lovenox  care of plan dw pt  mariama marino above care of plan. she wants to talk to hospice while pt  is here.   c/s hospice  dw rn

## 2021-06-21 NOTE — PROGRESS NOTE ADULT - PROBLEM SELECTOR PLAN 1
cytology (-) 6/7  s/p Pleurx 6/15 w/ Dr. Joy   TTE ordered as per Benita to r/o Pericardial Effusion, performed TTE with trivial pericardial effusion.  daily CXR  Hypotensive post procedure.  Now resolved.      Plan to CAP pleurx on Discharge       d/w Dr. Joy and thoracic team in AM rounds

## 2021-06-22 NOTE — SWALLOW BEDSIDE ASSESSMENT ADULT - SLP GENERAL OBSERVATIONS
Pt received asleep in bed, arousable to voice, ?[place], reduced cognition, 0x2-3, agreeable to eval, pain 0/10 pre/post assessment

## 2021-06-22 NOTE — PROGRESS NOTE ADULT - NUTRITIONAL ASSESSMENT
This patient has been assessed with a concern for Malnutrition and has been determined to have a diagnosis/diagnoses of Severe protein-calorie malnutrition.    This patient is being managed with:   Diet Soft-  Supplement Feeding Modality:  Oral  Ensure Muscle Health Cans or Servings Per Day:  1       Frequency:  Three Times a day  Entered: Gilmer 15 2021  4:12PM    
This patient has been assessed with a concern for Malnutrition and has been determined to have a diagnosis/diagnoses of Severe protein-calorie malnutrition.    This patient is being managed with:   Diet Soft-  Supplement Feeding Modality:  Oral  Ensure Muscle Health Cans or Servings Per Day:  1       Frequency:  Three Times a day  Entered: Jun 9 2021 11:53AM    
This patient has been assessed with a concern for Malnutrition and has been determined to have a diagnosis/diagnoses of Severe protein-calorie malnutrition.    This patient is being managed with:   Diet Soft-  Supplement Feeding Modality:  Oral  Ensure Muscle Health Cans or Servings Per Day:  1       Frequency:  Three Times a day  Entered: Jun 9 2021 11:53AM    
This patient has been assessed with a concern for Malnutrition and has been determined to have a diagnosis/diagnoses of Severe protein-calorie malnutrition.    This patient is being managed with:   Diet Dysphagia 2 Mechanical Soft-Nectar Consistency Fluid-  Entered: Jun 22 2021 11:32AM    
This patient has been assessed with a concern for Malnutrition and has been determined to have a diagnosis/diagnoses of Severe protein-calorie malnutrition.    This patient is being managed with:   Diet Soft-  Supplement Feeding Modality:  Oral  Ensure Muscle Health Cans or Servings Per Day:  1       Frequency:  Three Times a day  Entered: Jun 9 2021 11:53AM    
This patient has been assessed with a concern for Malnutrition and has been determined to have a diagnosis/diagnoses of Severe protein-calorie malnutrition.    This patient is being managed with:   Diet Mechanical Soft-  Consistent Carbohydrate {Evening Snack} (CSTCHOSN)  Fiber/Residue Restricted (LOWFIBER)  DASH/TLC {Sodium & Cholesterol Restricted} (DASH)  Supplement Feeding Modality:  Oral  Ensure Enlive Cans or Servings Per Day:  1       Frequency:  Three Times a day  Entered: Jun 7 2021  5:39PM    
This patient has been assessed with a concern for Malnutrition and has been determined to have a diagnosis/diagnoses of Severe protein-calorie malnutrition.    This patient is being managed with:   Diet Soft-  Supplement Feeding Modality:  Oral  Ensure Muscle Health Cans or Servings Per Day:  1       Frequency:  Three Times a day  Entered: Gilmer 15 2021  4:12PM    
This patient has been assessed with a concern for Malnutrition and has been determined to have a diagnosis/diagnoses of Severe protein-calorie malnutrition.    This patient is being managed with:   Diet Mechanical Soft-  Consistent Carbohydrate {Evening Snack} (CSTCHOSN)  Fiber/Residue Restricted (LOWFIBER)  DASH/TLC {Sodium & Cholesterol Restricted} (DASH)  Supplement Feeding Modality:  Oral  Ensure Enlive Cans or Servings Per Day:  1       Frequency:  Three Times a day  Entered: Jun 7 2021  5:39PM    
This patient has been assessed with a concern for Malnutrition and has been determined to have a diagnosis/diagnoses of Severe protein-calorie malnutrition.    This patient is being managed with:   Diet NPO-  NPO for Procedure/Test     NPO Start Date: 14-Jun-2021   NPO Start Time: 23:59  Except Medications  Entered: Jun 14 2021 12:59PM    Diet Soft-  Supplement Feeding Modality:  Oral  Ensure Muscle Health Cans or Servings Per Day:  1       Frequency:  Three Times a day  Entered: Jun 9 2021 11:53AM    
This patient has been assessed with a concern for Malnutrition and has been determined to have a diagnosis/diagnoses of Severe protein-calorie malnutrition.    This patient is being managed with:   Diet Soft-  Supplement Feeding Modality:  Oral  Ensure Muscle Health Cans or Servings Per Day:  1       Frequency:  Three Times a day  Entered: Gilmer 15 2021  4:12PM    
This patient has been assessed with a concern for Malnutrition and has been determined to have a diagnosis/diagnoses of Severe protein-calorie malnutrition.    This patient is being managed with:   Diet Soft-  Supplement Feeding Modality:  Oral  Ensure Muscle Health Cans or Servings Per Day:  1       Frequency:  Three Times a day  Entered: Jun 9 2021 11:53AM    

## 2021-06-22 NOTE — SWALLOW BEDSIDE ASSESSMENT ADULT - SLP PERTINENT HISTORY OF CURRENT PROBLEM
89y/oF PMH HTN, CAD, DM, atrial flutter w/PPD, CVA admitted with abd pain and recurrent R pleural effusion

## 2021-06-22 NOTE — SWALLOW BEDSIDE ASSESSMENT ADULT - SWALLOW EVAL: RECOMMENDED FEEDING/EATING TECHNIQUES
crush medication (when feasible)/maintain upright posture during/after eating for 30 mins/no straws/oral hygiene/position upright (90 degrees)/small sips/bites

## 2021-06-22 NOTE — PROGRESS NOTE ADULT - ASSESSMENT
89y/oF PMH HTN, CAD, DM, atrial flutter w/PPD, CVA admitted with abd pain and recurrent R pleural effusion     Anemia -iron dif   -hb 8.7  off coumadin ,   -long dw daughter marino. does not want any aggressive treatment. she wants her mom to be on hospice care. ok with transfusion at time moment if needs temporarily  - continue to hold coumadin. daughter understood risk of thromboembolism and stroke.   - ppi    R pleural effusion s/p R thoracentesis   -likely malignant   -s/p Is/p Pleurx 6/15 w/ Dr. Joy . Plan to CAP pleurx on Discharge CU   -TTE with trivial pericardial effusion.  -s/p shock, on midodrine 20mg q8, wean as tolerated   -oxygen to keep so2 >90%  -cxr -no acute change. mild leukocytosis. monitor off abx for now      Diarrhea, colitis on CT   Hypomagnesemia   -s/p cipro, flagyl     Chronic aflutter   -on amiodarone   -coumadin on hold .     HTN, HLD   -monitor, on midodrine currently   -cont statin     DM2  -hgba1c 5.8  -ISS     ALEA resolved     Severe protein-calorie malnutrition.    Diet Soft-  Supplement Feeding Modality:  Oral  Ensure Muscle Health Cans or Servings Per Day.  Frequency:  Three Times a day        DVT ppx: SCDs, started on lovenox  care of plan dw pt  mariama marino above care of plan. dc plan to [facility] with  hospice   dw sw.rn 89y/oF PMH HTN, CAD, DM, atrial flutter w/PPD, CVA admitted with abd pain and recurrent R pleural effusion     Anemia -iron dif   -hb 8.7  off coumadin ,   -long dw daughter marino. does not want any aggressive treatment. she wants her mom to be on hospice care. ok with transfusion at time moment if needs temporarily  - continue to hold coumadin. daughter understood risk of thromboembolism and stroke.   - ppi    R pleural effusion s/p R thoracentesis   -lung mass likely malignant , neg pl fluid for malignancy . family does not want further work up.  -s/p Is/p Pleurx 6/15 w/ Dr. Joy . Plan to CAP pleurx on Discharge CU   -TTE with trivial pericardial effusion.  -s/p hypovolumic shock, on midodrine 20mg q8, wean as tolerated   -oxygen to keep so2 >90%  -cxr -no acute change. mild leukocytosis. monitor off abx for now      Diarrhea, colitis on CT   Hypomagnesemia   -s/p cipro, flagyl     Chronic aflutter   -on amiodarone   -coumadin on hold .     HTN, HLD   -monitor, on midodrine currently   -cont statin     DM2  -hgba1c 5.8  -ISS     ALEA resolved     Severe protein-calorie malnutrition.    Diet Soft-  Supplement Feeding Modality:  Oral  Ensure Muscle Health Cans or Servings Per Day.  Frequency:  Three Times a day        DVT ppx: SCDs, started on lovenox  care of plan dw pt  mariama marino above care of plan. dc plan to [facility] with  hospice   dw sw.rn 89y/oF PMH HTN, CAD, DM, atrial flutter w/PPD, CVA admitted with abd pain and recurrent R pleural effusion     Anemia -iron dif   -hb 8.7  off coumadin ,   -long dw daughter marino. does not want any aggressive treatment. she wants her mom to be on hospice care. ok with transfusion at time moment if needs temporarily  - continue to hold coumadin. daughter understood risk of thromboembolism and stroke.   - ppi    R pleural effusion s/p R thoracentesis   -lung mass likely malignant ,unable to specify as family does not want further work up.  -s/p Is/p Pleurx 6/15 w/ Dr. Joy . Plan to CAP pleurx on Discharge [facility]   -TTE with trivial pericardial effusion.  -s/p hypovolumic shock, on midodrine 20mg q8, wean as tolerated   -oxygen to keep so2 >90%  -cxr -no acute change. mild leukocytosis. monitor off abx for now      Diarrhea, colitis on CT   Hypomagnesemia   -s/p cipro, flagyl     Chronic aflutter   -on amiodarone   -coumadin on hold .     HTN, HLD   -monitor, on midodrine currently   -cont statin     DM2  -hgba1c 5.8  -ISS     ALEA resolved     Severe protein-calorie malnutrition.    Diet Soft-  Supplement Feeding Modality:  Oral  Ensure Muscle Health Cans or Servings Per Day.  Frequency:  Three Times a day        DVT ppx: SCDs, started on lovenox  care of plan dw pt  mariama marino above care of plan. dc plan to [facility] with  hospice   dw sw.rn

## 2021-06-22 NOTE — DISCHARGE NOTE NURSING/CASE MANAGEMENT/SOCIAL WORK - PATIENT PORTAL LINK FT
You can access the FollowMyHealth Patient Portal offered by [facility] by registering at the following website: [email or web address]. By joining [facility]’s FollowMyHealth portal, you will also be able to view your health information using other applications (apps) compatible with our system.

## 2021-06-22 NOTE — SWALLOW BEDSIDE ASSESSMENT ADULT - ORAL PHASE
Within functional limits Delayed oral transit time/Lingual stasis +piecemeal, suspect posterior loss

## 2021-06-22 NOTE — PROGRESS NOTE ADULT - PROVIDER SPECIALTY LIST ADULT
Anesthesia
Hospitalist
Hospitalist
Internal Medicine
Thoracic Surgery
Hospitalist
Internal Medicine
Thoracic Surgery
Hospitalist
Thoracic Surgery
Critical Care
Hospitalist
Hospitalist
Internal Medicine
Thoracic Surgery
Critical Care
Hospitalist
Hospitalist
Internal Medicine
Internal Medicine
Thoracic Surgery
Thoracic Surgery
Hospitalist
Hospitalist
Thoracic Surgery
Thoracic Surgery

## 2021-06-22 NOTE — PROGRESS NOTE ADULT - SUBJECTIVE AND OBJECTIVE BOX
Patient is a 89y old  Female who presents with a chief complaint of diarrhea and pleural effusion (2021 15:37)    pt seen and exam. Pt is aaox3. denies any pain.      REVIEW OF SYSTEMS: All systems are reviewed and found to be negative except above    MEDICATIONS  (STANDING):  aMIOdarone    Tablet 200 milliGRAM(s) Oral daily  ascorbic acid 500 milliGRAM(s) Oral daily  atorvastatin 20 milliGRAM(s) Oral at bedtime  chlorhexidine 4% Liquid 1 Application(s) Topical <User Schedule>  dextrose 40% Gel 15 Gram(s) Oral once  dextrose 5%. 1000 milliLiter(s) (50 mL/Hr) IV Continuous <Continuous>  dextrose 5%. 1000 milliLiter(s) (100 mL/Hr) IV Continuous <Continuous>  dextrose 50% Injectable 25 Gram(s) IV Push once  dextrose 50% Injectable 12.5 Gram(s) IV Push once  dextrose 50% Injectable 25 Gram(s) IV Push once  enoxaparin Injectable 40 milliGRAM(s) SubCutaneous daily  famotidine    Tablet 20 milliGRAM(s) Oral daily  FLUoxetine 10 milliGRAM(s) Oral at bedtime  folic acid 1 milliGRAM(s) Oral daily  glucagon  Injectable 1 milliGRAM(s) IntraMuscular once  guaiFENesin  milliGRAM(s) Oral every 12 hours  insulin lispro (ADMELOG) corrective regimen sliding scale   SubCutaneous three times a day before meals  insulin lispro (ADMELOG) corrective regimen sliding scale   SubCutaneous at bedtime  melatonin 3 milliGRAM(s) Oral at bedtime  midodrine 20 milliGRAM(s) Oral every 8 hours  multivitamin/minerals 1 Tablet(s) Oral daily    MEDICATIONS  (PRN):  acetaminophen   Tablet .. 650 milliGRAM(s) Oral every 6 hours PRN Temp greater or equal to 38C (100.4F), Mild Pain (1 - 3)      CAPILLARY BLOOD GLUCOSE      POCT Blood Glucose.: 132 mg/dL (2021 12:16)  POCT Blood Glucose.: 112 mg/dL (2021 08:54)  POCT Blood Glucose.: 124 mg/dL (2021 00:01)  POCT Blood Glucose.: 144 mg/dL (2021 16:53)    I&O's Summary    2021 07:01  -  2021 07:00  --------------------------------------------------------  IN: 0 mL / OUT: 90 mL / NET: -90 mL        PHYSICAL EXAM:  Vital Signs Last 24 Hrs  T(C): 36.5 (2021 08:32), Max: 37 (2021 15:52)  T(F): 97.7 (2021 08:32), Max: 98.6 (2021 15:52)  HR: 88 (2021 08:32) (76 - 100)  BP: 144/79 (2021 08:32) (126/73 - 144/79)  BP(mean): --  RR: 16 (2021 08:32) (16 - 16)  SpO2: 96% (2021 08:32) (95% - 98%)    CONSTITUTIONAL: NAD,  EYES: PERRLA; conjunctiva and sclera clear  ENMT: Moist oral mucosa,   RESPIRATORY: Normal respiratory effort; mild crackle  to auscultation bilaterally  CARDIOVASCULAR: Regular rate and rhythm, normal S1 and S2, no murmur   EXTS: trace lower extremity edema; Peripheral pulses are 2+ bilaterally  ABDOMEN: Nontender to palpation, normoactive bowel sounds, no rebound/guarding;   PSYCH: affect appropriate  NEUROLOGY: A+O to person, place, and on/off time; no acute neuro change.      LABS:                        8.7    8.77  )-----------( 345      ( 2021 08:07 )             29.2     06-22    144  |  109<H>  |  12.6  ----------------------------<  104<H>  4.0   |  23.0  |  0.57    Ca    8.6      2021 08:06      PT/INR - ( 2021 07:30 )   PT: 15.5 sec;   INR: 1.36 ratio               Urinalysis Basic - ( 2021 06:59 )    Color: Yellow / Appearance: Slightly Turbid / S.015 / pH: x  Gluc: x / Ketone: Trace  / Bili: Negative / Urobili: Negative mg/dL   Blood: x / Protein: 30 mg/dL / Nitrite: Negative   Leuk Esterase: Trace / RBC: Negative /HPF / WBC 0-2   Sq Epi: x / Non Sq Epi: Few / Bacteria: Occasional          RADIOLOGY & ADDITIONAL TESTS:  Results Reviewed:   Imaging Personally Reviewed:  Electrocardiogram Personally Reviewed:    COORDINATION OF CARE:  Care Discussed with Consultants/Other Providers [Y/N]:  Prior or Outpatient Records Reviewed [Y/N]:

## 2021-06-22 NOTE — PROGRESS NOTE ADULT - NSICDXPILOT_GEN_ALL_CORE
[place]

## 2021-06-22 NOTE — PROGRESS NOTE ADULT - REASON FOR ADMISSION
diarrhea and pleural effusion

## 2021-06-23 NOTE — CDI QUERY NOTE - NSCDIOTHERTXTBX_GEN_ALL_CORE_HH
Can the type of shock be further clarified if known?  A.	Shock (please specify type)  B.	Shock unknown type  C.	Other, please specify  D.	Not clinically significant      Chart documentations:    6/15/21 Consult Note Adult-Critical Care Physician Assistant/Attendi:  Events last 24 hours:   Patient went for right pleurex catheter today, in PACU patient hypotensive requiring prince gtt. Patient thus far has drained total ~575 cc pleural fluid thus far.  Patient receiving LR IVF.  Admit to MICU.  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        NEURO: No active issues  CV: Shock state requiring vasopressor therapy, actively titrating prince for MAP >65, wean as tolerated.  Receiving crystalloid IVF.  Solucorteff. Repeat labs pending. Lopressor discontinued.      Discharge Note Provider:  R pleural effusion s/p R thoracentesis   -likely malignant   -s/p Is/p Pleurx 6/15 w/ Dr. Joy . Plan to CAP pleurx on Discharge CU   -TTE with trivial pericardial effusion.  -s/p shock, on midodrine 20mg q8, wean as tolerated   -oxygen to keep so2 >90%  -cxr -no acute change. mild leukocytosis. monitor off abx for now

## 2021-07-27 PROBLEM — Z74.09 IMPAIRED MOBILITY AND ADLS: Status: ACTIVE | Noted: 2019-08-06

## 2021-07-27 PROBLEM — R26.9 IMPAIRED GAIT: Status: ACTIVE | Noted: 2019-08-06

## 2021-07-27 PROBLEM — G81.11 RIGHT SPASTIC HEMIPARESIS: Status: ACTIVE | Noted: 2019-08-06

## 2021-07-27 PROBLEM — I63.512 ACUTE ISCHEMIC LEFT MCA STROKE: Status: ACTIVE | Noted: 2019-06-11

## 2021-08-06 NOTE — ED PROVIDER NOTE - CARDIAC, MLM
HPI  Kyle Franklin is a 48 y.o. female with h/o lost IUD strings who presents for IUD removal and replacement. She is doing well without complaints. Past Medical History:   Diagnosis Date    Essential hypertension     IUD strings lost     Plantar fasciitis     PONV (postoperative nausea and vomiting)     Posterior tibial tendinitis of left lower extremity     Scoliosis      Past Surgical History:   Procedure Laterality Date    APPENDECTOMY       SECTION N/A 1996    CHOLECYSTECTOMY, LAPAROSCOPIC N/A     LAPAROSCOPIC APPENDECTOMY N/A      Family History   Problem Relation Age of Onset    Rheum Arthritis Mother     Other Mother         Barter's Syndrome    Hypertension Other     Coronary Art Dis Other     Coronary Art Dis Father      Social History     Tobacco Use    Smoking status: Never Smoker    Smokeless tobacco: Never Used   Vaping Use    Vaping Use: Never used   Substance Use Topics    Alcohol use: Never    Drug use: Never     No current facility-administered medications on file prior to encounter. Current Outpatient Medications on File Prior to Encounter   Medication Sig Dispense Refill    olmesartan-hydroCHLOROthiazide (BENICAR HCT) 20-12.5 MG per tablet Take 1 tablet by mouth daily       levonorgestrel (MIRENA, 52 MG,) IUD 52 mg 1 each by Intrauterine route once       No Known Allergies  BP (!) 141/87   Pulse 79   Temp 98.4 °F (36.9 °C) (Temporal)   Ht 5' 3\" (1.6 m)   Wt 172 lb (78 kg)   LMP 2021   SpO2 99%   BMI 30.47 kg/m²   Physical Exam  Constitutional:       General: She is not in acute distress. Appearance: Normal appearance. She is not ill-appearing or diaphoretic. HENT:      Head: Normocephalic and atraumatic. Nose: Nose normal. No rhinorrhea. Eyes:      General: No scleral icterus. Right eye: No discharge. Left eye: No discharge. Extraocular Movements: Extraocular movements intact.    Pulmonary: Effort: Pulmonary effort is normal. No respiratory distress. Musculoskeletal:         General: Normal range of motion. Skin:     Coloration: Skin is not pale. Findings: No erythema or rash. Neurological:      Mental Status: She is alert and oriented to person, place, and time. Psychiatric:         Attention and Perception: Attention and perception normal.         Mood and Affect: Mood and affect normal.         Speech: Speech normal.         Behavior: Behavior normal. Behavior is cooperative. Thought Content: Thought content normal.         Cognition and Memory: Cognition and memory normal.         Judgment: Judgment normal.           Assessment  1. Missing IUD strings    Plan  To OR for Hysteroscopic IUD removal and placement. Risks of bleeding, infection, injury to surrounding structures and uterine perforation which may lead to inability to complete the planned procedure was reviewed. Patient states understanding and consent signed. Normal rate, regular rhythm.  Heart sounds S1, S2.  No murmurs, rubs or gallops.

## 2021-08-20 NOTE — H&P ADULT - ASSESSMENT
89 y/o F with PMH of HTN, CAD, DM, atrial flutter, CVA, lung mass presents from [facility] with shortness of breath and abdominal pain. The patient was recently discharged from [facility] on 6/22 to [facility]. Per patient's daughter at bedside, living will and MOLST form confirm patient does not want any further interventions. Patient placed on comfort measures.     Shortness of breath  Lung Mass  HTN, CAD, CVA, DM, Aflutter  - Comfort measures  - DNR/DNI  - Robinul q6h PRN for secretions  - Dilaudid drip  - Ativan 1mg q6h IVP  - Ativan 2mg IVP q2h PRN for agitation/restlessness  - patient not stable enough for transfer to [facility] at this time

## 2021-08-20 NOTE — H&P ADULT - NSICDXPASTSURGICALHX_GEN_ALL_CORE_FT
PAST SURGICAL HISTORY:  Aortic valve replaced     Artificial pacemaker     S/P CABG (coronary artery bypass graft)     S/P cholecystectomy     S/P coronary artery stent placement x 4, 2006-[facility] [place]

## 2021-08-20 NOTE — DISCHARGE NOTE FOR THE EXPIRED PATIENT - NS PATIENT DEATH CRITERIA
Absent breath sounds and pupillary reactions/Patient is dead based on Cardiopulmonary criteria including absent breath sounds, pulselessness and/or asystole

## 2021-08-20 NOTE — ED ADULT NURSE REASSESSMENT NOTE - NS ED NURSE REASSESS COMMENT FT1
MD Vizcarra called to bedside due to no signs of life present and EVERETTE called at 1525, family at  bedside, [facility] facility notified

## 2021-08-20 NOTE — ED PROVIDER NOTE - CLINICAL SUMMARY MEDICAL DECISION MAKING FREE TEXT BOX
89F hx stroke, cancer, resp failure on 3L, DNR/DNI/CMO, comes from [facility] SNF for severe abdominal pain and resp failure. IV morphine given, social work called to arrange hospice, end of life management

## 2021-08-20 NOTE — CONSULT NOTE ADULT - TIME BILLING
chart review, lab review, imaging review, discussions with family, Dr. Forte, and Dr. Vizcarra. Complex medical decision making in patient with terminal condition. complex symptom management in patient who is at end of life.

## 2021-08-20 NOTE — ED PROVIDER NOTE - ATTENDING CONTRIBUTION TO CARE
I, Edilberto Forte, personally saw the patient with the resident, and completed the key components of the history and physical exam. I then discussed the management plan with the resident.    89 yo F hx of CVA, lung ca, pleural effusion, sending from [facility] for hypoxia and abdominal pain. patient had abdominal pain x 1 week. Patient is on comfort care at [facility]. patient had living will and MOST that indicated DNR/DNI, no iv hydration, no abx, comfort measure only. Daughter at bed side agree with the plan. patient seen by palliative, will start on dilaudid infusion. patient admitted for comfort care. I, Edilberto Forte, personally saw the patient with the resident, and completed the key components of the history and physical exam. I then discussed the management plan with the resident.      Upon my evaluation, this patient had a high probability of imminent or life-threatening deterioration due to failure to thrive and respiratory failure, which required my direct attention, intervention, and personal management.    89 yo F hx of CVA, lung ca, pleural effusion, sending from [facility] for hypoxia and abdominal pain. patient had abdominal pain x 1 week. Patient is on comfort care at [facility]. patient had living will and MOST that indicated DNR/DNI, no iv hydration, no abx, comfort measure only. Daughter at bed side agree with the plan. patient seen by palliative, will start on dilaudid infusion. patient admitted for comfort care.    I have personally provided _40minutes of critical care time exculsive of time spent on separately billable procedures.  Time includes review discussing with family regarding code status, discussion with consultants, and monitoring for potential decompensation.  Interventions were performed as documented.

## 2021-08-20 NOTE — CONSULT NOTE ADULT - ASSESSMENT
Full note to follow.     End of life care    #1 Hypoxic respiratory failure - and pain - ordered continuous infusion of dilaudid with PRN dosing as well to optimize symptom management.   #2 Restlessness/ agitation - ativan ATC and PRN  #3 Neoplasm related pain - abdominal pain - dilaudid infusion. did not seem to respond to high doses of pushes of morphine.   #4 Encounter for palliative care - met with daughter at bedside comfort measures only, expressed to her prognosis at this time is likely hours to days. Patient is not stable for transfer to an inpatient hospice facility at this time due to cool extremities, heavy symptom burden and likely possibility of death in hours. Discussed with Dr. Forte and Dr. Vizcarra  89F with advanced heart disease, CVA, recent diagnosis of RUL mass concerning for cancer here with acute respiratory failure, neoplasm related pain, at end of life.    #1 Hypoxic respiratory failure - and pain - ordered continuous infusion of dilaudid with PRN dosing as well to optimize symptom management.   #2 Restlessness/ agitation - ativan ATC and PRN  #3 Neoplasm related pain - abdominal pain - dilaudid infusion. did not seem to respond to high doses of pushes of morphine.   #4 Encounter for palliative care - met with daughter at bedside comfort measures only, expressed to her prognosis at this time is likely hours to days. Patient is not stable for transfer to an inpatient hospice facility at this time due to cool extremities, heavy symptom burden and likely possibility of death in hours. Discussed with Dr. Forte and Dr. Vizcarra

## 2021-08-20 NOTE — ED PROVIDER NOTE - PROGRESS NOTE DETAILS
: patient see by case management, will get palliative consult. another morphine 4 mg IV ordered for respiratory distress. : patient seen by pallative Dr. Zuleta, patient unstable to go to the hospice in at the moment. recommend narcotic infusion. recommend admit to medicine for comfort care. : I spoke Dr. Vizcarra for admission : patient seen by palliative Dr. Zuleta, patient unstable to go to the hospice in at the moment. recommend narcotic infusion. recommend admit to medicine for comfort care.

## 2021-08-20 NOTE — ED PROVIDER NOTE - OBJECTIVE STATEMENT
89F hx stroke, cancer, resp failure on 3L, DNR/DNI/CMO, comes from [facility] SNF for severe abdominal pain and resp failure.  Signed living will, MOLST, and daughter who is health care proxy are at bedside.  Patient documented and daughter affirms patient does not want any interventions, not  even IVF or abx.  Daughter requests comfort measures only.

## 2021-08-20 NOTE — ED ADULT TRIAGE NOTE - CHIEF COMPLAINT QUOTE
BIBA sent from [facility] c/o SOB and sharp abdominal pain described as "ripping". Arrives alert and disoriented, RR36 saO2 100% on NRB 15L/min.  Pt taken to critical care with MD Forte and MD Alonzo bedside on arrival, MOLST form presented on arrival

## 2021-08-20 NOTE — ED PROVIDER NOTE - PHYSICAL EXAMINATION
General: Resp distress, critically ill appearing  HEENT: Normocephalic, EOM intact  Neck: No apparent stiffness or JVD  Pulm: Chest wall symmetric and nontender, lungs clear to ascultation   Cardiac: Increased rate rate and regular rhythm  Abdomen: tender to epigastrium  Skin: Skin is warm, dry and intact without rashes or lesions.  Neuro: No apparent motor or sensory deficits  Psych: Patient moaning, not answering questions

## 2021-08-20 NOTE — DISCHARGE NOTE FOR THE EXPIRED PATIENT - HOSPITAL COURSE
89 y/o F with PMH of HTN, CAD, DM, atrial flutter, CVA, lung mass presents from [facility] with shortness of breath and abdominal pain. The patient was recently discharged from [facility] on 6/22 to [facility]. Per patient's daughter at bedside, living will and MOLST form confirm patient does not want any further interventions. Patient placed on comfort measures. Notified at 15:25 that patient had absent spontaneous respirations and asystole on cardiac monitor.

## 2021-08-20 NOTE — ED ADULT NURSE NOTE - OBJECTIVE STATEMENT
Received pt from [facility].  Report from Mickie BLISS.  Pt BIBA  from [facility] with  c/o SOB and sharp abdominal pain described as "ripping". Arrives alert and disoriented, RR36 saO2 100% on NRB 15L/min.  Pt taken to critical care with MD Forte and MD Alonzo bedside on arrival, MOLST form presented on arrival.  Pt is comfort measures only, medicated as ordered.  Family at bedside.

## 2021-08-20 NOTE — CONSULT NOTE ADULT - SUBJECTIVE AND OBJECTIVE BOX
HPI:      PERTINENT PMH REVIEWED: Yes No    PAST MEDICAL & SURGICAL HISTORY:  CAD (coronary artery disease)  s/p CABG- 2004-[facility]    HTN (hypertension)    Hyperlipidemia    Obesity    Diabetes    Atrial flutter  1/2010- [facility], Dr Tran    Valvular disease    S/P CABG (coronary artery bypass graft)    S/P cholecystectomy    S/P coronary artery stent placement  x 4, 2006-[facility]    Aortic valve replaced    Artificial pacemaker        SOCIAL HISTORY:                      Substance history:                    Admitted from:  home  [facility]                     Catholic/spirituality:                    Cultural concerns:                      Surrogate/HCP/Guardian: Phone#:    FAMILY HISTORY:  Family history of hypertension (Father)    Family history of cerebrovascular accident (CVA) (Father)        Allergies    No Known Allergies    Intolerances        ADVANCE DIRECTIVES:   DNR YES NO  Completed on:                     MOLST  YES NO   Completed on:  Living Will  YES NO   Completed on:    Baseline ADLs (prior to admission):  Independent/ Dependent      Karnofsky/Palliative Performance Status Version 2:  %  http://npcrc.org/files/news/palliative_performance_scale_ppsv2.pdf    Present Symptoms:     Dyspnea: Mild Moderate Severe  Nausea/Vomiting: Yes No  Anxiety:  Yes No  Depression: Yes No  Fatigue: Yes No  Loss of appetite: Yes No  Constipation:     Pain:             Character-            Duration-            Effect-            Factors-            Frequency-            Location-            Severity-    Pain AD Score:  [email or web address] (press ctrl + left click to view)    Review of Systems: Reviewed                     Negative:                     Positive:  Unable to obtain due to poor mentation   All others negative    MEDICATIONS  (STANDING):  HYDROmorphone Infusion 2 mG/Hr (2 mL/Hr) IV Continuous <Continuous>  LORazepam   Injectable 1 milliGRAM(s) IV Push every 6 hours    MEDICATIONS  (PRN):  glycopyrrolate Injectable 0.4 milliGRAM(s) IV Push every 6 hours PRN secretions  LORazepam   Injectable 2 milliGRAM(s) IV Push every 2 hours PRN agtiation/restlessness      PHYSICAL EXAM:    Vital Signs Last 24 Hrs  T(C): --  T(F): --  HR: 69 (20 Aug 2021 11:33) (69 - 69)  BP: 132/50 (20 Aug 2021 11:33) (132/50 - 132/50)  BP(mean): --  RR: 38 (20 Aug 2021 11:33) (38 - 38)  SpO2: 100% (20 Aug 2021 11:33) (100% - 100%)    General: alert  oriented x ____ lethargic agitated delirious                  cachexia  nonverbal  coma    HEENT: normal  dry mouth  ET tube/trach    Lungs: comfortable tachypnea/labored breathing  excessive secretions    CV: normal  tachycardia    GI: normal  distended  tender  no BS               PEG/NG/OG tube  constipation  last BM:     : normal  incontinent  oliguria/anuria  carrington    MSK: normal  weakness  edema             ambulatory  bedbound/wheelchair bound    Neuro: no focal deficits cognitive impairment dysphagia dysarthria hemiplegia     Skin: normal  pressure ulcers- Stage_____  no rash    LABS:              I&O's Summary      RADIOLOGY & ADDITIONAL STUDIES:       HPI: 89F with PMH as listed transferred from [facility] due to severe abdominal pain, respiratory failure,  and symptoms that could not be managed under comfort care at the facility. Patient very symtomatic, received IVP morphine without benefit.     PERTINENT PMH REVIEWED: Yes     PAST MEDICAL & SURGICAL HISTORY:  CAD (coronary artery disease)  s/p CABG- 2004[facility]  HTN (hypertension)  Hyperlipidemia  Obesity  Diabetes  [facility]  [facility] disease  S/P CABG (coronary artery bypass graft)  S/P cholecystectomy  S/P coronary artery stent placement  x 4, 2006-[facility]  Aortic valve replaced  Artificial pacemaker    SOCIAL HISTORY:                      Substance history: non smoker                     Admitted from:  home                      Pentecostal/spirituality:                    Cultural concerns: none                       Surrogate - daughter Izabela      FAMILY HISTORY:  Family history of hypertension (Father)    Family history of cerebrovascular accident (CVA) (Father)    Allergies    No Known Allergies    ADVANCE DIRECTIVES: DNR/DNI. comfort measures only    Baseline ADLs (prior to admission):  Dependent      Karnofsky/Palliative Performance Status Version 2:  20%  http://npcrc.org/files/news/palliative_performance_scale_ppsv2.pdf    Present Symptoms:     Dyspnea: Severe  Nausea/Vomiting: No  Anxiety:  unable   Depression: unable   Fatigue: unable   Loss of appetite: unable   Constipation: no     Pain: per daughter severe abdominal pain             Character-            Duration-            Effect-            Factors-            Frequency-            Location-            Severity-    Pain AD Score:  [email or web address] (press ctrl + left click to view)    Review of Systems: Reviewed                   Unable to obtain due to poor mentation   All others negative    MEDICATIONS  (STANDING):  HYDROmorphone Infusion 2 mG/Hr (2 mL/Hr) IV Continuous <Continuous>  LORazepam   Injectable 1 milliGRAM(s) IV Push every 6 hours    MEDICATIONS  (PRN):  glycopyrrolate Injectable 0.4 milliGRAM(s) IV Push every 6 hours PRN secretions  LORazepam   Injectable 2 milliGRAM(s) IV Push every 2 hours PRN agtiation/restlessness      PHYSICAL EXAM:    Vital Signs Last 24 Hrs  T(C): --  T(F): --  HR: 69 (20 Aug 2021 11:33) (69 - 69)  BP: 132/50 (20 Aug 2021 11:33) (132/50 - 132/50)  BP(mean): --  RR: 38 (20 Aug 2021 11:33) (38 - 38)  SpO2: 100% (20 Aug 2021 11:33) (100% - 100%)    General: lethargic             HEENT: normal      Lungs: tachypnea/labored breathing      CV: normal      GI: normal     : normal      MSK: bedbound/wheelchair bound    Neuro: no focal deficits     Skin: no rash    LABS:    I&O's Summary    RADIOLOGY & ADDITIONAL STUDIES:            HPI: 89F with PMH as listed transferred from [facility] due to severe abdominal pain, respiratory failure,  and symptoms that could not be managed under comfort care at the facility. Patient very symtomatic, received IVP morphine without benefit.     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     PERTINENT PMH REVIEWED: Yes     PAST MEDICAL & SURGICAL HISTORY:  CAD (coronary artery disease)  s/p CABG- 2004-[facility]  HTN (hypertension)  Hyperlipidemia  Obesity  Diabetes  [facility]  [facility]  S/P CABG (coronary artery bypass graft)  S/P cholecystectomy  S/P coronary artery stent placement  x 4, 2006-[facility]  Aortic valve replaced  Artificial pacemaker    SOCIAL HISTORY:                      Substance history: non smoker                     Admitted from:  home                      Alevism/spirituality:                    Cultural concerns: none                       Surrogate - daughter Izabela      FAMILY HISTORY:  Family history of hypertension (Father)    Family history of cerebrovascular accident (CVA) (Father)    Allergies    No Known Allergies    ADVANCE DIRECTIVES: DNR/DNI. comfort measures only    Baseline ADLs (prior to admission):  Dependent      Karnofsky/Palliative Performance Status Version 2:  20%  http://npcrc.org/files/news/palliative_performance_scale_[email or web address].pdf    Present Symptoms:     Dyspnea: Severe  Nausea/Vomiting: No  Anxiety:  unable   Depression: unable   Fatigue: unable   Loss of appetite: unable   Constipation: no     Pain: per daughter severe abdominal pain             Character-            Duration-            Effect-            Factors-            Frequency-            Location-            Severity-    Pain AD Score:  [email or web address] (press ctrl + left click to view)    Review of Systems: Reviewed                   Unable to obtain due to poor mentation   All others negative    MEDICATIONS  (STANDING):  HYDROmorphone Infusion 2 mG/Hr (2 mL/Hr) IV Continuous <Continuous>  LORazepam   Injectable 1 milliGRAM(s) IV Push every 6 hours    MEDICATIONS  (PRN):  glycopyrrolate Injectable 0.4 milliGRAM(s) IV Push every 6 hours PRN secretions  LORazepam   Injectable 2 milliGRAM(s) IV Push every 2 hours PRN agtiation/restlessness      PHYSICAL EXAM:    Vital Signs Last 24 Hrs  T(C): --  T(F): --  HR: 69 (20 Aug 2021 11:33) (69 - 69)  BP: 132/50 (20 Aug 2021 11:33) (132/50 - 132/50)  BP(mean): --  RR: 38 (20 Aug 2021 11:33) (38 - 38)  SpO2: 100% (20 Aug 2021 11:33) (100% - 100%)    General: lethargic             HEENT: normal      Lungs: tachypnea/labored breathing      CV: normal      GI: normal     : normal      MSK: bedbound/wheelchair bound    Neuro: no focal deficits     Skin: no rash    LABS:    I&O's Summary    RADIOLOGY & ADDITIONAL STUDIES:

## 2021-08-20 NOTE — ED ADULT NURSE REASSESSMENT NOTE - NS ED NURSE REASSESS COMMENT FT1
Pt shows no cardiac activity at this time, only pacer spikes on the monitor.  No resp noted, Dr Vizcarra was called and will be down.  Family at bedside.

## 2021-08-20 NOTE — H&P ADULT - HISTORY OF PRESENT ILLNESS
89 y/o F with PMH of HTN, CAD, DM, atrial flutter, CVA, lung mass presents from [facility] with shortness of breath and abdominal pain. The patient was recently discharged from [facility] on 6/22 to [facility]. Per patient's daughter at bedside, living will and MOLST form confirm patient does not want any further interventions. Patient placed on comfort measures.

## 2021-08-20 NOTE — ED PROVIDER NOTE - CARE PLAN
1 Principal Discharge DX:	Acute respiratory failure with hypoxia   Principal Discharge DX:	Acute respiratory failure with hypoxia  Secondary Diagnosis:	End of life care

## 2021-08-20 NOTE — H&P ADULT - NSICDXPASTMEDICALHX_GEN_ALL_CORE_FT
PAST MEDICAL HISTORY:  Atrial flutter 1/2010- [facility], Dr Tran    CAD (coronary artery disease) s/p CABG- 2004-[facility]    Diabetes     HTN (hypertension)     Hyperlipidemia     Obesity     Valvular disease

## 2021-10-21 NOTE — BRIEF OPERATIVE NOTE - NSICDXBRIEFPOSTOP_GEN_ALL_CORE_FT
Detail Level: Detailed POST-OP DIAGNOSIS:  Pleural effusion 15-Gilmer-2021 16:04:54 Pleural effusion Uvaldo Prince   Detail Level: Simple Detail Level: Generalized Detail Level: Zone

## 2022-01-12 NOTE — SWALLOW BEDSIDE ASSESSMENT ADULT - SWALLOW EVAL: DIAGNOSIS
Updated prescription sent to pharmacy.   Mild oral dysphagia  confounded by incomplete dentition and reduced cognition. Pharyngeal stage of swallow appears functional for soft solids and nectar liquids. Suspect pharyngeal dysphagia with thin liquids.

## 2022-01-31 NOTE — H&P ADULT - NSHPPHYSICALEXAM_GEN_ALL_CORE
Referral sent in for a Follow Up Appointment - needs to be within 3-5 days. They should help establish a PCP for follow up.
OBJECTIVE:  Vital Signs Last 24 Hrs  T(C): 36.4 (07 Jun 2021 12:45), Max: 36.6 (07 Jun 2021 08:53)  T(F): 97.5 (07 Jun 2021 12:45), Max: 97.8 (07 Jun 2021 08:53)  HR: 66 (07 Jun 2021 12:45) (66 - 122)  BP: 150/66 (07 Jun 2021 12:45) (127/58 - 150/66)   RR: 18 (07 Jun 2021 12:45) (18 - 20)  SpO2: 93% (07 Jun 2021 12:45) (93% - 96%)    PHYSICAL EXAMINATION  General: Elderly female, lying in bed, having chest tube placed  HEENT:  extraocular movements intact, Poor oral hygeine  NECK:  supple  CVS: Well healed midline sternotomy scar. Left chest wall PPM(+).Irregular S1 S2  RESP:  Right chest tube with ~ 1300ml yellow fluid  GI:  RUQ scar well healed. Soft with LLQ tenderness. BS+  : No suprapubic tenderness  MSK:  Moves all extremities.  CNS:  AAOx3. RUE weakness.  INTEG:  RUE abrasion. Right knee laceration  PSYCH:  Fair mood

## 2022-03-11 NOTE — ED ADULT NURSE NOTE - DOES PATIENT HAVE ADVANCE DIRECTIVE
SCRIBE #1 NOTE: I, Phani Fry, am scribing for, and in the presence of, Minerva Rondon MD. I have scribed the entire note.       History     Chief Complaint   Patient presents with    Cardiac Arrest     Witnessed cardiac arrest while eating dinner. CPR x3, shock x1. ROSC achieved en route.      Review of patient's allergies indicates:   Allergen Reactions    Xanax [alprazolam]          History of Present Illness     HPI    3/10/2022, 7:55 PM  History obtained from EMS      History of Present Illness: Tanya Madrid is a 84 y.o. female patient with a PMHx of HTN, acute CHF, and HLD who presents to the Emergency Department for evaluation of cardiac arrest which onset suddenly 1 hour pta. Pt went into cardiac arrest while eating dinner. Pt achieved ROSC en route. Pt Symptoms are constant and severe in severity. Prior Tx includes 50 mcg of fentanyl and 50 mg of ketamine from EMS. No further complaints or concerns at this time.     HPI limited by the acuity of condition.     Arrival mode: EMS    PCP: Maggie Sue NP        Past Medical History:  Past Medical History:   Diagnosis Date    Acute CHF (congestive heart failure) 1/17/2021    Hyperlipemia     Hypertension     Parkinson's disease     Stroke 2016    Throat mass        Past Surgical History:  Past Surgical History:   Procedure Laterality Date    CLOSED REDUCTION Right 10/15/2020    Procedure: CLOSED REDUCTION;  Surgeon: Humberto Valdivia DO;  Location: [facility] OR;  Service: Orthopedics;  Laterality: Right;  ATTEMPTED    EVACUATION OF HEMATOMA Right 10/17/2020    Procedure: EVACUATION, HEMATOMA;  Surgeon: Humberto Valdivia DO;  Location: [facility] OR;  Service: Orthopedics;  Laterality: Right;    HEMIARTHROPLASTY OF HIP Left 7/12/2020    Procedure: HEMIARTHROPLASTY, HIP;  Surgeon: Humberto Valdivia DO;  Location: [facility] OR;  Service: Orthopedics;  Laterality: Left;    HEMIARTHROPLASTY OF HIP Right 10/2/2020    Procedure: HEMIARTHROPLASTY, HIP;   Surgeon: Loyd Kearney MD;  Location: [facility] OR;  Service: Orthopedics;  Laterality: Right;    HYSTERECTOMY      OPEN REDUCTION OF DISLOCATION OF HIP Right 10/17/2020    Procedure: OPEN REDUCTION, DISLOCATION, HIP;  Surgeon: Humberto Valdivia DO;  Location: [facility] OR;  Service: Orthopedics;  Laterality: Right;    THROAT SURGERY      TONSILLECTOMY           Family History:  No family history on file.    Social History:  Social History     Tobacco Use    Smoking status: Never Smoker    Smokeless tobacco: Never Used   Substance and Sexual Activity    Alcohol use: No    Drug use: No    Sexual activity: Not Currently        Review of Systems     Review of Systems   Unable to perform ROS: Acuity of condition      Physical Exam     Initial Vitals   BP Pulse Resp Temp SpO2   03/10/22 1945 03/10/22 1939 03/10/22 1939 03/10/22 2032 03/10/22 1939   (!) 148/106 88 (!) 26 100.3 °F (37.9 °C) 100 %      MAP       --                 Physical Exam  Nursing Notes and Vital Signs Reviewed.  Constitutional: Patient is in no acute distress.   Head: Atraumatic. Normocephalic.  Eyes: PERRL. EOM intact. Conjunctivae are not pale. No scleral icterus.  ENT: Mucous membranes are moist. Oropharynx is clear and symmetric.    Neck: Supple. Full ROM. No lymphadenopathy.  Cardiovascular: Tachycardiac. Regular rhythm. No murmurs, rubs, or gallops. Distal pulses are 2+ and symmetric.  Pulmonary/Chest: No respiratory distress. Clear to auscultation bilaterally. No wheezing or rales.  Abdominal: Soft and non-distended.  There is no tenderness.  No rebound, guarding, or rigidity.  Genitourinary: No CVA tenderness  Musculoskeletal: Moves all extremities. No obvious deformities. No edema. No calf tenderness.  Skin: Warm and dry.  Neurological:  Alert, awake, and appropriate.  Normal speech.  No acute focal neurological deficits are appreciated.  Psychiatric: Normal affect. Good eye contact. Appropriate in content.     ED Course    Intubation    Date/Time: 3/10/2022 8:01 PM  Location procedure was performed: [facility] EMERGENCY DEPARTMENT  Performed by: Minerva Rnodon MD  Authorized by: Minerva Rondon MD   Consent Done: Emergent Situation  Indications: respiratory failure  Intubation method: oral  Patient status: sedated  Preoxygenation: BVM  Pretreatment medications: fentanyl  Sedatives: etomidate  Paralytic: succinylcholine  Laryngoscope size: Glide 4  Tube size: 7.5 mm  Number of attempts: 2  Ventilation between attempts: BVM  ETT to lip: 25 cm  Chest x-ray interpreted by me and radiologist.  Chest x-ray findings: endotracheal tube in appropriate position  Complications: No  Specimens: No  Implants: No    Critical Care    Date/Time: 3/10/2022 9:19 PM  Performed by: Minerva Rondon MD  Authorized by: Minerva Rondon MD   Direct patient critical care time: 15 minutes  Additional history critical care time: 8 minutes  Ordering / reviewing critical care time: 9 minutes  Documentation critical care time: 7 minutes  Consulting other physicians critical care time: 6 minutes  Total critical care time (exclusive of procedural time) : 45 minutes  Critical care time was exclusive of separately billable procedures and treating other patients and teaching time.  Critical care was necessary to treat or prevent imminent or life-threatening deterioration of the following conditions: Cardiopulmonary arrest and cardiac arrest.  Critical care was time spent personally by me on the following activities: blood draw for specimens, development of treatment plan with patient or surrogate, interpretation of cardiac output measurements, evaluation of patient's response to treatment, examination of patient, obtaining history from patient or surrogate, ordering and performing treatments and interventions, ordering and review of laboratory studies, ordering and review of radiographic studies, pulse oximetry, re-evaluation of patient's condition and review of  old charts.    Central Line    Date/Time: 3/10/2022 10:21 PM  Performed by: Minerva Rondon MD  Authorized by: Minerva Rondon MD     Location procedure was performed:  [facility] EMERGENCY DEPARTMENT  Consent Done ?:  Emergent Situation  Time out complete?: Verified correct patient, procedure, equipment, staff, and site/side    Indications:  Med administration  Preparation:  Skin prepped with ChloraPrep  Skin prep agent dried: Skin prep agent completely dried prior to procedure    Sterile barriers: All five maximal sterile barriers used - gloves, gown, cap, mask and large sterile sheet    Hand hygiene: Hand hygiene performed immediately prior to central venous catheter insertion    Location:  Right internal jugular  Catheter size:  7 Fr  Ultrasound guidance: Yes    Steril sheath on probe.    Sterile gel used.  Manometry: No    Number of attempts:  1  Securement:  Line sutured and chlorhexidine patch  Complications: No    Specimens: No    Implants: No    XRay:  Placement verified by x-ray and successful placement  Adverse Events:  None      ED Vital Signs:  Vitals:    03/11/22 1215 03/11/22 1315 03/11/22 1330 03/11/22 1345   BP: (!) 173/102      Pulse: 78 74 70 74   Resp: (!) 23 (!) 23 (!) 23 (!) 24   Temp: (!) 91.22 °F (32.9 °C) (!) 89.96 °F (32.2 °C) (!) 90.32 °F (32.4 °C) (!) 90.5 °F (32.5 °C)   TempSrc:       SpO2:       Weight:       Height:        03/11/22 1400 03/11/22 1415 03/11/22 1430 03/11/22 1445   BP: 136/79      Pulse: 70 85 84 87   Resp: (!) 22 (!) 27 (!) 22 (!) 23   Temp: (!) 90.5 °F (32.5 °C) (!) 90.68 °F (32.6 °C) (!) 90.32 °F (32.4 °C) (!) 90.5 °F (32.5 °C)   TempSrc:       SpO2:       Weight:       Height:        03/11/22 1500 03/11/22 1504 03/11/22 1515 03/11/22 1530   BP:  133/85     Pulse: 86 90 89 90   Resp: (!) 23 (!) 22 (!) 26 (!) 22   Temp: (!) 90.5 °F (32.5 °C) (!) 90.5 °F (32.5 °C) (!) 90.68 °F (32.6 °C) (!) 90.86 °F (32.7 °C)   TempSrc:       SpO2:       Weight:       Height:         03/11/22 1545 03/11/22 1600 03/11/22 1615   BP:  139/86    Pulse: 90 90 92   Resp: (!) 22 (!) 22 (!) 27   Temp: (!) 91.22 °F (32.9 °C) (!) 91.04 °F (32.8 °C) (!) 91.4 °F (33 °C)   TempSrc:      SpO2:      Weight:      Height:          Abnormal Lab Results:  Labs Reviewed   CBC W/ AUTO DIFFERENTIAL - Abnormal; Notable for the following components:       Result Value    WBC 19.37 (*)     RBC 3.13 (*)     Hemoglobin 9.4 (*)     Hematocrit 29.8 (*)     MCHC 31.5 (*)     RDW 15.8 (*)     Immature Granulocytes 1.3 (*)     Gran # (ANC) 17.9 (*)     Immature Grans (Abs) 0.25 (*)     Lymph # 0.5 (*)     Gran % 92.4 (*)     Lymph % 2.8 (*)     Mono % 2.9 (*)     All other components within normal limits   COMPREHENSIVE METABOLIC PANEL - Abnormal; Notable for the following components:    Potassium 2.9 (*)     CO2 19 (*)     Glucose 179 (*)     Calcium 8.1 (*)     Albumin 2.9 (*)     AST 53 (*)     Anion Gap 17 (*)     eGFR if  48 (*)     eGFR if non  42 (*)     All other components within normal limits   LACTIC ACID, PLASMA - Abnormal; Notable for the following components:    Lactate (Lactic Acid) 5.7 (*)     All other components within normal limits    Narrative:     ,LA   critical result(s) called and verbal readback obtained from   BATSHEVA MARTINEZ RN by [email or web address] 03/10/2022 20:55   URINALYSIS, REFLEX TO URINE CULTURE - Abnormal; Notable for the following components:    Protein, UA 2+ (*)     Occult Blood UA Trace (*)     All other components within normal limits    Narrative:     Specimen Source->Urine   B-TYPE NATRIURETIC PEPTIDE - Abnormal; Notable for the following components:    BNP >4,900 (*)     All other components within normal limits   TROPONIN I - Abnormal; Notable for the following components:    Troponin I 0.041 (*)     All other components within normal limits   PROCALCITONIN - Abnormal; Notable for the following components:    Procalcitonin 2.08 (*)     All other components within  normal limits   ISTAT PROCEDURE - Abnormal; Notable for the following components:    POC PO2 209 (*)     POC HCO3 23.3 (*)     All other components within normal limits   URINALYSIS MICROSCOPIC    Narrative:     Specimen Source->Urine   SARS-COV-2 RDRP GENE    Narrative:     This test utilizes isothermal nucleic acid amplification   technology to detect the SARS-CoV-2 RdRp nucleic acid segment.   The analytical sensitivity (limit of detection) is 125 genome   equivalents/mL.   A POSITIVE result implies infection with the SARS-CoV-2 virus;   the patient is presumed to be contagious.     A NEGATIVE result means that SARS-CoV-2 nucleic acids are not   present above the limit of detection. A NEGATIVE result should be   treated as presumptive. It does not rule out the possibility of   COVID-19 and should not be the sole basis for treatment decisions.   If COVID-19 is strongly suspected based on clinical and exposure   history, re-testing using an alternate molecular assay should be   considered.   This test is only for use under the Food and Drug   Administration s Emergency Use Authorization (EUA).   Commercial kits are provided by [employer].   Performance characteristics of the EUA have been independently   verified by [facility] Department of   Pathology and Laboratory Medicine.   _________________________________________________________________   The authorized Fact Sheet for Healthcare Providers and the authorized Fact   Sheet for Patients of the ID NOW COVID-19 are available on the FDA   website:     https://www.fda.gov/media/877492/download  https://www.fda.gov/media/337636/download               All Lab Results:  Results for orders placed or performed during the hospital encounter of 03/10/22   Blood culture x two cultures. Draw prior to antibiotics.    Specimen: Peripheral, Forearm, Right; Blood   Result Value Ref Range    Blood Culture, Routine No Growth to date    Blood culture x two  cultures. Draw prior to antibiotics.    Specimen: Line, Subclavian, Right; Blood   Result Value Ref Range    Blood Culture, Routine No Growth to date    Culture, Respiratory with Gram Stain    Specimen: Sputum; Respiratory   Result Value Ref Range    Gram Stain (Respiratory) <10 epithelial cells per low power field.     Gram Stain (Respiratory) Moderate WBC's     Gram Stain (Respiratory) Many Gram positive cocci     Gram Stain (Respiratory) Few Gram positive rods    CBC auto differential   Result Value Ref Range    WBC 19.37 (H) 3.90 - 12.70 K/uL    RBC 3.13 (L) 4.00 - 5.40 M/uL    Hemoglobin 9.4 (L) 12.0 - 16.0 g/dL    Hematocrit 29.8 (L) 37.0 - 48.5 %    MCV 95 82 - 98 fL    MCH 30.0 27.0 - 31.0 pg    MCHC 31.5 (L) 32.0 - 36.0 g/dL    RDW 15.8 (H) 11.5 - 14.5 %    Platelets 258 150 - 450 K/uL    MPV 11.0 9.2 - 12.9 fL    Immature Granulocytes 1.3 (H) 0.0 - 0.5 %    Gran # (ANC) 17.9 (H) 1.8 - 7.7 K/uL    Immature Grans (Abs) 0.25 (H) 0.00 - 0.04 K/uL    Lymph # 0.5 (L) 1.0 - 4.8 K/uL    Mono # 0.6 0.3 - 1.0 K/uL    Eos # 0.1 0.0 - 0.5 K/uL    Baso # 0.03 0.00 - 0.20 K/uL    nRBC 0 0 /100 WBC    Gran % 92.4 (H) 38.0 - 73.0 %    Lymph % 2.8 (L) 18.0 - 48.0 %    Mono % 2.9 (L) 4.0 - 15.0 %    Eosinophil % 0.4 0.0 - 8.0 %    Basophil % 0.2 0.0 - 1.9 %    Differential Method Automated    Comprehensive metabolic panel   Result Value Ref Range    Sodium 141 136 - 145 mmol/L    Potassium 2.9 (L) 3.5 - 5.1 mmol/L    Chloride 105 95 - 110 mmol/L    CO2 19 (L) 23 - 29 mmol/L    Glucose 179 (H) 70 - 110 mg/dL    BUN 19 8 - 23 mg/dL    Creatinine 1.2 0.5 - 1.4 mg/dL    Calcium 8.1 (L) 8.7 - 10.5 mg/dL    Total Protein 6.2 6.0 - 8.4 g/dL    Albumin 2.9 (L) 3.5 - 5.2 g/dL    Total Bilirubin 0.9 0.1 - 1.0 mg/dL    Alkaline Phosphatase 90 55 - 135 U/L    AST 53 (H) 10 - 40 U/L    ALT 12 10 - 44 U/L    Anion Gap 17 (H) 8 - 16 mmol/L    eGFR if African American 48 (A) >60 mL/min/1.73 m^2    eGFR if non  42 (A) >60  mL/min/1.73 m^2   Lactic acid, plasma #1   Result Value Ref Range    Lactate (Lactic Acid) 5.7 (HH) 0.5 - 2.2 mmol/L   Urinalysis, Reflex to Urine Culture Urine, Clean Catch    Specimen: Urine   Result Value Ref Range    Specimen UA Urine, Clean Catch     Color, UA Yellow Yellow, Straw, Kassi    Appearance, UA Clear Clear    pH, UA 6.0 5.0 - 8.0    Specific Gravity, UA 1.020 1.005 - 1.030    Protein, UA 2+ (A) Negative    Glucose, UA Negative Negative    Ketones, UA Negative Negative    Bilirubin (UA) Negative Negative    Occult Blood UA Trace (A) Negative    Nitrite, UA Negative Negative    Urobilinogen, UA Negative <2.0 EU/dL    Leukocytes, UA Negative Negative   Brain natriuretic peptide   Result Value Ref Range    BNP >4,900 (H) 0 - 99 pg/mL   Troponin I   Result Value Ref Range    Troponin I 0.041 (H) 0.000 - 0.026 ng/mL   Procalcitonin   Result Value Ref Range    Procalcitonin 2.08 (H) <0.25 ng/mL   Urinalysis Microscopic   Result Value Ref Range    RBC, UA SEE COMMENT 0 - 4 /hpf    WBC, UA SEE COMMENT 0 - 5 /hpf    Bacteria SEE COMMENT None-Occ /hpf    Hyaline Casts, UA SEE COMMENT /lpf    Microscopic Comment SEE COMMENT    Lactic acid, plasma #2   Result Value Ref Range    Lactate (Lactic Acid) 3.6 (HH) 0.5 - 2.2 mmol/L   Lactic acid, plasma   Result Value Ref Range    Lactate (Lactic Acid) 8.5 (HH) 0.5 - 2.2 mmol/L   Magnesium   Result Value Ref Range    Magnesium 1.7 1.6 - 2.6 mg/dL   Comprehensive metabolic panel   Result Value Ref Range    Sodium 138 136 - 145 mmol/L    Potassium 4.8 3.5 - 5.1 mmol/L    Chloride 100 95 - 110 mmol/L    CO2 23 23 - 29 mmol/L    Glucose 179 (H) 70 - 110 mg/dL    BUN 21 8 - 23 mg/dL    Creatinine 1.6 (H) 0.5 - 1.4 mg/dL    Calcium 8.2 (L) 8.7 - 10.5 mg/dL    Total Protein 6.2 6.0 - 8.4 g/dL    Albumin 3.0 (L) 3.5 - 5.2 g/dL    Total Bilirubin 1.1 (H) 0.1 - 1.0 mg/dL    Alkaline Phosphatase 92 55 - 135 U/L    AST 59 (H) 10 - 40 U/L    ALT 21 10 - 44 U/L    Anion Gap 15 8 - 16  mmol/L    eGFR if African American 34 (A) >60 mL/min/1.73 m^2    eGFR if non African American 29 (A) >60 mL/min/1.73 m^2   Basic metabolic panel   Result Value Ref Range    Sodium 137 136 - 145 mmol/L    Potassium 4.1 3.5 - 5.1 mmol/L    Chloride 102 95 - 110 mmol/L    CO2 15 (L) 23 - 29 mmol/L    Glucose 181 (H) 70 - 110 mg/dL    BUN 23 8 - 23 mg/dL    Creatinine 1.8 (H) 0.5 - 1.4 mg/dL    Calcium 8.5 (L) 8.7 - 10.5 mg/dL    Anion Gap 20 (H) 8 - 16 mmol/L    eGFR if African American 29 (A) >60 mL/min/1.73 m^2    eGFR if non African American 25 (A) >60 mL/min/1.73 m^2   Magnesium   Result Value Ref Range    Magnesium 2.3 1.6 - 2.6 mg/dL   Phosphorus   Result Value Ref Range    Phosphorus 4.9 (H) 2.7 - 4.5 mg/dL   Troponin I   Result Value Ref Range    Troponin I 0.239 (H) 0.000 - 0.026 ng/mL   CK   Result Value Ref Range     (H) 20 - 180 U/L   CBC auto differential   Result Value Ref Range    WBC 25.17 (H) 3.90 - 12.70 K/uL    RBC 3.61 (L) 4.00 - 5.40 M/uL    Hemoglobin 10.8 (L) 12.0 - 16.0 g/dL    Hematocrit 34.3 (L) 37.0 - 48.5 %    MCV 95 82 - 98 fL    MCH 29.9 27.0 - 31.0 pg    MCHC 31.5 (L) 32.0 - 36.0 g/dL    RDW 15.9 (H) 11.5 - 14.5 %    Platelets 271 150 - 450 K/uL    MPV 11.3 9.2 - 12.9 fL    Immature Granulocytes 1.0 (H) 0.0 - 0.5 %    Gran # (ANC) 22.9 (H) 1.8 - 7.7 K/uL    Immature Grans (Abs) 0.25 (H) 0.00 - 0.04 K/uL    Lymph # 1.0 1.0 - 4.8 K/uL    Mono # 1.0 0.3 - 1.0 K/uL    Eos # 0.0 0.0 - 0.5 K/uL    Baso # 0.03 0.00 - 0.20 K/uL    nRBC 0 0 /100 WBC    Gran % 91.0 (H) 38.0 - 73.0 %    Lymph % 3.9 (L) 18.0 - 48.0 %    Mono % 4.0 4.0 - 15.0 %    Eosinophil % 0.0 0.0 - 8.0 %    Basophil % 0.1 0.0 - 1.9 %    Platelet Estimate Appears normal     Aniso Slight     Poik Slight     Poly Occasional     Hypo Occasional     Ovalocytes Occasional     Differential Method Automated    Protime-INR   Result Value Ref Range    Prothrombin Time 13.5 (H) 9.0 - 12.5 sec    INR 1.2 0.8 - 1.2   APTT   Result  Value Ref Range    aPTT 28.6 21.0 - 32.0 sec   Glucose, random   Result Value Ref Range    Glucose 179 (H) 70 - 110 mg/dL   Glucose, random   Result Value Ref Range    Glucose 179 (H) 70 - 110 mg/dL   Glucose, random   Result Value Ref Range    Glucose 181 (H) 70 - 110 mg/dL   Glucose, random (Unless on insulin infusion)   Result Value Ref Range    Glucose 181 (H) 70 - 110 mg/dL   Lactic acid, plasma   Result Value Ref Range    Lactate (Lactic Acid) 3.4 (H) 0.5 - 2.2 mmol/L   Basic metabolic panel   Result Value Ref Range    Sodium 139 136 - 145 mmol/L    Potassium 4.0 3.5 - 5.1 mmol/L    Chloride 102 95 - 110 mmol/L    CO2 16 (L) 23 - 29 mmol/L    Glucose 165 (H) 70 - 110 mg/dL    BUN 22 8 - 23 mg/dL    Creatinine 1.9 (H) 0.5 - 1.4 mg/dL    Calcium 8.6 (L) 8.7 - 10.5 mg/dL    Anion Gap 21 (H) 8 - 16 mmol/L    eGFR if African American 28 (A) >60 mL/min/1.73 m^2    eGFR if non African American 24 (A) >60 mL/min/1.73 m^2   Magnesium   Result Value Ref Range    Magnesium 2.4 1.6 - 2.6 mg/dL   Phosphorus   Result Value Ref Range    Phosphorus 4.9 (H) 2.7 - 4.5 mg/dL   Glucose, random (Unless on insulin infusion)   Result Value Ref Range    Glucose 165 (H) 70 - 110 mg/dL   Basic metabolic panel   Result Value Ref Range    Sodium 138 136 - 145 mmol/L    Potassium 3.9 3.5 - 5.1 mmol/L    Chloride 102 95 - 110 mmol/L    CO2 15 (L) 23 - 29 mmol/L    Glucose 135 (H) 70 - 110 mg/dL    BUN 24 (H) 8 - 23 mg/dL    Creatinine 1.9 (H) 0.5 - 1.4 mg/dL    Calcium 8.7 8.7 - 10.5 mg/dL    Anion Gap 21 (H) 8 - 16 mmol/L    eGFR if African American 28 (A) >60 mL/min/1.73 m^2    eGFR if non African American 24 (A) >60 mL/min/1.73 m^2   Magnesium   Result Value Ref Range    Magnesium 2.4 1.6 - 2.6 mg/dL   Phosphorus   Result Value Ref Range    Phosphorus 4.8 (H) 2.7 - 4.5 mg/dL   Troponin I   Result Value Ref Range    Troponin I 0.438 (H) 0.000 - 0.026 ng/mL   Glucose, random (Unless on insulin infusion)   Result Value Ref Range    Glucose  135 (H) 70 - 110 mg/dL   Lactic acid, plasma   Result Value Ref Range    Lactate (Lactic Acid) 10.8 (HH) 0.5 - 2.2 mmol/L   POCT COVID-19 Rapid Screening   Result Value Ref Range    POC Rapid COVID Negative Negative     Acceptable Yes    Echo   Result Value Ref Range    BSA 1.52 m2    TDI SEPTAL 0.97 m/s    LV LATERAL E/E' RATIO 37.67 m/s    LV SEPTAL E/E' RATIO 1.16 m/s    LA WIDTH 4.79 cm    IVC diameter 2.99 cm    Left Ventricular Outflow Tract Mean Velocity 0.33142757629459 cm/s    Left Ventricular Outflow Tract Mean Gradient 0.60 mmHg    TDI LATERAL 0.03 m/s    LVIDd 5.03 3.5 - 6.0 cm    IVS 1.15 (A) 0.6 - 1.1 cm    Posterior Wall 1.06 0.6 - 1.1 cm    Ao root annulus 2.71 cm    LVIDs 4.82 (A) 2.1 - 4.0 cm    FS 4 28 - 44 %    LA volume 94.82 cm3    Sinus 2.81 cm    STJ 2.10 cm    Ascending aorta 2.55 cm    LV mass 210.46 g    LA size 4.61 cm    RVDD 4.50 cm    TAPSE 1.28 cm    Left Ventricle Relative Wall Thickness 0.42 cm    AV mean gradient 5 mmHg    AV valve area 1.08 cm2    AV Velocity Ratio 0.35     AV index (prosthetic) 0.38     MV valve area p 1/2 method 3.87 cm2    E/A ratio 2.13     Mean e' 0.50 m/s    E wave deceleration time 196.015444320058399 msec    IVRT 121.511073950838745 msec    LVOT diameter 1.91 cm    LVOT area 2.9 cm2    LVOT peak harsha 0.56 m/s    LVOT peak VTI 11.20 cm    Ao peak harsha 1.62 m/s    Ao VTI 29.8 cm    RVOT peak harsha 0.30 m/s    RVOT peak VTI 6.5 cm    LVOT stroke volume 32.07 cm3    AV peak gradient 10 mmHg    PV mean gradient 0.19 mmHg    E/E' ratio 2.26 m/s    MV Peak E Harsha 1.13 m/s    TR Max Harsha 4.11 m/s    MV stenosis pressure 1/2 time 56.383939480 ms    MV Peak A Harsha 0.53 m/s    LV Systolic Volume 108.61 mL    LV Systolic Volume Index 71.5 mL/m2    LV Diastolic Volume 119.87 mL    LV Diastolic Volume Index 78.86 mL/m2    LA Volume Index 62.4 mL/m2    LV Mass Index 138 g/m2    Echo EF Estimated 9 %    RA Major Axis 5.34 cm    Left Atrium Minor Axis 4.19 cm     Left Atrium Major Axis 6.36 cm    Triscuspid Valve Regurgitation Peak Gradient 68 mmHg    RA Width 5.53 cm    EF 10 %   ISTAT PROCEDURE   Result Value Ref Range    POC PH 7.372 7.35 - 7.45    POC PCO2 40.1 35 - 45 mmHg    POC PO2 209 (H) 80 - 100 mmHg    POC HCO3 23.3 (L) 24 - 28 mmol/L    POC BE -2 -2 to 2 mmol/L    POC SATURATED O2 100 95 - 100 %    Rate 12     Sample ARTERIAL     Site RR     Allens Test Pass     [employer]Sys Adult Vent     Mode AC/PRVC     Vt 450     PEEP 5     FiO2 100    ISTAT PROCEDURE   Result Value Ref Range    POC PH 7.478 (H) 7.35 - 7.45    POC PCO2 35.4 35 - 45 mmHg    POC PO2 233 (H) 80 - 100 mmHg    POC HCO3 26.2 24 - 28 mmol/L    POC BE 3 -2 to 2 mmol/L    POC SATURATED O2 100 95 - 100 %    Rate 10     Sample ARTERIAL     Site RR     Allens Test Pass     [employer]Sys Adult Vent     Mode AC/PRVC     Vt 450     PEEP 5     FiO2 65    ISTAT PROCEDURE   Result Value Ref Range    POC PH 7.509 (H) 7.35 - 7.45    POC PCO2 30.1 (L) 35 - 45 mmHg    POC PO2 194 (H) 80 - 100 mmHg    POC HCO3 24.0 24 - 28 mmol/L    POC BE 1 -2 to 2 mmol/L    POC SATURATED O2 100 95 - 100 %    Rate 10     Sample ARTERIAL     Site Bi/UAC     Allens Test N/A     [employer]Sys Adult Vent     Mode AC/PRVC     Vt 450     PEEP 5     FiO2 40    ISTAT PROCEDURE   Result Value Ref Range    POC PH 7.481 (H) 7.35 - 7.45    POC PCO2 27.5 (LL) 35 - 45 mmHg    POC PO2 188 (H) 80 - 100 mmHg    POC HCO3 20.6 (L) 24 - 28 mmol/L    POC BE -3 -2 to 2 mmol/L    POC SATURATED O2 100 95 - 100 %    Rate 10     Sample ARTERIAL     Site Bi/UAC     Allens Test N/A     [employer]Sys Adult Vent     Mode AC/PRVC     Vt 450     PEEP 5     FiO2 35    ISTAT PROCEDURE   Result Value Ref Range    POC PH 7.426 7.35 - 7.45    POC PCO2 23.1 (LL) 35 - 45 mmHg    POC PO2 199 (H) 80 - 100 mmHg    POC HCO3 15.2 (L) 24 - 28 mmol/L    POC BE -9 -2 to 2 mmol/L    POC SATURATED O2 100 95 - 100 %    Rate 10     Sample ARTERIAL     Site Bi/UAC     Allens Test N/A     DelSys  Adult Vent     Mode PCV     PEEP 5     FiO2 35     IP 8          Imaging Results:  Imaging Results          X-Ray Chest 1 View (Final result)  Result time 03/10/22 22:34:39    Final result by Levi Booker MD (03/10/22 22:34:39)                 Impression:      Right central venous catheter in good position.    Worsening right-sided pulmonary opacities possibly infection, aspiration, or edema.  Correlation is advised.      Electronically signed by: Levi Booker  Date:    03/10/2022  Time:    22:34             Narrative:    EXAMINATION:  XR CHEST 1 VIEW    CLINICAL HISTORY:  Encounter for adjustment and management of vascular access device    TECHNIQUE:  Single frontal view of the chest was performed.    COMPARISON:  Multiple priors.    FINDINGS:  Endotracheal tube tip lies 4.5 cm from the kermit.  Enteric tube tip and side hole below the diaphragm.  Right central venous catheter tip over the cavoatrial junction.    Right-sided early pulmonary opacities slightly increased from the prior possibly developing infection, aspiration, or edema.    The cardiac silhouette is enlarged.  The hilar and mediastinal contours are unremarkable.    Bones are intact.                                CT Head Without Contrast (Final result)  Result time 03/10/22 21:04:09    Final result by Levi Booker MD (03/10/22 21:04:09)                 Impression:      No hemorrhage or acute major vascular distribution infarction.    Dehiscence of the posterior wall of the sphenoid sinus such as on series 4, image [id number]. There is high density material possibly related to paranasal sinus disease or neoplasm which extends towards the pituitary and along the right towards the intracranial ICA.  Further evaluation with contrast enhanced MRI when clinically appropriate would be recommended.  Suggest including 3D/volumetric postcontrast imaging.  ENT consult as clinically warranted.    This report was flagged in Epic as abnormal.    Findings  discussed with the ED physician at the time of dictation.    All CT scans at this facility are performed  using dose modulation techniques as appropriate to performed exam including the following:  automated exposure control; adjustment of mA and/or kV according to the patients size (this includes techniques or standardized protocols for targeted exams where dose is matched to indication/reason for exam: i.e. extremities or head);  iterative reconstruction technique.    Electronically signed by resident: Levi Booker  Date:    03/10/2022  Time:    20:45    Electronically signed by: Levi Booker  Date:    03/10/2022  Time:    21:04             Narrative:    EXAMINATION:  CT HEAD WITHOUT CONTRAST    CLINICAL HISTORY:  Mental status change, unknown cause;    TECHNIQUE:  Low dose axial CT images obtained throughout the head without intravenous contrast. Sagittal and coronal reconstructions were performed.    COMPARISON:  Multiple priors.    FINDINGS:  Intracranial compartment:    Ventricles and sulci are normal in size for age without evidence of hydrocephalus. No extra-axial blood or fluid collections.    Moderate to severe microvascular ischemic change.  No parenchymal mass, hemorrhage, edema or major vascular distribution infarct.    Skull/extracranial contents (limited evaluation): Dehiscence of the posterior wall of the sphenoid sinus such as on series 4, image [id number].  There is high density material possibly related to paranasal sinus disease or neoplasm which extends towards the pituitary and along the right towards the intracranial ICA.  Mastoid air cells and paranasal sinuses are essentially clear.                               X-Ray Chest 1 View (Final result)  Result time 03/10/22 20:22:06    Final result by Levi Booker MD (03/10/22 20:22:06)                 Impression:      As above.      Electronically signed by: Levi Booker  Date:    03/10/2022  Time:    20:22             Narrative:     EXAMINATION:  XR CHEST 1 VIEW    CLINICAL HISTORY:  Cardiac arrest, cause unspecified    TECHNIQUE:  Single frontal view of the chest was performed.    COMPARISON:  Multiple priors.    FINDINGS:  Enteric tube tip and side hole below the diaphragm.  Endotracheal tube tip 3.4 cm from the kermit.    No pleural fluid or pneumothorax.    Perihilar vascular prominence.  No definite emery edema.  No definite alveolar opacities.  Heart is enlarged.                                 The EKG was ordered, reviewed, and independently interpreted by the ED provider.  Interpretation time: 19:53  Rate: 97 BPM  Rhythm: Sinus rhythm with occasional Premature ventricular complexes and Premature atrial complexes  Interpretation: Left axis deviation. Minimal voltage criteria for LVH, may be normal variant (Thorp product). No STEMI.         The Emergency Provider reviewed the vital signs and test results, which are outlined above.     ED Discussion     9:59 PM: Discussed case with Candice Brown ([facility]). Dr. Fowler agrees with current care and management of pt and accepts admission.   Admitting Service: Hospital Medicine  Admitting Physician: Dr. Fowler  Admit to: ICU    9:59 PM: Re-evaluated pt. I have discussed test results, shared treatment plan, and the need for admission with patient and family at bedside. Pt and family express understanding at this time and agree with all information. All questions answered. Pt and family have no further questions or concerns at this time. Pt is ready for admit.       Medical Decision Making:   Clinical Tests:   Lab Tests: Ordered and Reviewed  Radiological Study: Ordered and Reviewed  Medical Tests: Ordered and Reviewed           ED Medication(s):  Medications   etomidate (AMIDATE) 2 mg/mL injection (  Not Given 3/10/22 2000)   chlorhexidine 0.12 % solution 15 mL (15 mLs Mouth/Throat Given 3/11/22 0859)   famotidine (PF) injection 20 mg (20 mg Intravenous Given 3/11/22 0859)   heparin  (porcine) injection 5,000 Units (5,000 Units Subcutaneous Given 3/11/22 1346)   cefTRIAXone (ROCEPHIN) 2 g/50 mL D5W IVPB (0 g Intravenous Stopped 3/11/22 0205)   sodium chloride 0.9% flush 10 mL (10 mLs Intravenous Given 3/11/22 1621)   naloxone 0.4 mg/mL injection 0.02 mg (has no administration in time range)   glucagon (human recombinant) injection 1 mg (has no administration in time range)   dextrose 10% bolus 125 mL (has no administration in time range)   dextrose 10% bolus 250 mL (has no administration in time range)   mupirocin 2 % ointment ( Nasal Given 3/11/22 0859)   pneumoc 13-lopez conj-dip cr(PF) (PREVNAR 13 (PF)) 0.5 mL (has no administration in time range)   DOBUtamine 1000 mg in D5W 250 mL infusion (2.5 mcg/kg/min × 52.6 kg Intravenous Verify Only 3/11/22 1600)   acetaminophen oral solution 650 mg (has no administration in time range)   aspirin chewable tablet 81 mg (has no administration in time range)   clopidogreL tablet 75 mg (has no administration in time range)   glucose chewable tablet 16 g (has no administration in time range)   glucose chewable tablet 24 g (has no administration in time range)   HYDROcodone-acetaminophen 5-325 mg per tablet 1 tablet (has no administration in time range)   melatonin oral solution 6 mg (has no administration in time range)   polyethylene glycol packet 17 g (has no administration in time range)   sacubitriL-valsartan 49-51 mg per tablet 1 tablet (has no administration in time range)   acetaminophen oral solution 650 mg (has no administration in time range)   hydrALAZINE injection 10 mg (has no administration in time range)   rocuronium 10 mg/mL injection (  Return to Cabinet 3/10/22 2000)   diltiaZEM (CARDIZEM) 5 mg/mL injection (10 mg  Given 3/10/22 1952)   lactated ringers bolus 1,572 mL (0 mL/kg × 52.4 kg Intravenous Stopped 3/10/22 2118)   piperacillin-tazobactam 4.5 g in dextrose 5 % 100 mL IVPB (ready to mix system) (0 g Intravenous Stopped 3/10/22 2320)  No   furosemide injection 40 mg (40 mg Intravenous Given 3/10/22 2141)   potassium chloride 10 mEq in 100 mL IVPB ( Intravenous Stopped 3/10/22 2345)   furosemide injection 60 mg (60 mg Intravenous Given 3/11/22 0100)   potassium bicarbonate disintegrating tablet 40 mEq (40 mEq Oral Given 3/11/22 0101)   magnesium sulfate 2g in water 50mL IVPB (premix) ( Intravenous Stopped 3/11/22 0429)       Current Discharge Medication List                  Scribe Attestation:   Scribe #1: I performed the above scribed service and the documentation accurately describes the services I performed. I attest to the accuracy of the note.     Attending:   Physician Attestation Statement for Scribe #1: I, Minerva Rondon MD, personally performed the services described in this documentation, as scribed by Phani Fry, in my presence, and it is both accurate and complete.           Clinical Impression       ICD-10-CM ICD-9-CM   1. Cardiac arrest  I46.9 427.5   2. 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 Acute on chronic congestive heart failure, unspecified heart failure type  I50.9 428.0   4. Encounter for central line placement  Z45.2 V58.81       Disposition:   Disposition: Admitted  Condition: Critical         Minerva Rondon MD  03/11/22 9905

## 2022-03-13 NOTE — DISCHARGE NOTE PROVIDER - NSDCCPGOAL_GEN_ALL_CORE_FT
"Last Written Prescription Date:  12/22/pregnancy test, please check TSH.               Patricia La RN 03/13/22 11:16 AM  "
To get better and follow your care plan as instructed.

## 2022-07-07 NOTE — DISCHARGE NOTE PROVIDER - NSDCQMAMI_CARD_ALL_CORE
What Type Of Note Output Would You Prefer (Optional)?: Bullet Format Hpi Title: Evaluation of Skin Lesions How Severe Are Your Spot(S)?: mild Have Your Spot(S) Been Treated In The Past?: has not been treated Additional History: Pt presents for FBE. Some concerns of spots on hands and back, groin, and face. No other concerns. Last saw COLEEN in 06/2019. No

## 2022-08-22 NOTE — BRIEF OPERATIVE NOTE - SPECIMENS
BREA BECKHAM     [facility]    Allergies    codeine (Hives)    Intolerances        PAST MEDICAL & SURGICAL HISTORY:  Dementia of frontal lobe type      Aphasic stroke      Diabetes mellitus      Respiratory failure      Hypertension      GERD (gastroesophageal reflux disease)      Constipation      Respiratory failure      CVA (cerebral vascular accident)      HTN (hypertension)      DM (diabetes mellitus)      Advanced dementia      COVID-19 virus detected      Quadriplegia      Pneumonia      Type II diabetes mellitus      Hx of appendectomy      Gastrostomy in place      Tracheostomy in place      Tracheostomy tube present      Feeding by G-tube          FAMILY HISTORY:  No pertinent family history in first degree relatives        Home Medications:  albuterol 90 mcg/inh inhalation aerosol with adapter: 2  inhaled every 6 hours (21 May 2022 21:16)  Bacid (LAC) oral tablet: 2 tab(s) by gastrostomy tube once a day (21 May 2022 21:16)  Betadine 10% topical swab: cleanse right and left great toes (21 May 2022 21:16)  Carafate 1 g/10 mL oral suspension: 10 milliliter(s) by gastrostomy tube 4 times a day (before meals and at bedtime) for 14 days (Started 6/4/21) (21 May 2022 21:16)  chlorhexidine 0.12% mucous membrane liquid: 15 milliliter(s) mucous membrane 2 times a day (21 May 2022 21:16)  ertapenem 1 g injection: 1 gram(s) injectable once a day until 6/11 (10 Zay 2022 12:12)  Eucerin topical cream: Apply topically to affected area once a day bilateral feet (21 May 2022 21:16)  folic acid 1 mg oral tablet: 1 tab(s) orally once a day (21 May 2022 21:16)  furosemide 20 mg oral tablet: 1 tab(s) orally once a day (10 Zay 2022 12:12)  insulin glargine 100 units/mL subcutaneous solution: 8 unit(s) subcutaneous once a day (in the morning) (01 Jun 2022 08:24)  ipratropium-albuterol 0.5 mg-2.5 mg/3 mL inhalation solution: 3 milliliter(s) inhaled 4 times a day (21 May 2022 21:16)  LORazepam 1 mg oral tablet: 1 tab(s) by gastrostomy tube every 4 hours (21 May 2022 21:16)  methocarbamol 500 mg oral tablet: 1 tab(s) by gastrostomy tube 2 times a day (21 May 2022 21:16)  MiraLax oral powder for reconstitution:  (21 May 2022 23:14)  Multiple Vitamins oral tablet: 1 tab(s) orally once a day (21 May 2022 21:16)  nystatin 100,000 units/g topical powder: 1 application topically 3 times a day (29 May 2022 16:35)  omeprazole 20 mg oral delayed release capsule: orally 2 times a day (21 May 2022 23:14)  polyethylene glycol 3350 oral powder for reconstitution: 17 gram(s) by gastrostomy tube every 12 hours (21 May 2022 21:16)  senna 8.6 mg oral tablet: 3 tab(s) by gastrostomy tube once a day (at bedtime) (21 May 2022 21:16)  simethicone 80 mg oral tablet, chewable: 1 tab(s) by gastrostomy tube every 6 hours (21 May 2022 21:16)  Tylenol 325 mg oral tablet: 2 tab(s) by gastrostomy tube once a day; 60 minutes prior to dressing change  (21 May 2022 21:16)  Tylenol 325 mg oral tablet: 2 tab(s) by gastrostomy tube every 6 hours, As Needed (21 May 2022 21:16)      MEDICATIONS  (STANDING):  albuterol/ipratropium for Nebulization 3 milliLiter(s) Nebulizer every 6 hours  chlorhexidine 0.12% Liquid 15 milliLiter(s) Oral Mucosa every 12 hours  chlorhexidine 2% Cloths 1 Application(s) Topical daily  collagenase Ointment 1 Application(s) Topical daily  dextrose 5%. 1000 milliLiter(s) (100 mL/Hr) IV Continuous <Continuous>  dextrose 5%. 1000 milliLiter(s) (50 mL/Hr) IV Continuous <Continuous>  dextrose 50% Injectable 25 Gram(s) IV Push once  dextrose 50% Injectable 12.5 Gram(s) IV Push once  dextrose 50% Injectable 25 Gram(s) IV Push once  folic acid 1 milliGRAM(s) Oral daily  glucagon  Injectable 1 milliGRAM(s) IntraMuscular once  heparin   Injectable 5000 Unit(s) SubCutaneous every 12 hours  insulin glargine Injectable (LANTUS) 8 Unit(s) SubCutaneous every morning  insulin lispro (ADMELOG) corrective regimen sliding scale   SubCutaneous every 6 hours  lactobacillus acidophilus 1 Tablet(s) Oral daily  LORazepam     Tablet 2 milliGRAM(s) Oral every 4 hours  methocarbamol 500 milliGRAM(s) Oral two times a day  multivitamin 1 Tablet(s) Oral daily  nystatin Powder 1 Application(s) Topical three times a day  pantoprazole  Injectable 40 milliGRAM(s) IV Push daily  piperacillin/tazobactam IVPB.. 3.375 Gram(s) IV Intermittent every 8 hours  polyethylene glycol 3350 17 Gram(s) Oral every 12 hours  povidone iodine 10% Solution 1 Application(s) Topical daily  senna 3 Tablet(s) Oral at bedtime  simethicone 80 milliGRAM(s) Chew every 6 hours  sodium chloride 7% Inhalation 4 milliLiter(s) Inhalation every 12 hours  trimethoprim  160 mG/sulfamethoxazole 800 mG 2 Tablet(s) Oral two times a day    MEDICATIONS  (PRN):  acetaminophen     Tablet .. 650 milliGRAM(s) Oral every 6 hours PRN Temp greater or equal to 38C (100.4F), Mild Pain (1 - 3)  aluminum hydroxide/magnesium hydroxide/simethicone Suspension 30 milliLiter(s) Oral every 4 hours PRN Dyspepsia  dextrose Oral Gel 15 Gram(s) Oral once PRN Blood Glucose LESS THAN 70 milliGRAM(s)/deciliter  fentaNYL    Injectable 25 MICROGram(s) IV Push every 4 hours PRN Moderate Pain (4 - 6)  melatonin 3 milliGRAM(s) Oral at bedtime PRN Insomnia  ondansetron Injectable 4 milliGRAM(s) IV Push every 8 hours PRN Nausea and/or Vomiting  sodium chloride 0.9% lock flush 10 milliLiter(s) IV Push every 1 hour PRN Pre/post blood products, medications, blood draw, and to maintain line patency      Diet, NPO with Tube Feed:   Tube Feeding Modality: Gastrostomy  Glucerna 1.5 Horacio  Total Volume for 24 Hours (mL): 900  Continuous  Starting Tube Feed Rate mL per Hour: 45  Until Goal Tube Feed Rate (mL per Hour): 45  Tube Feed Duration (in Hours): 20  Tube Feed Start Time: 00:00  Free Water Flush  Bolus   Total Volume per Flush (mL): 200   Frequency: Every 6 Hours   Total Daily Volume of Flush (mL): 800    Start Time: 00:00 (08-19-22 @ 13:25) [Active]          Vital Signs Last 24 Hrs  T(C): 36.1 (22 Aug 2022 08:23), Max: 37.1 (21 Aug 2022 12:27)  T(F): 97 (22 Aug 2022 08:23), Max: 98.8 (21 Aug 2022 12:27)  HR: 65 (22 Aug 2022 07:00) (64 - 86)  BP: 98/56 (22 Aug 2022 07:00) (87/49 - 160/78)  BP(mean): 69 (22 Aug 2022 07:00) (61 - 105)  RR: 14 (22 Aug 2022 07:00) (14 - 36)  SpO2: 99% (22 Aug 2022 07:00) (92% - 100%)    Parameters below as of 21 Aug 2022 21:29  Patient On (Oxygen Delivery Method): ventilator          08-21-22 @ 07:01  -  08-22-22 @ 07:00  --------------------------------------------------------  IN: 1635 mL / OUT: 450 mL / NET: 1185 mL        Mode: AC/ CMV (Assist Control/ Continuous Mandatory Ventilation), RR (machine): 16, TV (machine): 400, FiO2: 30, PEEP: 5, ITime: 1, MAP: 13, PIP: 27      LABS:                        7.5    7.53  )-----------( 285      ( 22 Aug 2022 06:35 )             24.5     08-22    135  |  102  |  37<H>  ----------------------------<  143<H>  5.2   |  30  |  1.33<H>    Ca    8.3<L>      22 Aug 2022 06:35    TPro  6.4  /  Alb  1.7<L>  /  TBili  0.2  /  DBili  x   /  AST  13  /  ALT  12  /  AlkPhos  105  08-22    PT/INR - ( 22 Aug 2022 06:35 )   PT: 14.0 sec;   INR: 1.21 ratio                   WBC:  WBC Count: 7.53 K/uL (08-22 @ 06:35)  WBC Count: 9.46 K/uL (08-21 @ 06:32)  WBC Count: 14.50 K/uL (08-20 @ 06:19)  WBC Count: 9.88 K/uL (08-20 @ 01:08)  WBC Count: 11.21 K/uL (08-19 @ 15:21)  WBC Count: 6.78 K/uL (08-19 @ 06:36)  WBC Count: 25.06 K/uL (08-18 @ 18:45)      MICROBIOLOGY:  RECENT CULTURES:  08-19 ET Tube ET Tube XXXX   Few polymorphonuclear leukocytes per low power field  Rare Squamous epithelial cells per low power field  Few Gram Negative Rods per oil power field   Normal Respiratory Elvira present    08-18 Clean Catch Clean Catch (Midstream) XXXX XXXX   10,000 - 49,000 CFU/mL Candida albicans "Susceptibilities not performed"    08-18 .Blood Blood-Peripheral XXXX XXXX   No growth to date.    08-18 .Blood Blood-Peripheral XXXX XXXX   No growth to date.                PT/INR - ( 22 Aug 2022 06:35 )   PT: 14.0 sec;   INR: 1.21 ratio             Sodium:  Sodium, Serum: 135 mmol/L (08-22 @ 06:35)  Sodium, Serum: 137 mmol/L (08-21 @ 06:32)  Sodium, Serum: 137 mmol/L (08-20 @ 06:19)  Sodium, Serum: 138 mmol/L (08-19 @ 06:36)  Sodium, Serum: 139 mmol/L (08-19 @ 02:46)  Sodium, Serum: 135 mmol/L (08-18 @ 18:45)      1.33 mg/dL 08-22 @ 06:35  1.39 mg/dL 08-21 @ 06:32  1.31 mg/dL 08-20 @ 06:19  1.10 mg/dL 08-19 @ 06:36  1.27 mg/dL 08-19 @ 02:46  1.26 mg/dL 08-18 @ 18:45      Hemoglobin:  Hemoglobin: 7.5 g/dL (08-22 @ 06:35)  Hemoglobin: 7.9 g/dL (08-21 @ 06:32)  Hemoglobin: 8.9 g/dL (08-20 @ 06:19)  Hemoglobin: 8.3 g/dL (08-20 @ 01:08)  Hemoglobin: 7.0 g/dL (08-19 @ 15:21)  Hemoglobin: 7.5 g/dL (08-19 @ 06:36)  Hemoglobin: 9.8 g/dL (08-18 @ 18:45)      Platelets: Platelet Count - Automated: 285 K/uL (08-22 @ 06:35)  Platelet Count - Automated: 293 K/uL (08-21 @ 06:32)  Platelet Count - Automated: 304 K/uL (08-20 @ 06:19)  Platelet Count - Automated: 292 K/uL (08-20 @ 01:08)  Platelet Count - Automated: 297 K/uL (08-19 @ 15:21)  Platelet Count - Automated: 272 K/uL (08-19 @ 06:36)  Platelet Count - Automated: 466 K/uL (08-18 @ 18:45)      LIVER FUNCTIONS - ( 22 Aug 2022 06:35 )  Alb: 1.7 g/dL / Pro: 6.4 g/dL / ALK PHOS: 105 U/L / ALT: 12 U/L DA / AST: 13 U/L / GGT: x                 RADIOLOGY & ADDITIONAL STUDIES:      MICROBIOLOGY:  RECENT CULTURES:  08-19 ET Tube ET Tube XXXX   Few polymorphonuclear leukocytes per low power field  Rare Squamous epithelial cells per low power field  Few Gram Negative Rods per oil power field   Normal Respiratory Elvira present    08-18 Clean Catch Clean Catch (Midstream) XXXX XXXX   10,000 - 49,000 CFU/mL Candida albicans "Susceptibilities not performed"    08-18 .Blood Blood-Peripheral XXXX XXXX   No growth to date.    08-18 .Blood Blood-Peripheral XXXX XXXX   No growth to date.             None

## 2022-09-01 NOTE — GOALS OF CARE CONVERSATION - ADVANCED CARE PLANNING - CONVERSATION DETAILS
Detail Level: Detailed
Patient is DNR/DNI per MOST form. Patient also has living will that report DNR/DNI, no iv hydration, no artifical feeding, no antibiotics. I confirmed patient's wishes with daughter at bedside who agree with the MOST form and patient's living will. patient is currently under comfort care at nursing home.

## 2022-10-07 NOTE — ED ADULT NURSE NOTE - NSFALLRSKUNASSIST_ED_ALL_ED
PERIODIC EXAM, ADULT PROPHY,  4 BWX    REVIEWED MED HX: meds, allergies, health changes reviewed in Epic  Seasonal asthma  CHIEF CONCERN:  none   PAIN SCALE:  0  ASA CLASS:  II  PLAQUE:  mild  CALCULUS: slight lower anterior  BLEEDING:   slight  STAIN :   slight  ORAL HYGIENE:  good  PERIO: no perio    Hand scaled, polished and flossed  Used Cavitron  Oral Hygiene Instruction:  recommended brushing 2 x daily for 2 minutes MIN, recommended flossing daily, reviewed dietary precautions  Visual and Tactile Intraoral/ Extraoral evaluation: Oral and Oropharyngeal cancer evaluation  No findings     Dr Rupal Felipe exam=   Reviewed with patient clinical and radiographic findings and patient verbalized understanding  All questions and concerns addressed       REFERRALS: no referrals needed    CARIES FINDINGS: no caries noted    Next Visit: 6mrc    Last BWX: 10-7-22  Last FMX : 5-3-21
no

## 2023-01-20 NOTE — PATIENT PROFILE ADULT - FALL HARM RISK CONCLUSION
Pt arrived to room about 0630. Lupillod, IRASEMA. Sitter at bedside for safety. Pt A&O.    Attempted to call mom and update but not able to reach.   Fall with Harm Risk

## 2024-07-02 NOTE — ED ADULT NURSE NOTE - NS ED NURSE TRANSPORT WITH
How Severe Is Your Skin Lesion?: mild Has Your Skin Lesion Been Treated?: not been treated Is This A New Presentation, Or A Follow-Up?: Skin Lesion Cardiac Monitor/Defib/ACLS/Rescue Kit/O2/BVM

## 2024-08-21 NOTE — PROGRESS NOTE ADULT - ASSESSMENT
Problem: PAIN - ADULT  Goal: Verbalizes/displays adequate comfort level or baseline comfort level  Description: Interventions:  - Encourage patient to monitor pain and request assistance  - Assess pain using appropriate pain scale  - Administer analgesics based on type and severity of pain and evaluate response  - Implement non-pharmacological measures as appropriate and evaluate response  - Consider cultural and social influences on pain and pain management  - Notify physician/advanced practitioner if interventions unsuccessful or patient reports new pain  Outcome: Progressing      Patient is ready for transfer to [facility]  Coumadin dosed  Lovenox stopped  Goal INR      Patient is being discharged to [facility] today.  Discharge instructions were discussed with patient and family, all current medications were reviewed with daughter, including AC. Patient and family were educated on importance of medication compliance,  continued  care with PMD and follow-up care with the specialists in the community. Safety and fall risk precautions  were discussed in detail, counseled on healthy life style modifications.  All questions were answered to their satisfaction.

## 2024-09-13 NOTE — GOALS OF CARE CONVERSATION - ADVANCED CARE PLANNING - FUTURE HOSPITALIZATION/TRANSFER
Preventive Care Visit  [facility] YARA KELLER MD, Pediatrics  Sep 13, 2024    Assessment & Plan   12 month old, here for preventive care.    Encounter for routine child health examination w/o abnormal findings  - Hemoglobin  - sodium fluoride (VANISH) 5% white varnish 1 packet  - AL APPLICATION TOPICAL FLUORIDE VARNISH BY [facility]  - Lead Capillary  - MMR (M-M-R II)  - PNEUMOCOCCAL 20 VALENT CONJUGATE (PREVNAR 20)  - VARICELLA LIVE (VARIVAX)  - INFLUENZA VACCINE, SPLIT VIRUS, TRIVALENT,PF (FLUZONE)  - PRIMARY CARE FOLLOW-UP SCHEDULING  - Hemoglobin  - Lead Capillary      Growth      Normal OFC, length and weight    Immunizations   Appropriate vaccinations were ordered.  I provided face to face vaccine counseling, answered questions, and explained the benefits and risks of the vaccine components ordered today including:  MMR and Varicella (Chicken Pox)  Immunizations Administered       Name Date Dose VIS Date Route    Influenza, Split Virus, Trivalent, Pf (Fluzone\Fluarix) 9/13/24 10:07 AM 0.5 mL 08/06/2021,Given Today Intramuscular    MMR 9/13/24 10:07 AM 0.5 mL 08/06/2021, Given Today Subcutaneous    Pneumococcal 20 valent Conjugate (Prevnar 20) 9/13/24 10:07 AM 0.5 mL 2023, Given Today Intramuscular    Varicella 9/13/24 10:07 AM 0.5 mL 08/06/2021, Given Today Subcutaneous          Anticipatory Guidance    Reviewed age appropriate anticipatory guidance.     Referrals/Ongoing Specialty Care  None  Verbal Dental Referral: Verbal dental referral was given  Dental Fluoride Varnish: Yes, fluoride varnish application risks and benefits were discussed, and verbal consent was received.      Betzaida Magana is presenting for the following:  Well Child (12 mo)          9/13/2024     9:20 AM   Additional Questions   Accompanied by Dad   Questions for today's visit No   Surgery, major illness, or injury since last physical No           9/13/2024   Social   Lives with Parent(s)   Who takes  care of your child? Parent(s)   Recent potential stressors None   History of trauma No   Family Hx mental health challenges No   Lack of transportation has limited access to appts/meds No   Do you have housing? (Housing is defined as stable permanent housing and does not include staying ouside in a car, in a tent, in an abandoned building, in an overnight shelter, or couch-surfing.) Yes   Are you worried about losing your housing? No            9/13/2024     9:17 AM   Health Risks/Safety   What type of car seat does your child use?  Car seat with harness   Is your child's car seat forward or rear facing? Rear facing   Where does your child sit in the car?  Back seat   Do you use space heaters, wood stove, or a fireplace in your home? No   Are poisons/cleaning supplies and medications kept out of reach? Yes   Do you have guns/firearms in the home? No         9/13/2024     9:17 AM   TB Screening   Was your child born outside of the [place]? No         9/13/2024     9:17 AM   TB Screening: Consider immunosuppression as a risk factor for TB   Recent TB infection or positive TB test in family/close contacts No   Recent travel outside [place] (child/family/close contacts) No   Recent residence in high-risk group setting (correctional facility/health care facility/homeless shelter/refugee camp) No          9/13/2024     9:17 AM   Dental Screening   Has your child had cavities in the last 2 years? No   Have parents/caregivers/siblings had cavities in the last 2 years? No         9/13/2024   Diet   Questions about feeding? No   How does your child eat?  (!) BOTTLE    Spoon feeding by caregiver    Self-feeding   What does your child regularly drink? (!) FORMULA   Vitamin or supplement use None   How often does your family eat meals together? Every day   How many snacks does your child eat per day 3   Are there types of foods your child won't eat? No   In past 12 months, concerned food might run out No   In past 12 months,  "food has run out/couldn't afford more No          9/13/2024     9:17 AM   Elimination   Bowel or bladder concerns? No concerns         9/13/2024     9:17 AM   Media Use   Hours per day of screen time (for entertainment) 1         9/13/2024     9:17 AM   Sleep   Do you have any concerns about your child's sleep? No concerns, regular bedtime routine and sleeps well through the night         9/13/2024     9:17 AM   Vision/Hearing   Vision or hearing concerns No concerns         9/13/2024     9:17 AM   Development/ Social-Emotional Screen   Developmental concerns No   Does your child receive any special services? No     Development     Screening tool used, reviewed with parent/guardian: No screening tool used  Milestones (by observation/ exam/ report) 75-90% ile   SOCIAL/EMOTIONAL:   Plays games with you, like [employer]cake  LANGUAGE/COMMUNICATION:   Waves \"bye-bye\"   Calls a parent \"mama\" or \"jess\" or another special name   Understands \"no\" (pauses briefly or stops when you say it)  COGNITIVE (LEARNING, THINKING, PROBLEM-SOLVING):    Puts something in a container, like a block in a cup   Looks for things they see you hide, like a toy under a blanket  MOVEMENT/PHYSICAL DEVELOPMENT:   Pulls up to stand   Walks, holding on to furniture   Drinks from a cup without a lid, as you hold it       Objective     Exam  Pulse 122   Temp 98.1  F (36.7  C) (Axillary)   Resp 32   Ht 2' 6\" (0.762 m)   Wt 22 lb 5 oz (10.1 kg)   HC 17.8\" (45.2 cm)   SpO2 100%   BMI 17.43 kg/m    58 %ile (Z= 0.21) based on WHO (Girls, 0-2 years) head circumference-for-age based on Head Circumference recorded on 9/13/2024.  84 %ile (Z= 0.98) based on WHO (Girls, 0-2 years) weight-for-age data using vitals from 9/13/2024.  79 %ile (Z= 0.82) based on WHO (Girls, 0-2 years) Length-for-age data based on Length recorded on 9/13/2024.  80 %ile (Z= 0.84) based on WHO (Girls, 0-2 years) weight-for-recumbent length data based on body measurements available as " of 9/13/2024.    Physical Exam  GENERAL: Active, alert,  no  distress.  SKIN: No significant rash, abnormal pigmentation or lesions.  HEAD: Normocephalic. Normal fontanels and sutures.  EYES: Conjunctivae and cornea normal. Red reflexes present bilaterally. Symmetric light reflex and no eye movement on cover/uncover test  EARS: normal: no effusions, no erythema, normal landmarks  NOSE: Normal without discharge.  MOUTH/THROAT: Clear. No oral lesions.  NECK: Supple, no masses.  LYMPH NODES: No cervical adenopathy  LUNGS: Clear. No rales, rhonchi, wheezing or retractions  HEART: Regular rate and rhythm. Normal S1/S2. No murmurs. Normal femoral pulses.  ABDOMEN: Soft, non-tender, not distended, no masses or hepatosplenomegaly. Normal umbilicus and bowel sounds.   GENITALIA: Normal female external genitalia. Emmanuel stage I,  No inguinal herniae are present.  EXTREMITIES: Hips normal with symmetric creases and full range of motion. Symmetric extremities, no deformities. Bears weight symmetrically.  NEUROLOGIC: Normal tone throughout. Normal reflexes for age    Signed Electronically by: YARA NOWAK MD     Send to hospital, if necessary, based on [facility] orders

## 2024-10-20 NOTE — H&P ADULT - NSHPPHYSICALEXAM_GEN_ALL_CORE
Vital Signs Last 24 Hrs  T(F): --  HR: 69 (20 Aug 2021 11:33) (69 - 69)  BP: 132/50 (20 Aug 2021 11:33) (132/50 - 132/50)  RR: 38 (20 Aug 2021 11:33) (38 - 38)  SpO2: 100% (20 Aug 2021 11:33) (100% - 100%)    Physical Exam:  Constitutional: sedated  Neck: Soft and supple  Respiratory: Good air entry bilaterally  Cardiovascular: Regular rate and rhythm  Gastrointestinal: Soft, non-tender to palpation, +bs  Vascular: 2+ peripheral pulses  Musculoskeletal: no lower extremity edema bilaterally 78

## 2024-12-03 NOTE — ED ADULT NURSE NOTE - CAS TRG GENERAL NORM CIRC DET
Medication: hydroCHLOROthiazide 12.5 MG tablet   Medication refill denied due to duplicate request.      Strong peripheral pulses

## 2024-12-23 NOTE — CDI QUERY NOTE - NSCDIOTHERTXTBX2_GEN_ALL_CORE_FT
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 Can you please clarify?  A.	Pleural effusion negative for malignancy  B.	Other, please specify  C.	Not clinically significant    Chart documentations:      Specimen(s) Submitted  PLEURAL FLUID, RIGHT  Clinical History  None provided  Gross Description  Received: 60  ml of yellow fluid in [employer]to[employer]  Prepared: 1 ThinPrep slide, 1 cell block,smear    Final Diagnosis  PLEURAL FLUID, RIGHT  NEGATIVE FOR MALIGNANT CELLS.    Cytology slide and cell block consist of reactive and benign  mesothelial cells  in a background of inflammatory cells.        Discharge Note Provider:  R pleural effusion s/p R thoracentesis   -likely malignant
hide
